# Patient Record
Sex: MALE | Race: WHITE | Employment: OTHER | URBAN - METROPOLITAN AREA
[De-identification: names, ages, dates, MRNs, and addresses within clinical notes are randomized per-mention and may not be internally consistent; named-entity substitution may affect disease eponyms.]

---

## 2017-01-17 ENCOUNTER — LAB ENCOUNTER (OUTPATIENT)
Dept: LAB | Age: 72
End: 2017-01-17
Attending: INTERNAL MEDICINE
Payer: MEDICARE

## 2017-01-17 DIAGNOSIS — N18.30 CHRONIC KIDNEY DISEASE, STAGE III (MODERATE) (HCC): Primary | ICD-10-CM

## 2017-01-17 LAB
ALBUMIN SERPL-MCNC: 3.9 G/DL (ref 3.5–4.8)
ALP LIVER SERPL-CCNC: 101 U/L (ref 45–117)
ALT SERPL-CCNC: 20 U/L (ref 17–63)
AST SERPL-CCNC: 20 U/L (ref 15–41)
BASOPHILS # BLD AUTO: 0.08 X10(3) UL (ref 0–0.1)
BASOPHILS NFR BLD AUTO: 1.2 %
BILIRUB SERPL-MCNC: 0.4 MG/DL (ref 0.1–2)
BILIRUB UR QL STRIP.AUTO: NEGATIVE
BUN BLD-MCNC: 50 MG/DL (ref 8–20)
CALCIUM BLD-MCNC: 9 MG/DL (ref 8.3–10.3)
CHLORIDE: 106 MMOL/L (ref 101–111)
CLARITY UR REFRACT.AUTO: CLEAR
CO2: 26 MMOL/L (ref 22–32)
COLOR UR AUTO: YELLOW
CREAT BLD-MCNC: 2.68 MG/DL (ref 0.7–1.3)
CREAT UR-SCNC: 71.5 MG/DL
EOSINOPHIL # BLD AUTO: 0.42 X10(3) UL (ref 0–0.3)
EOSINOPHIL NFR BLD AUTO: 6.1 %
ERYTHROCYTE [DISTWIDTH] IN BLOOD BY AUTOMATED COUNT: 14 % (ref 11.5–16)
GLUCOSE BLD-MCNC: 200 MG/DL (ref 70–99)
GLUCOSE UR STRIP.AUTO-MCNC: 50 MG/DL
HCT VFR BLD AUTO: 34.6 % (ref 37–53)
HGB BLD-MCNC: 11.2 G/DL (ref 13–17)
HYALINE CASTS #/AREA URNS AUTO: PRESENT /LPF
IMMATURE GRANULOCYTE COUNT: 0.02 X10(3) UL (ref 0–1)
IMMATURE GRANULOCYTE RATIO %: 0.3 %
KETONES UR STRIP.AUTO-MCNC: NEGATIVE MG/DL
LEUKOCYTE ESTERASE UR QL STRIP.AUTO: NEGATIVE
LYMPHOCYTES # BLD AUTO: 1.13 X10(3) UL (ref 0.9–4)
LYMPHOCYTES NFR BLD AUTO: 16.4 %
M PROTEIN MFR SERPL ELPH: 6.7 G/DL (ref 6.1–8.3)
MCH RBC QN AUTO: 28.1 PG (ref 27–33.2)
MCHC RBC AUTO-ENTMCNC: 32.4 G/DL (ref 31–37)
MCV RBC AUTO: 86.9 FL (ref 80–99)
MONOCYTES # BLD AUTO: 0.72 X10(3) UL (ref 0.1–0.6)
MONOCYTES NFR BLD AUTO: 10.4 %
NEUTROPHIL ABS PRELIM: 4.53 X10 (3) UL (ref 1.3–6.7)
NEUTROPHILS # BLD AUTO: 4.53 X10(3) UL (ref 1.3–6.7)
NEUTROPHILS NFR BLD AUTO: 65.6 %
NITRITE UR QL STRIP.AUTO: NEGATIVE
PH UR STRIP.AUTO: 5 [PH] (ref 4.5–8)
PHOSPHATE SERPL-MCNC: 5.6 MG/DL (ref 2.5–4.9)
PLATELET # BLD AUTO: 240 10(3)UL (ref 150–450)
POTASSIUM SERPL-SCNC: 4.1 MMOL/L (ref 3.6–5.1)
PROT UR STRIP.AUTO-MCNC: 100 MG/DL
PROT UR-MCNC: 198.6 MG/DL
PTH-INTACT SERPL-MCNC: 127.6 PG/ML (ref 11.1–79.5)
RBC # BLD AUTO: 3.98 X10(6)UL (ref 3.8–5.8)
RBC UR QL AUTO: NEGATIVE
RED CELL DISTRIBUTION WIDTH-SD: 43.8 FL (ref 35.1–46.3)
SODIUM SERPL-SCNC: 142 MMOL/L (ref 136–144)
SP GR UR STRIP.AUTO: 1.01 (ref 1–1.03)
UROBILINOGEN UR STRIP.AUTO-MCNC: <2 MG/DL
WBC # BLD AUTO: 6.9 X10(3) UL (ref 4–13)

## 2017-01-17 PROCEDURE — 81001 URINALYSIS AUTO W/SCOPE: CPT

## 2017-01-17 PROCEDURE — 80053 COMPREHEN METABOLIC PANEL: CPT

## 2017-01-17 PROCEDURE — 83970 ASSAY OF PARATHORMONE: CPT

## 2017-01-17 PROCEDURE — 85025 COMPLETE CBC W/AUTO DIFF WBC: CPT

## 2017-01-17 PROCEDURE — 84156 ASSAY OF PROTEIN URINE: CPT

## 2017-01-17 PROCEDURE — 84100 ASSAY OF PHOSPHORUS: CPT

## 2017-01-17 PROCEDURE — 82570 ASSAY OF URINE CREATININE: CPT

## 2017-05-15 ENCOUNTER — LAB ENCOUNTER (OUTPATIENT)
Dept: LAB | Age: 72
End: 2017-05-15
Attending: INTERNAL MEDICINE
Payer: MEDICARE

## 2017-05-15 DIAGNOSIS — N18.30 CHRONIC KIDNEY DISEASE, STAGE III (MODERATE) (HCC): Primary | ICD-10-CM

## 2017-05-15 PROCEDURE — 80053 COMPREHEN METABOLIC PANEL: CPT

## 2017-06-22 ENCOUNTER — APPOINTMENT (OUTPATIENT)
Dept: LAB | Facility: HOSPITAL | Age: 72
End: 2017-06-22
Attending: OPHTHALMOLOGY
Payer: MEDICARE

## 2017-06-22 ENCOUNTER — EKG ENCOUNTER (OUTPATIENT)
Dept: LAB | Facility: HOSPITAL | Age: 72
End: 2017-06-22
Attending: OPHTHALMOLOGY
Payer: MEDICARE

## 2017-06-22 DIAGNOSIS — H59.021 RETAINED LENS MATERIAL FOLLOWING CATARACT SURGERY OF RIGHT EYE: Primary | ICD-10-CM

## 2017-06-22 PROCEDURE — 80053 COMPREHEN METABOLIC PANEL: CPT

## 2017-06-22 PROCEDURE — 36415 COLL VENOUS BLD VENIPUNCTURE: CPT

## 2017-06-22 PROCEDURE — 93005 ELECTROCARDIOGRAM TRACING: CPT

## 2017-06-22 PROCEDURE — 93010 ELECTROCARDIOGRAM REPORT: CPT | Performed by: INTERNAL MEDICINE

## 2017-08-24 ENCOUNTER — LAB ENCOUNTER (OUTPATIENT)
Dept: LAB | Age: 72
End: 2017-08-24
Attending: INTERNAL MEDICINE
Payer: MEDICARE

## 2017-08-24 DIAGNOSIS — N25.0 RENAL OSTEODYSTROPHY: ICD-10-CM

## 2017-08-24 DIAGNOSIS — N18.4 CHRONIC KIDNEY DISEASE, STAGE IV (SEVERE) (HCC): Primary | ICD-10-CM

## 2017-08-24 LAB
BUN BLD-MCNC: 55 MG/DL (ref 8–20)
CALCIUM BLD-MCNC: 9.3 MG/DL (ref 8.3–10.3)
CHLORIDE: 106 MMOL/L (ref 101–111)
CO2: 26 MMOL/L (ref 22–32)
CREAT BLD-MCNC: 2.93 MG/DL (ref 0.7–1.3)
GLUCOSE BLD-MCNC: 168 MG/DL (ref 70–99)
PHOSPHATE SERPL-MCNC: 4.6 MG/DL (ref 2.5–4.9)
POTASSIUM SERPL-SCNC: 3.9 MMOL/L (ref 3.6–5.1)
PTH-INTACT SERPL-MCNC: 113.4 PG/ML (ref 11.1–79.5)
SODIUM SERPL-SCNC: 142 MMOL/L (ref 136–144)

## 2017-08-24 PROCEDURE — 84100 ASSAY OF PHOSPHORUS: CPT

## 2017-08-24 PROCEDURE — 83970 ASSAY OF PARATHORMONE: CPT

## 2017-08-24 PROCEDURE — 80048 BASIC METABOLIC PNL TOTAL CA: CPT

## 2017-12-21 PROBLEM — I50.33 ACUTE ON CHRONIC DIASTOLIC CHF (CONGESTIVE HEART FAILURE) (HCC): Status: ACTIVE | Noted: 2017-12-21

## 2017-12-28 ENCOUNTER — LAB ENCOUNTER (OUTPATIENT)
Dept: LAB | Age: 72
End: 2017-12-28
Attending: INTERNAL MEDICINE
Payer: MEDICARE

## 2017-12-28 DIAGNOSIS — N25.0 RENAL OSTEODYSTROPHY: ICD-10-CM

## 2017-12-28 DIAGNOSIS — N18.4 CHRONIC KIDNEY DISEASE, STAGE IV (SEVERE) (HCC): Primary | ICD-10-CM

## 2017-12-28 PROCEDURE — 81001 URINALYSIS AUTO W/SCOPE: CPT

## 2017-12-28 PROCEDURE — 83970 ASSAY OF PARATHORMONE: CPT

## 2017-12-28 PROCEDURE — 84156 ASSAY OF PROTEIN URINE: CPT

## 2017-12-28 PROCEDURE — 85025 COMPLETE CBC W/AUTO DIFF WBC: CPT

## 2017-12-28 PROCEDURE — 82570 ASSAY OF URINE CREATININE: CPT

## 2017-12-28 PROCEDURE — 84100 ASSAY OF PHOSPHORUS: CPT

## 2017-12-28 PROCEDURE — 80053 COMPREHEN METABOLIC PANEL: CPT

## 2018-01-08 ENCOUNTER — LAB ENCOUNTER (OUTPATIENT)
Dept: LAB | Age: 73
End: 2018-01-08
Attending: INTERNAL MEDICINE
Payer: MEDICARE

## 2018-01-08 DIAGNOSIS — D64.9 ANEMIA, UNSPECIFIED: Primary | ICD-10-CM

## 2018-01-08 LAB
FOLATE (FOLIC ACID), SERUM: 11.7 NG/ML (ref 8.7–24)
HAV AB SERPL IA-ACNC: 254 PG/ML (ref 193–986)
IRON SATURATION: 14 % (ref 13–45)
IRON: 46 UG/DL (ref 45–182)
TOTAL IRON BINDING CAPACITY: 326 UG/DL (ref 298–536)
TRANSFERRIN: 219 MG/DL (ref 200–360)

## 2018-01-08 PROCEDURE — 83550 IRON BINDING TEST: CPT

## 2018-01-08 PROCEDURE — 82746 ASSAY OF FOLIC ACID SERUM: CPT

## 2018-01-08 PROCEDURE — 82607 VITAMIN B-12: CPT

## 2018-01-08 PROCEDURE — 83540 ASSAY OF IRON: CPT

## 2018-01-17 PROCEDURE — 86334 IMMUNOFIX E-PHORESIS SERUM: CPT | Performed by: INTERNAL MEDICINE

## 2018-01-17 PROCEDURE — 84165 PROTEIN E-PHORESIS SERUM: CPT | Performed by: INTERNAL MEDICINE

## 2018-01-17 PROCEDURE — 83921 ORGANIC ACID SINGLE QUANT: CPT | Performed by: INTERNAL MEDICINE

## 2018-01-17 PROCEDURE — 83883 ASSAY NEPHELOMETRY NOT SPEC: CPT | Performed by: INTERNAL MEDICINE

## 2018-01-17 PROCEDURE — 82668 ASSAY OF ERYTHROPOIETIN: CPT | Performed by: INTERNAL MEDICINE

## 2018-01-19 PROBLEM — N18.9 ANEMIA ASSOCIATED WITH CHRONIC RENAL FAILURE: Status: ACTIVE | Noted: 2018-01-19

## 2018-01-19 PROBLEM — D63.1 ANEMIA ASSOCIATED WITH CHRONIC RENAL FAILURE: Status: ACTIVE | Noted: 2018-01-19

## 2018-01-22 PROBLEM — E53.8 B12 DEFICIENCY: Status: ACTIVE | Noted: 2018-01-22

## 2018-02-12 ENCOUNTER — HOSPITAL ENCOUNTER (EMERGENCY)
Facility: HOSPITAL | Age: 73
Discharge: HOME | End: 2018-02-12
Attending: EMERGENCY MEDICINE
Payer: MEDICARE

## 2018-02-12 VITALS
HEART RATE: 54 BPM | HEIGHT: 72 IN | RESPIRATION RATE: 16 BRPM | SYSTOLIC BLOOD PRESSURE: 155 MMHG | DIASTOLIC BLOOD PRESSURE: 62 MMHG | OXYGEN SATURATION: 100 % | TEMPERATURE: 97 F | WEIGHT: 250 LBS | BODY MASS INDEX: 33.86 KG/M2

## 2018-02-12 DIAGNOSIS — E10.649 HYPOGLYCEMIC INSULIN REACTION IN TYPE 1 DIABETES MELLITUS (HCC): Primary | ICD-10-CM

## 2018-02-12 LAB
ALBUMIN SERPL-MCNC: 4 G/DL (ref 3.5–4.8)
ALP LIVER SERPL-CCNC: 100 U/L (ref 45–117)
ALT SERPL-CCNC: 15 U/L (ref 17–63)
AST SERPL-CCNC: 22 U/L (ref 15–41)
ATRIAL RATE: 55 BPM
BASOPHILS # BLD AUTO: 0.07 X10(3) UL (ref 0–0.1)
BASOPHILS NFR BLD AUTO: 0.7 %
BILIRUB SERPL-MCNC: 0.6 MG/DL (ref 0.1–2)
BUN BLD-MCNC: 54 MG/DL (ref 8–20)
CALCIUM BLD-MCNC: 9.5 MG/DL (ref 8.3–10.3)
CHLORIDE: 106 MMOL/L (ref 101–111)
CO2: 26 MMOL/L (ref 22–32)
CREAT BLD-MCNC: 3.26 MG/DL (ref 0.7–1.3)
EOSINOPHIL # BLD AUTO: 0.32 X10(3) UL (ref 0–0.3)
EOSINOPHIL NFR BLD AUTO: 3.4 %
ERYTHROCYTE [DISTWIDTH] IN BLOOD BY AUTOMATED COUNT: 13.6 % (ref 11.5–16)
GLUCOSE BLD-MCNC: 118 MG/DL (ref 65–99)
GLUCOSE BLD-MCNC: 79 MG/DL (ref 70–99)
GLUCOSE BLD-MCNC: 96 MG/DL (ref 65–99)
HAV IGM SER QL: 2.5 MG/DL (ref 1.7–3)
HCT VFR BLD AUTO: 30.4 % (ref 37–53)
HGB BLD-MCNC: 10 G/DL (ref 13–17)
IMMATURE GRANULOCYTE COUNT: 0.06 X10(3) UL (ref 0–1)
IMMATURE GRANULOCYTE RATIO %: 0.6 %
LYMPHOCYTES # BLD AUTO: 0.49 X10(3) UL (ref 0.9–4)
LYMPHOCYTES NFR BLD AUTO: 5.2 %
M PROTEIN MFR SERPL ELPH: 7.6 G/DL (ref 6.1–8.3)
MCH RBC QN AUTO: 27.9 PG (ref 27–33.2)
MCHC RBC AUTO-ENTMCNC: 32.9 G/DL (ref 31–37)
MCV RBC AUTO: 84.7 FL (ref 80–99)
MONOCYTES # BLD AUTO: 0.56 X10(3) UL (ref 0.1–1)
MONOCYTES NFR BLD AUTO: 6 %
NEUTROPHIL ABS PRELIM: 7.91 X10 (3) UL (ref 1.3–6.7)
NEUTROPHILS # BLD AUTO: 7.91 X10(3) UL (ref 1.3–6.7)
NEUTROPHILS NFR BLD AUTO: 84.1 %
P AXIS: 67 DEGREES
P-R INTERVAL: 254 MS
PLATELET # BLD AUTO: 257 10(3)UL (ref 150–450)
POTASSIUM SERPL-SCNC: 3.2 MMOL/L (ref 3.6–5.1)
Q-T INTERVAL: 510 MS
QRS DURATION: 116 MS
QTC CALCULATION (BEZET): 487 MS
R AXIS: 64 DEGREES
RBC # BLD AUTO: 3.59 X10(6)UL (ref 3.8–5.8)
RED CELL DISTRIBUTION WIDTH-SD: 41.8 FL (ref 35.1–46.3)
SODIUM SERPL-SCNC: 141 MMOL/L (ref 136–144)
T AXIS: 127 DEGREES
VENTRICULAR RATE: 55 BPM
WBC # BLD AUTO: 9.4 X10(3) UL (ref 4–13)

## 2018-02-12 PROCEDURE — 82962 GLUCOSE BLOOD TEST: CPT

## 2018-02-12 PROCEDURE — 99285 EMERGENCY DEPT VISIT HI MDM: CPT

## 2018-02-12 PROCEDURE — 80053 COMPREHEN METABOLIC PANEL: CPT | Performed by: EMERGENCY MEDICINE

## 2018-02-12 PROCEDURE — 96360 HYDRATION IV INFUSION INIT: CPT

## 2018-02-12 PROCEDURE — 83735 ASSAY OF MAGNESIUM: CPT | Performed by: EMERGENCY MEDICINE

## 2018-02-12 PROCEDURE — 96361 HYDRATE IV INFUSION ADD-ON: CPT

## 2018-02-12 PROCEDURE — 93010 ELECTROCARDIOGRAM REPORT: CPT

## 2018-02-12 PROCEDURE — 93005 ELECTROCARDIOGRAM TRACING: CPT

## 2018-02-12 PROCEDURE — 85025 COMPLETE CBC W/AUTO DIFF WBC: CPT | Performed by: EMERGENCY MEDICINE

## 2018-02-12 RX ORDER — SODIUM CHLORIDE 9 MG/ML
125 INJECTION, SOLUTION INTRAVENOUS CONTINUOUS
Status: DISCONTINUED | OUTPATIENT
Start: 2018-02-12 | End: 2018-02-12

## 2018-02-12 NOTE — ED PROVIDER NOTES
Patient Seen in: [facility] Emergency Department    History   Patient presents with:  Fall (musculoskeletal, neurologic)  Hypoglycemia (metabolic)    Stated Complaint: fall    HPI    71-year-old male complaining of hypoglycemia.   This patient  Has a Reena Nelson MD;  Location: [address] [place] [address]                MANAGEMENT  1/25/2016: INJECTION, W/WO CONTRAST, DX/THERAPEUTIC SUBST* N/A      Comment: Procedure: LUMBAR EPIDURAL;  Surgeon: Lyndsay Casanova MD;  Location: [address] [place] [address] PAIN                MANAGEMENT  1/25/2016: PATIENT [place] Mihir PREOPERATIVE ORDER FOR IV ANT* N/A      Comment: Procedure: LUMBAR EPIDURAL;  Surgeon: Micaela Hamman, MD;  Location: [address] [place] [address]                MANAGEMENT  3/2/2016: Ermias Snow limits   CBC W/ DIFFERENTIAL - Abnormal; Notable for the following:     RBC 3.59 (*)     HGB 10.0 (*)     HCT 30.4 (*)     Neutrophil Absolute Prelim 7.91 (*)     Neutrophil Absolute 7.91 (*)     Lymphocyte Absolute 0.49 (*)     Eosinophil Absolute 0.32 Harrison Monzon [facility])  (primary encounter diagnosis)    Disposition:  Discharge  2/12/2018 11:27 am    Follow-up:  Hans Lousi MD  [address]      In 2 days  As needed        Medications Prescribed:  Discharge Medication List as

## 2018-07-03 ENCOUNTER — LAB ENCOUNTER (OUTPATIENT)
Dept: LAB | Age: 73
End: 2018-07-03
Attending: INTERNAL MEDICINE
Payer: MEDICARE

## 2018-07-03 DIAGNOSIS — N25.0 RENAL OSTEODYSTROPHY: ICD-10-CM

## 2018-07-03 DIAGNOSIS — N18.4 CHRONIC KIDNEY DISEASE, STAGE IV (SEVERE) (HCC): Primary | ICD-10-CM

## 2018-07-03 LAB
BUN BLD-MCNC: 86 MG/DL (ref 8–20)
CALCIUM BLD-MCNC: 9.7 MG/DL (ref 8.3–10.3)
CHLORIDE: 99 MMOL/L (ref 101–111)
CO2: 30 MMOL/L (ref 22–32)
CREAT BLD-MCNC: 3.83 MG/DL (ref 0.7–1.3)
GLUCOSE BLD-MCNC: 120 MG/DL (ref 70–99)
PHOSPHATE SERPL-MCNC: 4.1 MG/DL (ref 2.5–4.9)
POTASSIUM SERPL-SCNC: 3.3 MMOL/L (ref 3.6–5.1)
PTH-INTACT SERPL-MCNC: 155 PG/ML (ref 11.1–79.5)
SODIUM SERPL-SCNC: 141 MMOL/L (ref 136–144)

## 2018-07-03 PROCEDURE — 84100 ASSAY OF PHOSPHORUS: CPT

## 2018-07-03 PROCEDURE — 82607 VITAMIN B-12: CPT | Performed by: INTERNAL MEDICINE

## 2018-07-03 PROCEDURE — 83970 ASSAY OF PARATHORMONE: CPT

## 2018-07-03 PROCEDURE — 80048 BASIC METABOLIC PNL TOTAL CA: CPT

## 2018-07-03 PROCEDURE — 82746 ASSAY OF FOLIC ACID SERUM: CPT | Performed by: INTERNAL MEDICINE

## 2018-09-05 ENCOUNTER — LAB ENCOUNTER (OUTPATIENT)
Dept: LAB | Age: 73
End: 2018-09-05
Attending: INTERNAL MEDICINE
Payer: MEDICARE

## 2018-09-05 DIAGNOSIS — N25.0 RENAL OSTEODYSTROPHY: Primary | ICD-10-CM

## 2018-09-05 LAB
ALBUMIN SERPL-MCNC: 3.7 G/DL (ref 3.5–4.8)
ALBUMIN/GLOB SERPL: 1.2 {RATIO} (ref 1–2)
ALP LIVER SERPL-CCNC: 91 U/L (ref 45–117)
ALT SERPL-CCNC: 13 U/L (ref 17–63)
ANION GAP SERPL CALC-SCNC: 9 MMOL/L (ref 0–18)
AST SERPL-CCNC: 16 U/L (ref 15–41)
BILIRUB SERPL-MCNC: 0.5 MG/DL (ref 0.1–2)
BUN BLD-MCNC: 59 MG/DL (ref 8–20)
BUN/CREAT SERPL: 18.7 (ref 10–20)
CALCIUM BLD-MCNC: 8.6 MG/DL (ref 8.3–10.3)
CHLORIDE SERPL-SCNC: 103 MMOL/L (ref 101–111)
CO2 SERPL-SCNC: 28 MMOL/L (ref 22–32)
CREAT BLD-MCNC: 3.16 MG/DL (ref 0.7–1.3)
GLOBULIN PLAS-MCNC: 3.1 G/DL (ref 2.5–4)
GLUCOSE BLD-MCNC: 144 MG/DL (ref 70–99)
M PROTEIN MFR SERPL ELPH: 6.8 G/DL (ref 6.1–8.3)
OSMOLALITY SERPL CALC.SUM OF ELEC: 309 MOSM/KG (ref 275–295)
PHOSPHATE SERPL-MCNC: 4.8 MG/DL (ref 2.5–4.9)
POTASSIUM SERPL-SCNC: 3.4 MMOL/L (ref 3.6–5.1)
PTH-INTACT SERPL-MCNC: 156.8 PG/ML (ref 11.1–79.5)
SODIUM SERPL-SCNC: 140 MMOL/L (ref 136–144)

## 2018-09-05 PROCEDURE — 80053 COMPREHEN METABOLIC PANEL: CPT

## 2018-09-05 PROCEDURE — 84100 ASSAY OF PHOSPHORUS: CPT

## 2018-09-05 PROCEDURE — 83970 ASSAY OF PARATHORMONE: CPT

## 2018-11-14 ENCOUNTER — LAB ENCOUNTER (OUTPATIENT)
Dept: LAB | Age: 73
End: 2018-11-14
Attending: INTERNAL MEDICINE
Payer: MEDICARE

## 2018-11-14 DIAGNOSIS — N25.0 RENAL OSTEODYSTROPHY: Primary | ICD-10-CM

## 2018-11-14 PROCEDURE — 84100 ASSAY OF PHOSPHORUS: CPT

## 2018-11-14 PROCEDURE — 80053 COMPREHEN METABOLIC PANEL: CPT

## 2018-11-14 PROCEDURE — 83970 ASSAY OF PARATHORMONE: CPT

## 2018-11-19 ENCOUNTER — LAB ENCOUNTER (OUTPATIENT)
Dept: LAB | Age: 73
End: 2018-11-19
Attending: INTERNAL MEDICINE
Payer: MEDICARE

## 2018-11-19 DIAGNOSIS — N17.9 AKI (ACUTE KIDNEY INJURY) (HCC): Primary | ICD-10-CM

## 2018-11-19 PROCEDURE — 80048 BASIC METABOLIC PNL TOTAL CA: CPT

## 2018-11-24 ENCOUNTER — LAB ENCOUNTER (OUTPATIENT)
Dept: LAB | Age: 73
End: 2018-11-24
Attending: INTERNAL MEDICINE
Payer: MEDICARE

## 2018-11-24 DIAGNOSIS — N17.9 ACUTE KIDNEY FAILURE, UNSPECIFIED (HCC): Primary | ICD-10-CM

## 2018-11-24 PROCEDURE — 80048 BASIC METABOLIC PNL TOTAL CA: CPT

## 2019-02-06 ENCOUNTER — APPOINTMENT (OUTPATIENT)
Dept: GENERAL RADIOLOGY | Facility: HOSPITAL | Age: 74
DRG: 291 | End: 2019-02-06
Payer: MEDICARE

## 2019-02-06 ENCOUNTER — LAB ENCOUNTER (OUTPATIENT)
Dept: LAB | Age: 74
DRG: 291 | End: 2019-02-06
Attending: INTERNAL MEDICINE
Payer: MEDICARE

## 2019-02-06 ENCOUNTER — HOSPITAL ENCOUNTER (INPATIENT)
Facility: HOSPITAL | Age: 74
LOS: 3 days | Discharge: HOME | DRG: 291 | End: 2019-02-09
Attending: STUDENT IN AN ORGANIZED HEALTH CARE EDUCATION/TRAINING PROGRAM | Admitting: INTERNAL MEDICINE
Payer: MEDICARE

## 2019-02-06 DIAGNOSIS — R50.9 FEVER, UNSPECIFIED FEVER CAUSE: ICD-10-CM

## 2019-02-06 DIAGNOSIS — R05.9 COUGH: ICD-10-CM

## 2019-02-06 DIAGNOSIS — N19 RENAL FAILURE, UNSPECIFIED CHRONICITY: ICD-10-CM

## 2019-02-06 DIAGNOSIS — N25.0 RENAL OSTEODYSTROPHY: ICD-10-CM

## 2019-02-06 DIAGNOSIS — R11.2 NAUSEA AND VOMITING, INTRACTABILITY OF VOMITING NOT SPECIFIED, UNSPECIFIED VOMITING TYPE: ICD-10-CM

## 2019-02-06 DIAGNOSIS — I50.9 ACUTE ON CHRONIC CONGESTIVE HEART FAILURE, UNSPECIFIED HEART FAILURE TYPE (HCC): Primary | ICD-10-CM

## 2019-02-06 DIAGNOSIS — J81.0 ACUTE PULMONARY EDEMA (HCC): ICD-10-CM

## 2019-02-06 DIAGNOSIS — N18.4 CHRONIC KIDNEY DISEASE, STAGE IV (SEVERE) (HCC): Primary | ICD-10-CM

## 2019-02-06 LAB
ALBUMIN SERPL-MCNC: 3.2 G/DL (ref 3.1–4.5)
ALBUMIN SERPL-MCNC: 3.5 G/DL (ref 3.1–4.5)
ALBUMIN/GLOB SERPL: 0.8 {RATIO} (ref 1–2)
ALBUMIN/GLOB SERPL: 1.1 {RATIO} (ref 1–2)
ALLENS TEST: POSITIVE
ALP LIVER SERPL-CCNC: 118 U/L (ref 45–117)
ALP LIVER SERPL-CCNC: 135 U/L (ref 45–117)
ALT SERPL-CCNC: 13 U/L (ref 17–63)
ALT SERPL-CCNC: 15 U/L (ref 17–63)
ANION GAP SERPL CALC-SCNC: 7 MMOL/L (ref 0–18)
ANION GAP SERPL CALC-SCNC: 9 MMOL/L (ref 0–18)
APTT PPP: 36.1 SECONDS (ref 26.1–34.6)
ARTERIAL BLD GAS O2 SATURATION: 98 % (ref 92–100)
ARTERIAL BLOOD GAS BASE EXCESS: -0.7
ARTERIAL BLOOD GAS HCO3: 22.9 MEQ/L (ref 22–26)
ARTERIAL BLOOD GAS PCO2: 34 MM HG (ref 35–45)
ARTERIAL BLOOD GAS PH: 7.45 (ref 7.35–7.45)
ARTERIAL BLOOD GAS PO2: 185 MM HG (ref 80–105)
AST SERPL-CCNC: 18 U/L (ref 15–41)
AST SERPL-CCNC: 22 U/L (ref 15–41)
BASOPHILS # BLD AUTO: 0.05 X10(3) UL (ref 0–0.2)
BASOPHILS NFR BLD AUTO: 0.4 %
BILIRUB SERPL-MCNC: 0.5 MG/DL (ref 0.1–2)
BILIRUB SERPL-MCNC: 0.7 MG/DL (ref 0.1–2)
BUN BLD-MCNC: 42 MG/DL (ref 8–20)
BUN BLD-MCNC: 51 MG/DL (ref 8–20)
BUN/CREAT SERPL: 10.9 (ref 10–20)
BUN/CREAT SERPL: 9.8 (ref 10–20)
CALCIUM BLD-MCNC: 8.2 MG/DL (ref 8.3–10.3)
CALCIUM BLD-MCNC: 9.1 MG/DL (ref 8.3–10.3)
CALCULATED O2 SATURATION: 100 % (ref 92–100)
CARBOXYHEMOGLOBIN: 1.2 % SAT (ref 0–3)
CHLORIDE SERPL-SCNC: 107 MMOL/L (ref 101–111)
CHLORIDE SERPL-SCNC: 108 MMOL/L (ref 101–111)
CO2 SERPL-SCNC: 25 MMOL/L (ref 22–32)
CO2 SERPL-SCNC: 25 MMOL/L (ref 22–32)
CREAT BLD-MCNC: 4.27 MG/DL (ref 0.7–1.3)
CREAT BLD-MCNC: 4.69 MG/DL (ref 0.7–1.3)
DEPRECATED RDW RBC AUTO: 45.3 FL (ref 35.1–46.3)
EOSINOPHIL # BLD AUTO: 0.26 X10(3) UL (ref 0–0.7)
EOSINOPHIL NFR BLD AUTO: 1.8 %
ERYTHROCYTE [DISTWIDTH] IN BLOOD BY AUTOMATED COUNT: 14.5 % (ref 11–15)
EXPIRATORY PRESSURE: 6 CM H2O
FIO2: 50 %
GLOBULIN PLAS-MCNC: 3 G/DL (ref 2.8–4.4)
GLOBULIN PLAS-MCNC: 4.3 G/DL (ref 2.8–4.4)
GLUCOSE BLD-MCNC: 145 MG/DL (ref 70–99)
GLUCOSE BLD-MCNC: 209 MG/DL (ref 70–99)
HCT VFR BLD AUTO: 29.6 % (ref 39–53)
HGB BLD-MCNC: 9.8 G/DL (ref 13–17.5)
IMM GRANULOCYTES # BLD AUTO: 0.07 X10(3) UL (ref 0–1)
IMM GRANULOCYTES NFR BLD: 0.5 %
INR BLD: 1.24 (ref 0.9–1.1)
INSP PRESSURE: 14 CM H2O
LACTIC ACID: 2 MMOL/L (ref 0.5–2)
LYMPHOCYTES # BLD AUTO: 0.34 X10(3) UL (ref 1–4)
LYMPHOCYTES NFR BLD AUTO: 2.4 %
M PROTEIN MFR SERPL ELPH: 6.2 G/DL (ref 6.4–8.2)
M PROTEIN MFR SERPL ELPH: 7.8 G/DL (ref 6.4–8.2)
MCH RBC QN AUTO: 28.7 PG (ref 26–34)
MCHC RBC AUTO-ENTMCNC: 33.1 G/DL (ref 31–37)
MCV RBC AUTO: 86.5 FL (ref 80–100)
METHEMOGLOBIN: 0.7 % SAT (ref 0.4–1.5)
MONOCYTES # BLD AUTO: 0.82 X10(3) UL (ref 0.1–1)
MONOCYTES NFR BLD AUTO: 5.8 %
NEUTROPHILS # BLD AUTO: 12.67 X10 (3) UL (ref 1.5–7.7)
NEUTROPHILS # BLD AUTO: 12.67 X10(3) UL (ref 1.5–7.7)
NEUTROPHILS NFR BLD AUTO: 89.1 %
OSMOLALITY SERPL CALC.SUM OF ELEC: 303 MOSM/KG (ref 275–295)
OSMOLALITY SERPL CALC.SUM OF ELEC: 312 MOSM/KG (ref 275–295)
P/F RATIO: 368.4 MMHG
PATIENT TEMPERATURE: 99 F
PHOSPHATE SERPL-MCNC: 3.9 MG/DL (ref 2.5–4.9)
PLATELET # BLD AUTO: 226 10(3)UL (ref 150–450)
POTASSIUM SERPL-SCNC: 4.1 MMOL/L (ref 3.6–5.1)
POTASSIUM SERPL-SCNC: 4.3 MMOL/L (ref 3.6–5.1)
PRO-BETA NATRIURETIC PEPTIDE: 7352 PG/ML (ref ?–125)
PROCALCITONIN SERPL-MCNC: 0.17 NG/ML
PSA SERPL DL<=0.01 NG/ML-MCNC: 16.1 SECONDS (ref 12.4–14.7)
PTH-INTACT SERPL-MCNC: 233.5 PG/ML (ref 11.1–79.5)
RBC # BLD AUTO: 3.42 X10(6)UL (ref 3.8–5.8)
SODIUM SERPL-SCNC: 140 MMOL/L (ref 136–144)
SODIUM SERPL-SCNC: 141 MMOL/L (ref 136–144)
TOTAL HEMOGLOBIN: 9.4 G/DL (ref 12.6–17.4)
TROPONIN I SERPL-MCNC: <0.046 NG/ML (ref ?–0.05)
VENT RATE: 16 /MIN
WBC # BLD AUTO: 14.2 X10(3) UL (ref 4–11)

## 2019-02-06 PROCEDURE — 71045 X-RAY EXAM CHEST 1 VIEW: CPT | Performed by: STUDENT IN AN ORGANIZED HEALTH CARE EDUCATION/TRAINING PROGRAM

## 2019-02-06 PROCEDURE — 83970 ASSAY OF PARATHORMONE: CPT

## 2019-02-06 PROCEDURE — 80053 COMPREHEN METABOLIC PANEL: CPT

## 2019-02-06 PROCEDURE — 84100 ASSAY OF PHOSPHORUS: CPT

## 2019-02-06 PROCEDURE — 5A09357 ASSISTANCE WITH RESPIRATORY VENTILATION, LESS THAN 24 CONSECUTIVE HOURS, CONTINUOUS POSITIVE AIRWAY PRESSURE: ICD-10-PCS | Performed by: INTERNAL MEDICINE

## 2019-02-06 RX ORDER — FUROSEMIDE 10 MG/ML
40 INJECTION INTRAMUSCULAR; INTRAVENOUS ONCE
Status: COMPLETED | OUTPATIENT
Start: 2019-02-06 | End: 2019-02-06

## 2019-02-06 RX ORDER — HYDRALAZINE HYDROCHLORIDE 50 MG/1
100 TABLET, FILM COATED ORAL 3 TIMES DAILY
Status: DISCONTINUED | OUTPATIENT
Start: 2019-02-06 | End: 2019-02-09

## 2019-02-06 RX ORDER — ACETAMINOPHEN 500 MG
TABLET ORAL
Status: DISPENSED
Start: 2019-02-06 | End: 2019-02-07

## 2019-02-06 RX ORDER — FUROSEMIDE 10 MG/ML
40 INJECTION INTRAMUSCULAR; INTRAVENOUS
Status: DISCONTINUED | OUTPATIENT
Start: 2019-02-07 | End: 2019-02-09

## 2019-02-06 RX ORDER — AMLODIPINE BESYLATE 5 MG/1
10 TABLET ORAL
Status: DISCONTINUED | OUTPATIENT
Start: 2019-02-07 | End: 2019-02-09

## 2019-02-06 RX ORDER — CARVEDILOL 12.5 MG/1
25 TABLET ORAL
Status: DISCONTINUED | OUTPATIENT
Start: 2019-02-07 | End: 2019-02-09

## 2019-02-06 RX ORDER — HYDRALAZINE HYDROCHLORIDE 20 MG/ML
10 INJECTION INTRAMUSCULAR; INTRAVENOUS
Status: DISCONTINUED | OUTPATIENT
Start: 2019-02-06 | End: 2019-02-09

## 2019-02-06 RX ORDER — CLONIDINE HYDROCHLORIDE 0.1 MG/1
0.1 TABLET ORAL 2 TIMES DAILY
Status: DISCONTINUED | OUTPATIENT
Start: 2019-02-06 | End: 2019-02-09

## 2019-02-06 RX ORDER — ATORVASTATIN CALCIUM 80 MG/1
80 TABLET, FILM COATED ORAL NIGHTLY
Status: DISCONTINUED | OUTPATIENT
Start: 2019-02-06 | End: 2019-02-09

## 2019-02-06 RX ORDER — ASPIRIN 325 MG
325 TABLET, DELAYED RELEASE (ENTERIC COATED) ORAL DAILY
Status: DISCONTINUED | OUTPATIENT
Start: 2019-02-06 | End: 2019-02-09

## 2019-02-06 RX ORDER — ACETAMINOPHEN 500 MG
1000 TABLET ORAL ONCE
Status: COMPLETED | OUTPATIENT
Start: 2019-02-06 | End: 2019-02-06

## 2019-02-06 RX ORDER — NITROGLYCERIN 20 MG/100ML
200 INJECTION INTRAVENOUS
Status: DISCONTINUED | OUTPATIENT
Start: 2019-02-06 | End: 2019-02-09

## 2019-02-06 RX ORDER — ONDANSETRON 2 MG/ML
4 INJECTION INTRAMUSCULAR; INTRAVENOUS ONCE
Status: COMPLETED | OUTPATIENT
Start: 2019-02-06 | End: 2019-02-06

## 2019-02-07 ENCOUNTER — APPOINTMENT (OUTPATIENT)
Dept: INTERVENTIONAL RADIOLOGY/VASCULAR | Facility: HOSPITAL | Age: 74
DRG: 291 | End: 2019-02-07
Attending: INTERNAL MEDICINE
Payer: MEDICARE

## 2019-02-07 ENCOUNTER — APPOINTMENT (OUTPATIENT)
Dept: CV DIAGNOSTICS | Facility: HOSPITAL | Age: 74
DRG: 291 | End: 2019-02-07
Attending: INTERNAL MEDICINE
Payer: MEDICARE

## 2019-02-07 PROBLEM — N18.6 ESRD (END STAGE RENAL DISEASE) (HCC): Status: ACTIVE | Noted: 2019-02-06

## 2019-02-07 LAB
ADENOVIRUS PCR:: NEGATIVE
ANION GAP SERPL CALC-SCNC: 5 MMOL/L (ref 0–18)
B PERT DNA SPEC QL NAA+PROBE: NEGATIVE
BILIRUB UR QL STRIP.AUTO: NEGATIVE
BUN BLD-MCNC: 49 MG/DL (ref 8–20)
BUN/CREAT SERPL: 10.6 (ref 10–20)
C PNEUM DNA SPEC QL NAA+PROBE: NEGATIVE
CALCIUM BLD-MCNC: 8.3 MG/DL (ref 8.3–10.3)
CHLORIDE SERPL-SCNC: 110 MMOL/L (ref 101–111)
CLARITY UR REFRACT.AUTO: CLEAR
CO2 SERPL-SCNC: 27 MMOL/L (ref 22–32)
CORONAVIRUS 229E PCR:: POSITIVE
CORONAVIRUS HKU1 PCR:: NEGATIVE
CORONAVIRUS NL63 PCR:: NEGATIVE
CORONAVIRUS OC43 PCR:: NEGATIVE
CREAT BLD-MCNC: 4.63 MG/DL (ref 0.7–1.3)
DEPRECATED HBV CORE AB SER IA-ACNC: 241.7 NG/ML (ref 30–530)
EST. AVERAGE GLUCOSE BLD GHB EST-MCNC: 160 MG/DL (ref 68–126)
FLUAV RNA SPEC QL NAA+PROBE: NEGATIVE
FLUBV RNA SPEC QL NAA+PROBE: NEGATIVE
GLUCOSE BLD-MCNC: 113 MG/DL (ref 70–99)
GLUCOSE BLD-MCNC: 116 MG/DL (ref 65–99)
GLUCOSE BLD-MCNC: 125 MG/DL (ref 65–99)
GLUCOSE BLD-MCNC: 138 MG/DL (ref 65–99)
GLUCOSE BLD-MCNC: 152 MG/DL (ref 65–99)
GLUCOSE UR STRIP.AUTO-MCNC: 50 MG/DL
HAV IGM SER QL: 2.4 MG/DL (ref 1.8–2.5)
HBA1C MFR BLD HPLC: 7.2 % (ref ?–5.7)
HBV SURFACE AG SER-ACNC: <0.1 [IU]/L
HBV SURFACE AG SERPL QL IA: NONREACTIVE
IRON SATURATION: 10 % (ref 20–50)
IRON: 21 UG/DL (ref 45–182)
KETONES UR STRIP.AUTO-MCNC: NEGATIVE MG/DL
LEUKOCYTE ESTERASE UR QL STRIP.AUTO: NEGATIVE
METAPNEUMOVIRUS PCR:: NEGATIVE
MYCOPLASMA PNEUMONIA PCR:: NEGATIVE
NITRITE UR QL STRIP.AUTO: NEGATIVE
OSMOLALITY SERPL CALC.SUM OF ELEC: 308 MOSM/KG (ref 275–295)
PARAINFLUENZA 1 PCR:: NEGATIVE
PARAINFLUENZA 2 PCR:: NEGATIVE
PARAINFLUENZA 3 PCR:: NEGATIVE
PARAINFLUENZA 4 PCR:: NEGATIVE
PH UR STRIP.AUTO: 5 [PH] (ref 4.5–8)
POTASSIUM SERPL-SCNC: 3.8 MMOL/L (ref 3.6–5.1)
PROT UR STRIP.AUTO-MCNC: 100 MG/DL
RHINOVIRUS/ENTERO PCR:: NEGATIVE
RSV RNA SPEC QL NAA+PROBE: NEGATIVE
SODIUM SERPL-SCNC: 142 MMOL/L (ref 136–144)
SP GR UR STRIP.AUTO: 1.01 (ref 1–1.03)
TOTAL IRON BINDING CAPACITY: 216 UG/DL (ref 240–450)
TRANSFERRIN SERPL-MCNC: 145 MG/DL (ref 200–360)
UROBILINOGEN UR STRIP.AUTO-MCNC: <2 MG/DL

## 2019-02-07 PROCEDURE — 5A1D70Z PERFORMANCE OF URINARY FILTRATION, INTERMITTENT, LESS THAN 6 HOURS PER DAY: ICD-10-PCS | Performed by: RADIOLOGY

## 2019-02-07 PROCEDURE — 0JH63XZ INSERTION OF TUNNELED VASCULAR ACCESS DEVICE INTO CHEST SUBCUTANEOUS TISSUE AND FASCIA, PERCUTANEOUS APPROACH: ICD-10-PCS | Performed by: RADIOLOGY

## 2019-02-07 PROCEDURE — 02HV33Z INSERTION OF INFUSION DEVICE INTO SUPERIOR VENA CAVA, PERCUTANEOUS APPROACH: ICD-10-PCS | Performed by: RADIOLOGY

## 2019-02-07 PROCEDURE — 99223 1ST HOSP IP/OBS HIGH 75: CPT | Performed by: INTERNAL MEDICINE

## 2019-02-07 PROCEDURE — 93306 TTE W/DOPPLER COMPLETE: CPT | Performed by: INTERNAL MEDICINE

## 2019-02-07 RX ORDER — HEPARIN SODIUM 1000 [USP'U]/ML
1.5 INJECTION, SOLUTION INTRAVENOUS; SUBCUTANEOUS ONCE
Status: DISCONTINUED | OUTPATIENT
Start: 2019-02-07 | End: 2019-02-09

## 2019-02-07 RX ORDER — ACETAMINOPHEN 325 MG/1
650 TABLET ORAL EVERY 6 HOURS PRN
Status: DISCONTINUED | OUTPATIENT
Start: 2019-02-07 | End: 2019-02-09

## 2019-02-07 RX ORDER — LIDOCAINE HYDROCHLORIDE 10 MG/ML
INJECTION, SOLUTION INFILTRATION; PERINEURAL
Status: COMPLETED
Start: 2019-02-07 | End: 2019-02-07

## 2019-02-07 RX ORDER — HEPARIN SODIUM 5000 [USP'U]/ML
INJECTION, SOLUTION INTRAVENOUS; SUBCUTANEOUS
Status: COMPLETED
Start: 2019-02-07 | End: 2019-02-07

## 2019-02-07 RX ORDER — LOSARTAN POTASSIUM 50 MG/1
50 TABLET ORAL DAILY
Status: DISCONTINUED | OUTPATIENT
Start: 2019-02-07 | End: 2019-02-09

## 2019-02-07 RX ORDER — MIDAZOLAM HYDROCHLORIDE 1 MG/ML
INJECTION INTRAMUSCULAR; INTRAVENOUS
Status: COMPLETED
Start: 2019-02-07 | End: 2019-02-07

## 2019-02-07 RX ORDER — HEPARIN SODIUM 1000 [USP'U]/ML
1.5 INJECTION, SOLUTION INTRAVENOUS; SUBCUTANEOUS
Status: DISCONTINUED | OUTPATIENT
Start: 2019-02-07 | End: 2019-02-09

## 2019-02-07 RX ORDER — CEFAZOLIN SODIUM 1 G/3ML
INJECTION, POWDER, FOR SOLUTION INTRAMUSCULAR; INTRAVENOUS
Status: DISCONTINUED
Start: 2019-02-07 | End: 2019-02-07 | Stop reason: WASHOUT

## 2019-02-07 RX ORDER — HEPARIN SODIUM 5000 [USP'U]/ML
5000 INJECTION, SOLUTION INTRAVENOUS; SUBCUTANEOUS EVERY 8 HOURS SCHEDULED
Status: DISCONTINUED | OUTPATIENT
Start: 2019-02-07 | End: 2019-02-09

## 2019-02-07 RX ORDER — DEXTROSE MONOHYDRATE 25 G/50ML
50 INJECTION, SOLUTION INTRAVENOUS
Status: DISCONTINUED | OUTPATIENT
Start: 2019-02-07 | End: 2019-02-09

## 2019-02-07 NOTE — PLAN OF CARE
Received from ED around 2340 on RA. Alert and oriented x4. Denies SOB/CP. Lungs clear, breathing non-labored and equal. sBP <160, currently on low dose of nitro, will wean as tolerated. Using own insulin pump per endo. Urinal at bedside.  Will continue to m

## 2019-02-07 NOTE — CONSULTS
[facility]  Consultation Note      Lucita Pan Patient Status:  Inpatient    1945 MRN [id number]   [facility] 6NE-A Attending Vi Brooke MD   [facility] Day # 1 PCP Jessica Lawrence.  Ramon Bradley MD     Outpatient PCT 0.17. BiPAP and NTG improved work of breathing, RA this AM, weaning off NTG gtt. s/p lasix 40mg IV x 2 (last night, this AM). Cardiology consultation was requested.     Medications:    Current Facility-Administered Medications:  acetaminophen PAIN MANAGEMENT   • LUMBAR EPIDURAL N/A 1/25/2016    Performed by Caleb Pichardo MD at [address] 12/10/2015    Performed by Caleb Pichardo MD at [facility] murmurs/rubs/gallops are appreciated; PMI is non-displaced; there is no evidence of a sternal heave  Lungs: coarse BS bilaterally, no accessory muscle use is noted  Abdomen: Soft, non-tender; bowel sounds are normoactive; no hepatosplenomegaly  Extremities

## 2019-02-07 NOTE — RESPIRATORY THERAPY NOTE
DESI : EQUIPMENT USE: DAILY SUMMARY                                            SET MODE: CPAP                                          USAGE IN HOURS: 0:55                                          90% EXP. PRESSURE(E

## 2019-02-07 NOTE — H&P
[facility] hospitalist H+P  PCP Suzan Alarcon MD  CC SOB  HPI 73 yo male with multiple medical problems including but not limited to CKD, CAD, HTN, DESI, DM type 1 on insulin pum  Here with SOB, cough, fever. Feeling better.  During my visit no pain, pain quant Brother    • Diabetes Brother      Social History    Socioeconomic History      Marital status:       Spouse name: Not on file      Number of children: Not on file      Years of education: Not on file      Highest education level: Not on file    Soc TAKE 1 TABLET BY MOUTH TWO  TIMES DAILY WITH MEALS Disp: 180 tablet Rfl: 3   furosemide 80 MG Oral Tab Take 1 tablet (80 mg total) by mouth 2 (two) times daily.  Disp: 60 tablet Rfl: 5   HUMALOG 100 UNIT/ML Subcutaneous Solution 95 UNITS PER DAY AS DIRECTED bedside in agreement    CKD, ESRD  Consult renal    HTN uncontrolled  Medication ordered with improvement    CAD denies chest pain  Cardiology consulted    Preventative heparin    Keyshawn Quevedo MD  [facility] hospitalist  [phone]

## 2019-02-07 NOTE — PROGRESS NOTES
[facility] Note: Antimicrobial Weight Dose Adjustment for: ceftriaxone (ROCEPHIN)    Finn Hughes is a 72year old male who has been prescribed ceftriaxone (ROCEPHIN) 1g x1 dose in the ED.     Based on weight > 100kg, the dose was adjusted to cef

## 2019-02-07 NOTE — CONSULTS
[facility]  Report of Consultation    Quincy Dancer Patient Status:  Inpatient    1945 MRN [id number]   [facility] 6NE-A Attending Kassandra Min MD   [address] Day # 1 PCP Jorge Doctor.  Alfonso Keyes MD     Reason for Consultation Coronary Artery Stents 12/2009 8/16/2010   • Stented coronary artery    • Type 1 diabetes mellitus ([facility] [address]    • Unspecified essential hypertension      Past Surgical History:   Procedure Laterality Date   • BACK SURGERY  5/16/16    L2-L5 Decomp poss uninstru file.    Review of Systems:  + fever  + wt gain  Denies HA or visual changes  Denies CP or palpitations  + SOB/cough  Denies abd or flank pain  Denies N/V/D  Denies change in urinary habits or gross hematuria  + LE edema  Denies skin rashes/myalgias/arthra 02/07/2019       Glucose (mg/dL)   Date Value   01/09/2014 136 (H)   08/27/2012 270 (H)   01/12/2012 202 (H)     BUN (mg/dL)   Date Value   02/07/2019 49 (H)   02/06/2019 51 (H)   02/06/2019 42 (H)     Blood Urea Nitrogen (mg/dL)   Date Value   06/25/2018 and another treatment tomorrow and we will make arrangements for outpatient 3 times weekly dialysis for now likely at [facility].   Ultimately he and his wife who is a nurse believe that peritoneal dialysis would be a good option for him and I certain

## 2019-02-07 NOTE — CM/SW NOTE
Order received for Outpt hemodialysis. MD note indicates 3 times/wk at the [facility]. Referral info faxed to [facility] intake- [phone]. SW will need to f/u with pt and [facility] intake tomorrow re: time/day options for pt.   Will need to send HD flowsh

## 2019-02-07 NOTE — ED PROVIDER NOTES
Patient Seen in: [facility] Emergency Department    History   Patient presents with:  Dyspnea EJ SOB (respiratory)    Stated Complaint: dyspnea cough swollen legs    HPI    Patient is a 44-year-old male with previous history of coronary artery dise [facility] MAIN OR   • OTHER SURGICAL HISTORY  10/4/12    cysto-Dr. Stephanie Breaux   • REPLACE AORTIC VALVE OPEN  2012           Social History    Tobacco Use      Smoking status: Never Smoker      Smokeless tobacco: Never Used    Alcohol use: No      Alcohol/week: 0.0 oz GFR, Non- 12 (*)     GFR, -American 13 (*)     Alt 15 (*)     Alkaline Phosphatase 135 (*)     A/G Ratio 0.8 (*)     All other components within normal limits   PRO BETA NATRIURETIC PEPTIDE - Abnormal; Notable for the following compo intervals and axes as noted on EKG Report. Rate: 89  Rhythm: Sinus Rhythm  Reading: Normal sinus rhythm, normal intervals, normal axis, nonspecific ST wave abnormality noted.   No significant change from previous EKG performed on February 12, 2018 with the ICD-10-CM Noted POA    Acute on chronic congestive heart failure (HCC) I50.9 2/6/2019 Unknown      In excess of 60 minutes of critical care time (exclusive of billable procedures) was administered to manage the patient's unstable vital signs due to he

## 2019-02-07 NOTE — PROGRESS NOTES
Assumed care of patient at 0730. Patient alert and oriented X 4. Neurologically intact. CPAP in place. Lung sounds have crackles in bases bilaterally. Voiding per urinal.  Tolerating diet without nausea or vomiting.   After breakfast made NPO for inser

## 2019-02-07 NOTE — CONSULTS
Pulmonary / Critical Care H&P/Consult       NAME: Zunilda Russell - ROOM: [facility]/0025-E - MRN: [id number] - Age: 72year old - :  1945    Date of Admission: 2019  8:44 PM  Admission Diagnosis: Cough [R05]  Acute pulmonary edema ([facility] [address] [J81.0] PAIN MANAGEMENT   • LUMBAR EPIDURAL N/A 12/10/2015    Performed by Ema Griffin MD at [facility] ER W/ BONE GRAFT 3 LEVEL N/A 5/16/2016    Performed by Sree Galvan MD at [facility] MAIN OR   • OTHER SURGICAL HI Disp: 60 tablet Rfl: 3   atorvastatin 80 MG Oral Tab Take 1 tablet (80 mg total) by mouth nightly. Disp: 90 tablet Rfl: 3   metolazone 5 MG Oral Tab 5 mg as needed.  Disp:  Rfl:    AmLODIPine Besylate 10 MG Oral Tab Take 1 tablet (10 mg total) by mouth once (110.3 kg)  11/21/18 : 240 lb (108.9 kg)        Intake/Output Summary (Last 24 hours) at 2/7/2019 0856  Last data filed at 2/7/2019 0816  Gross per 24 hour   Intake 255 ml   Output 1200 ml   Net -945 ml       BP (!) 182/70 (BP Location: Right arm)   Pulse negative  rvp + coronavirus  procalc 0.17    Imaging: cxr: bilat interstitial opacities. ASSESSMENT/PLAN:    1. Dyspnea: secondary to pulmonary edema.  Viral bronchitis vs pneumonitis probably contributes but to a lesser degree  - currently on RA  - O2

## 2019-02-07 NOTE — RESPIRATORY THERAPY NOTE
Pt. In room 6621, in er pt on bipap, pt now breathing comfortable, o2 sats 94-95% on room.   Pt has hx of DESI/uses cpap at home     Pt set-up on cpap, per home settings, will continue to monitor

## 2019-02-08 ENCOUNTER — APPOINTMENT (OUTPATIENT)
Dept: GENERAL RADIOLOGY | Facility: HOSPITAL | Age: 74
DRG: 291 | End: 2019-02-08
Attending: INTERNAL MEDICINE
Payer: MEDICARE

## 2019-02-08 LAB
ANION GAP SERPL CALC-SCNC: 9 MMOL/L (ref 0–18)
ATRIAL RATE: 89 BPM
BASOPHILS # BLD AUTO: 0.05 X10(3) UL (ref 0–0.2)
BASOPHILS NFR BLD AUTO: 0.8 %
BUN BLD-MCNC: 44 MG/DL (ref 8–20)
BUN/CREAT SERPL: 11 (ref 10–20)
CALCIUM BLD-MCNC: 8.5 MG/DL (ref 8.3–10.3)
CHLORIDE SERPL-SCNC: 104 MMOL/L (ref 101–111)
CO2 SERPL-SCNC: 27 MMOL/L (ref 22–32)
CREAT BLD-MCNC: 4.01 MG/DL (ref 0.7–1.3)
DEPRECATED RDW RBC AUTO: 46.6 FL (ref 35.1–46.3)
EOSINOPHIL # BLD AUTO: 0.27 X10(3) UL (ref 0–0.7)
EOSINOPHIL NFR BLD AUTO: 4.3 %
ERYTHROCYTE [DISTWIDTH] IN BLOOD BY AUTOMATED COUNT: 14.6 % (ref 11–15)
GLUCOSE BLD-MCNC: 110 MG/DL (ref 65–99)
GLUCOSE BLD-MCNC: 162 MG/DL (ref 65–99)
GLUCOSE BLD-MCNC: 175 MG/DL (ref 65–99)
GLUCOSE BLD-MCNC: 239 MG/DL (ref 65–99)
GLUCOSE BLD-MCNC: 364 MG/DL (ref 65–99)
GLUCOSE BLD-MCNC: 392 MG/DL (ref 65–99)
GLUCOSE BLD-MCNC: 69 MG/DL (ref 65–99)
GLUCOSE BLD-MCNC: 71 MG/DL (ref 70–99)
GLUCOSE BLD-MCNC: 74 MG/DL (ref 65–99)
HAV IGM SER QL: 2.2 MG/DL (ref 1.8–2.5)
HCT VFR BLD AUTO: 24.4 % (ref 39–53)
HGB BLD-MCNC: 8 G/DL (ref 13–17.5)
IMM GRANULOCYTES # BLD AUTO: 0.04 X10(3) UL (ref 0–1)
IMM GRANULOCYTES NFR BLD: 0.6 %
LYMPHOCYTES # BLD AUTO: 0.53 X10(3) UL (ref 1–4)
LYMPHOCYTES NFR BLD AUTO: 8.4 %
MCH RBC QN AUTO: 28.5 PG (ref 26–34)
MCHC RBC AUTO-ENTMCNC: 32.8 G/DL (ref 31–37)
MCV RBC AUTO: 86.8 FL (ref 80–100)
MONOCYTES # BLD AUTO: 1.06 X10(3) UL (ref 0.1–1)
MONOCYTES NFR BLD AUTO: 16.7 %
NEUTROPHILS # BLD AUTO: 4.38 X10 (3) UL (ref 1.5–7.7)
NEUTROPHILS # BLD AUTO: 4.38 X10(3) UL (ref 1.5–7.7)
NEUTROPHILS NFR BLD AUTO: 69.2 %
OSMOLALITY SERPL CALC.SUM OF ELEC: 300 MOSM/KG (ref 275–295)
P AXIS: 46 DEGREES
P-R INTERVAL: 198 MS
PHOSPHATE SERPL-MCNC: 3.2 MG/DL (ref 2.5–4.9)
PLATELET # BLD AUTO: 147 10(3)UL (ref 150–450)
POTASSIUM SERPL-SCNC: 3.3 MMOL/L (ref 3.6–5.1)
Q-T INTERVAL: 364 MS
QRS DURATION: 100 MS
QTC CALCULATION (BEZET): 442 MS
R AXIS: 71 DEGREES
RBC # BLD AUTO: 2.81 X10(6)UL (ref 3.8–5.8)
SODIUM SERPL-SCNC: 140 MMOL/L (ref 136–144)
T AXIS: 72 DEGREES
VENTRICULAR RATE: 89 BPM
WBC # BLD AUTO: 6.3 X10(3) UL (ref 4–11)

## 2019-02-08 PROCEDURE — 71045 X-RAY EXAM CHEST 1 VIEW: CPT | Performed by: INTERNAL MEDICINE

## 2019-02-08 PROCEDURE — 99232 SBSQ HOSP IP/OBS MODERATE 35: CPT | Performed by: INTERNAL MEDICINE

## 2019-02-08 NOTE — PROGRESS NOTES
[facility]  Progress Note    Junie Santiago Patient Status:  Inpatient    1945 MRN [id number]   [facility] 8NE-A Attending Lou Palencia MD   [facility] Day # 2 PCP Edson Mckenzie.  MD Isabel     Assessment/Plan:  Patient Active glucose noted this AM  - decrease mdnt basal rate 1.5 units-->1.35 units/hr and add/resume AM basal of 1.5 units/hr  -CPM with carb ratio 1:4 and correction of 1:20, targeting to 120.    -Adjust regimen cautiously given his acute kidney injury and risk for 8. 2*  9.1  8.3   --   8.5   --    --    ALB  3.2  3.5   --    --    --    --    --     < > = values in this interval not displayed.      Lab Results   Component Value Date    PGLU 162 02/08/2019     Results for Akila Membreno (MRN [id number]) as of

## 2019-02-08 NOTE — PROGRESS NOTES
[facility]  Progress Note    Valerie Castaneda Patient Status:  Inpatient    1945 MRN [id number]   [facility] 8NE-A Attending Anai Oneil MD   [facility] Day # 2 PCP Elma Code.  Keith Bey MD     Outpatient cardi (108.9 kg)      General: Awake and alert; in no acute distress  Cardiac: Regular rate and regular rhythm; no murmurs/rubs/gallops are appreciated  Lungs: Clear to auscultation bilaterally; no accessory muscle use  Abdomen: Soft, non-tender; bowel sounds ar Component Value Date    WBC 6.3 02/08/2019    HGB 8.0 02/08/2019    HCT 24.4 02/08/2019    .0 02/08/2019     Lab Results   Component Value Date    INR 1.24 (H) 02/06/2019    INR 1.1 05/02/2016     Lab Results   Component Value Date     02/08

## 2019-02-08 NOTE — PROGRESS NOTES
[address] Patient Status:  Inpatient    1945 MRN [id number]   [facility] 8NE-A Attending Bharat Estes MD   [facility] Day # 2 PCP Tasha Aburto.  MD Isabel     Pulm / Critical Care Progress Note     S: pt st abnormality, atraumatic   Lungs: ctab   Chest wall: No tenderness or deformity. Heart: Regular rate and rhythm, normal S1S2, no murmur. Abdomen: soft, non-tender, non-distended, positive BS.    Extremity: no edema       Recent Labs   Lab  02/06/19   082

## 2019-02-08 NOTE — RESPIRATORY THERAPY NOTE
DESI - Equipment Use Daily Summary:  · Set Mode CPAP  · Usage in hours: 5:35  · 90% Pressure (EPAP) level: 8.0  · 90% Insp Pressure (IPAP):   · AHI: 4.5  · Supplemental Oxygen:   · Comments:

## 2019-02-08 NOTE — CM/SW NOTE
MSW spoke to Ida at [address] ext: 2094  MSW sent Flow sheets and requested [facility], MSW awaiting confirmation. MSW spoke to Ida and pt over speaker phone, pt declines a Tu/Th/Sat chair time.  Ida to check 1st choice Chadd, 2n

## 2019-02-08 NOTE — CERTIFICATION
**Certification    PHYSICIAN Certification of Need for Inpatient Hospitalization    Based on the his current state of illness, Jinx Favre requires inpatient hospitalization for his acute respiratory insufficiency, ESRD

## 2019-02-08 NOTE — CONSULTS
Heart Failure Navigator Progress Note    Patient was evaluated by the Heart Failure Navigator for understanding, verbalization, demonstration, and recall of education related to heart failure, overall adherence to the behaviors necessa transportation to attend follow-up appointments    _x__ yes  ___ no    Gave pt contact information and instructed him to call for further questions post discharge. Pt voice understanding and is in agreement with the plans.  He will let his wife know that I

## 2019-02-08 NOTE — PROGRESS NOTES
[facility] hospitalist daily note  Patient was seen/examined on 2/8/19    S; no chest pain, no SOB, no abd pain, no nausea/emesis  Patient denies bleeding.  He feels better  Per pt she gets Epo shot every 3 weeks for his anemia  Finished dialysis session early thi

## 2019-02-08 NOTE — PROGRESS NOTES
[facility]  Nephrology Progress Note    Zunilda Russell Patient Status:  Inpatient    1945 MRN [id number]   [facility] 8NE-A Attending Galen Richmond MD   [facility] Day # 2 PCP Serita Ganser.  MD Isabel       SUBJECTIVE:  Feels 2. 2   PHOS  3.9   --    --   3.2   GLU  145*  209*  113*  71       Recent Labs   Lab  02/06/19   0900  02/06/19   2053   ALT  13*  15*   AST  18  22   ALB  3.2  3.5       Recent Labs   Lab  02/07/19   1752  02/07/19   2108  02/08/19   0734  02/08/19   0805 hypertensive nephrosclerosis. He presented with worsening volume status and significantly worsening renal function and CVC was placed yesterday with initiation of HD. Plan HD again today.   he is interested in PD but not clear which modality he will choos

## 2019-02-09 VITALS
TEMPERATURE: 98 F | HEART RATE: 66 BPM | HEIGHT: 72 IN | RESPIRATION RATE: 18 BRPM | DIASTOLIC BLOOD PRESSURE: 65 MMHG | SYSTOLIC BLOOD PRESSURE: 151 MMHG | OXYGEN SATURATION: 97 % | BODY MASS INDEX: 30.1 KG/M2 | WEIGHT: 222.25 LBS

## 2019-02-09 LAB
ATRIAL RATE: 55 BPM
GLUCOSE BLD-MCNC: 114 MG/DL (ref 65–99)
GLUCOSE BLD-MCNC: 238 MG/DL (ref 65–99)
P AXIS: 64 DEGREES
P-R INTERVAL: 264 MS
Q-T INTERVAL: 504 MS
QRS DURATION: 102 MS
QTC CALCULATION (BEZET): 482 MS
R AXIS: 71 DEGREES
T AXIS: 133 DEGREES
VENTRICULAR RATE: 55 BPM

## 2019-02-09 PROCEDURE — 99232 SBSQ HOSP IP/OBS MODERATE 35: CPT | Performed by: INTERNAL MEDICINE

## 2019-02-09 RX ORDER — LOSARTAN POTASSIUM 50 MG/1
50 TABLET ORAL DAILY
Qty: 30 TABLET | Refills: 5 | Status: SHIPPED | OUTPATIENT
Start: 2019-02-09 | End: 2019-08-02

## 2019-02-09 NOTE — PROGRESS NOTES
[facility]  Nephrology Progress Note    Rubi Guzman Patient Status:  Inpatient    1945 MRN [id number]   [facility] 8NE-A Attending Michele Galvez MD   [facility] Day # 3 PCP Jaron Hall MD       SUBJECTIVE:  No com 3.9   --    --   3.2   GLU  145*  209*  113*  71       Recent Labs   Lab  02/06/19   0900  02/06/19   2053   ALT  13*  15*   AST  18  22   ALB  3.2  3.5       Recent Labs   Lab  02/08/19   1505  02/08/19   1720  02/08/19   2333  02/09/19   0648  02/09/19 volume status and significantly worsening renal function and CVC was placed and HD initiated. [facility] [place] and will discuss AVF vs PD cath placement as outpt (pt states he is leaning towards HD)     #2.   Anemia-due to his end-st

## 2019-02-09 NOTE — DIETARY NOTE
Nutrition Short Note    Dietitian consult received for heart failure education. Appropriate education and handout provided. All questions answered. RD available PRN.     Yoel Sharma MA, RD, LDN

## 2019-02-09 NOTE — RESPIRATORY THERAPY NOTE
DESI - Equipment Use Daily Summary:  · Set Mode CFLEX  · Usage in hours: 5.7  · 90% Pressure (EPAP) level:   · 90% Insp Pressure (IPAP): 8  · AHI: 5.9  · Supplemental Oxygen:  · Comments:

## 2019-02-09 NOTE — PROGRESS NOTES
[facility]  Progress Note    Caralee Rolling Patient Status:  Inpatient    1945 MRN [id number]   [facility] 8NE-A Attending Remy Harvey MD   [address] Day # 3 PCP Glenn Hall MD     Assessment/Plan:  Patient Active glucose yesterday AM with rebound hyperglycemia  - mdnt basal rate 1.5 units-->1.35 units/hr as of yesterday AM and add/resume AM basal of 1.5 units/hr  -CPM with carb ratio 1:4 and correction of 1:20, targeting to 120.    -likely rebound hyperglycemia yes --   4.01*   --    --    A1C  7.2*   --    --    --    --    --    PGLU   --    --    < >   --    < >  114*   CA  9.1  8.3   --   8.5   --    --    ALB  3.5   --    --    --    --    --     < > = values in this interval not displayed.      Lab Results   Com

## 2019-02-09 NOTE — DISCHARGE SUMMARY
[facility] Internal Medicine Discharge Summary   Patient ID:  Nahum Houston  [id number]  72 year old  7/17/1945    Admit date: 2/6/2019    Discharge date and time: 2/9/2019     Attending Physician: Charla Sharp    Primary Care Physician: Phani Mullins.  Kenneth admit   -home insulin per them    Leukocytosis may be due to coronovirus         ESRD  Consulted renal, dialysis initiated during admit  Hypokalemia replacement per renal  Plan HD MWF         CAD, HTN denies chest pain  Cardiology consulted, medication ord PTA Insulin via Pump        STOP taking these medications    Potassium Chloride ER 20 MEQ Tbcr  Commonly known as:  K-DUR M20           Where to Get Your Medications      These medications were sent to [address] [place] [address] cardiac shadow. Vascular congestion present and interstitial opacities and probably mild airspace opacities, suspect CHF with edema. No large effusion. No pneumothorax. 7 sternotomy wires are seen. Prosthetic aortic valve seen.       CONCLUSION:  Suspe immediate complication. 0.2 min of recorded fluoroscopy time. AK:AP 1.59. CONCLUSION:  Right tunneled HD catheter system. It is ready for use. Routine care.      Dictated by: Ras Mendez MD on 2/07/2019 at 19:54     Approved by: Ras Mendez MD

## 2019-02-09 NOTE — PROGRESS NOTES
[address] [facility]  Progress Note    Xi Lombardi Patient Status:  Inpatient    1945 MRN [id number]   [facility] 8NE-A Attending Bharat Estes MD   [facility] Day # 3 PCP Tasha Aburto.  MD Isabel     Cc: follow up pu 4.27*  4.69*  4.63*  4.01*   CA  8.2*  9.1  8.3  8.5   MG   --    --   2.4  2.2   PHOS  3.9   --    --   3.2   GLU  145*  209*  113*  71       Recent Labs   Lab  02/06/19   0900  02/06/19 2053   ALT  13*  15*   AST  18  22   ALB  3.2  3.5       Recent La Intravenous Once   • Heparin Sodium (Porcine)  5,000 Units Subcutaneous Q8H [facility]   • Insulin via Insulin Pump   Subcutaneous TID AC and HS   • Losartan Potassium  50 mg Oral Daily   • AmLODIPine Besylate  10 mg Oral Daily   • atorvastatin  80 mg Oral Nightl

## 2019-02-10 PROBLEM — Z09 HOSPITAL DISCHARGE FOLLOW-UP: Status: ACTIVE | Noted: 2019-02-10

## 2019-02-10 PROBLEM — I50.32 CHRONIC DIASTOLIC HEART FAILURE (HCC): Status: ACTIVE | Noted: 2017-12-21

## 2019-03-13 ENCOUNTER — HOSPITAL ENCOUNTER (OUTPATIENT)
Dept: ULTRASOUND IMAGING | Facility: HOSPITAL | Age: 74
Discharge: HOME | End: 2019-03-13
Attending: THORACIC SURGERY (CARDIOTHORACIC VASCULAR SURGERY)
Payer: MEDICARE

## 2019-03-13 DIAGNOSIS — Z01.818 OTHER SPECIFIED PRE-OPERATIVE EXAMINATION: ICD-10-CM

## 2019-03-13 DIAGNOSIS — N18.6 ESRD (END STAGE RENAL DISEASE) (HCC): ICD-10-CM

## 2019-03-13 PROCEDURE — 93971 EXTREMITY STUDY: CPT | Performed by: THORACIC SURGERY (CARDIOTHORACIC VASCULAR SURGERY)

## 2019-03-18 ENCOUNTER — HOSPITAL ENCOUNTER (OUTPATIENT)
Dept: LAB | Facility: HOSPITAL | Age: 74
Discharge: HOME | End: 2019-03-18
Attending: SURGERY
Payer: MEDICARE

## 2019-03-18 DIAGNOSIS — N18.6 ESRD (END STAGE RENAL DISEASE) (HCC): ICD-10-CM

## 2019-03-18 DIAGNOSIS — Z91.89 AT RISK FOR INFECTION: ICD-10-CM

## 2019-03-18 DIAGNOSIS — Z91.89 AT RISK FOR BLEEDING: ICD-10-CM

## 2019-03-18 DIAGNOSIS — Z01.818 PREOP TESTING: ICD-10-CM

## 2019-03-18 DIAGNOSIS — I10 HTN (HYPERTENSION): ICD-10-CM

## 2019-03-18 LAB
ALBUMIN SERPL-MCNC: 3.8 G/DL (ref 3.4–5)
ALBUMIN/GLOB SERPL: 1 {RATIO} (ref 1–2)
ALP LIVER SERPL-CCNC: 108 U/L (ref 45–117)
ALT SERPL-CCNC: 17 U/L (ref 16–61)
ANION GAP SERPL CALC-SCNC: 6 MMOL/L (ref 0–18)
APTT PPP: 36.5 SECONDS (ref 26.1–34.6)
AST SERPL-CCNC: 19 U/L (ref 15–37)
BASOPHILS # BLD AUTO: 0.08 X10(3) UL (ref 0–0.2)
BASOPHILS NFR BLD AUTO: 1.4 %
BILIRUB SERPL-MCNC: 0.6 MG/DL (ref 0.1–2)
BUN BLD-MCNC: 28 MG/DL (ref 7–18)
BUN/CREAT SERPL: 10.5 (ref 10–20)
CALCIUM BLD-MCNC: 9.3 MG/DL (ref 8.5–10.1)
CHLORIDE SERPL-SCNC: 103 MMOL/L (ref 98–107)
CO2 SERPL-SCNC: 30 MMOL/L (ref 21–32)
CREAT BLD-MCNC: 2.66 MG/DL (ref 0.7–1.3)
DEPRECATED RDW RBC AUTO: 48.4 FL (ref 35.1–46.3)
EOSINOPHIL # BLD AUTO: 0.3 X10(3) UL (ref 0–0.7)
EOSINOPHIL NFR BLD AUTO: 5.3 %
ERYTHROCYTE [DISTWIDTH] IN BLOOD BY AUTOMATED COUNT: 14.5 % (ref 11–15)
GLOBULIN PLAS-MCNC: 3.7 G/DL (ref 2.8–4.4)
GLUCOSE BLD-MCNC: 131 MG/DL (ref 70–99)
HCT VFR BLD AUTO: 40.9 % (ref 39–53)
HGB BLD-MCNC: 13.4 G/DL (ref 13–17.5)
IMM GRANULOCYTES # BLD AUTO: 0.01 X10(3) UL (ref 0–1)
IMM GRANULOCYTES NFR BLD: 0.2 %
INR BLD: 1.08 (ref 0.9–1.1)
LYMPHOCYTES # BLD AUTO: 0.79 X10(3) UL (ref 1–4)
LYMPHOCYTES NFR BLD AUTO: 13.9 %
M PROTEIN MFR SERPL ELPH: 7.5 G/DL (ref 6.4–8.2)
MCH RBC QN AUTO: 29.7 PG (ref 26–34)
MCHC RBC AUTO-ENTMCNC: 32.8 G/DL (ref 31–37)
MCV RBC AUTO: 90.7 FL (ref 80–100)
MONOCYTES # BLD AUTO: 0.61 X10(3) UL (ref 0.1–1)
MONOCYTES NFR BLD AUTO: 10.7 %
NEUTROPHILS # BLD AUTO: 3.91 X10 (3) UL (ref 1.5–7.7)
NEUTROPHILS # BLD AUTO: 3.91 X10(3) UL (ref 1.5–7.7)
NEUTROPHILS NFR BLD AUTO: 68.5 %
OSMOLALITY SERPL CALC.SUM OF ELEC: 295 MOSM/KG (ref 275–295)
PLATELET # BLD AUTO: 168 10(3)UL (ref 150–450)
POTASSIUM SERPL-SCNC: 4 MMOL/L (ref 3.5–5.1)
PSA SERPL DL<=0.01 NG/ML-MCNC: 14.5 SECONDS (ref 12.5–14.7)
RBC # BLD AUTO: 4.51 X10(6)UL (ref 3.8–5.8)
SODIUM SERPL-SCNC: 139 MMOL/L (ref 136–145)
WBC # BLD AUTO: 5.7 X10(3) UL (ref 4–11)

## 2019-03-18 PROCEDURE — 85730 THROMBOPLASTIN TIME PARTIAL: CPT | Performed by: SURGERY

## 2019-03-18 PROCEDURE — 85025 COMPLETE CBC W/AUTO DIFF WBC: CPT | Performed by: SURGERY

## 2019-03-18 PROCEDURE — 36415 COLL VENOUS BLD VENIPUNCTURE: CPT | Performed by: SURGERY

## 2019-03-18 PROCEDURE — 85610 PROTHROMBIN TIME: CPT | Performed by: SURGERY

## 2019-03-18 PROCEDURE — 80053 COMPREHEN METABOLIC PANEL: CPT | Performed by: SURGERY

## 2019-03-20 ENCOUNTER — ANESTHESIA EVENT (OUTPATIENT)
Dept: CARDIAC SURGERY | Facility: HOSPITAL | Age: 74
End: 2019-03-20

## 2019-03-21 ENCOUNTER — ANESTHESIA (OUTPATIENT)
Dept: CARDIAC SURGERY | Facility: HOSPITAL | Age: 74
End: 2019-03-21

## 2019-03-21 ENCOUNTER — HOSPITAL ENCOUNTER (OUTPATIENT)
Facility: HOSPITAL | Age: 74
Setting detail: HOSPITAL OUTPATIENT SURGERY
Discharge: HOME | End: 2019-03-21
Attending: SURGERY | Admitting: SURGERY
Payer: MEDICARE

## 2019-03-21 VITALS
BODY MASS INDEX: 30.07 KG/M2 | WEIGHT: 222 LBS | RESPIRATION RATE: 14 BRPM | HEART RATE: 52 BPM | SYSTOLIC BLOOD PRESSURE: 130 MMHG | DIASTOLIC BLOOD PRESSURE: 52 MMHG | OXYGEN SATURATION: 100 % | TEMPERATURE: 97 F | HEIGHT: 72 IN

## 2019-03-21 DIAGNOSIS — I10 HTN (HYPERTENSION): ICD-10-CM

## 2019-03-21 DIAGNOSIS — Z91.89 AT RISK FOR BLEEDING: ICD-10-CM

## 2019-03-21 DIAGNOSIS — Z91.89 AT RISK FOR INFECTION: ICD-10-CM

## 2019-03-21 DIAGNOSIS — Z01.818 PREOP TESTING: Primary | ICD-10-CM

## 2019-03-21 DIAGNOSIS — N18.6 ESRD (END STAGE RENAL DISEASE) (HCC): ICD-10-CM

## 2019-03-21 LAB
ANION GAP SERPL CALC-SCNC: 9 MMOL/L (ref 0–18)
BUN BLD-MCNC: 41 MG/DL (ref 7–18)
BUN/CREAT SERPL: 11.7 (ref 10–20)
CALCIUM BLD-MCNC: 8.4 MG/DL (ref 8.5–10.1)
CHLORIDE SERPL-SCNC: 106 MMOL/L (ref 98–107)
CO2 SERPL-SCNC: 26 MMOL/L (ref 21–32)
CREAT BLD-MCNC: 3.49 MG/DL (ref 0.7–1.3)
GLUCOSE BLD-MCNC: 67 MG/DL (ref 70–99)
GLUCOSE BLD-MCNC: 95 MG/DL (ref 70–99)
GLUCOSE BLD-MCNC: 96 MG/DL (ref 70–99)
OSMOLALITY SERPL CALC.SUM OF ELEC: 302 MOSM/KG (ref 275–295)
POTASSIUM SERPL-SCNC: 3.4 MMOL/L (ref 3.5–5.1)
SODIUM SERPL-SCNC: 141 MMOL/L (ref 136–145)

## 2019-03-21 PROCEDURE — 82962 GLUCOSE BLOOD TEST: CPT

## 2019-03-21 PROCEDURE — 03180ZD BYPASS LEFT BRACHIAL ARTERY TO UPPER ARM VEIN, OPEN APPROACH: ICD-10-PCS | Performed by: SURGERY

## 2019-03-21 PROCEDURE — 80048 BASIC METABOLIC PNL TOTAL CA: CPT | Performed by: SURGERY

## 2019-03-21 RX ORDER — MIDAZOLAM HYDROCHLORIDE 1 MG/ML
1 INJECTION INTRAMUSCULAR; INTRAVENOUS EVERY 5 MIN PRN
Status: DISCONTINUED | OUTPATIENT
Start: 2019-03-21 | End: 2019-03-21

## 2019-03-21 RX ORDER — MEPERIDINE HYDROCHLORIDE 25 MG/ML
12.5 INJECTION INTRAMUSCULAR; INTRAVENOUS; SUBCUTANEOUS AS NEEDED
Status: DISCONTINUED | OUTPATIENT
Start: 2019-03-21 | End: 2019-03-21

## 2019-03-21 RX ORDER — METOCLOPRAMIDE HYDROCHLORIDE 5 MG/ML
10 INJECTION INTRAMUSCULAR; INTRAVENOUS AS NEEDED
Status: DISCONTINUED | OUTPATIENT
Start: 2019-03-21 | End: 2019-03-21

## 2019-03-21 RX ORDER — HYDROCODONE BITARTRATE AND ACETAMINOPHEN 5; 325 MG/1; MG/1
1 TABLET ORAL AS NEEDED
Status: DISCONTINUED | OUTPATIENT
Start: 2019-03-21 | End: 2019-03-21

## 2019-03-21 RX ORDER — HYDROCODONE BITARTRATE AND ACETAMINOPHEN 5; 325 MG/1; MG/1
2 TABLET ORAL AS NEEDED
Status: DISCONTINUED | OUTPATIENT
Start: 2019-03-21 | End: 2019-03-21

## 2019-03-21 RX ORDER — ACETAMINOPHEN 500 MG
1000 TABLET ORAL ONCE AS NEEDED
Status: DISCONTINUED | OUTPATIENT
Start: 2019-03-21 | End: 2019-03-21

## 2019-03-21 RX ORDER — HYDROMORPHONE HYDROCHLORIDE 1 MG/ML
0.4 INJECTION, SOLUTION INTRAMUSCULAR; INTRAVENOUS; SUBCUTANEOUS EVERY 5 MIN PRN
Status: DISCONTINUED | OUTPATIENT
Start: 2019-03-21 | End: 2019-03-21

## 2019-03-21 RX ORDER — CEFAZOLIN SODIUM/WATER 2 G/20 ML
SYRINGE (ML) INTRAVENOUS
Status: DISCONTINUED | OUTPATIENT
Start: 2019-03-21 | End: 2019-03-21 | Stop reason: HOSPADM

## 2019-03-21 RX ORDER — SODIUM CHLORIDE, SODIUM LACTATE, POTASSIUM CHLORIDE, CALCIUM CHLORIDE 600; 310; 30; 20 MG/100ML; MG/100ML; MG/100ML; MG/100ML
INJECTION, SOLUTION INTRAVENOUS CONTINUOUS
Status: DISCONTINUED | OUTPATIENT
Start: 2019-03-21 | End: 2019-03-21

## 2019-03-21 RX ORDER — BUPIVACAINE HYDROCHLORIDE 5 MG/ML
INJECTION, SOLUTION EPIDURAL; INTRACAUDAL AS NEEDED
Status: DISCONTINUED | OUTPATIENT
Start: 2019-03-21 | End: 2019-03-21 | Stop reason: HOSPADM

## 2019-03-21 RX ORDER — ONDANSETRON 2 MG/ML
4 INJECTION INTRAMUSCULAR; INTRAVENOUS AS NEEDED
Status: DISCONTINUED | OUTPATIENT
Start: 2019-03-21 | End: 2019-03-21

## 2019-03-21 RX ORDER — LABETALOL HYDROCHLORIDE 5 MG/ML
5 INJECTION, SOLUTION INTRAVENOUS EVERY 5 MIN PRN
Status: DISCONTINUED | OUTPATIENT
Start: 2019-03-21 | End: 2019-03-21

## 2019-03-21 RX ORDER — NALOXONE HYDROCHLORIDE 0.4 MG/ML
80 INJECTION, SOLUTION INTRAMUSCULAR; INTRAVENOUS; SUBCUTANEOUS AS NEEDED
Status: DISCONTINUED | OUTPATIENT
Start: 2019-03-21 | End: 2019-03-21

## 2019-03-21 RX ORDER — DEXTROSE MONOHYDRATE 25 G/50ML
50 INJECTION, SOLUTION INTRAVENOUS
Status: DISCONTINUED | OUTPATIENT
Start: 2019-03-21 | End: 2019-03-21

## 2019-03-21 NOTE — H&P
[address] [place]    PATIENT'S NAME: Odin Walker   ATTENDING PHYSICIAN: Kati Macdonald M.D.    PATIENT ACCOUNT#:   [de-identified]    LOCATION:  [address] [place] [address] 8 [address]WP 10  MEDICAL RECORD #:   [id number]       YOB: 1945  ADMISSION DA

## 2019-03-21 NOTE — ANESTHESIA POSTPROCEDURE EVALUATION
[address] Patient Status:  Inpatient   Age/Gender 72year old male MRN [id number]   Location [facility] Attending Matt Aceves MD   Hosp Day # 0 PCP Bryant Soulier.  MD Isabel       Anesthesia Post-

## 2019-03-21 NOTE — PROGRESS NOTES
Called Interventional Radiology where Permacath Catheter was placed. Dr. Alaina Ortega informed of \"Angry\" stitch still in place to R side of neck. Area directly surrounding suture red and inflamed about 4 mm around.  Orders from Dr. Alaina Ortega to remove suture and done wit

## 2019-03-21 NOTE — OPERATIVE REPORT
Pre-op Dx: ESRD. Post-op Dx: same. Procedure: Left brachial- cephalic vein upper arm fistula.  Surgeon: Grabiel/ Asst: Latrell Cruz

## 2019-03-21 NOTE — ANESTHESIA PREPROCEDURE EVALUATION
PRE-OP EVALUATION    Patient Name: Barrie Vazquez    Pre-op Diagnosis: end stage renal disease    Procedure(s):  creation of left arm arteriovenous fistula  Melania Camara    Surgeon(s) and Role:     Rylee Qureshi MD - Primary    Pre-op vitals reviewed carbsCorrection Factor - unknown Disp:  Rfl:        Allergies: Lisinopril      Anesthesia Evaluation    Patient summary reviewed.     Anesthetic Complications           GI/Hepatic/Renal             (+) chronic renal disease and ESRD and hemodialysis REPLACE AORTIC VALVE OPEN  2012     Social History    Tobacco Use      Smoking status: Never Smoker      Smokeless tobacco: Never Used    Alcohol use: No      Alcohol/week: 0.0 oz      Drug use: No     Available pre-op labs reviewed.   Lab Results   Compone

## 2019-03-22 NOTE — OPERATIVE REPORT
[facility]    PATIENT'S NAME: Shirley Montana   ATTENDING PHYSICIAN: Sarah Tabares M.D. OPERATING PHYSICIAN: Sarah Tabares M.D.    PATIENT ACCOUNT#:   [de-identified]    LOCATION:  [address] [place] [address] EDWP 10  MEDICAL RECORD #:   [id number] sutures, hemoclips, and divided. The vein was ligated distally with 3-0 Ethibond on a needle after receiving a bolus of heparin of 10,000 units.   The patient had the brachial artery dissected out medially through the same incision with care taken to avoid

## 2019-04-18 ENCOUNTER — HOSPITAL ENCOUNTER (OUTPATIENT)
Dept: ULTRASOUND IMAGING | Facility: HOSPITAL | Age: 74
Discharge: HOME | End: 2019-04-18
Attending: SURGERY
Payer: MEDICARE

## 2019-04-18 DIAGNOSIS — G54.0 LEFT BRACHIAL PLEXITIS: ICD-10-CM

## 2019-04-18 PROCEDURE — 93990 DOPPLER FLOW TESTING: CPT | Performed by: SURGERY

## 2019-06-20 ENCOUNTER — HOSPITAL ENCOUNTER (OUTPATIENT)
Dept: ULTRASOUND IMAGING | Facility: HOSPITAL | Age: 74
Discharge: HOME | End: 2019-06-20
Attending: SURGERY
Payer: MEDICARE

## 2019-06-20 DIAGNOSIS — L98.8 FISTULA: ICD-10-CM

## 2019-06-20 DIAGNOSIS — N18.6 ESRD (END STAGE RENAL DISEASE) (HCC): ICD-10-CM

## 2019-06-20 DIAGNOSIS — I73.9 CLAUDICATION (HCC): ICD-10-CM

## 2019-06-20 PROCEDURE — 93990 DOPPLER FLOW TESTING: CPT | Performed by: SURGERY

## 2019-07-25 RX ORDER — CALCIUM ACETATE 667 MG/1
1 CAPSULE ORAL
COMMUNITY
End: 2021-02-22

## 2019-07-26 ENCOUNTER — HOSPITAL ENCOUNTER (OUTPATIENT)
Dept: LAB | Facility: HOSPITAL | Age: 74
Discharge: HOME | End: 2019-07-26
Attending: SURGERY
Payer: MEDICARE

## 2019-07-26 ENCOUNTER — HOSPITAL ENCOUNTER (OUTPATIENT)
Dept: CV DIAGNOSTICS | Facility: HOSPITAL | Age: 74
Discharge: HOME | End: 2019-07-26
Attending: SURGERY
Payer: MEDICARE

## 2019-07-26 DIAGNOSIS — N18.6 ESRD (END STAGE RENAL DISEASE) (HCC): ICD-10-CM

## 2019-07-26 DIAGNOSIS — Z01.818 PREOP TESTING: ICD-10-CM

## 2019-07-26 DIAGNOSIS — Z91.89 AT RISK FOR BLEEDING: ICD-10-CM

## 2019-07-26 LAB
ALBUMIN SERPL-MCNC: 4.2 G/DL (ref 3.4–5)
ALBUMIN/GLOB SERPL: 1 {RATIO} (ref 1–2)
ALP LIVER SERPL-CCNC: 89 U/L (ref 45–117)
ALT SERPL-CCNC: 22 U/L (ref 16–61)
ANION GAP SERPL CALC-SCNC: 9 MMOL/L (ref 0–18)
APTT PPP: 36 SECONDS (ref 25.4–36.1)
AST SERPL-CCNC: 19 U/L (ref 15–37)
ATRIAL RATE: 57 BPM
BASOPHILS # BLD AUTO: 0.06 X10(3) UL (ref 0–0.2)
BASOPHILS NFR BLD AUTO: 0.8 %
BILIRUB SERPL-MCNC: 0.6 MG/DL (ref 0.1–2)
BUN BLD-MCNC: 22 MG/DL (ref 7–18)
BUN/CREAT SERPL: 5 (ref 10–20)
CALCIUM BLD-MCNC: 9.6 MG/DL (ref 8.5–10.1)
CHLORIDE SERPL-SCNC: 99 MMOL/L (ref 98–112)
CO2 SERPL-SCNC: 30 MMOL/L (ref 21–32)
CREAT BLD-MCNC: 4.4 MG/DL (ref 0.7–1.3)
DEPRECATED RDW RBC AUTO: 45.9 FL (ref 35.1–46.3)
EOSINOPHIL # BLD AUTO: 0.29 X10(3) UL (ref 0–0.7)
EOSINOPHIL NFR BLD AUTO: 4 %
ERYTHROCYTE [DISTWIDTH] IN BLOOD BY AUTOMATED COUNT: 13.5 % (ref 11–15)
GLOBULIN PLAS-MCNC: 4.4 G/DL (ref 2.8–4.4)
GLUCOSE BLD-MCNC: 122 MG/DL (ref 70–99)
HCT VFR BLD AUTO: 35.9 % (ref 39–53)
HGB BLD-MCNC: 12.2 G/DL (ref 13–17.5)
IMM GRANULOCYTES # BLD AUTO: 0.03 X10(3) UL (ref 0–1)
IMM GRANULOCYTES NFR BLD: 0.4 %
INR BLD: 1.07 (ref 0.9–1.1)
LYMPHOCYTES # BLD AUTO: 1.16 X10(3) UL (ref 1–4)
LYMPHOCYTES NFR BLD AUTO: 16 %
M PROTEIN MFR SERPL ELPH: 8.6 G/DL (ref 6.4–8.2)
MCH RBC QN AUTO: 31.5 PG (ref 26–34)
MCHC RBC AUTO-ENTMCNC: 34 G/DL (ref 31–37)
MCV RBC AUTO: 92.8 FL (ref 80–100)
MONOCYTES # BLD AUTO: 0.91 X10(3) UL (ref 0.1–1)
MONOCYTES NFR BLD AUTO: 12.6 %
NEUTROPHILS # BLD AUTO: 4.79 X10 (3) UL (ref 1.5–7.7)
NEUTROPHILS # BLD AUTO: 4.79 X10(3) UL (ref 1.5–7.7)
NEUTROPHILS NFR BLD AUTO: 66.2 %
OSMOLALITY SERPL CALC.SUM OF ELEC: 291 MOSM/KG (ref 275–295)
P AXIS: 46 DEGREES
P-R INTERVAL: 250 MS
PLATELET # BLD AUTO: 174 10(3)UL (ref 150–450)
POTASSIUM SERPL-SCNC: 4.4 MMOL/L (ref 3.5–5.1)
PSA SERPL DL<=0.01 NG/ML-MCNC: 14.4 SECONDS (ref 12.5–14.7)
Q-T INTERVAL: 450 MS
QRS DURATION: 116 MS
QTC CALCULATION (BEZET): 438 MS
R AXIS: 60 DEGREES
RBC # BLD AUTO: 3.87 X10(6)UL (ref 3.8–5.8)
SODIUM SERPL-SCNC: 138 MMOL/L (ref 136–145)
T AXIS: 122 DEGREES
VENTRICULAR RATE: 57 BPM
WBC # BLD AUTO: 7.2 X10(3) UL (ref 4–11)

## 2019-07-26 PROCEDURE — 93010 ELECTROCARDIOGRAM REPORT: CPT | Performed by: INTERNAL MEDICINE

## 2019-07-26 PROCEDURE — 85610 PROTHROMBIN TIME: CPT | Performed by: SURGERY

## 2019-07-26 PROCEDURE — 36415 COLL VENOUS BLD VENIPUNCTURE: CPT | Performed by: SURGERY

## 2019-07-26 PROCEDURE — 80053 COMPREHEN METABOLIC PANEL: CPT | Performed by: SURGERY

## 2019-07-26 PROCEDURE — 93005 ELECTROCARDIOGRAM TRACING: CPT

## 2019-07-26 PROCEDURE — 85730 THROMBOPLASTIN TIME PARTIAL: CPT | Performed by: SURGERY

## 2019-07-26 PROCEDURE — 85025 COMPLETE CBC W/AUTO DIFF WBC: CPT | Performed by: SURGERY

## 2019-08-01 ENCOUNTER — HOSPITAL ENCOUNTER (OUTPATIENT)
Facility: HOSPITAL | Age: 74
Setting detail: HOSPITAL OUTPATIENT SURGERY
Discharge: HOME | End: 2019-08-01
Attending: SURGERY | Admitting: SURGERY
Payer: MEDICARE

## 2019-08-01 ENCOUNTER — ANESTHESIA EVENT (OUTPATIENT)
Dept: CARDIAC SURGERY | Facility: HOSPITAL | Age: 74
End: 2019-08-01
Payer: MEDICARE

## 2019-08-01 ENCOUNTER — ANESTHESIA (OUTPATIENT)
Dept: CARDIAC SURGERY | Facility: HOSPITAL | Age: 74
End: 2019-08-01
Payer: MEDICARE

## 2019-08-01 VITALS
HEART RATE: 57 BPM | DIASTOLIC BLOOD PRESSURE: 62 MMHG | OXYGEN SATURATION: 100 % | TEMPERATURE: 98 F | HEIGHT: 72 IN | WEIGHT: 220 LBS | BODY MASS INDEX: 29.8 KG/M2 | RESPIRATION RATE: 18 BRPM | SYSTOLIC BLOOD PRESSURE: 140 MMHG

## 2019-08-01 DIAGNOSIS — Z91.89 AT RISK FOR BLEEDING: ICD-10-CM

## 2019-08-01 DIAGNOSIS — Z01.818 PREOP TESTING: ICD-10-CM

## 2019-08-01 DIAGNOSIS — N18.6 ESRD (END STAGE RENAL DISEASE) (HCC): Primary | ICD-10-CM

## 2019-08-01 LAB
ANION GAP SERPL CALC-SCNC: 9 MMOL/L (ref 0–18)
BUN BLD-MCNC: 39 MG/DL (ref 7–18)
BUN/CREAT SERPL: 6.1 (ref 10–20)
CALCIUM BLD-MCNC: 9.6 MG/DL (ref 8.5–10.1)
CHLORIDE SERPL-SCNC: 103 MMOL/L (ref 98–112)
CO2 SERPL-SCNC: 26 MMOL/L (ref 21–32)
CREAT BLD-MCNC: 6.42 MG/DL (ref 0.7–1.3)
GLUCOSE BLD-MCNC: 148 MG/DL (ref 70–99)
GLUCOSE BLD-MCNC: 196 MG/DL (ref 70–99)
OSMOLALITY SERPL CALC.SUM OF ELEC: 301 MOSM/KG (ref 275–295)
POTASSIUM SERPL-SCNC: 4.5 MMOL/L (ref 3.5–5.1)
SODIUM SERPL-SCNC: 138 MMOL/L (ref 136–145)

## 2019-08-01 PROCEDURE — 05WY03Z REVISION OF INFUSION DEVICE IN UPPER VEIN, OPEN APPROACH: ICD-10-PCS | Performed by: SURGERY

## 2019-08-01 PROCEDURE — 80048 BASIC METABOLIC PNL TOTAL CA: CPT | Performed by: SURGERY

## 2019-08-01 PROCEDURE — 82962 GLUCOSE BLOOD TEST: CPT

## 2019-08-01 PROCEDURE — 36415 COLL VENOUS BLD VENIPUNCTURE: CPT

## 2019-08-01 RX ORDER — HYDROCODONE BITARTRATE AND ACETAMINOPHEN 5; 325 MG/1; MG/1
2 TABLET ORAL AS NEEDED
Status: DISCONTINUED | OUTPATIENT
Start: 2019-08-01 | End: 2019-08-01 | Stop reason: HOSPADM

## 2019-08-01 RX ORDER — HYDROCODONE BITARTRATE AND ACETAMINOPHEN 5; 325 MG/1; MG/1
1 TABLET ORAL AS NEEDED
Status: DISCONTINUED | OUTPATIENT
Start: 2019-08-01 | End: 2019-08-01 | Stop reason: HOSPADM

## 2019-08-01 RX ORDER — DIPHENHYDRAMINE HYDROCHLORIDE 50 MG/ML
12.5 INJECTION INTRAMUSCULAR; INTRAVENOUS AS NEEDED
Status: DISCONTINUED | OUTPATIENT
Start: 2019-08-01 | End: 2019-08-01 | Stop reason: HOSPADM

## 2019-08-01 RX ORDER — MIDAZOLAM HYDROCHLORIDE 1 MG/ML
1 INJECTION INTRAMUSCULAR; INTRAVENOUS EVERY 5 MIN PRN
Status: DISCONTINUED | OUTPATIENT
Start: 2019-08-01 | End: 2019-08-01 | Stop reason: HOSPADM

## 2019-08-01 RX ORDER — HYDROMORPHONE HYDROCHLORIDE 1 MG/ML
0.4 INJECTION, SOLUTION INTRAMUSCULAR; INTRAVENOUS; SUBCUTANEOUS EVERY 5 MIN PRN
Status: DISCONTINUED | OUTPATIENT
Start: 2019-08-01 | End: 2019-08-01 | Stop reason: HOSPADM

## 2019-08-01 RX ORDER — METOCLOPRAMIDE HYDROCHLORIDE 5 MG/ML
10 INJECTION INTRAMUSCULAR; INTRAVENOUS AS NEEDED
Status: DISCONTINUED | OUTPATIENT
Start: 2019-08-01 | End: 2019-08-01 | Stop reason: HOSPADM

## 2019-08-01 RX ORDER — INSULIN ASPART 100 [IU]/ML
INJECTION, SOLUTION INTRAVENOUS; SUBCUTANEOUS ONCE
Status: DISCONTINUED | OUTPATIENT
Start: 2019-08-01 | End: 2019-08-01 | Stop reason: HOSPADM

## 2019-08-01 RX ORDER — DEXTROSE MONOHYDRATE 25 G/50ML
50 INJECTION, SOLUTION INTRAVENOUS
Status: DISCONTINUED | OUTPATIENT
Start: 2019-08-01 | End: 2019-08-01 | Stop reason: HOSPADM

## 2019-08-01 RX ORDER — NALOXONE HYDROCHLORIDE 0.4 MG/ML
80 INJECTION, SOLUTION INTRAMUSCULAR; INTRAVENOUS; SUBCUTANEOUS AS NEEDED
Status: DISCONTINUED | OUTPATIENT
Start: 2019-08-01 | End: 2019-08-01 | Stop reason: HOSPADM

## 2019-08-01 RX ORDER — BUPIVACAINE HYDROCHLORIDE 5 MG/ML
INJECTION, SOLUTION EPIDURAL; INTRACAUDAL AS NEEDED
Status: DISCONTINUED | OUTPATIENT
Start: 2019-08-01 | End: 2019-08-01

## 2019-08-01 RX ORDER — SODIUM CHLORIDE, SODIUM LACTATE, POTASSIUM CHLORIDE, CALCIUM CHLORIDE 600; 310; 30; 20 MG/100ML; MG/100ML; MG/100ML; MG/100ML
INJECTION, SOLUTION INTRAVENOUS CONTINUOUS
Status: DISCONTINUED | OUTPATIENT
Start: 2019-08-01 | End: 2019-08-01

## 2019-08-01 RX ORDER — CEFAZOLIN SODIUM/WATER 2 G/20 ML
SYRINGE (ML) INTRAVENOUS
Status: DISCONTINUED | OUTPATIENT
Start: 2019-08-01 | End: 2019-08-01

## 2019-08-01 RX ORDER — MEPERIDINE HYDROCHLORIDE 25 MG/ML
12.5 INJECTION INTRAMUSCULAR; INTRAVENOUS; SUBCUTANEOUS AS NEEDED
Status: DISCONTINUED | OUTPATIENT
Start: 2019-08-01 | End: 2019-08-01 | Stop reason: HOSPADM

## 2019-08-01 RX ORDER — ONDANSETRON 2 MG/ML
4 INJECTION INTRAMUSCULAR; INTRAVENOUS AS NEEDED
Status: DISCONTINUED | OUTPATIENT
Start: 2019-08-01 | End: 2019-08-01 | Stop reason: HOSPADM

## 2019-08-01 NOTE — H&P
[address] [place]    PATIENT'S NAME: Sourav Soria   ATTENDING PHYSICIAN: Pierre Mcardle, M.D.    PATIENT ACCOUNT#:   [de-identified]    LOCATION:  [address] 10  MEDICAL RECORD #:   [id number]       YOB: 1945  ADMISSION DATE: auscultation. HEART:  Questionable systolic ejection murmur at left sternal border. ABDOMEN:  Soft, nontender. No masses. EXTREMITIES:  Left arm fistula is functioning. Flow appreciated at the palmar arch. The wound is clean and dry.   Lower leg pulse

## 2019-08-01 NOTE — ANESTHESIA PREPROCEDURE EVALUATION
PRE-OP EVALUATION    Patient Name: Kamran Ríos    Pre-op Diagnosis: end stage renal failure    Procedure(s):  revision of left arm arteriovenous fistula  SA pending    Surgeon(s) and Role:     Kevin Cortez MD - Primary    Pre-op vitals revfe CABG/stent    (+) valvular problems/murmurs (s/p AVR)        (+) CHF                Endo/Other      (+) diabetes         (+) anemia                   Pulmonary                    (+) sleep apnea       Neuro/Psych                                    Past Radha full Cardiovascular  Comment: mechanical valve     Rhythm: regular  Rate: normal     Dental      Dental appliance(s): partials       Pulmonary      Breath sounds clear to auscultation bilaterally.                Other findings            ASA: 4   Plan: gene

## 2019-08-01 NOTE — PROGRESS NOTES
Here for revision of AV fistula of Left arm. AOX3, no complaints, has insulin pump intact, Hemodialysis catheter to Right upper chest.  AV fistula to Left arm +Thrill. Wife is at bedside. BMP sent.

## 2019-08-01 NOTE — OPERATIVE REPORT
Pre-op Dx: Patent left brachial-cephalic fistula with increasing depth from skin in upper arm. Post-op Dx; same. Procedure: Revision left arm fistula/ mobilization upper arm venouis outflow.  Surgeon: Grabiel/ Asst: Eduardo Cheek

## 2019-08-01 NOTE — ANESTHESIA POSTPROCEDURE EVALUATION
[address] Patient Status:  Inpatient   Age/Gender 69year old male MRN [id number]   Location [address] [place] [address] Attending Uli Miner MD   Hosp Day # 0 PCP Jaron Hall MD       Anesthesia Post-op

## 2019-08-02 NOTE — OPERATIVE REPORT
[facility]    PATIENT'S NAME: Shirley Montana   ATTENDING PHYSICIAN: Sarah Tabares M.D. OPERATING PHYSICIAN: Sarah Tabares M.D.    PATIENT ACCOUNT#:   [de-identified]    LOCATION:  [address] 3 ED 10  MEDICAL RECORD #:   [id number]       [facility] outer part of [facility] arm just lateral to the outflow tract. Dissection brought down to expose the vein. It was mobilized upward with ligation of certain branches with 4-0 Ethibond ties, 6-0 Prolene sutures, hemoclips, and divided.   The vein

## 2019-08-30 ENCOUNTER — HOSPITAL ENCOUNTER (OUTPATIENT)
Dept: CARDIOLOGY CLINIC | Facility: HOSPITAL | Age: 74
Discharge: HOME | End: 2019-08-30
Attending: SURGERY
Payer: MEDICARE

## 2019-08-30 DIAGNOSIS — I77.0 A-V FISTULA (HCC): ICD-10-CM

## 2019-08-30 DIAGNOSIS — G54.0 LEFT BRACHIAL PLEXITIS: ICD-10-CM

## 2019-08-30 PROCEDURE — 93931 UPPER EXTREMITY STUDY: CPT | Performed by: SURGERY

## 2019-09-19 ENCOUNTER — HOSPITAL ENCOUNTER (OUTPATIENT)
Dept: ULTRASOUND IMAGING | Facility: HOSPITAL | Age: 74
Discharge: HOME | End: 2019-09-19
Attending: OPHTHALMOLOGY
Payer: MEDICARE

## 2019-09-19 DIAGNOSIS — I65.29 CAROTID ARTERY STENOSIS: ICD-10-CM

## 2019-09-19 PROCEDURE — 93880 EXTRACRANIAL BILAT STUDY: CPT | Performed by: OPHTHALMOLOGY

## 2019-10-08 ENCOUNTER — TELEPHONE (OUTPATIENT)
Dept: NEPHROLOGY | Facility: CLINIC | Age: 74
End: 2019-10-08

## 2019-10-08 DIAGNOSIS — N18.4 CKD (CHRONIC KIDNEY DISEASE) STAGE 4, GFR 15-29 ML/MIN (HCC): ICD-10-CM

## 2019-10-08 DIAGNOSIS — I25.10 CORONARY ARTERY DISEASE INVOLVING NATIVE CORONARY ARTERY OF NATIVE HEART WITHOUT ANGINA PECTORIS: ICD-10-CM

## 2019-10-08 DIAGNOSIS — Z95.2 HEART VALVE REPLACED BY TRANSPLANT: ICD-10-CM

## 2019-10-08 DIAGNOSIS — R60.0 BILATERAL LEG EDEMA: ICD-10-CM

## 2019-10-08 DIAGNOSIS — R06.02 SOB (SHORTNESS OF BREATH): ICD-10-CM

## 2019-10-08 DIAGNOSIS — I50.33 ACUTE ON CHRONIC DIASTOLIC CHF (CONGESTIVE HEART FAILURE) (HCC): ICD-10-CM

## 2019-10-08 DIAGNOSIS — E78.5 DYSLIPIDEMIA: ICD-10-CM

## 2019-10-08 DIAGNOSIS — I10 ESSENTIAL HYPERTENSION: ICD-10-CM

## 2019-10-08 RX ORDER — FUROSEMIDE 80 MG
80 TABLET ORAL DAILY
Qty: 90 TABLET | Refills: 3 | Status: SHIPPED | OUTPATIENT
Start: 2019-10-08 | End: 2020-12-15

## 2019-10-08 RX ORDER — FUROSEMIDE 80 MG
80 TABLET ORAL DAILY
Qty: 90 TABLET | Refills: 1 | Status: CANCELLED | OUTPATIENT
Start: 2019-10-08

## 2019-10-08 NOTE — TELEPHONE ENCOUNTER
PT IS OUT OF MEDS> Pt needs Rx for furosemide 80 mg. Takes one per day. Qty #90. Pharm Mohinder Holly, Bettina Shanks. Pt's #[phone]. Dialysis pt who saw Dr. Kenny Bush at dialysis on Monday but did not realize he needed refill.

## 2019-10-24 ENCOUNTER — ANESTHESIA EVENT (OUTPATIENT)
Dept: CARDIAC SURGERY | Facility: HOSPITAL | Age: 74
End: 2019-10-24
Payer: MEDICARE

## 2019-10-25 ENCOUNTER — HOSPITAL ENCOUNTER (OUTPATIENT)
Facility: HOSPITAL | Age: 74
Setting detail: HOSPITAL OUTPATIENT SURGERY
Discharge: HOME | End: 2019-10-25
Attending: SURGERY | Admitting: SURGERY
Payer: MEDICARE

## 2019-10-25 ENCOUNTER — ANESTHESIA (OUTPATIENT)
Dept: CARDIAC SURGERY | Facility: HOSPITAL | Age: 74
End: 2019-10-25
Payer: MEDICARE

## 2019-10-25 VITALS
HEART RATE: 53 BPM | OXYGEN SATURATION: 100 % | BODY MASS INDEX: 29.8 KG/M2 | RESPIRATION RATE: 16 BRPM | WEIGHT: 220 LBS | DIASTOLIC BLOOD PRESSURE: 56 MMHG | HEIGHT: 72 IN | TEMPERATURE: 97 F | SYSTOLIC BLOOD PRESSURE: 136 MMHG

## 2019-10-25 PROCEDURE — 93010 ELECTROCARDIOGRAM REPORT: CPT | Performed by: INTERNAL MEDICINE

## 2019-10-25 PROCEDURE — 36415 COLL VENOUS BLD VENIPUNCTURE: CPT

## 2019-10-25 PROCEDURE — 82962 GLUCOSE BLOOD TEST: CPT

## 2019-10-25 PROCEDURE — 93005 ELECTROCARDIOGRAM TRACING: CPT

## 2019-10-25 PROCEDURE — 80048 BASIC METABOLIC PNL TOTAL CA: CPT | Performed by: SURGERY

## 2019-10-25 PROCEDURE — 03170ZF BYPASS RIGHT BRACHIAL ARTERY TO LOWER ARM VEIN, OPEN APPROACH: ICD-10-PCS | Performed by: SURGERY

## 2019-10-25 RX ORDER — METOCLOPRAMIDE HYDROCHLORIDE 5 MG/ML
10 INJECTION INTRAMUSCULAR; INTRAVENOUS AS NEEDED
Status: DISCONTINUED | OUTPATIENT
Start: 2019-10-25 | End: 2019-10-25 | Stop reason: HOSPADM

## 2019-10-25 RX ORDER — NALOXONE HYDROCHLORIDE 0.4 MG/ML
80 INJECTION, SOLUTION INTRAMUSCULAR; INTRAVENOUS; SUBCUTANEOUS AS NEEDED
Status: DISCONTINUED | OUTPATIENT
Start: 2019-10-25 | End: 2019-10-25 | Stop reason: HOSPADM

## 2019-10-25 RX ORDER — CEFAZOLIN SODIUM/WATER 2 G/20 ML
SYRINGE (ML) INTRAVENOUS
Status: DISCONTINUED | OUTPATIENT
Start: 2019-10-25 | End: 2019-10-25 | Stop reason: HOSPADM

## 2019-10-25 RX ORDER — DIPHENHYDRAMINE HYDROCHLORIDE 50 MG/ML
12.5 INJECTION INTRAMUSCULAR; INTRAVENOUS AS NEEDED
Status: DISCONTINUED | OUTPATIENT
Start: 2019-10-25 | End: 2019-10-25 | Stop reason: HOSPADM

## 2019-10-25 RX ORDER — MIDAZOLAM HYDROCHLORIDE 1 MG/ML
1 INJECTION INTRAMUSCULAR; INTRAVENOUS EVERY 5 MIN PRN
Status: DISCONTINUED | OUTPATIENT
Start: 2019-10-25 | End: 2019-10-25 | Stop reason: HOSPADM

## 2019-10-25 RX ORDER — DEXTROSE MONOHYDRATE 25 G/50ML
50 INJECTION, SOLUTION INTRAVENOUS
Status: DISCONTINUED | OUTPATIENT
Start: 2019-10-25 | End: 2019-10-25 | Stop reason: HOSPADM

## 2019-10-25 RX ORDER — ONDANSETRON 2 MG/ML
4 INJECTION INTRAMUSCULAR; INTRAVENOUS AS NEEDED
Status: DISCONTINUED | OUTPATIENT
Start: 2019-10-25 | End: 2019-10-25 | Stop reason: HOSPADM

## 2019-10-25 RX ORDER — ACETAMINOPHEN 500 MG
1000 TABLET ORAL ONCE AS NEEDED
Status: DISCONTINUED | OUTPATIENT
Start: 2019-10-25 | End: 2019-10-25 | Stop reason: HOSPADM

## 2019-10-25 RX ORDER — HYDROCODONE BITARTRATE AND ACETAMINOPHEN 5; 325 MG/1; MG/1
2 TABLET ORAL AS NEEDED
Status: DISCONTINUED | OUTPATIENT
Start: 2019-10-25 | End: 2019-10-25 | Stop reason: HOSPADM

## 2019-10-25 RX ORDER — HYDROCODONE BITARTRATE AND ACETAMINOPHEN 5; 325 MG/1; MG/1
1 TABLET ORAL AS NEEDED
Status: DISCONTINUED | OUTPATIENT
Start: 2019-10-25 | End: 2019-10-25 | Stop reason: HOSPADM

## 2019-10-25 RX ORDER — SODIUM CHLORIDE 9 MG/ML
INJECTION, SOLUTION INTRAVENOUS CONTINUOUS
Status: DISCONTINUED | OUTPATIENT
Start: 2019-10-25 | End: 2019-10-25

## 2019-10-25 NOTE — ANESTHESIA POSTPROCEDURE EVALUATION
[address] Patient Status:  Hospital Outpatient Surgery   Age/Gender 69year old male MRN [id number]   Location [address] [facility] Attending Kaylyn Pat MD   Hosp Day # 0 PCP Micah Garcia MD

## 2019-10-25 NOTE — OPERATIVE REPORT
[facility]     PATIENT'S NAME: Lucita Pan        ATTENDING PHYSICIAN: Saadia Montana. MD Dinah   OPERATING PHYSICIAN: Saadia Montana.  MD Dinah      MEDICAL RECORD #:   [id number]        YOB: 1945       ADMISSION DATE:       10/25/ was given for surgical antimicrobial prophylaxis.  The patient was subsequently prepped and draped in the usual sterile fashion.  Time-out was performed.  With the use of the ultrasound, the cephalic vein was inspected and found to be of adequate caliber a suture in subcuticular fashion.  Dermabond was applied over the incision.  The patient awoke from anesthesia and was taken to recovery in stable condition.  All counts were correct at the end of the case.  I was present and performed all aspects of the pro

## 2019-10-25 NOTE — ANESTHESIA PREPROCEDURE EVALUATION
PRE-OP EVALUATION    Patient Name: Marine Javed    Pre-op Diagnosis: end stage  renal disease    Procedure(s):  right brachialcephalic arteriovenous fistula creation  Amparo Chung    Surgeon(s) and Role:     Senthil Rowley MD - Primary  Pre-op vi FOR PAIN MANAGEMENT   • LUMBAR LAMINECTOMY FUSION W/ BONE GRAFT 3 LEVEL N/A 5/16/2016    Performed by Ngozi Go MD at [address]   • OTHER SURGICAL HISTORY  10/4/12    cysto-Dr. Jony Henson   • REPLACE AORTIC VALVE OPEN  2012   • VALVE REPAIR       Social with: patient and spouse                Present on Admission:  **None**

## 2019-10-25 NOTE — H&P
Vascular Surgery  New Patient Consultation     Name: Rubi Guzman  MRN: [id number]  : 1945     Referring Provider: Marry Coley     CC: dialysis access creation     HPI: 68 yo M with hx of HTN, HL, CAD, and end-stage renal disease on • BACK SURGERY   5/16/16     L2-L5 Decomp poss uninstrumented fusion   • CABG       • CATH PERCUTANEOUS  TRANSLUMINAL CORONARY ANGIOPLASTY       • CATH TRANSCATHETER AORTIC VALVE REPLACEMENT       • LUMBAR EPIDURAL N/A 3/2/2016     Performed by Susan Redmond Ferrous Sulfate 325 (65 Fe) MG Oral Tab Take by mouth. Disp:  Rfl:    CARVEDILOL 25 MG Oral Tab TAKE 1 TABLET BY MOUTH TWO  TIMES DAILY WITH MEALS Disp: 180 tablet Rfl: 3   furosemide 80 MG Oral Tab Take 1 tablet (80 mg total) by mouth 2 (two) times daily. Previous incisions: well healed from previous attempts at dialysis access creation. Neck supple; no LAD; trachea midline   RRR  CTAB; breathing comfortably  Abd soft, NT, ND;  no palpable abdominal masses.   RUE: Palpable radial and 1+ ulnar pulse  LUE: Pa !Basilic ! Mid right upper arm     !------!  +--------+------------------------+------+  ! Basilic ! Distal right upper arm  !5.30mm! +--------+------------------------+------+  ! Basilic ! Right antecubital       !5.20mm!   +--------+------------------------+- !Basilic ! Distal left forearm     ! 2.20mm! +--------+------------------------+------+  ! Basilic ! Left wrist              !2.20mm! +--------+------------------------+------+     Artery mapping:     +-----------------------------+--------+  ! Location

## 2019-11-07 PROBLEM — N18.4 CHRONIC KIDNEY DISEASE, STAGE 4 (SEVERE) (HCC): Status: ACTIVE | Noted: 2018-08-11

## 2019-11-07 PROBLEM — Z01.818 PRE-TRANSPLANT EVALUATION FOR KIDNEY TRANSPLANT: Status: ACTIVE | Noted: 2018-08-10

## 2020-05-12 RX ORDER — CALCIUM ACETATE 667 MG/1
CAPSULE ORAL
Qty: 270 CAPSULE | Refills: 0 | OUTPATIENT
Start: 2020-05-12

## 2020-10-05 RX ORDER — FUROSEMIDE 80 MG
TABLET ORAL
Qty: 90 TABLET | Refills: 3 | OUTPATIENT
Start: 2020-10-05

## 2020-10-05 NOTE — TELEPHONE ENCOUNTER
Furosemide 80mg Take 1 tablet daily    Last filled-10/8/2019 for 1 year    LOV-patient to contact dialysis    rx denied

## 2020-11-21 PROBLEM — N18.4 CHRONIC KIDNEY DISEASE, STAGE 4 (SEVERE) (HCC): Status: RESOLVED | Noted: 2018-08-11 | Resolved: 2020-11-21

## 2020-11-21 PROBLEM — E11.3553 STABLE PROLIFERATIVE DIABETIC RETINOPATHY OF BOTH EYES ASSOCIATED WITH TYPE 2 DIABETES MELLITUS (HCC): Status: ACTIVE | Noted: 2020-11-21

## 2020-11-23 NOTE — BRIEF OP NOTE
Pre-Operative Diagnosis: end stage  renal disease     Post-Operative Diagnosis: End stage renal disease      Procedure Performed:   Procedure(s):  Right brachialcephalic arteriovenous fistula creation.      Surgeon(s) and Role:     * Maryanne Santoyo MD -
None

## 2021-01-21 NOTE — CONSULTS
Pharmacy consulted to dose Zosyn for possible sepsis. Weight >100 kg and will be initiating dialysis per renal.  Will dose Zosyn at 4.5 grams IV every 12 hours.      Neha Najera PharmD  02/07/19 11:26 AM 21-Jan-2021 21:55:28

## 2021-02-22 RX ORDER — CALCIUM ACETATE 667 MG/1
CAPSULE ORAL
Qty: 270 CAPSULE | Refills: 0 | Status: SHIPPED | OUTPATIENT
Start: 2021-02-22

## 2021-04-17 PROBLEM — Z09 HOSPITAL DISCHARGE FOLLOW-UP: Status: RESOLVED | Noted: 2019-02-10 | Resolved: 2021-04-17

## 2021-04-17 PROBLEM — R50.9 FEVER, UNSPECIFIED FEVER CAUSE: Status: RESOLVED | Noted: 2019-02-06 | Resolved: 2021-04-17

## 2021-04-17 PROBLEM — I50.9 ACUTE ON CHRONIC CONGESTIVE HEART FAILURE (HCC): Status: RESOLVED | Noted: 2019-02-06 | Resolved: 2021-04-17

## 2021-04-17 PROBLEM — R11.2 NAUSEA AND VOMITING, INTRACTABILITY OF VOMITING NOT SPECIFIED, UNSPECIFIED VOMITING TYPE: Status: RESOLVED | Noted: 2019-02-06 | Resolved: 2021-04-17

## 2021-04-17 PROBLEM — Z01.818 PRE-TRANSPLANT EVALUATION FOR KIDNEY TRANSPLANT: Status: RESOLVED | Noted: 2018-08-10 | Resolved: 2021-04-17

## 2021-04-17 PROBLEM — I50.9 ACUTE ON CHRONIC CONGESTIVE HEART FAILURE, UNSPECIFIED HEART FAILURE TYPE (HCC): Status: RESOLVED | Noted: 2019-02-06 | Resolved: 2021-04-17

## 2021-04-17 PROBLEM — R05.9 COUGH: Status: RESOLVED | Noted: 2019-02-06 | Resolved: 2021-04-17

## 2021-04-17 PROBLEM — J81.0 ACUTE PULMONARY EDEMA (HCC): Status: RESOLVED | Noted: 2019-02-06 | Resolved: 2021-04-17

## 2021-04-17 PROBLEM — N18.6 ESRD (END STAGE RENAL DISEASE) (HCC): Status: RESOLVED | Noted: 2019-02-06 | Resolved: 2021-04-17

## 2021-06-10 ENCOUNTER — APPOINTMENT (OUTPATIENT)
Dept: CT IMAGING | Facility: HOSPITAL | Age: 76
DRG: 480 | End: 2021-06-10
Attending: EMERGENCY MEDICINE
Payer: MEDICARE

## 2021-06-10 ENCOUNTER — APPOINTMENT (OUTPATIENT)
Dept: GENERAL RADIOLOGY | Facility: HOSPITAL | Age: 76
DRG: 480 | End: 2021-06-10
Attending: EMERGENCY MEDICINE
Payer: MEDICARE

## 2021-06-10 ENCOUNTER — APPOINTMENT (OUTPATIENT)
Dept: GENERAL RADIOLOGY | Facility: HOSPITAL | Age: 76
DRG: 480 | End: 2021-06-10
Attending: ORTHOPAEDIC SURGERY
Payer: MEDICARE

## 2021-06-10 ENCOUNTER — APPOINTMENT (OUTPATIENT)
Dept: GENERAL RADIOLOGY | Facility: HOSPITAL | Age: 76
DRG: 480 | End: 2021-06-10
Payer: MEDICARE

## 2021-06-10 ENCOUNTER — HOSPITAL ENCOUNTER (INPATIENT)
Facility: HOSPITAL | Age: 76
LOS: 5 days | Discharge: NURSING, REHAB OR LONG-TERM CARE | DRG: 480 | End: 2021-06-15
Admitting: INTERNAL MEDICINE
Payer: MEDICARE

## 2021-06-10 DIAGNOSIS — N18.6 ANEMIA IN ESRD (END-STAGE RENAL DISEASE) (HCC): ICD-10-CM

## 2021-06-10 DIAGNOSIS — I48.20 ATRIAL FIBRILLATION, CHRONIC (HCC): ICD-10-CM

## 2021-06-10 DIAGNOSIS — S72.002A CLOSED FRACTURE OF NECK OF LEFT FEMUR, INITIAL ENCOUNTER (HCC): Primary | ICD-10-CM

## 2021-06-10 DIAGNOSIS — D63.1 ANEMIA IN ESRD (END-STAGE RENAL DISEASE) (HCC): ICD-10-CM

## 2021-06-10 PROCEDURE — 70450 CT HEAD/BRAIN W/O DYE: CPT | Performed by: EMERGENCY MEDICINE

## 2021-06-10 PROCEDURE — 73552 X-RAY EXAM OF FEMUR 2/>: CPT | Performed by: ORTHOPAEDIC SURGERY

## 2021-06-10 PROCEDURE — 73700 CT LOWER EXTREMITY W/O DYE: CPT | Performed by: ORTHOPAEDIC SURGERY

## 2021-06-10 PROCEDURE — 73501 X-RAY EXAM HIP UNI 1 VIEW: CPT

## 2021-06-10 PROCEDURE — 71045 X-RAY EXAM CHEST 1 VIEW: CPT | Performed by: EMERGENCY MEDICINE

## 2021-06-10 RX ORDER — ONDANSETRON 2 MG/ML
4 INJECTION INTRAMUSCULAR; INTRAVENOUS EVERY 6 HOURS PRN
Status: DISCONTINUED | OUTPATIENT
Start: 2021-06-10 | End: 2021-06-12

## 2021-06-10 RX ORDER — CARVEDILOL 12.5 MG/1
25 TABLET ORAL 2 TIMES DAILY WITH MEALS
Status: DISCONTINUED | OUTPATIENT
Start: 2021-06-10 | End: 2021-06-15

## 2021-06-10 RX ORDER — HYDROMORPHONE HYDROCHLORIDE 1 MG/ML
0.2 INJECTION, SOLUTION INTRAMUSCULAR; INTRAVENOUS; SUBCUTANEOUS EVERY 2 HOUR PRN
Status: DISCONTINUED | OUTPATIENT
Start: 2021-06-10 | End: 2021-06-15

## 2021-06-10 RX ORDER — POLYETHYLENE GLYCOL 3350 17 G/17G
17 POWDER, FOR SOLUTION ORAL DAILY PRN
Status: DISCONTINUED | OUTPATIENT
Start: 2021-06-10 | End: 2021-06-15

## 2021-06-10 RX ORDER — HYDROCODONE BITARTRATE AND ACETAMINOPHEN 5; 325 MG/1; MG/1
1 TABLET ORAL EVERY 4 HOURS PRN
Status: DISCONTINUED | OUTPATIENT
Start: 2021-06-10 | End: 2021-06-12

## 2021-06-10 RX ORDER — DEXTROSE MONOHYDRATE 25 G/50ML
50 INJECTION, SOLUTION INTRAVENOUS
Status: DISCONTINUED | OUTPATIENT
Start: 2021-06-10 | End: 2021-06-11

## 2021-06-10 RX ORDER — HYDROCODONE BITARTRATE AND ACETAMINOPHEN 5; 325 MG/1; MG/1
1 TABLET ORAL EVERY 4 HOURS PRN
Status: DISCONTINUED | OUTPATIENT
Start: 2021-06-10 | End: 2021-06-15

## 2021-06-10 RX ORDER — ATORVASTATIN CALCIUM 40 MG/1
80 TABLET, FILM COATED ORAL NIGHTLY
Status: DISCONTINUED | OUTPATIENT
Start: 2021-06-10 | End: 2021-06-15

## 2021-06-10 RX ORDER — MORPHINE SULFATE 2 MG/ML
2 INJECTION, SOLUTION INTRAMUSCULAR; INTRAVENOUS EVERY 2 HOUR PRN
Status: DISCONTINUED | OUTPATIENT
Start: 2021-06-10 | End: 2021-06-15

## 2021-06-10 RX ORDER — DOCUSATE SODIUM 100 MG/1
100 CAPSULE, LIQUID FILLED ORAL 2 TIMES DAILY
Status: DISCONTINUED | OUTPATIENT
Start: 2021-06-10 | End: 2021-06-15

## 2021-06-10 RX ORDER — HYDROMORPHONE HYDROCHLORIDE 1 MG/ML
0.8 INJECTION, SOLUTION INTRAMUSCULAR; INTRAVENOUS; SUBCUTANEOUS EVERY 2 HOUR PRN
Status: DISCONTINUED | OUTPATIENT
Start: 2021-06-10 | End: 2021-06-15

## 2021-06-10 RX ORDER — HYDROCODONE BITARTRATE AND ACETAMINOPHEN 5; 325 MG/1; MG/1
2 TABLET ORAL EVERY 4 HOURS PRN
Status: DISCONTINUED | OUTPATIENT
Start: 2021-06-10 | End: 2021-06-15

## 2021-06-10 RX ORDER — MORPHINE SULFATE 4 MG/ML
4 INJECTION, SOLUTION INTRAMUSCULAR; INTRAVENOUS EVERY 2 HOUR PRN
Status: DISCONTINUED | OUTPATIENT
Start: 2021-06-10 | End: 2021-06-15

## 2021-06-10 RX ORDER — METOCLOPRAMIDE HYDROCHLORIDE 5 MG/ML
5 INJECTION INTRAMUSCULAR; INTRAVENOUS EVERY 8 HOURS PRN
Status: DISCONTINUED | OUTPATIENT
Start: 2021-06-10 | End: 2021-06-15

## 2021-06-10 RX ORDER — BISACODYL 10 MG
10 SUPPOSITORY, RECTAL RECTAL
Status: DISCONTINUED | OUTPATIENT
Start: 2021-06-10 | End: 2021-06-15

## 2021-06-10 RX ORDER — ACETAMINOPHEN 325 MG/1
650 TABLET ORAL EVERY 4 HOURS PRN
Status: DISCONTINUED | OUTPATIENT
Start: 2021-06-10 | End: 2021-06-15

## 2021-06-10 RX ORDER — SODIUM CHLORIDE 9 MG/ML
INJECTION, SOLUTION INTRAVENOUS CONTINUOUS
Status: ACTIVE | OUTPATIENT
Start: 2021-06-10 | End: 2021-06-10

## 2021-06-10 RX ORDER — ONDANSETRON 2 MG/ML
4 INJECTION INTRAMUSCULAR; INTRAVENOUS EVERY 6 HOURS PRN
Status: DISCONTINUED | OUTPATIENT
Start: 2021-06-10 | End: 2021-06-15

## 2021-06-10 RX ORDER — MORPHINE SULFATE 2 MG/ML
1 INJECTION, SOLUTION INTRAMUSCULAR; INTRAVENOUS EVERY 2 HOUR PRN
Status: DISCONTINUED | OUTPATIENT
Start: 2021-06-10 | End: 2021-06-15

## 2021-06-10 RX ORDER — HYDROCODONE BITARTRATE AND ACETAMINOPHEN 5; 325 MG/1; MG/1
2 TABLET ORAL EVERY 4 HOURS PRN
Status: DISCONTINUED | OUTPATIENT
Start: 2021-06-10 | End: 2021-06-12

## 2021-06-10 RX ORDER — HYDROMORPHONE HYDROCHLORIDE 1 MG/ML
0.4 INJECTION, SOLUTION INTRAMUSCULAR; INTRAVENOUS; SUBCUTANEOUS EVERY 2 HOUR PRN
Status: DISCONTINUED | OUTPATIENT
Start: 2021-06-10 | End: 2021-06-15

## 2021-06-10 RX ORDER — ACETAMINOPHEN 325 MG/1
650 TABLET ORAL EVERY 4 HOURS PRN
Status: DISCONTINUED | OUTPATIENT
Start: 2021-06-10 | End: 2021-06-12

## 2021-06-10 NOTE — ED INITIAL ASSESSMENT (HPI)
Pt to ED s/p slip and fall approximately 2 hours PTA. Pt c/o pain to left hip. Pt states that he began taking Xarelto yesterday due to Afib. Pt denies hitting his head and denies LOC.

## 2021-06-10 NOTE — PROGRESS NOTES
Consult Received    Left femoral neck fracture, basicervical femoral neck fracture  Afib on Xeralto  Last [place] Shawn does was this morning (6/10)    Bedrest  Full length femur film  CT left Hip  Medical optimization  Laureen Dow to ad

## 2021-06-10 NOTE — ED PROVIDER NOTES
Patient Seen in: [facility] Emergency Department      History   Patient presents with:  Fall  Leg or Foot Injury    Stated Complaint: fall, left hip pain     HPI/Subjective:   HPI    Patient is a pleasant 72-year-old male, presenting for evaluation Procedure Laterality Date   • BACK SURGERY  5/16/16    L2-L5 Decomp poss uninstrumented fusion   • CABG     • CATH PERCUTANEOUS  TRANSLUMINAL CORONARY ANGIOPLASTY     • CATH TRANSCATHETER AORTIC VALVE REPLACEMENT     • INJECTION, W/WO CONTRAST, DX/THERAP HAVE EXPERIENCED ANY OF THE FOLLOWING EVENTS N/A 3/2/2016    Procedure: LUMBAR EPIDURAL;  Surgeon: Thelma Pereyra MD;  Location: [facility] FOR PAIN MANAGEMENT   • PATIENT [address] FOR IV ANTIBIOTIC SURGICAL SITE INFECTION PROPHYLAXIS. respirations are unlabored. HEART: Irregularly irregular. There are no rubs or gallops. ABDOMEN: The abdomen is soft, nondistended, nontender. Bowel sounds present. SKIN: Skin is pink warm and dry. There are no rashes.    EXTREMITIES: Pain with movemen CBC W/ DIFFERENTIAL[[id number]]          Abnormal            Final result                 Please view results for these tests on the individual orders.    URINALYSIS WITH CULTURE REFLEX   RAINBOW DRAW BLUE   RAINBOW DRAW LAVENDER   RAINBOW DRAW LIGHT GREEN INDICATIONS:  fall, left hip pain  PATIENT STATED HISTORY: (As transcribed by Technologist)  Left hip pain after falling. FINDINGS:  There is fracture of left proximal femur. The fracture line is along the mid neck. Separation approximately 0.6 cm.   [facility] [employer] provider specified.         Medications Prescribed:  Current Discharge Medication List                          Hospital Problems     Present on Admission  Date Reviewed: 6/8/2021        ICD-10-CM Noted POA    * (Principal) Closed fracture of neck of left f

## 2021-06-11 ENCOUNTER — ANESTHESIA EVENT (OUTPATIENT)
Dept: SURGERY | Facility: HOSPITAL | Age: 76
DRG: 480 | End: 2021-06-11
Payer: MEDICARE

## 2021-06-11 ENCOUNTER — APPOINTMENT (OUTPATIENT)
Dept: GENERAL RADIOLOGY | Facility: HOSPITAL | Age: 76
DRG: 480 | End: 2021-06-11
Attending: ORTHOPAEDIC SURGERY
Payer: MEDICARE

## 2021-06-11 ENCOUNTER — ANESTHESIA (OUTPATIENT)
Dept: SURGERY | Facility: HOSPITAL | Age: 76
DRG: 480 | End: 2021-06-11
Payer: MEDICARE

## 2021-06-11 PROCEDURE — 0QH734Z INSERTION OF INTERNAL FIXATION DEVICE INTO LEFT UPPER FEMUR, PERCUTANEOUS APPROACH: ICD-10-PCS | Performed by: ORTHOPAEDIC SURGERY

## 2021-06-11 PROCEDURE — 99222 1ST HOSP IP/OBS MODERATE 55: CPT | Performed by: INTERNAL MEDICINE

## 2021-06-11 PROCEDURE — 5A1D70Z PERFORMANCE OF URINARY FILTRATION, INTERMITTENT, LESS THAN 6 HOURS PER DAY: ICD-10-PCS | Performed by: INTERNAL MEDICINE

## 2021-06-11 PROCEDURE — 76000 FLUOROSCOPY <1 HR PHYS/QHP: CPT | Performed by: ORTHOPAEDIC SURGERY

## 2021-06-11 DEVICE — IMPLANTABLE DEVICE: Type: IMPLANTABLE DEVICE | Site: HIP | Status: FUNCTIONAL

## 2021-06-11 RX ORDER — LOSARTAN POTASSIUM 50 MG/1
50 TABLET ORAL
Status: DISCONTINUED | OUTPATIENT
Start: 2021-06-12 | End: 2021-06-15

## 2021-06-11 RX ORDER — TIMOLOL MALEATE 5 MG/ML
1 SOLUTION/ DROPS OPHTHALMIC 2 TIMES DAILY
Status: DISCONTINUED | OUTPATIENT
Start: 2021-06-11 | End: 2021-06-15

## 2021-06-11 RX ORDER — SODIUM CHLORIDE 9 MG/ML
INJECTION, SOLUTION INTRAVENOUS CONTINUOUS PRN
Status: DISCONTINUED | OUTPATIENT
Start: 2021-06-11 | End: 2021-06-11 | Stop reason: SURG

## 2021-06-11 RX ORDER — PHENYLEPHRINE HCL 10 MG/ML
VIAL (ML) INJECTION AS NEEDED
Status: DISCONTINUED | OUTPATIENT
Start: 2021-06-11 | End: 2021-06-11 | Stop reason: SURG

## 2021-06-11 RX ORDER — BRIMONIDINE TARTRATE 2 MG/ML
1 SOLUTION/ DROPS OPHTHALMIC 2 TIMES DAILY
Status: DISCONTINUED | OUTPATIENT
Start: 2021-06-11 | End: 2021-06-15

## 2021-06-11 RX ORDER — SODIUM CHLORIDE, SODIUM LACTATE, POTASSIUM CHLORIDE, CALCIUM CHLORIDE 600; 310; 30; 20 MG/100ML; MG/100ML; MG/100ML; MG/100ML
INJECTION, SOLUTION INTRAVENOUS CONTINUOUS
Status: DISCONTINUED | OUTPATIENT
Start: 2021-06-11 | End: 2021-06-11 | Stop reason: HOSPADM

## 2021-06-11 RX ORDER — ONDANSETRON 2 MG/ML
4 INJECTION INTRAMUSCULAR; INTRAVENOUS AS NEEDED
Status: DISCONTINUED | OUTPATIENT
Start: 2021-06-11 | End: 2021-06-11 | Stop reason: HOSPADM

## 2021-06-11 RX ORDER — DEXTROSE MONOHYDRATE 25 G/50ML
50 INJECTION, SOLUTION INTRAVENOUS
Status: DISCONTINUED | OUTPATIENT
Start: 2021-06-11 | End: 2021-06-12

## 2021-06-11 RX ORDER — BUPIVACAINE HYDROCHLORIDE AND EPINEPHRINE 5; 5 MG/ML; UG/ML
INJECTION, SOLUTION EPIDURAL; INTRACAUDAL; PERINEURAL AS NEEDED
Status: DISCONTINUED | OUTPATIENT
Start: 2021-06-11 | End: 2021-06-11 | Stop reason: HOSPADM

## 2021-06-11 RX ORDER — DEXTROSE MONOHYDRATE 25 G/50ML
50 INJECTION, SOLUTION INTRAVENOUS
Status: DISCONTINUED | OUTPATIENT
Start: 2021-06-11 | End: 2021-06-11 | Stop reason: HOSPADM

## 2021-06-11 RX ORDER — HYDROCODONE BITARTRATE AND ACETAMINOPHEN 5; 325 MG/1; MG/1
2 TABLET ORAL AS NEEDED
Status: DISCONTINUED | OUTPATIENT
Start: 2021-06-11 | End: 2021-06-11 | Stop reason: HOSPADM

## 2021-06-11 RX ORDER — DEXAMETHASONE SODIUM PHOSPHATE 4 MG/ML
VIAL (ML) INJECTION AS NEEDED
Status: DISCONTINUED | OUTPATIENT
Start: 2021-06-11 | End: 2021-06-11 | Stop reason: SURG

## 2021-06-11 RX ORDER — CEFAZOLIN SODIUM 1 G/3ML
INJECTION, POWDER, FOR SOLUTION INTRAMUSCULAR; INTRAVENOUS AS NEEDED
Status: DISCONTINUED | OUTPATIENT
Start: 2021-06-11 | End: 2021-06-11 | Stop reason: SURG

## 2021-06-11 RX ORDER — ONDANSETRON 2 MG/ML
INJECTION INTRAMUSCULAR; INTRAVENOUS AS NEEDED
Status: DISCONTINUED | OUTPATIENT
Start: 2021-06-11 | End: 2021-06-11 | Stop reason: SURG

## 2021-06-11 RX ORDER — TIMOLOL MALEATE 5 MG/ML
1 SOLUTION/ DROPS OPHTHALMIC 2 TIMES DAILY
Status: DISCONTINUED | OUTPATIENT
Start: 2021-06-11 | End: 2021-06-11 | Stop reason: SDUPTHER

## 2021-06-11 RX ORDER — HYDROMORPHONE HYDROCHLORIDE 1 MG/ML
0.4 INJECTION, SOLUTION INTRAMUSCULAR; INTRAVENOUS; SUBCUTANEOUS EVERY 5 MIN PRN
Status: DISCONTINUED | OUTPATIENT
Start: 2021-06-11 | End: 2021-06-11 | Stop reason: HOSPADM

## 2021-06-11 RX ORDER — FUROSEMIDE 40 MG/1
80 TABLET ORAL DAILY
Status: DISCONTINUED | OUTPATIENT
Start: 2021-06-11 | End: 2021-06-11

## 2021-06-11 RX ORDER — HYDROCODONE BITARTRATE AND ACETAMINOPHEN 5; 325 MG/1; MG/1
1 TABLET ORAL AS NEEDED
Status: DISCONTINUED | OUTPATIENT
Start: 2021-06-11 | End: 2021-06-11 | Stop reason: HOSPADM

## 2021-06-11 RX ORDER — DOXEPIN HYDROCHLORIDE 50 MG/1
1 CAPSULE ORAL DAILY
Status: DISCONTINUED | OUTPATIENT
Start: 2021-06-12 | End: 2021-06-15

## 2021-06-11 RX ORDER — LIDOCAINE HYDROCHLORIDE 10 MG/ML
INJECTION, SOLUTION EPIDURAL; INFILTRATION; INTRACAUDAL; PERINEURAL AS NEEDED
Status: DISCONTINUED | OUTPATIENT
Start: 2021-06-11 | End: 2021-06-11 | Stop reason: SURG

## 2021-06-11 RX ORDER — CEFAZOLIN SODIUM/WATER 2 G/20 ML
2 SYRINGE (ML) INTRAVENOUS EVERY 8 HOURS
Status: COMPLETED | OUTPATIENT
Start: 2021-06-11 | End: 2021-06-12

## 2021-06-11 RX ORDER — FUROSEMIDE 40 MG/1
80 TABLET ORAL DAILY
Status: DISCONTINUED | OUTPATIENT
Start: 2021-06-12 | End: 2021-06-15

## 2021-06-11 RX ORDER — NALOXONE HYDROCHLORIDE 0.4 MG/ML
80 INJECTION, SOLUTION INTRAMUSCULAR; INTRAVENOUS; SUBCUTANEOUS AS NEEDED
Status: DISCONTINUED | OUTPATIENT
Start: 2021-06-11 | End: 2021-06-11 | Stop reason: HOSPADM

## 2021-06-11 RX ADMIN — LIDOCAINE HYDROCHLORIDE 50 MG: 10 INJECTION, SOLUTION EPIDURAL; INFILTRATION; INTRACAUDAL; PERINEURAL at 11:32:00

## 2021-06-11 RX ADMIN — SODIUM CHLORIDE: 9 INJECTION, SOLUTION INTRAVENOUS at 11:26:00

## 2021-06-11 RX ADMIN — ONDANSETRON 4 MG: 2 INJECTION INTRAMUSCULAR; INTRAVENOUS at 12:36:00

## 2021-06-11 RX ADMIN — PHENYLEPHRINE HCL 100 MCG: 10 MG/ML VIAL (ML) INJECTION at 11:43:00

## 2021-06-11 RX ADMIN — CEFAZOLIN SODIUM 2 G: 1 INJECTION, POWDER, FOR SOLUTION INTRAMUSCULAR; INTRAVENOUS at 11:36:00

## 2021-06-11 RX ADMIN — DEXAMETHASONE SODIUM PHOSPHATE 8 MG: 4 MG/ML VIAL (ML) INJECTION at 11:56:00

## 2021-06-11 NOTE — CONSULTS
[facility]  Report of Consultation    Hi Ochoa Patient Status:  Inpatient    1945 MRN [id number]   [facility] 3SW-A Attending Jordana Gerardo, *   Hosp Day # 1 PCP Vilma Donnelly MD     Reason for [facility] [place] [facility] [place] [facility]   • INJECTION, W/WO CONTRAST, DX/THERAPEUTIC SUBSTANCE, EPIDURAL/SUBARACHNOID; LUMBAR/SACRAL N/A 1/25/2016    Procedure: LUMBAR EPIDURAL;  Surgeon: Krishna Alvarez MD;  Location: [address] [place] [address]   •  [facility] PREOPERATIVE ORDER FOR IV ANTIBIOTIC SURGICAL SITE INFECTION PROPHYLAXIS.  N/A 1/25/2016    Procedure: LUMBAR EPIDURAL;  Surgeon: Kiesha Johnson MD;  Location: [facility] FOR PAIN MANAGEMENT   • PATIENT [facility] IV ANTIBIOTI HYDROcodone-acetaminophen (NORCO) 5-325 MG per tab 2 tablet, 2 tablet, Oral, Q4H PRN  •  ondansetron HCl (ZOFRAN) injection 4 mg, 4 mg, Intravenous, Q6H PRN  •  acetaminophen (TYLENOL) tab 650 mg, 650 mg, Oral, Q4H PRN **OR** HYDROcodone-acetaminophen (NOR gross hematuria  Denies LE edema  + L hip pain      Physical Exam:   /89 (BP Location: Right arm)   Pulse 63   Temp 98.6 °F (37 °C) (Oral)   Resp 15   Ht 6' (1.829 m)   Wt 230 lb (104.3 kg)   SpO2 98%   BMI 31.19 kg/m²   Temp (24hrs), Av.4 °F (36 Value   10/18/2019 34.0 (H)   06/25/2018 88 (H)   06/18/2018 76 (H)   01/09/2014 24   08/27/2012 21   01/12/2012 25     Creatinine, Serum (mg/dL)   Date Value   01/09/2014 1.65 (H)   08/27/2012 1.19   01/12/2012 1.25     Creatinine (mg/dL)   Date Value   0

## 2021-06-11 NOTE — PROGRESS NOTES
NURSING ADMISSION NOTE      Patient admitted via Cart  Oriented to room. Safety precautions initiated. Bed in low position. Call light in reach. Hospitalist notified of new consult.

## 2021-06-11 NOTE — CM/SW NOTE
06/11/21 1000   CM/SW Referral Data   Referral Source Physician   Reason for Referral Discharge planning   Informant Patient;Spouse; Children   Patient Info   Patient's Mental Status Alert;Oriented   Patient's Home Environment House   Patient lives with

## 2021-06-11 NOTE — BRIEF OP NOTE
Brief Op Note - Ortho    Pre-Operative Diagnosis: Left nondisplaced femoral neck fracture     Post-Operative Diagnosis: Left nondisplaced femoral neck fracture     Procedure Performed:   PERCUTANEOUS SCREW FIXATION OF LEFT FEMORAL NECK    Surgeon(s) and Ro

## 2021-06-11 NOTE — CONSULTS
[facility]    Report of Consultation    Keyana Tenajoseluis Patient Status:  Inpatient    1945 MRN [id number]   [facility] PRE OP HOLDING Attending Thee Mueller, *   Hosp Day # 1 PCP Darrell Dancer, MD     Date of A Coronary Artery Stents 12/2009 8/16/2010   • Type 1 diabetes mellitus ([facility] [address]    • Unspecified essential hypertension        Past Surgical History  Past Surgical History:   Procedure Laterality Date   • BACK SURGERY  5/16/16    L2-L5 Decomp poss uninstrument THE FOLLOWING EVENTS N/A 1/25/2016    Procedure: LUMBAR EPIDURAL;  Surgeon: Rosa Mcgraw MD;  Location: [facility] FOR PAIN MANAGEMENT   • PATIENT DOCUMENTED NOT TO HAVE EXPERIENCED ANY OF THE FOLLOWING EVENTS N/A 3/2/2016    Procedure: LUMBAR EPIDURAL; injection 1 mg, 1 mg, Intravenous, Q2H PRN   Or  [MAR Hold] morphINE sulfate (PF) 2 MG/ML injection 2 mg, 2 mg, Intravenous, Q2H PRN   Or  [MAR Hold] morphINE sulfate (PF) 4 MG/ML injection 4 mg, 4 mg, Intravenous, Q2H PRN  [MAR Hold] acetaminophen (TYLENO PRN  [MAR Hold] insulin detemir (LEVEMIR) 100 UNIT/ML flextouch 15 Units, 15 Units, Subcutaneous, Daily  [MAR Hold] Insulin Aspart Pen (NOVOLOG) 100 UNIT/ML flexpen 1-68 Units, 1-68 Units, Subcutaneous, TID CC  [MAR Hold] Insulin Aspart Pen (NOVOLOG) 100 U nausea  Skin:  Denies rash   Neurologic:  Denies headache or other problems  Musculoskeletal:  As described in HPI  14 System Review of Systems otherwise negative     Physical Exam:   Blood pressure 132/57, pulse 68, temperature 98.7 °F (37.1 °C), temperat INR 1.75 (H) 06/11/2021    PTP 20.9 (H) 06/11/2021    TSH 2.580 08/27/2012    PSA 0.7 01/09/2014    CRP 0.3 04/22/2014    MG 2.1 06/11/2021    PHOS 3.2 02/08/2019    TROP <0.046 02/06/2019    B12 949 02/06/2019         Imaging:  XR FEMUR MIN 2 VIEWS LEFT ( since last night. Xarelto has been held since yesterday a.m.  - Operative and nonoperative treatments discussed with the patient. Risks of nonoperative management including complications of prolonged bedrest and/or immobility discussed.   Risks of surgica

## 2021-06-11 NOTE — H&P
[facility] Hospitalist H&P       CC: L femoral neck fx sp mechanical fall    PCP: Anisha Nevarez MD    History of Present Illness: Pt is a 69 yo with mmp including but not limited to CAD, HTN/HL, new pAfib, DMII on insulin pump, ESRD on HD, river is admitt LUMBAR/SACRAL N/A 1/25/2016    Procedure: LUMBAR EPIDURAL;  Surgeon: Cherylene Deters, MD;  Location: [facility] FOR PAIN MANAGEMENT   • INJECTION, W/WO CONTRAST, DX/THERAPEUTIC SUBSTANCE, EPIDURAL/SUBARACHNOID; LUMBAR/SACRAL N/A 3/2/2016    Procedure: Goldie Hopkins EPIDURAL;  Surgeon: Flavio Mckay MD;  Location: [facility] FOR PAIN MANAGEMENT   • PATIENT [address] FOR IV ANTIBIOTIC SURGICAL SITE INFECTION PROPHYLAXIS.  N/A 3/2/2016    Procedure: LUMBAR EPIDURAL;  Surgeon: Chandni Evans MD;  Katie Hernández Normocephalic, without obvious abnormality, atraumatic. Eyes:  Sclera anicteric,  EOMs intact.  Lids wnl    Ears, nose, throat:  external ears and nose within normal limits, hearing intact       Neck: Supple, symmetrical   Lungs:   Clear to auscultation hip.   Dictated by (CST): Rex Calabrese MD on 6/10/2021 at 8:38 PM     Finalized by (CST): Rex Calabrese MD on 6/10/2021 at 8:40 PM            XR [address] [facility] VIEW, LEFT (CPT=73501)    Result Date: 6/10/2021                 CONCLUSION:  Left femora

## 2021-06-11 NOTE — ANESTHESIA PROCEDURE NOTES
Airway  Date/Time: 6/11/2021 11:32 AM  Urgency: elective    Airway not difficult    General Information and Staff    Patient location during procedure: OR  Anesthesiologist: Bogdan Conn MD  Resident/CRNA: Cezar Griffin CRNA  Performed: CRNA     Ind

## 2021-06-11 NOTE — ED QUICK NOTES
Glucose monitor was removed and given to wife. accucheck was completed  Insulin pump remains on, and in place.

## 2021-06-11 NOTE — OPERATIVE REPORT
Operative Note  [address]    Patient Name: Rohit Amaro    Procedure Date: 2021    : 1945    MRN: [id number]    Preoperative Diagnosis: Left nondisplaced femoral neck fracture    Postoperative Diagnosis: Left nondispla the entire way and not perforating the subchondral bone. 2 additional wires were placed in the posterior superior and anterior superior positions to create the inverted triangle configuration.   Similarly these were confirmed on fluoroscopy to be within steven

## 2021-06-11 NOTE — PLAN OF CARE
A&Ox4. VSS. On room air. SCDs on bilaterally. Telemetry monitoring - DESI no cpap. NPO since midnight. Last BM 6/9. Dialysis patient on M-W-F - decreased urine output. Fistula to right arm. Pain controlled with Dilaudid. Bedrest maintained.  Insulin pump rem

## 2021-06-11 NOTE — PLAN OF CARE
Dialysis this am, 2L removed per dialysis technician. OR at 1100, returned to floor around 1400. O2 2L per NC, hx DESI no CPAP, was on room air preop, will wean as tolerated. Pt a/ox4. Denies pain at this time. Small aquacel to left thigh CDI.  CMS intac

## 2021-06-11 NOTE — ANESTHESIA POSTPROCEDURE EVALUATION
[address] Patient Status:  Inpatient   Age/Gender 70year old male MRN [id number]   [facility] SURGERY Attending Jeevan Montana, [address] [place] Day # 1 PCP Lavelle Alarcon MD       Anesthesia Post-op Note    P

## 2021-06-11 NOTE — PROGRESS NOTES
AFO ordered for right foot drop. [address] [facility]  updated pt transport to OR. Will see patient later this afternoon for AFO delivery.

## 2021-06-11 NOTE — ANESTHESIA PREPROCEDURE EVALUATION
PRE-OP EVALUATION    Patient Name: Janet Marks    Admit Diagnosis: Atrial fibrillation, chronic ([facility] [I48.20]  Closed fracture of neck of left femur, initial encounter ([facility] [S72.002A]    Pre-op Diagnosis: Femoral fracture ([facility] Ericka Smith    PE tablet, Oral, Q4H PRN   Or  [MAR Hold] HYDROcodone-acetaminophen (NORCO) 5-325 MG per tab 2 tablet, 2 tablet, Oral, Q4H PRN  [MAR Hold] HYDROmorphone HCl (DILAUDID) 1 MG/ML injection 0.2 mg, 0.2 mg, Intravenous, Q2H PRN   Or  [MAR Hold] HYDROmorphone HCl ( 0900  COMBIGAN 0.2-0.5 % Ophthalmic Solution, Place 1 drop into both eyes 2 (two) times daily. , Disp: , Rfl: , 6/10/2021 at 0900  losartan Potassium 50 MG Oral Tab, Take 1 tablet (50 mg total) by mouth Every Tuesday, Thursday, and Saturday.  Takes on [place]- involving native coronary artery of native heart without angina pectoris Chronic diastolic heart failure (HCC)  Anemia associated with chronic renal failure B12 deficiency  Anemia in ESRD (end-stage renal disease) ([facility] [address] Status post aortic valve replacement W/ BONE GRAFT 3 LEVEL;  Surgeon: Holly Watson MD;  Location: [facility] MAIN OR   • PATIENT DOCUMENTED NOT TO HAVE EXPERIENCED ANY OF THE FOLLOWING EVENTS N/A 12/10/2015    Procedure: LUMBAR EPIDURAL;  Surgeon: Simeon Layne MD;  Location: [address] [place] [address] BUN 40 (H) 06/11/2021    CREATSERUM 6.73 (H) 06/11/2021     (H) 06/11/2021    CA 8.6 06/11/2021     Lab Results   Component Value Date    INR 1.75 (H) 06/11/2021         Airway      Mallampati: II  Mouth opening: >3 FB  TM distance: > 6 cm  Neck ROM

## 2021-06-11 NOTE — PROGRESS NOTES
Patient returned to unit from PACU. A/ox4, denies pain at this time. CMS intact to LLE. AFO ordered for RLE, will be delivered today. WBAT per orders.  Plan for PT/OT to see in am.

## 2021-06-12 RX ORDER — INSULIN ASPART 100 [IU]/ML
INJECTION, SOLUTION INTRAVENOUS; SUBCUTANEOUS
Status: DISCONTINUED | OUTPATIENT
Start: 2021-06-12 | End: 2021-06-14

## 2021-06-12 RX ORDER — DEXTROSE MONOHYDRATE 25 G/50ML
50 INJECTION, SOLUTION INTRAVENOUS
Status: DISCONTINUED | OUTPATIENT
Start: 2021-06-12 | End: 2021-06-12

## 2021-06-12 RX ORDER — DEXTROSE MONOHYDRATE 25 G/50ML
50 INJECTION, SOLUTION INTRAVENOUS
Status: DISCONTINUED | OUTPATIENT
Start: 2021-06-12 | End: 2021-06-15

## 2021-06-12 NOTE — PHYSICAL THERAPY NOTE
PHYSICAL THERAPY HIP EVALUATION - INPATIENT     Room Number: 425/806-X  Evaluation Date: 6/12/2021  Type of Evaluation: Initial  Physician Order: PT Eval and Treat    Presenting Problem: s/p left hip percutaneous screw fixation on 6/11/21  Reason for Thera Decomp poss uninstrumented fusion   • CABG     • CATH PERCUTANEOUS  TRANSLUMINAL CORONARY ANGIOPLASTY     • CATH TRANSCATHETER AORTIC VALVE REPLACEMENT     • INJECTION, W/WO CONTRAST, DX/THERAPEUTIC SUBSTANCE, EPIDURAL/SUBARACHNOID; LUMBAR/SACRAL N/A 12/10 Procedure: LUMBAR EPIDURAL;  Surgeon: Shane Kc MD;  Location: [facility] FOR PAIN MANAGEMENT   • PATIENT [address] FOR IV ANTIBIOTIC SURGICAL SITE INFECTION PROPHYLAXIS.  N/A 12/10/2015    Procedure: LUMBAR EPIDURAL;  Surgeon: Je Gonzalez for the following:   Left Hip flexion 100  Left Knee extension lacks 7-10 degs    Lower extremity strength is within functional limits except for the following:    Left Hip flexion  3-/5  Left Knee extension  3-/5    BALANCE  Static Sitting: Normal  Dynami Mobility as indicated above. VC's provided for expectations, encouragement, reassurance, t/f tech's, pain control tech's, gait pattern. Exercise/Education Provided:  Functional activity tolerated  Gait training  Transfer training  Eval completed.   E gait.   The patient is below baseline and would benefit from skilled inpatient PT to address the above deficits to assist patient in returning to prior level of function.     DISCHARGE RECOMMENDATIONS  PT Discharge Recommendations: Acute rehabilitation    P

## 2021-06-12 NOTE — PLAN OF CARE
Dressing to left hip clean, dry, intact. Denies numbness, tingling. Sitting up in chair. Has ambulated in hallway and in room, using walker & gait belt with asst of staff. Instructed on plan of care, call don't fall protocol, verbalized understanding.

## 2021-06-12 NOTE — PROGRESS NOTES
[address] Patient Status:  Inpatient    1945 MRN [id number]   [facility] 3SW-A Attending Gregorio Martinez, *   Hosp Day # 2 PCP Abdulaziz Fox MD       Subjective:  POD #1 left hip pinning    Pa

## 2021-06-12 NOTE — CONSULTS
PHYSICAL MEDICINE AND REHABILITATION CONSULTATION     CC: Impaired mobility and ADL dysfunction secondary to Left femoral neck fracture    HPI: This is a 68-year-old gentleman with known history of ESRD due to diabetes mellitus and hypertension who is on h (NOVOLOG) 100 UNIT/ML vial 1-100 Units, 1-100 Units, Subcutaneous, TID AC and HS  losartan Potassium (COZAAR) tab 50 mg, 50 mg, Oral, Once per day on Sun Tue Thu Sat  furosemide (LASIX) tab 80 mg, 80 mg, Oral, Daily  brimonidine Tartrate (ALPHAGAN) 0.2 % o Comment:Dyspnea    Past Medical History:  Past Medical History:   Diagnosis Date   • Aortic stenosis 1/5/2012   • CAD (coronary artery disease) 8/16/2010   • Cataract    • CKD (chronic kidney disease) stage 4, GFR 15-29 ml/min ([facility]) 3/30/2016   • Congestiv BY  PHYS PERFRMG SV 5+ YR N/A 1/25/2016    Procedure: LUMBAR EPIDURAL;  Surgeon: Kel Rome MD;  Location: [address] [place] [address] BY  PHYS [address] [facility]  N SV 5+ YR N/A 3/2/2016    Procedure: LUMBAR EPIDURAL;  Surgeon: Ashley Perry, Social History:  The patient lives in a 1 level house with his wife. 2 stairs to enter.         FUNCTIONAL STATUS:  Premorbid functional status-independent mobility and self-care skills without an assistive device    Current functional status-maximal K 5.6 06/12/2021    CL 95 06/12/2021    CO2 26.0 06/12/2021     06/12/2021    CA 8.5 06/12/2021    MG 2.1 06/12/2021    PGLU 302 06/12/2021       ASSESSMENT:    REHAB DIAGNOSIS:  1. Impaired mobility/ADL dysfunction secondary to left hip  2.   Status

## 2021-06-12 NOTE — OCCUPATIONAL THERAPY NOTE
OCCUPATIONAL THERAPY EVALUATION - INPATIENT     Room Number: 807/176-B  Evaluation Date: 6/12/2021  Type of Evaluation: Initial  Presenting Problem: L hip fracture    Physician Order: IP Consult to Occupational Therapy  Reason for Therapy: ADL/IADL Dysfunc CONTRAST, DX/THERAPEUTIC SUBSTANCE, EPIDURAL/SUBARACHNOID; LUMBAR/SACRAL N/A 12/10/2015    Procedure: LUMBAR EPIDURAL;  Surgeon: Marianna Su MD;  Location: [facility] FOR PAIN MANAGEMENT   • INJECTION, W/WO CONTRAST, DX/THERAPEUTIC SUBSTANCE, EPIDURAL/ INFECTION PROPHYLAXIS. N/A 12/10/2015    Procedure: LUMBAR EPIDURAL;  Surgeon: Domo Muhammad MD;  Location: [facility] FOR PAIN MANAGEMENT   • PATIENT [address] FOR IV ANTIBIOTIC SURGICAL SITE INFECTION PROPHYLAXIS.  N/A 1/25/2016    Pro limits    SENSATION  Light touch:  intact    Communication: WNL    Behavioral/Emotional/Social: WNL    RANGE OF MOTION AND STRENGTH ASSESSMENT  Upper extremity ROM is within functional limits     Upper extremity strength is within functional limits      on and needs met. Patient End of Session: Up in chair;Needs met;Call light within reach;RN aware of session/findings; All patient questions and concerns addressed; Alarm set    ASSESSMENT     Patient is a 70year old male admitted on 6/10/2021 for  Left fem transfer training; Endurance training;Patient/Family education;Patient/Family training;Equipment eval/education; Compensatory technique education  Rehab Potential : Good  Frequency (Obs): 3-5x/week  Number of Visits to Meet Established Goals: 4    ADL Goals

## 2021-06-12 NOTE — PLAN OF CARE
Pt alert and oriented x4. Pt denies n/t. Pt has chronic foot drop RLE AFO to be worn when OOB. 2L PRN  IS encouraged. DESI does not use CPAP. Tele: Afib. SCD's bilaterally/ ankle pumps encouraged. Decreased urine output. LBM 6/9. WBAT to LLE.  Min-mod ass

## 2021-06-12 NOTE — CM/SW NOTE
RN contacted YSABEL stating PT and OT recommend [facility] for acute rehabilitation and a PMR consult is pending. YSABEL sent referral via [facility] to [address] [facility] for acute rehabilitation, pending PMR recommendation.      Addendum:  [facility] can accept pending dialysis

## 2021-06-12 NOTE — PROGRESS NOTES
[facility] Hospitalist Progress Note     PCP: Jaime Lopez MD    CC:  Follow up    SUBJECTIVE:  Sitting up in chair, wife at bedside.   Pain minimal. No cp/sob/n/v/f/c. +U, +flatus    OBJECTIVE:  Temp:  [97.8 °F (36.6 °C)-98.6 °F (37 °C)] 98.6 °F (37 °C) brimonidine Tartrate  1 drop Both Eyes BID    And   • Timolol Maleate  1 drop Both Eyes BID   • multivitamin  1 tablet Oral Daily   • atorvastatin  80 mg Oral Nightly   • carvedilol  25 mg Oral BID with meals   • docusate sodium  100 mg Oral BID   • Yonsi monitor     **PPx-scds for now  Questions/concerns were discussed with patient and/or family by bedside.          Thank Bandar Eduardo MD    [facility] Hospitalist  Answering Service number: [phone]

## 2021-06-13 NOTE — PROGRESS NOTES
[facility] Hospitalist Progress Note     PCP: Lavelle Alarcon MD    CC:  Follow up    SUBJECTIVE:  Sitting up in chair,    Pain minimal. No cp/sob/n/v/f/c. +U, +flatus.  bs better    OBJECTIVE:  Temp:  [97.6 °F (36.4 °C)-99 °F (37.2 °C)] 98 °F (36.7 °C)  Puls drop Both Eyes BID    And   • Timolol Maleate  1 drop Both Eyes BID   • multivitamin  1 tablet Oral Daily   • atorvastatin  80 mg Oral Nightly   • carvedilol  25 mg Oral BID with meals   • docusate sodium  100 mg Oral BID   • Insulin Aspart Pen  1-68 Units

## 2021-06-13 NOTE — PROGRESS NOTES
[address] Patient Status:  Inpatient    1945 MRN [id number]   [facility] 3SW-A Attending Vannessa Herrera, *   Hosp Day # 3 PCP Gita Yoo MD       Subjective:  POD #2 left hip pinning     P

## 2021-06-13 NOTE — PLAN OF CARE
Pt alert and oriented x4. Pt denies n/t. Pt has chronic foot drop RLE AFO to be worn when OOB. 2L PRN  IS encouraged. DESI does not use CPAP. Tele: Afib. SCD's bilaterally/ ankle pumps encouraged. Decreased urine output. LBM 6/12. WBAT to LLE.  Min-mod as

## 2021-06-13 NOTE — PLAN OF CARE
Right hip/thigh aquacel dressing with small amount old drainage. Pedal pulses + bilat. Chronic right foot drop. AV fistula to right arm. Has own continuous glucose monitor. Dialyisis called and scheduled for tomorrow, I spoke with Teresa Wong.  Plan for dc to [facility]

## 2021-06-14 PROCEDURE — 99232 SBSQ HOSP IP/OBS MODERATE 35: CPT | Performed by: CLINICAL NURSE SPECIALIST

## 2021-06-14 PROCEDURE — 90935 HEMODIALYSIS ONE EVALUATION: CPT | Performed by: INTERNAL MEDICINE

## 2021-06-14 RX ORDER — HYDROCODONE BITARTRATE AND ACETAMINOPHEN 5; 325 MG/1; MG/1
1-2 TABLET ORAL EVERY 4 HOURS PRN
Qty: 20 TABLET | Refills: 0 | Status: SHIPPED | OUTPATIENT
Start: 2021-06-14 | End: 2021-07-12 | Stop reason: ALTCHOICE

## 2021-06-14 RX ORDER — PSEUDOEPHEDRINE HCL 30 MG
100 TABLET ORAL 2 TIMES DAILY
Qty: 30 CAPSULE | Refills: 0 | Status: SHIPPED | OUTPATIENT
Start: 2021-06-14 | End: 2021-08-24 | Stop reason: ALTCHOICE

## 2021-06-14 NOTE — PROGRESS NOTES
[facility] Hospitalist Progress Note     PCP: Dahlia Romero MD    CC:  Follow up    SUBJECTIVE:  Sitting up in bed, sore today.  No cp/sob/n/v/f/c. +U, +flatus    OBJECTIVE:  Temp:  [97.7 °F (36.5 °C)-98.6 °F (37 °C)] 98.1 °F (36.7 °C)  Pulse:  [55-85] 85 aspart  1-100 Units Subcutaneous TID AC and HS   • losartan Potassium  50 mg Oral Once per day on Sun Tue Thu Sat   • furosemide  80 mg Oral Daily   • brimonidine Tartrate  1 drop Both Eyes BID    And   • Timolol Maleate  1 drop Both Eyes BID   • multivita bedside.          Thank Carline Howell MD    [facility] Hospitalist  Answering Service number: [phone]

## 2021-06-14 NOTE — CM/SW NOTE
Spoke with Wilhelm Bumpers from [facility]. Pt is accepted for admission and they have dialysis availability, but no current bed availability for acute rehab. She does not anticipate bed availability today. Unsure when beds will open up.     Met with pt and pt's wi

## 2021-06-14 NOTE — CONSULTS
[facility]  Diabetes Clinical Nurse Specialist Consult Note    Raven List Patient Status:  Inpatient    1945 MRN [id number]   [facility] 3SW-A Attending Bladimir Aguilar, *   Hosp Day # 4 PCP Cintia Oglesby MD patient  regarding medications,insulin pump management and interrogation.      Insulin Pump Settings:    Type of Pump: Medtronic 630G  Type of Insulin: Humalog  Total Daily Dose:  57 units    Basal:  0-8A = 1.35; 8A-12P = 1.55; 12P -24 = 1.4  Total Daily Do

## 2021-06-14 NOTE — CONSULTS
[facility]  Progress Note    Boise Michael Patient Status:  Inpatient    1945 MRN [id number]   [facility] 3SW-A Attending Kaykay Zhou, *   Hosp Day # 4 PCP Carmelo Liu MD        No acute events overnight Intravenous, Q2H PRN   Or  HYDROmorphone HCl (DILAUDID) 1 MG/ML injection 0.4 mg, 0.4 mg, Intravenous, Q2H PRN   Or  HYDROmorphone HCl (DILAUDID) 1 MG/ML injection 0.8 mg, 0.8 mg, Intravenous, Q2H PRN  Metoclopramide HCl (REGLAN) injection 5 mg, 5 mg, Intr hours.    Invalid input(s): ALPHOS, TBIL, DBIL, TPROT        Impression/Plan:    1. ESRD- due to DM/HTN. Hyperkalemia -   HD today    2. Anemia- due to ESRD. Cont DALE for goal hgb 10-11 gms    3. L femoral neck fx- s/p OR.   Per ortho    4.  afib- rate

## 2021-06-14 NOTE — PLAN OF CARE
Patient is alert, oriented x4. Admitted for closed fracture of left femur s/p fall. Vitals stable, afebrile. Stable on RA. Tele - Afib controlled (60-70s)-on xarelto. C/o moderate pain to the left hip- managing with PRN tylenol/norco. Tolerating diet.  Insu Anticipate increased pain with activity and pre-medicate as appropriate  Outcome: Progressing     Problem: SAFETY ADULT - FALL  Goal: Free from fall injury  Description: INTERVENTIONS:  - Assess pt frequently for physical needs  - Identify cognitive and ph

## 2021-06-14 NOTE — CM/SW NOTE
Received call from pt's dtr, Hanny Pardo ([phone]). Discussed DC planning for [facility], however no beds available. Reviewed options for [facility] vs alternate acute rehab facilities. Pt's dtr requested referrals to other acute rehab programs.   They do prefer [facility] if b

## 2021-06-14 NOTE — OCCUPATIONAL THERAPY NOTE
OCCUPATIONAL THERAPY TREATMENT NOTE - INPATIENT     Room Number: 116/304-I  Session: 1   Number of Visits to Meet Established Goals: 4    Presenting Problem: L hip fracture    History related to current admission: Patient is a 70year old male admitted on EPIDURAL/SUBARACHNOID; LUMBAR/SACRAL N/A 12/10/2015    Procedure: LUMBAR EPIDURAL;  Surgeon: Karl Wall MD;  Location: [facility] FOR PAIN MANAGEMENT   • INJECTION, W/WO CONTRAST, DX/THERAPEUTIC SUBSTANCE, EPIDURAL/SUBARACHNOID; LUMBAR/SACRAL N/A 1/25 Procedure: LUMBAR EPIDURAL;  Surgeon: Reece Cazares MD;  Location: [facility] FOR PAIN MANAGEMENT   • PATIENT [address] FOR IV ANTIBIOTIC SURGICAL SITE INFECTION PROPHYLAXIS.  N/A 1/25/2016    Procedure: LUMBAR EPIDURAL;  Surgeon: Karyn Marin mobility, performed supine to sit min assist for LLE with increased time; education on LB dressing techniques/equipment, performed LB dressing donning shorts to knees with use of reacher, min assist and increased time due to low vision; standing via RW and equipment PRN --> in progress  Patient will perform toileting: with supervision --> in progress    Functional Transfer Goals  Patient will transfer from sit to stand:  with min assist --> MET 6/14  Patient will transfer to toilet:  with min assist and with

## 2021-06-14 NOTE — PLAN OF CARE
Pt AOx4. R.A, DESI no CPAP. Pain to Left Hip relieved with PO pain medication. Aquacel dressing, C/D/I.  WBAT, Up w/ min-moderate assist. VS remain stable, on tele - a.fib at baseline, hemodialysis M,W,F, per pt voids small amounts occasionally, last BM 6/12

## 2021-06-14 NOTE — CM/SW NOTE
No acute rehab facilities with bed availability for today. Spoke with Fabienne Mills from [facility] who stated they will have a bed for patient tomorrow, 6/15. He will go to room 1113. RN to call report to [phone].   Spoke with pt's dtr, Blanca Stauffer who has been i

## 2021-06-14 NOTE — PROGRESS NOTES
ORTHOPEDIC SURGERY PROGRESS NOTE    SUBJECTIVE:  Feels well this morning. Some thigh pain yesterday after a lot of ambulation. Feels improved this morning. Denies fevers, chills, chest pains, shortness of breath, calf pain.     OBJECTIVE:  Blood pressure 11

## 2021-06-15 VITALS
BODY MASS INDEX: 31.15 KG/M2 | WEIGHT: 230 LBS | TEMPERATURE: 99 F | DIASTOLIC BLOOD PRESSURE: 52 MMHG | OXYGEN SATURATION: 98 % | SYSTOLIC BLOOD PRESSURE: 135 MMHG | HEIGHT: 72 IN | RESPIRATION RATE: 19 BRPM | HEART RATE: 60 BPM

## 2021-06-15 PROCEDURE — 99231 SBSQ HOSP IP/OBS SF/LOW 25: CPT | Performed by: INTERNAL MEDICINE

## 2021-06-15 NOTE — PROGRESS NOTES
Patient will be going to [facility]. He will be getting picked up at 1300. Called report to [employer]. No further questions were asked.

## 2021-06-15 NOTE — PROGRESS NOTES
Assumed care at 0700. He is A&Ox4. On RA. Tele-A Fib controlled. Aquacel dressing to Left hip. AFO brace to his Rt foot when OOB,  2A w walker to move to chair. Has insulin pump on and he does the adjustments with his wife. DC to [facility] today at 1300.

## 2021-06-15 NOTE — PROGRESS NOTES
Notified Dr. Ginny Cid about patient's K=5.4. He said that it is fine for him to Discharge today. He will get dialysis tomorrow.

## 2021-06-15 NOTE — CONSULTS
[facility]  Progress Note    Finn Hughes Patient Status:  Inpatient    1945 MRN [id number]   [facility] 3SW-A Attending Elver Barakat, *   Hosp Day # 5 PCP Anisha Nevarez MD        No acute events overnight  P (DILAUDID) 1 MG/ML injection 0.2 mg, 0.2 mg, Intravenous, Q2H PRN   Or  HYDROmorphone HCl (DILAUDID) 1 MG/ML injection 0.4 mg, 0.4 mg, Intravenous, Q2H PRN   Or  HYDROmorphone HCl (DILAUDID) 1 MG/ML injection 0.8 mg, 0.8 mg, Intravenous, Q2H PRN  Metoclopr TPROT      Impression/Plan:    1. ESRD- due to DM/HTN. Hyperkalemia - continue routine HD; next treatment tomorrow     2. Anemia- due to ESRD. Cont DALE for goal hgb 10-11 gms    3. L femoral neck fx- s/p OR.   Per ortho    4.  afib- rate controlled, on

## 2021-06-15 NOTE — DISCHARGE SUMMARY
General Medicine Discharge Summary     Patient ID:  Xi Lombardi  70year old  7/17/1945    Admit date: 6/10/2021    Discharge date and time: 6/15/2021    Attending Physician: Otis Burns including xarelto  **ESRD on HD-renal on, HD per renal  **chronic anemia-monitor  **DESI-protocol  **coagulopathy d/t xarelto, monitor prn  **thrombocytopenia, mild- repeat CBC in 1 wk recommended    Consults: IP CONSULT TO ORTHOPEDIC SURGERY  IP CONSULT TO infarction or acute intracranial hemorrhage. White matter low attenuation is consistent with mild to moderate chronic small vessel disease. There is mild maxillary sinus mucosal thickening.   The remainder of the paranasal sinuses and mastoid air cells ar CONCLUSION:  1. There is a nondisplaced mid femoral neck fracture. 2. There is joint space narrowing and osteoarthritis in the left hip.    Dictated by (CST): Kierra Iglesias MD on 6/10/2021 at 8:38 PM     Finalized by (CST): Kierra Iglesias MD on 6/10/2021 [id number]* 14.91mGy            CONCLUSION:  Six images obtained during placement of 3 lag screws proximal left femur. Images obtained for the purposes of surgical planning.  If additional diagnostic imaging needed, consider followup with standard imag both eyes 2 (two) times daily. losartan Potassium 50 MG Oral Tab  Take 1 tablet (50 mg total) by mouth Every Tuesday, Thursday, and Saturday. Takes on Sa-Su-Tu-Th and Sunday    furosemide 80 MG Oral Tab  Take 1 tablet (80 mg total) by mouth daily.     as

## 2021-06-15 NOTE — PLAN OF CARE
A&O x 4. VSS. On RA/DESI no CPAP. Tele- AFib. Left hip pain well controlled with Norco PRN. Aquacel dressing to left hip D/I. AFO brace to right foot when OOB. Up with min/mod assist and walker to bedside chair. SCD's/Xarelto.  Reviewed POC, pain management,

## 2021-09-24 ENCOUNTER — HOSPITAL ENCOUNTER (EMERGENCY)
Facility: HOSPITAL | Age: 76
Discharge: HOME | End: 2021-09-25
Attending: EMERGENCY MEDICINE
Payer: MEDICARE

## 2021-09-24 VITALS
RESPIRATION RATE: 18 BRPM | OXYGEN SATURATION: 98 % | WEIGHT: 227.06 LBS | BODY MASS INDEX: 31 KG/M2 | HEART RATE: 78 BPM | SYSTOLIC BLOOD PRESSURE: 113 MMHG | TEMPERATURE: 98 F | DIASTOLIC BLOOD PRESSURE: 51 MMHG

## 2021-09-24 DIAGNOSIS — L50.9 URTICARIA: Primary | ICD-10-CM

## 2021-09-24 PROCEDURE — 99284 EMERGENCY DEPT VISIT MOD MDM: CPT

## 2021-09-24 PROCEDURE — S0028 INJECTION, FAMOTIDINE, 20 MG: HCPCS | Performed by: EMERGENCY MEDICINE

## 2021-09-24 PROCEDURE — 96372 THER/PROPH/DIAG INJ SC/IM: CPT

## 2021-09-24 RX ORDER — FAMOTIDINE 10 MG/ML
20 INJECTION, SOLUTION INTRAVENOUS ONCE
Status: DISCONTINUED | OUTPATIENT
Start: 2021-09-24 | End: 2021-09-25

## 2021-09-24 RX ORDER — DIPHENHYDRAMINE HYDROCHLORIDE 50 MG/ML
25 INJECTION INTRAMUSCULAR; INTRAVENOUS ONCE
Status: DISCONTINUED | OUTPATIENT
Start: 2021-09-24 | End: 2021-09-25

## 2021-09-24 RX ORDER — METHYLPREDNISOLONE SODIUM SUCCINATE 125 MG/2ML
125 INJECTION, POWDER, LYOPHILIZED, FOR SOLUTION INTRAMUSCULAR; INTRAVENOUS ONCE
Status: DISCONTINUED | OUTPATIENT
Start: 2021-09-24 | End: 2021-09-25

## 2021-09-25 RX ORDER — FAMOTIDINE 20 MG/1
20 TABLET ORAL 2 TIMES DAILY
Status: DISCONTINUED | OUTPATIENT
Start: 2021-09-25 | End: 2021-09-25

## 2021-09-25 RX ORDER — METHYLPREDNISOLONE 4 MG/1
TABLET ORAL
Qty: 1 EACH | Refills: 0 | Status: SHIPPED | OUTPATIENT
Start: 2021-09-25 | End: 2021-10-19 | Stop reason: ALTCHOICE

## 2021-09-25 RX ORDER — DIPHENHYDRAMINE HYDROCHLORIDE 50 MG/ML
25 INJECTION INTRAMUSCULAR; INTRAVENOUS ONCE
Status: COMPLETED | OUTPATIENT
Start: 2021-09-25 | End: 2021-09-25

## 2021-09-25 RX ORDER — METHYLPREDNISOLONE SODIUM SUCCINATE 125 MG/2ML
125 INJECTION, POWDER, LYOPHILIZED, FOR SOLUTION INTRAMUSCULAR; INTRAVENOUS ONCE
Status: COMPLETED | OUTPATIENT
Start: 2021-09-25 | End: 2021-09-25

## 2021-09-25 NOTE — ED PROVIDER NOTES
His urticarial rash  Patient Seen in: [facility] Emergency Department      History   Patient presents with: Allergic Rxn Allergies    Stated Complaint: rash    Subjective:   HPI    Patient is a 63-year-old male.   Presents with diffuse urticarial rash DX/THERAPEUTIC SUBSTANCE, EPIDURAL/SUBARACHNOID; LUMBAR/SACRAL N/A 1/25/2016    Procedure: LUMBAR EPIDURAL;  Surgeon: Cherylene Deters, MD;  Location: [facility] FOR PAIN MANAGEMENT   • INJECTION, W/WO CONTRAST, DX/THERAPEUTIC SUBSTANCE, EPIDURAL/SUBARACHNOI PROPHYLAXIS. N/A 1/25/2016    Procedure: LUMBAR EPIDURAL;  Surgeon: Nancy Kyle MD;  Location: [facility] FOR PAIN MANAGEMENT   • PATIENT [address] FOR IV ANTIBIOTIC SURGICAL SITE INFECTION PROPHYLAXIS.  N/A 3/2/2016    Procedure: Samm Callaway rash.      Comments: Diffuse urticarial rash noted on the trunk and extremities. Neurological:      Mental Status: He is alert and oriented to person, place, and time. Motor: No abnormal muscle tone.       Coordination: Coordination normal.   Psychia

## 2023-05-17 NOTE — LETTER
Date: 8/29/2024  Patient name: Darin Bowens  YOB: 1945  Medical Record Number: [id number]  Primary Coverage: Payor: MEDICARE / Plan: MEDICARE PART A&B / Product Type: *No Product type* /   Secondary Coverage: COMMERCIAL - [facility]  Insurance ID: [id number]  Patient Address: [address] [place] [id number]  Telephone Information:   Home Phone [phone]   Work Phone [phone]   Mobile [phone]       Encounter Date: 8/29/2024  Provider: DELMI Wheeler  Diagnosis:     ICD-10-CM   1. Non-pressure chronic ulcer of other part of left foot with necrosis of bone ([facility])  L97.524   2. Non-pressure chronic ulcer of other part of right foot with necrosis of bone ([facility])  L97.514   3. PAD (peripheral artery disease) ([facility])  I73.9   4. ESRD (end stage renal disease) ([facility])  N18.6   5. Diabetic foot ulcer with osteomyelitis ([facility])  E11.621    E11.69    L97.509    M86.9         Wound 07/18/24 #1 Foot Left (Active)   Date First Assessed/Time First Assessed: 07/18/24 1344    Wound Number (Wound Clinic Only): #1  Primary Wound Type: Diabetic Ulcer  Location: Foot  Wound Location Orientation: Left      Assessments 7/18/2024  2:00 PM 8/29/2024 10:23 AM   Wound Image        Drainage Amount Small Moderate   Drainage Description Serosanguineous Sanguineous   Treatments Compression Compression;Wound Vac - Neg Pressure   Wound Length (cm) 5.2 cm 3.4 cm   Wound Width (cm) 10.4 cm 8.6 cm   Wound Surface Area (cm^2) 54.08 cm^2 29.24 cm^2   Wound Depth (cm) 1 cm 1.2 cm   Wound Volume (cm^3) 54.08 cm^3 35.088 cm^3   Wound Healing % -- 35   Margins Well-defined edges Well-defined edges   Non-staged Wound Description Full thickness Full thickness   María-wound Assessment Clean;Dry;Blanchable erythema Edema   Wound Granulation Tissue Red;Spongy --   Wound Bed Granulation (%) 15 % --   Wound Bed Slough (%) 85 % --   Wound Odor None None   Exposed Structure Bone;Adipose --   Shape 10staples to  periwound unable to view wound bed   Tunneling? No --   Undermining? No --   Sinus Tracts? No --   Balbuena Scale Grade 4 Grade 4       Inactive Orders   Date Order Priority Status Authorizing Provider   08/29/24 1204 Cellular tissue product application Diabetic Ulcer Left Foot Routine Completed Jenny Olmos, APRN   08/22/24 1152 Cellular tissue product application Diabetic Ulcer Left Foot Routine Completed Jenny Olmos, APRN   08/14/24 1452 Wound care Routine Discontinued Jose R Meza DPM   08/01/24 1142 Cellular tissue product application Diabetic Ulcer Left Foot Routine Completed Jenny Olmos, APRN   08/01/24 1116 Debridement Diabetic Ulcer Left Foot Routine Completed Jenny Olmos, APRN   07/25/24 1445 Debridement Diabetic Ulcer Left Foot Routine Completed Jenny Olmos, DELMI       Wound 07/18/24 #2 right Foot Right (Active)   Date First Assessed/Time First Assessed: 07/18/24 1345    Wound Number (Wound Clinic Only): #2 right  Primary Wound Type: Diabetic Ulcer  Location: Foot  Wound Location Orientation: Right      Assessments 7/18/2024  1:57 PM 8/29/2024 10:16 AM   Wound Image         Drainage Amount Small Small   Drainage Description Serosanguineous Sanguineous   Treatments Compression Compression   Wound Length (cm) 4.4 cm 1.4 cm   Wound Width (cm) 14 cm 12.8 cm   Wound Surface Area (cm^2) 61.6 cm^2 17.92 cm^2   Wound Depth (cm) 2.1 cm 0.1 cm   Wound Volume (cm^3) 129.36 cm^3 1.792 cm^3   Wound Healing % -- 99   Margins Well-defined edges Well-defined edges   Non-staged Wound Description Full thickness Full thickness   María-wound Assessment Clean;Dry;Blanchable erythema Edema;Dry   Wound Granulation Tissue Red;Pink;Spongy Pink   Wound Bed Granulation (%) 30 % 20 %   Wound Bed Epithelium (%) 20 % 70 %   Wound Bed Slough (%) 50 % 0 %   Wound Bed Eschar (%) -- 30 %   Wound Odor None None   Exposed Structure Bone;Adipose --   Shape 9 staples noted to maría wound 13 sutures noted   Tunneling? No --    Undermining? No --   Sinus Tracts? No --   Balbuena Scale Grade 4 Grade 4       Inactive Orders   Date Order Priority Status Authorizing Provider   08/29/24 1207 Debridement Diabetic Ulcer Right Foot Routine Completed Jenny Olmos, APRN   08/29/24 1205 Cellular tissue product application Diabetic Ulcer Right Foot Routine Completed Jenny Olmos, APRN   08/14/24 1452 Wound care Routine Discontinued Jose R Meza DPM   08/01/24 1140 Cellular tissue product application Diabetic Ulcer Right Foot Routine Completed Jenny Olmos, APRN   08/01/24 1058 Debridement Diabetic Ulcer Right Foot Routine Completed Jenny Olmos, APRN   07/25/24 1445 Debridement Diabetic Ulcer Right Foot Routine Completed Jenny Olmos, DELMI       Wound 07/18/24 #3 Ankle Posterior;Right (Active)   Date First Assessed/Time First Assessed: 07/18/24 1347    Wound Number (Wound Clinic Only): #3  Primary Wound Type: Diabetic Ulcer  Location: Ankle  Wound Location Orientation: Posterior;Right      Assessments 7/18/2024  2:07 PM 8/29/2024 10:19 AM   Wound Image        Drainage Amount None None   Treatments Compression --   Wound Length (cm) 4.5 cm 3.2 cm   Wound Width (cm) 2.1 cm 1.9 cm   Wound Surface Area (cm^2) 9.45 cm^2 6.08 cm^2   Wound Depth (cm) 0.1 cm 0.1 cm   Wound Volume (cm^3) 0.945 cm^3 0.608 cm^3   Wound Healing % -- 36   Margins -- Well-defined edges   Non-staged Wound Description -- Full thickness   María-wound Assessment Dry;Blanchable erythema --   Wound Granulation Tissue Pink;Firm Spongy;Pink   Wound Bed Granulation (%) 10 % 30 %   Wound Bed Epithelium (%) 10 % --   Wound Bed Slough (%) -- 30 %   Wound Bed Eschar (%) 80 % 40 %   Wound Odor None None   Shape blister to maría --   Tunneling? No --   Undermining? No --   Sinus Tracts? No --   Pressure Injury Stage Unstageable Stage 3   Balbuena Scale -- Grade 4       Inactive Orders   Date Order Priority Status Authorizing Provider   07/25/24 1442 Debridement Pressure Injury  Posterior;Right Ankle Routine Completed Jenny Olmos APRN       Wound 07/18/24 #4 Right heel wound Heel Right (Active)   Date First Assessed/Time First Assessed: 07/18/24 1428    Wound Number (Wound Clinic Only): #4 Right heel wound  Primary Wound Type: Pressure Injury  Location: Heel  Wound Location Orientation: Right      Assessments 7/18/2024  2:31 PM 8/29/2024 10:22 AM   Wound Image       Drainage Amount None Small   Drainage Description -- Serous;Yellow   Treatments Compression --   Wound Length (cm) 0.5 cm 1 cm   Wound Width (cm) 1 cm 1.3 cm   Wound Surface Area (cm^2) 0.5 cm^2 1.3 cm^2   Wound Depth (cm) 0 cm 0.1 cm   Wound Volume (cm^3) 0 cm^3 0.13 cm^3   Margins Well-defined edges --   María-wound Assessment Dry;Clean Edema   Wound Granulation Tissue -- Firm;Pink   Wound Bed Granulation (%) -- 75 %   Wound Bed Epithelium (%) 100 % --   Wound Bed Slough (%) -- 25 %   Wound Odor None None   Pressure Injury Stage Deep Tissue Unstageable       No associated orders.       Compression Wrap 08/01/24 Foot Dorsal;Right (Active)   Placement Date/Time: 08/01/24 1147   Location: Foot  Wound Location Orientation: Dorsal;Right      Assessments 8/1/2024 11:47 AM 8/29/2024 10:24 AM   Response to Treatment Well tolerated Well tolerated   Compression Layers Multilayer Multilayer   Compression Product Type Unna Boot Unna Boot   Dressing Applied Yes Yes   Compression Wrap Location Toes to Knee Toes to Knee   Compression Wrap Status Clean;Dry Clean;Dry;Intact       No associated orders.       Compression Wrap 08/01/24 Foot Dorsal;Left (Active)   Placement Date/Time: 08/01/24 1148   Location: Foot  Wound Location Orientation: Dorsal;Left      Assessments 8/1/2024 11:48 AM 8/29/2024 10:24 AM   Response to Treatment Well tolerated Well tolerated   Compression Layers Multilayer Multilayer   Compression Product Type Unna Boot Unna Boot   Dressing Applied Yes Yes   Compression Wrap Location Toes to Knee Toes to Knee   Compression  Wrap Status Clean;Dry --       No associated orders.       Wound 08/22/24 #5 Left Heel Heel Left (Active)   Date First Assessed/Time First Assessed: 08/22/24 1021    Wound Number (Wound Clinic Only): #5 Left Heel  Primary Wound Type: Pressure Injury  Location: Heel  Wound Location Orientation: Left      Assessments 8/22/2024 10:49 AM 8/29/2024 10:24 AM   Wound Image       Drainage Amount None None   Treatments Betadine Betadine   Wound Length (cm) 1.6 cm 1.5 cm   Wound Width (cm) 0.5 cm 1 cm   Wound Surface Area (cm^2) 0.8 cm^2 1.5 cm^2   Wound Depth (cm) 0 cm 0 cm   Wound Volume (cm^3) 0 cm^3 0 cm^3   Margins -- Well-defined edges   Non-staged Wound Description -- Full thickness   María-wound Assessment Clean Dry   Wound Bed Epithelium (%) 100 % 100 %   Wound Odor None None   Shape -- DTI   Pressure Injury Stage Deep Tissue Deep Tissue       No associated orders.       Wound 08/22/24 #6 Left Anterior Ankle Ankle Anterior;Left (Active)   Date First Assessed/Time First Assessed: 08/22/24 1022    Wound Number (Wound Clinic Only): #6 Left Anterior Ankle  Primary Wound Type: Pressure Injury  Location: Ankle  Wound Location Orientation: Anterior;Left      Assessments 8/22/2024 10:50 AM 8/29/2024 10:27 AM   Wound Image       Drainage Amount None None   Treatments Betadine Betadine   Wound Length (cm) 1.2 cm 1.1 cm   Wound Width (cm) 0.6 cm 0.4 cm   Wound Surface Area (cm^2) 0.72 cm^2 0.44 cm^2   Wound Depth (cm) 0 cm 0 cm   Wound Volume (cm^3) 0 cm^3 0 cm^3   Margins -- Well-defined edges   Non-staged Wound Description -- Full thickness   María-wound Assessment Clean Clean   Wound Bed Epithelium (%) 100 % 100 %   Wound Odor None None   Pressure Injury Stage Stage 1 Stage 1       No associated orders.       Negative Pressure Wound Therapy Dorsal;Left (Active)   Placement Date/Time: 08/22/24 1258   Wound Location Orientation: Dorsal;Left      Assessments 8/22/2024 10:49 AM 8/26/2024  4:30 PM   Wound photographed/measured Yes  Yes   Machine Status (On) Yes Yes   Unit Type Medella Medella   Dressing Type Other (Comment) Other (Comment)   Number of Foam Pieces Used 2 2   Cycle Continuous Continuous   Target Pressure (mmHg) 125 125   Canister Changed Yes No       No associated orders.       Negative Pressure Wound Therapy Dorsal;Right (Active)   Placement Date/Time: 08/29/24 1245   Wound Location Orientation: Dorsal;Right      Assessments 8/29/2024 10:24 AM   Wound photographed/measured Yes   Machine Status (On) Yes   Site Assessment Clean   María-wound Assessment Clean   Unit Type Medella   Number of Foam Pieces Used 2   Cycle Continuous   Target Pressure (mmHg) 125   Canister Changed Yes       No associated orders.                           Miscellaneous/Additional Orders:  Offloading: Heel off-loading boot(s)      Follow Up:  No follow-ups on file.      Additional Notes:  All dressings to remain in place until wound clinic appointment.    Additional home health DME: No           Intermediate Repair And Flap Additional Text (Will Appearing After The Standard Complex Repair Text): The intermediate repair was not sufficient to completely close the primary defect. The remaining additional defect was repaired with the flap mentioned below.

## 2024-05-20 RX ORDER — DORZOLAMIDE HCL 20 MG/ML
1 SOLUTION/ DROPS OPHTHALMIC 2 TIMES DAILY
COMMUNITY

## 2024-05-20 RX ORDER — KETOCONAZOLE 20 MG/ML
1 SHAMPOO TOPICAL
COMMUNITY

## 2024-05-20 RX ORDER — LORATADINE 10 MG/1
10 TABLET, ORALLY DISINTEGRATING ORAL AS NEEDED
Status: ON HOLD | COMMUNITY
End: 2024-05-22

## 2024-05-20 RX ORDER — NETARSUDIL AND LATANOPROST OPHTHALMIC SOLUTION, 0.02%/0.005% .2; .05 MG/ML; MG/ML
1 SOLUTION/ DROPS OPHTHALMIC; TOPICAL DAILY
COMMUNITY

## 2024-05-20 NOTE — PAT NURSING NOTE
Per PAT encounter/Flirtomatichart message sent to pt:    PreOp Instructions     You are scheduled for: a Peripheral Procedure     Date of Procedure: 05/22/24 Wednesday     Diet Instructions: Do not eat or drink anything after midnight     Medications: Take an Aspirin 81 mg tablet the day of your procedure, Take Plavix 75mg the day of your procedure, Medications you are allowed to take can be taken with a sip of water the morning of your procedure     Do not take the following Blood Thinner(s): Last dose of Xarelto was Sunday evening 5/19/24. Do NOT take your dose tonight, Monday 5/20, and tomorrow Tuesday 5/21.     Medications to Stop: Hold herbal supplements and vitamins morning of the procedure 5/22.    Diabetic Instructions: You wear a CGM (continuous glucose monitor), you will need to remove the entire device (sensor/transmitter) prior to your procedure. Please discuss with your Endocrinologist's office regarding what you should set your insulin pump to since your will not be eating or drinking after midnight going into the procedure day Wednesday 5/22/24.      Skin Prep: Shower with antibacterial soap using a clean washcloth, prior to procedure     Arrival Time: The day prior to your procedure (Tuesday) you will receive a phone call before 6:00 pm with your arrival time. If you haven't received a phone call, please check your voicemail messages., If you did not receive a voice mail and it is after 6:00 pm, please call the nursing supervisor at [phone].    Driving After Procedure: If sedation is given, you WILL NOT be able to drive home. You will need a responsible adult  to drive you home., Cannot take uber or cab unless approved by physician     Discharge Teaching: Your nurse will give you specific instructions before discharge, Most people can resume normal activities in 2-3 days, Any questions, please call the physician's office     [employer] parking is available starting at 6 am or park in the [place] parkin  ginny at [facility]. Check in at the [facility] reception desk. Our  will be there to check you in for your procedure. Please bring your insurance cards and ID with you.                                                                                                                                      Please DO NOT respond to this message, the inbasket is not monitored for messages. For any questions, please call the physician's office.    Wife stated will call Endocrinologist re: basal rate of insulin pump and to review how to place G7 device since will need to place one on (last G6 placed on 5/19).

## 2024-05-22 ENCOUNTER — HOSPITAL ENCOUNTER (OUTPATIENT)
Dept: INTERVENTIONAL RADIOLOGY/VASCULAR | Facility: HOSPITAL | Age: 79
Discharge: HOME | End: 2024-05-22
Attending: SURGERY | Admitting: SURGERY
Payer: MEDICARE

## 2024-05-22 VITALS
BODY MASS INDEX: 28.44 KG/M2 | OXYGEN SATURATION: 97 % | TEMPERATURE: 98 F | WEIGHT: 210 LBS | DIASTOLIC BLOOD PRESSURE: 75 MMHG | SYSTOLIC BLOOD PRESSURE: 132 MMHG | HEART RATE: 49 BPM | HEIGHT: 72 IN | RESPIRATION RATE: 11 BRPM

## 2024-05-22 DIAGNOSIS — I70.269 ATHEROSCLEROSIS OF ARTERY OF EXTREMITY WITH GANGRENE (HCC): ICD-10-CM

## 2024-05-22 LAB — GLUCOSE BLD-MCNC: 134 MG/DL (ref 70–99)

## 2024-05-22 PROCEDURE — 99153 MOD SED SAME PHYS/QHP EA: CPT | Performed by: SURGERY

## 2024-05-22 PROCEDURE — 0620T EVASC VEN ARTLZ TIBL/PRNL VN: CPT | Performed by: SURGERY

## 2024-05-22 PROCEDURE — 37241 VASC EMBOLIZE/OCCLUDE VENOUS: CPT | Performed by: SURGERY

## 2024-05-22 PROCEDURE — 82962 GLUCOSE BLOOD TEST: CPT

## 2024-05-22 PROCEDURE — 99152 MOD SED SAME PHYS/QHP 5/>YRS: CPT | Performed by: SURGERY

## 2024-05-22 PROCEDURE — 06LY3CZ OCCLUSION OF LOWER VEIN WITH EXTRALUMINAL DEVICE, PERCUTANEOUS APPROACH: ICD-10-PCS | Performed by: SURGERY

## 2024-05-22 PROCEDURE — 047P3Z1 DILATION OF RIGHT ANTERIOR TIBIAL ARTERY USING DRUG-COATED BALLOON, PERCUTANEOUS APPROACH: ICD-10-PCS | Performed by: SURGERY

## 2024-05-22 RX ORDER — IODIXANOL 320 MG/ML
100 INJECTION, SOLUTION INTRAVASCULAR
Status: COMPLETED | OUTPATIENT
Start: 2024-05-22 | End: 2024-05-22

## 2024-05-22 RX ORDER — MIDAZOLAM HYDROCHLORIDE 1 MG/ML
INJECTION INTRAMUSCULAR; INTRAVENOUS
Status: COMPLETED
Start: 2024-05-22 | End: 2024-05-22

## 2024-05-22 RX ORDER — SODIUM CHLORIDE 9 MG/ML
INJECTION, SOLUTION INTRAVENOUS CONTINUOUS
Status: DISCONTINUED | OUTPATIENT
Start: 2024-05-22 | End: 2024-05-22

## 2024-05-22 RX ORDER — HEPARIN SODIUM 5000 [USP'U]/ML
INJECTION, SOLUTION INTRAVENOUS; SUBCUTANEOUS
Status: COMPLETED
Start: 2024-05-22 | End: 2024-05-22

## 2024-05-22 RX ORDER — LIDOCAINE HYDROCHLORIDE 10 MG/ML
INJECTION, SOLUTION EPIDURAL; INFILTRATION; INTRACAUDAL; PERINEURAL
Status: COMPLETED
Start: 2024-05-22 | End: 2024-05-22

## 2024-05-22 RX ADMIN — IODIXANOL 150 ML: 320 INJECTION, SOLUTION INTRAVASCULAR at 15:22:00

## 2024-05-22 RX ADMIN — SODIUM CHLORIDE: 9 INJECTION, SOLUTION INTRAVENOUS at 10:15:00

## 2024-05-22 NOTE — IVS NOTE
Patient discharged home post PV angiogram. Pt is able to sit up and ambulate without difficulty. Pt's vital signs are stable. Left groin procedural access site is dry and intact with no signs and symptoms of bleeding or hematoma. Pt tolerated food/fluids. Dr. Nix spoke to pt/family post procedure. Instructions provided and pt/family verbalized understanding. PIV removed. Discharged via wheelchair with all belongings.    153

## 2024-05-22 NOTE — DISCHARGE INSTRUCTIONS
HOME CARE INSTRUCTIONS FOLLOWING ANGIOGRAPHY, ANGIOPLASTY (PTCA/PTA) OR INSERTION OF STENT       Activity  DO NOT drive after the procedure.  You may resume driving late the following day according to the nurse or physician's instructions  Plan on resting and relaxing tonight and tomorrow  Resume your normal activity after 48 hours, or as instructed by your physician  Do not lift anything over 10 pounds for 1 week  Avoid heavy lifting for 1 week  Avoid drinking alcohol for the next 24 hours  If the groin site was used, avoid repeated stair use and excessive walking for the next 24 hours    What is Normal?  A small lump at the procedure site associated with mild tenderness when touched  The procedure site may be bruised or discolored  There may be a small amount of drainage on the bandage    Special Instructions  Drink plenty of fluids during the next 24 hours to \"flush\" the contrast from your system  The bandage is to remain in place for 24 hours  Keep the bandage clean and dry  DO NOT submerge the procedure site for 3 days (no bath tubs or pools)  After 24 hours, you must remove the bandage  You should shower after removing the bandage, and wash the procedure site gently with soap and water  If you choose to wear a bandage for a few days, make sure it remains clean and dry and that it is changed daily    When you should NOTIFY YOUR PHYSICIAN  Bleeding can occur at the procedure site - both on the outside of the skin and/or beneath the surface of the skin  Swelling or a large lump at the procedure site can occur, which may be accompanied by moderate to severe pain    If either of the above occurs, lie down flat.  Have someone apply pressure to the procedure site with both hands, as instructed by the nurse.  Hold pressure for 20 minutes and the bleeding should stop.  Notify your physician of the occurrence  If the bleeding does not stop, call 911 and continue to apply pressure    If you experience signs of a fever,  temperature > 101°, chills, infection (redness, swelling, thick yellow drainage, or a foul odor from the procedure site)  If you notice any numbness, tingling, or loss of feeling to your leg or foot or groin access  If you notice any numbness, tingling, or loss of feeling to your fingers or hand, if wrist access was utilized    If You Received a Stent:    You will remain on an antiplatelet drug and/or aspirin.  Antiplatelet medications are usually taken for six months to one year and should not be stopped unless your cardiologist directs you to do so.  These medications help to prevent blockage at the stent site.  If another physician or dentist asks you to stop your antiplatelet medication, you need to consult your cardiologist first.  Together, your cardiologist and other physician can discuss the risks that may be involved if you are not taking the antiplatelet medication   If an MRI is necessary, it may be done 4-6 weeks after your procedure.  Verify this with your cardiologist  Keep your stent card with you at all times!  If you need an MRI in the future, your stent card will need to be shown to the technologist before performing the MRI.  A duplicate card CANNOT be reproduced.    Other  You may resume your present diet, unless otherwise specified by your physician.  You may resume all of your medications as prescribed, unless otherwise directed by your physician.  A list of your medications was provided to you at discharge.  Continue the walking program initiated in the hospital and progress your walking as directed.  Or, gradually resume your previous aerobic exercise schedule as tolerated.  Hold metformin/glucophage 48 hours post proceudre.

## 2024-05-23 NOTE — H&P
The above referenced H&P was reviewed by Aleksey Nix MD on 5/22/2024, the patient was examined and no significant changes have occurred in the patient's condition since the H&P was performed.  Risks and benefits were discussed, proceed with procedure as planned.    Aleksey Nix MD  [facility]  Vascular Surgery

## 2024-05-23 NOTE — OPERATIVE REPORT
Vascular Surgery Op Note  Patients Name:  Darin Bowens    Operating Physician: Aleksey Nix MD                                                     Location:  Cath lab                                               YOB: 1945      Operation Date:   05/22/2024           Pre-Operative Diagnosis:   1.  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  Ultrasound-guided access of the left common femoral artery  2.  Selective catheterization third order branch of the right lower extremity  3.  Right  leg angiogram with radiologic supervision and interpretation  4.  Ultrasound-guided access of the right anterior tibial vein.  5.  Right lower extremity venogram with radiologic supervision and interpretation  6.  Angioplasty and stenting of the right anterior tibial artery with 6 mm Viabahn postdilated 5.0mm balloon.   7.  Angioplasty of the right anterior tibial vein with 6 mm Viabahn postdilated 5.0mm balloon.   8.  Venoplasty of the plantar veins with 3 mm balloon.  9. Embolization of right anterior tibial artery with 9mm MVP medtronic vascular plug.    10. Closure of right groin with Pro-glide Perclose suture (6Fr).        Surgeon:  Aleksey Nix MD        Anesthesia:   An independent observer was present for the physiological monitoring and administration medication throughout the duration of the procedure under my direct supervision.     Versed 6 mg  Fentanyl 300 mcg  Sedation time 120 minutes        EBL: 100cc     Complications: None     Drains: None     Findings: Widely patent bilateral common femoral, SFA, profunda, popliteal.  PT and peroneal patent to the foot however significant atresia of the vasculature into the foot with sparse collaterals.  AT reoccluded despite recent endovascular intervention.  No opacification to the toe wounds.   Deep venous arterialization performed in the right anterior tibial artery to vein however the veins in the foot were friable after 3 mm balloon angioplasty and there was no significant outflow and thus this was embolized.  The patient maintained outflow via the PT and peroneal bilaterally consistent with preoperative baseline.  Patient should proceed with TMA and should this not heal they understand the need for future major amputation.        Indications for procedure: This is a 78-year-old male who presented with gangrene .  On evaluation he had inadequate flow for wound healing. He underwent angiogram with recent intervention of the anterior tibial artery.  Despite this intervention there was reocclusion of the anterior tibials and worsening of his wounds.  Given the status of his foot, with worsening wounds despite recent intervention he was offered amputation which he was against versus repeat angiogram with plan for possible deep venous arterialization procedure.  They strongly desired attempt at limb salvage with deep vein arterialization procedure.  I discussed the risk and benefits of the procedure.  Informed consent was directly obtained.  On the afternoon of 05/22 the patient was brought to the Cath Lab and placed in supine position.  Conscious sedation was initiated without any issues.  The patient was subsequently prepped and draped in the usual sterile fashion.  Timeout was performed.  With the use the ultrasound the left common femoral was identified proximal to its bifurcation at the level of the femoral head.  The skin and soft tissues were anesthetized and then a micropuncture needle was used to access the right common femoral with advancement of a microwire and exchanged for a micro sheath.  Glidewire advantage was then put in place followed by placement of a 5 Wolof sheath.  Sheath shot angiogram deemed appropriate location of the access.  The patient was systemically heparinized.  A rim  catheter was advanced to the aorta.  The glide wire advantage and rim catheter were advanced up and over the bifurcation into the right common femoral.  A right leg angiogram was performed with the findings as listed above.  As such I opted to proceed with intervention.  A Glidewire advantage was then put in place and then exchanged for a 6 Citizen of [place] sheath that was advanced up and over the bifurcation into the SFA.     I then advanced the wire and Chou cross catheter into the anterior tibial artery. I opted to proceeded with a double snare approach.  I placed a snare from below into the anterior tibial artery.  I then accessed the right anterior tibial vein with ultrasound and advanced a V18 wire and exchanged for a 5 Citizen of [place] slender sheath.  I then placed a snare overlapping the anterior tibial artery.  I then utilized a needle and punctured the calf directly through the snares under fluoroscopic visualization and confirmed encircling of the needle by both snares.  A run-through wire was then snared from above and below thereby obtaining through and through access.  I then exchanged for a Chou cross catheter and V18 wire for through and through access.  I then used a 4 mm balloon to perform predilation through the crossing site between the anterior tibial artery and anterior tibial vein with 4 mm balloon to perform angioplasty of both. I then exchanged for a 6 mm Viabahn and extended this through the origin of the anterior tibial artery all the way down to the anterior tibial vein at the ankle.  This was postdilated with 5 mm balloon.  Repeat angiogram revealed wide patency of this.  I then exchanged for a Chou cross catheter and redirected an 018 Glidewire advantage into the anterior tibial vein and attempted to pass this into the foot however the vein was discontinuous with the pedal venous loop.  I used numerous wire and catheter combinations and was able to negotiate a communication between the anterior tibial  vein and posterior tibial vein. A 3 mm balloon was used to perform venoplasty of the plantar veins.  Repeat angiogram revealed despite gentle and insufflation of the plantar veins that there was no outflow through this and the vein was very friable with no significant outflow.  Therefore and in weighing risks and benefits opted to terminate the procedure.  I placed a glide cath into the proximal anterior tibial and deployed a 9 mm Medtronic vascular plug to successfully embolized the deep anteriorization.  Repeat angiogram revealed outflow via the peroneal and PT that was unchanged from preoperative baseline. At this point I was satisfied and opted to terminate the procedure.  I performed a retrograde angiogram on the left that confirmed the outflow to match the right lower Dominy and I opted to forego any intervention on this.  All catheters and wires were withdrawn.  I then deployed a Pro-glide Perclose suture in the right groin which was then cinched locked and cut thereby closing the arteriotomy site.  Manual pressure was held distally and up upon visualization of hemostasis a sterile dressing was applied.        The patient awoke from sedation and was taken to recovery in stable condition.  All counts were correct at the end of the case.  He should proceed with attempt at TMA and she does not heal he will require major amputation.  This was communicated with the family extensively who affirmed understanding and agreed.     Aleksey Nix MD

## 2024-06-04 VITALS — HEIGHT: 72 IN | WEIGHT: 210 LBS | BODY MASS INDEX: 28.44 KG/M2

## 2024-06-06 ENCOUNTER — ANESTHESIA EVENT (OUTPATIENT)
Dept: SURGERY | Facility: HOSPITAL | Age: 79
DRG: 239 | End: 2024-06-06
Payer: MEDICARE

## 2024-06-06 ENCOUNTER — HOSPITAL ENCOUNTER (OUTPATIENT)
Facility: HOSPITAL | Age: 79
Discharge: HOME | End: 2024-06-06
Attending: PODIATRIST | Admitting: PODIATRIST
Payer: MEDICARE

## 2024-06-06 ENCOUNTER — ANESTHESIA (OUTPATIENT)
Dept: SURGERY | Facility: HOSPITAL | Age: 79
DRG: 239 | End: 2024-06-06
Payer: MEDICARE

## 2024-06-06 RX ORDER — SODIUM CHLORIDE 9 MG/ML
INJECTION, SOLUTION INTRAVENOUS CONTINUOUS
Status: DISCONTINUED | OUTPATIENT
Start: 2024-06-06 | End: 2024-06-06

## 2024-06-06 RX ORDER — DEXTROSE MONOHYDRATE 25 G/50ML
50 INJECTION, SOLUTION INTRAVENOUS
Status: DISCONTINUED | OUTPATIENT
Start: 2024-06-06 | End: 2024-06-06

## 2024-06-06 RX ORDER — NICOTINE POLACRILEX 4 MG
30 LOZENGE BUCCAL
Status: DISCONTINUED | OUTPATIENT
Start: 2024-06-06 | End: 2024-06-06

## 2024-06-06 RX ORDER — ACETAMINOPHEN 500 MG
1000 TABLET ORAL ONCE
Status: DISCONTINUED | OUTPATIENT
Start: 2024-06-06 | End: 2024-06-06

## 2024-06-06 RX ORDER — NICOTINE POLACRILEX 4 MG
15 LOZENGE BUCCAL
Status: DISCONTINUED | OUTPATIENT
Start: 2024-06-06 | End: 2024-06-06

## 2024-06-12 DIAGNOSIS — M86.9 OSTEOMYELITIS (HCC): Primary | ICD-10-CM

## 2024-06-13 ENCOUNTER — HOSPITAL ENCOUNTER (INPATIENT)
Facility: HOSPITAL | Age: 79
LOS: 2 days | Discharge: HOME HEALTH | DRG: 239 | End: 2024-06-15
Attending: PODIATRIST | Admitting: PODIATRIST

## 2024-06-13 DIAGNOSIS — M86.9 OSTEOMYELITIS (HCC): ICD-10-CM

## 2024-06-13 PROBLEM — I96 GANGRENE OF RIGHT FOOT (HCC): Status: ACTIVE | Noted: 2024-06-13

## 2024-06-13 LAB
ANION GAP SERPL CALC-SCNC: 11 MMOL/L (ref 0–18)
BUN BLD-MCNC: 39 MG/DL (ref 9–23)
CALCIUM BLD-MCNC: 8.9 MG/DL (ref 8.5–10.1)
CHLORIDE SERPL-SCNC: 96 MMOL/L (ref 98–112)
CO2 SERPL-SCNC: 24 MMOL/L (ref 21–32)
CREAT BLD-MCNC: 7.38 MG/DL
EGFRCR SERPLBLD CKD-EPI 2021: 7 ML/MIN/1.73M2 (ref 60–?)
GLUCOSE BLD-MCNC: 140 MG/DL (ref 70–99)
GLUCOSE BLD-MCNC: 158 MG/DL (ref 70–99)
GLUCOSE BLD-MCNC: 165 MG/DL (ref 70–99)
GLUCOSE BLD-MCNC: 181 MG/DL (ref 70–99)
OSMOLALITY SERPL CALC.SUM OF ELEC: 285 MOSM/KG (ref 275–295)
POTASSIUM SERPL-SCNC: 4.7 MMOL/L (ref 3.5–5.1)
SODIUM SERPL-SCNC: 131 MMOL/L (ref 136–145)

## 2024-06-13 PROCEDURE — 0L8S0ZZ DIVISION OF RIGHT ANKLE TENDON, OPEN APPROACH: ICD-10-PCS | Performed by: PODIATRIST

## 2024-06-13 PROCEDURE — 0Y6S0Z0 DETACHMENT AT LEFT 2ND TOE, COMPLETE, OPEN APPROACH: ICD-10-PCS | Performed by: PODIATRIST

## 2024-06-13 PROCEDURE — 0Y6M0ZC DETACHMENT AT RIGHT FOOT, PARTIAL 3RD RAY, OPEN APPROACH: ICD-10-PCS | Performed by: PODIATRIST

## 2024-06-13 PROCEDURE — 0Y6M0ZD DETACHMENT AT RIGHT FOOT, PARTIAL 4TH RAY, OPEN APPROACH: ICD-10-PCS | Performed by: PODIATRIST

## 2024-06-13 PROCEDURE — 0Y6M0Z9 DETACHMENT AT RIGHT FOOT, PARTIAL 1ST RAY, OPEN APPROACH: ICD-10-PCS | Performed by: PODIATRIST

## 2024-06-13 PROCEDURE — 0Y6M0ZF DETACHMENT AT RIGHT FOOT, PARTIAL 5TH RAY, OPEN APPROACH: ICD-10-PCS | Performed by: PODIATRIST

## 2024-06-13 PROCEDURE — 0Y6M0ZB DETACHMENT AT RIGHT FOOT, PARTIAL 2ND RAY, OPEN APPROACH: ICD-10-PCS | Performed by: PODIATRIST

## 2024-06-13 RX ORDER — HYDROCODONE BITARTRATE AND ACETAMINOPHEN 5; 325 MG/1; MG/1
1 TABLET ORAL EVERY 4 HOURS PRN
Status: DISCONTINUED | OUTPATIENT
Start: 2024-06-13 | End: 2024-06-15

## 2024-06-13 RX ORDER — SODIUM CHLORIDE, SODIUM LACTATE, POTASSIUM CHLORIDE, CALCIUM CHLORIDE 600; 310; 30; 20 MG/100ML; MG/100ML; MG/100ML; MG/100ML
INJECTION, SOLUTION INTRAVENOUS CONTINUOUS
Status: DISCONTINUED | OUTPATIENT
Start: 2024-06-13 | End: 2024-06-13 | Stop reason: HOSPADM

## 2024-06-13 RX ORDER — LIDOCAINE HYDROCHLORIDE 10 MG/ML
INJECTION, SOLUTION EPIDURAL; INFILTRATION; INTRACAUDAL; PERINEURAL AS NEEDED
Status: DISCONTINUED | OUTPATIENT
Start: 2024-06-13 | End: 2024-06-13 | Stop reason: SURG

## 2024-06-13 RX ORDER — BRIMONIDINE TARTRATE 2 MG/ML
1 SOLUTION/ DROPS OPHTHALMIC 2 TIMES DAILY
Status: DISCONTINUED | OUTPATIENT
Start: 2024-06-13 | End: 2024-06-15

## 2024-06-13 RX ORDER — BUPIVACAINE HYDROCHLORIDE 5 MG/ML
INJECTION, SOLUTION EPIDURAL; INTRACAUDAL AS NEEDED
Status: DISCONTINUED | OUTPATIENT
Start: 2024-06-13 | End: 2024-06-13 | Stop reason: HOSPADM

## 2024-06-13 RX ORDER — MIDAZOLAM HYDROCHLORIDE 1 MG/ML
1 INJECTION INTRAMUSCULAR; INTRAVENOUS EVERY 5 MIN PRN
Status: DISCONTINUED | OUTPATIENT
Start: 2024-06-13 | End: 2024-06-13 | Stop reason: HOSPADM

## 2024-06-13 RX ORDER — CARVEDILOL 12.5 MG/1
12.5 TABLET ORAL 2 TIMES DAILY WITH MEALS
Status: DISCONTINUED | OUTPATIENT
Start: 2024-06-13 | End: 2024-06-15

## 2024-06-13 RX ORDER — NALOXONE HYDROCHLORIDE 0.4 MG/ML
80 INJECTION, SOLUTION INTRAMUSCULAR; INTRAVENOUS; SUBCUTANEOUS AS NEEDED
Status: DISCONTINUED | OUTPATIENT
Start: 2024-06-13 | End: 2024-06-13 | Stop reason: HOSPADM

## 2024-06-13 RX ORDER — HYDROMORPHONE HYDROCHLORIDE 1 MG/ML
0.6 INJECTION, SOLUTION INTRAMUSCULAR; INTRAVENOUS; SUBCUTANEOUS EVERY 5 MIN PRN
Status: DISCONTINUED | OUTPATIENT
Start: 2024-06-13 | End: 2024-06-13 | Stop reason: HOSPADM

## 2024-06-13 RX ORDER — ACETAMINOPHEN 325 MG/1
650 TABLET ORAL EVERY 4 HOURS PRN
Status: DISCONTINUED | OUTPATIENT
Start: 2024-06-13 | End: 2024-06-15

## 2024-06-13 RX ORDER — ACETAMINOPHEN 500 MG
1000 TABLET ORAL ONCE AS NEEDED
Status: DISCONTINUED | OUTPATIENT
Start: 2024-06-13 | End: 2024-06-13 | Stop reason: HOSPADM

## 2024-06-13 RX ORDER — NICOTINE POLACRILEX 4 MG
30 LOZENGE BUCCAL
Status: DISCONTINUED | OUTPATIENT
Start: 2024-06-13 | End: 2024-06-15

## 2024-06-13 RX ORDER — METOPROLOL TARTRATE 1 MG/ML
2.5 INJECTION, SOLUTION INTRAVENOUS ONCE
Status: DISCONTINUED | OUTPATIENT
Start: 2024-06-13 | End: 2024-06-13 | Stop reason: HOSPADM

## 2024-06-13 RX ORDER — POLYETHYLENE GLYCOL 3350 17 G/17G
17 POWDER, FOR SOLUTION ORAL DAILY PRN
Status: DISCONTINUED | OUTPATIENT
Start: 2024-06-13 | End: 2024-06-15

## 2024-06-13 RX ORDER — ONDANSETRON 2 MG/ML
4 INJECTION INTRAMUSCULAR; INTRAVENOUS EVERY 6 HOURS PRN
Status: DISCONTINUED | OUTPATIENT
Start: 2024-06-13 | End: 2024-06-13 | Stop reason: HOSPADM

## 2024-06-13 RX ORDER — DORZOLAMIDE HCL 20 MG/ML
1 SOLUTION/ DROPS OPHTHALMIC 2 TIMES DAILY
Status: DISCONTINUED | OUTPATIENT
Start: 2024-06-13 | End: 2024-06-15

## 2024-06-13 RX ORDER — ENEMA 19; 7 G/133ML; G/133ML
1 ENEMA RECTAL ONCE AS NEEDED
Status: DISCONTINUED | OUTPATIENT
Start: 2024-06-13 | End: 2024-06-15

## 2024-06-13 RX ORDER — BISACODYL 10 MG
10 SUPPOSITORY, RECTAL RECTAL
Status: DISCONTINUED | OUTPATIENT
Start: 2024-06-13 | End: 2024-06-15

## 2024-06-13 RX ORDER — METOCLOPRAMIDE HYDROCHLORIDE 5 MG/ML
5 INJECTION INTRAMUSCULAR; INTRAVENOUS EVERY 8 HOURS PRN
Status: DISCONTINUED | OUTPATIENT
Start: 2024-06-13 | End: 2024-06-13 | Stop reason: HOSPADM

## 2024-06-13 RX ORDER — SODIUM CHLORIDE 9 MG/ML
INJECTION, SOLUTION INTRAVENOUS CONTINUOUS
Status: DISCONTINUED | OUTPATIENT
Start: 2024-06-13 | End: 2024-06-15

## 2024-06-13 RX ORDER — HEPARIN SODIUM 5000 [USP'U]/ML
5000 INJECTION, SOLUTION INTRAVENOUS; SUBCUTANEOUS EVERY 8 HOURS SCHEDULED
Status: DISCONTINUED | OUTPATIENT
Start: 2024-06-13 | End: 2024-06-15

## 2024-06-13 RX ORDER — HYDROMORPHONE HYDROCHLORIDE 1 MG/ML
0.2 INJECTION, SOLUTION INTRAMUSCULAR; INTRAVENOUS; SUBCUTANEOUS EVERY 2 HOUR PRN
Status: DISCONTINUED | OUTPATIENT
Start: 2024-06-13 | End: 2024-06-15

## 2024-06-13 RX ORDER — ATORVASTATIN CALCIUM 40 MG/1
80 TABLET, FILM COATED ORAL NIGHTLY
Status: DISCONTINUED | OUTPATIENT
Start: 2024-06-13 | End: 2024-06-15

## 2024-06-13 RX ORDER — SENNOSIDES 8.6 MG
17.2 TABLET ORAL NIGHTLY PRN
Status: DISCONTINUED | OUTPATIENT
Start: 2024-06-13 | End: 2024-06-15

## 2024-06-13 RX ORDER — HYDROCODONE BITARTRATE AND ACETAMINOPHEN 5; 325 MG/1; MG/1
2 TABLET ORAL ONCE AS NEEDED
Status: DISCONTINUED | OUTPATIENT
Start: 2024-06-13 | End: 2024-06-13 | Stop reason: HOSPADM

## 2024-06-13 RX ORDER — INSULIN ASPART 100 [IU]/ML
INJECTION, SOLUTION INTRAVENOUS; SUBCUTANEOUS ONCE
Status: DISCONTINUED | OUTPATIENT
Start: 2024-06-13 | End: 2024-06-13 | Stop reason: HOSPADM

## 2024-06-13 RX ORDER — TIMOLOL MALEATE 5 MG/ML
1 SOLUTION/ DROPS OPHTHALMIC 2 TIMES DAILY
Status: DISCONTINUED | OUTPATIENT
Start: 2024-06-13 | End: 2024-06-15

## 2024-06-13 RX ORDER — HYDROCODONE BITARTRATE AND ACETAMINOPHEN 5; 325 MG/1; MG/1
2 TABLET ORAL EVERY 4 HOURS PRN
Status: DISCONTINUED | OUTPATIENT
Start: 2024-06-13 | End: 2024-06-15

## 2024-06-13 RX ORDER — CALCIUM ACETATE 667 MG/1
1 CAPSULE ORAL
Status: DISCONTINUED | OUTPATIENT
Start: 2024-06-14 | End: 2024-06-15

## 2024-06-13 RX ORDER — SODIUM CHLORIDE, SODIUM LACTATE, POTASSIUM CHLORIDE, CALCIUM CHLORIDE 600; 310; 30; 20 MG/100ML; MG/100ML; MG/100ML; MG/100ML
INJECTION, SOLUTION INTRAVENOUS CONTINUOUS
Status: DISCONTINUED | OUTPATIENT
Start: 2024-06-13 | End: 2024-06-15

## 2024-06-13 RX ORDER — NICOTINE POLACRILEX 4 MG
15 LOZENGE BUCCAL
Status: DISCONTINUED | OUTPATIENT
Start: 2024-06-13 | End: 2024-06-15

## 2024-06-13 RX ORDER — ACETAMINOPHEN 500 MG
1000 TABLET ORAL ONCE
Status: DISCONTINUED | OUTPATIENT
Start: 2024-06-13 | End: 2024-06-15

## 2024-06-13 RX ORDER — HYDROMORPHONE HYDROCHLORIDE 1 MG/ML
0.4 INJECTION, SOLUTION INTRAMUSCULAR; INTRAVENOUS; SUBCUTANEOUS EVERY 2 HOUR PRN
Status: DISCONTINUED | OUTPATIENT
Start: 2024-06-13 | End: 2024-06-15

## 2024-06-13 RX ORDER — HYDROMORPHONE HYDROCHLORIDE 1 MG/ML
0.1 INJECTION, SOLUTION INTRAMUSCULAR; INTRAVENOUS; SUBCUTANEOUS EVERY 2 HOUR PRN
Status: DISCONTINUED | OUTPATIENT
Start: 2024-06-13 | End: 2024-06-15

## 2024-06-13 RX ORDER — DEXTROSE MONOHYDRATE 25 G/50ML
50 INJECTION, SOLUTION INTRAVENOUS
Status: DISCONTINUED | OUTPATIENT
Start: 2024-06-13 | End: 2024-06-15

## 2024-06-13 RX ORDER — HYDROMORPHONE HYDROCHLORIDE 1 MG/ML
0.2 INJECTION, SOLUTION INTRAMUSCULAR; INTRAVENOUS; SUBCUTANEOUS EVERY 5 MIN PRN
Status: DISCONTINUED | OUTPATIENT
Start: 2024-06-13 | End: 2024-06-13 | Stop reason: HOSPADM

## 2024-06-13 RX ORDER — HYDROMORPHONE HYDROCHLORIDE 1 MG/ML
0.4 INJECTION, SOLUTION INTRAMUSCULAR; INTRAVENOUS; SUBCUTANEOUS EVERY 5 MIN PRN
Status: DISCONTINUED | OUTPATIENT
Start: 2024-06-13 | End: 2024-06-13 | Stop reason: HOSPADM

## 2024-06-13 RX ORDER — HYDROCODONE BITARTRATE AND ACETAMINOPHEN 5; 325 MG/1; MG/1
1 TABLET ORAL ONCE AS NEEDED
Status: DISCONTINUED | OUTPATIENT
Start: 2024-06-13 | End: 2024-06-13 | Stop reason: HOSPADM

## 2024-06-13 RX ADMIN — SODIUM CHLORIDE: 9 INJECTION, SOLUTION INTRAVENOUS at 17:13:00

## 2024-06-13 RX ADMIN — LIDOCAINE HYDROCHLORIDE 50 MG: 10 INJECTION, SOLUTION EPIDURAL; INFILTRATION; INTRACAUDAL; PERINEURAL at 16:09:00

## 2024-06-13 NOTE — ANESTHESIA POSTPROCEDURE EVALUATION
[facility]    Darin Bowens Patient Status:  Hospital Outpatient Surgery   Age/Gender 78 year old male MRN [id number]   Location [facility] POST ANESTHESIA CARE UNIT Attending Jose R Meza DPM   Hosp Day # 0 PCP Zachery Hall MD       Anesthesia Post-op Note    RIGHT TRANSMETATARSAL AMPUTATION, ACHILLES TENDON LENGTHENING, AND LEFT SECOND TOE AMPUTATION    Procedure Summary       Date: 06/13/24 Room / Location:  MAIN OR [address] [place] [address] MAIN OR    Anesthesia Start: 1603 Anesthesia Stop: 1713    Procedure: RIGHT TRANSMETATARSAL AMPUTATION, ACHILLES TENDON LENGTHENING, AND LEFT SECOND TOE AMPUTATION (Bilateral: Foot) Diagnosis:       Osteomyelitis (HCC)      (Osteomyelitis (HCC) [M86.9])    Surgeons: Jose R Meza DPM Responsible Provider: Ingrid Moscoso MD    Anesthesia Type: general, MAC ASA Status: 3            Anesthesia Type: general, MAC    Vitals Value Taken Time   BP 89/52 06/13/24 1711   Temp  06/13/24 1713   Pulse 62 06/13/24 1713   Resp 13 06/13/24 1713   SpO2 97 % 06/13/24 1713   Vitals shown include unfiled device data.    Patient Location: PACU    Anesthesia Type: MAC    Airway Patency: patent    Postop Pain Control: adequate    Mental Status: mildly sedated but able to meaningfully participate in the post-anesthesia evaluation    Nausea/Vomiting: none    Cardiopulmonary/Hydration status: stable euvolemic    Complications: no apparent anesthesia related complications    Postop vital signs: stable    Dental Exam: Unchanged from Preop    Patient to be discharged from PACU when criteria met.

## 2024-06-13 NOTE — ANESTHESIA PREPROCEDURE EVALUATION
PRE-OP EVALUATION    Patient Name: Darin Bowens    Admit Diagnosis: Osteomyelitis (HCC) [M86.9]  Gangrene of right foot (HCC)    Pre-op Diagnosis: Osteomyelitis (HCC) [M86.9]    RIGHT TRANSMETATARSAL AMPUTATION AND ACHILLES TENDON LENGTHENING    Anesthesia Procedure: RIGHT TRANSMETATARSAL AMPUTATION AND ACHILLES TENDON LENGTHENING (Right)    Surgeons and Role:     * Jose R Meza, DPM - Primary    Pre-op vitals reviewed.  Temp: 98.6 °F (37 °C)  Pulse: 66  Resp: 18  BP: 114/63  SpO2: 100 %  Body mass index is 28.4 kg/m².    Current medications reviewed.  Hospital Medications:   acetaminophen (Tylenol Extra Strength) tab 1,000 mg  1,000 mg Oral Once    glucose (Dex4) 15 GM/59ML oral liquid 15 g  15 g Oral Q15 Min PRN    Or    glucose (Glutose) 40% oral gel 15 g  15 g Oral Q15 Min PRN    Or    glucose-vitamin C (Dex-4) chewable tab 4 tablet  4 tablet Oral Q15 Min PRN    Or    dextrose 50% injection 50 mL  50 mL Intravenous Q15 Min PRN    Or    glucose (Dex4) 15 GM/59ML oral liquid 30 g  30 g Oral Q15 Min PRN    Or    glucose (Glutose) 40% oral gel 30 g  30 g Oral Q15 Min PRN    Or    glucose-vitamin C (Dex-4) chewable tab 8 tablet  8 tablet Oral Q15 Min PRN    lactated ringers infusion   Intravenous Continuous    ceFAZolin (Ancef) 2g in 10mL IV syringe premix  2 g Intravenous Once    ceFAZolin (Ancef) 2 g/10mL IV syringe premix        sodium chloride 0.9% infusion   Intravenous Continuous       Outpatient Medications:     Medications Prior to Admission   Medication Sig Dispense Refill Last Dose    carvedilol 12.5 MG Oral Tab Take 1 tablet (12.5 mg total) by mouth 2 (two) times daily with meals. Take one tablet (12.5mg total) by mouth two times a day 180 tablet 3 6/13/2024    dorzolamide 2 % Ophthalmic Solution Place 1 drop into both eyes in the morning and 1 drop before bedtime.       ketoconazole 2 % External Shampoo Apply 1 Application topically daily as needed for Itching.       Netarsudil-Latanoprost  (ROCKLATAN) 0.02-0.005 % Ophthalmic Solution Apply 1 drop to eye daily. Both eyes       atorvastatin 80 MG Oral Tab Take 1 tablet (80 mg total) by mouth nightly. 90 tablet 2     CLOPIDOGREL 75 MG Oral Tab TAKE 1 TABLET(75 MG) BY MOUTH DAILY 90 tablet 0 6/6/2024    Continuous Blood Gluc Sensor (DEXCOM G6 SENSOR) Does not apply Misc 1 Device Every 10 days. 3 each 1     Insulin Lispro (HUMALOG) 100 UNIT/ML Subcutaneous Solution 95 units per day via insulin pump. 90 mL 1     clindamycin 1 % External Lotion Apply twice daily to scalp and chest rashes (Patient taking differently: Apply topically 2 (two) times daily as needed. Apply twice daily to scalp and chest rashes) 60 mL 11     Ergocalciferol (VITAMIN D2 OR) Take 50,000 Units by mouth every 30 (thirty) days.       rivaroxaban (XARELTO) 15 MG Oral Tab Take 1 tablet (15 mg total) by mouth daily with food. 90 tablet 3 6/6/2024    Continuous Blood Gluc Transmit (DEXCOM G6 TRANSMITTER) Does not apply Misc Change every 90 days 1 each 3     CALCIUM ACETATE 667 MG Oral Cap TAKE 1 CAPSULE BY MOUTH THREE TIMES DAILY WITH MEALS 270 capsule 0     COMBIGAN 0.2-0.5 % Ophthalmic Solution Place 1 drop into both eyes 2 (two) times daily.       PTA Insulin via Pump Inject into the skin continuous. humalog insulin   Medtronic  Basal Rate : 55.6 standard rate 1.90  I:C - 1 units/4 carbs  Correction Factor - unknown          Allergies: Lisinopril      Anesthesia Evaluation    Patient summary reviewed.    Anesthetic Complications  (-) history of anesthetic complications         GI/Hepatic/Renal    Negative GI/hepatic/renal ROS.         (+) chronic renal disease and ESRD and hemodialysis                   Cardiovascular                  (+) hypertension     (+) CAD             (+) dysrhythmias and atrial fibrillation  (+) CHF                Endo/Other      (+) diabetes  type 1, using insulin                         Pulmonary                    (+) sleep apnea and CPAP       Neuro/Psych    Negative neuro/psych ROS.                                  Procedure Laterality Date    Back surgery  5/16/16    L2-L5 Decomp poss uninstrumented fusion    Cabg      Cath percutaneous  transluminal coronary angioplasty      Cath transcatheter aortic valve replacement      Injection, w/wo contrast, dx/therapeutic substance, epidural/subarachnoid; lumbar/sacral N/A 12/10/2015    Procedure: LUMBAR EPIDURAL;  Surgeon: Rojas Bolanos MD;  Location: [facility]    Injection, w/wo contrast, dx/therapeutic substance, epidural/subarachnoid; lumbar/sacral N/A 1/25/2016    Procedure: LUMBAR EPIDURAL;  Surgeon: Rojas Bolanos MD;  Location: [facility]    Injection, w/wo contrast, dx/therapeutic substance, epidural/subarachnoid; lumbar/sacral N/A 3/2/2016    Procedure: LUMBAR EPIDURAL;  Surgeon: Corey Mcduffie MD;  Location: [facility]    M-seda by  phys perfrmg svc 5+ yr N/A 12/10/2015    Procedure: LUMBAR EPIDURAL;  Surgeon: Rojas Bolanos MD;  Location: [facility]    M-sedaj by  phys perfrmg svc 5+ yr N/A 1/25/2016    Procedure: LUMBAR EPIDURAL;  Surgeon: Rojas Bolanos MD;  Location: [facility]    M-sedaj by  phys perfrmg svc 5+ yr N/A 3/2/2016    Procedure: LUMBAR EPIDURAL;  Surgeon: Corey Mcduffie MD;  Location: [facility] PAIN MANAGEMENT    Other surgical history  10/4/12    cysto-Dr. [email or web address]    Other surgical history N/A 5/16/2016    Procedure: LUMBAR LAMINECTOMY FUSION W/ BONE GRAFT 3 LEVEL;  Surgeon: CARLY Garcia MD;  Location:  MAIN OR    Patient documented not to have experienced any of the following events N/A 12/10/2015    Procedure: LUMBAR EPIDURAL;  Surgeon: Rojas Bolanos MD;  Location: [facility]    Patient documented not to have experienced any of the following events N/A 1/25/2016    Procedure: LUMBAR EPIDURAL;  Surgeon: Rojas Bolanos MD;  Location: [facility]  [place] [facility]    Patient documented not to have experienced any of the following events N/A 3/2/2016    Procedure: LUMBAR EPIDURAL;  Surgeon: Corey Mcduffie MD;  Location: [facility]IN [facility]    Patient withough preoperative order for iv antibiotic surgical site infection prophylaxis. N/A 12/10/2015    Procedure: LUMBAR EPIDURAL;  Surgeon: Rojas Bolanos MD;  Location: [facility]    Patient withough preoperative order for iv antibiotic surgical site infection prophylaxis. N/A 1/25/2016    Procedure: LUMBAR EPIDURAL;  Surgeon: Rojas Bolanos MD;  Location: [facility]IN [facility]    Patient withough preoperative order for iv antibiotic surgical site infection prophylaxis. N/A 3/2/2016    Procedure: LUMBAR EPIDURAL;  Surgeon: Corey Mcduffie MD;  Location: [facility] PAIN MANAGEMENT    Replace aortic valve open  2012    Valve repair       Social History     Socioeconomic History    Marital status:    Tobacco Use    Smoking status: Never    Smokeless tobacco: Never   Vaping Use    Vaping status: Never Used   Substance and Sexual Activity    Alcohol use: No     Alcohol/week: 0.0 standard drinks of alcohol    Drug use: No     History   Drug Use No     Available pre-op labs reviewed.     Lab Results   Component Value Date     (L) 06/13/2024    K 4.7 06/13/2024    CL 96 (L) 06/13/2024    CO2 24.0 06/13/2024    BUN 39 (H) 06/13/2024    CREATSERUM 7.38 (H) 06/13/2024     (H) 06/13/2024    CA 8.9 06/13/2024            Airway      Mallampati: I  Mouth opening: >3 FB  TM distance: > 6 cm  Neck ROM: full Cardiovascular    Cardiovascular exam normal.  Rhythm: regular  Rate: normal     Dental    Dentition appears grossly intact         Pulmonary    Pulmonary exam normal.                 Other findings        ASA: 3   Plan: general  NPO status verified and patient meets guidelines.    Post-procedure pain management plan discussed with surgeon and  patient.    Comment:    Plan is MAC anesthesia, which may include deep sedation.  Implied that memory of procedure is unlikely although intraop recall, if it occurs, may be a reasonable and comfortable experience with this anesthetic.  Aware that general anesthesia is not intended though deep sedation may include occasional moments of general anesthesia.  The backup plan for safe patient care may include change of plan to full general anesthesia with potential airway placement.  Patient (legal guardian) demonstrated understanding, accepts risks and benefits, and wishes to proceed as reflected in the signed consent form.  Difficulty airway equipment available as needed, may need general anesthesia OETT.  Previous anesthesia records reviewed.  Plan/risks discussed with: patient              Plan/risks discussed with: patient              Present on Admission:  **None**

## 2024-06-13 NOTE — OPERATIVE REPORT
Date of surgery: 06/13/24     Preoperative Diagnosis:   Right forefoot gangrene  Right ankle equinus  Left second toe gangrene  Peripheral arterial disease  Diabetic neuropathy    Postoperative Diagnosis: Same     Procedure:  1.  Transmetatarsal amputation of the right foot  2.  Right Achilles tendon lengthening  3.  Left second toe amputation at the metatarsophalangeal joint.     Surgeon: Jose R Meza D.P.M.  Anesthesia: MAC  EBL: 30 ml     Specimens: Right forefoot and Left 2nd toe  Complications: None     Technique:  Patient was brought into the operating room and placed on the operating table in a supine position.  Patient was then placed under anesthesia.  Both lower extremities were then prepped and draped in the usual aseptic manner.  A tourniquet was not utilized. 20 ml total of 0.5% Marcaine was utilized to block the right ankle and left foot.    A fishmouth type incision was made at the RIGHT forefoot proximal to the metatarsophalangeal joint.  Incision was deepened directly down to bone.  Using blunt and sharp dissection the soft tissues were freed off the distal aspect of the metatarsals.  Next, a sagittal saw was utilized and all metatarsals were resected distally.  Sharp dissection was carried out and the forefoot was amputated and passed off the field and sent for specimen.  Bleeders were ligated as necessary.  Neurovascular structures were carefully protected.  All prominent tendons were sharply excised to allow them to retract back into the deeper soft tissues.  Surgical site was then copiously irrigated with Irrisept solution.  Good healthy bleeding was noted from the dorsal and plantar full-thickness flaps.  The plantar full-thickness flap was mobilized and repositioned dorsally for closure.  Next, deep structures were reapproximated with 2-0 Vicryl, subcutaneous tissue was closed with 4-0 Vicryl and skin was then closed with staples.  Good CFT was noted from both the dorsal and plantar  full-thickness flaps.    Next, three incisions were made at the distal Achilles tendon, 2 cm apart in the standard technique and proximal to the insertion.  Next, the ankle was placed in maximal dorsiflexion and Achilles tendon lengthening was done through these 3 incisions with excellent reduction of the equinus.  Surgical sites were then irrigated and closed with skin staples.    Attention was directed to the LEFT foot. A, circumferential incision was around the second toe. Incision was deepened directly done to bone. The was then disarticulated sharply through the MTP joint and passed off the field. All prominent tendons were sharply excised to allow them to retract back into the deeper soft tissues.  Surgical site was then copiously irrigated with Irrisept solution.  Good healthy bleeding was noted the wound margins. Next, subcutaneous tissue was closed with 4-0 Vicryl and skin was then closed with 3-0 Nylon.    Betadine paint, adaptic and a well-padded sterile dressing was applied to both surgical sites.  The right lower extremity will be placed in a PRAFO boot postoperative to keep the ankle in a maximally dorsiflexed position.  Patient tolerated the procedure well and was transferred to PACU in stable condition.

## 2024-06-13 NOTE — H&P
[facility]   part of [facility]    History & Physical    Darin Bowens Patient Status:  Hospital Outpatient Surgery    1945 MRN [id number]   Location [facility] PERIOPERATIVE SERVICE Attending Jose R Meza [id number]   Hosp Day # 0 PCP Zachery Hall MD     Date:  2024  Date of Admission:  2024    Chief Complaint:   TMA    HPI:   Darin Bowens is a(n) 78 year old male w/ PMHx of CAD s/p stent , PAD s/p b/l LE stents, Afib (renally dosed xarelto), DM2 on insulin pump c/b polyneuropathy, ESRD on HD, chronic CHF w/ LVH, AVStenosis s/p BioAVR , h/o CVA, eHTN, HLD, DSEI, gangrenous right foot/DFU who presented for right TMA w/ Dr. Odell.     Patient seen for pre op by elizabeth, Dr. Zimmerman - rec holding plavix / xarelto x 2 days pre op - patient has not taken those since Friday of last week.     He has no chest pains, palps, dyspnea, he has chronic stable arthur. No fevers, chills.     He is on insulin pump and was instructed by endo to turn to 80% and recently taken it off for surgery.    He had HD yesterday - typical schedule is MWF    History     Past Medical History:    Aortic stenosis    Back problem    CAD (coronary artery disease)    Calculus of kidney    Cataract    CKD (chronic kidney disease) stage 4, GFR 15-29 ml/min (HCC)    Congestive heart disease (HCC)    Coronary atherosclerosis    DDD (degenerative disc disease), lumbar    Diabetes mellitus (HCC)    Dialysis patient ([facility])    fistula upper right arm/MWF    Dyslipidemia    Heart valve disease    High cholesterol    Hypertriglyceridemia    Hypoglycemic reaction    Muscle weakness    cane    Onychomycosis    DESI (obstructive sleep apnea) C-PAP     severe AHI 68, O2 sam 84%, CPAP 7    Other and unspecified hyperlipidemia    Overweight(278.02)    Renal disorder    S/P Coronary Artery Stents 2009    Sleep apnea    doesn't use CPAP    Type 1 diabetes mellitus (HCC)    Unspecified essential hypertension    Visual  impairment    legally blind     Past Surgical History:   Procedure Laterality Date    Back surgery  5/16/16    L2-L5 Decomp poss uninstrumented fusion    Cabg      Cath percutaneous  transluminal coronary angioplasty      Cath transcatheter aortic valve replacement      Injection, w/wo contrast, dx/therapeutic substance, epidural/subarachnoid; lumbar/sacral N/A 12/10/2015    Procedure: LUMBAR EPIDURAL;  Surgeon: Rojas Bolanos MD;  Location: [facility]    Injection, w/wo contrast, dx/therapeutic substance, epidural/subarachnoid; lumbar/sacral N/A 1/25/2016    Procedure: LUMBAR EPIDURAL;  Surgeon: Rojas Bolanos MD;  Location: [facility]    Injection, w/wo contrast, dx/therapeutic substance, epidural/subarachnoid; lumbar/sacral N/A 3/2/2016    Procedure: LUMBAR EPIDURAL;  Surgeon: Corey Mcduffie MD;  Location: [facility]    M-sedaj by  phys perfrmg svc 5+ yr N/A 12/10/2015    Procedure: LUMBAR EPIDURAL;  Surgeon: Rojas Bolanos MD;  Location: [facility]    M-sedaj by  phys perfrmg svc 5+ yr N/A 1/25/2016    Procedure: LUMBAR EPIDURAL;  Surgeon: Rojas Bolanos MD;  Location: [facility]    M-sedaj by  phys perfrmg svc 5+ yr N/A 3/2/2016    Procedure: LUMBAR EPIDURAL;  Surgeon: Corey Mcduffie MD;  Location: [facility]    Other surgical history  10/4/12    raleigho-Dr. Calderón    Other surgical history N/A 5/16/2016    Procedure: LUMBAR LAMINECTOMY FUSION W/ BONE GRAFT 3 LEVEL;  Surgeon: CARLY Garcia MD;  Location:  MAIN OR    Patient documented not to have experienced any of the following events N/A 12/10/2015    Procedure: LUMBAR EPIDURAL;  Surgeon: Rojas Bolanos MD;  Location: [facility]    Patient documented not to have experienced any of the following events N/A 1/25/2016    Procedure: LUMBAR EPIDURAL;  Surgeon: Rojas Bolanos MD;  Location: [facility]  [facility]    Patient documented not to have experienced any of the following events N/A 3/2/2016    Procedure: LUMBAR EPIDURAL;  Surgeon: Corey Mcduffie MD;  Location: [facility]    Patient withough preoperative order for iv antibiotic surgical site infection prophylaxis. N/A 12/10/2015    Procedure: LUMBAR EPIDURAL;  Surgeon: Rojas Bolanos MD;  Location: [facility]    Patient withough preoperative order for iv antibiotic surgical site infection prophylaxis. N/A 1/25/2016    Procedure: LUMBAR EPIDURAL;  Surgeon: Rojas Bolanos MD;  Location: [facility]    Patient withough preoperative order for iv antibiotic surgical site infection prophylaxis. N/A 3/2/2016    Procedure: LUMBAR EPIDURAL;  Surgeon: Corey Mcduffie MD;  Location: [facility]    Replace aortic valve open  2012    Valve repair       Family History   Problem Relation Age of Onset    Heart Attack Father     Heart Attack Brother     Diabetes Brother     Diabetes Brother      Social History:  Social History     Socioeconomic History    Marital status:    Tobacco Use    Smoking status: Never    Smokeless tobacco: Never   Vaping Use    Vaping status: Never Used   Substance and Sexual Activity    Alcohol use: No     Alcohol/week: 0.0 standard drinks of alcohol    Drug use: No     Social Determinants of Health     Food Insecurity: Low Risk  (1/28/2023)    Received from [facility]    Food Insecurity     Have there been times that your food ran out, and you didn't have money to get more?: No     Are there times that you worry that this might happen?: No   Transportation Needs: Low Risk  (1/28/2023)    Received from [facility]    Transportation Needs     Do you have trouble getting transportation to medical appointments?: No   Social Connections: Low Risk  (9/7/2021)    Received from [facility]    Social  Connections     Do you have someone you could call for help if needed?: Yes       Allergies/Medications:   Allergies:   Allergies   Allergen Reactions    Lisinopril Coughing     Dyspnea       Medications Prior to Admission   Medication Sig    carvedilol 12.5 MG Oral Tab Take 1 tablet (12.5 mg total) by mouth 2 (two) times daily with meals. Take one tablet (12.5mg total) by mouth two times a day    dorzolamide 2 % Ophthalmic Solution Place 1 drop into both eyes in the morning and 1 drop before bedtime.    ketoconazole 2 % External Shampoo Apply 1 Application topically daily as needed for Itching.    Netarsudil-Latanoprost (ROCKLATAN) 0.02-0.005 % Ophthalmic Solution Apply 1 drop to eye daily. Both eyes    atorvastatin 80 MG Oral Tab Take 1 tablet (80 mg total) by mouth nightly.    CLOPIDOGREL 75 MG Oral Tab TAKE 1 TABLET(75 MG) BY MOUTH DAILY    Continuous Blood Gluc Sensor (DEXCOM G6 SENSOR) Does not apply Misc 1 Device Every 10 days.    Insulin Lispro (HUMALOG) 100 UNIT/ML Subcutaneous Solution 95 units per day via insulin pump.    clindamycin 1 % External Lotion Apply twice daily to scalp and chest rashes (Patient taking differently: Apply topically 2 (two) times daily as needed. Apply twice daily to scalp and chest rashes)    Ergocalciferol (VITAMIN D2 OR) Take 50,000 Units by mouth every 30 (thirty) days.    rivaroxaban (XARELTO) 15 MG Oral Tab Take 1 tablet (15 mg total) by mouth daily with food.    Continuous Blood Gluc Transmit (DEXCOM G6 TRANSMITTER) Does not apply Misc Change every 90 days    CALCIUM ACETATE 667 MG Oral Cap TAKE 1 CAPSULE BY MOUTH THREE TIMES DAILY WITH MEALS    COMBIGAN 0.2-0.5 % Ophthalmic Solution Place 1 drop into both eyes 2 (two) times daily.    PTA Insulin via Pump Inject into the skin continuous. humalog insulin   [employer]tronic  Basal Rate : 55.6 standard rate 1.90  I:C - 1 units/4 carbs  Correction Factor - unknown       Review of Systems:   A comprehensive 12 point review of systems  was otherwise negative, aside from what's stated above.    Physical Exam:   Vital Signs:  Blood pressure 114/63, pulse 66, temperature 98.6 °F (37 °C), temperature source Temporal, resp. rate 18, weight 209 lb 7 oz (95 kg), SpO2 100%.     General: No acute distress. Alert and oriented x 3.  HEENT: Moist mucous membranes. EOM-I. PERRL  Neck: No lymphadenopathy.  No JVD. No carotid bruits.  Respiratory: Clear to auscultation bilaterally.  No wheezes. No rhonchi.  Cardiovascular: S1, S2.  Regular rate and rhythm.  No murmurs. Equal pulses   Abdomen: Soft, nontender, nondistended.  Positive bowel sounds. No rebound tenderness  Neurologic: No focal neurological deficits.  Musculoskeletal: b/l feet are wrapped  Integument: No lesions. No erythema.  Psychiatric: Appropriate mood and affect.    Results:     Lab Results   Component Value Date    WBC 6.77 03/01/2022    HGB 11.7 (L) 03/01/2022    HCT 38.0 03/01/2022     03/01/2022    CREATSERUM 7.38 (H) 06/13/2024    BUN 39 (H) 06/13/2024     (L) 06/13/2024    K 4.7 06/13/2024    CL 96 (L) 06/13/2024    CO2 24.0 06/13/2024     (H) 06/13/2024    CA 8.9 06/13/2024    ALB 5.0 01/03/2022    ALKPHO 102 01/03/2022    BILT 0.77 01/03/2022    TP 7.5 01/03/2022    AST 13 01/03/2022    ALT 7 01/03/2022    PTT 31.7 03/01/2022    INR 1.4 (H) 03/01/2022    TSH 2.580 08/27/2012    PSA 0.7 01/09/2014    CRP 0.3 04/22/2014    MG 2.1 06/12/2021    PHOS 3.2 02/08/2019    TROP <0.046 02/06/2019    B12 949 02/06/2019                Assessment/Plan:     Darin Bowens is a(n) 78 year old male w/ PMHx of CAD s/p stent 2009, PAD s/p b/l LE stents, Afib (renally dosed xarelto), DM2 on insulin pump c/b polyneuropathy, ESRD on HD, chronic CHF w/ LVH, AVStenosis s/p BioAVR 2012, h/o CVA, eHTN, HLD, DESI, gangrenous right foot/DFU who presented for right TMA w/ Dr. Odell.    Gangrene to right foot   DFU  PAD s/p b/l LE stents   Plan for TMA  Pre op risk assessment / eval   -  patient seen by elizabeth for pre op - Dr. Zimmerman - rec optimized for OR   - he has multiple cardiac co morbidities, CAD, CHF, Afib, CKD on HD, DM2 on insulin, prior CVA  - plan to continue beta blockade perioperatively   - takes losartan on non HD days   - insulin pump turned off per endo recs - resume post op once on diet   - norco prn, dilaudid low dose iv prn for post op pain    CAD s/p stent 2009  CHF w/ LVH   Afib   PAD  eHTN  HLD  - resume plavix and xarelto when okay w/ podiatry   - telemetry   - cont coreg   - takes losartan on non hd days - resume as needed     ESRD on HD   - consult nephrology     DM on insulin pump  - see above     Resume home eye meds    Trend labs daily     DVT ppx: ppx heparin until okay for full ac    Dispo: will follow     DO MISTY DeG Hospitalist

## 2024-06-14 LAB
ANION GAP SERPL CALC-SCNC: 9 MMOL/L (ref 0–18)
BASOPHILS # BLD AUTO: 0.03 X10(3) UL (ref 0–0.2)
BASOPHILS NFR BLD AUTO: 0.3 %
BUN BLD-MCNC: 53 MG/DL (ref 9–23)
CALCIUM BLD-MCNC: 8.9 MG/DL (ref 8.5–10.1)
CHLORIDE SERPL-SCNC: 100 MMOL/L (ref 98–112)
CO2 SERPL-SCNC: 24 MMOL/L (ref 21–32)
CREAT BLD-MCNC: 8.72 MG/DL
EGFRCR SERPLBLD CKD-EPI 2021: 6 ML/MIN/1.73M2 (ref 60–?)
EOSINOPHIL # BLD AUTO: 0.09 X10(3) UL (ref 0–0.7)
EOSINOPHIL NFR BLD AUTO: 0.8 %
ERYTHROCYTE [DISTWIDTH] IN BLOOD BY AUTOMATED COUNT: 14.1 %
EST. AVERAGE GLUCOSE BLD GHB EST-MCNC: 123 MG/DL (ref 68–126)
GLUCOSE BLD-MCNC: 144 MG/DL (ref 70–99)
GLUCOSE BLD-MCNC: 166 MG/DL (ref 70–99)
GLUCOSE BLD-MCNC: 177 MG/DL (ref 70–99)
GLUCOSE BLD-MCNC: 242 MG/DL (ref 70–99)
GLUCOSE BLD-MCNC: 259 MG/DL (ref 70–99)
HBA1C MFR BLD: 5.9 % (ref ?–5.7)
HCT VFR BLD AUTO: 26.7 %
HGB BLD-MCNC: 8.8 G/DL
IMM GRANULOCYTES # BLD AUTO: 0.04 X10(3) UL (ref 0–1)
IMM GRANULOCYTES NFR BLD: 0.4 %
LYMPHOCYTES # BLD AUTO: 0.51 X10(3) UL (ref 1–4)
LYMPHOCYTES NFR BLD AUTO: 4.6 %
MAGNESIUM SERPL-MCNC: 2.2 MG/DL (ref 1.6–2.6)
MCH RBC QN AUTO: 31.3 PG (ref 26–34)
MCHC RBC AUTO-ENTMCNC: 33 G/DL (ref 31–37)
MCV RBC AUTO: 95 FL
MONOCYTES # BLD AUTO: 0.75 X10(3) UL (ref 0.1–1)
MONOCYTES NFR BLD AUTO: 6.7 %
NEUTROPHILS # BLD AUTO: 9.7 X10 (3) UL (ref 1.5–7.7)
NEUTROPHILS # BLD AUTO: 9.7 X10(3) UL (ref 1.5–7.7)
NEUTROPHILS NFR BLD AUTO: 87.2 %
OSMOLALITY SERPL CALC.SUM OF ELEC: 294 MOSM/KG (ref 275–295)
PLATELET # BLD AUTO: 160 10(3)UL (ref 150–450)
POTASSIUM SERPL-SCNC: 5 MMOL/L (ref 3.5–5.1)
RBC # BLD AUTO: 2.81 X10(6)UL
SODIUM SERPL-SCNC: 133 MMOL/L (ref 136–145)
WBC # BLD AUTO: 11.1 X10(3) UL (ref 4–11)

## 2024-06-14 PROCEDURE — 99222 1ST HOSP IP/OBS MODERATE 55: CPT | Performed by: INTERNAL MEDICINE

## 2024-06-14 PROCEDURE — 5A1D70Z PERFORMANCE OF URINARY FILTRATION, INTERMITTENT, LESS THAN 6 HOURS PER DAY: ICD-10-PCS | Performed by: INTERNAL MEDICINE

## 2024-06-14 RX ORDER — CLOPIDOGREL BISULFATE 75 MG/1
75 TABLET ORAL DAILY
Status: DISCONTINUED | OUTPATIENT
Start: 2024-06-15 | End: 2024-06-15

## 2024-06-14 NOTE — CM/SW NOTE
Met w/ pt to discuss DC planning. Current anticipated therapy need for DC is gradual rehabilitative therapy. Discussed medicare guidelines for coverage for RORO stay.     Pt was admitted as inpatient 6/13. He would need 3 medically necessary midnights to qualify for RORO coverage. If pt is medically cleared before that time, pt could elect to private pay for RORO or discharge to home w/ home health care services.     Pt reports that he does not have the financial means to privately pay for RORO. If he is medically cleared before Sunday, he would need to discharge home with home health care.     D/w pt that his functional mobility may also improve during his stay. PT contacted and will see pt tomorrow for treatment and to assess for changes in pt's anticipated therapy need.     Pt's sps Jocelyne also updated on POC.     Message sent to [facility] for review. Referrals for RORO and C sent. PASRR completed and attached to RORO referral.     CM/SW to follow up w/ pt/sps Saturday.     HUGO Smith, CMSRN    [id number]

## 2024-06-14 NOTE — OCCUPATIONAL THERAPY NOTE
OCCUPATIONAL THERAPY EVALUATION - INPATIENT     Room Number: 355/355-A  Evaluation Date: 6/14/2024  Type of Evaluation: Initial  Presenting Problem: gangrene of R foot s/p R TMA with achilles lengthening and L 2nd toe amputation on 6/13    Physician Order: IP Consult to Occupational Therapy  Reason for Therapy: ADL/IADL Dysfunction and Discharge Planning    OCCUPATIONAL THERAPY ASSESSMENT   Patient is currently functioning below baseline with toileting, bathing, upper body dressing, lower body dressing, bed mobility, and transfers. Prior to admission, patient's baseline is mod I with use of a cane at home and using a W/C in the community, pt also has assist with showers from his wife.  Patient is requiring moderate assist and maximum assist as a result of the following impairments: decreased functional strength, decreased functional reach, decreased endurance, pain, impaired standing balance, and difficulty maintaining precautions. Occupational Therapy will continue to follow for duration of hospitalization.    Patient will benefit from continued skilled OT Services to promote return to prior level of function and safety with continuous assistance and gradual rehabilitative therapy       History Related to Current Admission: Patient is a 78 year old male admitted on 6/13/2024 with Presenting Problem: gangrene of R foot s/p R TMA with achilles lengthening and L 2nd toe amputation on 6/13. Co-Morbidities : ESRD on HD, PAD    Recommendations for nursing staff:   Transfers: magaly lift  Toileting location: bed    EVALUATION SESSION  Patient Start of Session: Pt was found in his bed with his wife at the bedside.   FUNCTIONAL TRANSFER ASSESSMENT  Sit to Stand: Edge of Bed  Edge of Bed: Maximum Assist    BED MOBILITY  Supine to Sit : Minimal Assist  Sit to Supine (OT): Minimal Assist    BALANCE ASSESSMENT     FUNCTIONAL ADL ASSESSMENT  LB Dressing Seated: Dependent (to don L LE post-op shoe)      ACTIVITY TOLERANCE:                           O2 SATURATIONS       Cognition  Follows simple commands     Upper extremity: WFL    EDUCATION PROVIDED  Patient: Role of Occupational Therapy; Plan of Care; Discharge Recommendations; Functional Transfer Techniques; Fall Prevention  Patient's Response to Education: Verbalized Understanding  Family/Caregiver: Role of Occupational Therapy; Plan of Care; Discharge Recommendations; Functional Transfer Techniques; Fall Prevention  Family/Caregiver's Response to Education: Verbalized Understanding      Equipment used: RW, gait belt   Demonstrates functional use, Would benefit from additional trial     Therapist comments: LB dressing to don L LE post-op shoe>supine>sit EOB>stand to RW>pt demo difficulty with motor planning side steps to L side to HOB>pt also was unable to maintain NWB through his R LE during functional mobility with the use of a RW>sitting EOB>supine>set-up with meal     Patient End of Session: In bed;Needs met;RN aware of session/findings;Call light within reach;All patient questions and concerns addressed;Alarm set;Family present    OCCUPATIONAL PROFILE    HOME SITUATION  Type of Home: Condo  Home Layout: One level  Lives With: Spouse    Toilet and Equipment: Comfort height toilet  Shower/Tub and Equipment: Walk-in shower;Shower chair  Other Equipment: Other (Comment) (RW, W/C, cane)    Occupation/Status: Retired   Hand Dominance: Right  Drives: No  Patient Regularly Uses: Glasses    Prior Level of Function: Pt lives at home with his wife. Pt's wife assists as needed with ADL but most consistently with showers. Pt uses a cane indoors and a W/C in the community.     SUBJECTIVE   \"They cut off one toe on this foot and 5 on the other.\"     PAIN ASSESSMENT  Ratin  Location: Pt denies pain       OBJECTIVE  Precautions: Bed/chair alarm;Low vision;Hard of hearing;Limb alert - right (legally blind)  Fall Risk: High fall risk    WEIGHT BEARING RESTRICTION  Weight Bearing Restriction: R  lower extremity;L lower extremity        R Lower Extremity: Non-Weight Bearing (PRAFO in full dorsiflexion)  L Lower Extremity: Weight Bearing as Tolerated (post-op shoe)    ASSESSMENTS    AM-PAC ‘6-Clicks’ Inpatient Daily Activity Short Form  -   Putting on and taking off regular lower body clothing?: A Lot  -   Bathing (including washing, rinsing, drying)?: A Lot  -   Toileting, which includes using toilet, bedpan or urinal? : A Lot  -   Putting on and taking off regular upper body clothing?: A Lot  -   Taking care of personal grooming such as brushing teeth?: A Little  -   Eating meals?: A Little    AM-PAC Score:  Score: 14  Approx Degree of Impairment: 59.67%  Standardized Score (AM-PAC Scale): 33.39    PLAN  OT Treatment Plan: Balance activities;Energy conservation/work simplification techniques;ADL training;Functional transfer training;Patient/Family education;Patient/Family training;Equipment eval/education;Compensatory technique education;Continued evaluation  Rehab Potential : Fair  Frequency: 3-5x/week  Number of Visits to Meet Established Goals: 5    ADL Goals  Patient will perform toileting with mod A and AE PRN  Patient will perform LB dressing with mod A and AE PRN    Functional Transfer Goals  Patient will perform bed mobility supine to sit with min A  Patient will perform bed mobility sit to supine with min A  Patient will perform toilet transfer with mod A    Additional Goals:  Patient will state R LE NWB and L LE WBAT with post-op shoe donned precautions and maintain during ADL/functional mobility.       Patient Evaluation Complexity Level:   Occupational Profile/Medical History MODERATE - Expanded review of history including review of medical or therapy record   Specific performance deficits impacting engagement in ADL/IADL MODERATE  3 - 5 performance deficits   Client Assessment/Performance Deficits MODERATE - Comorbidities and min to mod modifications of tasks    Clinical Decision Making  MODERATE - Analysis of occupational profile, detailed assessments, several treatment options    Overall Complexity MODERATE     OT Session Time: 30 minutes  Therapeutic Activity: 15 minutes

## 2024-06-14 NOTE — PHYSICAL THERAPY NOTE
PHYSICAL THERAPY EVALUATION - INPATIENT     Room Number: 355/355-A  Evaluation Date: 6/14/2024  Type of Evaluation: Initial  Physician Order: PT Eval and Treat    Presenting Problem: s/p R TMA and achilles tendon lengthening, L 2nd toe amputation 6/13/24  Co-Morbidities : ESRD on HD, PAD, CAD, HTN, Afib, CHF  Reason for Therapy: Mobility Dysfunction and Discharge Planning    PHYSICAL THERAPY ASSESSMENT   Patient is currently functioning below baseline with transfers, gait, and standing prolonged periods.  Prior to admission, patient's baseline is supervision.  Patient is requiring maximum assist as a result of the following impairments: decreased functional strength, impaired standing balance, impaired coordination, impaired motor planning, difficulty maintaining precautions, and limited R ankle ROM.  Physical Therapy will continue to follow for duration of hospitalization.    Patient will benefit from continued skilled PT Services to promote return to prior level of function and safety with continuous assistance and gradual rehabilitative therapy .    PLAN  PT Treatment Plan: Bed mobility;Endurance;Transfer training;Balance training;Strengthening;Range of motion;Gait training  Rehab Potential : Fair  Frequency (Obs): 3-5x/week  Number of Visits to Meet Established Goals: 5      CURRENT GOALS    Goal #1 Patient is able to demonstrate supine - sit EOB @ level: supervision     Goal #2 Patient is able to demonstrate transfers Sit to/from Stand at assistance level: moderate assistance     Goal #3 Patient is able to transfer to bedside chair with MOD assist     Goal #4    Goal #5    Goal #6    Goal Comments: Goals established on 6/14/2024      PHYSICAL THERAPY MEDICAL/SOCIAL HISTORY  History related to current admission: Patient is a 78 year old male admitted on 6/13/2024 from home for R TMA and achilles tendon lengthening as well as 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      HOME SITUATION  Type of Home:  Condo   Home Layout: One level  Stairs to Enter : 3  Railing: Yes  Stairs to Bedroom: 0       Lives With: Spouse  Drives: No  Patient Owned Equipment: Rolling walker;Cane;Wheelchair  Patient Regularly Uses: Glasses    Prior Level of [place]: Pt lives with spouse in single story home with 3 LAUREN. Pt typically ambulatory with cane sometimes. Pt independent with most ADL and mobility. Pt does use W/C for longer distances in the community.     SUBJECTIVE  \"I'm just used to putting weight on that leg.\"       OBJECTIVE  Precautions: Bed/chair alarm;Low vision;Hard of hearing;Limb alert - right (legally blind)  Fall Risk: High fall risk    WEIGHT BEARING RESTRICTION  Weight Bearing Restriction: R lower extremity;L lower extremity        R Lower Extremity: Non-Weight Bearing (PRAFO in full dorsiflexion)  L Lower Extremity: Weight Bearing as Tolerated (post-op shoe)    PAIN ASSESSMENT  Rating: 3  Location: R LE  Management Techniques: Activity promotion;Repositioning    COGNITION  Following Commands:  follows one step commands with increased time and follows one step commands with repetition  Motor Planning: impaired  Safety Judgement:  decreased awareness of need for assistance and decreased awareness of need for safety  Awareness of Errors:  decreased awareness of errors     RANGE OF MOTION AND STRENGTH ASSESSMENT  Upper extremity ROM and strength are within functional limits     Lower extremity ROM is within functional limits except limited R ankle PF due to bracing    Lower extremity strength is within functional limits except for the following:    Right Knee extension  3+/5  Left Knee extension  3+/5      BALANCE  Static Sitting: Good  Dynamic Sitting: Good  Static Standing: Poor  Dynamic Standing: Poor -    ADDITIONAL TESTS                                    ACTIVITY TOLERANCE                         O2 WALK       NEUROLOGICAL FINDINGS                        AM-PAC '6-Clicks' INPATIENT SHORT FORM - BASIC  MOBILITY  How much difficulty does the patient currently have...  Patient Difficulty: Turning over in bed (including adjusting bedclothes, sheets and blankets)?: A Little   Patient Difficulty: Sitting down on and standing up from a chair with arms (e.g., wheelchair, bedside commode, etc.): A Lot   Patient Difficulty: Moving from lying on back to sitting on the side of the bed?: A Little   How much help from another person does the patient currently need...   Help from Another: Moving to and from a bed to a chair (including a wheelchair)?: A Lot   Help from Another: Need to walk in hospital room?: Total   Help from Another: Climbing 3-5 steps with a railing?: Total       AM-PAC Score:  Raw Score: 12   Approx Degree of Impairment: 68.66%   Standardized Score (AM-PAC Scale): 35.33   CMS Modifier (G-Code): CL    FUNCTIONAL ABILITY STATUS  Gait Assessment   Functional Mobility/Gait Assessment  Gait Assistance: Maximum assistance  Distance (ft): 1  Assistive Device: Rolling walker    Skilled Therapy Provided   Received supine in bed.   Assisted with donning L post op shoe.   Supervision for bed mobility.   Demo on transfers/standing while maintaining NWB on R LE.   VC for transfer set up.   MOD assist x 2P for force generation and balance with sit-stand.   Static standing with MOD assist for balance.   2 sit-stand reps with MOD assist x 2P.  Unable to maintain NWB on R LE despite cuing.   VC for side steps. Difficulty with motor planning.   Poor L limb advancement for gait.   MAX assist x 2P for balance while attempting side steps.   Returned to sitting EOB.   Supervision for bed mobility with return to bed.     Bed Mobility:  Rolling: supervision  Supine to sit: supervision   Sit to supine: supervision     Transfer Mobility:  Sit to stand: MOD x 2P   Stand to sit: MOD x 2P  Gait = MAX x 2P    Therapist's Comments: Reviewed NWB with PRAFO boot recommendation on R and WBAT with post op shoe on L foot. Will assess patients  ability to perform lateral transfers to drop arm chair next session.     Exercise/Education Provided:  Bed mobility  Energy conservation  Functional activity tolerated  Posture  Strengthening  Transfer training    Patient End of Session: In bed;Needs met;Call light within reach;RN aware of session/findings;Family present;Discussed recommendations with /      Patient Evaluation Complexity Level:  History Moderate - 1 or 2 personal factors and/or co-morbidities   Examination of body systems Moderate - addressing a total of 3 or more elements   Clinical Presentation Moderate - Evolving   Clinical Decision Making Moderate - Evolving       PT Session Time: 20 minutes  Gait Training:  minutes  Therapeutic Activity: 12 minutes  Neuromuscular Re-education:  minutes  Therapeutic Exercise:  minutes

## 2024-06-14 NOTE — CONSULTS
[facility]  Report of Consultation    Darin Bowens Patient Status:  Inpatient    1945 MRN [id number]   Location [facility] 3SW-A Attending Jose R Meza, DPM   Hosp Day # 1 PCP Zachery Hall MD     Reason for Consultation:  ESRD on HD    History of Present Illness:  Darin Bowens is a a(n) 78 year old man well known to our service with mult med probs incl ESRD due to DM on HD MWF at [facility] adm yest after R TMA and L second toe amp.    History:  Past Medical History:    Aortic stenosis    Back problem    CAD (coronary artery disease)    Calculus of kidney    Cataract    CKD (chronic kidney disease) stage 4, GFR 15-29 ml/min (HCC)    Congestive heart disease (HCC)    Coronary atherosclerosis    DDD (degenerative disc disease), lumbar    Diabetes mellitus (HCC)    Dialysis patient ([facility])    fistula upper right arm/MWF    Dyslipidemia    Heart valve disease    High cholesterol    Hypertriglyceridemia    Hypoglycemic reaction    Muscle weakness    cane    Onychomycosis    DESI (obstructive sleep apnea) C-PAP     severe AHI 68, O2 sam 84%, CPAP 7    Other and unspecified hyperlipidemia    Overweight(278.02)    Renal disorder    S/P Coronary Artery Stents 2009    Sleep apnea    doesn't use CPAP    Type 1 diabetes mellitus (HCC)    Unspecified essential hypertension    Visual impairment    legally blind     Past Surgical History:   Procedure Laterality Date    Back surgery  16    L2-L5 Decomp poss uninstrumented fusion    Cabg      Cath percutaneous  transluminal coronary angioplasty      Cath transcatheter aortic valve replacement      Injection, w/wo contrast, dx/therapeutic substance, epidural/subarachnoid; lumbar/sacral N/A 12/10/2015    Procedure: LUMBAR EPIDURAL;  Surgeon: Rojas Bolanos MD;  Location: [facility] [place] [facility]    Injection, w/wo contrast, dx/therapeutic substance, epidural/subarachnoid; lumbar/sacral N/A 2016    Procedure: LUMBAR  EPIDURAL;  Surgeon: Rojas Bolanos MD;  Location: [facility]    Injection, w/wo contrast, dx/therapeutic substance, epidural/subarachnoid; lumbar/sacral N/A 3/2/2016    Procedure: LUMBAR EPIDURAL;  Surgeon: Corey Mcduffie MD;  Location: [facility]    M-sedaj by  phys perfrmg sv 5+ yr N/A 12/10/2015    Procedure: LUMBAR EPIDURAL;  Surgeon: Rojas Bolanos MD;  Location: [facility]    M-sedaj by  phys perfrmg svc 5+ yr N/A 1/25/2016    Procedure: LUMBAR EPIDURAL;  Surgeon: Rojas Bolanos MD;  Location: [facility]    M-sedaj by  phys perfrmg svc 5+ yr N/A 3/2/2016    Procedure: LUMBAR EPIDURAL;  Surgeon: Corey Mcduffie MD;  Location: [facility]    Other surgical history  10/4/12    cysto-Dr. Calderón    Other surgical history N/A 5/16/2016    Procedure: LUMBAR LAMINECTOMY FUSION W/ BONE GRAFT 3 LEVEL;  Surgeon: CARLY Garcia MD;  Location: [facility] OR    Patient documented not to have experienced any of the following events N/A 12/10/2015    Procedure: LUMBAR EPIDURAL;  Surgeon: Rojas Bolanos MD;  Location: [facility]    Patient documented not to have experienced any of the following events N/A 1/25/2016    Procedure: LUMBAR EPIDURAL;  Surgeon: Rojas Bolanos MD;  Location: [facility]    Patient documented not to have experienced any of the following events N/A 3/2/2016    Procedure: LUMBAR EPIDURAL;  Surgeon: Corey Mcduffie MD;  Location: [facility]    Patient withough preoperative order for iv antibiotic surgical site infection prophylaxis. N/A 12/10/2015    Procedure: LUMBAR EPIDURAL;  Surgeon: Rojas Bolanos MD;  Location: [facility]    Patient withough preoperative order for iv antibiotic surgical site infection prophylaxis. N/A 1/25/2016    Procedure: LUMBAR EPIDURAL;  Surgeon: Rojas Bolanos MD;  Location: [facility]    Patient withough preoperative order for iv antibiotic surgical site infection prophylaxis. N/A 3/2/2016    Procedure: LUMBAR EPIDURAL;  Surgeon: Corey Mcduffie MD;  Location: [facility] [place] [facility]    Replace aortic valve open  2012    Valve repair       Family History   Problem Relation Age of Onset    Heart Attack Father     Heart Attack Brother     Diabetes Brother     Diabetes Brother       reports that he has never smoked. He has never used smokeless tobacco. He reports that he does not drink alcohol and does not use drugs.    Allergies:  Allergies   Allergen Reactions    Lisinopril Coughing     Dyspnea         Medications:    Current Facility-Administered Medications:     acetaminophen (Tylenol Extra Strength) tab 1,000 mg, 1,000 mg, Oral, Once    glucose (Dex4) 15 GM/59ML oral liquid 15 g, 15 g, Oral, Q15 Min PRN **OR** glucose (Glutose) 40% oral gel 15 g, 15 g, Oral, Q15 Min PRN **OR** glucose-vitamin C (Dex-4) chewable tab 4 tablet, 4 tablet, Oral, Q15 Min PRN **OR** dextrose 50% injection 50 mL, 50 mL, Intravenous, Q15 Min PRN **OR** glucose (Dex4) 15 GM/59ML oral liquid 30 g, 30 g, Oral, Q15 Min PRN **OR** glucose (Glutose) 40% oral gel 30 g, 30 g, Oral, Q15 Min PRN **OR** glucose-vitamin C (Dex-4) chewable tab 8 tablet, 8 tablet, Oral, Q15 Min PRN    lactated ringers infusion, , Intravenous, Continuous    sodium chloride 0.9% infusion, , Intravenous, Continuous    atorvastatin (Lipitor) tab 80 mg, 80 mg, Oral, Nightly    calcium acetate (Phoslo) cap 667 mg, 1 capsule, Oral, TID CC    carvedilol (Coreg) tab 12.5 mg, 12.5 mg, Oral, BID with meals    brimonidine (Alphagan) 0.2 % ophthalmic solution 1 drop, 1 drop, Both Eyes, BID    timolol (Timoptic) 0.5 % ophthalmic solution 1 drop, 1 drop, Both Eyes, BID    dorzolamide (Trusopt) 2 % ophthalmic solution 1 drop, 1 drop, Both Eyes, BID    Netarsudil-Latanoprost 0.02-0.005 % SOLN 1 drop, 1 drop, Ophthalmic, Nightly    heparin  (Porcine) 5000 UNIT/ML injection 5,000 Units, 5,000 Units, Subcutaneous, Q8H LIZEBTH    acetaminophen (Tylenol) tab 650 mg, 650 mg, Oral, Q4H PRN **OR** HYDROcodone-acetaminophen (Norco) 5-325 MG per tab 1 tablet, 1 tablet, Oral, Q4H PRN **OR** HYDROcodone-acetaminophen (Norco) 5-325 MG per tab 2 tablet, 2 tablet, Oral, Q4H PRN    HYDROmorphone (Dilaudid) 1 MG/ML injection 0.1 mg, 0.1 mg, Intravenous, Q2H PRN **OR** HYDROmorphone (Dilaudid) 1 MG/ML injection 0.2 mg, 0.2 mg, Intravenous, Q2H PRN **OR** HYDROmorphone (Dilaudid) 1 MG/ML injection 0.4 mg, 0.4 mg, Intravenous, Q2H PRN    polyethylene glycol (PEG 3350) (Miralax) 17 g oral packet 17 g, 17 g, Oral, Daily PRN    sennosides (Senokot) tab 17.2 mg, 17.2 mg, Oral, Nightly PRN    bisacodyl (Dulcolax) 10 MG rectal suppository 10 mg, 10 mg, Rectal, Daily PRN    fleet enema (Fleet) 7-19 GM/118ML rectal enema 133 mL, 1 enema, Rectal, Once PRN    ceFAZolin (Ancef) 1 g in dextrose 5% 100mL IVPB-ADD, 1 g, Intravenous, Q24H    insulin aspart (NovoLOG) 100 Units/mL FlexPen 1-10 Units, 1-10 Units, Subcutaneous, TID AC and HS  No current outpatient medications on file.       Review of Systems:  Denies fever/chills  Denies wt loss/gain  Denies HA or visual changes  Denies CP or palpitations  Denies SOB/cough/hemoptysis  Denies abd or flank pain  Denies N/V/D  Denies change in urinary habits or gross hematuria  Denies LE edema  Denies skin rashes/myalgias/arthralgias      Physical Exam:   /64 (BP Location: Left arm)   Pulse 68   Temp 98.7 °F (37.1 °C) (Oral)   Resp 20   Wt 211 lb 6.4 oz (95.9 kg)   SpO2 98%   BMI 28.67 kg/m²   Temp (24hrs), Av.3 °F (36.8 °C), Min:97.7 °F (36.5 °C), Max:99 °F (37.2 °C)       Intake/Output Summary (Last 24 hours) at 2024 1232  Last data filed at 2024 0823  Gross per 24 hour   Intake 650 ml   Output 30 ml   Net 620 ml     Last 3 Weights   24 0545 211 lb 6.4 oz (95.9 kg)   24 1324 209 lb 7 oz (95 kg)   24  1120 210 lb (95.3 kg)   05/20/24 1342 210 lb (95.3 kg)   04/07/22 1253 210 lb (95.3 kg)   12/21/21 0831 210 lb (95.3 kg)     General: Alert and oriented in no apparent distress.  HEENT: No scleral icterus, MMM  Neck: Supple, no SHERI or thyromegaly  Cardiac: Regular rate and rhythm, S1, S2 normal, no murmur or rub  Lungs: Clear without wheezes, rales, rhonchi.    Abdomen: Soft, non-tender. + bowel sounds, no palpable organomegaly  Extremities: Without clubbing, cyanosis or edema. B foot dressings in place, AVF with bruit/thrill  Neurologic: Alert and oriented, cranial nerves grossly intact, moving all extremities  Skin: Warm and dry, no rashes      Laboratory Data:  Lab Results   Component Value Date    WBC 11.1 06/14/2024    HGB 8.8 06/14/2024    HCT 26.7 06/14/2024    .0 06/14/2024    CREATSERUM 8.72 06/14/2024    BUN 53 06/14/2024     06/14/2024    K 5.0 06/14/2024     06/14/2024    CO2 24.0 06/14/2024     06/14/2024    CA 8.9 06/14/2024    MG 2.2 06/14/2024    PGLU 259 06/14/2024       Glucose (mg/dL)   Date Value   01/09/2014 136 (H)   08/27/2012 270 (H)   01/12/2012 202 (H)     BUN (mg/dL)   Date Value   06/14/2024 53 (H)   06/13/2024 39 (H)     Blood Urea Nitrogen (mg/dL)   Date Value   03/01/2022 38.0 (H)   01/03/2022 23.0 (H)   11/22/2021 58.0 (H)   01/09/2014 24   08/27/2012 21   01/12/2012 25     Creatinine, Serum (mg/dL)   Date Value   01/09/2014 1.65 (H)   08/27/2012 1.19   01/12/2012 1.25     Creatinine (mg/dL)   Date Value   06/14/2024 8.72 (H)   06/13/2024 7.38 (H)   03/01/2022 5.97 (H)   01/03/2022 3.82 (H)   11/22/2021 8.97 (H)       No results found for: \"MICROALBCREA\"    Recent Labs   Lab 06/14/24  0515   WBC 11.1*   HGB 8.8*   MCV 95.0   .0       Recent Labs   Lab 06/13/24  1337 06/14/24  0515   * 133*   K 4.7 5.0   CL 96* 100   CO2 24.0 24.0   BUN 39* 53*   CREATSERUM 7.38* 8.72*   CA 8.9 8.9   MG  --  2.2   * 166*       No results for input(s):  \"ALT\", \"AST\", \"ALB\", \"AMYLASE\", \"LIPASE\", \"LDH\" in the last 168 hours.    Invalid input(s): \"ALPHOS\", \"TBIL\", \"DBIL\", \"TPROT\"    Recent Labs   Lab 06/13/24  1337 06/13/24  1709 06/13/24  2328 06/14/24  0533 06/14/24  1207   PGLU 165* 140* 181* 177* 259*             Impression/Plan:    #1.  ESRD- due to DM.  HD to cont per usual MWF routine    #2.  Anemia- due to ESRD.  DALE to cont for goal hgb 10-11 gms    #3.  PAD/osteo- s/p OR    Thank you for allowing me to participate in the care of your patient. Please do not hesitate to call with any questions or concerns.       Sean Flores MD  6/14/2024  12:32 PM

## 2024-06-14 NOTE — PROGRESS NOTES
[facility] Pharmacy Note:  Renal Adjustment for cefazolin (ANCEF)    Darin Bowens is a 78 year old patient who has been prescribed cefazolin (ANCEF) 1000 mg every 8 hrs.  The estimated creatinine clearance is 9.3 mL/min (A) (based on SCr of 7.38 mg/dL (H)). The dose has been adjusted to cefazolin (ANCEF) 1000 mg every 24 hrs per hospital renal dose adjustment protocol for treatment of surgical prophylaxis.  Pharmacy will follow and adjust dose as warranted for additional renal function changes.    Thank you,    Rachel Jin, PharmD  6/13/2024  8:12 PM

## 2024-06-14 NOTE — CONSULTS
Darin Bowens 78 year old male presents with gangrene of right forefoot and left 2nd toe. He underwent bilaterally LE angio for revascularization. Currently denies any SOB, cp/n/f/v/c     Past Medical History:    Aortic stenosis    Back problem    CAD (coronary artery disease)    Calculus of kidney    Cataract    CKD (chronic kidney disease) stage 4, GFR 15-29 ml/min ([facility])    Congestive heart disease ([facility])    Coronary atherosclerosis    DDD (degenerative disc disease), lumbar    Diabetes mellitus ([facility])    Dialysis patient ([facility])    fistula upper right arm/MWF    Dyslipidemia    Heart valve disease    High cholesterol    Hypertriglyceridemia    Hypoglycemic reaction    Muscle weakness    cane    Onychomycosis    DESI (obstructive sleep apnea) C-PAP     severe AHI 68, O2 asm 84%, CPAP 7    Other and unspecified hyperlipidemia    Overweight(278.02)    Renal disorder    S/P Coronary Artery Stents 12/2009    Sleep apnea    doesn't use CPAP    Type 1 diabetes mellitus ([facility])    Unspecified essential hypertension    Visual impairment    legally blind     Past Surgical History:   Procedure Laterality Date    Back surgery  5/16/16    L2-L5 Decomp poss uninstrumented fusion    Cabg      Cath percutaneous  transluminal coronary angioplasty      Cath transcatheter aortic valve replacement      Injection, w/wo contrast, dx/therapeutic substance, epidural/subarachnoid; lumbar/sacral N/A 12/10/2015    Procedure: LUMBAR EPIDURAL;  Surgeon: Rojas Bolanos MD;  Location: [facility]    Injection, w/wo contrast, dx/therapeutic substance, epidural/subarachnoid; lumbar/sacral N/A 1/25/2016    Procedure: LUMBAR EPIDURAL;  Surgeon: Rojas Bolanos MD;  Location: [facility]    Injection, w/wo contrast, dx/therapeutic substance, epidural/subarachnoid; lumbar/sacral N/A 3/2/2016    Procedure: LUMBAR EPIDURAL;  Surgeon: Corey Mcduffie MD;  Location: [facility]    M-sedajavi by   phys perfrmg svc 5+ yr N/A 12/10/2015    Procedure: LUMBAR EPIDURAL;  Surgeon: Rojas Bolanos MD;  Location: [facility]    M-sedaj by  phys perfrmg svc 5+ yr N/A 1/25/2016    Procedure: LUMBAR EPIDURAL;  Surgeon: Rojas Bolanos MD;  Location: [facility]    M-sedaj by  phys perfrmg svc 5+ yr N/A 3/2/2016    Procedure: LUMBAR EPIDURAL;  Surgeon: Corey Mcduffie MD;  Location: [facility]    Other surgical history  10/4/12    cysto-Dr. Calderón    Other surgical history N/A 5/16/2016    Procedure: LUMBAR LAMINECTOMY FUSION W/ BONE GRAFT 3 LEVEL;  Surgeon: CARLY Garcia MD;  Location: [facility] OR    Patient documented not to have experienced any of the following events N/A 12/10/2015    Procedure: LUMBAR EPIDURAL;  Surgeon: Rojas Bolanos MD;  Location: [facility]    Patient documented not to have experienced any of the following events N/A 1/25/2016    Procedure: LUMBAR EPIDURAL;  Surgeon: Rojas Bolanos MD;  Location: [facility]    Patient documented not to have experienced any of the following events N/A 3/2/2016    Procedure: LUMBAR EPIDURAL;  Surgeon: Corey Mcduffie MD;  Location: [facility]    Patient withough preoperative order for iv antibiotic surgical site infection prophylaxis. N/A 12/10/2015    Procedure: LUMBAR EPIDURAL;  Surgeon: Rojas Bolanos MD;  Location: [facility]    Patient withough preoperative order for iv antibiotic surgical site infection prophylaxis. N/A 1/25/2016    Procedure: LUMBAR EPIDURAL;  Surgeon: Rojas Bolanos MD;  Location: [facility]    Patient withough preoperative order for iv antibiotic surgical site infection prophylaxis. N/A 3/2/2016    Procedure: LUMBAR EPIDURAL;  Surgeon: Corey Mcduffie MD;  Location: [facility]    Replace aortic valve open  2012    Valve repair       Family History    Problem Relation Age of Onset    Heart Attack Father     Heart Attack Brother     Diabetes Brother     Diabetes Brother      Social History     Socioeconomic History    Marital status:      Spouse name: Not on file    Number of children: Not on file    Years of education: Not on file    Highest education level: Not on file   Occupational History    Not on file   Tobacco Use    Smoking status: Never    Smokeless tobacco: Never   Vaping Use    Vaping status: Never Used   Substance and Sexual Activity    Alcohol use: No     Alcohol/week: 0.0 standard drinks of alcohol    Drug use: No    Sexual activity: Not on file   Other Topics Concern    Not on file   Social History Narrative    Not on file     Social Determinants of Health     Financial Resource Strain: Not on file   Food Insecurity: No Food Insecurity (6/13/2024)    Food Insecurity     Food Insecurity: Never true   Transportation Needs: No Transportation Needs (6/13/2024)    Transportation Needs     Lack of Transportation: No     Car Seat: Not on file   Physical Activity: Not on file   Stress: Not on file   Social Connections: Low Risk  (9/7/2021)    Received from [facility]    Social Connections     Do you have someone you could call for help if needed?: Yes   Housing Stability: Low Risk  (6/13/2024)    Housing Stability     Housing Instability: No     Housing Instability Emergency: Not on file     Crib or [place]in[place]: Not on file     Allergies   Allergen Reactions    Lisinopril Coughing     Dyspnea         ROS  Constitutional: negative for - fever, chills, weight change  Integument: Right forefoot gangrene, Left 2nd toe gangrene  Respiratory: negative for - wheezing, SOB, chest congestion, cough  Cardiovascular: negative for - lower extremity edema, cyanosis, palpitations, chest pain, night cramps  Gastrointestinal: negative for - abdominal pain, diarrhea, heartburn, nausea  Musculoskeletal: negative for - toe pain, ankle pain,  joint pain, muscle cramps, muscle weakness  Neurological: negative for - tremors, memory loss, paralysis, vertigo, (+) numbness/tingling    Physical exam:   Pedal pulses diminished. Dry forefoot gangrene, right foot. Left 2nd toe gangrene. Dry stable eschar of left 4th toe.    Last A1c value was 5.9% done 6/14/2024.      Assessment & Plan: 78 year old male with     Right forefoot gangrene  Left 2nd toe gangrene  PAD  Diabetic neuropathy    Afebrile, AVSS  Patient is s/p bilateral LE angio & revascularization    Discussed surgical and conservative treatment options at length with patient including all risks benefits and complications.  At this time patient would like to proceed with surgical treatment since patient has failed conservative treatment  I discussed the surgical procedure, postop course, importance of compliance and possible complications including but not limited to: pain, infection, bleeding, neuritis, dehiscence, chronic nonhealing wounds, proximal amputation and need for further surgery.  Patient verbalized understanding of the above and would like to proceed with surgery    Patient has been NPO  Sx today  Patient will need rehab placement postop    Jose R Meza DPM

## 2024-06-14 NOTE — PLAN OF CARE
Patient is alert and oriented x 4. Dialysis called at 1900 & left voicemail for tmrw. Vitals stable on room air. Tele (afib). ACE wrap dressing to BLE c/d/I & elevated on pillows. Denies pain. Denies numbness & tingling. Anuric, plan for dialysis tmrw. Tolerating diet. Plan for PT/OT & wound care. Plan of care discussed with patient.

## 2024-06-14 NOTE — PROGRESS NOTES
[facility]   part of [facility]    Progress Note     Darin Bowens Patient Status:  Inpatient    1945 MRN [id number]   Location [facility] 3SW-A Attending Jose R Meza, [id number]   Hosp Day # 1 PCP Zachery Hall MD     Follow up for: The encounter diagnosis was Osteomyelitis (HCC).      Interval History/Subjective:     S/p RIGHT TRANSMETATARSAL AMPUTATION, ACHILLES TENDON LENGTHENING, AND LEFT SECOND TOE AMPUTATION   NATALY overnight  Afebrile, HDS    Feels well, denies pain, nausea/sob or any other new/worsening symptoms. Asking for diet to be liberalized. Hopeful to go home after HD     Vital signs:  Temp:  [97.7 °F (36.5 °C)-99 °F (37.2 °C)] 98.3 °F (36.8 °C)  Pulse:  [53-83] 70  Resp:  [12-19] 17  BP: ()/(43-64) 122/51  SpO2:  [95 %-100 %] 98 %    Physical Exam:    General: NAD, Comfortable, Nontoxic   Respiratory: CTAB; reg resp rate & effort, no wheezes/crackles  Cardiovascular: S1, S2. Regular rate and rhythm. No murmurs appreciated  Abdomen: Soft, NTND, no guarding/rebound   Neurologic: No focal neurological deficits.   Extremities: No edema. Bilateral feet wrapped in ACE, c/d/I; wiggles R toes  Skin: Dry, no rashes, ulcers or lesions     Diagnostic Data:      Labs:  Recent Labs   Lab 24  0515   WBC 11.1*   HGB 8.8*   MCV 95.0   .0       Recent Labs   Lab 24  1337 24  0515   * 166*   BUN 39* 53*   CREATSERUM 7.38* 8.72*   CA 8.9 8.9   * 133*   K 4.7 5.0   CL 96* 100   CO2 24.0 24.0       No results for input(s): \"PTP\", \"INR\" in the last 168 hours.    No results for input(s): \"TROP\", \"CK\" in the last 168 hours.         Imaging: Imaging data reviewed in Epic.    Medications:    acetaminophen  1,000 mg Oral Once    atorvastatin  80 mg Oral Nightly    calcium acetate  1 capsule Oral TID CC    carvedilol  12.5 mg Oral BID with meals    brimonidine  1 drop Both Eyes BID    timolol  1 drop Both Eyes BID    [Held by provider] dorzolamide  1 drop  Both Eyes BID    [Held by provider] Netarsudil-Latanoprost  1 drop Ophthalmic Nightly    heparin  5,000 Units Subcutaneous Q8H LIZBETH    ceFAZolin  1 g Intravenous Q24H    insulin aspart  1-10 Units Subcutaneous TID AC and HS       ASSESSMENT / PLAN:     Darin Bowens is a(n) 78 year old male w/ PMHx of CAD s/p stent 2009, PAD s/p b/l LE stents, Afib (renally dosed xarelto), DM2 on insulin pump c/b polyneuropathy, ESRD on HD, chronic CHF w/ LVH, AVStenosis s/p BioAVR 2012, h/o CVA, eHTN, HLD, DESI, gangrenous right foot/DFU who presented for right TMA w/ Dr. Odell.     Gangrene to right foot   DFU  PAD s/p b/l LE stents   Plan for TMA  Pre op risk assessment / eval   - patient seen by cards for pre op - Dr. Zimmerman - rec optimized for OR   - he has multiple cardiac co morbidities, CAD, CHF, Afib, CKD on HD, DM2 on insulin, prior CVA  - plan to continue beta blockade perioperatively   - takes losartan on non HD days   - insulin pump turned off per endo recs - resume post op once on diet   - norco prn, dilaudid low dose iv prn for post op pain     CAD s/p stent 2009  CHF w/ LVH   Afib   PAD  eHTN  HLD  - holding  plavix/xarelto, resume when okay w/ podiatry   - cont coreg   - takes losartan on non hd days - resume as needed   - postop wound care per Podiatry      ESRD on HD   - nephrology consulted, HD pending later today      DM on insulin pump  - see above     DVT Mechanical Prophylaxis:        DVT Pharmacologic Prophylaxis   Medication    heparin (Porcine) 5000 UNIT/ML injection 5,000 Units              Code Status: Full Code    Dispo: stable on floor; discharge pending completion of HD, Podiatry clearance    Plan of care discussed with patient and/or family at bedside.    HETAL Calix MD  [facility]   [phone]      This note was created using voice recognition technology. It may include inadvertent transcriptional errors. Any such errors should be contextually interpreted and should not be taken  to alter the content or the meaning.     Note to Patient: The 21st Century Cures Act makes medical notes like these available to patients in the interest of transparency. However, be advised this is a medical document. It is intended as peer to peer communication. It is written in medical language and may contain abbreviations or verbiage that are unfamiliar. It may appear blunt or direct. Medical documents are intended to carry relevant information, facts as evident, and the clinical opinion of the practitioner and not intended to be the primary source of your information.  Please refer directly to myself or clinical staff for information regarding plan of care.

## 2024-06-14 NOTE — PLAN OF CARE
Patient alert and oriented x4. VSS on RA. Tele in afib, controlled rate. Legally blind. Denies pain. Denies n/t. Ace wrap dressings to bilateral feet, CDI. Bilateral heels offloaded with pillows. Refusing SCDs, subcutaneous heparin, IS encouraged. Pt anuric, on dialysis. Rolling side to side. Tolerating diet, no c/o n/v.     0945: [facility] called for PRAFO boot for right foot.    Plan: wound care to see, dialysis, PT/OT    Update: PRAFO brace to RLE in place. NWB to RLE. LLE WBAT with surgical shoe.     1600: Spoke to Dr. Meza about patient progress. Dr. Meza will not be seeing patient today. Per Dr. Meza patient not cleared for discharge today.    1800: pt done with dialysis, per dialysis RN 2.5 liters removed

## 2024-06-14 NOTE — CONSULTS
[facility]  Report of Inpatient Wound Care Consultation    Darin Bowens Patient Status:  Inpatient    1945 MRN [id number]   Location [facility] 3SW-A Attending Jose R Meza, DPM   Hosp Day # 1 PCP Zachery Hall MD     Reason for Consultation:  L 2nd toe amputation site, R TMA    History of Present Illness:  Darin Bowens is a a(n) 78 year old male.  Patient with multiple comorbidities.    SUBJECTIVE:  How does it look?      History:  Past Medical History:    Aortic stenosis    Back problem    CAD (coronary artery disease)    Calculus of kidney    Cataract    CKD (chronic kidney disease) stage 4, GFR 15-29 ml/min ([facility])    Congestive heart disease (HCC)    Coronary atherosclerosis    DDD (degenerative disc disease), lumbar    Diabetes mellitus (HCC)    Dialysis patient ([facility])    fistula upper right arm/MWF    Dyslipidemia    Heart valve disease    High cholesterol    Hypertriglyceridemia    Hypoglycemic reaction    Muscle weakness    cane    Onychomycosis    DESI (obstructive sleep apnea) C-PAP     severe AHI 68, O2 sam 84%, CPAP 7    Other and unspecified hyperlipidemia    Overweight(278.02)    Renal disorder    S/P Coronary Artery Stents 2009    Sleep apnea    doesn't use CPAP    Type 1 diabetes mellitus (HCC)    Unspecified essential hypertension    Visual impairment    legally blind     Past Surgical History:   Procedure Laterality Date    Back surgery  16    L2-L5 Decomp poss uninstrumented fusion    Cabg      Cath percutaneous  transluminal coronary angioplasty      Cath transcatheter aortic valve replacement      Injection, w/wo contrast, dx/therapeutic substance, epidural/subarachnoid; lumbar/sacral N/A 12/10/2015    Procedure: LUMBAR EPIDURAL;  Surgeon: Rojas Bolanos MD;  Location: [facility]    Injection, w/wo contrast, dx/therapeutic substance, epidural/subarachnoid; lumbar/sacral N/A 2016    Procedure: LUMBAR EPIDURAL;  Surgeon:  Rojas Bolanos MD;  Location: [facility]    Injection, w/wo contrast, dx/therapeutic substance, epidural/subarachnoid; lumbar/sacral N/A 3/2/2016    Procedure: LUMBAR EPIDURAL;  Surgeon: Corey Mcduffie MD;  Location: [facility]    M-sedaj by  phys perfrmg svc 5+ yr N/A 12/10/2015    Procedure: LUMBAR EPIDURAL;  Surgeon: Rojas Bolanos MD;  Location: [facility]    M-sedaj by  phys perfrmg svc 5+ yr N/A 1/25/2016    Procedure: LUMBAR EPIDURAL;  Surgeon: Rojas Bolanos MD;  Location: [facility]    M-sedaj by  phys perfrmg svc 5+ yr N/A 3/2/2016    Procedure: LUMBAR EPIDURAL;  Surgeon: Corey Mcduffie MD;  Location: [facility]    Other surgical history  10/4/12    cysto-Dr. Calderón    Other surgical history N/A 5/16/2016    Procedure: LUMBAR LAMINECTOMY FUSION W/ BONE GRAFT 3 LEVEL;  Surgeon: CARLY Garcia MD;  Location: [facility] OR    Patient documented not to have experienced any of the following events N/A 12/10/2015    Procedure: LUMBAR EPIDURAL;  Surgeon: Rojas Bolanos MD;  Location: [facility]    Patient documented not to have experienced any of the following events N/A 1/25/2016    Procedure: LUMBAR EPIDURAL;  Surgeon: Rojas Bolanos MD;  Location: [facility]    Patient documented not to have experienced any of the following events N/A 3/2/2016    Procedure: LUMBAR EPIDURAL;  Surgeon: Corey Mcduffie MD;  Location: [facility]    Patient withough preoperative order for iv antibiotic surgical site infection prophylaxis. N/A 12/10/2015    Procedure: LUMBAR EPIDURAL;  Surgeon: Rojas Bolanos MD;  Location: [facility]    Patient withough preoperative order for iv antibiotic surgical site infection prophylaxis. N/A 1/25/2016    Procedure: LUMBAR EPIDURAL;  Surgeon: Rojas Bolanos MD;  Location: [facility]     Patient withough preoperative order for iv antibiotic surgical site infection prophylaxis. N/A 3/2/2016    Procedure: LUMBAR EPIDURAL;  Surgeon: Corey Mcduffie MD;  Location: [facility]    Replace aortic valve open  2012    Valve repair        reports that he has never smoked. He has never used smokeless tobacco. He reports that he does not drink alcohol and does not use drugs.      Allergies:  @ALLERGY    Laboratory Data:    Recent Labs   Lab 06/13/24  1337 06/13/24  1709 06/13/24  2328 06/14/24  0515 06/14/24  0533 06/14/24  1207   WBC  --   --   --  11.1*  --   --    HGB  --   --   --  8.8*  --   --    HCT  --   --   --  26.7*  --   --    PLT  --   --   --  160.0  --   --    CREATSERUM 7.38*  --   --  8.72*  --   --    BUN 39*  --   --  53*  --   --    *  --   --  166*  --   --    CA 8.9  --   --  8.9  --   --    PGLU 165*   < > 181*  --  177* 259*    < > = values in this interval not displayed.         ASSESSMENT:  Wound 06/13/24 Toe (Comment which one) Right (Active)   Date First Assessed/Time First Assessed: 06/13/24 1616   Primary Wound Type: Amputation  Location: (c) Toe (Comment which one)  Wound Location Orientation: Right      Assessments 6/14/2024  2:16 PM   Wound Image          Wound 06/13/24 Foot Left (Active)   Date First Assessed/Time First Assessed: 06/13/24 1646   Present on Original Admission: No  Primary Wound Type: Incision  Location: Foot  Wound Location Orientation: Left      Assessments 6/14/2024  2:15 PM   Wound Image         B feet warm, +dorsal pedal pulse noted, capillary refill at 4 seconds.  No open wounds noted.  L 4th toe with dry scab noted, incision to both the L 2nd toe amputation site dry as well as the R TMA site.    Betadyne/gauze to the L incision and L 4th toe, adaptic/gauze/ABD pad, light ace wrap to the R foot and then re-applied PRAFO boot.  RN aware of the above.     Discharge planning in progress.     Care Summary:  Care Summary: Discussed Plan  of Care at beside with patient. Patient verbally acknowledges understanding of all instructions and all questions were answered.      Additional Notes:  Discharge planning in progress.           Thank you for this consultation and for allowing me to participate in the care of your patient.      Time Spent 45 Minutes.    Thank you,  Lindy Brown, PT, [facility]  Wound Care Clinician  [place] [facility]  6/14/2024  2:53 PM

## 2024-06-14 NOTE — PLAN OF CARE
Patient alert and oriented x4. VSS on RA. Tele in afib, controlled rate. Legally blind. Right limb alert, AV fistula in place. Ace wrap dressing to right foot, CDI. Ace wrap dressing to left foot, CDI. Pt anuric. Rolling side to side.     Pt legally blind and does not manage insulin pump by self. Pts wife states she is not staying the night to be able to manage the pump. Pt and pts wife requesting MD to be paged for patient to be sliding scale overnight. Insulin pump and CGM not currently in place. Dr. Whitmore paged, see orders.     Plan: nephrology consult, wound care

## 2024-06-15 VITALS
DIASTOLIC BLOOD PRESSURE: 61 MMHG | RESPIRATION RATE: 17 BRPM | HEART RATE: 64 BPM | BODY MASS INDEX: 29 KG/M2 | WEIGHT: 211 LBS | SYSTOLIC BLOOD PRESSURE: 97 MMHG | OXYGEN SATURATION: 100 % | TEMPERATURE: 99 F

## 2024-06-15 PROBLEM — I73.9 PAD (PERIPHERAL ARTERY DISEASE) (HCC): Status: ACTIVE | Noted: 2024-06-15

## 2024-06-15 LAB
ANION GAP SERPL CALC-SCNC: 8 MMOL/L (ref 0–18)
BASOPHILS # BLD AUTO: 0.03 X10(3) UL (ref 0–0.2)
BASOPHILS NFR BLD AUTO: 0.3 %
BUN BLD-MCNC: 39 MG/DL (ref 9–23)
CALCIUM BLD-MCNC: 9.2 MG/DL (ref 8.5–10.1)
CHLORIDE SERPL-SCNC: 102 MMOL/L (ref 98–112)
CO2 SERPL-SCNC: 26 MMOL/L (ref 21–32)
CREAT BLD-MCNC: 6.86 MG/DL
EGFRCR SERPLBLD CKD-EPI 2021: 8 ML/MIN/1.73M2 (ref 60–?)
EOSINOPHIL # BLD AUTO: 0.03 X10(3) UL (ref 0–0.7)
EOSINOPHIL NFR BLD AUTO: 0.3 %
ERYTHROCYTE [DISTWIDTH] IN BLOOD BY AUTOMATED COUNT: 14.1 %
GLUCOSE BLD-MCNC: 231 MG/DL (ref 70–99)
GLUCOSE BLD-MCNC: 238 MG/DL (ref 70–99)
GLUCOSE BLD-MCNC: 324 MG/DL (ref 70–99)
HCT VFR BLD AUTO: 29.8 %
HGB BLD-MCNC: 10.1 G/DL
IMM GRANULOCYTES # BLD AUTO: 0.05 X10(3) UL (ref 0–1)
IMM GRANULOCYTES NFR BLD: 0.5 %
LYMPHOCYTES # BLD AUTO: 0.54 X10(3) UL (ref 1–4)
LYMPHOCYTES NFR BLD AUTO: 5.6 %
MAGNESIUM SERPL-MCNC: 2.2 MG/DL (ref 1.6–2.6)
MCH RBC QN AUTO: 31.2 PG (ref 26–34)
MCHC RBC AUTO-ENTMCNC: 33.9 G/DL (ref 31–37)
MCV RBC AUTO: 92 FL
MONOCYTES # BLD AUTO: 0.91 X10(3) UL (ref 0.1–1)
MONOCYTES NFR BLD AUTO: 9.4 %
NEUTROPHILS # BLD AUTO: 8.15 X10 (3) UL (ref 1.5–7.7)
NEUTROPHILS # BLD AUTO: 8.15 X10(3) UL (ref 1.5–7.7)
NEUTROPHILS NFR BLD AUTO: 83.9 %
OSMOLALITY SERPL CALC.SUM OF ELEC: 299 MOSM/KG (ref 275–295)
PLATELET # BLD AUTO: 165 10(3)UL (ref 150–450)
POTASSIUM SERPL-SCNC: 4.1 MMOL/L (ref 3.5–5.1)
RBC # BLD AUTO: 3.24 X10(6)UL
SODIUM SERPL-SCNC: 136 MMOL/L (ref 136–145)
WBC # BLD AUTO: 9.7 X10(3) UL (ref 4–11)

## 2024-06-15 PROCEDURE — 99232 SBSQ HOSP IP/OBS MODERATE 35: CPT | Performed by: INTERNAL MEDICINE

## 2024-06-15 NOTE — PROGRESS NOTES
[facility]   part of [facility]     Nephrology Progress Note    Darin Bowens Patient Status:  Inpatient    1945 MRN [id number]   Location [facility] 3SW-A Attending Jose R Meza, DPM   Hosp Day # 2 PCP Zachery Hall MD       SUBJECTIVE:  States he is \"feeling pretty good\"         Physical Exam:   BP 97/61 (BP Location: Left arm)   Pulse 64   Temp 98.7 °F (37.1 °C) (Oral)   Resp 17   Wt 211 lb (95.7 kg)   SpO2 100%   BMI 28.62 kg/m²   Temp (24hrs), Av.9 °F (37.2 °C), Min:98.7 °F (37.1 °C), Max:99.4 °F (37.4 °C)       Intake/Output Summary (Last 24 hours) at 6/15/2024 0920  Last data filed at 6/15/2024 0829  Gross per 24 hour   Intake 960 ml   Output --   Net 960 ml     Last 3 Weights   06/15/24 0008 211 lb (95.7 kg)   24 0545 211 lb 6.4 oz (95.9 kg)   24 1324 209 lb 7 oz (95 kg)   24 1120 210 lb (95.3 kg)   24 1342 210 lb (95.3 kg)   22 1253 210 lb (95.3 kg)   21 0831 210 lb (95.3 kg)     General: Alert and oriented in no apparent distress.  HEENT: No scleral icterus, MMM  Neck: Supple, no SHERI or thyromegaly  Cardiac: Regular rate and rhythm, S1, S2 normal, no murmur or rub  Lungs: Clear without wheezes, rales, rhonchi.    Abdomen: Soft, non-tender. + bowel sounds, no palpable organomegaly  Extremities: Without clubbing, cyanosis or edema. AVF with bruit/thrill, dressings in place B feet  Neurologic: moving all extremities  Skin: Warm and dry, no rash        Labs:     Recent Labs   Lab 24  0515 06/15/24  0517   WBC 11.1* 9.7   HGB 8.8* 10.1*   MCV 95.0 92.0   .0 165.0       Recent Labs   Lab 24  1337 24  0515 06/15/24  0517   * 133* 136   K 4.7 5.0 4.1   CL 96* 100 102   CO2 24.0 24.0 26.0   BUN 39* 53* 39*   CREATSERUM 7.38* 8.72* 6.86*   CA 8.9 8.9 9.2   MG  --  2.2 2.2   * 166* 238*       No results for input(s): \"ALT\", \"AST\", \"ALB\", \"AMYLASE\", \"LIPASE\", \"LDH\" in the last 168 hours.    Invalid  input(s): \"ALPHOS\", \"TBIL\", \"DBIL\", \"TPROT\"    Recent Labs   Lab 06/14/24  0533 06/14/24  1207 06/14/24  1736 06/14/24  2059 06/15/24  0501   PGLU 177* 259* 144* 242* 231*       Meds:   Current Facility-Administered Medications   Medication Dose Route Frequency    rivaroxaban (Xarelto) tab 15 mg  15 mg Oral Daily with food    clopidogrel (Plavix) tab 75 mg  75 mg Oral Daily    acetaminophen (Tylenol Extra Strength) tab 1,000 mg  1,000 mg Oral Once    glucose (Dex4) 15 GM/59ML oral liquid 15 g  15 g Oral Q15 Min PRN    Or    glucose (Glutose) 40% oral gel 15 g  15 g Oral Q15 Min PRN    Or    glucose-vitamin C (Dex-4) chewable tab 4 tablet  4 tablet Oral Q15 Min PRN    Or    dextrose 50% injection 50 mL  50 mL Intravenous Q15 Min PRN    Or    glucose (Dex4) 15 GM/59ML oral liquid 30 g  30 g Oral Q15 Min PRN    Or    glucose (Glutose) 40% oral gel 30 g  30 g Oral Q15 Min PRN    Or    glucose-vitamin C (Dex-4) chewable tab 8 tablet  8 tablet Oral Q15 Min PRN    lactated ringers infusion   Intravenous Continuous    sodium chloride 0.9% infusion   Intravenous Continuous    atorvastatin (Lipitor) tab 80 mg  80 mg Oral Nightly    calcium acetate (Phoslo) cap 667 mg  1 capsule Oral TID CC    carvedilol (Coreg) tab 12.5 mg  12.5 mg Oral BID with meals    brimonidine (Alphagan) 0.2 % ophthalmic solution 1 drop  1 drop Both Eyes BID    timolol (Timoptic) 0.5 % ophthalmic solution 1 drop  1 drop Both Eyes BID    dorzolamide (Trusopt) 2 % ophthalmic solution 1 drop  1 drop Both Eyes BID    Netarsudil-Latanoprost 0.02-0.005 % SOLN 1 drop  1 drop Ophthalmic Nightly    heparin (Porcine) 5000 UNIT/ML injection 5,000 Units  5,000 Units Subcutaneous Q8H LIZBETH    acetaminophen (Tylenol) tab 650 mg  650 mg Oral Q4H PRN    Or    HYDROcodone-acetaminophen (Norco) 5-325 MG per tab 1 tablet  1 tablet Oral Q4H PRN    Or    HYDROcodone-acetaminophen (Norco) 5-325 MG per tab 2 tablet  2 tablet Oral Q4H PRN    HYDROmorphone (Dilaudid) 1 MG/ML  injection 0.1 mg  0.1 mg Intravenous Q2H PRN    Or    HYDROmorphone (Dilaudid) 1 MG/ML injection 0.2 mg  0.2 mg Intravenous Q2H PRN    Or    HYDROmorphone (Dilaudid) 1 MG/ML injection 0.4 mg  0.4 mg Intravenous Q2H PRN    polyethylene glycol (PEG 3350) (Miralax) 17 g oral packet 17 g  17 g Oral Daily PRN    sennosides (Senokot) tab 17.2 mg  17.2 mg Oral Nightly PRN    bisacodyl (Dulcolax) 10 MG rectal suppository 10 mg  10 mg Rectal Daily PRN    fleet enema (Fleet) 7-19 GM/118ML rectal enema 133 mL  1 enema Rectal Once PRN    insulin aspart (NovoLOG) 100 Units/mL FlexPen 1-10 Units  1-10 Units Subcutaneous TID AC and HS         Impression/Plan:      #1.  ESRD- due to DM.  HD to cont per usual MWF routine     #2.  Anemia- due to ESRD.  DALE to cont for goal hgb 10-11 gms     #3.  PAD/osteo- s/p  R TMA and L second toe amp    Questions/concerns were discussed with patient and/or family by bedside.          Sean Flores MD  6/15/2024  9:20 AM

## 2024-06-15 NOTE — DISCHARGE SUMMARY
two times a day, Normal, Disp-180 tablet, R-3      clindamycin 1 % External Lotion Apply twice daily to scalp and chest rashes, Normal, Disp-60 mL, R-11      Ergocalciferol (VITAMIN D2 OR) Take 50,000 Units by mouth every 30 (thirty) days., Historical      rivaroxaban (XARELTO) 15 MG Oral Tab Take 1 tablet (15 mg total) by mouth daily with food., Normal, Disp-90 tablet, R-3      Continuous Blood Gluc Transmit (DEXCOM G6 TRANSMITTER) Does not apply Misc Change every 90 days, Normal, Disp-1 each, R-3      CALCIUM ACETATE 667 MG Oral Cap TAKE 1 CAPSULE BY MOUTH THREE TIMES DAILY WITH MEALS, Normal, Disp-270 capsule, R-0      COMBIGAN 0.2-0.5 % Ophthalmic Solution Place 1 drop into both eyes 2 (two) times daily., Historical, ROXANNA      PTA Insulin via Pump Inject into the skin continuous. humalog insulin   [employer]  Basal Rate : 55.6 standard rate 1.90  I:C - 1 units/4 carbs  Correction Factor - unknown, Historical             Activity: activity as tolerated  Diet: cardiac diet  Wound Care: as directed  Code Status: Full Code      Exam on day of discharge:     Vitals:    06/15/24 0829   BP: 97/61   Pulse: 64   Resp: 17   Temp: 98.7 °F (37.1 °C)       General: NAD, Comfortable, Nontoxic   Respiratory: CTAB; reg resp rate & effort, no wheezes/crackles  Cardiovascular: S1, S2. Regular rate and rhythm. No murmurs appreciated  Abdomen: Soft, NTND, no guarding/rebound   Neurologic: No focal neurological deficits.   Extremities: No edema. Bilateral feet wrapped in ACE, c/d/I; wiggles R toes  Skin: Dry, no rashes, ulcers or lesions       Total time coordinating care for discharge: 35 minutes    HETAL Calix MD  [facility] Hospitalist  [phone]

## 2024-06-15 NOTE — PLAN OF CARE
NURSING DISCHARGE NOTE    Discharged Home via Wheelchair.  Accompanied by Family member  Belongings Taken by patient/family.    AVS printed and discussed, IV removed, Pt ready to discharge home.

## 2024-06-15 NOTE — PHYSICAL THERAPY NOTE
PHYSICAL THERAPY TREATMENT NOTE - INPATIENT    Room Number: 355/355-A     Session: 1     Number of Visits to Meet Established Goals: 5    Presenting Problem: s/p R TMA and achilles tendon lengthening, L 2nd toe amputation 6/13/24  Co-Morbidities : ESRD on HD, PAD, CAD, HTN, Afib, CHF    ASSESSMENT   Patient demonstrates good  progress this session, goals  updated to reflect patient performance.    Patient continues to function below baseline with transfers, gait, and standing prolonged periods.  Contributing factors to remaining limitations include pain, impaired standing balance, impaired motor planning, decreased muscular endurance, and difficulty maintaining precautions.  Next session anticipate patient to progress transfers and gait.  Physical Therapy will continue to follow patient for duration of hospitalization.    Patient continues to benefit from continued skilled PT services: at discharge to promote functional independence and safety with additional support and return home with home health PT.    Pt requires a drop arm W/C to safely transfer OOB while maintaining NWB restriction on R LE. Pt would benefit from ramp to safely enter his home using W/C.     PLAN  PT Treatment Plan: Bed mobility;Endurance;Transfer training;Balance training;Strengthening;Range of motion;Gait training  Rehab Potential : Fair  Frequency (Obs): 3-5x/week    CURRENT GOALS     Goal #1 Patient is able to demonstrate supine - sit EOB @ level: supervision      Goal #2 Patient is able to demonstrate transfers Sit to/from Stand at assistance level: moderate assistance- MET  NEW: MIN while maintaining NWB on R LE      Goal #3 Patient is able to transfer to bedside chair with MOD assist      Goal #4  Pt able to perform lateral transfer to drop arm chair- MET   Goal #5     Goal #6     Goal Comments: Goals established on 6/14/2024  6/15/2024 all goals updated     SUBJECTIVE  \"I think today went better.\"     OBJECTIVE  Precautions:  Bed/chair alarm;Low vision;Hard of hearing;Limb alert - right (legally blind)    WEIGHT BEARING RESTRICTION  Weight Bearing Restriction: R lower extremity;L lower extremity        R Lower Extremity: Non-Weight Bearing (PRAFO in full dorsiflexion)  L Lower Extremity: Weight Bearing as Tolerated (post-op shoe)    PAIN ASSESSMENT   Ratin  Location: R LE  Management Techniques: Activity promotion;Repositioning    BALANCE                                                                                                                       Static Sitting: Good  Dynamic Sitting: Good           Static Standing: Poor  Dynamic Standing: Poor    ACTIVITY TOLERANCE                         O2 WALK         AM-PAC '6-Clicks' INPATIENT SHORT FORM - BASIC MOBILITY  How much difficulty does the patient currently have...  Patient Difficulty: Turning over in bed (including adjusting bedclothes, sheets and blankets)?: A Little   Patient Difficulty: Sitting down on and standing up from a chair with arms (e.g., wheelchair, bedside commode, etc.): A Little   Patient Difficulty: Moving from lying on back to sitting on the side of the bed?: A Little   How much help from another person does the patient currently need...   Help from Another: Moving to and from a bed to a chair (including a wheelchair)?: A Little   Help from Another: Need to walk in hospital room?: Total   Help from Another: Climbing 3-5 steps with a railing?: Total       AM-PAC Score:  Raw Score: 14   Approx Degree of Impairment: 61.29%   Standardized Score (AM-PAC Scale): 38.1   CMS Modifier (G-Code): CL    FUNCTIONAL ABILITY STATUS  Gait Assessment   Functional Mobility/Gait Assessment  Gait Assistance: Moderate assistance  Distance (ft): 1  Assistive Device: Rolling walker    Skilled Therapy Provided  Pt assisted with donning post op shoe.   Supervision for bed mobility.   VC for transfer set up.   MOD assist for force generation for sit-stand to RW.   MOD assist for  static and dynamic standing balance.   VC to maintaining NWB on R LE.   Pt demonstrates improved ability to slide L foot for limb advancement, however, unable to maintain NWB. Pt also demonstrates AKBAR.   Returned to sitting bedside.   VC to perform lateral transfer to drop arm chair.   Able to transfer to bedside chair with supervision.   Reviewed recommendations with Pt and wife via phone.     Bed Mobility:  Rolling: supervision   Supine<>Sit: supervision   Sit<>Supine: NT     Transfer Mobility:  Sit<>Stand: MOD   Stand<>Sit: MOD   Gait: MOD    Therapist's Comments: Recommend lateral transfers to drop arm chair toward L side.     Patient End of Session: Up in chair;Needs met;Call light within reach;RN aware of session/findings;All patient questions and concerns addressed;Alarm set    PT Session Time: 25 minutes  Gait Training: 10 minutes  Therapeutic Activity: 15 minutes  Therapeutic Exercise:  minutes   Neuromuscular Re-education:  minutes

## 2024-06-15 NOTE — DISCHARGE INSTRUCTIONS
Wound care: Every 48hrs due to start on 6/16  Betadyne painting, gauze to the Left 2nd toe amputation site and 4th toe.  Adaptic, gauze, ABD pad, kerlix to the Right transmetatarsal amputation site, re-apply PRAFO boot.     Sometimes managing your health at home requires assistance.  The [facility] team has recognized your preference to use [facility].  They can be reached by phone at [phone].  The fax number for your reference is [phone].  A representative from the home health agency will contact you or your family to schedule your first visit.

## 2024-06-15 NOTE — HOME CARE LIAISON
Received referral via [facility] for Home Health services. Spoke w/ patient and his wife who is agreeable with [facility]. Contact information placed on AVS.

## 2024-06-15 NOTE — CM/SW NOTE
SW placed phone call to patient's Wife, left VM.    2:30PM  SW received phone call from patient's Wife and Son, who stated they would like to take patient home.  SW set up [facility]HC, [facility] choice and reserved in [facility].     Family to provide transportation home.   RN updated.    Gracia Albrecht LCSW  /Discharge Planner

## 2024-06-15 NOTE — PROGRESS NOTES
POD #2 s/p TMA, CUBA RLE, L 2nd toe amputation by Dr. Meza.  Doing well  Dressings CDI  PRAFO boot on RLE  No pain  Denies n/v/f/c    AFVSS  Sutures intact  TMA R foot  Left 2nd toe amputated  No acute soi.            A/P:  POD#2    Doing well  Cont local wound care  Cont PRAFO boot on RLE  NWB RLE  WBAT surgical shoe left foot  Cont to work with PT/OT  Discharge planning to [facility]  Will follow

## 2024-06-15 NOTE — PROGRESS NOTES
[facility]   part of [facility]    Progress Note     Darin Bowens Patient Status:  Inpatient    1945 MRN [id number]   Location [facility] 3SW-A Attending Jose R Meza, [id number]   Hosp Day # 2 PCP Zachery Hall MD     Follow up for: The encounter diagnosis was Osteomyelitis (HCC).      Interval History/Subjective:     HD yesterday without issue  NATALY overnight  Afebrile, HDS  PT/OT pending     ***    Vital signs:  Temp:  [98.3 °F (36.8 °C)-99.4 °F (37.4 °C)] 99.4 °F (37.4 °C)  Pulse:  [62-79] 68  Resp:  [15-20] 16  BP: (104-122)/(37-64) 104/46  SpO2:  [97 %-100 %] 97 %    Physical Exam:    General: NAD, Comfortable, Nontoxic   Respiratory: CTAB; reg resp rate & effort, no wheezes/crackles  Cardiovascular: S1, S2. Regular rate and rhythm. No murmurs appreciated  Abdomen: Soft, NTND, no guarding/rebound   Neurologic: No focal neurological deficits.   Extremities: No edema. ***  Skin: Dry, no rashes, ulcers or lesions     Diagnostic Data:      Labs:  Recent Labs   Lab 24  0515 06/15/24  0517   WBC 11.1* 9.7   HGB 8.8* 10.1*   MCV 95.0 92.0   .0 165.0       Recent Labs   Lab 24  1337 24  0515 06/15/24  0517   * 166* 238*   BUN 39* 53* 39*   CREATSERUM 7.38* 8.72* 6.86*   CA 8.9 8.9 9.2   * 133* 136   K 4.7 5.0 4.1   CL 96* 100 102   CO2 24.0 24.0 26.0       No results for input(s): \"PTP\", \"INR\" in the last 168 hours.    No results for input(s): \"TROP\", \"CK\" in the last 168 hours.         Imaging: Imaging data reviewed in Epic.    Medications:    rivaroxaban  15 mg Oral Daily with food    clopidogrel  75 mg Oral Daily    acetaminophen  1,000 mg Oral Once    atorvastatin  80 mg Oral Nightly    calcium acetate  1 capsule Oral TID CC    carvedilol  12.5 mg Oral BID with meals    brimonidine  1 drop Both Eyes BID    timolol  1 drop Both Eyes BID    dorzolamide  1 drop Both Eyes BID    Netarsudil-Latanoprost  1 drop Ophthalmic Nightly    heparin  5,000 Units  Subcutaneous Q8H [facility]    insulin aspart  1-10 Units Subcutaneous TID AC and HS       ASSESSMENT / PLAN:     Darin Bowens Is a a 78 year old male who presents with ***    Problem List / Diagnoses    ***      Plan    ***    DVT Mechanical Prophylaxis:        DVT Pharmacologic Prophylaxis   Medication    rivaroxaban (Xarelto) tab 15 mg    heparin (Porcine) 5000 UNIT/ML injection 5,000 Units              Code Status: ***     Dispo: ***    Plan of care discussed with patient and/or family at bedside.    HETAL Calix MD  [facility]   [phone]      This note was created using voice recognition technology. It may include inadvertent transcriptional errors. Any such errors should be contextually interpreted and should not be taken to alter the content or the meaning.     Note to Patient: The 21st Century Cures Act makes medical notes like these available to patients in the interest of transparency. However, be advised this is a medical document. It is intended as peer to peer communication. It is written in medical language and may contain abbreviations or verbiage that are unfamiliar. It may appear blunt or direct. Medical documents are intended to carry relevant information, facts as evident, and the clinical opinion of the practitioner and not intended to be the primary source of your information.  Please refer directly to myself or clinical staff for information regarding plan of care.

## 2024-06-15 NOTE — PLAN OF CARE
On call I'm md updated regarding 8 beats of vtach as told by tele tech. Vss at this time. Call light within reach.

## 2024-06-15 NOTE — PLAN OF CARE
POD 2 BLE foot surgeries, Pt is AAOX4, legally blind, VSS, room air, IV SL, glucose monitored and treated per orders, daily weight, up with lateral transfer, stand pivot on Lt foot w/ post-op shoe, PO meds for pain, plan TBD, Pt doing well, all needs met, all safety measures in place, call light within reach, will CTM.

## 2024-06-18 NOTE — CM/SW NOTE
VM received yesterday evening on main social work line from pt's son indicating [facility] has not yet contacted them to start care. Notified [facility] liaison, Romana, and requested she reach out to pt's son ASAP to coordinate start of care. Romana confirms they have reached out to pt/family, but will relay to contact son (Darin #[phone]). They were delayed in contacting son over the weekend, as they needed to verify PCP.    Addendum: Confirmed with Romana from [facility] that DON reached out to son and pt will be seen today.     VM left for pt's son, Cricket, to follow up on concerns.    Sherry Varghese, AJN, RN-BC    [id number]

## 2024-06-24 LAB
ALBUMIN SERPL-MCNC: 2.7 G/DL (ref 3.4–5)
ALBUMIN/GLOB SERPL: 0.6 {RATIO} (ref 1–2)
ALP LIVER SERPL-CCNC: 149 U/L
ALT SERPL-CCNC: 22 U/L
ANION GAP SERPL CALC-SCNC: 12 MMOL/L (ref 0–18)
AST SERPL-CCNC: 67 U/L (ref 15–37)
BASOPHILS # BLD AUTO: 0.03 X10(3) UL (ref 0–0.2)
BASOPHILS NFR BLD AUTO: 0.2 %
BILIRUB SERPL-MCNC: 0.6 MG/DL (ref 0.1–2)
BUN BLD-MCNC: 76 MG/DL (ref 9–23)
CALCIUM BLD-MCNC: 9.1 MG/DL (ref 8.5–10.1)
CHLORIDE SERPL-SCNC: 95 MMOL/L (ref 98–112)
CO2 SERPL-SCNC: 23 MMOL/L (ref 21–32)
CREAT BLD-MCNC: 10.8 MG/DL
EGFRCR SERPLBLD CKD-EPI 2021: 4 ML/MIN/1.73M2 (ref 60–?)
EOSINOPHIL # BLD AUTO: 0.05 X10(3) UL (ref 0–0.7)
EOSINOPHIL NFR BLD AUTO: 0.3 %
ERYTHROCYTE [DISTWIDTH] IN BLOOD BY AUTOMATED COUNT: 14.7 %
GLOBULIN PLAS-MCNC: 4.4 G/DL (ref 2.8–4.4)
GLUCOSE BLD-MCNC: 149 MG/DL (ref 70–99)
HCT VFR BLD AUTO: 29.4 %
HGB BLD-MCNC: 9.4 G/DL
IMM GRANULOCYTES # BLD AUTO: 0.14 X10(3) UL (ref 0–1)
IMM GRANULOCYTES NFR BLD: 1 %
LYMPHOCYTES # BLD AUTO: 0.51 X10(3) UL (ref 1–4)
LYMPHOCYTES NFR BLD AUTO: 3.6 %
MCH RBC QN AUTO: 29.9 PG (ref 26–34)
MCHC RBC AUTO-ENTMCNC: 32 G/DL (ref 31–37)
MCV RBC AUTO: 93.6 FL
MONOCYTES # BLD AUTO: 0.6 X10(3) UL (ref 0.1–1)
MONOCYTES NFR BLD AUTO: 4.2 %
NEUTROPHILS # BLD AUTO: 13.01 X10 (3) UL (ref 1.5–7.7)
NEUTROPHILS # BLD AUTO: 13.01 X10(3) UL (ref 1.5–7.7)
NEUTROPHILS NFR BLD AUTO: 90.7 %
OSMOLALITY SERPL CALC.SUM OF ELEC: 295 MOSM/KG (ref 275–295)
PLATELET # BLD AUTO: 304 10(3)UL (ref 150–450)
PLATELETS.RETICULATED NFR BLD AUTO: 2.2 % (ref 0–7)
POTASSIUM SERPL-SCNC: 4.4 MMOL/L (ref 3.5–5.1)
PROT SERPL-MCNC: 7.1 G/DL (ref 6.4–8.2)
RBC # BLD AUTO: 3.14 X10(6)UL
SODIUM SERPL-SCNC: 130 MMOL/L (ref 136–145)
WBC # BLD AUTO: 14.3 X10(3) UL (ref 4–11)

## 2024-06-24 RX ORDER — AMOXICILLIN AND CLAVULANATE POTASSIUM 875; 125 MG/1; MG/1
0.5 TABLET, FILM COATED ORAL 2 TIMES DAILY
COMMUNITY
End: 2024-07-12

## 2024-06-25 ENCOUNTER — APPOINTMENT (OUTPATIENT)
Dept: INTERVENTIONAL RADIOLOGY/VASCULAR | Facility: HOSPITAL | Age: 79
End: 2024-06-25
Attending: INTERNAL MEDICINE
Payer: MEDICARE

## 2024-06-25 ENCOUNTER — HOSPITAL ENCOUNTER (INPATIENT)
Facility: HOSPITAL | Age: 79
LOS: 17 days | Discharge: NURSING, REHAB OR LONG-TERM CARE | End: 2024-07-12
Attending: EMERGENCY MEDICINE | Admitting: INTERNAL MEDICINE
Payer: MEDICARE

## 2024-06-25 DIAGNOSIS — T82.590A MALFUNCTION OF ARTERIOVENOUS DIALYSIS FISTULA, INITIAL ENCOUNTER (HCC): ICD-10-CM

## 2024-06-25 DIAGNOSIS — R11.2 NAUSEA AND VOMITING, UNSPECIFIED VOMITING TYPE: ICD-10-CM

## 2024-06-25 DIAGNOSIS — M86.9 OSTEOMYELITIS (HCC): ICD-10-CM

## 2024-06-25 DIAGNOSIS — L03.119 CELLULITIS OF LOWER EXTREMITY, UNSPECIFIED LATERALITY: Primary | ICD-10-CM

## 2024-06-25 DIAGNOSIS — I96 GANGRENE OF RIGHT FOOT (HCC): ICD-10-CM

## 2024-06-25 PROBLEM — N18.6 ESRD (END STAGE RENAL DISEASE) (HCC): Status: ACTIVE | Noted: 2024-06-25

## 2024-06-25 LAB
CRP SERPL-MCNC: 18.5 MG/DL (ref ?–0.3)
ERYTHROCYTE [SEDIMENTATION RATE] IN BLOOD: 75 MM/HR
FLUAV + FLUBV RNA SPEC NAA+PROBE: NEGATIVE
FLUAV + FLUBV RNA SPEC NAA+PROBE: NEGATIVE
GLUCOSE BLD-MCNC: 136 MG/DL (ref 70–99)
GLUCOSE BLD-MCNC: 225 MG/DL (ref 70–99)
GLUCOSE BLD-MCNC: 251 MG/DL (ref 70–99)
GLUCOSE BLD-MCNC: 333 MG/DL (ref 70–99)
LACTATE SERPL-SCNC: 1.3 MMOL/L (ref 0.4–2)
LACTATE SERPL-SCNC: 2.1 MMOL/L (ref 0.4–2)
RSV RNA SPEC NAA+PROBE: NEGATIVE
SARS-COV-2 RNA RESP QL NAA+PROBE: NOT DETECTED

## 2024-06-25 PROCEDURE — 5A1D70Z PERFORMANCE OF URINARY FILTRATION, INTERMITTENT, LESS THAN 6 HOURS PER DAY: ICD-10-PCS | Performed by: INTERNAL MEDICINE

## 2024-06-25 PROCEDURE — B544ZZA ULTRASONOGRAPHY OF LEFT JUGULAR VEINS, GUIDANCE: ICD-10-PCS | Performed by: RADIOLOGY

## 2024-06-25 PROCEDURE — 05HN33Z INSERTION OF INFUSION DEVICE INTO LEFT INTERNAL JUGULAR VEIN, PERCUTANEOUS APPROACH: ICD-10-PCS | Performed by: RADIOLOGY

## 2024-06-25 PROCEDURE — 99222 1ST HOSP IP/OBS MODERATE 55: CPT | Performed by: INTERNAL MEDICINE

## 2024-06-25 RX ORDER — ACETAMINOPHEN 500 MG
500 TABLET ORAL EVERY 4 HOURS PRN
Status: DISCONTINUED | OUTPATIENT
Start: 2024-06-25 | End: 2024-07-12

## 2024-06-25 RX ORDER — DEXTROSE MONOHYDRATE 25 G/50ML
50 INJECTION, SOLUTION INTRAVENOUS
Status: DISCONTINUED | OUTPATIENT
Start: 2024-06-25 | End: 2024-07-12

## 2024-06-25 RX ORDER — HYDROCODONE BITARTRATE AND ACETAMINOPHEN 5; 325 MG/1; MG/1
1 TABLET ORAL EVERY 4 HOURS PRN
Status: DISCONTINUED | OUTPATIENT
Start: 2024-06-25 | End: 2024-07-12

## 2024-06-25 RX ORDER — METRONIDAZOLE 500 MG/1
500 TABLET ORAL EVERY 12 HOURS SCHEDULED
Status: DISCONTINUED | OUTPATIENT
Start: 2024-06-25 | End: 2024-07-12

## 2024-06-25 RX ORDER — NICOTINE POLACRILEX 4 MG
15 LOZENGE BUCCAL
Status: DISCONTINUED | OUTPATIENT
Start: 2024-06-25 | End: 2024-07-12

## 2024-06-25 RX ORDER — SODIUM CHLORIDE 9 MG/ML
INJECTION, SOLUTION INTRAVENOUS CONTINUOUS
Status: DISCONTINUED | OUTPATIENT
Start: 2024-06-25 | End: 2024-06-25

## 2024-06-25 RX ORDER — HEPARIN SODIUM 5000 [USP'U]/ML
INJECTION, SOLUTION INTRAVENOUS; SUBCUTANEOUS
Status: COMPLETED
Start: 2024-06-25 | End: 2024-06-25

## 2024-06-25 RX ORDER — ONDANSETRON 2 MG/ML
4 INJECTION INTRAMUSCULAR; INTRAVENOUS EVERY 6 HOURS PRN
Status: DISCONTINUED | OUTPATIENT
Start: 2024-06-25 | End: 2024-07-12

## 2024-06-25 RX ORDER — ATORVASTATIN CALCIUM 40 MG/1
80 TABLET, FILM COATED ORAL NIGHTLY
Status: DISCONTINUED | OUTPATIENT
Start: 2024-06-25 | End: 2024-07-12

## 2024-06-25 RX ORDER — SENNOSIDES 8.6 MG
17.2 TABLET ORAL NIGHTLY PRN
Status: DISCONTINUED | OUTPATIENT
Start: 2024-06-25 | End: 2024-07-12

## 2024-06-25 RX ORDER — ONDANSETRON 2 MG/ML
4 INJECTION INTRAMUSCULAR; INTRAVENOUS ONCE
Status: COMPLETED | OUTPATIENT
Start: 2024-06-25 | End: 2024-06-25

## 2024-06-25 RX ORDER — METOCLOPRAMIDE HYDROCHLORIDE 5 MG/ML
5 INJECTION INTRAMUSCULAR; INTRAVENOUS EVERY 8 HOURS PRN
Status: DISCONTINUED | OUTPATIENT
Start: 2024-06-25 | End: 2024-07-12

## 2024-06-25 RX ORDER — ACETAMINOPHEN 325 MG/1
650 TABLET ORAL EVERY 4 HOURS PRN
Status: DISCONTINUED | OUTPATIENT
Start: 2024-06-25 | End: 2024-07-12

## 2024-06-25 RX ORDER — HEPARIN SODIUM 1000 [USP'U]/ML
1.5 INJECTION, SOLUTION INTRAVENOUS; SUBCUTANEOUS
Status: COMPLETED | OUTPATIENT
Start: 2024-06-25 | End: 2024-06-25

## 2024-06-25 RX ORDER — HYDROMORPHONE HYDROCHLORIDE 1 MG/ML
0.8 INJECTION, SOLUTION INTRAMUSCULAR; INTRAVENOUS; SUBCUTANEOUS EVERY 2 HOUR PRN
Status: DISCONTINUED | OUTPATIENT
Start: 2024-06-25 | End: 2024-07-12

## 2024-06-25 RX ORDER — NICOTINE POLACRILEX 4 MG
30 LOZENGE BUCCAL
Status: DISCONTINUED | OUTPATIENT
Start: 2024-06-25 | End: 2024-07-12

## 2024-06-25 RX ORDER — LIDOCAINE HYDROCHLORIDE 10 MG/ML
INJECTION, SOLUTION INFILTRATION; PERINEURAL
Status: COMPLETED
Start: 2024-06-25 | End: 2024-06-25

## 2024-06-25 RX ORDER — VANCOMYCIN 2 GRAM/500 ML IN 0.9 % SODIUM CHLORIDE INTRAVENOUS
2000 ONCE
Status: COMPLETED | OUTPATIENT
Start: 2024-06-25 | End: 2024-06-25

## 2024-06-25 RX ORDER — BISACODYL 10 MG
10 SUPPOSITORY, RECTAL RECTAL
Status: DISCONTINUED | OUTPATIENT
Start: 2024-06-25 | End: 2024-07-12

## 2024-06-25 RX ORDER — ALBUMIN (HUMAN) 12.5 G/50ML
25 SOLUTION INTRAVENOUS
Status: DISCONTINUED | OUTPATIENT
Start: 2024-06-25 | End: 2024-06-26

## 2024-06-25 RX ORDER — HYDROMORPHONE HYDROCHLORIDE 1 MG/ML
0.4 INJECTION, SOLUTION INTRAMUSCULAR; INTRAVENOUS; SUBCUTANEOUS EVERY 2 HOUR PRN
Status: DISCONTINUED | OUTPATIENT
Start: 2024-06-25 | End: 2024-07-12

## 2024-06-25 RX ORDER — CARVEDILOL 12.5 MG/1
12.5 TABLET ORAL 2 TIMES DAILY WITH MEALS
Status: DISCONTINUED | OUTPATIENT
Start: 2024-06-25 | End: 2024-07-12

## 2024-06-25 RX ORDER — HYDROMORPHONE HYDROCHLORIDE 1 MG/ML
0.2 INJECTION, SOLUTION INTRAMUSCULAR; INTRAVENOUS; SUBCUTANEOUS EVERY 2 HOUR PRN
Status: DISCONTINUED | OUTPATIENT
Start: 2024-06-25 | End: 2024-07-12

## 2024-06-25 RX ORDER — HYDROCODONE BITARTRATE AND ACETAMINOPHEN 5; 325 MG/1; MG/1
2 TABLET ORAL EVERY 4 HOURS PRN
Status: DISCONTINUED | OUTPATIENT
Start: 2024-06-25 | End: 2024-07-12

## 2024-06-25 RX ORDER — POLYETHYLENE GLYCOL 3350 17 G/17G
17 POWDER, FOR SOLUTION ORAL DAILY PRN
Status: DISCONTINUED | OUTPATIENT
Start: 2024-06-25 | End: 2024-07-12

## 2024-06-25 NOTE — ED QUICK NOTES
Pt's family leaving. Per family they removed his insulin pump because \"they wont use it here\". Family taking insulin pump home

## 2024-06-25 NOTE — H&P
[facility] History and Physical      Chief Complaint   Patient presents with    Postop/Procedure Problem    Fever    Nausea/Vomiting/Diarrhea        PCP: Zachery Hall MD      History of Present Illness: Patient is a 78 year old male with PMH sig for CAD status post stents, PAD status post bilateral lower extremity stents, atrial fibrillation on Xarelto, diabetes mellitus type 2 on insulin pump, end-stage renal disease on dialysis, chronic CHF with LVH, status post AVR, history of stroke, hypertension, hyperlipidemia, DESI and recent right TMA and left foot second digit amputation on 6/13 by Dr. Meza presented with poor surgical wound healing.  Apparently he has been having fever since last week and was started on Augmentin.  He was having redness and swelling near the surgical incision sites but then started noticing a foul odor and discharge coming from both wounds.  He was at dialysis when it had to be finished earlier this his AV fistula had clotted.  Has been reporting multiple episodes of nonbloody emesis.  Family has been having difficulty with basic ADLs given his recent deconditioning from surgery.  In the ER vitals are stable.  Labs significant for leukocytosis, baseline renal parameters, hyponatremia, anemia.  Started on ceftriaxone and vancomycin, no active drainage that could be cultured per ER.  Nephrology, vascular and podiatry were consulted admitted for further evaluation and treatment.    Past Medical History:    Aortic stenosis    Back problem    CAD (coronary artery disease)    Calculus of kidney    Cataract    CKD (chronic kidney disease) stage 4, GFR 15-29 ml/min ([facility])    Congestive heart disease (HCC)    Coronary atherosclerosis    DDD (degenerative disc disease), lumbar    Diabetes mellitus (HCC)    Dialysis patient ([facility])    fistula upper right arm/MWF    Dyslipidemia    Heart valve disease    High cholesterol    Hypertriglyceridemia    Hypoglycemic reaction    Muscle weakness     cane    Nausea and vomiting, unspecified vomiting type    Onychomycosis    DESI (obstructive sleep apnea) C-PAP     severe AHI 68, O2 sam 84%, CPAP 7    Other and unspecified hyperlipidemia    Overweight(278.02)    Renal disorder    S/P Coronary Artery Stents 12/2009    Sleep apnea    doesn't use CPAP    Type 1 diabetes mellitus (HCC)    Unspecified essential hypertension    Visual impairment    legally blind      Past Surgical History:   Procedure Laterality Date    Back surgery  5/16/16    L2-L5 Decomp poss uninstrumented fusion    Cabg      Cath percutaneous  transluminal coronary angioplasty      Cath transcatheter aortic valve replacement      Injection, w/wo contrast, dx/therapeutic substance, epidural/subarachnoid; lumbar/sacral N/A 12/10/2015    Procedure: LUMBAR EPIDURAL;  Surgeon: Rojas Bolanos MD;  Location: [facility]    Injection, w/wo contrast, dx/therapeutic substance, epidural/subarachnoid; lumbar/sacral N/A 1/25/2016    Procedure: LUMBAR EPIDURAL;  Surgeon: Rojas Bolanos MD;  Location: [facility]    Injection, w/wo contrast, dx/therapeutic substance, epidural/subarachnoid; lumbar/sacral N/A 3/2/2016    Procedure: LUMBAR EPIDURAL;  Surgeon: Corey Mcduffie MD;  Location: [facility]    M-sedaj by  phys perfrmg svc 5+ yr N/A 12/10/2015    Procedure: LUMBAR EPIDURAL;  Surgeon: Rojas Bolanos MD;  Location: [facility]    M-sedaj by  phys perfrmg svc 5+ yr N/A 1/25/2016    Procedure: LUMBAR EPIDURAL;  Surgeon: Rojas Bolanos MD;  Location: [facility]    M-sedaj by  phys perfrmg svc 5+ yr N/A 3/2/2016    Procedure: LUMBAR EPIDURAL;  Surgeon: Corey Mcduffie MD;  Location: [facility]    Other surgical history  10/4/12    cysto-Dr. Calderón    Other surgical history N/A 5/16/2016    Procedure: LUMBAR LAMINECTOMY FUSION W/ BONE GRAFT 3 LEVEL;  Surgeon: CARLY Garcia MD;   Location:  MAIN OR    Patient documented not to have experienced any of the following events N/A 12/10/2015    Procedure: LUMBAR EPIDURAL;  Surgeon: Rojas Bolanos MD;  Location: [facility]    Patient documented not to have experienced any of the following events N/A 1/25/2016    Procedure: LUMBAR EPIDURAL;  Surgeon: Rojas Bolanos MD;  Location: [facility]    Patient documented not to have experienced any of the following events N/A 3/2/2016    Procedure: LUMBAR EPIDURAL;  Surgeon: Corey Mcduffie MD;  Location: [facility]    Patient withough preoperative order for iv antibiotic surgical site infection prophylaxis. N/A 12/10/2015    Procedure: LUMBAR EPIDURAL;  Surgeon: Rojas Bolanos MD;  Location: [facility]    Patient withough preoperative order for iv antibiotic surgical site infection prophylaxis. N/A 1/25/2016    Procedure: LUMBAR EPIDURAL;  Surgeon: Rojas Bolanos MD;  Location: [facility]    Patient withough preoperative order for iv antibiotic surgical site infection prophylaxis. N/A 3/2/2016    Procedure: LUMBAR EPIDURAL;  Surgeon: Corey Mcduffie MD;  Location: [facility]    Replace aortic valve open  2012    Valve repair          ALL:  Allergies   Allergen Reactions    Lisinopril Coughing     Dyspnea          No current outpatient medications on file.       Social History     Tobacco Use    Smoking status: Never    Smokeless tobacco: Never   Substance Use Topics    Alcohol use: No     Alcohol/week: 0.0 standard drinks of alcohol        Fam Hx  Family History   Problem Relation Age of Onset    Heart Attack Father     Heart Attack Brother     Diabetes Brother     Diabetes Brother        Review of Systems  Comprehensive ROS reviewed and negative except for what is stated in HPI.      OBJECTIVE:  BP 95/45 (BP Location: Left arm)   Pulse 75   Temp 97.4 °F (36.3 °C) (Oral)   Resp 16    Ht 6' (1.829 m)   Wt 211 lb (95.7 kg)   SpO2 96%   BMI 28.62 kg/m²   General:  Alert, no distress, appears stated age.    Head:  Normocephalic, without obvious abnormality, atraumatic.   Eyes:  Sclera anicteric, No conjunctival pallor, EOMs intact.    Nose: Nares normal. Septum midline. Mucosa normal. No drainage.   Throat: Lips, mucosa, and tongue normal. Teeth and gums normal.   Neck: Supple, symmetrical, trachea midline, no cervical or supraclavicular lymph adenopathy, thyroid: no enlargment/tenderness/nodules appreciated   Lungs:   Clear to auscultation bilaterally. Normal effort   Chest wall:  No tenderness or deformity.   Heart:  Regular rate and rhythm, S1, S2 normal, no murmur, rub or gallop appreciated   Abdomen:   Soft, non-tender. Bowel sounds normal. No masses,  No organomegaly. Non distended   Extremities: Extremities normal, atraumatic, no cyanosis or edema. R TMA with dry grangrene along medial aspect, L foot with small area of eschar around surgical incision, 3rd toe blue/cold with no cap refill, diminished cap refill in other toes of L foot    Skin: Skin color, texture, turgor normal. No rashes or lesions.    Neurologic: Normal strength, no focal deficit appreciated     Data Review:    LABS:   Lab Results   Component Value Date    WBC 14.3 06/24/2024    HGB 9.4 06/24/2024    HCT 29.4 06/24/2024    .0 06/24/2024    CREATSERUM 10.80 06/24/2024    BUN 76 06/24/2024     06/24/2024    K 4.4 06/24/2024    CL 95 06/24/2024    CO2 23.0 06/24/2024     06/24/2024    CA 9.1 06/24/2024    ALB 2.7 06/24/2024    ALKPHO 149 06/24/2024    BILT 0.6 06/24/2024    TP 7.1 06/24/2024    AST 67 06/24/2024    ALT 22 06/24/2024    PGLU 136 06/25/2024       CXR: All imaging personally reviewed.      Radiology: No results found.     Assessment/Plan:     78 year old male with PMH sig for CAD status post stents, PAD status post bilateral lower extremity stents, atrial fibrillation on Xarelto, diabetes  mellitus type 2 on insulin pump, end-stage renal disease on dialysis, chronic CHF with LVH, status post AVR, history of stroke, hypertension, hyperlipidemia, DESI and recent right TMA and left foot second digit amputation on 6/13 by Dr. Meza presented with poor surgical wound healing.    Postop wound infection  R TMA, L foot 2nd digit amputation on 6/13, Dr. Meza/Podiatry  - obtain cultures if possible  - cont zosyn, vanco; consult ID  - consult Podiatry, will likely need further debridement/I&D  - check ESR/CRP; further imaging per podiatry  - Arterial US in April, likely has more distal PAD, may require further amputation, await vascular/podiatry evaluation     ESRD on HD  Malfunctioning AVF  - vascular consulted  - nephrology o/c     CAD s/p stents  PAD s/p b/l LE stents  Chronic CHF w/ LVH  S/p AVR  Atrial fibrillation  - coreg, lipitor  - holding xarelto for procedure     DM2  - insulin pump  - ISS/accucheks    Essential HTN  - coreg    Hyperlipidemia  - statin    DESI  - cpap at bedtime       FEN: regular diet, PT/OT  Proph: SCDs, heparin  Code status: Full code    Dispo - consult SW for placement     Outpatient records or previous hospital records reviewed.   [facility] hospitalist to continue to follow patient while in house      Bello Barrios MD  [facility]  Hospitalist  Message over [email or web address]  Pager: [phone]

## 2024-06-25 NOTE — ED PROVIDER NOTES
Patient Seen in: [facility] Emergency Department      History     Chief Complaint   Patient presents with    Postop/Procedure Problem    Fever    Nausea/Vomiting/Diarrhea     Stated Complaint: recent toes amputed 6/13 toes black    Subjective:   HPI    78-year-old male past medical history of CAD post stents in 2009, atrial fibrillation renally dose Xarelto, DM2 on insulin pump, chronic CHF with LVH, AV stenosis status post bio AVR 2012, ESRD on HD via right upper extremity AV fistula Monday Wednesday Friday, peripheral vascular disease status post lower extremity stents, transmetatarsal amputation of right foot and amputation of left second digit 6/13/2024 now with complaints of nausea and vomiting, clotted right AV fistula, intermittent fevers and foul smell from the wound site.  Patient's wife reports patient began having fevers last week was started on Augmentin feels like the redness and swelling have gone down slightly but has started noticing a foul order coming from both of the surgical wound sites.  Today patient had an dialysis early as he reportedly had a clotted AV fistula.  also today has not been eating and drinking well has had multiple episodes of nonbloody or bilious emesis.  Last vomited in the waiting room of the ED.  No fever chills no sick contacts.   patient's family is also concerned as the plan was for patient to go to a SNF however he did not qualify and was discharged home after 2 days.  They are having difficulty with his basic ADLs and getting him to dialysis because of his recent surgeries.    Objective:   Past Medical History:    Aortic stenosis    Back problem    CAD (coronary artery disease)    Calculus of kidney    Cataract    CKD (chronic kidney disease) stage 4, GFR 15-29 ml/min ([facility])    Congestive heart disease (HCC)    Coronary atherosclerosis    DDD (degenerative disc disease), lumbar    Diabetes mellitus ([facility])    Dialysis patient ([facility])    fistula upper right arm/MWF     Dyslipidemia    Heart valve disease    High cholesterol    Hypertriglyceridemia    Hypoglycemic reaction    Muscle weakness    cane    Onychomycosis    DESI (obstructive sleep apnea) C-PAP     severe AHI 68, O2 sam 84%, CPAP 7    Other and unspecified hyperlipidemia    Overweight(278.02)    Renal disorder    S/P Coronary Artery Stents 12/2009    Sleep apnea    doesn't use CPAP    Type 1 diabetes mellitus (HCC)    Unspecified essential hypertension    Visual impairment    legally blind              Past Surgical History:   Procedure Laterality Date    Back surgery  5/16/16    L2-L5 Decomp poss uninstrumented fusion    Cabg      Cath percutaneous  transluminal coronary angioplasty      Cath transcatheter aortic valve replacement      Injection, w/wo contrast, dx/therapeutic substance, epidural/subarachnoid; lumbar/sacral N/A 12/10/2015    Procedure: LUMBAR EPIDURAL;  Surgeon: Rojas Bolanos MD;  Location: [facility]    Injection, w/wo contrast, dx/therapeutic substance, epidural/subarachnoid; lumbar/sacral N/A 1/25/2016    Procedure: LUMBAR EPIDURAL;  Surgeon: Rojas Bolanos MD;  Location: [facility]    Injection, w/wo contrast, dx/therapeutic substance, epidural/subarachnoid; lumbar/sacral N/A 3/2/2016    Procedure: LUMBAR EPIDURAL;  Surgeon: Corey Mcduffie MD;  Location: [facility]    M-sedaj by  phys perfrmg svc 5+ yr N/A 12/10/2015    Procedure: LUMBAR EPIDURAL;  Surgeon: Rojas Bolanos MD;  Location: [facility]    M-sedaj by  phys perfrmg svc 5+ yr N/A 1/25/2016    Procedure: LUMBAR EPIDURAL;  Surgeon: Rojas Bolanos MD;  Location: [facility]    M-sedaj by  phys perfrmg svc 5+ yr N/A 3/2/2016    Procedure: LUMBAR EPIDURAL;  Surgeon: Corey Mcduffie MD;  Location: [facility]    Other surgical history  10/4/12    cysto-Dr. Calderón    Other surgical history N/A 5/16/2016    Procedure:  LUMBAR LAMINECTOMY FUSION W/ BONE GRAFT 3 LEVEL;  Surgeon: CARLY Garcia MD;  Location: [facility] OR    Patient documented not to have experienced any of the following events N/A 12/10/2015    Procedure: LUMBAR EPIDURAL;  Surgeon: Rojas Bolanos MD;  Location: [facility]IN [facility]    Patient documented not to have experienced any of the following events N/A 1/25/2016    Procedure: LUMBAR EPIDURAL;  Surgeon: Rojas Bolanos MD;  Location: [facility]    Patient documented not to have experienced any of the following events N/A 3/2/2016    Procedure: LUMBAR EPIDURAL;  Surgeon: Corey Mcduffie MD;  Location: [facility]    Patient withough preoperative order for iv antibiotic surgical site infection prophylaxis. N/A 12/10/2015    Procedure: LUMBAR EPIDURAL;  Surgeon: Rojas Bolanos MD;  Location: [facility]    Patient withough preoperative order for iv antibiotic surgical site infection prophylaxis. N/A 1/25/2016    Procedure: LUMBAR EPIDURAL;  Surgeon: Rojas Bolanos MD;  Location: [facility]    Patient withough preoperative order for iv antibiotic surgical site infection prophylaxis. N/A 3/2/2016    Procedure: LUMBAR EPIDURAL;  Surgeon: Corey Mcduffie MD;  Location: [facility]    Replace aortic valve open  2012    Valve repair                  Social History     Socioeconomic History    Marital status:    Tobacco Use    Smoking status: Never    Smokeless tobacco: Never   Vaping Use    Vaping status: Never Used   Substance and Sexual Activity    Alcohol use: No     Alcohol/week: 0.0 standard drinks of alcohol    Drug use: No     Social Determinants of Health     Food Insecurity: No Food Insecurity (6/25/2024)    Food Insecurity     Food Insecurity: Never true   Transportation Needs: No Transportation Needs (6/25/2024)    Transportation Needs     Lack of Transportation: No   Social Connections: Low  Risk  (9/7/2021)    Received from [facility]    Social Connections     Do you have someone you could call for help if needed?: Yes   Housing Stability: Low Risk  (6/25/2024)    Housing Stability     Housing Instability: No              Review of Systems    Positive for stated complaint: recent toes amputed 6/13 toes black  Other systems are as noted in HPI.  Constitutional and vital signs reviewed.      All other systems reviewed and negative except as noted above.    Physical Exam     ED Triage Vitals [06/24/24 2000]   BP 93/53   Pulse 93   Resp 18   Temp 98.8 °F (37.1 °C)   Temp src Oral   SpO2 100 %   O2 Device None (Room air)       Current Vitals:   Vital Signs  BP: 101/82  Pulse: 59  Resp: 15  Temp: 98.8 °F (37.1 °C)  Temp src: Oral  MAP (mmHg): 89    Oxygen Therapy  SpO2: 100 %  O2 Device: None (Room air)            Physical Exam  Vitals and nursing note reviewed.   Constitutional:       Appearance: He is well-developed.   HENT:      Head: Normocephalic and atraumatic.   Eyes:      Pupils: Pupils are equal, round, and reactive to light.   Cardiovascular:      Rate and Rhythm: Normal rate and regular rhythm.   Pulmonary:      Effort: Pulmonary effort is normal.      Breath sounds: Normal breath sounds.   Abdominal:      General: Bowel sounds are normal.      Palpations: Abdomen is soft.   Musculoskeletal:         General: No deformity.      Cervical back: Normal range of motion and neck supple.   Skin:     General: Skin is warm and dry.      Capillary Refill: Capillary refill takes less than 2 seconds.      Comments: Right foot edema and erythema overlying  incision site no drainage from the wound site no tenderness palpation, left foot with erythema and edema overlying the dorsum of the foot   Neurological:      Mental Status: He is alert and oriented to person, place, and time.               ED Course     Labs Reviewed   COMP METABOLIC PANEL (14) - Abnormal; Notable for the following  components:       Result Value    Glucose 149 (*)     Sodium 130 (*)     Chloride 95 (*)     BUN 76 (*)     Creatinine 10.80 (*)     eGFR-Cr 4 (*)     AST 67 (*)     Alkaline Phosphatase 149 (*)     Albumin 2.7 (*)     A/G Ratio 0.6 (*)     All other components within normal limits   LACTIC ACID, PLASMA - Abnormal; Notable for the following components:    Lactic Acid 2.1 (*)     All other components within normal limits   CBC W/ DIFFERENTIAL - Abnormal; Notable for the following components:    WBC 14.3 (*)     RBC 3.14 (*)     HGB 9.4 (*)     HCT 29.4 (*)     Neutrophil Absolute Prelim 13.01 (*)     Neutrophil Absolute 13.01 (*)     Lymphocyte Absolute 0.51 (*)     All other components within normal limits   SARS-COV-2/FLU A AND B/RSV BY PCR ([employer]) - Normal    Narrative:     This test is intended for the qualitative detection and differentiation of SARS-CoV-2, influenza A, influenza B, and respiratory syncytial virus (RSV) viral RNA in nasopharyngeal or nares swabs from individuals suspected of respiratory viral infection consistent with COVID-19 by their healthcare provider. Signs and symptoms of respiratory viral infection due to SARS-CoV-2, influenza, and RSV can be similar.    Test performed using the Xpert Xpress SARS-CoV-2/FLU/RSV (real time RT-PCR)  assay on the [employer] instrument, [address], [place] [address].   This test is being used under the Food and Drug Administration's Emergency Use Authorization.    The authorized Fact Sheet for Healthcare Providers for this assay is available upon request from the laboratory.   CBC WITH DIFFERENTIAL WITH PLATELET    Narrative:     The following orders were created for panel order CBC With Differential With Platelet.  Procedure                               Abnormality         Status                     ---------                               -----------         ------                     CBC W/ DIFFERENTIAL[[id number]]          Abnormal            Final result                  Please view results for these tests on the individual orders.   LACTIC ACID REFLEX POST POSTIVE   RAINBOW DRAW LAVENDER   RAINBOW DRAW LIGHT GREEN   RAINBOW DRAW BLUE   BLOOD CULTURE   BLOOD CULTURE                          MDM      78-year-old male past medical history of end-stage renal disease CAD A-fib on Xarelto recent transmetatarsal amputation of the right foot and left second digit amputation 6/13/2024 with complaints of possible wound infection nausea and vomiting and clotted right arm fistula.  Will sign stable arrival patient appears nontoxic examination.  Abdomen soft nontender no rebound guarding or rigidity.  Acute on distal risk and improvement of his nausea and vomiting.  Labs reviewed leukocytosis 14,000 lactic acid 2.1.  Patient was started on ceftriaxone as well as vancomycin for concern for possible infection of his postop surgical site.  There is no active drainage from the site that could be cultured.  Blood cultures sent.  Case discussed with hospitalist agrees admission at this time requesting consult orders for nephrology, vascular surgery, podiatry.  Orders placed.  Admission disposition: 6/25/2024  3:42 AM                                        Medical Decision Making      Disposition and Plan     Clinical Impression:  1. Cellulitis of lower extremity, unspecified laterality    2. Nausea and vomiting, unspecified vomiting type    3. Malfunction of arteriovenous dialysis fistula, initial encounter ([facility])         Disposition:  Admit  6/25/2024  3:42 am    Follow-up:  No follow-up provider specified.        Medications Prescribed:  Current Discharge Medication List                            Hospital Problems       Present on Admission  Date Reviewed: 4/7/2022            ICD-10-CM Noted POA    * (Principal) Cellulitis of lower extremity, unspecified laterality L03.119 6/25/2024 Unknown

## 2024-06-25 NOTE — PLAN OF CARE
A&O x 4, drowsy. VSS. On RA. Tele-NSR. No c/o pain. Bilateral wounds NIGEL, pictures and culture taken. NWB to LLE/Heel WB to RLE per pt, will clarify with Podiatry. Reviewed POC, pain management, and fall precautions with pt. Plan for Vascular, Podiatry, and Nephro to see. Will continue to monitor.

## 2024-06-25 NOTE — H&P (VIEW-ONLY)
[facility]  Report of Consultation    Darin Bowens Patient Status:  Observation    1945 MRN [id number]   Location [facility] 3SW-A Attending Yandel Ordoñez MD   Hosp Day # 0 PCP Zachery Hall MD     Reason for Consultation:  ESRD    History of Present Illness:  Darin Bowens is a a(n) 78 year old male with hx of PAD, Afib on AC, DM, ESRD, s/p AVR, DESI who is admitted to hospital for evaluation /management of poor surgical healing.   Patient was also noted to have a clotted AVF yesterday  Denies any n/v  No cp/sob  No f/c      History:  Past Medical History:    Aortic stenosis    Back problem    CAD (coronary artery disease)    Calculus of kidney    Cataract    CKD (chronic kidney disease) stage 4, GFR 15-29 ml/min (HCC)    Congestive heart disease (HCC)    Coronary atherosclerosis    DDD (degenerative disc disease), lumbar    Diabetes mellitus (HCC)    Dialysis patient ([facility])    fistula upper right arm/MWF    Dyslipidemia    Heart valve disease    High cholesterol    Hypertriglyceridemia    Hypoglycemic reaction    Muscle weakness    cane    Nausea and vomiting, unspecified vomiting type    Onychomycosis    DESI (obstructive sleep apnea) C-PAP     severe AHI 68, O2 sam 84%, CPAP 7    Other and unspecified hyperlipidemia    Overweight(278.02)    Renal disorder    S/P Coronary Artery Stents 2009    Sleep apnea    doesn't use CPAP    Type 1 diabetes mellitus (HCC)    Unspecified essential hypertension    Visual impairment    legally blind     Past Surgical History:   Procedure Laterality Date    Back surgery  16    L2-L5 Decomp poss uninstrumented fusion    Cabg      Cath percutaneous  transluminal coronary angioplasty      Cath transcatheter aortic valve replacement      Injection, w/wo contrast, dx/therapeutic substance, epidural/subarachnoid; lumbar/sacral N/A 12/10/2015    Procedure: LUMBAR EPIDURAL;  Surgeon: Rojas Bolanos MD;  [facility]    Injection, w/wo contrast, dx/therapeutic substance, epidural/subarachnoid; lumbar/sacral N/A 1/25/2016    Procedure: LUMBAR EPIDURAL;  Surgeon: Rojas Bolanos MD;  Location: [facility]    Injection, w/wo contrast, dx/therapeutic substance, epidural/subarachnoid; lumbar/sacral N/A 3/2/2016    Procedure: LUMBAR EPIDURAL;  Surgeon: Corey Mcduffie MD;  Location: [facility]    M-sedaj by  phys perfrmg svc 5+ yr N/A 12/10/2015    Procedure: LUMBAR EPIDURAL;  Surgeon: Rojas Bolanos MD;  Location: [facility]    M-sedaj by  phys perfrmg svc 5+ yr N/A 1/25/2016    Procedure: LUMBAR EPIDURAL;  Surgeon: Rojas Bolanos MD;  Location: [facility]    M-sedaj by  phys perfrmg svc 5+ yr N/A 3/2/2016    Procedure: LUMBAR EPIDURAL;  Surgeon: Corey Mcduffie MD;  Location: [facility]    Other surgical history  10/4/12    cysto-Dr. Calderón    Other surgical history N/A 5/16/2016    Procedure: LUMBAR LAMINECTOMY FUSION W/ BONE GRAFT 3 LEVEL;  Surgeon: CARLY Garcia MD;  Location: [facility] OR    Patient documented not to have experienced any of the following events N/A 12/10/2015    Procedure: LUMBAR EPIDURAL;  Surgeon: Rojas Bolanos MD;  Location: [facility]    Patient documented not to have experienced any of the following events N/A 1/25/2016    Procedure: LUMBAR EPIDURAL;  Surgeon: Rojas Bolanos MD;  Location: [facility]    Patient documented not to have experienced any of the following events N/A 3/2/2016    Procedure: LUMBAR EPIDURAL;  Surgeon: Corey Mcduffie MD;  Location: [facility]    Patient withough preoperative order for iv antibiotic surgical site infection prophylaxis. N/A 12/10/2015    Procedure: LUMBAR EPIDURAL;  Surgeon: Rojas Bolanos MD;  Location: [facility]    Patient withough preoperative order for iv antibiotic  surgical site infection prophylaxis. N/A 1/25/2016    Procedure: LUMBAR EPIDURAL;  Surgeon: Rojas Bolanos MD;  [place] [facility]    Patient withough preoperative order for iv antibiotic surgical site infection prophylaxis. N/A 3/2/2016    Procedure: LUMBAR EPIDURAL;  Surgeon: Corey Mcduffie MD;  Location: [facility]    Replace aortic valve open  2012    Valve repair       Family History   Problem Relation Age of Onset    Heart Attack Father     Heart Attack Brother     Diabetes Brother     Diabetes Brother       reports that he has never smoked. He has never used smokeless tobacco. He reports that he does not drink alcohol and does not use drugs.    Allergies:  Allergies   Allergen Reactions    Lisinopril Coughing     Dyspnea         Current Medications:    Current Facility-Administered Medications:     sodium chloride 0.9% infusion, , Intravenous, Continuous    acetaminophen (Tylenol Extra Strength) tab 500 mg, 500 mg, Oral, Q4H PRN    acetaminophen (Tylenol) tab 650 mg, 650 mg, Oral, Q4H PRN **OR** HYDROcodone-acetaminophen (Norco) 5-325 MG per tab 1 tablet, 1 tablet, Oral, Q4H PRN **OR** HYDROcodone-acetaminophen (Norco) 5-325 MG per tab 2 tablet, 2 tablet, Oral, Q4H PRN    HYDROmorphone (Dilaudid) 1 MG/ML injection 0.2 mg, 0.2 mg, Intravenous, Q2H PRN **OR** HYDROmorphone (Dilaudid) 1 MG/ML injection 0.4 mg, 0.4 mg, Intravenous, Q2H PRN **OR** HYDROmorphone (Dilaudid) 1 MG/ML injection 0.8 mg, 0.8 mg, Intravenous, Q2H PRN    polyethylene glycol (PEG 3350) (Miralax) 17 g oral packet 17 g, 17 g, Oral, Daily PRN    sennosides (Senokot) tab 17.2 mg, 17.2 mg, Oral, Nightly PRN    bisacodyl (Dulcolax) 10 MG rectal suppository 10 mg, 10 mg, Rectal, Daily PRN    ondansetron (Zofran) 4 MG/2ML injection 4 mg, 4 mg, Intravenous, Q6H PRN    metoclopramide (Reglan) 5 mg/mL injection 5 mg, 5 mg, Intravenous, Q8H PRN    glucose (Dex4) 15 GM/59ML oral liquid 15 g, 15 g, Oral, Q15  Min PRN **OR** glucose (Glutose) 40% oral gel 15 g, 15 g, Oral, Q15 Min PRN **OR** glucose-vitamin C (Dex-4) chewable tab 4 tablet, 4 tablet, Oral, Q15 Min PRN **OR** dextrose 50% injection 50 mL, 50 mL, Intravenous, Q15 Min PRN **OR** glucose (Dex4) 15 GM/59ML oral liquid 30 g, 30 g, Oral, Q15 Min PRN **OR** glucose (Glutose) 40% oral gel 30 g, 30 g, Oral, Q15 Min PRN **OR** glucose-vitamin C (Dex-4) chewable tab 8 tablet, 8 tablet, Oral, Q15 Min PRN    insulin aspart (NovoLOG) 100 Units/mL FlexPen 4-20 Units, 4-20 Units, Subcutaneous, TID AC and HS    atorvastatin (Lipitor) tab 80 mg, 80 mg, Oral, Nightly    carvedilol (Coreg) tab 12.5 mg, 12.5 mg, Oral, BID with meals    [START ON 6/26/2024] vancomycin (Vancocin) 1,000 mg in sodium chloride 0.9% 250 mL IVPB-ADDV, 10 mg/kg, Intravenous, Once per day on Monday Wednesday Friday    piperacillin-tazobactam (Zosyn) 3.375 g in dextrose 5% 100 mL IVPB-ADDV, 3.375 g, Intravenous, Q12H  Home Medications:  No current outpatient medications on file.       Review of Systems:  See HPI; A total of 12 systems reviewed and otherwise unremarkable.    Physical Exam:  Vital signs: Blood pressure 95/45, pulse 75, temperature 97.4 °F (36.3 °C), temperature source Oral, resp. rate 16, height 6' (1.829 m), weight 211 lb (95.7 kg), SpO2 96%.  General: No acute distress. Alert and oriented x 3.  HEENT: Moist mucous membranes. EOM-I. PERRL  Neck: No lymphadenopathy.  No JVD. No carotid bruits.  Respiratory: Clear to auscultation bilaterally.  No wheezes. No rhonchi.  Cardiovascular: S1, S2.  Regular rate and rhythm.  No murmurs. Equal pulses   Abdomen: Soft, nontender, nondistended.  Positive bowel sounds. No rebound tenderness   Neurologic: No focal neurological deficits.   Ext ; + edema; BLE wounds noted- dressing intact  Integument: No lesions. No erythema.  Psychiatric: Appropriate mood and affect.  Recent Labs     06/24/24  2218   WBC 14.3*   HGB 9.4*   MCV 93.6   .0        Recent Labs     06/24/24  2218   *   K 4.4   CL 95*   CO2 23.0   BUN 76*   CREATSERUM 10.80*   CA 9.1       Recent Labs     06/24/24  2218   ALT 22   AST 67*   ALB 2.7*       Imaging:  Reviwed     Impression:  ESRD - due to DM/HTN  - on HD ; plan for tx today  - will request IR for temp HD cath placement  - consult Dr. Nix for evaluation of clotted AVF  Foot infection - s/p recent  L foot toe amputation on 6/13  - on abx; wound care  - podiatry following  PAD- as above  CHF; s/p AVR; volume optimization w/ HD/UF  Anemia due to CKD- DALE w/ HD  DM/HTN  HL    Thank you for allowing me to participate in this patient's care.  Please feel free to call me with any questions or concerns.    Aj Bardales MD  6/25/2024  9:05 AM

## 2024-06-25 NOTE — CONSULTS
Infectious Disease Initial Consultation      Date of admission: 6/25/2024 12:55 AM     Date of service: 06/25/24 8:37 AM    Consult requested by: Yandel Ordoñez MD     Reason for consult: Surgical wound infection    Chief complaint: Surgical wound infection    History of present illness: Darin Bowens is a 78 year old male with peripheral vascular disease status post lower extremity stents, recently had a transmetatarsal amputation of the right foot and amputation of the left second digit on 6/13/2024, end-stage renal disease on hemodialysis via right AV fistula, type 2 diabetes on insulin, atrial fibrillation, who presents here with nausea and vomiting, clotted right AV fistula, intermittent fevers and foul-smelling drainage from his foot wounds.  Patient started having fevers last week, and he was started on Augmentin.  Since then, he noted that the foot swelling and redness has improved.  However, he started having foul odor coming from both the surgical wound sites.  In addition to that, he reports poor appetite along with nausea and vomiting.  No other constitutional symptoms.  Of note, he was at the dialysis center on the day of admission, was noted to have a clotted AV fistula.    In the emergency room, he was afebrile, hemodynamically stable.  Labs revealed leukocytosis white count of 14.3 with a left shift.  BMP was consistent with his end-stage renal disease.   LFTs showed a slightly elevated alk phos of 149 and AST of 67, otherwise unremarkable.  Lactic acid was slightly elevated at 2.1.  Drainage cultures and blood cultures were obtained, the patient and was started on IV Zosyn and IV vancomycin.  Reviewing the pathology from his amputation, osteomyelitis was noted on the right; however, no osteomyelitis was noted on the left.    Review of systems:  All other components of the review of systems are negative, except those described in the history of present illness.     Past Medical  History:    Aortic stenosis    Back problem    CAD (coronary artery disease)    Calculus of kidney    Cataract    CKD (chronic kidney disease) stage 4, GFR 15-29 ml/min (HCC)    Congestive heart disease (HCC)    Coronary atherosclerosis    DDD (degenerative disc disease), lumbar    Diabetes mellitus (HCC)    Dialysis patient ([facility])    fistula upper right arm/MWF    Dyslipidemia    Heart valve disease    High cholesterol    Hypertriglyceridemia    Hypoglycemic reaction    Muscle weakness    cane    Nausea and vomiting, unspecified vomiting type    Onychomycosis    DESI (obstructive sleep apnea) C-PAP     severe AHI 68, O2 sam 84%, CPAP 7    Other and unspecified hyperlipidemia    Overweight(278.02)    Renal disorder    S/P Coronary Artery Stents 12/2009    Sleep apnea    doesn't use CPAP    Type 1 diabetes mellitus (HCC)    Unspecified essential hypertension    Visual impairment    legally blind     Past Surgical History:   Procedure Laterality Date    Back surgery  5/16/16    L2-L5 Decomp poss uninstrumented fusion    Cabg      Cath percutaneous  transluminal coronary angioplasty      Cath transcatheter aortic valve replacement      Injection, w/wo contrast, dx/therapeutic substance, epidural/subarachnoid; lumbar/sacral N/A 12/10/2015    Procedure: LUMBAR EPIDURAL;  Surgeon: Rojas Bolanos MD;  Location: [facility]    Injection, w/wo contrast, dx/therapeutic substance, epidural/subarachnoid; lumbar/sacral N/A 1/25/2016    Procedure: LUMBAR EPIDURAL;  Surgeon: Rojas Bolanos MD;  Location: [facility]    Injection, w/wo contrast, dx/therapeutic substance, epidural/subarachnoid; lumbar/sacral N/A 3/2/2016    Procedure: LUMBAR EPIDURAL;  Surgeon: Corey Mcduffie MD;  [place] [facility]    M-sedaj by  phys perfrmg svc 5+ yr N/A 12/10/2015    Procedure: LUMBAR EPIDURAL;  Surgeon: Rojas Bolanos MD;  [place] [facility]    M-sedaj by   phys perfrmg svc 5+ yr N/A 1/25/2016    Procedure: LUMBAR EPIDURAL;  Surgeon: Rojas Bolanos MD;  [place] [facility]    M-sedaj by  phys perfrmg svc 5+ yr N/A 3/2/2016    Procedure: LUMBAR EPIDURAL;  Surgeon: Corey Mcduffie MD;  [place] [facility]    Other surgical history  10/4/12    cysto-Dr. Calderón    Other surgical history N/A 5/16/2016    Procedure: LUMBAR LAMINECTOMY FUSION W/ BONE GRAFT 3 LEVEL;  Surgeon: CARLY Garcia MD;  Location:  MAIN OR    Patient documented not to have experienced any of the following events N/A 12/10/2015    Procedure: LUMBAR EPIDURAL;  Surgeon: Rojas Bolanos MD;  [place]: [facility]    Patient documented not to have experienced any of the following events N/A 1/25/2016    Procedure: LUMBAR EPIDURAL;  Surgeon: Rojas Bolanos MD;  [place]: [facility]    Patient documented not to have experienced any of the following events N/A 3/2/2016    Procedure: LUMBAR EPIDURAL;  Surgeon: Corey Mcduffie MD;  [place]: [facility]    Patient withough preoperative order for iv antibiotic surgical site infection prophylaxis. N/A 12/10/2015    Procedure: LUMBAR EPIDURAL;  Surgeon: Rojas Bolanos MD;  [place]: [facility]    Patient withough preoperative order for iv antibiotic surgical site infection prophylaxis. N/A 1/25/2016    Procedure: LUMBAR EPIDURAL;  Surgeon: Rojas Bolanos MD;  [place]: [facility]    Patient withough preoperative order for iv antibiotic surgical site infection prophylaxis. N/A 3/2/2016    Procedure: LUMBAR EPIDURAL;  Surgeon: Corey Mcduffie MD;  Location: [facility]    Replace aortic valve open  2012    Valve repair       Social History     Socioeconomic History    Marital status:    Tobacco Use    Smoking status: Never    Smokeless tobacco: Never   Vaping Use    Vaping status: Never Used    Substance and Sexual Activity    Alcohol use: No     Alcohol/week: 0.0 standard drinks of alcohol    Drug use: No     Social Determinants of Health     Food Insecurity: No Food Insecurity (6/25/2024)    Food Insecurity     Food Insecurity: Never true   Transportation Needs: No Transportation Needs (6/25/2024)    Transportation Needs     Lack of Transportation: No   Social Connections: Low Risk  (9/7/2021)    Received from [facility]    Social Connections     Do you have someone you could call for help if needed?: Yes   Housing Stability: Low Risk  (6/25/2024)    Housing Stability     Housing Instability: No     Family History   Problem Relation Age of Onset    Heart Attack Father     Heart Attack Brother     Diabetes Brother     Diabetes Brother      Reviewed, see above    Medications:    sodium chloride    acetaminophen    acetaminophen **OR** HYDROcodone-acetaminophen **OR** HYDROcodone-acetaminophen    HYDROmorphone **OR** HYDROmorphone **OR** HYDROmorphone    polyethylene glycol (PEG 3350)    sennosides    bisacodyl    ondansetron    metoclopramide    glucose **OR** glucose **OR** glucose-vitamin C **OR** dextrose **OR** glucose **OR** glucose **OR** glucose-vitamin C    insulin aspart    atorvastatin    carvedilol    [START ON 6/26/2024] vancomycin    piperacillin-tazobactam     Allergies:  Allergies   Allergen Reactions    Lisinopril Coughing     Dyspnea         Physical Exam:  Vitals:    06/25/24 0800   BP: 95/45   Pulse: 75   Resp: 16   Temp: 97.4 °F (36.3 °C)     Vitals signs and nursing note reviewed.   Constitutional:       Appearance: Normal appearance.   HENT:      Head: Normocephalic and atraumatic.      Mouth: Mucous membranes are moist.   Neck:      Musculoskeletal: Neck supple.   Cardiovascular:      Rate and Rhythm: Normal rate.   Pulmonary:      Effort: Pulmonary effort is normal. No respiratory distress.   Musculoskeletal:      Right lower leg: +1 edema.  See pictures  below     Left lower leg: +1 edema.  See pictures below  Skin:     General: Skin is warm and dry.   Neurological:      General: No focal deficit present.      Mental Status: Alert and oriented to person, place, and time.                   Laboratory data:  I have independently reviewed all lab results; including old microbiological results.  Lab Results   Component Value Date    WBC 14.3 06/24/2024    HGB 9.4 06/24/2024    HCT 29.4 06/24/2024    .0 06/24/2024    CREATSERUM 10.80 06/24/2024    BUN 76 06/24/2024     06/24/2024    K 4.4 06/24/2024    CL 95 06/24/2024    CO2 23.0 06/24/2024     06/24/2024    CA 9.1 06/24/2024    ALB 2.7 06/24/2024    ALKPHO 149 06/24/2024    BILT 0.6 06/24/2024    TP 7.1 06/24/2024    AST 67 06/24/2024    ALT 22 06/24/2024        Recent Labs   Lab 06/24/24  2218   RBC 3.14*   HGB 9.4*   HCT 29.4*   MCV 93.6   MCH 29.9   MCHC 32.0   RDW 14.7   NEPRELIM 13.01*   WBC 14.3*   .0       Microbiology data:  No results found for this visit on 06/25/24.    Impression:  Darin Bowens is a 78 year old male with     Bilateral foot surgical wound infection in the setting of recent amputations of the right foot and amputation of the left second digit on 6/13/2024 with threat to limb  Pathology showed osteomyelitis on the right  Now with foul-smelling drainage, redness along with fever and leukocytosis concerning for sepsis with threat to life  Drainage and blood cultures pending  Currently on IV Zosyn and IV vancomycin  History of end stage renal disease  On hemodialysis through AV fistula that is now clotted  Management as per renal medicine  Antibiotics will be renally adjusted  Peripheral vascular disease  Increased risk of limb loss  Allergic reaction to Augmentin  The patient reports itchy rash since starting the Augmentin as an outpatient    Recommendations:     Check MRI of both feet to rule out underlying abscess, with and without contrast  Consult  podiatry  Continue to follow-up on drainage cultures and blood cultures  Discontinue IV Zosyn  Start metronidazole 500 mg twice daily  Start IV cefepime 1 g every 24 hours  Continue IV vancomycin, pharmacy to dose  Continue to monitor daily labs for antibiotic toxicity  Further recommendations will depend on the above work-up and clinical progress     The plan of care was discussed with the primary hospital team, Yandel Ordoñez MD     Recommendations were also discussed with the patient; all questions were answered.     Thank you for this consultation. Please don't hesitate to call the ID team for questions or any acute changes in patient's clinical condition.    Please note that this report has been produced using speech recognition software and may contain errors related to that system including, but not limited to, errors in grammar, punctuation, and spelling, as well as words and phrases that possibly may have been recognized inappropriately.  If there are any questions or concerns, contact the dictating provider for clarification.    The  [employer] Cures Act makes medical notes like these available to patients in the interest of transparency. Please be advised this is a medical document. Medical documents are intended to carry relevant information, facts as evident, and the clinical opinion of the practitioner. The medical note is intended as peer to peer communication and may appear blunt or direct. It is written in medical language and may contain abbreviations or verbiage that are unfamiliar.     Sadaf Hugo MD  [facility]. Tel: [phone]. Fax: [phone].     Darin Bowens : 1945 MRN: [id number] [facility]: [id number]

## 2024-06-25 NOTE — PLAN OF CARE
NURSING ADMISSION NOTE      Patient admitted via cart from ED. Oriented to room. Safety precautions initiated. Bed in lowest position. Call light within reach. Patient comfortable and vital signs stable. Dr Valenzuela notified of consult.     A&Ox4. NPO. IVF. Plan TBD.

## 2024-06-25 NOTE — PROGRESS NOTES
[facility] Pharmacy Dosing Service      Initial Pharmacokinetic Consult for Vancomycin Dosing     Darin Bowens is a 78 year old male who is being initiated on vancomycin therapy for cellulitis.  Pharmacy has been asked to dose vancomycin by Dr Valenzuela.  The initial treatment and monitoring approach will be non-AUC strategy.        Weight and Temperature:    Wt Readings from Last 1 Encounters:   24 95.7 kg (211 lb)        Temp Readings from Last 1 Encounters:   24 97.8 °F (36.6 °C) (Oral)      Labs:   Recent Labs   Lab 24  2218   CREATSERUM 10.80*      Estimated Creatinine Clearance: 6.4 mL/min (A) (based on SCr of 10.8 mg/dL (H)).     Recent Labs   Lab 24  2218   WBC 14.3*          The Pharmacokinetic Target is:    Trough/random 15-20 mg/L (applies to IV dosing in HD and IP dosing in APD)    Renal Dosing Considerations:    HD: Home regimen: MWF. Last session      Assessment/Plan:   Initial/Loading dose: Has received 2000 mg IV (20 mg/kg, capped at 2250 mg) x 1 loading dose.      Maintenance dose: Pharmacy will dose vancomycin at 1000 mg IV after each dialysis    Monitorin) Plan for vancomycin random level to be obtained before the 3rd dose    2) Pharmacy will order SCr as clinically indicated to assess renal function.    3) Pharmacy will monitor for toxicity and efficacy, adjust vancomycin dose and/or frequency, and order vancomycin levels as appropriate per the Pharmacy and Therapeutics Committee approved protocol until discontinuation of the medication.       We appreciate the opportunity to assist in the care of this patient.     Aj [facility]rella [facility]  2024  7:03 AM  [facility]  Pharmacy Extension: [phone]

## 2024-06-25 NOTE — CONSULTS
[facility]  Report of Consultation    Darin Bowens Patient Status:  Observation    1945 MRN [id number]   Location [facility] 3SW-A Attending Yandel Ordoñez MD   Hosp Day # 0 PCP Zachery Hall MD     Reason for Consultation:  ESRD    History of Present Illness:  Darin Bowens is a a(n) 78 year old male with hx of PAD, Afib on AC, DM, ESRD, s/p AVR, DESI who is admitted to hospital for evaluation /management of poor surgical healing.   Patient was also noted to have a clotted AVF yesterday  Denies any n/v  No cp/sob  No f/c      History:  Past Medical History:    Aortic stenosis    Back problem    CAD (coronary artery disease)    Calculus of kidney    Cataract    CKD (chronic kidney disease) stage 4, GFR 15-29 ml/min (HCC)    Congestive heart disease (HCC)    Coronary atherosclerosis    DDD (degenerative disc disease), lumbar    Diabetes mellitus (HCC)    Dialysis patient ([facility])    fistula upper right arm/MWF    Dyslipidemia    Heart valve disease    High cholesterol    Hypertriglyceridemia    Hypoglycemic reaction    Muscle weakness    cane    Nausea and vomiting, unspecified vomiting type    Onychomycosis    DESI (obstructive sleep apnea) C-PAP     severe AHI 68, O2 sam 84%, CPAP 7    Other and unspecified hyperlipidemia    Overweight(278.02)    Renal disorder    S/P Coronary Artery Stents 2009    Sleep apnea    doesn't use CPAP    Type 1 diabetes mellitus (HCC)    Unspecified essential hypertension    Visual impairment    legally blind     Past Surgical History:   Procedure Laterality Date    Back surgery  16    L2-L5 Decomp poss uninstrumented fusion    Cabg      Cath percutaneous  transluminal coronary angioplasty      Cath transcatheter aortic valve replacement      Injection, w/wo contrast, dx/therapeutic substance, epidural/subarachnoid; lumbar/sacral N/A 12/10/2015    Procedure: LUMBAR EPIDURAL;  Surgeon: Rojas Bolanos MD;  Location: [facility]    Injection, w/wo contrast, dx/therapeutic substance, epidural/subarachnoid; lumbar/sacral N/A 1/25/2016    Procedure: LUMBAR EPIDURAL;  Surgeon: Rojas Bolanos MD;  Location: [facility]    Injection, w/wo contrast, dx/therapeutic substance, epidural/subarachnoid; lumbar/sacral N/A 3/2/2016    Procedure: LUMBAR EPIDURAL;  Surgeon: Corey Mcduffie MD;  Location: [facility]    M-sedaj by  phys perfrmg svc 5+ yr N/A 12/10/2015    Procedure: LUMBAR EPIDURAL;  Surgeon: Rojas Bolanos MD;  Location: [facility]    M-sedaj by  phys perfrmg svc 5+ yr N/A 1/25/2016    Procedure: LUMBAR EPIDURAL;  Surgeon: Rojas Bolanos MD;  Location: [facility]    M-sedaj by  phys perfrmg svc 5+ yr N/A 3/2/2016    Procedure: LUMBAR EPIDURAL;  Surgeon: Corey Mcduffie MD;  Location: [facility]    Other surgical history  10/4/12    cysto-Dr. Calderón    Other surgical history N/A 5/16/2016    Procedure: LUMBAR LAMINECTOMY FUSION W/ BONE GRAFT 3 LEVEL;  Surgeon: CARLY Garcia MD;  Location: [facility] OR    Patient documented not to have experienced any of the following events N/A 12/10/2015    Procedure: LUMBAR EPIDURAL;  Surgeon: Rojas Bolanos MD;  Location: [facility]    Patient documented not to have experienced any of the following events N/A 1/25/2016    Procedure: LUMBAR EPIDURAL;  Surgeon: Rojas Bolanos MD;  Location: [facility]    Patient documented not to have experienced any of the following events N/A 3/2/2016    Procedure: LUMBAR EPIDURAL;  Surgeon: Corey Mcduffie MD;  Location: [facility]    Patient withough preoperative order for iv antibiotic surgical site infection prophylaxis. N/A 12/10/2015    Procedure: LUMBAR EPIDURAL;  Surgeon: Rojas Bolanos MD;  Location: [facility]    Patient withough preoperative order for iv antibiotic  surgical site infection prophylaxis. N/A 1/25/2016    Procedure: LUMBAR EPIDURAL;  Surgeon: Rojas Bolanos MD;  Location: [facility]    Patient withough preoperative order for iv antibiotic surgical site infection prophylaxis. N/A 3/2/2016    Procedure: LUMBAR EPIDURAL;  Surgeon: Corey Mcduffie MD;  Location: [facility]    Replace aortic valve open  2012    Valve repair       Family History   Problem Relation Age of Onset    Heart Attack Father     Heart Attack Brother     Diabetes Brother     Diabetes Brother       reports that he has never smoked. He has never used smokeless tobacco. He reports that he does not drink alcohol and does not use drugs.    Allergies:  Allergies   Allergen Reactions    Lisinopril Coughing     Dyspnea         Current Medications:    Current Facility-Administered Medications:     sodium chloride 0.9% infusion, , Intravenous, Continuous    acetaminophen (Tylenol Extra Strength) tab 500 mg, 500 mg, Oral, Q4H PRN    acetaminophen (Tylenol) tab 650 mg, 650 mg, Oral, Q4H PRN **OR** HYDROcodone-acetaminophen (Norco) 5-325 MG per tab 1 tablet, 1 tablet, Oral, Q4H PRN **OR** HYDROcodone-acetaminophen (Norco) 5-325 MG per tab 2 tablet, 2 tablet, Oral, Q4H PRN    HYDROmorphone (Dilaudid) 1 MG/ML injection 0.2 mg, 0.2 mg, Intravenous, Q2H PRN **OR** HYDROmorphone (Dilaudid) 1 MG/ML injection 0.4 mg, 0.4 mg, Intravenous, Q2H PRN **OR** HYDROmorphone (Dilaudid) 1 MG/ML injection 0.8 mg, 0.8 mg, Intravenous, Q2H PRN    polyethylene glycol (PEG 3350) (Miralax) 17 g oral packet 17 g, 17 g, Oral, Daily PRN    sennosides (Senokot) tab 17.2 mg, 17.2 mg, Oral, Nightly PRN    bisacodyl (Dulcolax) 10 MG rectal suppository 10 mg, 10 mg, Rectal, Daily PRN    ondansetron (Zofran) 4 MG/2ML injection 4 mg, 4 mg, Intravenous, Q6H PRN    metoclopramide (Reglan) 5 mg/mL injection 5 mg, 5 mg, Intravenous, Q8H PRN    glucose (Dex4) 15 GM/59ML oral liquid 15 g, 15 g, Oral, Q15  Min PRN **OR** glucose (Glutose) 40% oral gel 15 g, 15 g, Oral, Q15 Min PRN **OR** glucose-vitamin C (Dex-4) chewable tab 4 tablet, 4 tablet, Oral, Q15 Min PRN **OR** dextrose 50% injection 50 mL, 50 mL, Intravenous, Q15 Min PRN **OR** glucose (Dex4) 15 GM/59ML oral liquid 30 g, 30 g, Oral, Q15 Min PRN **OR** glucose (Glutose) 40% oral gel 30 g, 30 g, Oral, Q15 Min PRN **OR** glucose-vitamin C (Dex-4) chewable tab 8 tablet, 8 tablet, Oral, Q15 Min PRN    insulin aspart (NovoLOG) 100 Units/mL FlexPen 4-20 Units, 4-20 Units, Subcutaneous, TID AC and HS    atorvastatin (Lipitor) tab 80 mg, 80 mg, Oral, Nightly    carvedilol (Coreg) tab 12.5 mg, 12.5 mg, Oral, BID with meals    [START ON 6/26/2024] vancomycin (Vancocin) 1,000 mg in sodium chloride 0.9% 250 mL IVPB-ADDV, 10 mg/kg, Intravenous, Once per day on Monday Wednesday Friday    piperacillin-tazobactam (Zosyn) 3.375 g in dextrose 5% 100 mL IVPB-ADDV, 3.375 g, Intravenous, Q12H  Home Medications:  No current outpatient medications on file.       Review of Systems:  See HPI; A total of 12 systems reviewed and otherwise unremarkable.    Physical Exam:  Vital signs: Blood pressure 95/45, pulse 75, temperature 97.4 °F (36.3 °C), temperature source Oral, resp. rate 16, height 6' (1.829 m), weight 211 lb (95.7 kg), SpO2 96%.  General: No acute distress. Alert and oriented x 3.  HEENT: Moist mucous membranes. EOM-I. PERRL  Neck: No lymphadenopathy.  No JVD. No carotid bruits.  Respiratory: Clear to auscultation bilaterally.  No wheezes. No rhonchi.  Cardiovascular: S1, S2.  Regular rate and rhythm.  No murmurs. Equal pulses   Abdomen: Soft, nontender, nondistended.  Positive bowel sounds. No rebound tenderness   Neurologic: No focal neurological deficits.   Ext ; + edema; BLE wounds noted- dressing intact  Integument: No lesions. No erythema.  Psychiatric: Appropriate mood and affect.  Recent Labs     06/24/24  2218   WBC 14.3*   HGB 9.4*   MCV 93.6   .0        Recent Labs     06/24/24  2218   *   K 4.4   CL 95*   CO2 23.0   BUN 76*   CREATSERUM 10.80*   CA 9.1       Recent Labs     06/24/24  2218   ALT 22   AST 67*   ALB 2.7*       Imaging:  Reviwed     Impression:  ESRD - due to DM/HTN  - on HD ; plan for tx today  - will request IR for temp HD cath placement  - consult Dr. Nix for evaluation of clotted AVF  Foot infection - s/p recent  L foot toe amputation on 6/13  - on abx; wound care  - podiatry following  PAD- as above  CHF; s/p AVR; volume optimization w/ HD/UF  Anemia due to CKD- DALE w/ HD  DM/HTN  HL    Thank you for allowing me to participate in this patient's care.  Please feel free to call me with any questions or concerns.    Aj Bardales MD  6/25/2024  9:05 AM

## 2024-06-25 NOTE — DISCHARGE INSTRUCTIONS
Resume Home Health with [facility] [phone]    2000 ADA diet as tolerated.  Check you blood sugars before meals  Insulin pump as ordered  Nepro daily at breakfast  Alex daily at lunch and dinner    PT/OT eval and treat  Activity as tolerated  No strenuous activity  WBAT to BLE with post op shoes when out of bed  Continue to use your incentive spirometer once you are discharged for as long as you are taking pain medications. 10 breaths every hour while you are awake.    Notify your physician for:  1. Increased drainage or new drainage coming from incision.  2. If incision opens up.  3. Fevers greater than 101 on two consecutive readings taken 4 hours apart.  4. If wound starts to smell or turns red.  5. For pain not relieved by pain medications.  6. Persistent nausea or vomiting.  7. Any questions, problems, or concerns.    Wound care RN to see  Daily dressing changes to left foot and right foot:  : Remove dressing  To both feet surgical sites, apply 1/4 strength Dakin's solution soaked dressings to bilateral surgical sites daily  Cover with 4x4 gauze, ABD pad, kerlix.  Re-apply B heel offloading boots.

## 2024-06-25 NOTE — HOME CARE LIAISON
Current with [facility] for RN PT AND OT services. Will need ROBERT order if patient status changes to inpatient status. Informed RUKHSANA Pearson. Updated AVS with contact information

## 2024-06-25 NOTE — ED QUICK NOTES
Orders for admission, patient is aware of plan and ready to go upstairs. Any questions, please call ED RN Jennifer at extension [phone].     Patient Covid vaccination status: Fully vaccinated     COVID Test Ordered in ED: SARS-CoV-2/Flu A and B/RSV by PCR (GeneXpert)    COVID Suspicion at Admission: N/A    Running Infusions:      Mental Status/LOC at time of transport: A&Ox4    Other pertinent information:   CIWA score: N/A   NIH score:  N/A

## 2024-06-25 NOTE — ED INITIAL ASSESSMENT (HPI)
PT 2 WKS POST OP TOES AMPUTATED, TODAY WITH DISCOLORATION AND PAIN NOTED TO BOTH FEET.  HD TODAY, CUT SHORT DUE TO CLOTTING IN FISTULA

## 2024-06-26 ENCOUNTER — ANESTHESIA EVENT (OUTPATIENT)
Dept: SURGERY | Facility: HOSPITAL | Age: 79
End: 2024-06-26
Payer: MEDICARE

## 2024-06-26 ENCOUNTER — APPOINTMENT (OUTPATIENT)
Dept: MRI IMAGING | Facility: HOSPITAL | Age: 79
End: 2024-06-26
Attending: INTERNAL MEDICINE
Payer: MEDICARE

## 2024-06-26 LAB
ANION GAP SERPL CALC-SCNC: 11 MMOL/L (ref 0–18)
ANION GAP SERPL CALC-SCNC: 12 MMOL/L (ref 0–18)
ANTIBODY SCREEN: NEGATIVE
APTT PPP: 30.3 SECONDS (ref 23–36)
BASOPHILS # BLD AUTO: 0.03 X10(3) UL (ref 0–0.2)
BASOPHILS NFR BLD AUTO: 0.3 %
BUN BLD-MCNC: 45 MG/DL (ref 9–23)
BUN BLD-MCNC: 53 MG/DL (ref 9–23)
CALCIUM BLD-MCNC: 8.6 MG/DL (ref 8.5–10.1)
CALCIUM BLD-MCNC: 8.7 MG/DL (ref 8.5–10.1)
CHLORIDE SERPL-SCNC: 100 MMOL/L (ref 98–112)
CHLORIDE SERPL-SCNC: 101 MMOL/L (ref 98–112)
CO2 SERPL-SCNC: 23 MMOL/L (ref 21–32)
CO2 SERPL-SCNC: 25 MMOL/L (ref 21–32)
CREAT BLD-MCNC: 6.96 MG/DL
CREAT BLD-MCNC: 7.94 MG/DL
EGFRCR SERPLBLD CKD-EPI 2021: 6 ML/MIN/1.73M2 (ref 60–?)
EGFRCR SERPLBLD CKD-EPI 2021: 8 ML/MIN/1.73M2 (ref 60–?)
EOSINOPHIL # BLD AUTO: 0.06 X10(3) UL (ref 0–0.7)
EOSINOPHIL NFR BLD AUTO: 0.5 %
ERYTHROCYTE [DISTWIDTH] IN BLOOD BY AUTOMATED COUNT: 14.8 %
ERYTHROCYTE [DISTWIDTH] IN BLOOD BY AUTOMATED COUNT: 14.9 %
GLUCOSE BLD-MCNC: 196 MG/DL (ref 70–99)
GLUCOSE BLD-MCNC: 199 MG/DL (ref 70–99)
GLUCOSE BLD-MCNC: 211 MG/DL (ref 70–99)
GLUCOSE BLD-MCNC: 213 MG/DL (ref 70–99)
GLUCOSE BLD-MCNC: 249 MG/DL (ref 70–99)
GLUCOSE BLD-MCNC: 290 MG/DL (ref 70–99)
GLUCOSE BLD-MCNC: 317 MG/DL (ref 70–99)
GLUCOSE BLD-MCNC: 348 MG/DL (ref 70–99)
HBV SURFACE AG SER-ACNC: 0.12 [IU]/L
HBV SURFACE AG SERPL QL IA: NONREACTIVE
HCT VFR BLD AUTO: 22.1 %
HCT VFR BLD AUTO: 23.8 %
HGB BLD-MCNC: 7 G/DL
HGB BLD-MCNC: 7.4 G/DL
IMM GRANULOCYTES # BLD AUTO: 0.12 X10(3) UL (ref 0–1)
IMM GRANULOCYTES NFR BLD: 1 %
LYMPHOCYTES # BLD AUTO: 0.63 X10(3) UL (ref 1–4)
LYMPHOCYTES NFR BLD AUTO: 5.3 %
MCH RBC QN AUTO: 29.6 PG (ref 26–34)
MCH RBC QN AUTO: 29.8 PG (ref 26–34)
MCHC RBC AUTO-ENTMCNC: 31.1 G/DL (ref 31–37)
MCHC RBC AUTO-ENTMCNC: 31.7 G/DL (ref 31–37)
MCV RBC AUTO: 94 FL
MCV RBC AUTO: 95.2 FL
MONOCYTES # BLD AUTO: 0.92 X10(3) UL (ref 0.1–1)
MONOCYTES NFR BLD AUTO: 7.7 %
NEUTROPHILS # BLD AUTO: 10.14 X10 (3) UL (ref 1.5–7.7)
NEUTROPHILS # BLD AUTO: 10.14 X10(3) UL (ref 1.5–7.7)
NEUTROPHILS NFR BLD AUTO: 85.2 %
OSMOLALITY SERPL CALC.SUM OF ELEC: 301 MOSM/KG (ref 275–295)
OSMOLALITY SERPL CALC.SUM OF ELEC: 305 MOSM/KG (ref 275–295)
PLATELET # BLD AUTO: 203 10(3)UL (ref 150–450)
PLATELET # BLD AUTO: 241 10(3)UL (ref 150–450)
POTASSIUM SERPL-SCNC: 4.1 MMOL/L (ref 3.5–5.1)
POTASSIUM SERPL-SCNC: 4.2 MMOL/L (ref 3.5–5.1)
RBC # BLD AUTO: 2.35 X10(6)UL
RBC # BLD AUTO: 2.5 X10(6)UL
RH BLOOD TYPE: NEGATIVE
RH BLOOD TYPE: NEGATIVE
SODIUM SERPL-SCNC: 135 MMOL/L (ref 136–145)
SODIUM SERPL-SCNC: 137 MMOL/L (ref 136–145)
WBC # BLD AUTO: 10.8 X10(3) UL (ref 4–11)
WBC # BLD AUTO: 11.9 X10(3) UL (ref 4–11)

## 2024-06-26 PROCEDURE — 73720 MRI LWR EXTREMITY W/O&W/DYE: CPT | Performed by: INTERNAL MEDICINE

## 2024-06-26 PROCEDURE — 99233 SBSQ HOSP IP/OBS HIGH 50: CPT | Performed by: INTERNAL MEDICINE

## 2024-06-26 PROCEDURE — 30233N1 TRANSFUSION OF NONAUTOLOGOUS RED BLOOD CELLS INTO PERIPHERAL VEIN, PERCUTANEOUS APPROACH: ICD-10-PCS | Performed by: HOSPITALIST

## 2024-06-26 RX ORDER — SODIUM CHLORIDE 9 MG/ML
INJECTION, SOLUTION INTRAVENOUS ONCE
Status: DISCONTINUED | OUTPATIENT
Start: 2024-06-26 | End: 2024-07-12

## 2024-06-26 RX ORDER — HEPARIN SODIUM AND DEXTROSE 10000; 5 [USP'U]/100ML; G/100ML
1000 INJECTION INTRAVENOUS ONCE
Status: DISCONTINUED | OUTPATIENT
Start: 2024-06-26 | End: 2024-06-26

## 2024-06-26 RX ORDER — GADOTERATE MEGLUMINE 376.9 MG/ML
20 INJECTION INTRAVENOUS
Status: COMPLETED | OUTPATIENT
Start: 2024-06-26 | End: 2024-06-26

## 2024-06-26 RX ORDER — HEPARIN SODIUM 1000 [USP'U]/ML
2000 INJECTION, SOLUTION INTRAVENOUS; SUBCUTANEOUS
Status: DISCONTINUED | OUTPATIENT
Start: 2024-06-26 | End: 2024-07-02

## 2024-06-26 RX ORDER — ALBUMIN (HUMAN) 12.5 G/50ML
25 SOLUTION INTRAVENOUS
Status: ACTIVE | OUTPATIENT
Start: 2024-06-26 | End: 2024-06-28

## 2024-06-26 RX ORDER — HEPARIN SODIUM 5000 [USP'U]/ML
5000 INJECTION, SOLUTION INTRAVENOUS; SUBCUTANEOUS EVERY 8 HOURS SCHEDULED
Status: DISCONTINUED | OUTPATIENT
Start: 2024-06-26 | End: 2024-06-27

## 2024-06-26 RX ORDER — HEPARIN SODIUM 1000 [USP'U]/ML
5000 INJECTION, SOLUTION INTRAVENOUS; SUBCUTANEOUS ONCE
Status: DISCONTINUED | OUTPATIENT
Start: 2024-06-26 | End: 2024-06-26

## 2024-06-26 RX ORDER — HEPARIN SODIUM AND DEXTROSE 10000; 5 [USP'U]/100ML; G/100ML
INJECTION INTRAVENOUS CONTINUOUS
Status: DISCONTINUED | OUTPATIENT
Start: 2024-06-26 | End: 2024-06-26

## 2024-06-26 NOTE — PROGRESS NOTES
[facility]   part of [facility] Infectious Disease  Progress Note    Darin Bowens Patient Status:  Inpatient    1945 MRN [id number]   Location [facility] 3SW-A Attending Yandel Ordoñez MD   Hosp Day # 1 PCP Zachery Hall MD     Subjective:    Patient seen and examined resting in bed, just got back from MRI. Family at the bedside. He is tolerating the IV abx no new complaints or concerns.     Review of Systems:  Review of systems reviewed and negative except as mentioned    Objective:  Blood pressure 103/54, pulse 61, temperature 98.3 °F (36.8 °C), temperature source Oral, resp. rate 17, height 6' (1.829 m), weight 211 lb (95.7 kg), SpO2 99%.        Physical Exam:  General: Awake, alert, non-tox, NAD.  HEENT:  Oropharynx clear, trachea ML.  Heart: RRR S1S2 no murmurs.  Lungs: Essentially CTA b/l, no rhonchi, rales, wheezes.  Abdomen: Soft, NT/ND.  BS present.  No organomegaly.  Extremity: +b/l feet with dressing in place  Neurological: No focal deficits.  Derm:  Warm, dry, free from rashes.  + left chest HD cath    Lab Data Review:  Lab Results   Component Value Date    WBC 11.9 2024    HGB 7.0 2024    HCT 22.1 2024    .0 2024    CREATSERUM 6.96 2024    BUN 45 2024     2024    K 4.1 2024     2024    CO2 25.0 2024     2024    CA 8.6 2024        Cultures:  Hospital Encounter on 24   1. Aerobic Bacterial Culture     Status: Abnormal (Preliminary result)    Collection Time: 24  8:14 AM    Specimen: Foot,left; Other   Result Value Ref Range    Aerobic Culture Result 4+ growth Gram Negative Wojciech (A) N/A    Aerobic Culture Result 4+ growth Gram Negative Wojciech (A) N/A    Aerobic Smear No WBCs seen N/A    Aerobic Smear 1+ Gram Positive Cocci N/A    Aerobic Smear 1+ Gram Negative Rods N/A   2. Blood Culture     Status: None (Preliminary result)     Collection Time: 06/25/24  2:36 AM    Specimen: Blood,peripheral   Result Value Ref Range    Blood Culture Result No Growth 1 Day N/A        Radiology:  IR CENTRAL VENOUS ACCESS    Result Date: 6/25/2024  CONCLUSION:  Successful non-tunneled dialysis catheter placement via the left internal jugular vein.  Chronically occluded right internal jugular vein.   LOCATION:  [facility]    Dictated by (CST): Zana Lui MD on 6/25/2024 at 5:26 PM     Finalized by (CST): Zana Lui MD on 6/25/2024 at 5:32 PM          Assessment and Plan:    Bilateral foot surgical wound infection in the setting of recent amputations of the right foot and amputation of the left second digit on 6/13/2024   - Pathology showed osteomyelitis on the right  - Now with foul-smelling drainage, redness along with fever and leukocytosis   - drainage cul pending  - blood cultures NGTD  - podiatry following plans for eventual surgical intervention  - IV Vancomycin, cefepime and PO flagyl ongoing    History of end stage renal disease  - On hemodialysis through AV fistula that is now clotted  - now s/p temp HD cath placement   - Management as per renal medicine  - Antibiotics will be renally adjusted    Peripheral vascular disease  - Increased risk of limb loss  - recently underwent BLE angioplasty with Dr. Nix  - vascular consult pending     Allergic reaction to Augmentin  - The patient reports itchy rash since starting the Augmentin as an outpatient    Recommendations  - Continue IV Vancomycin, cefepime and PO flagyl  - Follow pending cultures  - Repeat labs tomorrow  - Further recommendations to follow      If you have any questions or concerns please call [facility] Infectious Disease at [phone].     SURESH Meek    6/26/2024  10:42 AM

## 2024-06-26 NOTE — CM/SW NOTE
06/26/24 1000   CM/ Referral Data   Referral Source    Reason for Referral Discharge planning   Informant Patient   Medical Hx   Does patient have an established PCP? Yes   Patient Info   Patient's Current Mental Status at Time of Assessment Alert;Oriented   Patient's Home Environment Condo/Apt no elevator   Number of Levels in Home 1   Number of Stair in Home 3 steps in   Patient lives with Spouse/Significant other   Patient Status Prior to Admission   Independent with ADLs and Mobility No   Pt. requires assistance with Driving   Services in place prior to admission DME/Supplies at home;Home Health Care;Dialysis   Home Health Provider Info [facility]   Type of DME/Supplies Straight Cane;Wheelchair   Dialysis Clinic [facility])   Scheduled Dialysis days M-W-F   Discharge Needs   Anticipated D/C needs Home health care;To be determined   Choice of Post-Acute Provider   Informed patient of right to choose their preferred provider Yes   List of appropriate post-acute services provided to patient/family with quality data No - Declined list   Patient/family choice [facility]     CM self referred to case for discharge planning.  Pt is a 78 year old female admitted with redness and swelling at surgical sites from recent Right transmetatarsal amputation, achilles tendon lengthening, and Left second toe amputation done by Dr Meza 6/13/24     Met w/pt to confirm current services:  [facility] and [facility] for HD    Pt awaiting MRI today and per podiatry note, he will need further surgical intervention.    / to remain available for support and/or discharge planning.     Florence Solomon MBA MSN, RN CTL/  [phone]

## 2024-06-26 NOTE — PLAN OF CARE
Pt A&Ox4, VSS on RA. Tele, A-fib, SCDs on. Pt denies pain at this time. BLE wounds open to air. HD tx completed. Plan for daily woundcare and continuing IV abx. Call light in reach, safety measures in place.     0115: MRI unable to take pt d/t recent dialysis treatment. Contrast cannot be given same day as HD per MRI.  0500: daily  woundcare per podiatry complete. BLE cleaned with betadine wrapped with kerlix.

## 2024-06-26 NOTE — ANESTHESIA PREPROCEDURE EVALUATION
PRE-OP EVALUATION    Patient Name: Darin Bowens    Admit Diagnosis: Malfunction of arteriovenous dialysis fistula, initial encounter ([facility]) [T82.590A]  Cellulitis of lower extremity, unspecified laterality [L03.119]  Nausea and vomiting, unspecified vomiting type [R11.2]    Pre-op Diagnosis: GANGRENE OF BOTH FOOT    RIGHT FOOT WOUND DEBRIDEMENT PARTIAL RESECTION OF 1ST AND 2ND METATARSAL, LEFT FOOT WOUND DEBRIDEMENT WITH POSSIBLE TRANSMETATARSAL AMPUTATION    Anesthesia Procedure: RIGHT FOOT WOUND DEBRIDEMENT PARTIAL RESECTION OF 1ST AND 2ND METATARSAL, LEFT FOOT WOUND DEBRIDEMENT WITH POSSIBLE TRANSMETATARSAL AMPUTATION (Bilateral)  TOE AMPUTATION (Left)    Surgeons and Role:     * Jose R Meza, DPM - Primary      Pre-op vitals reviewed.  Temp: 97.9 °F (36.6 °C)  Pulse: 87  Resp: 17  BP: 106/61  SpO2: 99 %  Body mass index is 28.62 kg/m².    Current medications reviewed.  Hospital Medications:   sodium chloride 0.9 % IV bolus 100 mL  100 mL Intravenous Q30 Min PRN    And    albumin human (Albumin) 25% injection 25 g  25 g Intravenous PRN Dialysis    sodium chloride 0.9% infusion   Intravenous Once    [COMPLETED] gadoterate meglumine (Dotarem) 10 MMOL/20ML injection 20 mL  20 mL Intravenous ONCE PRN    vancomycin (Vancocin) 1,000 mg in sodium chloride 0.9% 250 mL IVPB-ADDV  10 mg/kg Intravenous Once    heparin (Porcine) 1000 UNIT/ML injection - BOLUS IV 5,000 Units  5,000 Units Intravenous Once    heparin (Porcine) 00571 units/250 mL infusion (ACS/AFIB) INITIAL DOSE  1,000 Units/hr Intravenous Once    heparin (Porcine) 01126 units/250mL infusion ACS/AFIB CONTINUOUS  200-3,000 Units/hr Intravenous Continuous    [COMPLETED] ondansetron (Zofran) 4 MG/2ML injection 4 mg  4 mg Intravenous Once    [COMPLETED] cefTRIAXone (Rocephin) 1 g in sodium chloride 0.9% 100 mL IVPB-ADDV  1 g Intravenous Once    [COMPLETED] vancomycin (Vancocin) 2 g in sodium chloride 0.9% 500mL IVPB premix  2,000 mg Intravenous Once     acetaminophen (Tylenol Extra Strength) tab 500 mg  500 mg Oral Q4H PRN    acetaminophen (Tylenol) tab 650 mg  650 mg Oral Q4H PRN    Or    HYDROcodone-acetaminophen (Norco) 5-325 MG per tab 1 tablet  1 tablet Oral Q4H PRN    Or    HYDROcodone-acetaminophen (Norco) 5-325 MG per tab 2 tablet  2 tablet Oral Q4H PRN    HYDROmorphone (Dilaudid) 1 MG/ML injection 0.2 mg  0.2 mg Intravenous Q2H PRN    Or    HYDROmorphone (Dilaudid) 1 MG/ML injection 0.4 mg  0.4 mg Intravenous Q2H PRN    Or    HYDROmorphone (Dilaudid) 1 MG/ML injection 0.8 mg  0.8 mg Intravenous Q2H PRN    polyethylene glycol (PEG 3350) (Miralax) 17 g oral packet 17 g  17 g Oral Daily PRN    sennosides (Senokot) tab 17.2 mg  17.2 mg Oral Nightly PRN    bisacodyl (Dulcolax) 10 MG rectal suppository 10 mg  10 mg Rectal Daily PRN    ondansetron (Zofran) 4 MG/2ML injection 4 mg  4 mg Intravenous Q6H PRN    metoclopramide (Reglan) 5 mg/mL injection 5 mg  5 mg Intravenous Q8H PRN    glucose (Dex4) 15 GM/59ML oral liquid 15 g  15 g Oral Q15 Min PRN    Or    glucose (Glutose) 40% oral gel 15 g  15 g Oral Q15 Min PRN    Or    glucose-vitamin C (Dex-4) chewable tab 4 tablet  4 tablet Oral Q15 Min PRN    Or    dextrose 50% injection 50 mL  50 mL Intravenous Q15 Min PRN    Or    glucose (Dex4) 15 GM/59ML oral liquid 30 g  30 g Oral Q15 Min PRN    Or    glucose (Glutose) 40% oral gel 30 g  30 g Oral Q15 Min PRN    Or    glucose-vitamin C (Dex-4) chewable tab 8 tablet  8 tablet Oral Q15 Min PRN    insulin aspart (NovoLOG) 100 Units/mL FlexPen 4-20 Units  4-20 Units Subcutaneous TID AC and HS    atorvastatin (Lipitor) tab 80 mg  80 mg Oral Nightly    carvedilol (Coreg) tab 12.5 mg  12.5 mg Oral BID with meals    [COMPLETED] heparin (Porcine) 1000 UNIT/ML injection 1,500 Units  1.5 mL Intracatheter Once    ceFEPIme (Maxipime) 1 g in sodium chloride 0.9% 100 mL IVPB-MBP  1 g Intravenous Q24H    [COMPLETED] vancomycin (Vancocin) 1,000 mg in sodium chloride 0.9% 250 mL  IVPB-ADDV  10 mg/kg Intravenous Once    Vancomycin: PHARMACY DOSING  1 each Intravenous See Admin Instructions (RX holding)    metRONIDAZOLE (Flagyl) tab 500 mg  500 mg Oral 2 times per day    [COMPLETED] heparin (Porcine) 5000 UNIT/ML injection        [COMPLETED] lidocaine (Xylocaine) 1 % injection        [COMPLETED] iohexol (OMNIPAQUE) 350 MG/ML injection 20 mL  20 mL Injection ONCE PRN       Outpatient Medications:     Medications Prior to Admission   Medication Sig Dispense Refill Last Dose    dorzolamide 2 % Ophthalmic Solution Place 1 drop into both eyes in the morning and 1 drop before bedtime.       Netarsudil-Latanoprost (ROCKLATAN) 0.02-0.005 % Ophthalmic Solution Apply 1 drop to eye daily. Both eyes       atorvastatin 80 MG Oral Tab Take 1 tablet (80 mg total) by mouth nightly. 90 tablet 2     CLOPIDOGREL 75 MG Oral Tab TAKE 1 TABLET(75 MG) BY MOUTH DAILY 90 tablet 0     Insulin Lispro (HUMALOG) 100 UNIT/ML Subcutaneous Solution 95 units per day via insulin pump. 90 mL 1     Ergocalciferol (VITAMIN D2 OR) Take 50,000 Units by mouth every 30 (thirty) days.       rivaroxaban (XARELTO) 15 MG Oral Tab Take 1 tablet (15 mg total) by mouth daily with food. 90 tablet 3 6/24/2024    CALCIUM ACETATE 667 MG Oral Cap TAKE 1 CAPSULE BY MOUTH THREE TIMES DAILY WITH MEALS 270 capsule 0     COMBIGAN 0.2-0.5 % Ophthalmic Solution Place 1 drop into both eyes 2 (two) times daily.       amoxicillin clavulanate 875-125 MG Oral Tab Take 0.5 tablets by mouth 2 (two) times daily.       ketoconazole 2 % External Shampoo Apply 1 Application topically daily as needed for Itching.       Continuous Blood Gluc Sensor (DEXCOM G6 SENSOR) Does not apply Misc 1 Device Every 10 days. 3 each 1     carvedilol 12.5 MG Oral Tab Take 1 tablet (12.5 mg total) by mouth 2 (two) times daily with meals. Take one tablet (12.5mg total) by mouth two times a day 180 tablet 3     clindamycin 1 % External Lotion Apply twice daily to scalp and chest  alfonzo (Patient not taking: Reported on 6/13/2024) 60 mL 11     Continuous Blood Gluc Transmit (DEXCOM G6 TRANSMITTER) Does not apply Misc Change every 90 days 1 each 3     PTA Insulin via Pump Inject into the skin continuous. humalog insulin   [employer]  Basal Rate : 55.6 standard rate 1.90  I:C - 1 units/4 carbs  Correction Factor - unknown          Allergies: Augmentin [amoxicillin-pot clavulanate] and Lisinopril      Anesthesia Evaluation    Patient summary reviewed.    Anesthetic Complications  (-) history of anesthetic complications         GI/Hepatic/Renal    Negative GI/hepatic/renal ROS.         (+) chronic renal disease and ESRD and hemodialysis                   Cardiovascular        Exercise tolerance: poor     MET: <=4      (+) hypertension     (+) CAD    (+) CABG/stent    (+) valvular problems/murmurs     (+) dysrhythmias and atrial fibrillation  (+) CHF                Endo/Other      (+) diabetes and well controlled, type 1, using insulin                         Pulmonary                    (+) sleep apnea and CPAP      Neuro/Psych    Negative neuro/psych ROS.                          Visual impairement        Past Surgical History:   Procedure Laterality Date    Back surgery  5/16/16    L2-L5 Decomp poss uninstrumented fusion    Cabg      Cath percutaneous  transluminal coronary angioplasty      Cath transcatheter aortic valve replacement      Injection, w/wo contrast, dx/therapeutic substance, epidural/subarachnoid; lumbar/sacral N/A 12/10/2015    Procedure: LUMBAR EPIDURAL;  Surgeon: Rojas Bolanos MD;  Location: [facility]    Injection, w/wo contrast, dx/therapeutic substance, epidural/subarachnoid; lumbar/sacral N/A 1/25/2016    Procedure: LUMBAR EPIDURAL;  Surgeon: Rojas Bolanos MD;  Location: [facility]    Injection, w/wo contrast, dx/therapeutic substance, epidural/subarachnoid; lumbar/sacral N/A 3/2/2016    Procedure: LUMBAR EPIDURAL;  Surgeon:  Corey Mcduffie MD;  Location: [facility]    M-sedaj by  phys perfrmg svc 5+ yr N/A 12/10/2015    Procedure: LUMBAR EPIDURAL;  Surgeon: Rojas Bolanos MD;  Location: [facility]    M-sedaj by  phys perfrmg svc 5+ yr N/A 1/25/2016    Procedure: LUMBAR EPIDURAL;  Surgeon: Rojas Bolanos MD;  Location: [facility]    M-sedaj by  phys perfrmg sv 5+ yr N/A 3/2/2016    Procedure: LUMBAR EPIDURAL;  Surgeon: Corey Mcduffie MD;  Location: [facility]    Other surgical history  10/4/12    cysto-Dr. Calderón    Other surgical history N/A 5/16/2016    Procedure: LUMBAR LAMINECTOMY FUSION W/ BONE GRAFT 3 LEVEL;  Surgeon: CARLY Garcia MD;  Location: [facility] OR    Patient documented not to have experienced any of the following events N/A 12/10/2015    Procedure: LUMBAR EPIDURAL;  Surgeon: Rojas Bolanos MD;  Location: [facility]    Patient documented not to have experienced any of the following events N/A 1/25/2016    Procedure: LUMBAR EPIDURAL;  Surgeon: Rojas Bolanos MD;  Location: [facility]    Patient documented not to have experienced any of the following events N/A 3/2/2016    Procedure: LUMBAR EPIDURAL;  Surgeon: Corey Mcduffie MD;  Location: [facility]    Patient withough preoperative order for iv antibiotic surgical site infection prophylaxis. N/A 12/10/2015    Procedure: LUMBAR EPIDURAL;  Surgeon: Rojas Bolanos MD;  Location: [facility]    Patient withough preoperative order for iv antibiotic surgical site infection prophylaxis. N/A 1/25/2016    Procedure: LUMBAR EPIDURAL;  Surgeon: Rojas Bolanos MD;  Location: [facility]    Patient withough preoperative order for iv antibiotic surgical site infection prophylaxis. N/A 3/2/2016    Procedure: LUMBAR EPIDURAL;  Surgeon: Corey Mcduffie MD;  Location: [facility]     Replace aortic valve open  2012    Valve repair       Social History     Socioeconomic History    Marital status:    Tobacco Use    Smoking status: Never    Smokeless tobacco: Never   Vaping Use    Vaping status: Never Used   Substance and Sexual Activity    Alcohol use: No     Alcohol/week: 0.0 standard drinks of alcohol    Drug use: No     History   Drug Use No     Available pre-op labs reviewed.  Lab Results   Component Value Date    WBC 11.9 (H) 06/26/2024    RBC 2.35 (L) 06/26/2024    HGB 7.0 (L) 06/26/2024    HCT 22.1 (L) 06/26/2024    MCV 94.0 06/26/2024    MCH 29.8 06/26/2024    MCHC 31.7 06/26/2024    RDW 14.8 06/26/2024    .0 06/26/2024     Lab Results   Component Value Date     06/26/2024    K 4.1 06/26/2024     06/26/2024    CO2 25.0 06/26/2024    BUN 45 (H) 06/26/2024    CREATSERUM 6.96 (H) 06/26/2024     (H) 06/26/2024    CA 8.6 06/26/2024            Airway      Mallampati: II  Mouth opening: >3 FB  TM distance: > 6 cm  Neck ROM: full Cardiovascular    Cardiovascular exam normal.  Rhythm: regular  Rate: normal     Dental    Dentition appears grossly intact  Dental appliance(s): lower dentures and upper dentures       Pulmonary    Pulmonary exam normal.                 Other findings            ASA: 4   Plan: MAC and general  NPO status verified and         Comment: Chart check  Plan/risks discussed with: patient  Use of blood product(s) discussed with: patient            Present on Admission:   Anemia in chronic kidney disease, on chronic dialysis (HCC)

## 2024-06-26 NOTE — PROGRESS NOTES
[facility]  Progress Note    Darin Bowens Patient Status:  Observation    1945 MRN [id number]   Location [facility] 3SW-A Attending Yandel Ordoñez MD   Hosp Day # 1 PCP Zachery Hall MD      No acute events  S/p temp HD cath yesterday and subsequent HD  Feels OK  Plan for MRI w/ contrast today - reviewed risk of NSS w/ pt- will plan for daily HDx 3      History:  Past Medical History:    Aortic stenosis    Back problem    CAD (coronary artery disease)    Calculus of kidney    Cataract    CKD (chronic kidney disease) stage 4, GFR 15-29 ml/min (HCC)    Congestive heart disease (HCC)    Coronary atherosclerosis    DDD (degenerative disc disease), lumbar    Diabetes mellitus (HCC)    Dialysis patient (HCC)    fistula upper right arm/MWF    Dyslipidemia    Heart valve disease    High cholesterol    Hypertriglyceridemia    Hypoglycemic reaction    Muscle weakness    cane    Nausea and vomiting, unspecified vomiting type    Onychomycosis    DESI (obstructive sleep apnea) C-PAP     severe AHI 68, O2 sam 84%, CPAP 7    Other and unspecified hyperlipidemia    Overweight(278.02)    Renal disorder    S/P Coronary Artery Stents 2009    Sleep apnea    doesn't use CPAP    Type 1 diabetes mellitus (HCC)    Unspecified essential hypertension    Visual impairment    legally blind     Past Surgical History:   Procedure Laterality Date    Back surgery  16    L2-L5 Decomp poss uninstrumented fusion    Cabg      Cath percutaneous  transluminal coronary angioplasty      Cath transcatheter aortic valve replacement      Injection, w/wo contrast, dx/therapeutic substance, epidural/subarachnoid; lumbar/sacral N/A 12/10/2015    Procedure: LUMBAR EPIDURAL;  Surgeon: Rojas Bolanos MD;  Location: [facility]    Injection, w/wo contrast, dx/therapeutic substance, epidural/subarachnoid; lumbar/sacral N/A 2016    Procedure: LUMBAR EPIDURAL;  Surgeon: Rojas Bolanos MD;   Location: [facility]    Injection, w/wo contrast, dx/therapeutic substance, epidural/subarachnoid; lumbar/sacral N/A 3/2/2016    Procedure: LUMBAR EPIDURAL;  Surgeon: Corey Mcduffie MD;  Location: [facility]    M-sedaj by [facility] perfrmg [facility] 5+ yr N/A 12/10/2015    Procedure: LUMBAR EPIDURAL;  Surgeon: Rojas Bolanos MD;  Location: [facility]    M-sedaj by  phys perfrmg [facility] 5+ yr N/A 1/25/2016    Procedure: LUMBAR EPIDURAL;  Surgeon: Rojas Bolanos MD;  Location: [facility]    M-sedaj by [facility] perfrmg [facility] 5+ yr N/A 3/2/2016    Procedure: LUMBAR EPIDURAL;  Surgeon: Corey Mcduffie MD;  Location: [facility]    Other surgical history  10/4/12    cysto-Dr. Calderón    Other surgical history N/A 5/16/2016    Procedure: LUMBAR LAMINECTOMY FUSION W/ BONE GRAFT 3 LEVEL;  Surgeon: CARLY Garcia MD;  Location: [facility] OR    Patient documented not to have experienced any of the following events N/A 12/10/2015    Procedure: LUMBAR EPIDURAL;  Surgeon: Rojas Bolanos MD;  Location: [facility]    Patient documented not to have experienced any of the following events N/A 1/25/2016    Procedure: LUMBAR EPIDURAL;  Surgeon: Rojas Bolanos MD;  Location: [facility]    Patient documented not to have experienced any of the following events N/A 3/2/2016    Procedure: LUMBAR EPIDURAL;  Surgeon: Corey Mcduffie MD;  Location: [facility]    Patient withough preoperative order for iv antibiotic surgical site infection prophylaxis. N/A 12/10/2015    Procedure: LUMBAR EPIDURAL;  Surgeon: Rojas Bolanos MD;  Location: [facility]    Patient withough preoperative order for iv antibiotic surgical site infection prophylaxis. N/A 1/25/2016    Procedure: LUMBAR EPIDURAL;  Surgeon: Rojas Bolanos MD;  Location: [facility]    Patient withough  preoperative order for iv antibiotic surgical site infection prophylaxis. N/A 3/2/2016    Procedure: LUMBAR EPIDURAL;  Surgeon: Corey Mcduffie MD;  Location: [facility]    Replace aortic valve open  2012    Valve repair       Family History   Problem Relation Age of Onset    Heart Attack Father     Heart Attack Brother     Diabetes Brother     Diabetes Brother       reports that he has never smoked. He has never used smokeless tobacco. He reports that he does not drink alcohol and does not use drugs.    Allergies:  Allergies   Allergen Reactions    Augmentin [Amoxicillin-Pot Clavulanate] RASH    Lisinopril Coughing     Dyspnea         Current Medications:    Current Facility-Administered Medications:     acetaminophen (Tylenol Extra Strength) tab 500 mg, 500 mg, Oral, Q4H PRN    acetaminophen (Tylenol) tab 650 mg, 650 mg, Oral, Q4H PRN **OR** HYDROcodone-acetaminophen (Norco) 5-325 MG per tab 1 tablet, 1 tablet, Oral, Q4H PRN **OR** HYDROcodone-acetaminophen (Norco) 5-325 MG per tab 2 tablet, 2 tablet, Oral, Q4H PRN    HYDROmorphone (Dilaudid) 1 MG/ML injection 0.2 mg, 0.2 mg, Intravenous, Q2H PRN **OR** HYDROmorphone (Dilaudid) 1 MG/ML injection 0.4 mg, 0.4 mg, Intravenous, Q2H PRN **OR** HYDROmorphone (Dilaudid) 1 MG/ML injection 0.8 mg, 0.8 mg, Intravenous, Q2H PRN    polyethylene glycol (PEG 3350) (Miralax) 17 g oral packet 17 g, 17 g, Oral, Daily PRN    sennosides (Senokot) tab 17.2 mg, 17.2 mg, Oral, Nightly PRN    bisacodyl (Dulcolax) 10 MG rectal suppository 10 mg, 10 mg, Rectal, Daily PRN    ondansetron (Zofran) 4 MG/2ML injection 4 mg, 4 mg, Intravenous, Q6H PRN    metoclopramide (Reglan) 5 mg/mL injection 5 mg, 5 mg, Intravenous, Q8H PRN    glucose (Dex4) 15 GM/59ML oral liquid 15 g, 15 g, Oral, Q15 Min PRN **OR** glucose (Glutose) 40% oral gel 15 g, 15 g, Oral, Q15 Min PRN **OR** glucose-vitamin C (Dex-4) chewable tab 4 tablet, 4 tablet, Oral, Q15 Min PRN **OR** dextrose 50%  injection 50 mL, 50 mL, Intravenous, Q15 Min PRN **OR** glucose (Dex4) 15 GM/59ML oral liquid 30 g, 30 g, Oral, Q15 Min PRN **OR** glucose (Glutose) 40% oral gel 30 g, 30 g, Oral, Q15 Min PRN **OR** glucose-vitamin C (Dex-4) chewable tab 8 tablet, 8 tablet, Oral, Q15 Min PRN    insulin aspart (NovoLOG) 100 Units/mL FlexPen 4-20 Units, 4-20 Units, Subcutaneous, TID AC and HS    atorvastatin (Lipitor) tab 80 mg, 80 mg, Oral, Nightly    carvedilol (Coreg) tab 12.5 mg, 12.5 mg, Oral, BID with meals    sodium chloride 0.9 % IV bolus 100 mL, 100 mL, Intravenous, Q30 Min PRN **AND** albumin human (Albumin) 25% injection 25 g, 25 g, Intravenous, PRN Dialysis    ceFEPIme (Maxipime) 1 g in sodium chloride 0.9% 100 mL IVPB-MBP, 1 g, Intravenous, Q24H    Vancomycin: PHARMACY DOSING, 1 each, Intravenous, See Admin Instructions (RX holding)    metRONIDAZOLE (Flagyl) tab 500 mg, 500 mg, Oral, 2 times per day  Home Medications:  No current outpatient medications on file.       Review of Systems:  See HPI; A total of 12 systems reviewed and otherwise unremarkable.    Physical Exam:  Vital signs: Blood pressure 103/54, pulse 61, temperature 98.3 °F (36.8 °C), temperature source Oral, resp. rate 17, height 6' (1.829 m), weight 211 lb (95.7 kg), SpO2 99%.  General: No acute distress. Alert and oriented x 3.  HEENT: Moist mucous membranes. EOM-I. PERRL  Neck: No lymphadenopathy.  No JVD. No carotid bruits.  Respiratory: Clear to auscultation bilaterally.  No wheezes. No rhonchi.  Cardiovascular: S1, S2.  Regular rate and rhythm.  No murmurs. Equal pulses   Abdomen: Soft, nontender, nondistended.  Positive bowel sounds. No rebound tenderness   Neurologic: No focal neurological deficits.   Ext ; + edema; BLE wounds noted- dressing intact  Integument: No lesions. No erythema.  Psychiatric: Appropriate mood and affect.  Recent Labs     06/24/24  5058 06/26/24  0435   WBC 14.3* 11.9*   HGB 9.4* 7.0*   MCV 93.6 94.0   .0 203.0        Recent Labs     06/24/24 2218 06/26/24  0435   * 137   K 4.4 4.1   CL 95* 101   CO2 23.0 25.0   BUN 76* 45*   CREATSERUM 10.80* 6.96*   CA 9.1 8.6       Recent Labs     06/24/24 2218   ALT 22   AST 67*   ALB 2.7*           Imaging:  Reviwed     Impression:  ESRD - due to DM/HTN  - plan for HD today after MRI - I reviewed risk of NSF with patient in detail. Serial HD will be done in effort to remove the MRI contrast agent and help minimize risk.  - await surgery eval regarding AVF revision/intervention  Foot infection - s/p recent  L foot toe amputation on 6/13  - on abx; wound care  - podiatry /ID following; they have requested MRI w/ contrast as noted above  PAD- as above  CHF; s/p AVR; volume optimization w/ HD/UF  Anemia due to CKD- DALE w/ HD  DM/HTN  HL    Thank you for allowing me to participate in this patient's care.  Please feel free to call me with any questions or concerns.    Aj Bardales MD  6/26/2024

## 2024-06-26 NOTE — PROGRESS NOTES
Critical access hospital and [facility]  Hospitalist Progress Note                                                                     [facility]   part of [facility]        Darin Phanschmidt  7/17/1945    SUBJECTIVE:  Patient seen and examined.   Just returned from MRI.  Family bedside.  Denies CP/SOB.  NAD.       OBJECTIVE:  Temp:  [97.9 °F (36.6 °C)-98.3 °F (36.8 °C)] 97.9 °F (36.6 °C)  Pulse:  [56-87] 87  Resp:  [16-17] 17  BP: ()/(44-61) 106/61  SpO2:  [99 %-100 %] 99 %  Exam  Gen: No acute distress, alert and oriented x3, no focal neurologic deficits  Pulm: Lungs clear bilaterally, normal respiratory effort  CV: Heart with regular rate and rhythm, no murmur.  Normal PMI.    Abd: Abdomen soft, nontender, nondistended, no organomegaly, bowel sounds present  MSK: Full range of motion in extremities, no clubbing, no cyanosis. Both feet wrapped  Skin: no rashes or lesions    Labs:   Recent Labs   Lab 06/24/24 2218 06/26/24  0435   WBC 14.3* 11.9*   HGB 9.4* 7.0*   MCV 93.6 94.0   .0 203.0       Recent Labs   Lab 06/24/24 2218 06/26/24 0435   * 137   K 4.4 4.1   CL 95* 101   CO2 23.0 25.0   BUN 76* 45*   CREATSERUM 10.80* 6.96*   CA 9.1 8.6   * 196*       Recent Labs   Lab 06/24/24 2218   ALT 22   AST 67*   ALB 2.7*       Recent Labs   Lab 06/25/24  1737 06/25/24  2112 06/26/24  0511 06/26/24  0641 06/26/24  1412   PGLU 333* 225* 211* 213* 317*       Meds:   Scheduled:    sodium chloride   Intravenous Once    vancomycin  10 mg/kg Intravenous Once    insulin aspart  4-20 Units Subcutaneous TID AC and HS    atorvastatin  80 mg Oral Nightly    carvedilol  12.5 mg Oral BID with meals    cefepime  1 g Intravenous Q24H    Vancomycin IV  1 each Intravenous See Admin Instructions (RX holding)    metRONIDAZOLE  500 mg Oral 2 times per day     Continuous Infusions:   PRN:   sodium chloride **AND** albumin human    acetaminophen    acetaminophen **OR**  HYDROcodone-acetaminophen **OR** HYDROcodone-acetaminophen    HYDROmorphone **OR** HYDROmorphone **OR** HYDROmorphone    polyethylene glycol (PEG 3350)    sennosides    bisacodyl    ondansetron    metoclopramide    glucose **OR** glucose **OR** glucose-vitamin C **OR** dextrose **OR** glucose **OR** glucose **OR** glucose-vitamin C    Assessment/Plan:  Principal Problem:    Cellulitis of lower extremity, unspecified laterality  Active Problems:    Anemia in chronic kidney disease, on chronic dialysis ([facility])    Nausea and vomiting, unspecified vomiting type    Malfunction of arteriovenous dialysis fistula, initial encounter ([facility])    ESRD (end stage renal disease) ([facility])    78 year old male with PMH sig for CAD status post stents, PAD status post bilateral lower extremity stents, atrial fibrillation on Xarelto, diabetes mellitus type 2 on insulin pump, end-stage renal disease on dialysis, chronic CHF with LVH, status post AVR, history of stroke, hypertension, hyperlipidemia, DESI and recent right TMA and left foot second digit amputation on 6/13 by Dr. Meza presented with poor surgical wound healing.     Postop wound infection  R TMA, L foot 2nd digit amputation on 6/13, Dr. Meza/Podiatry  - obtain cultures if possible  - cont cefepime/flagyljennifero; consult ID  - consult Podiatry, will likely need further debridement/I&D - tentative plan for tomorrow   - elevated ESR/CRP  - MRI results pending   - Arterial US in April, likely has more distal PAD, may require further amputation, await vascular/podiatry evaluation      ESRD on HD  Malfunctioning AVF  - vascular consulted  - nephrology o/c      CAD s/p stents  PAD s/p b/l LE stents  Chronic CHF w/ LVH  S/p AVR  Atrial fibrillation  - coreg, lipitor  - holding xarelto for procedure - start heparin infusion prophylaxis      DM2  - insulin pump  - ISS/accucheks     Essential HTN  - coreg     Hyperlipidemia  - statin     DESI  - cpap at bedtime         FEN: regular diet,  PT/OT  Proph: SCDs, heparin gtt  Code status: Full code      Bello Barrios MD  [facility]  Message over [employer]  Pager: [phone]

## 2024-06-26 NOTE — CONSULTS
Darin Bowens 78 year old male with significant past medical history as listed below presents with worsening of his bilateral surgical wounds.  Patient reports seeing ischemic changes to his feet over the weekend with increased redness and order.  Patient reports he felt unwell over the weekend and was vomiting.  Currently denies any shortness of breath, chest pain, nausea fever vomiting chills.    Past Medical History:    Aortic stenosis    Back problem    CAD (coronary artery disease)    Calculus of kidney    Cataract    CKD (chronic kidney disease) stage 4, GFR 15-29 ml/min ([facility])    Congestive heart disease ([facility])    Coronary atherosclerosis    DDD (degenerative disc disease), lumbar    Diabetes mellitus ([facility])    Dialysis patient ([facility])    fistula upper right arm/MWF    Dyslipidemia    Heart valve disease    High cholesterol    Hypertriglyceridemia    Hypoglycemic reaction    Muscle weakness    cane    Nausea and vomiting, unspecified vomiting type    Onychomycosis    DESI (obstructive sleep apnea) C-PAP     severe AHI 68, O2 sam 84%, CPAP 7    Other and unspecified hyperlipidemia    Overweight(278.02)    Renal disorder    S/P Coronary Artery Stents 12/2009    Sleep apnea    doesn't use CPAP    Type 1 diabetes mellitus ([facility])    Unspecified essential hypertension    Visual impairment    legally blind     Past Surgical History:   Procedure Laterality Date    Back surgery  5/16/16    L2-L5 Decomp poss uninstrumented fusion    Cabg      Cath percutaneous  transluminal coronary angioplasty      Cath transcatheter aortic valve replacement      Injection, w/wo contrast, dx/therapeutic substance, epidural/subarachnoid; lumbar/sacral N/A 12/10/2015    Procedure: LUMBAR EPIDURAL;  Surgeon: Rojas Bolanos MD;  Location: [facility] [place] [facility]    Injection, w/wo contrast, dx/therapeutic substance, epidural/subarachnoid; lumbar/sacral N/A 1/25/2016    Procedure: LUMBAR EPIDURAL;  Surgeon: Rojas Bolanos MD;   Location: [facility]    Injection, w/wo contrast, dx/therapeutic substance, epidural/subarachnoid; lumbar/sacral N/A 3/2/2016    Procedure: LUMBAR EPIDURAL;  Surgeon: Corey Mcduffie MD;  Location: [facility]    M-sedaj by [facility] per[facility] 5+ yr N/A 12/10/2015    Procedure: LUMBAR EPIDURAL;  Surgeon: Rojas Bolanos MD;  Location: [facility]    M-sedaj by  phys perfrmg [facility] 5+ yr N/A 1/25/2016    Procedure: LUMBAR EPIDURAL;  Surgeon: Rojas Bolanos MD;  Location: [facility]    M-sedaj by [facility] per[facility] 5+ yr N/A 3/2/2016    Procedure: LUMBAR EPIDURAL;  Surgeon: Corey Mcduffie MD;  Location: [facility]    Other surgical history  10/4/12    cysto-Dr. Calderón    Other surgical history N/A 5/16/2016    Procedure: LUMBAR LAMINECTOMY FUSION W/ BONE GRAFT 3 LEVEL;  Surgeon: CARLY Garcia MD;  Location: [facility] OR    Patient documented not to have experienced any of the following events N/A 12/10/2015    Procedure: LUMBAR EPIDURAL;  Surgeon: Rojas Bolanos MD;  Location: [facility]    Patient documented not to have experienced any of the following events N/A 1/25/2016    Procedure: LUMBAR EPIDURAL;  Surgeon: Rojas Bolanos MD;  Location: [facility]    Patient documented not to have experienced any of the following events N/A 3/2/2016    Procedure: LUMBAR EPIDURAL;  Surgeon: Corey Mcduffie MD;  Location: [facility]    Patient withough preoperative order for iv antibiotic surgical site infection prophylaxis. N/A 12/10/2015    Procedure: LUMBAR EPIDURAL;  Surgeon: Rojas Bolanos MD;  Location: [facility]    Patient withough preoperative order for iv antibiotic surgical site infection prophylaxis. N/A 1/25/2016    Procedure: LUMBAR EPIDURAL;  Surgeon: Rojas Bolanos MD;  Location: [facility]    Patient withough  preoperative order for iv antibiotic surgical site infection prophylaxis. N/A 3/2/2016    Procedure: LUMBAR EPIDURAL;  Surgeon: Corey Mcduffie MD;  [place] [facility] [place] [facility]    Replace aortic valve open  2012    Valve repair       Family History   Problem Relation Age of Onset    Heart Attack Father     Heart Attack Brother     Diabetes Brother     Diabetes Brother      Social History     Socioeconomic History    Marital status:      Spouse name: Not on file    Number of children: Not on file    Years of education: Not on file    Highest education level: Not on file   Occupational History    Not on file   Tobacco Use    Smoking status: Never    Smokeless tobacco: Never   Vaping Use    Vaping status: Never Used   Substance and Sexual Activity    Alcohol use: No     Alcohol/week: 0.0 standard drinks of alcohol    Drug use: No    Sexual activity: Not on file   Other Topics Concern    Not on file   Social History Narrative    Not on file     Social Determinants of Health     Financial Resource Strain: Not on file   Food Insecurity: No Food Insecurity (6/25/2024)    Food Insecurity     Food Insecurity: Never true   Transportation Needs: No Transportation Needs (6/25/2024)    Transportation Needs     Lack of Transportation: No     Car Seat: Not on file   Physical Activity: Not on file   Stress: Not on file   Social Connections: Low Risk  (9/7/2021)    Received from [facility]    Social Connections     Do you have someone you could call for help if needed?: Yes   Housing Stability: Low Risk  (6/25/2024)    Housing Stability     Housing Instability: No     Housing Instability Emergency: Not on file     Crib or Bassinette: Not on file     Allergies   Allergen Reactions    Augmentin [Amoxicillin-Pot Clavulanate] RASH    Lisinopril Coughing     Dyspnea         ROS  Constitutional: negative for - fever, chills, weight change  Integument: Right TMA site gangrene, gangrene at second  toe amputation site, left foot  Respiratory: negative for - wheezing, SOB, chest congestion, cough  Cardiovascular: negative for - (+) lower extremity edema, cyanosis, palpitations, chest pain  Gastrointestinal: negative for - abdominal pain, diarrhea, heartburn, nausea  Musculoskeletal: Right foot TMA  Neurological: +numbness    Physical exam:   Vitals:    06/25/24 2051   BP: 102/56   Pulse: 70   Resp: 16   Temp: 98.1 °F (36.7 °C)     General: NAD  HEENT: EOMI, PERRLA  Respiratory: No wheezing, no rhonchi  Musculoskeletal: Right foot TMA  Neuro: No focal deficits  Psych: Mood and affect normal    Lower extremity exam: Pedal pulses nonpalpable.  Sensation diminished.  Right foot: Necrosis with bogginess, erythema and malodor noted along the medial aspect of the TMA site  Left foot: Necrosis at the second toe amputation site, ischemic changes of the great toe, third toe and fourth toe.  + Purulence    Recent Labs   Lab 06/24/24  2218   RBC 3.14*   HGB 9.4*   HCT 29.4*   MCV 93.6   MCH 29.9   MCHC 32.0   RDW 14.7   NEPRELIM 13.01*   WBC 14.3*   .0       Recent Labs   Lab 06/24/24  2218   *   BUN 76*   CREATSERUM 10.80*   EGFRCR 4*   CA 9.1   ALB 2.7*   *   K 4.4   CL 95*   CO2 23.0   ALKPHO 149*   AST 67*   ALT 22   BILT 0.6   TP 7.1     Last A1c value was 5.9% done 6/14/2024.    No results found for this visit on 06/25/24.    IR CENTRAL VENOUS ACCESS    Result Date: 6/25/2024  CONCLUSION:  Successful non-tunneled dialysis catheter placement via the left internal jugular vein.  Chronically occluded right internal jugular vein.   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 Nix    Patient will require surgical intervention  Patient is at high risk for further soft tissue loss, worsening infection, chronic nonhealing wounds and proximal amputation  Will plan for surgical intervention    Discussed with the patient's daughter at length.    Will follow    Thank you for the consult    Jose R Meza D.P.M.

## 2024-06-26 NOTE — PLAN OF CARE
Ok per Dr. Bardales for 1U PRBC's to be given post MRI's or with HD this evening as to not further delay scans.

## 2024-06-27 ENCOUNTER — ANESTHESIA (OUTPATIENT)
Dept: SURGERY | Facility: HOSPITAL | Age: 79
End: 2024-06-27
Payer: MEDICARE

## 2024-06-27 ENCOUNTER — APPOINTMENT (OUTPATIENT)
Dept: GENERAL RADIOLOGY | Facility: HOSPITAL | Age: 79
End: 2024-06-27
Attending: PODIATRIST
Payer: MEDICARE

## 2024-06-27 LAB
ANION GAP SERPL CALC-SCNC: 12 MMOL/L (ref 0–18)
BASOPHILS # BLD AUTO: 0.02 X10(3) UL (ref 0–0.2)
BASOPHILS NFR BLD AUTO: 0.2 %
BUN BLD-MCNC: 39 MG/DL (ref 9–23)
CALCIUM BLD-MCNC: 8.8 MG/DL (ref 8.5–10.1)
CHLORIDE SERPL-SCNC: 100 MMOL/L (ref 98–112)
CO2 SERPL-SCNC: 24 MMOL/L (ref 21–32)
CREAT BLD-MCNC: 5.71 MG/DL
EGFRCR SERPLBLD CKD-EPI 2021: 10 ML/MIN/1.73M2 (ref 60–?)
EOSINOPHIL # BLD AUTO: 0.09 X10(3) UL (ref 0–0.7)
EOSINOPHIL NFR BLD AUTO: 0.7 %
ERYTHROCYTE [DISTWIDTH] IN BLOOD BY AUTOMATED COUNT: 16.8 %
GLUCOSE BLD-MCNC: 105 MG/DL (ref 70–99)
GLUCOSE BLD-MCNC: 140 MG/DL (ref 70–99)
GLUCOSE BLD-MCNC: 159 MG/DL (ref 70–99)
GLUCOSE BLD-MCNC: 170 MG/DL (ref 70–99)
GLUCOSE BLD-MCNC: 189 MG/DL (ref 70–99)
GLUCOSE BLD-MCNC: 284 MG/DL (ref 70–99)
HCT VFR BLD AUTO: 24.4 %
HGB BLD-MCNC: 8.1 G/DL
IMM GRANULOCYTES # BLD AUTO: 0.14 X10(3) UL (ref 0–1)
IMM GRANULOCYTES NFR BLD: 1.1 %
LYMPHOCYTES # BLD AUTO: 0.68 X10(3) UL (ref 1–4)
LYMPHOCYTES NFR BLD AUTO: 5.5 %
MCH RBC QN AUTO: 29.1 PG (ref 26–34)
MCHC RBC AUTO-ENTMCNC: 33.2 G/DL (ref 31–37)
MCV RBC AUTO: 87.8 FL
MONOCYTES # BLD AUTO: 1.23 X10(3) UL (ref 0.1–1)
MONOCYTES NFR BLD AUTO: 9.9 %
NEUTROPHILS # BLD AUTO: 10.26 X10 (3) UL (ref 1.5–7.7)
NEUTROPHILS # BLD AUTO: 10.26 X10(3) UL (ref 1.5–7.7)
NEUTROPHILS NFR BLD AUTO: 82.6 %
OSMOLALITY SERPL CALC.SUM OF ELEC: 292 MOSM/KG (ref 275–295)
PLATELET # BLD AUTO: 154 10(3)UL (ref 150–450)
POTASSIUM SERPL-SCNC: 3.9 MMOL/L (ref 3.5–5.1)
RBC # BLD AUTO: 2.78 X10(6)UL
SODIUM SERPL-SCNC: 136 MMOL/L (ref 136–145)
VANCOMYCIN SERPL-MCNC: 27.9 UG/ML (ref ?–40)
WBC # BLD AUTO: 12.4 X10(3) UL (ref 4–11)

## 2024-06-27 PROCEDURE — 0QBP0ZZ EXCISION OF LEFT METATARSAL, OPEN APPROACH: ICD-10-PCS | Performed by: PODIATRIST

## 2024-06-27 PROCEDURE — 0Y6M0ZB DETACHMENT AT RIGHT FOOT, PARTIAL 2ND RAY, OPEN APPROACH: ICD-10-PCS | Performed by: PODIATRIST

## 2024-06-27 PROCEDURE — 99233 SBSQ HOSP IP/OBS HIGH 50: CPT | Performed by: INTERNAL MEDICINE

## 2024-06-27 PROCEDURE — 0Y6M0Z9 DETACHMENT AT RIGHT FOOT, PARTIAL 1ST RAY, OPEN APPROACH: ICD-10-PCS | Performed by: PODIATRIST

## 2024-06-27 RX ORDER — SODIUM CHLORIDE 9 MG/ML
INJECTION, SOLUTION INTRAVENOUS CONTINUOUS PRN
Status: DISCONTINUED | OUTPATIENT
Start: 2024-06-27 | End: 2024-06-27 | Stop reason: SURG

## 2024-06-27 RX ORDER — DIPHENHYDRAMINE HYDROCHLORIDE 50 MG/ML
INJECTION INTRAMUSCULAR; INTRAVENOUS
Status: COMPLETED
Start: 2024-06-27 | End: 2024-06-27

## 2024-06-27 RX ORDER — LABETALOL HYDROCHLORIDE 5 MG/ML
5 INJECTION, SOLUTION INTRAVENOUS EVERY 5 MIN PRN
Status: DISCONTINUED | OUTPATIENT
Start: 2024-06-27 | End: 2024-06-27 | Stop reason: HOSPADM

## 2024-06-27 RX ORDER — HYDROCODONE BITARTRATE AND ACETAMINOPHEN 5; 325 MG/1; MG/1
1 TABLET ORAL ONCE AS NEEDED
Status: DISCONTINUED | OUTPATIENT
Start: 2024-06-27 | End: 2024-06-27 | Stop reason: HOSPADM

## 2024-06-27 RX ORDER — HYDROMORPHONE HYDROCHLORIDE 1 MG/ML
0.4 INJECTION, SOLUTION INTRAMUSCULAR; INTRAVENOUS; SUBCUTANEOUS EVERY 5 MIN PRN
Status: DISCONTINUED | OUTPATIENT
Start: 2024-06-27 | End: 2024-06-27 | Stop reason: HOSPADM

## 2024-06-27 RX ORDER — DIPHENHYDRAMINE HYDROCHLORIDE 50 MG/ML
12.5 INJECTION INTRAMUSCULAR; INTRAVENOUS AS NEEDED
Status: DISCONTINUED | OUTPATIENT
Start: 2024-06-27 | End: 2024-06-27 | Stop reason: HOSPADM

## 2024-06-27 RX ORDER — MIDAZOLAM HYDROCHLORIDE 1 MG/ML
INJECTION INTRAMUSCULAR; INTRAVENOUS AS NEEDED
Status: DISCONTINUED | OUTPATIENT
Start: 2024-06-27 | End: 2024-06-27 | Stop reason: SURG

## 2024-06-27 RX ORDER — SODIUM HYPOCHLORITE 2.5 MG/ML
SOLUTION TOPICAL 2 TIMES DAILY
Status: DISCONTINUED | OUTPATIENT
Start: 2024-06-27 | End: 2024-07-02

## 2024-06-27 RX ORDER — METOCLOPRAMIDE HYDROCHLORIDE 5 MG/ML
5 INJECTION INTRAMUSCULAR; INTRAVENOUS EVERY 8 HOURS PRN
Status: DISCONTINUED | OUTPATIENT
Start: 2024-06-27 | End: 2024-06-27 | Stop reason: HOSPADM

## 2024-06-27 RX ORDER — HYDROMORPHONE HYDROCHLORIDE 1 MG/ML
0.6 INJECTION, SOLUTION INTRAMUSCULAR; INTRAVENOUS; SUBCUTANEOUS EVERY 5 MIN PRN
Status: DISCONTINUED | OUTPATIENT
Start: 2024-06-27 | End: 2024-06-27 | Stop reason: HOSPADM

## 2024-06-27 RX ORDER — ONDANSETRON 2 MG/ML
4 INJECTION INTRAMUSCULAR; INTRAVENOUS EVERY 6 HOURS PRN
Status: DISCONTINUED | OUTPATIENT
Start: 2024-06-27 | End: 2024-06-27 | Stop reason: HOSPADM

## 2024-06-27 RX ORDER — HYDROCODONE BITARTRATE AND ACETAMINOPHEN 5; 325 MG/1; MG/1
2 TABLET ORAL ONCE AS NEEDED
Status: DISCONTINUED | OUTPATIENT
Start: 2024-06-27 | End: 2024-06-27 | Stop reason: HOSPADM

## 2024-06-27 RX ORDER — NALOXONE HYDROCHLORIDE 0.4 MG/ML
0.08 INJECTION, SOLUTION INTRAMUSCULAR; INTRAVENOUS; SUBCUTANEOUS AS NEEDED
Status: DISCONTINUED | OUTPATIENT
Start: 2024-06-27 | End: 2024-06-27 | Stop reason: HOSPADM

## 2024-06-27 RX ORDER — ACETAMINOPHEN 500 MG
1000 TABLET ORAL ONCE AS NEEDED
Status: DISCONTINUED | OUTPATIENT
Start: 2024-06-27 | End: 2024-06-27 | Stop reason: HOSPADM

## 2024-06-27 RX ORDER — HYDROMORPHONE HYDROCHLORIDE 1 MG/ML
0.2 INJECTION, SOLUTION INTRAMUSCULAR; INTRAVENOUS; SUBCUTANEOUS EVERY 5 MIN PRN
Status: DISCONTINUED | OUTPATIENT
Start: 2024-06-27 | End: 2024-06-27 | Stop reason: HOSPADM

## 2024-06-27 RX ORDER — MEPERIDINE HYDROCHLORIDE 25 MG/ML
12.5 INJECTION INTRAMUSCULAR; INTRAVENOUS; SUBCUTANEOUS AS NEEDED
Status: DISCONTINUED | OUTPATIENT
Start: 2024-06-27 | End: 2024-06-27 | Stop reason: HOSPADM

## 2024-06-27 RX ADMIN — MIDAZOLAM HYDROCHLORIDE 1 MG: 1 INJECTION INTRAMUSCULAR; INTRAVENOUS at 11:49:00

## 2024-06-27 RX ADMIN — MIDAZOLAM HYDROCHLORIDE 1 MG: 1 INJECTION INTRAMUSCULAR; INTRAVENOUS at 11:39:00

## 2024-06-27 RX ADMIN — SODIUM CHLORIDE: 9 INJECTION, SOLUTION INTRAVENOUS at 11:39:00

## 2024-06-27 NOTE — CONSULTS
Vascular Surgery  New Patient Consultation    Name: Darin Bowens  MRN: [id number]  : 1945    Referring Provider: No ref. provider found    CC: Nonhealing wounds of the bilateral lower extremities    HPI: 78-year-old male with history of hypertension, hyperlipidemia, end-stage renal disease on dialysis, diabetes who is well-known to me who has undergone numerous interventions for limb salvage.  He recently sustained wounds and underwent attempted deep vein arterialization but was found to have severe infra malleoli are disease with his venous system and arterial system.  He was maximized and cleared to proceed with TMA.  He understood that he was at high risk for limb loss.  He has had progression in his left toe post toe amputations as well as has had some ischemia in the medial edge of his right TMA.  Vascular consulted. Denies any pain. Sitting comfortably on dialysis.    Past Medical History:    Aortic stenosis    Back problem    CAD (coronary artery disease)    Calculus of kidney    Cataract    CKD (chronic kidney disease) stage 4, GFR 15-29 ml/min ([facility])    Congestive heart disease ([facility])    Coronary atherosclerosis    DDD (degenerative disc disease), lumbar    Diabetes mellitus ([facility])    Dialysis patient ([facility])    fistula upper right arm/MWF    Dyslipidemia    Heart valve disease    High cholesterol    Hypertriglyceridemia    Hypoglycemic reaction    Muscle weakness    cane    Nausea and vomiting, unspecified vomiting type    Onychomycosis    DESI (obstructive sleep apnea) C-PAP     severe AHI 68, O2 sam 84%, CPAP 7    Other and unspecified hyperlipidemia    Overweight(278.02)    Renal disorder    S/P Coronary Artery Stents 2009    Sleep apnea    doesn't use CPAP    Type 1 diabetes mellitus ([facility])    Unspecified essential hypertension    Visual impairment    legally blind       Past Surgical History:   Procedure Laterality Date    Back surgery  16    L2-L5 Decomp poss uninstrumented  fusion    Cabg      Cath percutaneous  transluminal coronary angioplasty      Cath transcatheter aortic valve replacement      Injection, w/wo contrast, dx/therapeutic substance, epidural/subarachnoid; lumbar/sacral N/A 12/10/2015    Procedure: LUMBAR EPIDURAL;  Surgeon: Rojas Bolanos MD;  Location: [facility]    Injection, w/wo contrast, dx/therapeutic substance, epidural/subarachnoid; lumbar/sacral N/A 1/25/2016    Procedure: LUMBAR EPIDURAL;  Surgeon: Rojas Bolanos MD;  Location: [facility]    Injection, w/wo contrast, dx/therapeutic substance, epidural/subarachnoid; lumbar/sacral N/A 3/2/2016    Procedure: LUMBAR EPIDURAL;  Surgeon: Corey Mcduffie MD;  Location: [facility]    M-sedaj by  phys perfrmg [facility] 5+ yr N/A 12/10/2015    Procedure: LUMBAR EPIDURAL;  Surgeon: Rojas Bolanos MD;  Location: [facility]    M-sedaj by  phys perfrmg [facility]vc 5+ yr N/A 1/25/2016    Procedure: LUMBAR EPIDURAL;  Surgeon: Rojas Bolanos MD;  Location: [facility]    M-sedaj by  phys perfrmg [facility]vc 5+ yr N/A 3/2/2016    Procedure: LUMBAR EPIDURAL;  Surgeon: Corey Mcduffie MD;  Location: [facility]    Other surgical history  10/4/12    cysto-Dr. Calderón    Other surgical history N/A 5/16/2016    Procedure: LUMBAR LAMINECTOMY FUSION W/ BONE GRAFT 3 LEVEL;  Surgeon: CARLY Garcia MD;  Location:  MAIN OR    Patient documented not to have experienced any of the following events N/A 12/10/2015    Procedure: LUMBAR EPIDURAL;  Surgeon: Rojas Bolanos MD;  Location: [facility]    Patient documented not to have experienced any of the following events N/A 1/25/2016    Procedure: LUMBAR EPIDURAL;  Surgeon: Rojas Bolanos MD;  Location: [facility]    Patient documented not to have experienced any of the following events N/A 3/2/2016    Procedure: LUMBAR EPIDURAL;  Surgeon:  Corey Mcduffie MD;  Location: [facility]    Patient withough preoperative order for iv antibiotic surgical site infection prophylaxis. N/A 12/10/2015    Procedure: LUMBAR EPIDURAL;  Surgeon: Rojas Bolanos MD;  Location: [facility]    Patient withough preoperative order for iv antibiotic surgical site infection prophylaxis. N/A 1/25/2016    Procedure: LUMBAR EPIDURAL;  Surgeon: Rojas Bolanos MD;  Location: [facility]IN [facility]NAG[facility]    Patient withough preoperative order for iv antibiotic surgical site infection prophylaxis. N/A 3/2/2016    Procedure: LUMBAR EPIDURAL;  Surgeon: Corey Mcduffie MD;  Location: [facility]    Replace aortic valve open  2012    Valve repair         Allergies   Allergen Reactions    Augmentin [Amoxicillin-Pot Clavulanate] RASH    Lisinopril Coughing     Dyspnea         No current facility-administered medications on file prior to encounter.     Current Outpatient Medications on File Prior to Encounter   Medication Sig Dispense Refill    dorzolamide 2 % Ophthalmic Solution Place 1 drop into both eyes in the morning and 1 drop before bedtime.      Netarsudil-Latanoprost (ROCKLATAN) 0.02-0.005 % Ophthalmic Solution Apply 1 drop to eye daily. Both eyes      atorvastatin 80 MG Oral Tab Take 1 tablet (80 mg total) by mouth nightly. 90 tablet 2    CLOPIDOGREL 75 MG Oral Tab TAKE 1 TABLET(75 MG) BY MOUTH DAILY 90 tablet 0    Insulin Lispro (HUMALOG) 100 UNIT/ML Subcutaneous Solution 95 units per day via insulin pump. 90 mL 1    Ergocalciferol (VITAMIN D2 OR) Take 50,000 Units by mouth every 30 (thirty) days.      rivaroxaban (XARELTO) 15 MG Oral Tab Take 1 tablet (15 mg total) by mouth daily with food. 90 tablet 3    CALCIUM ACETATE 667 MG Oral Cap TAKE 1 CAPSULE BY MOUTH THREE TIMES DAILY WITH MEALS 270 capsule 0    COMBIGAN 0.2-0.5 % Ophthalmic Solution Place 1 drop into both eyes 2 (two) times daily.      amoxicillin  clavulanate 875-125 MG Oral Tab Take 0.5 tablets by mouth 2 (two) times daily.      ketoconazole 2 % External Shampoo Apply 1 Application topically daily as needed for Itching.      Continuous Blood Gluc Sensor (DEXCOM G6 SENSOR) Does not apply Misc 1 Device Every 10 days. 3 each 1    carvedilol 12.5 MG Oral Tab Take 1 tablet (12.5 mg total) by mouth 2 (two) times daily with meals. Take one tablet (12.5mg total) by mouth two times a day 180 tablet 3    clindamycin 1 % External Lotion Apply twice daily to scalp and chest rashes (Patient not taking: Reported on 6/13/2024) 60 mL 11    Continuous Blood Gluc Transmit (DEXCOM G6 TRANSMITTER) Does not apply Misc Change every 90 days 1 each 3    PTA Insulin via Pump Inject into the skin continuous. humalog insulin   [employer]  Basal Rate : 55.6 standard rate 1.90  I:C - 1 units/4 carbs  Correction Factor - unknown         Social History     Socioeconomic History    Marital status:    Tobacco Use    Smoking status: Never    Smokeless tobacco: Never   Vaping Use    Vaping status: Never Used   Substance and Sexual Activity    Alcohol use: No     Alcohol/week: 0.0 standard drinks of alcohol    Drug use: No        Family History   Problem Relation Age of Onset    Heart Attack Father     Heart Attack Brother     Diabetes Brother     Diabetes Brother        ROS:  See HPI above for pertinent positives and negatives.   Constitutional: No fevers, chills, fatigue or night sweats.  ENT: No mouth pain, neck pain, running nose, headaches or swollen glands.  Skin: No rashes, pruritus or skin changes,  Respiratory: Denies cough, wheezing or shortness of breath.  CV: Denies chest pain, palpitations, orthopnea, PND or dizziness.  Musculoskeletal: No joint pain, stiffness or swelling.  GI: No nausea, vomiting or diarrhea. No blood in stools.  Neurologic: No seizures, tremors, weakness or numbness.     Physical Examination  Vitals:    06/26/24 1430 06/26/24 1555 06/26/24 1729 06/26/24  1800   BP: 106/61 102/43     BP Location: Left arm Left arm     Pulse: 87 65 90    Resp: 17 17     Temp: 97.9 °F (36.6 °C) 98.1 °F (36.7 °C)  98.1 °F (36.7 °C)   TempSrc: Oral Oral     SpO2: 99% 98% 99%    Weight:       Height:          Skin no rashes or skin lesions identified  Neck supple; no LAD; trachea midline  RRR  CTAB; breathing comfortably  Abd soft, NT, ND;  no palpable abdominal masses.  RUE: Palpable radial and ulnar pulse  LUE: Palpable radial and ulnar pulse  RLE: palp Fem, nonpalp DP, nonpalp PT   LLE: palp Fem, nonpalp DP, nonpalp PT   Neurologic: CN II-XII grossly intact  5/5 sensorimotor function in the bilateral upper and lower extremities.  SEE MEDIA FOR foot wounds.      Labs:  Lab Results   Component Value Date    WBC 10.8 06/26/2024    HGB 7.4 (L) 06/26/2024    HCT 23.8 (L) 06/26/2024    .0 06/26/2024    NEPERCENT 85.2 06/26/2024    LYPERCENT 5.3 06/26/2024    MOPERCENT 7.7 06/26/2024    EOPERCENT 0.5 06/26/2024    BAPERCENT 0.3 06/26/2024    NE 10.14 (H) 06/26/2024    LYMABS 0.63 (L) 06/26/2024    MOABSO 0.92 06/26/2024    EOABSO 0.06 06/26/2024    BAABSO 0.03 06/26/2024       Imaging: NOne    Assessment and Plan:  78 year old male with numerous comorbidities who presents with atherosclerosis with ulcerations in the bilateral lower extremities.  He is at very high risk for limb loss.  I informed him that he is not a candidate for any further revascularization and should his foot amputation does not heal he will require major amputation.  He is cleared to proceed with Dr. Meza.  Vascular will sign off.  Should he have any further wound healing issues please contact vascular to consider major amputation. All questions and concerns were addressed.  The patient affirms understanding and agrees with the treatment plan.    Thank you for allowing me to participate in this patient's care.    Aleksey Nix MD  [facility]  Vascular Surgery

## 2024-06-27 NOTE — PROGRESS NOTES
[facility]   part of [facility] Infectious Disease  Progress Note    Darin Bowens Patient Status:  Inpatient    1945 MRN [id number]   Location [facility] 3SW-A Attending Yandel Ordoñez MD   Hosp Day # 2 PCP Zachery Hall MD     Subjective:    Patient seen and examined resting in bed, family at bedside. Family thinks some increased confusion today. Patient awake and answering questions appropriately for me.    Review of Systems:  Review of systems reviewed and negative except as mentioned    Objective:  Blood pressure 124/46, pulse 64, temperature 99.3 °F (37.4 °C), temperature source Oral, resp. rate 18, height 6' (1.829 m), weight 205 lb (93 kg), SpO2 96%.        Physical Exam:  General: Awake, alert, non-tox, NAD.  HEENT:  Oropharynx clear, trachea ML.  Heart: RRR S1S2 no murmurs.  Lungs: Essentially CTA b/l, no rhonchi, rales, wheezes.  Abdomen: Soft, NT/ND.  BS present.  No organomegaly.  Extremity: No edema. +b/l feet with dressing in place   Neurological: No focal deficits.  Derm:  Warm, dry, free from rashes.  Left chest HD cath     Lab Data Review:  Lab Results   Component Value Date    WBC 12.4 2024    HGB 8.1 2024    HCT 24.4 2024    .0 2024    CREATSERUM 5.71 2024    BUN 39 2024     2024    K 3.9 2024     2024    CO2 24.0 2024     2024    CA 8.8 2024    PTT 30.3 2024        Cultures:  Hospital Encounter on 24   1. Aerobic Bacterial Culture     Status: Abnormal (Preliminary result)    Collection Time: 24  8:14 AM    Specimen: Foot,left; Other   Result Value Ref Range    Aerobic Culture Result 4+ growth Enterobacter cloacae complex (A) N/A    Aerobic Culture Result 4+ growth Acinetobacter pittii (A) N/A    Aerobic Smear No WBCs seen N/A    Aerobic Smear 1+ Gram Positive Cocci N/A    Aerobic Smear 1+ Gram Negative Rods N/A        Susceptibility    Enterobacter cloacae complex -  (no method available)     Cefazolin >=64 Resistant      Cefepime <=1 Sensitive      Ceftriaxone <=1 Sensitive      Ciprofloxacin <=0.25 Sensitive      Gentamicin <=1 Sensitive      Meropenem <=0.25 Sensitive      Levofloxacin <=0.12 Sensitive      Piperacillin + Tazobactam <=4 Sensitive      Trimethoprim/Sulfa <=20 Sensitive    2. Blood Culture     Status: None (Preliminary result)    Collection Time: 06/25/24  2:36 AM    Specimen: Blood,peripheral   Result Value Ref Range    Blood Culture Result No Growth 2 Days N/A        Radiology:  MRI FOOT (W+WO), RIGHT (CPT=73720)    Result Date: 6/26/2024  CONCLUSION:  Postsurgical changes status post amputation of wall the distal digits.  Fluid along the surgical approach may represent postoperative seroma although superinfection cannot be excluded.  There is no evidence of osteomyelitis.   LOCATION:  [facility]   Dictated by (CST): Randy Vaz MD on 6/26/2024 at 3:28 PM     Finalized by (CST): Randy Vaz MD on 6/26/2024 at 3:32 PM       MRI FOOT (W+WO), LEFT (CPT=73720)    Result Date: 6/26/2024  CONCLUSION:  Postsurgical changes in the 2nd toe.  No evidence of osteomyelitis.  A fluid tract extending from the surgical site to the skin surface is noted.   LOCATION:  [facility]   Dictated by (CST): Randy Vaz MD on 6/26/2024 at 3:23 PM     Finalized by (CST): Randy Vaz MD on 6/26/2024 at 3:28 PM       IR CENTRAL VENOUS ACCESS    Result Date: 6/25/2024  CONCLUSION:  Successful non-tunneled dialysis catheter placement via the left internal jugular vein.  Chronically occluded right internal jugular vein.   LOCATION:  [facility]    Dictated by (CST): Zana Lui MD on 6/25/2024 at 5:26 PM     Finalized by (CST): Zana Lui MD on 6/25/2024 at 5:32 PM          Assessment and Plan:    Bilateral foot surgical wound infection in the setting of recent amputations of the right foot and amputation of the left second digit on 6/13/2024   -  Pathology showed osteomyelitis on the right  - Now with foul-smelling drainage, redness along with fever and leukocytosis   - drainage cul prelim with Enterobacter and Acinetobacter   - blood cultures NGTD  - podiatry following plans for eventual surgical intervention  - IV Vancomycin, cefepime and PO flagyl ongoing     History of end stage renal disease  - On hemodialysis through AV fistula that is now clotted  - now s/p temp HD cath placement   - Management as per renal medicine  - Antibiotics will be renally adjusted     Peripheral vascular disease  - Increased risk of limb loss  - recently underwent BLE angioplasty with Dr. Nix  - vascular consult reviewed not a candidate for any further revascularization     Allergic reaction to Augmentin  - The patient reports itchy rash since starting the Augmentin as an outpatient     Recommendations  - Continue IV Vancomycin, cefepime and PO flagyl  - Follow pending cultures  - Plans for OR today please send cul and path  - Repeat labs tomorrow  - Further recommendations to follow       If you have any questions or concerns please call [facility] and [facility] Infectious Disease at [phone].     SURESH Meek    6/27/2024  10:21 AM

## 2024-06-27 NOTE — PLAN OF CARE
Patient A&Ox3 . Had some confusion  and forgetfulness when he woke up . Able to reorient . Vital signs stable , tele with A-Fib . On room air . Denies any pain . Dressing to BL feet clean and dry . AFO boot to right leg . Completed dialysis and took out 1.3 liter . left Jugular temporary cath intact .  On IV and po antibiotics . Patient had BM today . Oliguric . NPO post midnight for surgery tomorrow .  Blood sugar 348 covered per sliding scale . Updated plan of care . All safety precautions in place . Reminded to \"call don't fall' . Will continue to monitor .

## 2024-06-27 NOTE — PLAN OF CARE
Spoke with Dr. Barrios, will dc Heparin drip and change to SubQ Heparin d/t Hgb of 7.0 and blood transfusion.

## 2024-06-27 NOTE — ANESTHESIA POSTPROCEDURE EVALUATION
[facility]    Darin Bowens Patient Status:  Inpatient   Age/Gender 78 year old male MRN [id number]   Location [facility] 3SW-A Attending Yandel Ordoñez MD   Hosp Day # 2 PCP Zachery Hall MD       Anesthesia Post-op Note    RIGHT FOOT WOUND DEBRIDEMENT PARTIAL RESECTION OF 1ST AND 2ND METATARSAL, LEFT FOOT WOUND DEBRIDEMENT WITH PARTIAL RESECTION OF 2ND METATARSAL    Procedure Summary       Date: 06/27/24 Room / Location:  MAIN OR 04 / [facility] MAIN OR    Anesthesia Start: 1139 Anesthesia Stop: 1236    Procedure: RIGHT FOOT WOUND DEBRIDEMENT PARTIAL RESECTION OF 1ST AND 2ND METATARSAL, LEFT FOOT WOUND DEBRIDEMENT WITH PARTIAL RESECTION OF 2ND METATARSAL (Bilateral: Foot) Diagnosis: (GANGRENE OF BOTH FOOT)    Surgeons: Jose R Meza DPM Anesthesiologist: Erma Mishra MD    Anesthesia Type: MAC ASA Status: 4            Anesthesia Type: MAC    Vitals Value Taken Time   /63 06/27/24 1334   Temp 99 °F (37.2 °C) 06/27/24 1334   Pulse 71 06/27/24 1350   Resp 17 06/27/24 1350   SpO2 98 % 06/27/24 1350   Vitals shown include unfiled device data.    Patient Location: PACU    Anesthesia Type: MAC    Airway Patency: patent    Postop Pain Control: adequate    Mental Status: mildly sedated but able to meaningfully participate in the post-anesthesia evaluation    Nausea/Vomiting: none    Cardiopulmonary/Hydration status: stable euvolemic    Complications: no apparent anesthesia related complications    Postop vital signs: stable    Dental Exam: Unchanged from Preop    Patient to be discharged from PACU when criteria met.

## 2024-06-27 NOTE — OPERATIVE REPORT
Date of surgery: 06/27/24     Preoperative Diagnosis:   Necrotic transmetatarsal amputation site, right foot  Necrotic second toe amputation site, right foot  Diabetic neuropathy  Peripheral arterial disease    Postoperative Diagnosis: Same     Procedure:   1.  Excisional wound debridement to level of bone, 24 cm², right foot  2.  Partial resection of first and second metatarsals, right foot  3.  Excisional wound debridement to level of bone, 10 cm², left foot  4.  Partial resection of second metatarsal, left foot     Surgeon: Jose R Meza D.P.M.  Anesthesia: MAC  EBL: 20 ml     Specimens:   Right foot soft tissues, first metatarsal bone for cultures and pathology  Left foot second metatarsal bone for cultures and pathology    Complications: None     Technique:  Patient was brought into the operating room and placed on the operating table in a supine position.  Patient was then placed under anesthesia.  A tourniquet was not utilized.  Local anesthetic block was not given since patient is insensate.  Both lower extremities were prepped and draped in the usual aseptic manner.    Attention was directed to the right foot transmetatarsal amputation site where there was necrosis along the medial aspect.  Using a 10 blade the necrotic soft tissues were sharply excised down to level of bone and passed off the field.  Significant necrosis of the deeper tissues was noted dorsally and plantarly.  Using blunt and sharp dissection all necrotic soft tissues were sharply excised to healthier tissues.  Tissue specimens were sent for cultures.    Next, soft tissues were freed off the distal aspects of the first and second metatarsals of the right foot.  Next a sagittal saw was utilized and distal portions of the first and second metatarsals were resected to healthy bleeding bone.  First metatarsal bone was sent for cultures and pathology.  Surgical site was then copiously irrigated with a pulse lavage.  Surgical site was then packed  open with wet-to-dry sterile dressings.    Next, attention was directed to the left foot where there was necrosis at the second toe amputation site and extending proximally along the dorsal forefoot.  Using a 10 blade all necrotic soft tissues were sharply excised to level of bone and passed off the field.  No significant bleeding was noted from the wound margins.  Purulence was noted and this was copiously irrigated with a pulse lavage.      Next, soft tissues were freed off the distal aspect of the second metatarsal of the left foot.  Next, a sagittal saw was utilized and the distal second metatarsal was resected and passed off the field.  Left second metatarsal bone was sent for pathology and cultures.  Surgical site was irrigated again with a pulse lavage.  Surgical site was then packed open with wet-to-dry sterile dressings.    Patient remains at high risk for chronic nonhealing wounds, further soft tissue loss and limb loss.  Patient has significant peripheral arterial disease.  Patient will require staged procedures for limb salvage.

## 2024-06-27 NOTE — PROGRESS NOTES
SPoke with IR, plan is for tunneled HD cath tomorrow at 1400.  RN to speak with ID tomorrow morning to get clearance for line placement after WBC results.  RN to call IR charge once pt is cleared by ID in the morning after ID rounds.

## 2024-06-27 NOTE — PROGRESS NOTES
[facility]  Hospitalist Progress Note                                                                     [facility]   part of [facility]        Darin MCINTYRE Gogo  7/17/1945    SUBJECTIVE:  Patient seen and examined.   Just returned from OR.  Denies CP/SOB.  NAD.       OBJECTIVE:  Temp:  [98.1 °F (36.7 °C)-99.3 °F (37.4 °C)] 99 °F (37.2 °C)  Pulse:  [61-90] 61  Resp:  [14-23] 18  BP: ()/(27-96) 135/63  SpO2:  [93 %-100 %] 98 %  Exam  Gen: No acute distress, alert and oriented x3, no focal neurologic deficits  Pulm: Lungs clear bilaterally, normal respiratory effort  CV: Heart with regular rate and rhythm, no murmur.  Normal PMI.    Abd: Abdomen soft, nontender, nondistended, no organomegaly, bowel sounds present  MSK: Full range of motion in extremities, no clubbing, no cyanosis. Both feet wrapped  Skin: no rashes or lesions    Labs:   Recent Labs   Lab 06/24/24 2218 06/26/24 0435 06/26/24  1535 06/27/24  0437   WBC 14.3* 11.9* 10.8 12.4*   HGB 9.4* 7.0* 7.4* 8.1*   MCV 93.6 94.0 95.2 87.8   .0 203.0 241.0 154.0       Recent Labs   Lab 06/24/24 2218 06/26/24 0435 06/26/24 1538 06/27/24 0437   * 137 135* 136   K 4.4 4.1 4.2 3.9   CL 95* 101 100 100   CO2 23.0 25.0 23.0 24.0   BUN 76* 45* 53* 39*   CREATSERUM 10.80* 6.96* 7.94* 5.71*   CA 9.1 8.6 8.7 8.8   * 196* 290* 105*       Recent Labs   Lab 06/24/24 2218   ALT 22   AST 67*   ALB 2.7*       Recent Labs   Lab 06/26/24 2121 06/26/24  2315 06/27/24  0538 06/27/24  1232 06/27/24  1449   PGLU 249* 348* 140* 170* 189*       Meds:   Scheduled:    epoetin matt  10,000 Units Intravenous Once in dialysis    sodium hypochlorite   Topical BID    [START ON 6/28/2024] apixaban  2.5 mg Oral BID    sodium chloride   Intravenous Once    insulin aspart  4-20 Units Subcutaneous TID AC and HS    atorvastatin  80 mg Oral Nightly    carvedilol  12.5 mg Oral BID with meals     cefepime  1 g Intravenous Q24H    Vancomycin IV  1 each Intravenous See Admin Instructions (RX holding)    metRONIDAZOLE  500 mg Oral 2 times per day     Continuous Infusions:   PRN:   sodium chloride **AND** albumin human    heparin    acetaminophen    acetaminophen **OR** HYDROcodone-acetaminophen **OR** HYDROcodone-acetaminophen    HYDROmorphone **OR** HYDROmorphone **OR** HYDROmorphone    polyethylene glycol (PEG 3350)    sennosides    bisacodyl    ondansetron    metoclopramide    glucose **OR** glucose **OR** glucose-vitamin C **OR** dextrose **OR** glucose **OR** glucose **OR** glucose-vitamin C    Assessment/Plan:  Principal Problem:    Cellulitis of lower extremity, unspecified laterality  Active Problems:    Anemia in chronic kidney disease, on chronic dialysis ([facility])    Nausea and vomiting, unspecified vomiting type    Malfunction of arteriovenous dialysis fistula, initial encounter ([facility])    ESRD (end stage renal disease) ([facility])    78 year old male with PMH sig for CAD status post stents, PAD status post bilateral lower extremity stents, atrial fibrillation on Xarelto, diabetes mellitus type 2 on insulin pump, end-stage renal disease on dialysis, chronic CHF with LVH, status post AVR, history of stroke, hypertension, hyperlipidemia, DESI and recent right TMA and left foot second digit amputation on 6/13 by Dr. Meza presented with poor surgical wound healing.     Postop wound infection  R TMA, L foot 2nd digit amputation on 6/13, Dr. Meza/Podiatry  - obtain cultures if possible  - cont cefepime/flagyl, vanco; consult ID  - consult Podiatry, will likely need further debridement/I&D - tentative plan for tomorrow   - elevated ESR/CRP  - MRI results reviewed   - Arterial US in April, likely has more distal PAD, may require further amputation, await vascular/podiatry evaluation   - s/p podiatry I&D of R foot (1st/2nd metatarsals) and L foot (2nd metatarsal) - await surgical cx/biopsy results     ESRD on  HD  Malfunctioning AVF  - vascular consulted  - nephrology o/c      CAD s/p stents  PAD s/p b/l LE stents  Chronic CHF w/ LVH  S/p AVR  Atrial fibrillation  - coreg, lipitor  - holding xarelto for procedure - can switch to eliquis tomorrow AM      DM2  - insulin pump  - ISS/accucheks     Essential HTN  - coreg     Hyperlipidemia  - statin     DESI  - cpap at bedtime         FEN: regular diet, PT/OT  Proph: SCDs, eliquis in AM    Code status: Full code      Bello Barrios MD  [facility] Hospitalist  Message over [employer]  Pager: [phone]

## 2024-06-27 NOTE — PROGRESS NOTES
Called [facility], they are aware of order for HD today. Informed them that pt just went to OR.  RN to call them when pt gets back from surgery.

## 2024-06-27 NOTE — PLAN OF CARE
Pt A & O x3, confused at times, appears to be seeing people who are not in the room, but is easily reoriented.  IV vanco and cefepime.  Dressings to Bilateral feet with gazue/kerlix/ABD. RN to reinforce PRN.  Tele-afib.  SCD LLE.  AFO R foot.  NWB RLE.  Heel touch to Left foot.  Pt up x mod assist.  Accu checks ACHS.  HD to come today, they were notified after pt arrived back to unit from PACU.  DESI no CPAP.  /IS.  Last BM 6/26.  Plan for tunneled HD cath conversion tomorrow.  Fistula RUE -/-, clotted off.  Family at bedside.  Pt voids once/day.

## 2024-06-27 NOTE — PROGRESS NOTES
[facility]  Progress Note    Darin Bowens Patient Status:  Observation    1945 MRN [id number]   [facility] 3SW-A Attending Yandel Ordoñez MD   Hosp Day # 2 PCP Zachery Hall MD      No acute events  HD today   Feels OK         Current Medications:    Current Facility-Administered Medications:     [Transfer Hold] epoetin matt (Epogen, Procrit) 79769 UNIT/ML injection 10,000 Units, 10,000 Units, Intravenous, Once in dialysis    [Transfer Hold] sodium chloride 0.9 % IV bolus 100 mL, 100 mL, Intravenous, Q30 Min PRN **AND** [Transfer Hold] albumin human (Albumin) 25% injection 25 g, 25 g, Intravenous, PRN Dialysis    [Transfer Hold] sodium chloride 0.9% infusion, , Intravenous, Once    [Transfer Hold] heparin (Porcine) 5000 UNIT/ML injection 5,000 Units, 5,000 Units, Subcutaneous, Q8H LIZBETH    [Transfer Hold] heparin (Porcine) 1000 UNIT/ML injection 2,000 Units, 2,000 Units, Intravenous, PRN Dialysis    [Transfer Hold] acetaminophen (Tylenol Extra Strength) tab 500 mg, 500 mg, Oral, Q4H PRN    [Transfer Hold] acetaminophen (Tylenol) tab 650 mg, 650 mg, Oral, Q4H PRN **OR** [Transfer Hold] HYDROcodone-acetaminophen (Norco) 5-325 MG per tab 1 tablet, 1 tablet, Oral, Q4H PRN **OR** [Transfer Hold] HYDROcodone-acetaminophen (Norco) 5-325 MG per tab 2 tablet, 2 tablet, Oral, Q4H PRN    [Transfer Hold] HYDROmorphone (Dilaudid) 1 MG/ML injection 0.2 mg, 0.2 mg, Intravenous, Q2H PRN **OR** [Transfer Hold] HYDROmorphone (Dilaudid) 1 MG/ML injection 0.4 mg, 0.4 mg, Intravenous, Q2H PRN **OR** [Transfer Hold] HYDROmorphone (Dilaudid) 1 MG/ML injection 0.8 mg, 0.8 mg, Intravenous, Q2H PRN    [Transfer Hold] polyethylene glycol (PEG 3350) (Miralax) 17 g oral packet 17 g, 17 g, Oral, Daily PRN    [Transfer Hold] sennosides (Senokot) tab 17.2 mg, 17.2 mg, Oral, Nightly PRN    [Transfer Hold] bisacodyl (Dulcolax) 10 MG rectal suppository 10 mg, 10 mg, Rectal, Daily PRN    [Transfer Hold]  ondansetron (Zofran) 4 MG/2ML injection 4 mg, 4 mg, Intravenous, Q6H PRN    [Transfer Hold] metoclopramide (Reglan) 5 mg/mL injection 5 mg, 5 mg, Intravenous, Q8H PRN    [Transfer Hold] glucose (Dex4) 15 GM/59ML oral liquid 15 g, 15 g, Oral, Q15 Min PRN **OR** [Transfer Hold] glucose (Glutose) 40% oral gel 15 g, 15 g, Oral, Q15 Min PRN **OR** [Transfer Hold] glucose-vitamin C (Dex-4) chewable tab 4 tablet, 4 tablet, Oral, Q15 Min PRN **OR** [Transfer Hold] dextrose 50% injection 50 mL, 50 mL, Intravenous, Q15 Min PRN **OR** [Transfer Hold] glucose (Dex4) 15 GM/59ML oral liquid 30 g, 30 g, Oral, Q15 Min PRN **OR** [Transfer Hold] glucose (Glutose) 40% oral gel 30 g, 30 g, Oral, Q15 Min PRN **OR** [Transfer Hold] glucose-vitamin C (Dex-4) chewable tab 8 tablet, 8 tablet, Oral, Q15 Min PRN    [Transfer Hold] insulin aspart (NovoLOG) 100 Units/mL FlexPen 4-20 Units, 4-20 Units, Subcutaneous, TID AC and HS    [Transfer Hold] atorvastatin (Lipitor) tab 80 mg, 80 mg, Oral, Nightly    [Transfer Hold] carvedilol (Coreg) tab 12.5 mg, 12.5 mg, Oral, BID with meals    ceFEPIme (Maxipime) 1 g in sodium chloride 0.9% 100 mL IVPB-MBP, 1 g, Intravenous, Q24H    Vancomycin: PHARMACY DOSING, 1 each, Intravenous, See Admin Instructions (RX holding)    metRONIDAZOLE (Flagyl) tab 500 mg, 500 mg, Oral, 2 times per day  Home Medications:  No current outpatient medications on file.       Review of Systems:  See HPI; A total of 12 systems reviewed and otherwise unremarkable.    Physical Exam:  Vital signs: Blood pressure 124/46, pulse 64, temperature 99.3 °F (37.4 °C), temperature source Oral, resp. rate 18, height 6' (1.829 m), weight 205 lb (93 kg), SpO2 96%.  General: No acute distress. Alert and oriented x 3.  HEENT: Moist mucous membranes. EOM-I. PERRL  Neck: No lymphadenopathy.  No JVD. No carotid bruits.  Respiratory: Clear to auscultation bilaterally.  No wheezes. No rhonchi.  Cardiovascular: S1, S2.  Regular rate and rhythm.  No  murmurs. Equal pulses   Abdomen: Soft, nontender, nondistended.  Positive bowel sounds. No rebound tenderness   Neurologic: No focal neurological deficits.   Ext ; + edema; BLE wounds noted- dressing intact  Integument: No lesions. No erythema.  Psychiatric: Appropriate mood and affect.    Recent Labs     06/24/24 2218 06/26/24 0435 06/26/24  1535 06/27/24 0437   WBC 14.3* 11.9* 10.8 12.4*   HGB 9.4* 7.0* 7.4* 8.1*   MCV 93.6 94.0 95.2 87.8   .0 203.0 241.0 154.0       Recent Labs     06/24/24 2218 06/26/24 0435 06/26/24  1538 06/27/24 0437   * 137 135* 136   K 4.4 4.1 4.2 3.9   CL 95* 101 100 100   CO2 23.0 25.0 23.0 24.0   BUN 76* 45* 53* 39*   CREATSERUM 10.80* 6.96* 7.94* 5.71*   CA 9.1 8.6 8.7 8.8       Recent Labs     06/24/24 2218   ALT 22   AST 67*   ALB 2.7*         Imaging:  Reviwed     Impression:  ESRD - due to DM/HTN  - plan for HD today after MRI - I reviewed risk of NSF with patient in detail. Serial HD will be done in effort to remove the MRI contrast agent and help minimize risk.  - plan for PC tomorrow  - will have pt f/u with Dr. Nix for o/p eval of new AVF/fistula revision  Foot infection - s/p recent  L foot toe amputation on 6/13  - on abx; wound care  - podiatry /ID following   PAD- as above  CHF; s/p AVR; volume optimization w/ HD/UF  Anemia due to CKD- DALE w/ HD  DM/HTN  HL    Thank you for allowing me to participate in this patient's care.  Please feel free to call me with any questions or concerns.    Aj Bardales MD  6/27/2024

## 2024-06-28 ENCOUNTER — APPOINTMENT (OUTPATIENT)
Dept: INTERVENTIONAL RADIOLOGY/VASCULAR | Facility: HOSPITAL | Age: 79
End: 2024-06-28
Attending: PODIATRIST
Payer: MEDICARE

## 2024-06-28 LAB
ANION GAP SERPL CALC-SCNC: 10 MMOL/L (ref 0–18)
BASOPHILS # BLD AUTO: 0.01 X10(3) UL (ref 0–0.2)
BASOPHILS NFR BLD AUTO: 0.1 %
BLOOD TYPE BARCODE: 600
BUN BLD-MCNC: 35 MG/DL (ref 9–23)
CALCIUM BLD-MCNC: 8.9 MG/DL (ref 8.5–10.1)
CHLORIDE SERPL-SCNC: 100 MMOL/L (ref 98–112)
CO2 SERPL-SCNC: 24 MMOL/L (ref 21–32)
CREAT BLD-MCNC: 5.64 MG/DL
EGFRCR SERPLBLD CKD-EPI 2021: 10 ML/MIN/1.73M2 (ref 60–?)
EOSINOPHIL # BLD AUTO: 0.06 X10(3) UL (ref 0–0.7)
EOSINOPHIL NFR BLD AUTO: 0.5 %
ERYTHROCYTE [DISTWIDTH] IN BLOOD BY AUTOMATED COUNT: 16.1 %
GLUCOSE BLD-MCNC: 192 MG/DL (ref 70–99)
GLUCOSE BLD-MCNC: 198 MG/DL (ref 70–99)
GLUCOSE BLD-MCNC: 199 MG/DL (ref 70–99)
GLUCOSE BLD-MCNC: 218 MG/DL (ref 70–99)
GLUCOSE BLD-MCNC: 287 MG/DL (ref 70–99)
HCT VFR BLD AUTO: 25.5 %
HGB BLD-MCNC: 8.2 G/DL
IMM GRANULOCYTES # BLD AUTO: 0.14 X10(3) UL (ref 0–1)
IMM GRANULOCYTES NFR BLD: 1.2 %
INR BLD: 1.48 (ref 0.8–1.2)
LYMPHOCYTES # BLD AUTO: 0.61 X10(3) UL (ref 1–4)
LYMPHOCYTES NFR BLD AUTO: 5.4 %
MCH RBC QN AUTO: 29.2 PG (ref 26–34)
MCHC RBC AUTO-ENTMCNC: 32.2 G/DL (ref 31–37)
MCV RBC AUTO: 90.7 FL
MONOCYTES # BLD AUTO: 1.04 X10(3) UL (ref 0.1–1)
MONOCYTES NFR BLD AUTO: 9.3 %
NEUTROPHILS # BLD AUTO: 9.35 X10 (3) UL (ref 1.5–7.7)
NEUTROPHILS # BLD AUTO: 9.35 X10(3) UL (ref 1.5–7.7)
NEUTROPHILS NFR BLD AUTO: 83.5 %
OSMOLALITY SERPL CALC.SUM OF ELEC: 291 MOSM/KG (ref 275–295)
PLATELET # BLD AUTO: 135 10(3)UL (ref 150–450)
POTASSIUM SERPL-SCNC: 4.3 MMOL/L (ref 3.5–5.1)
PROTHROMBIN TIME: 18 SECONDS (ref 11.6–14.8)
RBC # BLD AUTO: 2.81 X10(6)UL
SODIUM SERPL-SCNC: 134 MMOL/L (ref 136–145)
UNIT VOLUME: 350 ML
VANCOMYCIN SERPL-MCNC: 22.6 UG/ML (ref ?–40)
WBC # BLD AUTO: 11.2 X10(3) UL (ref 4–11)

## 2024-06-28 PROCEDURE — 0JH63XZ INSERTION OF TUNNELED VASCULAR ACCESS DEVICE INTO CHEST SUBCUTANEOUS TISSUE AND FASCIA, PERCUTANEOUS APPROACH: ICD-10-PCS | Performed by: STUDENT IN AN ORGANIZED HEALTH CARE EDUCATION/TRAINING PROGRAM

## 2024-06-28 PROCEDURE — 02H633Z INSERTION OF INFUSION DEVICE INTO RIGHT ATRIUM, PERCUTANEOUS APPROACH: ICD-10-PCS | Performed by: STUDENT IN AN ORGANIZED HEALTH CARE EDUCATION/TRAINING PROGRAM

## 2024-06-28 PROCEDURE — B518YZA FLUOROSCOPY OF SUPERIOR VENA CAVA USING OTHER CONTRAST, GUIDANCE: ICD-10-PCS | Performed by: STUDENT IN AN ORGANIZED HEALTH CARE EDUCATION/TRAINING PROGRAM

## 2024-06-28 PROCEDURE — 05PYX3Z REMOVAL OF INFUSION DEVICE FROM UPPER VEIN, EXTERNAL APPROACH: ICD-10-PCS | Performed by: STUDENT IN AN ORGANIZED HEALTH CARE EDUCATION/TRAINING PROGRAM

## 2024-06-28 PROCEDURE — 90935 HEMODIALYSIS ONE EVALUATION: CPT | Performed by: INTERNAL MEDICINE

## 2024-06-28 RX ORDER — MIDAZOLAM HYDROCHLORIDE 1 MG/ML
INJECTION INTRAMUSCULAR; INTRAVENOUS
Status: COMPLETED
Start: 2024-06-28 | End: 2024-06-28

## 2024-06-28 RX ORDER — LIDOCAINE HYDROCHLORIDE 10 MG/ML
INJECTION, SOLUTION INFILTRATION; PERINEURAL
Status: COMPLETED
Start: 2024-06-28 | End: 2024-06-28

## 2024-06-28 RX ORDER — CEFAZOLIN SODIUM 1 G/3ML
INJECTION, POWDER, FOR SOLUTION INTRAMUSCULAR; INTRAVENOUS
Status: COMPLETED
Start: 2024-06-28 | End: 2024-06-28

## 2024-06-28 RX ORDER — HEPARIN SODIUM 5000 [USP'U]/ML
INJECTION, SOLUTION INTRAVENOUS; SUBCUTANEOUS
Status: COMPLETED
Start: 2024-06-28 | End: 2024-06-28

## 2024-06-28 RX ORDER — LIDOCAINE HYDROCHLORIDE AND EPINEPHRINE BITARTRATE 20; .01 MG/ML; MG/ML
INJECTION, SOLUTION SUBCUTANEOUS
Status: COMPLETED
Start: 2024-06-28 | End: 2024-06-28

## 2024-06-28 NOTE — PROGRESS NOTES
Patient seen at bedside with his wife.  No acute events overnight.    Physical exam:   Vitals:    06/28/24 1232   BP: 101/51   Pulse: 69   Resp: 18   Temp:      General: NAD  HEENT: EOMI, PERRLA  Respiratory: No wheezing, no rhonchi  Musculoskeletal: Right foot TMA  Neuro: No focal deficits  Psych: Mood and affect normal    Lower extremity exam: Pedal pulses nonpalpable.  Sensation diminished.  Right foot: TMA site wound with fibrogranular base, some necrosis at the dorsal wound margins, no erythema, no purulence, exposed metatarsals  Left foot: Surgical site with fibronecrotic base, some ischemic changes to the great toe and second toe, no purulence, erythema decreasing    Recent Labs   Lab 06/26/24  0435 06/26/24  1535 06/27/24  0437 06/28/24 0437   RBC 2.35* 2.50* 2.78* 2.81*   HGB 7.0* 7.4* 8.1* 8.2*   HCT 22.1* 23.8* 24.4* 25.5*   MCV 94.0 95.2 87.8 90.7   MCH 29.8 29.6 29.1 29.2   MCHC 31.7 31.1 33.2 32.2   RDW 14.8 14.9 16.8 16.1   NEPRELIM 10.14*  --  10.26* 9.35*   WBC 11.9* 10.8 12.4* 11.2*   .0 241.0 154.0 135.0*       Recent Labs   Lab 06/24/24  2218 06/26/24 0435 06/26/24  1538 06/27/24  0437 06/28/24 0437   *   < > 290* 105* 192*   BUN 76*   < > 53* 39* 35*   CREATSERUM 10.80*   < > 7.94* 5.71* 5.64*   EGFRCR 4*   < > 6* 10* 10*   CA 9.1   < > 8.7 8.8 8.9   ALB 2.7*  --   --   --   --    *   < > 135* 136 134*   K 4.4   < > 4.2 3.9 4.3   CL 95*   < > 100 100 100   CO2 23.0   < > 23.0 24.0 24.0   ALKPHO 149*  --   --   --   --    AST 67*  --   --   --   --    ALT 22  --   --   --   --    BILT 0.6  --   --   --   --    TP 7.1  --   --   --   --     < > = values in this interval not displayed.     Last A1c value was 5.9% done 6/14/2024.    Hospital Encounter on 06/25/24   1. Tissue Aerobic Culture     Status: Abnormal (Preliminary result)    Collection Time: 06/27/24 12:13 PM    Specimen: Foot,left; Tissue   Result Value Ref Range    Tissue Culture Result 4+ growth Gram Negative Wojciech  (A) N/A    Tissue Smear 4+ WBCs seen (A) N/A    Tissue Smear 4+ Gram Negative Rods (A) N/A    Tissue Smear 2+ Gram positive cocci in pairs (A) N/A   2. Anaerobic Culture     Status: None (Preliminary result)    Collection Time: 06/27/24 12:13 PM    Specimen: Foot,left; Tissue   Result Value Ref Range    Anaerobic Culture Pending N/A   3. Aerobic Bacterial Culture     Status: Abnormal    Collection Time: 06/25/24  8:14 AM    Specimen: Foot,left; Other   Result Value Ref Range    Aerobic Culture Result  N/A     Mixture of organisms suggestive of normal skin justice    Aerobic Culture Result 4+ growth Enterobacter cloacae complex (A) N/A    Aerobic Culture Result 4+ growth Acinetobacter pittii (A) N/A    Aerobic Smear No WBCs seen N/A    Aerobic Smear 1+ Gram Positive Cocci N/A    Aerobic Smear 1+ Gram Negative Rods N/A       Susceptibility    Enterobacter cloacae complex -  (no method available)     Cefazolin >=64 Resistant      Cefepime <=1 Sensitive      Ceftriaxone <=1 Sensitive      Ciprofloxacin <=0.25 Sensitive      Gentamicin <=1 Sensitive      Meropenem <=0.25 Sensitive      Levofloxacin <=0.12 Sensitive      Piperacillin + Tazobactam <=4 Sensitive      Trimethoprim/Sulfa <=20 Sensitive     Acinetobacter pittii -  (no method available)     Ceftazidime <=1 Sensitive      Ciprofloxacin <1 Sensitive      Gentamicin <1 Sensitive      Meropenem <1 Sensitive      Levofloxacin <=0.25 Sensitive      Piperacillin + Tazobactam <16 Sensitive      Trimethoprim/Sulfa <=40 Sensitive    4. Blood Culture     Status: None (Preliminary result)    Collection Time: 06/25/24  2:36 AM    Specimen: Blood,peripheral   Result Value Ref Range    Blood Culture Result No Growth 3 Days N/A       IR CENTRAL VENOUS ACCESS    Result Date: 6/28/2024  CONCLUSION: Successful conversion of  left IJ non tunneled central venous dialysis catheter to left chest tunneled dialysis catheter  (27 cm tip to cuff Bard Hemosplit catheter). Catheter is ready  for use.  Recommend routine catheter care.   LOCATION:  [facility]    Dictated by (CST): Randy Maddox MD on 6/28/2024 at 12:09 PM     Finalized by (CST): Randy Maddox MD on 6/28/2024 at 12:15 PM       MRI FOOT (W+WO), RIGHT (CPT=73720)    Result Date: 6/26/2024  CONCLUSION:  Postsurgical changes status post amputation of wall the distal digits.  Fluid along the surgical approach may represent postoperative seroma although superinfection cannot be excluded.  There is no evidence of osteomyelitis.   LOCATION:  [facility]   Dictated by (CST): Randy Vaz MD on 6/26/2024 at 3:28 PM     Finalized by (CST): Randy Vaz MD on 6/26/2024 at 3:32 PM       MRI FOOT (W+WO), LEFT (CPT=73720)    Result Date: 6/26/2024  CONCLUSION:  Postsurgical changes in the 2nd toe.  No evidence of osteomyelitis.  A fluid tract extending from the surgical site to the skin surface is noted.   LOCATION:  [facility]   Dictated by (CST): Randy Vaz MD on 6/26/2024 at 3:23 PM     Finalized by (CST): Randy Vaz MD on 6/26/2024 at 3:28 PM       IR CENTRAL VENOUS ACCESS    Result Date: 6/25/2024  CONCLUSION:  Successful non-tunneled dialysis catheter placement via the left internal jugular vein.  Chronically occluded right internal jugular vein.   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  Some necrosis noted.  Left foot surgical site with ischemic changes, erythema decreasing.  Dakin's solution soaked  dressings applied.    Follow-up OR cultures  Continue IV antibiotics    Further soft tissue loss is expected.  He will require further surgical treatment.  Patient is at high risk for further soft tissue loss, worsening infection, chronic nonhealing wounds and proximal amputation    Will follow    Jose R Meza D.P.M.

## 2024-06-28 NOTE — PROGRESS NOTES
Infectious Disease Progress Note      Date of admission: 6/25/2024 12:55 AM     Reason for consult: Bilateral foot surgical wound infection with osteomyelitis    Subjective: Feels well.  Pain under control.  No nausea or vomiting.  No diarrhea.  No shortness of breath.  No cough or sputum production.    The rest of the systems were reviewed and found to be negative except was mentioned above    Interval events: This is a 78-year-old male patient with history of peripheral vascular disease, recently had a transmetatarsal amputation of the right foot and amputation of the left second digit on 6/13/2024, presents here with surgical wound infection and gangrene involving both his feet.  MRI of the right foot showing postsurgical changes along with fluid along the surgical approach that may represent postoperative seroma with question of abscess, no evidence of osteomyelitis.  MRI of the left foot showed postsurgical changes with fluid tracking extending from the surgical site to the skin surface.  Patient was taken to the OR on 6/27, excisional wound debridement to the level of bone along with partial resection of the first and second metatarsal on the right and excisional wound debridement to the level of bone and partial resection of the second metatarsal on the left.  Drainage cultures back on 6/25 are growing Enterobacter cloacae and Acinetobacter species.  Surgical cultures are growing gram-positive cocci in pairs and gram-negative rods, speciation and susceptibilities are pending.    Medications:    sodium hypochlorite    [Held by provider] apixaban    sodium chloride    heparin    acetaminophen    acetaminophen **OR** HYDROcodone-acetaminophen **OR** HYDROcodone-acetaminophen    HYDROmorphone **OR** HYDROmorphone **OR** HYDROmorphone    polyethylene glycol (PEG 3350)    sennosides    bisacodyl    ondansetron    metoclopramide    glucose **OR** glucose **OR** glucose-vitamin C **OR** dextrose **OR** glucose  **OR** glucose **OR** glucose-vitamin C    insulin aspart    atorvastatin    carvedilol    cefepime    Vancomycin IV    metRONIDAZOLE     Allergies:  Allergies   Allergen Reactions    Augmentin [Amoxicillin-Pot Clavulanate] RASH    Lisinopril Coughing     Dyspnea         Physical Exam:  Vitals:    06/28/24 0757   BP: 111/73   Pulse: 71   Resp: 16   Temp: 98.7 °F (37.1 °C)     Vitals signs and nursing note reviewed.   Constitutional:       Appearance: Normal appearance.   HENT:      Head: Normocephalic and atraumatic.      Mouth: Mucous membranes are moist.   Neck:      Musculoskeletal: Neck supple.   Cardiovascular:      Rate and Rhythm: Normal rate.    Pulmonary:      Effort: Pulmonary effort is normal. No respiratory distress.   Musculoskeletal:      Right lower leg: No edema.  Clean dressing noted     Left lower leg: No edema.  Clean dressing noted  Skin:     General: Skin is warm and dry.   Neurological:      General: No focal deficit present.      Mental Status: Alert and oriented to person, place, and time.       Laboratory data:  I have reviewed all the lab results independently.  Lab Results   Component Value Date    WBC 11.2 06/28/2024    HGB 8.2 06/28/2024    HCT 25.5 06/28/2024    .0 06/28/2024    CREATSERUM 5.64 06/28/2024    BUN 35 06/28/2024     06/28/2024    K 4.3 06/28/2024     06/28/2024    CO2 24.0 06/28/2024     06/28/2024    CA 8.9 06/28/2024    INR 1.48 06/28/2024      Recent Labs   Lab 06/28/24  0437   RBC 2.81*   HGB 8.2*   HCT 25.5*   MCV 90.7   MCH 29.2   MCHC 32.2   RDW 16.1   NEPRELIM 9.35*   WBC 11.2*   .0*      Microbiology data:  Hospital Encounter on 06/25/24   1. Tissue Aerobic Culture     Status: Abnormal (Preliminary result)    Collection Time: 06/27/24 12:13 PM    Specimen: Foot,left; Tissue   Result Value Ref Range    Tissue Culture Result 4+ growth Gram Negative Wojciech (A) N/A    Tissue Smear 4+ WBCs seen (A) N/A    Tissue Smear 4+ Gram Negative Rods  (A) N/A    Tissue Smear 2+ Gram positive cocci in pairs (A) N/A   2. Anaerobic Culture     Status: None (Preliminary result)    Collection Time: 06/27/24 12:13 PM    Specimen: Foot,left; Tissue   Result Value Ref Range    Anaerobic Culture Pending N/A   3. Aerobic Bacterial Culture     Status: Abnormal    Collection Time: 06/25/24  8:14 AM    Specimen: Foot,left; Other   Result Value Ref Range    Aerobic Culture Result  N/A     Mixture of organisms suggestive of normal skin justice    Aerobic Culture Result 4+ growth Enterobacter cloacae complex (A) N/A    Aerobic Culture Result 4+ growth Acinetobacter pittii (A) N/A    Aerobic Smear No WBCs seen N/A    Aerobic Smear 1+ Gram Positive Cocci N/A    Aerobic Smear 1+ Gram Negative Rods N/A       Susceptibility    Enterobacter cloacae complex -  (no method available)     Cefazolin >=64 Resistant      Cefepime <=1 Sensitive      Ceftriaxone <=1 Sensitive      Ciprofloxacin <=0.25 Sensitive      Gentamicin <=1 Sensitive      Meropenem <=0.25 Sensitive      Levofloxacin <=0.12 Sensitive      Piperacillin + Tazobactam <=4 Sensitive      Trimethoprim/Sulfa <=20 Sensitive     Acinetobacter pittii -  (no method available)     Ceftazidime <=1 Sensitive      Ciprofloxacin <1 Sensitive      Gentamicin <1 Sensitive      Meropenem <1 Sensitive      Levofloxacin <=0.25 Sensitive      Piperacillin + Tazobactam <16 Sensitive      Trimethoprim/Sulfa <=40 Sensitive    4. Blood Culture     Status: None (Preliminary result)    Collection Time: 06/25/24  2:36 AM    Specimen: Blood,peripheral   Result Value Ref Range    Blood Culture Result No Growth 3 Days N/A     Impression:  Darin Bowens is a 78 year old male with    Right-sided surgical wound infection with osteomyelitis with risk to limb  This is in the setting of right foot amputation on 6/13  Now status post excisional wound debridement to the level of bone along with partial resection of the first and second metatarsal on the  right on 6/27   Surgical cultures with gram-positive cocci and gram-negative rods  Drainage cultures from the time of admission grew Enterobacter cloacae and Acinetobacter  Currently on IV cefepime and IV vancomycin along with p.o. metronidazole  Left-sided surgical wound infection with osteomyelitis and gangrene with risk to limb  This is in the setting of recent foot surgery on 6/13  Now status post excisional wound debridement to level of bone along with partial resection of the second metatarsal on 6/27  Cultures with gram-positive cocci and gram-negative rods  Currently on IV cefepime IV vancomycin normal  Peripheral vascular disease  Vascular surgery following  High risk for limb loss  End stage renal disease  AV fistula clotted  Permacath planned for today  Allergic reaction to Augmentin  Now stopped  Rash resolved    Recommendations:    Continue to follow-up on surgical cultures  Continue IV cefepime, vancomycin along with p.o. metronidazole  Wound care as per podiatry  Continue to monitor daily labs for antibiotic toxicities  Further recommendations will depend on the above work-up and clinical progress     The plan of care was discussed with the primary hospital team, Vira Richardson MD     Recommendations were also discussed with the patient; all questions were answered.     Thank you for this consultation. Please don't hesitate to call the ID team for questions or any acute changes in patient's clinical condition.    Please note that this report has been produced using speech recognition software and may contain errors related to that system including, but not limited to, errors in grammar, punctuation, and spelling, as well as words and phrases that possibly may have been recognized inappropriately.  If there are any questions or concerns, contact the dictating provider for clarification.    The 21st Century Cures Act makes medical notes like these available to patients in the interest of transparency.  Please be advised this is a medical document. Medical documents are intended to carry relevant information, facts as evident, and the clinical opinion of the practitioner. The medical note is intended as peer to peer communication and may appear blunt or direct. It is written in medical language and may contain abbreviations or verbiage that are unfamiliar.     Sadaf Hugo MD  [facility]. Tel: [phone]. Fax: [phone].     Darin Bowens : 1945 MRN: [id number] [facility]: [id number]

## 2024-06-28 NOTE — PLAN OF CARE
Patient admitted for Cellulitis BLE.  Medication given as ordered.  IR tunneled cath. Plan for HD today, continued IV antibiotics.

## 2024-06-28 NOTE — PROGRESS NOTES
[facility] Hospitalist Progress Note       SUBJECTIVE:  Pt feels okay        Plan to exchange temp cath to tunneled cath 6/28, pt is NPO     Pt denies pain no SOB no Chest pain no foot pain       OBJECTIVE:  Scheduled Meds:    sodium hypochlorite   Topical BID    [Held by provider] apixaban  2.5 mg Oral BID    sodium chloride   Intravenous Once    insulin aspart  4-20 Units Subcutaneous TID AC and HS    atorvastatin  80 mg Oral Nightly    carvedilol  12.5 mg Oral BID with meals    cefepime  1 g Intravenous Q24H    Vancomycin IV  1 each Intravenous See Admin Instructions (RX holding)    metRONIDAZOLE  500 mg Oral 2 times per day     Continuous Infusions:   PRN Meds:   heparin    acetaminophen    acetaminophen **OR** HYDROcodone-acetaminophen **OR** HYDROcodone-acetaminophen    HYDROmorphone **OR** HYDROmorphone **OR** HYDROmorphone    polyethylene glycol (PEG 3350)    sennosides    bisacodyl    ondansetron    metoclopramide    glucose **OR** glucose **OR** glucose-vitamin C **OR** dextrose **OR** glucose **OR** glucose **OR** glucose-vitamin C    Vitals  Vitals:    06/28/24 0757   BP: 111/73   Pulse: 71   Resp: 16   Temp: 98.7 °F (37.1 °C)         Exam   Gen-    no acute distress, alert and oriented x 3   RESP-   Lungs CTA, normal respiratory effort  CV-      Heart RRR, no mgr  Abd-    soft, nondistended, nontender, bowel sounds present  Skin-    no rash  Neuro-  no focal neurologic deficits  Ext-      dressing in tact   Psych- alert and oriented x 3     Labs:     Chem:  Recent Labs   Lab 06/24/24 2218 06/26/24  0435 06/26/24  1538 06/27/24  0437 06/28/24  0437   * 137 135* 136 134*   K 4.4 4.1 4.2 3.9 4.3   CL 95* 101 100 100 100   CO2 23.0 25.0 23.0 24.0 24.0   BUN 76* 45* 53* 39* 35*   ALT 22  --   --   --   --    AST 67*  --   --   --   --    ALB 2.7*  --   --   --   --        HEM:  Recent Labs   Lab 06/24/24 2218 06/26/24  0435 06/26/24  1535 06/27/24  0437 06/28/24  0437   WBC 14.3* 11.9* 10.8 12.4* 11.2*    HGB 9.4* 7.0* 7.4* 8.1* 8.2*   .0 203.0 241.0 154.0 135.0*   MCV 93.6 94.0 95.2 87.8 90.7       Coagulation:  Recent Labs   Lab 06/24/24  2218 06/26/24  0435 06/26/24  1535 06/27/24  0437 06/28/24  0437 06/28/24  0730   PTT  --   --  30.3  --   --   --    INR  --   --   --   --   --  1.48*   HCT 29.4* 22.1* 23.8* 24.4* 25.5*  --    HGB 9.4* 7.0* 7.4* 8.1* 8.2*  --    .0 203.0 241.0 154.0 135.0*  --               AP:  78 year old male with PMH sig for CAD status post stents, PAD status post bilateral lower extremity stents, atrial fibrillation on Xarelto, diabetes mellitus type 2 on insulin pump, end-stage renal disease on dialysis, chronic CHF with LVH, status post AVR, history of stroke, hypertension, hyperlipidemia, DESI and recent right TMA and left foot second digit amputation on 6/13 by Dr. Meza presented with poor surgical wound healing.     Postop wound infection  R TMA, L foot 2nd digit amputation on 6/13, Dr. Meza/Podiatry  - obtain cultures if possible  - abx per ID consult  - Arterial US in April, likely has more distal PAD, may require further amputation, await vascular/podiatry evaluation - for now   - s/p podiatry I&D of R foot (1st/2nd metatarsals) and L foot (2nd metatarsal) - await surgical cx/biopsy results     [facility] on HD  Malfunctioning AVF  - vascular consulted  - nephrology o/c - p génesis for tunneled cathter today 6/28, eliquis on hold      CAD s/p stents  PAD s/p b/l LE stents  Chronic CHF w/ LVH  S/p AVR  Atrial fibrillation  - coreg, lipitor  - holding AC for procedure      DM2  - insulin pump  - ISS/accucheks     Essential HTN  - coreg     Hyperlipidemia  - statin     DESI  - cpap at bedtime         FEN: regular diet, PT/OT  Proph: SCDs, elqiuis when able     Code status: Full code             Vira Richardson MD

## 2024-06-28 NOTE — PLAN OF CARE
Patient A&Ox3-4 , occasional confusion ,easily reoriented . Vital signs stable . On room air . Tele controlled A.Fib . Denies any pain now . Dressing to both feet with some oozing ,reinforced as needed . Legs elevated on pillows . On iv and po antibiotics . Completed dialysis , I.5 litre out . Plan to exchange the temp cath to tunneled catheter by IR in am . NPO after midnight . Holding anticoagulants for the procedure . All safety precautions in place . Reminded to \"call don't fall\" . Will continue to monitor .

## 2024-06-28 NOTE — PROCEDURES
[facility]   part of [facility]  Procedure Note    Darin Bowens Patient Status:  Inpatient    1945 MRN [id number]   Location [facility] INTERVENTIONAL SUITES Attending Vira Richardson MD   Hosp Day # 3 PCP Zachery Hall MD     Procedure: Exchange of temp cath for perm dialysis catheter    Pre-Procedure Diagnosis:  CKD    Post-Procedure Diagnosis: Same    Anesthesia:  Local and Sedation    Findings:  Successful removal of left neck temp cath and placement of tunneled dialysis catheter via left internal jugular      Specimens: None    Blood Loss:  <5ml    Tourniquet Time: 0  Complications:  None  Drains:  None    Secondary Diagnosis:  None    Tan Padilla MD  2024

## 2024-06-28 NOTE — PROGRESS NOTES
[facility]  Progress Note    Darin Bowens Patient Status:  Observation    1945 MRN [id number]   [facility] 3SW-A Attending Yandel Ordoñez MD   Hosp Day # 3 PCP Zachery Hall MD      No acute events  PC placement today; HD today     Current Medications:    Current Facility-Administered Medications:     sodium hypochlorite (Dakin's) 0.25 % external solution, , Topical, BID    [Held by provider] apixaban (Eliquis) tab 2.5 mg, 2.5 mg, Oral, BID    sodium chloride 0.9% infusion, , Intravenous, Once    heparin (Porcine) 1000 UNIT/ML injection 2,000 Units, 2,000 Units, Intravenous, PRN Dialysis    acetaminophen (Tylenol Extra Strength) tab 500 mg, 500 mg, Oral, Q4H PRN    acetaminophen (Tylenol) tab 650 mg, 650 mg, Oral, Q4H PRN **OR** HYDROcodone-acetaminophen (Norco) 5-325 MG per tab 1 tablet, 1 tablet, Oral, Q4H PRN **OR** HYDROcodone-acetaminophen (Norco) 5-325 MG per tab 2 tablet, 2 tablet, Oral, Q4H PRN    HYDROmorphone (Dilaudid) 1 MG/ML injection 0.2 mg, 0.2 mg, Intravenous, Q2H PRN **OR** HYDROmorphone (Dilaudid) 1 MG/ML injection 0.4 mg, 0.4 mg, Intravenous, Q2H PRN **OR** HYDROmorphone (Dilaudid) 1 MG/ML injection 0.8 mg, 0.8 mg, Intravenous, Q2H PRN    polyethylene glycol (PEG 3350) (Miralax) 17 g oral packet 17 g, 17 g, Oral, Daily PRN    sennosides (Senokot) tab 17.2 mg, 17.2 mg, Oral, Nightly PRN    bisacodyl (Dulcolax) 10 MG rectal suppository 10 mg, 10 mg, Rectal, Daily PRN    ondansetron (Zofran) 4 MG/2ML injection 4 mg, 4 mg, Intravenous, Q6H PRN    metoclopramide (Reglan) 5 mg/mL injection 5 mg, 5 mg, Intravenous, Q8H PRN    glucose (Dex4) 15 GM/59ML oral liquid 15 g, 15 g, Oral, Q15 Min PRN **OR** glucose (Glutose) 40% oral gel 15 g, 15 g, Oral, Q15 Min PRN **OR** glucose-vitamin C (Dex-4) chewable tab 4 tablet, 4 tablet, Oral, Q15 Min PRN **OR** dextrose 50% injection 50 mL, 50 mL, Intravenous, Q15 Min PRN **OR** glucose (Dex4) 15 GM/59ML oral liquid 30 g,  30 g, Oral, Q15 Min PRN **OR** glucose (Glutose) 40% oral gel 30 g, 30 g, Oral, Q15 Min PRN **OR** glucose-vitamin C (Dex-4) chewable tab 8 tablet, 8 tablet, Oral, Q15 Min PRN    insulin aspart (NovoLOG) 100 Units/mL FlexPen 4-20 Units, 4-20 Units, Subcutaneous, TID AC and HS    atorvastatin (Lipitor) tab 80 mg, 80 mg, Oral, Nightly    carvedilol (Coreg) tab 12.5 mg, 12.5 mg, Oral, BID with meals    ceFEPIme (Maxipime) 1 g in sodium chloride 0.9% 100 mL IVPB-MBP, 1 g, Intravenous, Q24H    Vancomycin: PHARMACY DOSING, 1 each, Intravenous, See Admin Instructions (RX holding)    metRONIDAZOLE (Flagyl) tab 500 mg, 500 mg, Oral, 2 times per day  Home Medications:  No current outpatient medications on file.       Review of Systems:  See HPI; A total of 12 systems reviewed and otherwise unremarkable.    Physical Exam:  Vital signs: Blood pressure 111/73, pulse 71, temperature 98.7 °F (37.1 °C), temperature source Oral, resp. rate 16, height 6' (1.829 m), weight 198 lb 8 oz (90 kg), SpO2 92%.  General: No acute distress. Alert and oriented x 3.  HEENT: Moist mucous membranes. EOM-I. PERRL  Neck: No lymphadenopathy.  No JVD. No carotid bruits.  Respiratory: Clear to auscultation bilaterally.  No wheezes. No rhonchi.  Cardiovascular: S1, S2.  Regular rate and rhythm.  No murmurs. Equal pulses   Abdomen: Soft, nontender, nondistended.  Positive bowel sounds. No rebound tenderness   Neurologic: No focal neurological deficits.   Ext ; + edema; BLE wounds noted- dressing intact  Integument: No lesions. No erythema.  Psychiatric: Appropriate mood and affect.    Recent Labs     06/24/24  2218 06/26/24  0435 06/26/24  1535 06/27/24  0437   WBC 14.3* 11.9* 10.8 12.4*   HGB 9.4* 7.0* 7.4* 8.1*   MCV 93.6 94.0 95.2 87.8   .0 203.0 241.0 154.0       Recent Labs     06/24/24  2218 06/26/24  0435 06/26/24  1538 06/27/24  0437   * 137 135* 136   K 4.4 4.1 4.2 3.9   CL 95* 101 100 100   CO2 23.0 25.0 23.0 24.0   BUN 76* 45* 53*  39*   CREATSERUM 10.80* 6.96* 7.94* 5.71*   CA 9.1 8.6 8.7 8.8       Recent Labs     06/24/24  2218   ALT 22   AST 67*   ALB 2.7*         Imaging:  Reviwed     Impression:  ESRD - due to DM/HTN  - plan for HD today after MRI - I reviewed risk of NSF with patient in detail. Serial HD will be done in effort to remove the MRI contrast agent and help minimize risk.  - Plan for PC exchange; HD today- resume MWF schedule moving forward  - will have pt f/u with Dr. Nix for o/p eval of new AVF/fistula revision  Foot infection - s/p recent  L foot toe amputation on 6/13  - on abx; wound care  - podiatry /ID following   PAD- as above  CHF; s/p AVR; volume optimization w/ HD/UF  Anemia due to CKD- DALE w/ HD  DM/HTN  HL    Thank you for allowing me to participate in this patient's care.  Please feel free to call me with any questions or concerns.    Aj Bardales MD  6/28/2024

## 2024-06-28 NOTE — CM/SW NOTE
Chart reviewed for discharge planning.  Pt is POD 1 bilateral LE I&D/resections.  Noted temp cath removed and tunneled cath for HD placed today.  PT/OT evals pending.  Duly ID following for antibiotic recommendations.  / to remain available for support and/or discharge planning.     Lili Kennedy [facility]  Discharge Planner  [phone]

## 2024-06-29 ENCOUNTER — ANESTHESIA EVENT (OUTPATIENT)
Dept: SURGERY | Facility: HOSPITAL | Age: 79
End: 2024-06-29
Payer: MEDICARE

## 2024-06-29 DIAGNOSIS — M86.9 OSTEOMYELITIS (HCC): Primary | ICD-10-CM

## 2024-06-29 LAB
ANION GAP SERPL CALC-SCNC: 13 MMOL/L (ref 0–18)
BASOPHILS # BLD AUTO: 0.03 X10(3) UL (ref 0–0.2)
BASOPHILS NFR BLD AUTO: 0.2 %
BUN BLD-MCNC: 30 MG/DL (ref 9–23)
CALCIUM BLD-MCNC: 8.1 MG/DL (ref 8.5–10.1)
CHLORIDE SERPL-SCNC: 101 MMOL/L (ref 98–112)
CO2 SERPL-SCNC: 17 MMOL/L (ref 21–32)
CREAT BLD-MCNC: 5.17 MG/DL
EGFRCR SERPLBLD CKD-EPI 2021: 11 ML/MIN/1.73M2 (ref 60–?)
EOSINOPHIL # BLD AUTO: 0.09 X10(3) UL (ref 0–0.7)
EOSINOPHIL NFR BLD AUTO: 0.7 %
ERYTHROCYTE [DISTWIDTH] IN BLOOD BY AUTOMATED COUNT: 15.8 %
GLUCOSE BLD-MCNC: 220 MG/DL (ref 70–99)
GLUCOSE BLD-MCNC: 222 MG/DL (ref 70–99)
GLUCOSE BLD-MCNC: 261 MG/DL (ref 70–99)
GLUCOSE BLD-MCNC: 295 MG/DL (ref 70–99)
GLUCOSE BLD-MCNC: 346 MG/DL (ref 70–99)
HCT VFR BLD AUTO: 26.6 %
HGB BLD-MCNC: 8.6 G/DL
IMM GRANULOCYTES # BLD AUTO: 0.15 X10(3) UL (ref 0–1)
IMM GRANULOCYTES NFR BLD: 1.1 %
LYMPHOCYTES # BLD AUTO: 0.58 X10(3) UL (ref 1–4)
LYMPHOCYTES NFR BLD AUTO: 4.2 %
MCH RBC QN AUTO: 28.8 PG (ref 26–34)
MCHC RBC AUTO-ENTMCNC: 32.3 G/DL (ref 31–37)
MCV RBC AUTO: 89 FL
MONOCYTES # BLD AUTO: 1.22 X10(3) UL (ref 0.1–1)
MONOCYTES NFR BLD AUTO: 8.9 %
NEUTROPHILS # BLD AUTO: 11.7 X10 (3) UL (ref 1.5–7.7)
NEUTROPHILS # BLD AUTO: 11.7 X10(3) UL (ref 1.5–7.7)
NEUTROPHILS NFR BLD AUTO: 84.9 %
OSMOLALITY SERPL CALC.SUM OF ELEC: 285 MOSM/KG (ref 275–295)
PLATELET # BLD AUTO: 206 10(3)UL (ref 150–450)
POTASSIUM SERPL-SCNC: 4.4 MMOL/L (ref 3.5–5.1)
RBC # BLD AUTO: 2.99 X10(6)UL
SODIUM SERPL-SCNC: 131 MMOL/L (ref 136–145)
WBC # BLD AUTO: 13.8 X10(3) UL (ref 4–11)

## 2024-06-29 PROCEDURE — 99232 SBSQ HOSP IP/OBS MODERATE 35: CPT | Performed by: INTERNAL MEDICINE

## 2024-06-29 RX ORDER — CLOPIDOGREL BISULFATE 75 MG/1
75 TABLET ORAL DAILY
Status: DISCONTINUED | OUTPATIENT
Start: 2024-06-29 | End: 2024-07-12

## 2024-06-29 RX ORDER — INSULIN DEGLUDEC 100 U/ML
10 INJECTION, SOLUTION SUBCUTANEOUS DAILY
Status: DISCONTINUED | OUTPATIENT
Start: 2024-06-29 | End: 2024-07-09

## 2024-06-29 NOTE — PROGRESS NOTES
[facility]   part of [facility] Infectious Disease  Progress Note    Darin Bowens Patient Status:  Inpatient    1945 MRN [id number]   Location [facility] 3SW-A Attending Vira Richardson MD   Hosp Day # 4 PCP Zachery Hall MD     Subjective:    Patient seen and examined resting in bed, wife at bedside. He is tolerating the IV abx.     Review of Systems:  Review of systems reviewed and negative except as mentioned    Objective:  Blood pressure 103/46, pulse 65, temperature 98.2 °F (36.8 °C), temperature source Oral, resp. rate 16, height 6' (1.829 m), weight 198 lb 8 oz (90 kg), SpO2 99%.      Physical Exam:  General: Awake, alert, non-tox, NAD.  HEENT:  Oropharynx clear, trachea ML.  Heart: RRR S1S2 no murmurs.  Lungs: Essentially CTA b/l, no rhonchi, rales, wheezes.  Abdomen: Soft, NT/ND.  BS present.  No organomegaly.  Extremity: +b/l feet wrapped  Neurological: No focal deficits.  Derm:  Warm, dry, free from rashes.  + left chest HD cath              Lab Data Review:  Lab Results   Component Value Date    CREATSERUM 5.17 2024    BUN 30 2024     2024    K 4.4 2024     2024    CO2 17.0 2024     2024    CA 8.1 2024        Cultures:  Hospital Encounter on 24   1. Tissue Aerobic Culture     Status: Abnormal (Preliminary result)    Collection Time: 24 12:13 PM    Specimen: Foot,left; Tissue   Result Value Ref Range    Tissue Culture Result 4+ growth Enterobacter cloacae (A) N/A    Tissue Smear 4+ WBCs seen (A) N/A    Tissue Smear 4+ Gram Negative Rods (A) N/A    Tissue Smear 2+ Gram positive cocci in pairs (A) N/A       Susceptibility    Enterobacter cloacae -  (no method available)     Cefazolin >=64 Resistant      Cefepime <=1 Sensitive      Ceftriaxone <=1 Sensitive      Ciprofloxacin <=0.25 Sensitive      Gentamicin <=1 Sensitive      Meropenem <=0.25 Sensitive      Levofloxacin  <=0.12 Sensitive      Piperacillin + Tazobactam <=4 Sensitive      Trimethoprim/Sulfa <=20 Sensitive    2. Anaerobic Culture     Status: None (Preliminary result)    Collection Time: 06/27/24 12:13 PM    Specimen: Foot,left; Tissue   Result Value Ref Range    Anaerobic Culture Pending N/A   3. Aerobic Bacterial Culture     Status: Abnormal    Collection Time: 06/25/24  8:14 AM    Specimen: Foot,left; Other   Result Value Ref Range    Aerobic Culture Result  N/A     Mixture of organisms suggestive of normal skin justice    Aerobic Culture Result 4+ growth Enterobacter cloacae complex (A) N/A    Aerobic Culture Result 4+ growth Acinetobacter pittii (A) N/A    Aerobic Smear No WBCs seen N/A    Aerobic Smear 1+ Gram Positive Cocci N/A    Aerobic Smear 1+ Gram Negative Rods N/A       Susceptibility    Enterobacter cloacae complex -  (no method available)     Cefazolin >=64 Resistant      Cefepime <=1 Sensitive      Ceftriaxone <=1 Sensitive      Ciprofloxacin <=0.25 Sensitive      Gentamicin <=1 Sensitive      Meropenem <=0.25 Sensitive      Levofloxacin <=0.12 Sensitive      Piperacillin + Tazobactam <=4 Sensitive      Trimethoprim/Sulfa <=20 Sensitive     Acinetobacter pittii -  (no method available)     Ceftazidime <=1 Sensitive      Ciprofloxacin <1 Sensitive      Gentamicin <1 Sensitive      Meropenem <1 Sensitive      Levofloxacin <=0.25 Sensitive      Piperacillin + Tazobactam <16 Sensitive      Trimethoprim/Sulfa <=40 Sensitive    4. Blood Culture     Status: None (Preliminary result)    Collection Time: 06/25/24  2:36 AM    Specimen: Blood,peripheral   Result Value Ref Range    Blood Culture Result No Growth 4 Days N/A        Radiology:  IR CENTRAL VENOUS ACCESS    Result Date: 6/28/2024  CONCLUSION: Successful conversion of  left IJ non tunneled central venous dialysis catheter to left chest tunneled dialysis catheter  (27 cm tip to cuff Bard Hemosplit catheter). Catheter is ready for use.  Recommend routine  catheter care.   LOCATION:  [facility]    Dictated by (CST): Randy Maddox MD on 6/28/2024 at 12:09 PM     Finalized by (CST): Randy Maddox MD on 6/28/2024 at 12:15 PM       MRI FOOT (W+WO), RIGHT (CPT=73720)    Result Date: 6/26/2024  CONCLUSION:  Postsurgical changes status post amputation of wall the distal digits.  Fluid along the surgical approach may represent postoperative seroma although superinfection cannot be excluded.  There is no evidence of osteomyelitis.   LOCATION:  [facility]   Dictated by (CST): Randy Vaz MD on 6/26/2024 at 3:28 PM     Finalized by (CST): Randy Vaz MD on 6/26/2024 at 3:32 PM       MRI FOOT (W+WO), LEFT (CPT=73720)    Result Date: 6/26/2024  CONCLUSION:  Postsurgical changes in the 2nd toe.  No evidence of osteomyelitis.  A fluid tract extending from the surgical site to the skin surface is noted.   LOCATION:  [facility]   Dictated by (CST): Randy Vaz MD on 6/26/2024 at 3:23 PM     Finalized by (CST): Randy Vaz MD on 6/26/2024 at 3:28 PM       IR CENTRAL VENOUS ACCESS    Result Date: 6/25/2024  CONCLUSION:  Successful non-tunneled dialysis catheter placement via the left internal jugular vein.  Chronically occluded right internal jugular vein.   LOCATION:  [facility]    Dictated by (CST): Zana Lui MD on 6/25/2024 at 5:26 PM     Finalized by (CST): Zana Lui MD on 6/25/2024 at 5:32 PM          Assessment and Plan:    Right-sided surgical wound infection with osteomyelitis with risk to limb  - This is in the setting of right foot amputation on 6/13  - Now status post excisional wound debridement to the level of bone along with partial resection of the first and second metatarsal on the right on 6/27   - Surgical cultures with Enterobacter cloacae  - Drainage cultures from the time of admission grew Enterobacter cloacae and Acinetobacter  - Podiatry note reviewed plans for additional OR, possibly tomorrow  - Currently on IV cefepime and IV vancomycin along with p.o.  metronidazole    Left-sided surgical wound infection with osteomyelitis and gangrene with risk to limb  - This is in the setting of recent foot surgery on 6/13  - Now status post excisional wound debridement to level of bone along with partial resection of the second metatarsal on 6/27  - Surgical cultures with Enterobacter cloacae  - Podiatry note reviewed plans for additional OR, possibly tomorrow  - IV abx as above    Peripheral vascular disease  - Vascular surgery following  - High risk for limb loss    End stage renal disease  - AV fistula clotted  - Permacath planned for today    Allergic reaction to Augmentin   Now stopped, rahs resolved    Recommendations  - Continue IV cefepime, vancomycin along with p.o. metronidazole  - Follow pending cultures  - Plans for additional OR, please send cul and path  - Post op care per podiatry  - Daily labs while on IV abx  - Further recommendations to follow       If you have any questions or concerns please call [facility] at [phone].     SURESH Meek    6/29/2024  9:40 AM

## 2024-06-29 NOTE — PLAN OF CARE
Alert and oriented x 4. Vitals stable on room air. Bilateral foot wound dressings changed as ordered. Plan for return to OR tomorrow - patient and family updated with plan. Does not produce urine due to kidney disease. Dialysis catheter dressing clean and dry to left jugular. Tolerating diet as ordered. Up to chair with PT/OT. Plan of care discussed with patient and family.

## 2024-06-29 NOTE — ANESTHESIA PREPROCEDURE EVALUATION
PRE-OP EVALUATION    Patient Name: Darin Bowens    Admit Diagnosis: Malfunction of arteriovenous dialysis fistula, initial encounter ([facility]) [T82.590A]  Cellulitis of lower extremity, unspecified laterality [L03.119]  Nausea and vomiting, unspecified vomiting type [R11.2]    Pre-op Diagnosis: Osteomyelitis (Formerly [facility]) [M86.9]    RIGHT FOOT WOUND DEBRIDMENT & APPLICATION WOUND VAC AND LEFT TRANS-METARTASAL AMPUTATION W/ ACHILES TENDON    Anesthesia Procedure: RIGHT FOOT WOUND DEBRIDMENT & APPLICATION WOUND VAC AND LEFT TRANS-METARTASAL AMPUTATION W/ ACHILES TENDON (Bilateral)    Surgeons and Role:     * Jose R Meza, DPDEBBIE - Primary    Pre-op vitals reviewed.  Temp: 98.1 °F (36.7 °C)  Pulse: 65  Resp: 18  BP: 96/43  SpO2: 100 %  Body mass index is 26.92 kg/m².    Current medications reviewed.  Hospital Medications:   insulin degludec (Tresiba) 100 units/mL flextouch 10 Units  10 Units Subcutaneous Daily    clopidogrel (Plavix) tab 75 mg  75 mg Oral Daily    [COMPLETED] lidocaine-EPINEPHrine (Xylocaine-Epinephrine) 2 %-1:680352 injection        [COMPLETED] lidocaine (Xylocaine) 1 % injection        [COMPLETED] fentaNYL (Sublimaze) 50 mcg/mL injection        [COMPLETED] midazolam (Versed) 2 MG/2ML injection        [COMPLETED] ceFAZolin (Ancef) 1 g injection        [COMPLETED] ceFAZolin (Ancef) 1 g injection        [COMPLETED] heparin (Porcine) 5000 UNIT/ML injection        [COMPLETED] epoetin matt (Epogen, Procrit) 62268 UNIT/ML injection 10,000 Units  10,000 Units Intravenous Once in dialysis    sodium hypochlorite (Dakin's) 0.25 % external solution   Topical BID    apixaban (Eliquis) tab 2.5 mg  2.5 mg Oral BID    [] sodium chloride 0.9 % IV bolus 100 mL  100 mL Intravenous Q30 Min PRN    And    [] albumin human (Albumin) 25% injection 25 g  25 g Intravenous PRN Dialysis    sodium chloride 0.9% infusion   Intravenous Once    [COMPLETED] gadoterate meglumine (Dotarem) 10 MMOL/20ML injection 20 mL  20 mL  Intravenous ONCE PRN    [COMPLETED] vancomycin (Vancocin) 1,000 mg in sodium chloride 0.9% 250 mL IVPB-ADDV  10 mg/kg Intravenous Once    heparin (Porcine) 1000 UNIT/ML injection 2,000 Units  2,000 Units Intravenous PRN Dialysis    [COMPLETED] ondansetron (Zofran) 4 MG/2ML injection 4 mg  4 mg Intravenous Once    [COMPLETED] cefTRIAXone (Rocephin) 1 g in sodium chloride 0.9% 100 mL IVPB-ADDV  1 g Intravenous Once    [COMPLETED] vancomycin (Vancocin) 2 g in sodium chloride 0.9% 500mL IVPB premix  2,000 mg Intravenous Once    acetaminophen (Tylenol Extra Strength) tab 500 mg  500 mg Oral Q4H PRN    acetaminophen (Tylenol) tab 650 mg  650 mg Oral Q4H PRN    Or    HYDROcodone-acetaminophen (Norco) 5-325 MG per tab 1 tablet  1 tablet Oral Q4H PRN    Or    HYDROcodone-acetaminophen (Norco) 5-325 MG per tab 2 tablet  2 tablet Oral Q4H PRN    HYDROmorphone (Dilaudid) 1 MG/ML injection 0.2 mg  0.2 mg Intravenous Q2H PRN    Or    HYDROmorphone (Dilaudid) 1 MG/ML injection 0.4 mg  0.4 mg Intravenous Q2H PRN    Or    HYDROmorphone (Dilaudid) 1 MG/ML injection 0.8 mg  0.8 mg Intravenous Q2H PRN    polyethylene glycol (PEG 3350) (Miralax) 17 g oral packet 17 g  17 g Oral Daily PRN    sennosides (Senokot) tab 17.2 mg  17.2 mg Oral Nightly PRN    bisacodyl (Dulcolax) 10 MG rectal suppository 10 mg  10 mg Rectal Daily PRN    ondansetron (Zofran) 4 MG/2ML injection 4 mg  4 mg Intravenous Q6H PRN    metoclopramide (Reglan) 5 mg/mL injection 5 mg  5 mg Intravenous Q8H PRN    glucose (Dex4) 15 GM/59ML oral liquid 15 g  15 g Oral Q15 Min PRN    Or    glucose (Glutose) 40% oral gel 15 g  15 g Oral Q15 Min PRN    Or    glucose-vitamin C (Dex-4) chewable tab 4 tablet  4 tablet Oral Q15 Min PRN    Or    dextrose 50% injection 50 mL  50 mL Intravenous Q15 Min PRN    Or    glucose (Dex4) 15 GM/59ML oral liquid 30 g  30 g Oral Q15 Min PRN    Or    glucose (Glutose) 40% oral gel 30 g  30 g Oral Q15 Min PRN    Or    glucose-vitamin C (Dex-4)  chewable tab 8 tablet  8 tablet Oral Q15 Min PRN    insulin aspart (NovoLOG) 100 Units/mL FlexPen 4-20 Units  4-20 Units Subcutaneous TID AC and HS    atorvastatin (Lipitor) tab 80 mg  80 mg Oral Nightly    carvedilol (Coreg) tab 12.5 mg  12.5 mg Oral BID with meals    [COMPLETED] heparin (Porcine) 1000 UNIT/ML injection 1,500 Units  1.5 mL Intracatheter Once    ceFEPIme (Maxipime) 1 g in sodium chloride 0.9% 100 mL IVPB-MBP  1 g Intravenous Q24H    [COMPLETED] vancomycin (Vancocin) 1,000 mg in sodium chloride 0.9% 250 mL IVPB-ADDV  10 mg/kg Intravenous Once    Vancomycin: PHARMACY DOSING  1 each Intravenous See Admin Instructions (RX holding)    metRONIDAZOLE (Flagyl) tab 500 mg  500 mg Oral 2 times per day    [COMPLETED] heparin (Porcine) 5000 UNIT/ML injection        [COMPLETED] lidocaine (Xylocaine) 1 % injection        [COMPLETED] iohexol (OMNIPAQUE) 350 MG/ML injection 20 mL  20 mL Injection ONCE PRN       Outpatient Medications:     Medications Prior to Admission   Medication Sig Dispense Refill Last Dose    dorzolamide 2 % Ophthalmic Solution Place 1 drop into both eyes in the morning and 1 drop before bedtime.       Netarsudil-Latanoprost (ROCKLATAN) 0.02-0.005 % Ophthalmic Solution Apply 1 drop to eye daily. Both eyes       atorvastatin 80 MG Oral Tab Take 1 tablet (80 mg total) by mouth nightly. 90 tablet 2     CLOPIDOGREL 75 MG Oral Tab TAKE 1 TABLET(75 MG) BY MOUTH DAILY 90 tablet 0     Insulin Lispro (HUMALOG) 100 UNIT/ML Subcutaneous Solution 95 units per day via insulin pump. 90 mL 1     Ergocalciferol (VITAMIN D2 OR) Take 50,000 Units by mouth every 30 (thirty) days.       rivaroxaban (XARELTO) 15 MG Oral Tab Take 1 tablet (15 mg total) by mouth daily with food. 90 tablet 3 6/24/2024    CALCIUM ACETATE 667 MG Oral Cap TAKE 1 CAPSULE BY MOUTH THREE TIMES DAILY WITH MEALS 270 capsule 0     COMBIGAN 0.2-0.5 % Ophthalmic Solution Place 1 drop into both eyes 2 (two) times daily.       amoxicillin  clavulanate 875-125 MG Oral Tab Take 0.5 tablets by mouth 2 (two) times daily.       ketoconazole 2 % External Shampoo Apply 1 Application topically daily as needed for Itching.       Continuous Blood Gluc Sensor (DEXCOM G6 SENSOR) Does not apply Misc 1 Device Every 10 days. 3 each 1     carvedilol 12.5 MG Oral Tab Take 1 tablet (12.5 mg total) by mouth 2 (two) times daily with meals. Take one tablet (12.5mg total) by mouth two times a day 180 tablet 3     clindamycin 1 % External Lotion Apply twice daily to scalp and chest rashes (Patient not taking: Reported on 6/13/2024) 60 mL 11     Continuous Blood Gluc Transmit (DEXCOM G6 TRANSMITTER) Does not apply Misc Change every 90 days 1 each 3     PTA Insulin via Pump Inject into the skin continuous. humalog insulin   [facility] [employer]  Basal Rate : 55.6 standard rate 1.90  I:C - 1 units/4 carbs  Correction Factor - unknown          Allergies: Augmentin [amoxicillin-pot clavulanate] and Lisinopril      Anesthesia Evaluation    Patient summary reviewed.    Anesthetic Complications  (-) history of anesthetic complications         GI/Hepatic/Renal    Negative GI/hepatic/renal ROS.         (+) chronic renal disease and ESRD and hemodialysis                   Cardiovascular        Exercise tolerance: poor     MET: <=4      (+) hypertension     (+) CAD    (+) CABG/stent    (+) valvular problems/murmurs (s/p AVR)     (+) dysrhythmias and atrial fibrillation  (+) CHF                Endo/Other      (+) diabetes and well controlled, type 1, using insulin                         Pulmonary                    (+) sleep apnea and CPAP      Neuro/Psych    Negative neuro/psych ROS.                          Visual impairement        Past Surgical History:   Procedure Laterality Date    Back surgery  5/16/16    L2-L5 Decomp poss uninstrumented fusion    Cabg      Cath percutaneous  transluminal coronary angioplasty      Cath transcatheter aortic valve replacement      Injection, w/wo contrast,  dx/therapeutic substance, epidural/subarachnoid; lumbar/sacral N/A 12/10/2015    Procedure: LUMBAR EPIDURAL;  Surgeon: Rojas Bolanos MD;  Location: [facility]IN [facility]    Injection, w/wo contrast, dx/therapeutic substance, epidural/subarachnoid; lumbar/sacral N/A 1/25/2016    Procedure: LUMBAR EPIDURAL;  Surgeon: Rojas Bolanos MD;  Location: [facility]    Injection, w/wo contrast, dx/therapeutic substance, epidural/subarachnoid; lumbar/sacral N/A 3/2/2016    Procedure: LUMBAR EPIDURAL;  Surgeon: Corey Mcduffie MD;  Location: [facility]    M-sedaj by  phys perfrmg svc 5+ yr N/A 12/10/2015    Procedure: LUMBAR EPIDURAL;  Surgeon: Rojas Bolanos MD;  Location: [facility]    M-sedaj by  phys perfrmg svc 5+ yr N/A 1/25/2016    Procedure: LUMBAR EPIDURAL;  Surgeon: Rojas Bolanos MD;  Location: [facility]    M-sedaj by  phys perfrmg svc 5+ yr N/A 3/2/2016    Procedure: LUMBAR EPIDURAL;  Surgeon: oCrey Mcduffie MD;  Location: [facility]    Other surgical history  10/4/12    cysto-Dr. Calderón    Other surgical history N/A 5/16/2016    Procedure: LUMBAR LAMINECTOMY FUSION W/ BONE GRAFT 3 LEVEL;  Surgeon: CARLY Garcia MD;  Location:  MAIN OR    Patient documented not to have experienced any of the following events N/A 12/10/2015    Procedure: LUMBAR EPIDURAL;  Surgeon: Rojas Bolanos MD;  Location: [facility]    Patient documented not to have experienced any of the following events N/A 1/25/2016    Procedure: LUMBAR EPIDURAL;  Surgeon: Rojas Bolanos MD;  Location: [facility]    Patient documented not to have experienced any of the following events N/A 3/2/2016    Procedure: LUMBAR EPIDURAL;  Surgeon: Corey Mcduffie MD;  Location: [facility]    Patient withough preoperative order for iv antibiotic surgical site infection prophylaxis. N/A  12/10/2015    Procedure: LUMBAR EPIDURAL;  Surgeon: Rojas Bolanos MD;  Location: [facility]IN [facility]    Patient withough preoperative order for iv antibiotic surgical site infection prophylaxis. N/A 1/25/2016    Procedure: LUMBAR EPIDURAL;  Surgeon: Rojas Bolanos MD;  Location: [facility]IN [facility]    Patient withough preoperative order for iv antibiotic surgical site infection prophylaxis. N/A 3/2/2016    Procedure: LUMBAR EPIDURAL;  Surgeon: Corey Mcduffie MD;  Location: [facility]    Replace aortic valve open  2012    Valve repair       Social History     Socioeconomic History    Marital status:    Tobacco Use    Smoking status: Never    Smokeless tobacco: Never   Vaping Use    Vaping status: Never Used   Substance and Sexual Activity    Alcohol use: No     Alcohol/week: 0.0 standard drinks of alcohol    Drug use: No     History   Drug Use No     Available pre-op labs reviewed.  Lab Results   Component Value Date    WBC 13.8 (H) 06/29/2024    RBC 2.99 (L) 06/29/2024    HGB 8.6 (L) 06/29/2024    HCT 26.6 (L) 06/29/2024    MCV 89.0 06/29/2024    MCH 28.8 06/29/2024    MCHC 32.3 06/29/2024    RDW 15.8 06/29/2024    .0 06/29/2024     Lab Results   Component Value Date     (L) 06/29/2024    K 4.4 06/29/2024     06/29/2024    CO2 17.0 (L) 06/29/2024    BUN 30 (H) 06/29/2024    CREATSERUM 5.17 (H) 06/29/2024     (H) 06/29/2024    CA 8.1 (L) 06/29/2024     Lab Results   Component Value Date    INR 1.48 (H) 06/28/2024         Airway      Mallampati: III  Mouth opening: 3 FB  TM distance: 4 - 6 cm   Cardiovascular             Dental             Pulmonary                     Other findings              ASA: 3   Plan: MAC  NPO status verified and     Post-procedure pain management plan discussed with surgeon and patient.    Comment: MAC discussed in detail, as well as, possible conversion to GETA.  Risk of sore throat, cough, dental trauma, PONV  discussed.  All questions answered    Plan/risks discussed with: patient                Present on Admission:   Anemia in chronic kidney disease, on chronic dialysis (HCC)

## 2024-06-29 NOTE — PLAN OF CARE
Per IR MD ok to resume elequis 2.5mg tonight as per pt hx post hd cath placement/ exchange this afternoon.  Hd compleate last night, 1.5L taken off. Hd cath in place CDI, dressed by HD RN. On ra, river no cpap. Iv abx given post HD, as ordered. Tolerating diet. Pt completed day 3 of serial HD sessions, next will be as per pt usual Monday. Dressing to BLE changed as ordered, with dakin solution, now clean dry and intact, pt allowing me to elevate BLE with 1 pillow at this time. Bed alarm in place. Call light within reach, verbalized understanding of poc & call dont fall protocol.     Pt ot to eval today.  Pt requesting to dc to [facility] when able to dc, stating his wife cannot care for him at home. Pt son also wants this as dose wife per son via phone call. Will endorse to next rn so she may endorse to sw/cm later today.

## 2024-06-29 NOTE — PROGRESS NOTES
[facility]  Progress Note    Darin Bowens Patient Status:  Observation    1945 MRN [id number]   [facility] 3SW-A Attending Yandel Ordoñez MD   Hosp Day # 4 PCP Zachery Hall MD      No acute events       Current Medications:    Current Facility-Administered Medications:     sodium hypochlorite (Dakin's) 0.25 % external solution, , Topical, BID    apixaban (Eliquis) tab 2.5 mg, 2.5 mg, Oral, BID    sodium chloride 0.9% infusion, , Intravenous, Once    heparin (Porcine) 1000 UNIT/ML injection 2,000 Units, 2,000 Units, Intravenous, PRN Dialysis    acetaminophen (Tylenol Extra Strength) tab 500 mg, 500 mg, Oral, Q4H PRN    acetaminophen (Tylenol) tab 650 mg, 650 mg, Oral, Q4H PRN **OR** HYDROcodone-acetaminophen (Norco) 5-325 MG per tab 1 tablet, 1 tablet, Oral, Q4H PRN **OR** HYDROcodone-acetaminophen (Norco) 5-325 MG per tab 2 tablet, 2 tablet, Oral, Q4H PRN    HYDROmorphone (Dilaudid) 1 MG/ML injection 0.2 mg, 0.2 mg, Intravenous, Q2H PRN **OR** HYDROmorphone (Dilaudid) 1 MG/ML injection 0.4 mg, 0.4 mg, Intravenous, Q2H PRN **OR** HYDROmorphone (Dilaudid) 1 MG/ML injection 0.8 mg, 0.8 mg, Intravenous, Q2H PRN    polyethylene glycol (PEG 3350) (Miralax) 17 g oral packet 17 g, 17 g, Oral, Daily PRN    sennosides (Senokot) tab 17.2 mg, 17.2 mg, Oral, Nightly PRN    bisacodyl (Dulcolax) 10 MG rectal suppository 10 mg, 10 mg, Rectal, Daily PRN    ondansetron (Zofran) 4 MG/2ML injection 4 mg, 4 mg, Intravenous, Q6H PRN    metoclopramide (Reglan) 5 mg/mL injection 5 mg, 5 mg, Intravenous, Q8H PRN    glucose (Dex4) 15 GM/59ML oral liquid 15 g, 15 g, Oral, Q15 Min PRN **OR** glucose (Glutose) 40% oral gel 15 g, 15 g, Oral, Q15 Min PRN **OR** glucose-vitamin C (Dex-4) chewable tab 4 tablet, 4 tablet, Oral, Q15 Min PRN **OR** dextrose 50% injection 50 mL, 50 mL, Intravenous, Q15 Min PRN **OR** glucose (Dex4) 15 GM/59ML oral liquid 30 g, 30 g, Oral, Q15 Min PRN **OR** glucose  (Glutose) 40% oral gel 30 g, 30 g, Oral, Q15 Min PRN **OR** glucose-vitamin C (Dex-4) chewable tab 8 tablet, 8 tablet, Oral, Q15 Min PRN    insulin aspart (NovoLOG) 100 Units/mL FlexPen 4-20 Units, 4-20 Units, Subcutaneous, TID AC and HS    atorvastatin (Lipitor) tab 80 mg, 80 mg, Oral, Nightly    carvedilol (Coreg) tab 12.5 mg, 12.5 mg, Oral, BID with meals    ceFEPIme (Maxipime) 1 g in sodium chloride 0.9% 100 mL IVPB-MBP, 1 g, Intravenous, Q24H    Vancomycin: PHARMACY DOSING, 1 each, Intravenous, See Admin Instructions (RX holding)    metRONIDAZOLE (Flagyl) tab 500 mg, 500 mg, Oral, 2 times per day  Home Medications:  No current outpatient medications on file.       Review of Systems:  See HPI; A total of 12 systems reviewed and otherwise unremarkable.    Physical Exam:  Vital signs: Blood pressure 118/51, pulse 65, temperature 97.9 °F (36.6 °C), temperature source Oral, resp. rate 16, height 6' (1.829 m), weight 198 lb 8 oz (90 kg), SpO2 99%.  General: No acute distress. Alert and oriented x 3.  HEENT: Moist mucous membranes. EOM-I. PERRL  Neck: No lymphadenopathy.  No JVD. No carotid bruits.  Respiratory: Clear to auscultation bilaterally.  No wheezes. No rhonchi.  Cardiovascular: S1, S2.  Regular rate and rhythm.  No murmurs. Equal pulses   Abdomen: Soft, nontender, nondistended.  Positive bowel sounds. No rebound tenderness   Neurologic: No focal neurological deficits.   Ext ; + edema; BLE wounds noted- dressing intact  Integument: No lesions. No erythema.  Psychiatric: Appropriate mood and affect.    Recent Labs     06/26/24  1535 06/27/24  0437 06/28/24  0437 06/28/24  0730   WBC 10.8 12.4* 11.2*  --    HGB 7.4* 8.1* 8.2*  --    MCV 95.2 87.8 90.7  --    .0 154.0 135.0*  --    INR  --   --   --  1.48*       Recent Labs     06/26/24  1538 06/27/24  0437 06/28/24  0437 06/29/24  0455   * 136 134* 131*   K 4.2 3.9 4.3 4.4    100 100 101   CO2 23.0 24.0 24.0 17.0*   BUN 53* 39* 35* 30*    CREATSERUM 7.94* 5.71* 5.64* 5.17*   CA 8.7 8.8 8.9 8.1*       No results for input(s): \"ALT\", \"AST\", \"ALB\", \"AMYLASE\", \"LIPASE\", \"LDH\" in the last 72 hours.    Invalid input(s): \"ALPHOS\", \"TBIL\", \"DBIL\", \"TPROT\"      Imaging:  Reviwed     Impression:  ESRD - due to DM/HTN  - plan for HD today after MRI - I reviewed risk of NSF with patient in detail. Serial HD will be done in effort to remove the MRI contrast agent and help minimize risk.  S/p PC  - plan for next HD on Monday  - will have pt f/u with Dr. Nix for o/p eval of new AVF/fistula revision  Foot infection - s/p recent  L foot toe amputation on 6/13  - on abx; wound care  - podiatry /ID following   PAD- as above  CHF; s/p AVR; volume optimization w/ HD/UF  Anemia due to CKD- DALE w/ HD  DM/HTN  HL    Thank you for allowing me to participate in this patient's care.  Please feel free to call me with any questions or concerns.    Aj Bardales MD  6/29/2024

## 2024-06-29 NOTE — PROGRESS NOTES
Patient seen at bedside with his daughter.  No acute events overnight.    Physical exam:   Vitals:    06/29/24 0811   BP: 103/46   Pulse:    Resp:    Temp: 98.2 °F (36.8 °C)     General: NAD  HEENT: EOMI, PERRLA  Respiratory: No wheezing, no rhonchi  Musculoskeletal: Right foot TMA  Neuro: No focal deficits  Psych: Mood and affect normal    Lower extremity exam: Pedal pulses nonpalpable.  Sensation diminished.  Right foot: TMA site wound with fibronecrotic base, some necrosis at the dorsal wound margins, no erythema, no purulence, exposed metatarsals  Left foot: Surgical site with fibronecrotic base, ischemic changes to the great toe and second toe, no purulence. Necrotic ulcer of 3rd toe    Recent Labs   Lab 06/27/24 0437 06/28/24 0437 06/29/24  1022   RBC 2.78* 2.81* 2.99*   HGB 8.1* 8.2* 8.6*   HCT 24.4* 25.5* 26.6*   MCV 87.8 90.7 89.0   MCH 29.1 29.2 28.8   MCHC 33.2 32.2 32.3   RDW 16.8 16.1 15.8   NEPRELIM 10.26* 9.35* 11.70*   WBC 12.4* 11.2* 13.8*   .0 135.0* 206.0       Recent Labs   Lab 06/24/24 2218 06/26/24 0435 06/27/24 0437 06/28/24 0437 06/29/24  0455   *   < > 105* 192* 220*   BUN 76*   < > 39* 35* 30*   CREATSERUM 10.80*   < > 5.71* 5.64* 5.17*   EGFRCR 4*   < > 10* 10* 11*   CA 9.1   < > 8.8 8.9 8.1*   ALB 2.7*  --   --   --   --    *   < > 136 134* 131*   K 4.4   < > 3.9 4.3 4.4   CL 95*   < > 100 100 101   CO2 23.0   < > 24.0 24.0 17.0*   ALKPHO 149*  --   --   --   --    AST 67*  --   --   --   --    ALT 22  --   --   --   --    BILT 0.6  --   --   --   --    TP 7.1  --   --   --   --     < > = values in this interval not displayed.     Last A1c value was 5.9% done 6/14/2024.    Hospital Encounter on 06/25/24   1. Tissue Aerobic Culture     Status: Abnormal (Preliminary result)    Collection Time: 06/27/24 12:13 PM    Specimen: Foot,left; Tissue   Result Value Ref Range    Tissue Culture Result 4+ growth Enterobacter cloacae (A) N/A    Tissue Smear 4+ WBCs seen (A) N/A     Tissue Smear 4+ Gram Negative Rods (A) N/A    Tissue Smear 2+ Gram positive cocci in pairs (A) N/A       Susceptibility    Enterobacter cloacae -  (no method available)     Cefazolin >=64 Resistant      Cefepime <=1 Sensitive      Ceftriaxone <=1 Sensitive      Ciprofloxacin <=0.25 Sensitive      Gentamicin <=1 Sensitive      Meropenem <=0.25 Sensitive      Levofloxacin <=0.12 Sensitive      Piperacillin + Tazobactam <=4 Sensitive      Trimethoprim/Sulfa <=20 Sensitive    2. Anaerobic Culture     Status: None (Preliminary result)    Collection Time: 06/27/24 12:13 PM    Specimen: Foot,left; Tissue   Result Value Ref Range    Anaerobic Culture Pending N/A   3. Aerobic Bacterial Culture     Status: Abnormal    Collection Time: 06/25/24  8:14 AM    Specimen: Foot,left; Other   Result Value Ref Range    Aerobic Culture Result  N/A     Mixture of organisms suggestive of normal skin justice    Aerobic Culture Result 4+ growth Enterobacter cloacae complex (A) N/A    Aerobic Culture Result 4+ growth Acinetobacter pittii (A) N/A    Aerobic Smear No WBCs seen N/A    Aerobic Smear 1+ Gram Positive Cocci N/A    Aerobic Smear 1+ Gram Negative Rods N/A       Susceptibility    Enterobacter cloacae complex -  (no method available)     Cefazolin >=64 Resistant      Cefepime <=1 Sensitive      Ceftriaxone <=1 Sensitive      Ciprofloxacin <=0.25 Sensitive      Gentamicin <=1 Sensitive      Meropenem <=0.25 Sensitive      Levofloxacin <=0.12 Sensitive      Piperacillin + Tazobactam <=4 Sensitive      Trimethoprim/Sulfa <=20 Sensitive     Acinetobacter pittii -  (no method available)     Ceftazidime <=1 Sensitive      Ciprofloxacin <1 Sensitive      Gentamicin <1 Sensitive      Meropenem <1 Sensitive      Levofloxacin <=0.25 Sensitive      Piperacillin + Tazobactam <16 Sensitive      Trimethoprim/Sulfa <=40 Sensitive    4. Blood Culture     Status: None (Preliminary result)    Collection Time: 06/25/24  2:36 AM    Specimen:  Blood,peripheral   Result Value Ref Range    Blood Culture Result No Growth 4 Days N/A       IR CENTRAL VENOUS ACCESS    Result Date: 6/28/2024  CONCLUSION: Successful conversion of  left IJ non tunneled central venous dialysis catheter to left chest tunneled dialysis catheter  (27 cm tip to cuff Bard Hemosplit catheter). Catheter is ready for use.  Recommend routine catheter care.   LOCATION:  [facility]    Dictated by (CST): Ranyd Maddox MD on 6/28/2024 at 12:09 PM     Finalized by (CST): Randy Maddox MD on 6/28/2024 at 12:15 PM       MRI FOOT (W+WO), RIGHT (CPT=73720)    Result Date: 6/26/2024  CONCLUSION:  Postsurgical changes status post amputation of wall the distal digits.  Fluid along the surgical approach may represent postoperative seroma although superinfection cannot be excluded.  There is no evidence of osteomyelitis.   LOCATION:  [facility]   Dictated by (CST): Randy Vaz MD on 6/26/2024 at 3:28 PM     Finalized by (CST): Randy Vaz MD on 6/26/2024 at 3:32 PM       MRI FOOT (W+WO), LEFT (CPT=73720)    Result Date: 6/26/2024  CONCLUSION:  Postsurgical changes in the 2nd toe.  No evidence of osteomyelitis.  A fluid tract extending from the surgical site to the skin surface is noted.   LOCATION:  [facility]   Dictated by (CST): Randy Vaz MD on 6/26/2024 at 3:23 PM     Finalized by (CST): Randy Vaz MD on 6/26/2024 at 3:28 PM       IR CENTRAL VENOUS ACCESS    Result Date: 6/25/2024  CONCLUSION:  Successful non-tunneled dialysis catheter placement via the left internal jugular vein.  Chronically occluded right internal jugular vein.   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    OR cultures noted  Continue IV antibiotics    Further soft tissue loss is expected.  He will require further surgical treatment.  Patient is at high risk for further soft tissue loss, worsening infection, chronic nonhealing wounds and proximal amputation  Will try to salvage both his feet as much as possible.    Conservative and surgical treatment options at length with the patient and his daughter at bedside  Patient daughter verbalized understanding of the above and agreed to surgical treatment.  Plan for right foot wound debridement, application of wound VAC and left foot transmetatarsal amputation.    N.p.o. after midnight  Surgery tomorrow    Will follow    Jose R Meza D.P.M.

## 2024-06-29 NOTE — OCCUPATIONAL THERAPY NOTE
OCCUPATIONAL THERAPY EVALUATION - INPATIENT     Room Number: 353/353-A  Evaluation Date: 6/29/2024  Type of Evaluation: Initial  Presenting Problem: s/p L foot debridement and 2nd metatarsal partial resection 6/27, s/p R foot debridement and 1st/2nd metatarsal resection 6/27    Physician Order: IP Consult to Occupational Therapy  Reason for Therapy: ADL/IADL Dysfunction and Discharge Planning    OCCUPATIONAL THERAPY ASSESSMENT   Patient is currently functioning  baseline with ADL/mobility. Prior to admission, patient's baseline is assist for showers, cane ambulation in home.  Patient is requiring min (A) transfers and max (A) LB ADL as a result of the following impairments: WB restrictions, knowledge of adaptive techniques, LE skin integrity.  Occupational Therapy will continue to follow for duration of hospitalization.      Patient will benefit from continued skilled OT Services to promote return to prior level of function and safety with continuous assistance and gradual rehabilitative therapy       History Related to Current Admission: Patient is a 78 year old male admitted on 6/25/2024 with Presenting Problem: s/p L foot debridement and 2nd metatarsal partial resection 6/27, s/p R foot debridement and 1st/2nd metatarsal resection 6/27. Co-Morbidities : gangrene of R foot s/p R TMA with achilles lengthening and L 2nd toe amputation on 6/13 with DC to [facility] for 2 days; ESRD on HD, DM2, PAD, CHF, AVR    Pt discharged to [facility] after recent hospitalization, was only there 2 days prior to DC home. Pt states he was home 3 days before readmission. Per chart, family was having difficulty caring for him at home.     Recommendations for nursing staff:   Transfers: min (A) lateral transfer to LEFT  Toileting location: drop arm commode    EVALUATION SESSION  Patient Start of Session: semi supine  FUNCTIONAL TRANSFER ASSESSMENT     BED MOBILITY  Supine to Sit : Stand-by Assist  Scooting: Min ( A) lateral transfer to  LEFT    BALANCE ASSESSMENT     FUNCTIONAL ADL ASSESSMENT  LB Dressing Seated: Maximum Assist      ACTIVITY TOLERANCE: vitals wfl    EDUCATION PROVIDED  Patient: Role of Occupational Therapy; Plan of Care; Functional Transfer Techniques; Weight Bear Status; Surgical Precautions; Compensatory ADL Techniques  Patient's Response to Education: Verbalized Understanding      Equipment used: drop arm recliner  Demonstrates functional use, Would benefit from additional trial     Therapist comments: ADL/mobility as noted. Assist to don R PRAFO. Would benefit from L surgical shoe; discussed with RN. Requires RLE support to maintain NWB during lateral transfer to LEFT. Educated on drop arm equpiment.    Patient End of Session: Up in chair;Needs met;Call light within reach;RN aware of session/findings;All patient questions and concerns addressed (alarm pad unavailable; RN aware)    OCCUPATIONAL PROFILE    HOME SITUATION  Type of Home: Condo  Home Layout: One level  Lives With: Spouse    Toilet and Equipment: Comfort height toilet;3-in-1 commode  Shower/Tub and Equipment: Walk-in shower (shower)                     Prior Level of Function: Prior to recent admission pt had assist from spouse for showers, was otherwise mostly independent with ADL. Used cane in the home and wheelchair in the community. Since recent admission pt has been doing stand pivot transfers bed <> wheelchair <> commode with assist and RW. He has had assist from spouse or daughter for all LB ADL.    SUBJECTIVE   Pt states he's very itchy; RN notified.     PAIN ASSESSMENT  Rating: Unable to rate  Location: \"it's okay\" B feet  Management Techniques: Repositioning    OBJECTIVE  Precautions:  (R foot PRAFO)  Fall Risk: Standard fall risk    WEIGHT BEARING RESTRICTION  Weight Bearing Restriction: L lower extremity        R Lower Extremity: Non-Weight Bearing (PRAFO)  L Lower Extremity:  (Heel WB)    ASSESSMENTS    AM-PAC ‘6-Clicks’ Inpatient Daily Activity Short  Form  -   Putting on and taking off regular lower body clothing?: A Lot  -   Bathing (including washing, rinsing, drying)?: A Lot  -   Toileting, which includes using toilet, bedpan or urinal? : A Little  -   Putting on and taking off regular upper body clothing?: A Little  -   Taking care of personal grooming such as brushing teeth?: None  -   Eating meals?: None    AM-PAC Score:  Score: 18  Approx Degree of Impairment: 46.65%  Standardized Score (AM-PAC Scale): 38.66    PLAN  OT Treatment Plan: Balance activities;ADL training;Functional transfer training;Patient/Family education;Patient/Family training;Compensatory technique education;Equipment eval/education  Rehab Potential : Good  Frequency: 3-5x/week  Number of Visits to Meet Established Goals: 5    ADL Goals  Patient will perform toileting with supervision and AE PRN  Patient will perform LB dressing with supervision and AE PRN    Functional Transfer Goals    Patient will perform bed mobility sit to supine with supervision  Patient will perform bedside commode transfer with supervision    Additional Goals:  Patient will state WB precautions and maintain during transfers      Patient Evaluation Complexity Level:   Occupational Profile/Medical History LOW - Brief history including review of medical or therapy records    Specific performance deficits impacting engagement in ADL/IADL LOW  1 - 3 performance deficits    Client Assessment/Performance Deficits LOW - No comorbidities nor modifications of tasks    Clinical Decision Making LOW - Analysis of occupational profile, problem-focused assessments, limited treatment options    Overall Complexity LOW     OT Session Time: 25 minutes  Self-Care Home Management:  minutes  Therapeutic Activity: 15 minutes  Therapeutic Exercise:  minutes

## 2024-06-29 NOTE — PHYSICAL THERAPY NOTE
PHYSICAL THERAPY EVALUATION - INPATIENT     Room Number: 353/353-A  Evaluation Date: 6/29/2024  Type of Evaluation: Initial  Physician Order: PT Eval and Treat    Presenting Problem: cellulitis of LE  Co-Morbidities : B LE wounds, hx of TMA, 6/13 L foot amputaion, CHF, AVR, anemia,ESRD, DM,HTN  Reason for Therapy: Mobility Dysfunction and Discharge Planning    PHYSICAL THERAPY ASSESSMENT   Patient is currently functioning below baseline with transfers, gait, stair negotiation, and standing prolonged periods.  Prior to admission, patient's baseline is amb with RW as support although limited at household distance and has w/c to use for longer distances.  Patient at this time is restricted with NWB on the R LE and is heel bearing on the  L LE with plan for further procedure next week, as a result of the following impairments: pt is non-amb and is will be w/c bound and will needed supervision in lateral transfers bed<>recliner for overall safety.  Physical Therapy will continue to follow for duration of hospitalization.      Patient will benefit from continued skilled PT Services to promote return to prior level of function and safety with continuous assistance and gradual rehabilitative therapy .    PLAN  PT Treatment Plan: Bed mobility;Body mechanics;Patient education;Gait training;Strengthening;Stair training;Transfer training;Balance training;Range of motion;Family education  Rehab Potential : Good  Frequency (Obs): 3-5x/week  Number of Visits to Meet Established Goals: 5      CURRENT GOALS    Goal #1 Patient is able to demonstrate supine - sit EOB @ level: mod I     Goal #2 Patient is able to demonstrate transfers EOB to/from BSC at assistance level: supervision with maintenance of L heel bearing only     Goal #3 Pt will be ind/mod I in HEP for B LE while seated/supine     Goal #4    Goal #5    Goal #6    Goal Comments: Goals established on 6/29/2024      PHYSICAL THERAPY MEDICAL/SOCIAL HISTORY  History  related to current admission: Patient is a 78 year old male admitted on 6/25/2024 from home with a transmetatarsal amputation of the right foot and amputation of the left second digit on 6/13/2024, presents here with surgical wound infection and gangrene involving both his feet. OR on 6/27, excisional wound debridement to the level of bone along with partial resection of the first and second metatarsal on the right and excisional wound debridement to the level of bone and partial resection of the second metatarsal on the left.       Plan for right foot wound debridement, application of wound VAC and left foot transmetatarsal amputation.       HOME SITUATION  Type of Home: [facility]   Home Layout: One level                Lives With: Spouse  Drives: No  Patient Owned Equipment: Rolling walker       Prior Level of [place]: Prior to admission, patient's baseline is amb with RW as support although limited at household distance and has w/c to use for longer distances.      SUBJECTIVE  Plan for further  sx procedure on the L LE next week, per family      OBJECTIVE  Precautions:  (R foot PRAFO)  Fall Risk: High fall risk    WEIGHT BEARING RESTRICTION  Weight Bearing Restriction: R lower extremity;L lower extremity        R Lower Extremity: Non-Weight Bearing  L Lower Extremity:  (heel bearing only)    PAIN ASSESSMENT  Rating: Unable to rate          COGNITION  Overall Cognitive Status:  WFL - within functional limits    RANGE OF MOTION AND STRENGTH ASSESSMENT  Upper extremity ROM and strength are within functional limits     Lower extremity ROM is within functional limits     Lower extremity strength is within functional limits    BALANCE  Static Sitting: Good  Dynamic Sitting: Good  Static Standing: Dependent  Dynamic Standing: Dependent    ADDITIONAL TESTS        ACTIVITY TOLERANCE; fair          AM-PAC '6-Clicks' INPATIENT SHORT FORM - BASIC MOBILITY  How much difficulty does the patient currently have...  Patient  Difficulty: Turning over in bed (including adjusting bedclothes, sheets and blankets)?: None   Patient Difficulty: Sitting down on and standing up from a chair with arms (e.g., wheelchair, bedside commode, etc.): Unable   Patient Difficulty: Moving from lying on back to sitting on the side of the bed?: None   How much help from another person does the patient currently need...   Help from Another: Moving to and from a bed to a chair (including a wheelchair)?: Total   Help from Another: Need to walk in hospital room?: Total   Help from Another: Climbing 3-5 steps with a railing?: Total       AM-PAC Score:  Raw Score: 12   Approx Degree of Impairment: 68.66%   Standardized Score (AM-PAC Scale): 35.33   CMS Modifier (G-Code): CL    FUNCTIONAL ABILITY STATUS  Gait Assessment   Functional Mobility/Gait Assessment  Gait Assistance:  (pt is non-ambulatory)    Skilled Therapy Provided     Bed Mobility:  Rolling: mod I  Supine to sit: SBA   Sit to supine: SBA     Transfer Mobility:  Sit to stand: NT   Stand to sit: NT  Gait = non-amb    Therapist's Comments:   SBA in bed<>recliner transfers with heel bearing and good maintenance of NWB on the R LE  Recommend for post op shoe on the L LE for safety and protection on the L heel to use on transfers only. Nursing made aware  Instructed pt on HEP for B LE while seated/supine and educated on it's rationale with family instructed to remind pt  Noted decrease BP with nursing aware   Placed back in bed as per request  Addressed all issues and concerns. Nursing is aware of this visit.      Exercise/Education Provided:  Bed mobility  Body mechanics  Functional activity tolerated  ROM  Strengthening  Transfer training    Patient End of Session: Up in chair;Needs met;Call light within reach;RN aware of session/findings;All patient questions and concerns addressed      Patient Evaluation Complexity Level:  History High - 3 or more personal factors and/or co-morbidities   Examination of  body systems Moderate - addressing a total of 3 or more elements   Clinical Presentation Moderate - Evolving   Clinical Decision Making Moderate - Evolving       PT Session Time: 30 minutes  Gait Trainin minutes  Therapeutic Activity: 15 minutes  Therapeutic Exercise: 10 minutes

## 2024-06-29 NOTE — PROGRESS NOTES
[facility] Hospitalist Progress Note       SUBJECTIVE:     Pt feels well   Upset he is not able to go home today     OBJECTIVE:  Scheduled Meds:    sodium hypochlorite   Topical BID    apixaban  2.5 mg Oral BID    sodium chloride   Intravenous Once    insulin aspart  4-20 Units Subcutaneous TID AC and HS    atorvastatin  80 mg Oral Nightly    carvedilol  12.5 mg Oral BID with meals    cefepime  1 g Intravenous Q24H    Vancomycin IV  1 each Intravenous See Admin Instructions (RX holding)    metRONIDAZOLE  500 mg Oral 2 times per day     Continuous Infusions:   PRN Meds:   heparin    acetaminophen    acetaminophen **OR** HYDROcodone-acetaminophen **OR** HYDROcodone-acetaminophen    HYDROmorphone **OR** HYDROmorphone **OR** HYDROmorphone    polyethylene glycol (PEG 3350)    sennosides    bisacodyl    ondansetron    metoclopramide    glucose **OR** glucose **OR** glucose-vitamin C **OR** dextrose **OR** glucose **OR** glucose **OR** glucose-vitamin C    Vitals  Vitals:    06/29/24 0811   BP: 103/46   Pulse:    Resp:    Temp: 98.2 °F (36.8 °C)         Exam   Gen-    no acute distress, alert and oriented x 3   RESP-   Lungs CTA, normal respiratory effort  CV-      Heart RRR, no mgr  Abd-    soft, nondistended, nontender, bowel sounds present  Skin-    no rash  Neuro-  no focal neurologic deficits  Ext-     wrapped   Psych- alert and oriented x 3     Labs:     Chem:  Recent Labs   Lab 06/24/24 2218 06/26/24 0435 06/26/24  1538 06/27/24 0437 06/28/24 0437 06/29/24  0455   * 137 135* 136 134* 131*   K 4.4 4.1 4.2 3.9 4.3 4.4   CL 95* 101 100 100 100 101   CO2 23.0 25.0 23.0 24.0 24.0 17.0*   BUN 76* 45* 53* 39* 35* 30*   ALT 22  --   --   --   --   --    AST 67*  --   --   --   --   --    ALB 2.7*  --   --   --   --   --        HEM:  Recent Labs   Lab 06/24/24 2218 06/26/24 0435 06/26/24  1535 06/27/24  0437 06/28/24  0437   WBC 14.3* 11.9* 10.8 12.4* 11.2*   HGB 9.4* 7.0* 7.4* 8.1* 8.2*   .0 203.0 241.0 154.0  135.0*   MCV 93.6 94.0 95.2 87.8 90.7       Coagulation:  Recent Labs   Lab 06/24/24  2218 06/26/24  0435 06/26/24  1535 06/27/24  0437 06/28/24  0437 06/28/24  0730   PTT  --   --  30.3  --   --   --    INR  --   --   --   --   --  1.48*   HCT 29.4* 22.1* 23.8* 24.4* 25.5*  --    HGB 9.4* 7.0* 7.4* 8.1* 8.2*  --    .0 203.0 241.0 154.0 135.0*  --               AP:  78 year old male with PMH sig for CAD status post stents, PAD status post bilateral lower extremity stents, atrial fibrillation on Xarelto, diabetes mellitus type 2 on insulin pump, end-stage renal disease on dialysis, chronic CHF with LVH, status post AVR, history of stroke, hypertension, hyperlipidemia, DESI and recent right TMA and left foot second digit amputation on 6/13 by Dr. Meza presented with poor surgical wound healing.     Postop wound infection  R TMA, L foot 2nd digit amputation on 6/13, Dr. Meza/Podiatry  - s/p podiatry I&D of R foot (1st/2nd metatarsals) and L foot (2nd metatarsal) on 6/27- await surgical cx/biopsy results  - abx per ID consult  - Arterial US in April, likely has more distal PAD, may require further amputation, per vascular  pt is not a candidate for any further revascularization and should his foot amputation does not heal he will require major amputation.      ESRD on HD  Malfunctioning AVF  - vascular consulted  - nephrology o/c -s/p tunneled cathter on 6/28      CAD s/p stents  PAD s/p b/l LE stents  Chronic CHF w/ LVH  S/p AVR  Atrial fibrillation  - coreg, lipitor  - plavix   - per podiatry ok to resume AC - pt now on eliquis instead of his home xarelto due to his renal disease      DM2  - insulin pump - not on this admission  - ISS/accucheks - high dose SSI, pt sugars still high will add tresiba 10 units daily      Essential HTN  - coreg     Hyperlipidemia  - statin     DESI  - cpap at bedtime         FEN: regular diet, PT/OT  Proph: SCDs        Code status: Full code        MD MISTY RomeG  Hospitalist

## 2024-06-30 ENCOUNTER — ANESTHESIA (OUTPATIENT)
Dept: SURGERY | Facility: HOSPITAL | Age: 79
End: 2024-06-30
Payer: MEDICARE

## 2024-06-30 PROBLEM — T82.898A ARTERIOVENOUS FISTULA OCCLUSION (HCC): Status: ACTIVE | Noted: 2024-06-30

## 2024-06-30 LAB
ALBUMIN SERPL-MCNC: 1.9 G/DL (ref 3.4–5)
ALBUMIN/GLOB SERPL: 0.5 {RATIO} (ref 1–2)
ALP LIVER SERPL-CCNC: 112 U/L
ALT SERPL-CCNC: 9 U/L
ANION GAP SERPL CALC-SCNC: 11 MMOL/L (ref 0–18)
AST SERPL-CCNC: 38 U/L (ref 15–37)
BASOPHILS # BLD AUTO: 0.02 X10(3) UL (ref 0–0.2)
BASOPHILS NFR BLD AUTO: 0.2 %
BILIRUB SERPL-MCNC: 0.5 MG/DL (ref 0.1–2)
BUN BLD-MCNC: 50 MG/DL (ref 9–23)
CALCIUM BLD-MCNC: 9.2 MG/DL (ref 8.5–10.1)
CHLORIDE SERPL-SCNC: 98 MMOL/L (ref 98–112)
CO2 SERPL-SCNC: 23 MMOL/L (ref 21–32)
CREAT BLD-MCNC: 6.95 MG/DL
EGFRCR SERPLBLD CKD-EPI 2021: 8 ML/MIN/1.73M2 (ref 60–?)
EOSINOPHIL # BLD AUTO: 0.11 X10(3) UL (ref 0–0.7)
EOSINOPHIL NFR BLD AUTO: 0.9 %
ERYTHROCYTE [DISTWIDTH] IN BLOOD BY AUTOMATED COUNT: 15.7 %
GLOBULIN PLAS-MCNC: 3.7 G/DL (ref 2.8–4.4)
GLUCOSE BLD-MCNC: 109 MG/DL (ref 70–99)
GLUCOSE BLD-MCNC: 123 MG/DL (ref 70–99)
GLUCOSE BLD-MCNC: 165 MG/DL (ref 70–99)
GLUCOSE BLD-MCNC: 180 MG/DL (ref 70–99)
GLUCOSE BLD-MCNC: 216 MG/DL (ref 70–99)
HCT VFR BLD AUTO: 24.6 %
HGB BLD-MCNC: 7.9 G/DL
IMM GRANULOCYTES # BLD AUTO: 0.11 X10(3) UL (ref 0–1)
IMM GRANULOCYTES NFR BLD: 0.9 %
LYMPHOCYTES # BLD AUTO: 0.81 X10(3) UL (ref 1–4)
LYMPHOCYTES NFR BLD AUTO: 7 %
MCH RBC QN AUTO: 29.2 PG (ref 26–34)
MCHC RBC AUTO-ENTMCNC: 32.1 G/DL (ref 31–37)
MCV RBC AUTO: 90.8 FL
MONOCYTES # BLD AUTO: 1 X10(3) UL (ref 0.1–1)
MONOCYTES NFR BLD AUTO: 8.6 %
NEUTROPHILS # BLD AUTO: 9.55 X10 (3) UL (ref 1.5–7.7)
NEUTROPHILS # BLD AUTO: 9.55 X10(3) UL (ref 1.5–7.7)
NEUTROPHILS NFR BLD AUTO: 82.4 %
OSMOLALITY SERPL CALC.SUM OF ELEC: 288 MOSM/KG (ref 275–295)
PLATELET # BLD AUTO: 200 10(3)UL (ref 150–450)
POTASSIUM SERPL-SCNC: 4.1 MMOL/L (ref 3.5–5.1)
PROT SERPL-MCNC: 5.6 G/DL (ref 6.4–8.2)
RBC # BLD AUTO: 2.71 X10(6)UL
SODIUM SERPL-SCNC: 132 MMOL/L (ref 136–145)
WBC # BLD AUTO: 11.6 X10(3) UL (ref 4–11)

## 2024-06-30 PROCEDURE — 0Y6N0Z9 DETACHMENT AT LEFT FOOT, PARTIAL 1ST RAY, OPEN APPROACH: ICD-10-PCS | Performed by: PODIATRIST

## 2024-06-30 PROCEDURE — 0Y6N0ZD DETACHMENT AT LEFT FOOT, PARTIAL 4TH RAY, OPEN APPROACH: ICD-10-PCS | Performed by: PODIATRIST

## 2024-06-30 PROCEDURE — 99232 SBSQ HOSP IP/OBS MODERATE 35: CPT | Performed by: INTERNAL MEDICINE

## 2024-06-30 PROCEDURE — 0QBN0ZZ EXCISION OF RIGHT METATARSAL, OPEN APPROACH: ICD-10-PCS | Performed by: PODIATRIST

## 2024-06-30 PROCEDURE — 0Y6N0ZB DETACHMENT AT LEFT FOOT, PARTIAL 2ND RAY, OPEN APPROACH: ICD-10-PCS | Performed by: PODIATRIST

## 2024-06-30 PROCEDURE — 0Y6N0ZC DETACHMENT AT LEFT FOOT, PARTIAL 3RD RAY, OPEN APPROACH: ICD-10-PCS | Performed by: PODIATRIST

## 2024-06-30 PROCEDURE — 0L8P0ZZ DIVISION OF LEFT LOWER LEG TENDON, OPEN APPROACH: ICD-10-PCS | Performed by: PODIATRIST

## 2024-06-30 PROCEDURE — 0Y6N0ZF DETACHMENT AT LEFT FOOT, PARTIAL 5TH RAY, OPEN APPROACH: ICD-10-PCS | Performed by: PODIATRIST

## 2024-06-30 RX ORDER — INSULIN ASPART 100 [IU]/ML
INJECTION, SOLUTION INTRAVENOUS; SUBCUTANEOUS ONCE
Status: DISCONTINUED | OUTPATIENT
Start: 2024-06-30 | End: 2024-06-30 | Stop reason: HOSPADM

## 2024-06-30 RX ORDER — PHENYLEPHRINE HCL 10 MG/ML
VIAL (ML) INJECTION AS NEEDED
Status: DISCONTINUED | OUTPATIENT
Start: 2024-06-30 | End: 2024-06-30 | Stop reason: SURG

## 2024-06-30 RX ORDER — SODIUM CHLORIDE 9 MG/ML
INJECTION, SOLUTION INTRAVENOUS CONTINUOUS PRN
Status: DISCONTINUED | OUTPATIENT
Start: 2024-06-30 | End: 2024-06-30 | Stop reason: SURG

## 2024-06-30 RX ORDER — HYDROMORPHONE HYDROCHLORIDE 1 MG/ML
0.6 INJECTION, SOLUTION INTRAMUSCULAR; INTRAVENOUS; SUBCUTANEOUS EVERY 5 MIN PRN
Status: DISCONTINUED | OUTPATIENT
Start: 2024-06-30 | End: 2024-06-30 | Stop reason: HOSPADM

## 2024-06-30 RX ORDER — ONDANSETRON 2 MG/ML
4 INJECTION INTRAMUSCULAR; INTRAVENOUS EVERY 6 HOURS PRN
Status: DISCONTINUED | OUTPATIENT
Start: 2024-06-30 | End: 2024-06-30 | Stop reason: HOSPADM

## 2024-06-30 RX ORDER — SODIUM CHLORIDE, SODIUM LACTATE, POTASSIUM CHLORIDE, CALCIUM CHLORIDE 600; 310; 30; 20 MG/100ML; MG/100ML; MG/100ML; MG/100ML
INJECTION, SOLUTION INTRAVENOUS CONTINUOUS
Status: DISCONTINUED | OUTPATIENT
Start: 2024-06-30 | End: 2024-06-30 | Stop reason: HOSPADM

## 2024-06-30 RX ORDER — NALOXONE HYDROCHLORIDE 0.4 MG/ML
0.08 INJECTION, SOLUTION INTRAMUSCULAR; INTRAVENOUS; SUBCUTANEOUS AS NEEDED
Status: DISCONTINUED | OUTPATIENT
Start: 2024-06-30 | End: 2024-06-30 | Stop reason: HOSPADM

## 2024-06-30 RX ORDER — HYDROMORPHONE HYDROCHLORIDE 1 MG/ML
0.2 INJECTION, SOLUTION INTRAMUSCULAR; INTRAVENOUS; SUBCUTANEOUS EVERY 5 MIN PRN
Status: DISCONTINUED | OUTPATIENT
Start: 2024-06-30 | End: 2024-06-30 | Stop reason: HOSPADM

## 2024-06-30 RX ORDER — MIDAZOLAM HYDROCHLORIDE 1 MG/ML
INJECTION INTRAMUSCULAR; INTRAVENOUS AS NEEDED
Status: DISCONTINUED | OUTPATIENT
Start: 2024-06-30 | End: 2024-06-30 | Stop reason: SURG

## 2024-06-30 RX ORDER — BUPIVACAINE HYDROCHLORIDE 5 MG/ML
INJECTION, SOLUTION EPIDURAL; INTRACAUDAL AS NEEDED
Status: DISCONTINUED | OUTPATIENT
Start: 2024-06-30 | End: 2024-06-30 | Stop reason: HOSPADM

## 2024-06-30 RX ORDER — HYDROMORPHONE HYDROCHLORIDE 1 MG/ML
0.4 INJECTION, SOLUTION INTRAMUSCULAR; INTRAVENOUS; SUBCUTANEOUS EVERY 5 MIN PRN
Status: DISCONTINUED | OUTPATIENT
Start: 2024-06-30 | End: 2024-06-30 | Stop reason: HOSPADM

## 2024-06-30 RX ORDER — LIDOCAINE HYDROCHLORIDE 10 MG/ML
INJECTION, SOLUTION EPIDURAL; INFILTRATION; INTRACAUDAL; PERINEURAL AS NEEDED
Status: DISCONTINUED | OUTPATIENT
Start: 2024-06-30 | End: 2024-06-30 | Stop reason: SURG

## 2024-06-30 RX ORDER — HYDROXYZINE HYDROCHLORIDE 25 MG/1
25 TABLET, FILM COATED ORAL 3 TIMES DAILY PRN
Status: DISCONTINUED | OUTPATIENT
Start: 2024-06-30 | End: 2024-07-12

## 2024-06-30 RX ADMIN — PHENYLEPHRINE HCL 100 MCG: 10 MG/ML VIAL (ML) INJECTION at 12:24:00

## 2024-06-30 RX ADMIN — SODIUM CHLORIDE: 9 INJECTION, SOLUTION INTRAVENOUS at 11:48:00

## 2024-06-30 RX ADMIN — PHENYLEPHRINE HCL 100 MCG: 10 MG/ML VIAL (ML) INJECTION at 12:18:00

## 2024-06-30 RX ADMIN — PHENYLEPHRINE HCL 100 MCG: 10 MG/ML VIAL (ML) INJECTION at 12:48:00

## 2024-06-30 RX ADMIN — MIDAZOLAM HYDROCHLORIDE 2 MG: 1 INJECTION INTRAMUSCULAR; INTRAVENOUS at 11:52:00

## 2024-06-30 RX ADMIN — LIDOCAINE HYDROCHLORIDE 50 MG: 10 INJECTION, SOLUTION EPIDURAL; INFILTRATION; INTRACAUDAL; PERINEURAL at 11:52:00

## 2024-06-30 NOTE — PROGRESS NOTES
[facility]  Progress Note    Darin Bowens Patient Status:  Observation    1945 MRN [id number]   [facility] 3SW-A Attending Yandel Ordoñez MD   Hosp Day # 5 PCP Zachery Hall MD      No acute events       Current Medications:    Current Facility-Administered Medications:     bupivacaine PF (Marcaine) 0.5% injection, , , PRN    [Transfer Hold] insulin degludec (Tresiba) 100 units/mL flextouch 10 Units, 10 Units, Subcutaneous, Daily    [Transfer Hold] clopidogrel (Plavix) tab 75 mg, 75 mg, Oral, Daily    [Transfer Hold] sodium hypochlorite (Dakin's) 0.25 % external solution, , Topical, BID    [Transfer Hold] apixaban (Eliquis) tab 2.5 mg, 2.5 mg, Oral, BID    [Transfer Hold] sodium chloride 0.9% infusion, , Intravenous, Once    [Transfer Hold] heparin (Porcine) 1000 UNIT/ML injection 2,000 Units, 2,000 Units, Intravenous, PRN Dialysis    [Transfer Hold] acetaminophen (Tylenol Extra Strength) tab 500 mg, 500 mg, Oral, Q4H PRN    [Transfer Hold] acetaminophen (Tylenol) tab 650 mg, 650 mg, Oral, Q4H PRN **OR** [Transfer Hold] HYDROcodone-acetaminophen (Norco) 5-325 MG per tab 1 tablet, 1 tablet, Oral, Q4H PRN **OR** [Transfer Hold] HYDROcodone-acetaminophen (Norco) 5-325 MG per tab 2 tablet, 2 tablet, Oral, Q4H PRN    [Transfer Hold] HYDROmorphone (Dilaudid) 1 MG/ML injection 0.2 mg, 0.2 mg, Intravenous, Q2H PRN **OR** [Transfer Hold] HYDROmorphone (Dilaudid) 1 MG/ML injection 0.4 mg, 0.4 mg, Intravenous, Q2H PRN **OR** [Transfer Hold] HYDROmorphone (Dilaudid) 1 MG/ML injection 0.8 mg, 0.8 mg, Intravenous, Q2H PRN    [Transfer Hold] polyethylene glycol (PEG 3350) (Miralax) 17 g oral packet 17 g, 17 g, Oral, Daily PRN    [Transfer Hold] sennosides (Senokot) tab 17.2 mg, 17.2 mg, Oral, Nightly PRN    [Transfer Hold] bisacodyl (Dulcolax) 10 MG rectal suppository 10 mg, 10 mg, Rectal, Daily PRN    [Transfer Hold] ondansetron (Zofran) 4 MG/2ML injection 4 mg, 4 mg, Intravenous, Q6H  PRN    [Transfer Hold] metoclopramide (Reglan) 5 mg/mL injection 5 mg, 5 mg, Intravenous, Q8H PRN    [Transfer Hold] glucose (Dex4) 15 GM/59ML oral liquid 15 g, 15 g, Oral, Q15 Min PRN **OR** [Transfer Hold] glucose (Glutose) 40% oral gel 15 g, 15 g, Oral, Q15 Min PRN **OR** [Transfer Hold] glucose-vitamin C (Dex-4) chewable tab 4 tablet, 4 tablet, Oral, Q15 Min PRN **OR** [Transfer Hold] dextrose 50% injection 50 mL, 50 mL, Intravenous, Q15 Min PRN **OR** [Transfer Hold] glucose (Dex4) 15 GM/59ML oral liquid 30 g, 30 g, Oral, Q15 Min PRN **OR** [Transfer Hold] glucose (Glutose) 40% oral gel 30 g, 30 g, Oral, Q15 Min PRN **OR** [Transfer Hold] glucose-vitamin C (Dex-4) chewable tab 8 tablet, 8 tablet, Oral, Q15 Min PRN    [Transfer Hold] insulin aspart (NovoLOG) 100 Units/mL FlexPen 4-20 Units, 4-20 Units, Subcutaneous, TID AC and HS    [Transfer Hold] atorvastatin (Lipitor) tab 80 mg, 80 mg, Oral, Nightly    [Transfer Hold] carvedilol (Coreg) tab 12.5 mg, 12.5 mg, Oral, BID with meals    ceFEPIme (Maxipime) 1 g in sodium chloride 0.9% 100 mL IVPB-MBP, 1 g, Intravenous, Q24H    Vancomycin: PHARMACY DOSING, 1 each, Intravenous, See Admin Instructions (RX holding)    metRONIDAZOLE (Flagyl) tab 500 mg, 500 mg, Oral, 2 times per day    Facility-Administered Medications Ordered in Other Encounters:     midazolam (Versed) 2 MG/2ML injection, , Intravenous, PRN    fentaNYL (Sublimaze) 50 mcg/mL injection, , Intravenous, PRN    lidocaine PF (Xylocaine-MPF) 1% injection, , Intravenous, PRN    propofol (Diprivan) 10 MG/ML injection, , Intravenous, PRN    propofol (Diprivan) 10 mg/mL infusion premix, , Intravenous, Continuous PRN    ceFAZolin (Ancef) 2g in 10mL IV syringe premix, , Intravenous, PRN    sodium chloride 0.9% infusion, , Intravenous, Continuous PRN    phenylephrine (Myron-Synephrine) 10 MG/ML injection, , Intravenous, PRN  Home Medications:  No current outpatient medications on file.       Review of Systems:  See  HPI; A total of 12 systems reviewed and otherwise unremarkable.    Physical Exam:  Vital signs: Blood pressure 124/61, pulse 64, temperature 98.2 °F (36.8 °C), temperature source Oral, resp. rate 15, height 6' (1.829 m), weight 198 lb 8 oz (90 kg), SpO2 100%.  General: No acute distress. Alert and oriented x 3.  HEENT: Moist mucous membranes. EOM-I. PERRL  Neck: No lymphadenopathy.  No JVD. No carotid bruits.  Respiratory: Clear to auscultation bilaterally.  No wheezes. No rhonchi.  Cardiovascular: S1, S2.  Regular rate and rhythm.  No murmurs. Equal pulses   Abdomen: Soft, nontender, nondistended.  Positive bowel sounds. No rebound tenderness   Neurologic: No focal neurological deficits.   Ext ; + edema; BLE wounds noted- dressing intact  Integument: No lesions. No erythema.  Psychiatric: Appropriate mood and affect.    Recent Labs     06/28/24  0437 06/28/24  0730 06/29/24  1022 06/30/24  0402   WBC 11.2*  --  13.8* 11.6*   HGB 8.2*  --  8.6* 7.9*   MCV 90.7  --  89.0 90.8   .0*  --  206.0 200.0   INR  --  1.48*  --   --        Recent Labs     06/28/24  0437 06/29/24  0455 06/30/24  0402   * 131* 132*   K 4.3 4.4 4.1    101 98   CO2 24.0 17.0* 23.0   BUN 35* 30* 50*   CREATSERUM 5.64* 5.17* 6.95*   CA 8.9 8.1* 9.2       Recent Labs     06/30/24  0402   ALT 9*   AST 38*   ALB 1.9*       Imaging:  Reviwed     Impression:  ESRD - due to DM/HTN; clotted AVF  S/p PC  - plan for next HD tomorrow   - will have pt f/u with Dr. Nix for o/p eval of new AVF/fistula revision  Foot infection - s/p recent  L foot toe amputation on 6/13  - on abx; wound care  - podiatry /ID following   PAD- as above  CHF; s/p AVR; volume optimization w/ HD/UF  Anemia due to CKD- DALE w/ HD  DM/HTN  HL    Thank you for allowing me to participate in this patient's care.  Please feel free to call me with any questions or concerns.    Aj Bardales MD  6/30/2024

## 2024-06-30 NOTE — PROGRESS NOTES
[facility]   part of [facility] Infectious Disease  Progress Note    Darin Boewns Patient Status:  Inpatient    1945 MRN [id number]   Location [facility] 3SW-A Attending Vira Richardson MD   Hosp Day # 5 PCP Zachery Hall MD     Subjective:    Patient seen and examined resting in bed, family at bedside. He continues to tolerate the IV abx without any issues. Plans for OR today    Review of Systems:  Review of systems reviewed and negative except as mentioned    Objective:  Blood pressure 127/64, pulse 70, temperature 98.3 °F (36.8 °C), temperature source Oral, resp. rate 18, height 6' (1.829 m), weight 198 lb 8 oz (90 kg), SpO2 97%.      Physical Exam:  General: Awake, alert, non-tox, NAD.  HEENT:  Oropharynx clear, trachea ML.  Heart: RRR S1S2 no murmurs.  Lungs: Essentially CTA b/l, no rhonchi, rales, wheezes.  Abdomen: Soft, NT/ND.  BS present.  No organomegaly.  Extremity: No edema. +b/l feet wrapped  Neurological: No focal deficits.  Derm:  Warm, dry, free from rashes.  Right chest HD cath        Lab Data Review:  Lab Results   Component Value Date    WBC 11.6 2024    HGB 7.9 2024    HCT 24.6 2024    .0 2024    CREATSERUM 6.95 2024    BUN 50 2024     2024    K 4.1 2024    CL 98 2024    CO2 23.0 2024     2024    CA 9.2 2024    ALB 1.9 2024    ALKPHO 112 2024    BILT 0.5 2024    TP 5.6 2024    AST 38 2024    ALT 9 2024        Cultures:  Hospital Encounter on 24   1. Tissue Aerobic Culture     Status: Abnormal    Collection Time: 24 12:13 PM    Specimen: Foot,left; Tissue   Result Value Ref Range    Tissue Culture Result 4+ growth Enterobacter cloacae (A) N/A    Tissue Culture Result 1+ growth Gram positive justice N/A    Tissue Smear 4+ WBCs seen (A) N/A    Tissue Smear 4+ Gram Negative Rods (A) N/A    Tissue Smear 2+ Gram  positive cocci in pairs (A) N/A       Susceptibility    Enterobacter cloacae -  (no method available)     Cefazolin >=64 Resistant      Cefepime <=1 Sensitive      Ceftriaxone <=1 Sensitive      Ciprofloxacin <=0.25 Sensitive      Gentamicin <=1 Sensitive      Meropenem <=0.25 Sensitive      Levofloxacin <=0.12 Sensitive      Piperacillin + Tazobactam <=4 Sensitive      Trimethoprim/Sulfa <=20 Sensitive    2. Anaerobic Culture     Status: None (Preliminary result)    Collection Time: 06/27/24 12:13 PM    Specimen: Foot,left; Tissue   Result Value Ref Range    Anaerobic Culture Pending N/A   3. Aerobic Bacterial Culture     Status: Abnormal    Collection Time: 06/25/24  8:14 AM    Specimen: Foot,left; Other   Result Value Ref Range    Aerobic Culture Result  N/A     Mixture of organisms suggestive of normal skin justice    Aerobic Culture Result 4+ growth Enterobacter cloacae complex (A) N/A    Aerobic Culture Result 4+ growth Acinetobacter pittii (A) N/A    Aerobic Smear No WBCs seen N/A    Aerobic Smear 1+ Gram Positive Cocci N/A    Aerobic Smear 1+ Gram Negative Rods N/A       Susceptibility    Enterobacter cloacae complex -  (no method available)     Cefazolin >=64 Resistant      Cefepime <=1 Sensitive      Ceftriaxone <=1 Sensitive      Ciprofloxacin <=0.25 Sensitive      Gentamicin <=1 Sensitive      Meropenem <=0.25 Sensitive      Levofloxacin <=0.12 Sensitive      Piperacillin + Tazobactam <=4 Sensitive      Trimethoprim/Sulfa <=20 Sensitive     Acinetobacter pittii -  (no method available)     Ceftazidime <=1 Sensitive      Ciprofloxacin <1 Sensitive      Gentamicin <1 Sensitive      Meropenem <1 Sensitive      Levofloxacin <=0.25 Sensitive      Piperacillin + Tazobactam <16 Sensitive      Trimethoprim/Sulfa <=40 Sensitive    4. Blood Culture     Status: None    Collection Time: 06/25/24  2:36 AM    Specimen: Blood,peripheral   Result Value Ref Range    Blood Culture Result No Growth 5 Days N/A         Radiology:  IR CENTRAL VENOUS ACCESS    Result Date: 6/28/2024  CONCLUSION: Successful conversion of  left IJ non tunneled central venous dialysis catheter to left chest tunneled dialysis catheter  (27 cm tip to cuff Bard Hemosplit catheter). Catheter is ready for use.  Recommend routine catheter care.   LOCATION:  [facility]    Dictated by (CST): Randy Maddox MD on 6/28/2024 at 12:09 PM     Finalized by (CST): Randy Maddox MD on 6/28/2024 at 12:15 PM       MRI FOOT (W+WO), RIGHT (CPT=73720)    Result Date: 6/26/2024  CONCLUSION:  Postsurgical changes status post amputation of wall the distal digits.  Fluid along the surgical approach may represent postoperative seroma although superinfection cannot be excluded.  There is no evidence of osteomyelitis.   LOCATION:  [facility]   Dictated by (CST): Randy Vaz MD on 6/26/2024 at 3:28 PM     Finalized by (CST): Randy Vaz MD on 6/26/2024 at 3:32 PM       MRI FOOT (W+WO), LEFT (CPT=73720)    Result Date: 6/26/2024  CONCLUSION:  Postsurgical changes in the 2nd toe.  No evidence of osteomyelitis.  A fluid tract extending from the surgical site to the skin surface is noted.   LOCATION:  [facility]   Dictated by (CST): Randy Vaz MD on 6/26/2024 at 3:23 PM     Finalized by (CST): Randy Vaz MD on 6/26/2024 at 3:28 PM       IR CENTRAL VENOUS ACCESS    Result Date: 6/25/2024  CONCLUSION:  Successful non-tunneled dialysis catheter placement via the left internal jugular vein.  Chronically occluded right internal jugular vein.   LOCATION:  [facility]    Dictated by (CST): Zana Lui MD on 6/25/2024 at 5:26 PM     Finalized by (CST): Zana Lui MD on 6/25/2024 at 5:32 PM          Assessment and Plan:    Right-sided surgical wound infection with osteomyelitis with risk to limb  - This is in the setting of right foot amputation on 6/13  - Now status post excisional wound debridement to the level of bone along with partial resection of the first and second metatarsal on the right  on 6/27   - Surgical cultures with Enterobacter cloacae  - Drainage cultures from the time of admission grew Enterobacter cloacae and Acinetobacter  - Podiatry note reviewed plans for additional OR, possibly tomorrow  - Currently on IV cefepime and IV vancomycin along with p.o. metronidazole     Left-sided surgical wound infection with osteomyelitis and gangrene with risk to limb  - This is in the setting of recent foot surgery on 6/13  - Now status post excisional wound debridement to level of bone along with partial resection of the second metatarsal on 6/27  - Surgical cultures with Enterobacter cloacae  - Podiatry note reviewed plans for additional OR, today  - IV abx as above     Peripheral vascular disease  - Vascular surgery following  - High risk for limb loss     End stage renal disease  - AV fistula clotted  - s/p Permacath placement   - per nephrology     Allergic reaction to Augmentin   Now stopped, rahs resolved     Recommendations  - Continue IV cefepime, vancomycin along with p.o. metronidazole  - Follow pending cultures  - Plans for additional OR, please send cul and path  - Post op care per podiatry  - Daily labs while on IV abx  - Further recommendations to follow       If you have any questions or concerns please call [facility] Infectious Disease at [phone].     SURESH Meek    6/30/2024  6:09 AM

## 2024-06-30 NOTE — PROGRESS NOTES
Returned from PACU. Reports moderate pain but declines pain medication. States comfortable right now. Complains of itching, small red rash to chest, encouraged not to scratch >> notified hospitalist and received Atarax order. Bilateral feet dressings clean/dry/intact. Bilateral feet elevated on pillows. Plan of care discussed with patient spouse.

## 2024-06-30 NOTE — OPERATIVE REPORT
Date of surgery: 06/30/24     Preoperative Diagnosis:   Gangrene forefoot, left foot  Necrosis of transmetatarsal amputation site, right foot  Diabetic neuropathy  Peripheral arterial disease    Postoperative Diagnosis: Same     Procedure:   1.  Transmetatarsal amputation, left foot  2.  Achilles tendon lengthening, left  3.  Excisional wound debridement to level of bone, 30 cm², right foot  4.  Partial resection of the third and fourth metatarsals, right foot    Surgeon: Jose R Meza D.P.M.  Anesthesia: MAC  EBL: 100 ml     Specimens: Left foot soft tissue for cultures    Complications: None     Technique:  Patient was brought into the operating room and placed on the operating table in a supine position.  Patient was then placed under anesthesia.  A tourniquet was not utilized. Both lower extremities were prepped and draped in the usual aseptic manner.    A fishmouth type incision was made at the LEFT forefoot proximal to the metatarsophalangeal joint.  Incision was deepened directly down to bone.  Using blunt and sharp dissection the soft tissues were freed off the distal aspect of the metatarsals.  Next, a sagittal saw was utilized and all metatarsals were resected distally.  Sharp dissection was carried out and the forefoot was amputated and passed off the field and sent for specimen.  Bleeders were ligated as necessary.  Neurovascular structures were carefully protected.  All prominent tendons were sharply excised to allow them to retract back into the deeper soft tissues.  At this time purulence was noted from the plantar flap extending through the plantar musculatures.  Deep spaces were explored and opened and purulence was extruded out.  Surgical site was then copiously irrigated with Irrisept solution.  Good healthy bleeding was noted from the dorsal and plantar full-thickness flaps.  The plantar full-thickness flap was mobilized and repositioned dorsally for partial closure.  Next, deep structures were  reapproximated with 2-0 Vicryl and skin was partially closed on the lateral side.  The medial aspect of the surgical site was left open.    Next, three incisions were made at the distal Achilles tendon, 2 cm apart in the standard technique and proximal to the insertion.  Next, the ankle was placed in maximal dorsiflexion and Achilles tendon lengthening was done through these 3 incisions with excellent reduction of the equinus.  Surgical sites were then irrigated and closed with skin staples. Next, the transmetatarsal amputation surgical site was packed open with wet-to-dry sterile dressings on the left foot.    Attention was directed to the RIGHT foot. A, circumferential incision was around all nonviable necrotic soft tissue and deepened directly down to bone.  At this time it was noted that there was some purulence and necrosis coming along the lateral aspect of the transmetatarsal amputation site.  Hence, dissection was carried down in this location and all nonviable necrotic soft tissue was sharply excised to level of bone.  Deep spaces were explored to make sure there was no further purulence.  A total of 30 cm² of excisional wound debridement to level of bone was done on the right foot.    Next, soft tissues were freed off the distal aspects of the third and fourth metatarsals that were prominent.  A sagittal saw was then utilized and distal third and fourth metatarsals were resected to healthy bleeding bone.  Surgical site was then copiously irrigated with Irrisept solution.  The surgical site was then partially closed with 2-0 Vicryl and skin staples on the lateral aspect.  Wet-to-dry sterile dressings were then applied to the surgical site.    Patient tolerated the procedure well and was transferred to PACU in stable condition.  Patient remains at high risk for chronic nonhealing wounds, further soft tissue loss and limb loss.  Patient has significant peripheral arterial disease.  Patient will require staged  procedures for limb salvage.

## 2024-06-30 NOTE — PROGRESS NOTES
[facility] Hospitalist Progress Note       SUBJECTIVE:  Wound not healing well, podiatry planning on taking pt OR today for R food debridement and wound vac and left foot TMA     Pt feels okay this AM  NO chest pain no SOB noted     OBJECTIVE:  Scheduled Meds:    insulin degludec  10 Units Subcutaneous Daily    clopidogrel  75 mg Oral Daily    sodium hypochlorite   Topical BID    apixaban  2.5 mg Oral BID    sodium chloride   Intravenous Once    insulin aspart  4-20 Units Subcutaneous TID AC and HS    atorvastatin  80 mg Oral Nightly    carvedilol  12.5 mg Oral BID with meals    cefepime  1 g Intravenous Q24H    Vancomycin IV  1 each Intravenous See Admin Instructions (RX holding)    metRONIDAZOLE  500 mg Oral 2 times per day     Continuous Infusions:   PRN Meds:   heparin    acetaminophen    acetaminophen **OR** HYDROcodone-acetaminophen **OR** HYDROcodone-acetaminophen    HYDROmorphone **OR** HYDROmorphone **OR** HYDROmorphone    polyethylene glycol (PEG 3350)    sennosides    bisacodyl    ondansetron    metoclopramide    glucose **OR** glucose **OR** glucose-vitamin C **OR** dextrose **OR** glucose **OR** glucose **OR** glucose-vitamin C    Vitals  Vitals:    06/30/24 0838   BP: 124/61   Pulse: 64   Resp: 15   Temp: 98.2 °F (36.8 °C)         Exam   Gen-    no acute distress, alert and oriented x 3   RESP-   Lungs CTA, normal respiratory effort  CV-      Heart RRR, no mgr  Abd-    soft, nondistended, nontender, bowel sounds present  Skin-    no rash  Neuro-  no focal neurologic deficits  Ext-    dressing in tact   Psych- alert and oriented x 3     Labs:     Chem:  Recent Labs   Lab 06/24/24  2218 06/26/24  0435 06/26/24  1538 06/27/24  0437 06/28/24  0437 06/29/24  0455 06/30/24  0402   *   < > 135* 136 134* 131* 132*   K 4.4   < > 4.2 3.9 4.3 4.4 4.1   CL 95*   < > 100 100 100 101 98   CO2 23.0   < > 23.0 24.0 24.0 17.0* 23.0   BUN 76*   < > 53* 39* 35* 30* 50*   ALT 22  --   --   --   --   --  9*   AST 67*  --    --   --   --   --  38*   ALB 2.7*  --   --   --   --   --  1.9*    < > = values in this interval not displayed.       HEM:  Recent Labs   Lab 06/26/24  1535 06/27/24 0437 06/28/24 0437 06/29/24  1022 06/30/24  0402   WBC 10.8 12.4* 11.2* 13.8* 11.6*   HGB 7.4* 8.1* 8.2* 8.6* 7.9*   .0 154.0 135.0* 206.0 200.0   MCV 95.2 87.8 90.7 89.0 90.8       Coagulation:  Recent Labs   Lab 06/26/24  1535 06/27/24 0437 06/28/24 0437 06/28/24  0730 06/29/24  1022 06/30/24  0402   PTT 30.3  --   --   --   --   --    INR  --   --   --  1.48*  --   --    HCT 23.8* 24.4* 25.5*  --  26.6* 24.6*   HGB 7.4* 8.1* 8.2*  --  8.6* 7.9*   .0 154.0 135.0*  --  206.0 200.0              AP:  78 year old male with PMH sig for CAD status post stents, PAD status post bilateral lower extremity stents, atrial fibrillation on Xarelto, diabetes mellitus type 2 on insulin pump, end-stage renal disease on dialysis, chronic CHF with LVH, status post AVR, history of stroke, hypertension, hyperlipidemia, DESI and recent right TMA and left foot second digit amputation on 6/13 by Dr. Meza presented with poor surgical wound healing.     Postop wound infection  R TMA, L foot 2nd digit amputation on 6/13, Dr. Meza/Podiatry  - s/p podiatry I&D of R foot (1st/2nd metatarsals) and L foot (2nd metatarsal) on 6/27- growing enterobacter   - wound not healing well from 6/27 surgery so pt to go to OR 6/30  Plan is  for right foot wound debridement, application of wound VAC and left foot transmetatarsal amputation.   - abx per ID    - Arterial US in April, likely has more distal PAD, may require further amputation, per vascular  pt is not a candidate for any further revascularization and should his foot amputation does not heal he will require major amputation.      ESRD on HD  Malfunctioning AVF  - vascular consulted  - nephrology o/c -s/p tunneled cathter on 6/28      CAD s/p stents  PAD s/p b/l LE stents  Chronic CHF w/ LVH  S/p AVR  Atrial  fibrillation  - coreg, lipitor  - plavix   - per podiatry ok to resume AC - pt now on eliquis instead of his home xarelto due to his renal disease      DM2  - insulin pump - not on this admission  - ISS/accucheks - high dose SSI, tresiba daily 10 units     Anemia  - suspecting ABLA from surgery   - monitor for now   - denies BRBPR no melena      Essential HTN  - coreg     Hyperlipidemia  - statin     DESI  - cpap at bedtime         FEN: regular diet, PT/OT  Proph: SCDs eliquis     Code status: Full code         Vira Richardson MD   [facility] Hospitalist

## 2024-06-30 NOTE — ANESTHESIA POSTPROCEDURE EVALUATION
[facility]    Darin Bowens Patient Status:  Inpatient   Age/Gender 78 year old male MRN [id number]   Location [facility] SURGERY Attending Vira Richardson MD   Hosp Day # 5 PCP Zachery Hall MD       Anesthesia Post-op Note    RIGHT FOOT WOUND DEBRIDMENT & APPLICATION WOUND VAC AND LEFT TRANS-METARTASAL AMPUTATION W/ ACHILES TENDON    Procedure Summary       Date: 06/30/24 Room / Location:  MAIN OR 03 / [facility] MAIN OR    Anesthesia Start: 1148 Anesthesia Stop: 1305    Procedure: RIGHT FOOT WOUND DEBRIDMENT & APPLICATION WOUND VAC AND LEFT TRANS-METARTASAL AMPUTATION W/ ACHILES TENDON (Bilateral: Foot) Diagnosis:       Osteomyelitis (HCC)      (Osteomyelitis (HCC) [M86.9])    Surgeons: Jose R Meza DPM Anesthesiologist: Medardo Hernandez MD    Anesthesia Type: MAC ASA Status: 3            Anesthesia Type: MAC    Vitals Value Taken Time   BP 95/72 06/30/24 1305   Temp 97 06/30/24 1305   Pulse 65 06/30/24 1305   Resp 19 06/30/24 1305   SpO2 99 06/30/24 1305       Patient Location: PACU    Anesthesia Type: MAC    Airway Patency: patent    Postop Pain Control: adequate    Mental Status: preanesthetic baseline    Nausea/Vomiting: none    Cardiopulmonary/Hydration status: stable euvolemic    Complications: no apparent anesthesia related complications    Postop vital signs: stable    Dental Exam: Unchanged from Preop    Patient to be discharged from PACU when criteria met.

## 2024-06-30 NOTE — PLAN OF CARE
Alert and oriented x 4. Vitals stable on room air. Bilateral foot wound dressings clean and dry. Dialysis catheter dressing clean and dry to left jugular. NPO for surgery this morning. Plan of care discussed with patient and son.

## 2024-06-30 NOTE — PLAN OF CARE
On call podiatrist & on call Joann reid updated regarding elequis now on hold for surg in morning. On call JOANN reid updated regarding surg plans &  npo at midnight, no IVF after midnight per md D/t Pt Hx.  Per podiatrist, who is performing surg in morning, stating \"no need to hold\" in regards to anticoagulation for tonight's dose of elequis. Will give now as directed. On call hospitalist updated.    Pt refusing to have dressings changed tonight, as he stating he's having surgery on them anyway tomorrow.    Pt resting comfortably in bed at this time. Denies need for pain meds. Pt upset regarding need for surgery, need to get blood drawn, pokes related to blood sugar checks & insulin, pt educated on importance.

## 2024-07-01 LAB
ANION GAP SERPL CALC-SCNC: 13 MMOL/L (ref 0–18)
BASOPHILS # BLD AUTO: 0.02 X10(3) UL (ref 0–0.2)
BASOPHILS NFR BLD AUTO: 0.2 %
BUN BLD-MCNC: 67 MG/DL (ref 9–23)
CALCIUM BLD-MCNC: 9.4 MG/DL (ref 8.5–10.1)
CHLORIDE SERPL-SCNC: 98 MMOL/L (ref 98–112)
CO2 SERPL-SCNC: 19 MMOL/L (ref 21–32)
CREAT BLD-MCNC: 8.46 MG/DL
EGFRCR SERPLBLD CKD-EPI 2021: 6 ML/MIN/1.73M2 (ref 60–?)
EOSINOPHIL # BLD AUTO: 0.11 X10(3) UL (ref 0–0.7)
EOSINOPHIL NFR BLD AUTO: 1 %
ERYTHROCYTE [DISTWIDTH] IN BLOOD BY AUTOMATED COUNT: 15.7 %
GLUCOSE BLD-MCNC: 144 MG/DL (ref 70–99)
GLUCOSE BLD-MCNC: 152 MG/DL (ref 70–99)
GLUCOSE BLD-MCNC: 160 MG/DL (ref 70–99)
GLUCOSE BLD-MCNC: 227 MG/DL (ref 70–99)
GLUCOSE BLD-MCNC: 236 MG/DL (ref 70–99)
GLUCOSE BLD-MCNC: 265 MG/DL (ref 70–99)
HCT VFR BLD AUTO: 24.4 %
HGB BLD-MCNC: 7.7 G/DL
IMM GRANULOCYTES # BLD AUTO: 0.11 X10(3) UL (ref 0–1)
IMM GRANULOCYTES NFR BLD: 1 %
LYMPHOCYTES # BLD AUTO: 0.57 X10(3) UL (ref 1–4)
LYMPHOCYTES NFR BLD AUTO: 4.9 %
MAGNESIUM SERPL-MCNC: 2.2 MG/DL (ref 1.6–2.6)
MCH RBC QN AUTO: 29.3 PG (ref 26–34)
MCHC RBC AUTO-ENTMCNC: 31.6 G/DL (ref 31–37)
MCV RBC AUTO: 92.8 FL
MONOCYTES # BLD AUTO: 0.74 X10(3) UL (ref 0.1–1)
MONOCYTES NFR BLD AUTO: 6.4 %
NEUTROPHILS # BLD AUTO: 10 X10 (3) UL (ref 1.5–7.7)
NEUTROPHILS # BLD AUTO: 10 X10(3) UL (ref 1.5–7.7)
NEUTROPHILS NFR BLD AUTO: 86.5 %
OSMOLALITY SERPL CALC.SUM OF ELEC: 292 MOSM/KG (ref 275–295)
PLATELET # BLD AUTO: 288 10(3)UL (ref 150–450)
PLATELETS.RETICULATED NFR BLD AUTO: 1.4 % (ref 0–7)
POTASSIUM SERPL-SCNC: 4.7 MMOL/L (ref 3.5–5.1)
RBC # BLD AUTO: 2.63 X10(6)UL
SODIUM SERPL-SCNC: 130 MMOL/L (ref 136–145)
VANCOMYCIN SERPL-MCNC: 18.1 UG/ML (ref ?–40)
WBC # BLD AUTO: 11.6 X10(3) UL (ref 4–11)

## 2024-07-01 PROCEDURE — 99232 SBSQ HOSP IP/OBS MODERATE 35: CPT | Performed by: INTERNAL MEDICINE

## 2024-07-01 NOTE — CONGREGATE LIVING REVIEW
[facility] Authorization    The [facility] Review Committee has reviewed this case and the patient IS APPROVED for discharge to a facility for Short Term Skilled once the following procedure is followed:     - The physician discharge instructions (contained within the SHAMIKA note for SNF) must inlcude the below appropriate and approved COVID instructions to the facility    For questions regarding CLRC approval process, please contact the CM assigned to the case.  For questions regarding RN discharge workflow, please contact the unit Clinical Leader.

## 2024-07-01 NOTE — PHYSICAL THERAPY NOTE
PHYSICAL THERAPY RE- EVALUATION - INPATIENT     Room Number: 353/353-A  Evaluation Date: 7/1/2024  Type of Evaluation: Re-evaluation  Physician Order: PT Eval and Treat    Presenting Problem: cellulitis of LE  Co-Morbidities : gangrene of R foot s/p R TMA with achilles lengthening and L 2nd toe amputation on 6/13 with DC to [facility] for 2 days; ESRD on HD, DM2, PAD, CHF, AVR  Reason for Therapy: Mobility Dysfunction and Discharge Planning    Procedure 6/30/24 Dr Meza:    1.  Transmetatarsal amputation, left foot  2.  Achilles tendon lengthening, left  3.  Excisional wound debridement to level of bone, 30 cm², right foot  4.  Partial resection of the third and fourth metatarsals, right foot    -Per epic chat with podiatrist, Dr Meza, pt WBAT bilateral feet with post op shoes.    PHYSICAL THERAPY ASSESSMENT   Patient is currently functioning below baseline with bed mobility, transfers, and gait.  Prior to admission, patient's baseline is Xiao with RW for short distances, Xiao with wheelchair for longer distances.  Patient is requiring minimal assist and moderate assist as a result of the following impairments: decreased functional strength, decreased endurance/aerobic capacity, pain, impaired static and dyanmic standing balance, impaired coordination, impaired motor planning, and decreased muscular endurance.  Physical Therapy will continue to follow for duration of hospitalization.    Patient will benefit from continued skilled PT Services to promote return to prior level of function and safety with continuous assistance and gradual rehabilitative therapy .    PLAN  PT Treatment Plan: Bed mobility;Body mechanics;Coordination;Endurance;Energy conservation;Patient education;Family education;Gait training;Neuromuscular re-educate;Strengthening;Range of motion;Stoop training;Stair training;Transfer training;Balance training  Rehab Potential : Fair  Frequency (Obs): 3-5x/week  Number of Visits to Meet Established Goals:  5      CURRENT GOALS    Goal #1 Patient is able to demonstrate supine - sit EOB @ level: independent     Goal #2 Patient is able to demonstrate transfers Sit to/from Stand at assistance level: supervision     Goal #3 Patient is able to ambulate 25 feet with assist device: walker - rolling at assistance level: minimum assistance     Goal #4    Goal #5    Goal #6    Goal Comments: Goals established on 2024    PHYSICAL THERAPY MEDICAL/SOCIAL HISTORY  History related to current admission: Patient is a 78 year old male admitted on 2024 from wound clinic for worsening BL foot wounds.  Pt diagnosed with cellulitis.    HOME SITUATION  Type of Home: Condo   Home Layout: One level;Ramped entrance  Stairs to Enter : 3  Railing: Yes          Lives With: Spouse  Drives: No  Patient Owned Equipment: Rolling walker;Wheelchair       Prior Level of [place]: Per pt lives in one level condo with ramped entrance. Lives with spouse. Ambulates household distances with RW, longer distances via use of wheelchair.    SUBJECTIVE  \"I can't feel my feet.\"    OBJECTIVE  Precautions: Limb alert - right;Bed/chair alarm (post op shoes for both feet)  Fall Risk: High fall risk    WEIGHT BEARING RESTRICTION  Weight Bearing Restriction: L lower extremity;R lower extremity        R Lower Extremity: Weight Bearing as Tolerated (w/ post op shoe)  L Lower Extremity: Weight Bearing as Tolerated (w/ post op shoe)    PAIN ASSESSMENT  Ratin  Location: denies pain at this time reporting he is unable to feel his feet       COGNITION  Overall Cognitive Status:  WFL - within functional limits    RANGE OF MOTION AND STRENGTH ASSESSMENT  Upper extremity ROM and strength are within functional limits   Lower extremity ROM is within functional limits   Lower extremity strength is within functional limits       BALANCE  Static Sitting: Fair +  Dynamic Sitting: Fair  Static Standing: Poor +  Dynamic Standing: Poor +    ADDITIONAL TESTS                                     ACTIVITY TOLERANCE                         O2 WALK       NEUROLOGICAL FINDINGS                        AM-PAC '6-Clicks' INPATIENT SHORT FORM - BASIC MOBILITY  How much difficulty does the patient currently have...  Patient Difficulty: Turning over in bed (including adjusting bedclothes, sheets and blankets)?: A Little   Patient Difficulty: Sitting down on and standing up from a chair with arms (e.g., wheelchair, bedside commode, etc.): A Little   Patient Difficulty: Moving from lying on back to sitting on the side of the bed?: A Little   How much help from another person does the patient currently need...   Help from Another: Moving to and from a bed to a chair (including a wheelchair)?: A Little   Help from Another: Need to walk in hospital room?: A Lot   Help from Another: Climbing 3-5 steps with a railing?: A Lot       AM-PAC Score:  Raw Score: 16   Approx Degree of Impairment: 54.16%   Standardized Score (AM-PAC Scale): 40.78   CMS Modifier (G-Code): CK    FUNCTIONAL ABILITY STATUS  Gait Assessment   Functional Mobility/Gait Assessment  Gait Assistance: Moderate assistance  Distance (ft): 3  Assistive Device: Rolling walker    Skilled Therapy Provided     Bed Mobility:  Rolling: NT  Supine to sit: w/ HOB elevated Sariah- required therapist assist for leverage to pull to sitting position    Sit to supine: NT     Transfer Mobility:  Sit to stand: modA x 2 to RW v/c for hand placement   Stand to sit: modA x 2   Gait = modA x 2 w/ RW x 3 feet- sidesteps along EOB, sidesteps to bedside chair- assist with RW navigation as well    Therapist's Comments:   Patient presents sitting up in bed. Discussed role and goal of physical therapy in hospital setting. Pt in agreement to session. Educated on updated weight bearing status- WBAT to BL feet with post op shoes on. Pt verbalizes understanding.  Bed mobility with HOB elevated at Sariah. Once sitting EOB SBA to maintain static sitting balance. Blood noted to  be draining out of dressing- RN called to assist with dressing change. Post op shoes donned totalA. Sit to stand modA x 2 to RW. Able to complete weight shifting in standing, however pt reporting he needed a rest break. After seated rest break, sit to stand again modA x 2 to RW- able to take lateral steps along EOB and to bedside chair at modA; see above for specifics. Upright in chair at end of session- pt noted to be pretty fatigued. /46. RN aware of session.     Exercise/Education Provided:  Bed mobility  Body mechanics  Energy conservation  Functional activity tolerated  Posture  Transfer training    Patient End of Session: Up in chair;Needs met;Call light within reach;RN aware of session/findings;All patient questions and concerns addressed;Alarm set;Discussed recommendations with /    Patient Evaluation Complexity Level:  History Moderate - 1 or 2 personal factors and/or co-morbidities   Examination of body systems Low - addressing 1-2 elements   Clinical Presentation Low - Stable   Clinical Decision Making Low - Stable     PT Session Time: 25 minutes  Therapeutic Activity: 14 minutes

## 2024-07-01 NOTE — PLAN OF CARE
A/o x4. RA//IS encouraged. Tele: NSR. IV abx. 1800 ada accuchecks. LBM 6/26. Anuric . HD today. Pain managed with po pain medication. Cx pending. Dc planning tbd. POC updated with pt. All safety measures in place. Call light within reach instructed pt to call for help or assistance.

## 2024-07-01 NOTE — PROGRESS NOTES
[facility] Hospitalist Progress Note       SUBJECTIVE:  No f/c. Feeling better. Sleeping a lot. Itchy. Some pain. Getting HD      OBJECTIVE:  Scheduled Meds:    vancomycin  7.5 mg/kg Intravenous Once per day on Monday Wednesday Friday    insulin degludec  10 Units Subcutaneous Daily    clopidogrel  75 mg Oral Daily    sodium hypochlorite   Topical BID    apixaban  2.5 mg Oral BID    sodium chloride   Intravenous Once    insulin aspart  4-20 Units Subcutaneous TID AC and HS    atorvastatin  80 mg Oral Nightly    carvedilol  12.5 mg Oral BID with meals    cefepime  1 g Intravenous Q24H    metRONIDAZOLE  500 mg Oral 2 times per day     Continuous Infusions:   PRN Meds:   hydrOXYzine    heparin    acetaminophen    acetaminophen **OR** HYDROcodone-acetaminophen **OR** HYDROcodone-acetaminophen    HYDROmorphone **OR** HYDROmorphone **OR** HYDROmorphone    polyethylene glycol (PEG 3350)    sennosides    bisacodyl    ondansetron    metoclopramide    glucose **OR** glucose **OR** glucose-vitamin C **OR** dextrose **OR** glucose **OR** glucose **OR** glucose-vitamin C    Vitals  Vitals:    07/01/24 1219   BP: 125/46   Pulse: 76   Resp: 16   Temp:          Exam   Gen-    no acute distress, alert and oriented x 3   RESP-   Lungs CTA, normal respiratory effort  CV-      Heart RRR, no mgr  Abd-    soft, nondistended, nontender, bowel sounds present  Skin-    no rash  Neuro-  no focal neurologic deficits  Ext-    dressing in tact b/l   Psych- alert and oriented x 3     Labs:     Chem:  Recent Labs   Lab 06/24/24  2218 06/26/24  0435 06/27/24  0437 06/28/24  0437 06/29/24  0455 06/30/24  0402 07/01/24  0609   *   < > 136 134* 131* 132* 130*   K 4.4   < > 3.9 4.3 4.4 4.1 4.7   CL 95*   < > 100 100 101 98 98   CO2 23.0   < > 24.0 24.0 17.0* 23.0 19.0*   BUN 76*   < > 39* 35* 30* 50* 67*   MG  --   --   --   --   --   --  2.2   ALT 22  --   --   --   --  9*  --    AST 67*  --   --   --   --  38*  --    ALB 2.7*  --   --   --   --  1.9*   --     < > = values in this interval not displayed.       HEM:  Recent Labs   Lab 06/27/24  0437 06/28/24  0437 06/29/24  1022 06/30/24  0402 07/01/24  0609   WBC 12.4* 11.2* 13.8* 11.6* 11.6*   HGB 8.1* 8.2* 8.6* 7.9* 7.7*   .0 135.0* 206.0 200.0 288.0   MCV 87.8 90.7 89.0 90.8 92.8       Coagulation:  Recent Labs   Lab 06/26/24  1535 06/27/24  0437 06/28/24  0437 06/28/24  0730 06/29/24  1022 06/30/24  0402 07/01/24  0609   PTT 30.3  --   --   --   --   --   --    INR  --   --   --  1.48*  --   --   --    HCT 23.8* 24.4* 25.5*  --  26.6* 24.6* 24.4*   HGB 7.4* 8.1* 8.2*  --  8.6* 7.9* 7.7*   .0 154.0 135.0*  --  206.0 200.0 288.0              AP:  78 year old male with PMH sig for CAD status post stents, PAD status post bilateral lower extremity stents, atrial fibrillation on Xarelto, diabetes mellitus type 2 on insulin pump, end-stage renal disease on dialysis, chronic CHF with LVH, status post AVR, history of stroke, hypertension, hyperlipidemia, DESI and recent right TMA and left foot second digit amputation on 6/13 by Dr. Meza presented with poor surgical wound healing.     Postop wound infection  R TMA, L foot 2nd digit amputation on 6/13, Dr. Meza/Podiatry  L forefoot gangrene  Necrosis of transmetatarsal amputation site, right foot  - s/p podiatry I&D of R foot (1st/2nd metatarsals) and L foot (2nd metatarsal) on 6/27- growing enterobacter   - wound not healing well from 6/27 surgery so pt back to OR 6/30--> Transmetatarsal amputation, left foot; Achilles tendon lengthening, left; Excisional wound debridement to level of bone;  Partial resection of the third and fourth metatarsals, right foot  - abx per ID    - Arterial US in April, likely has more distal PAD, may require further amputation, per vascular  pt is not a candidate for any further revascularization and should his foot amputation does not heal he will require major amputation.      ESRD on HD  Malfunctioning AVF  - vascular  consulted  - nephrology o/c -s/p tunneled cathter on 6/28      CAD s/p stents  PAD s/p b/l LE stents  Chronic CHF w/ LVH  S/p AVR  Atrial fibrillation  - coreg, lipitor  - plavix   - per podiatry ok to resume AC - pt now on eliquis instead of his home xarelto due to his renal disease      DM2  - insulin pump - not on this admission  - ISS/accucheks - high dose SSI, tresiba daily 10 units     Anemia  - suspecting ABLA from surgery   - monitor for now hgb 7.9 to 7.7   - denies BRBPR no melena      Essential HTN  - coreg     Hyperlipidemia  - statin     DESI  - cpap at bedtime     Itching  - prn atarax, may be 2/2 meds/dialysis        FEN: regular diet, PT/OT  Proph: SCDs eliquis     Code status: Full code         Reviewed chart including previous progress notes. D/w pt/wife, RN     Mook Escalante MD  [facility] Hospitalist  [phone]  7/1/2024  3:44 PM

## 2024-07-01 NOTE — CM/SW NOTE
07/01/24 1200   Discharge Needs   Anticipated D/C needs Subacute rehab   Services Requested   Submitted to [facility] Yes   PASRR Level 1 Submitted Yes   Choice of Post-Acute Provider   Informed patient of right to choose their preferred provider Yes   List of appropriate post-acute services provided to patient/family with quality data Yes     Noted that therapy worked w/pt and Anticipated therapy need: Gradual Rehabilitative Therapy    Met w/pt and his wife at the bedside and they are interested in [facility].  Provided [facility] list with quality data and pt/wife will notify SW/CM of choice    / to remain available for support and/or discharge planning.     Florence FAUSTINA MSN, RN CTL/  [id number]

## 2024-07-01 NOTE — PROGRESS NOTES
[facility]   part of [facility]     Nephrology Progress Note    Darin Bowens Patient Status:  Inpatient    1945 MRN [id number]   Location [facility] 3SW-A Attending Mook Escalante MD   Hosp Day # 6 PCP Zachery Hall MD       SUBJECTIVE:  Relatively stable this morning although does note ongoing pain to the feet bilaterally        Physical Exam:   /50 (BP Location: Left arm)   Pulse 76   Temp 98.5 °F (36.9 °C) (Oral)   Resp 19   Ht 6' (1.829 m)   Wt 198 lb 8 oz (90 kg)   SpO2 90%   BMI 26.92 kg/m²   Temp (24hrs), Av.9 °F (36.6 °C), Min:97 °F (36.1 °C), Max:98.5 °F (36.9 °C)       Intake/Output Summary (Last 24 hours) at 2024 0954  Last data filed at 2024 0834  Gross per 24 hour   Intake 120 ml   Output 100 ml   Net 20 ml     Last 3 Weights   24 0600 198 lb 8 oz (90 kg)   24 0648 205 lb (93 kg)   24 0543 211 lb (95.7 kg)   24 2000 211 lb (95.7 kg)   06/15/24 0008 211 lb (95.7 kg)   24 0545 211 lb 6.4 oz (95.9 kg)   24 1324 209 lb 7 oz (95 kg)   24 1120 210 lb (95.3 kg)   24 1342 210 lb (95.3 kg)   22 1253 210 lb (95.3 kg)     General: Alert and oriented in no apparent distress.  HEENT: No scleral icterus, MMM  Neck: Supple, no SHERI or thyromegaly  Cardiac: Regular rate and rhythm, S1, S2 normal, no murmur or rub  Lungs: Clear without wheezes, rales, rhonchi.    Extremities: No significant pitting edema, bilateral dressings in place over the feet   neurologic: Alert and oriented, cranial nerves grossly intact, moving all extremities  Skin: Warm and dry, no rash        Labs:     Recent Labs   Lab 24  0437 24  04324  0730 24  1022 24  0402 24  0609   WBC 12.4* 11.2*  --  13.8* 11.6* 11.6*   HGB 8.1* 8.2*  --  8.6* 7.9* 7.7*   MCV 87.8 90.7  --  89.0 90.8 92.8   .0 135.0*  --  206.0 200.0 288.0   INR  --   --  1.48*  --   --   --        Recent Labs   Lab  06/27/24  0437 06/28/24  0437 06/29/24  0455 06/30/24  0402 07/01/24  0609    134* 131* 132* 130*   K 3.9 4.3 4.4 4.1 4.7    100 101 98 98   CO2 24.0 24.0 17.0* 23.0 19.0*   BUN 39* 35* 30* 50* 67*   CREATSERUM 5.71* 5.64* 5.17* 6.95* 8.46*   CA 8.8 8.9 8.1* 9.2 9.4   MG  --   --   --   --  2.2   * 192* 220* 109* 152*       Recent Labs   Lab 06/24/24 2218 06/30/24  0402   ALT 22 9*   AST 67* 38*   ALB 2.7* 1.9*       Recent Labs   Lab 06/30/24  0502 06/30/24  1302 06/30/24  1525 06/30/24  2100 07/01/24  0507   PGLU 123* 165* 180* 216* 160*       Meds:   Current Facility-Administered Medications   Medication Dose Route Frequency    vancomycin (Vancocin) 750 mg in sodium chloride 0.9% 250 mL IVPB-ADDV  7.5 mg/kg Intravenous Once per day on Monday Wednesday Friday    epoetin matt (Epogen, Procrit) 66913 UNIT/ML injection 10,000 Units  10,000 Units Intravenous Once in dialysis    hydrOXYzine (Atarax) tab 25 mg  25 mg Oral TID PRN    insulin degludec (Tresiba) 100 units/mL flextouch 10 Units  10 Units Subcutaneous Daily    clopidogrel (Plavix) tab 75 mg  75 mg Oral Daily    sodium hypochlorite (Dakin's) 0.25 % external solution   Topical BID    apixaban (Eliquis) tab 2.5 mg  2.5 mg Oral BID    sodium chloride 0.9% infusion   Intravenous Once    heparin (Porcine) 1000 UNIT/ML injection 2,000 Units  2,000 Units Intravenous PRN Dialysis    acetaminophen (Tylenol Extra Strength) tab 500 mg  500 mg Oral Q4H PRN    acetaminophen (Tylenol) tab 650 mg  650 mg Oral Q4H PRN    Or    HYDROcodone-acetaminophen (Norco) 5-325 MG per tab 1 tablet  1 tablet Oral Q4H PRN    Or    HYDROcodone-acetaminophen (Norco) 5-325 MG per tab 2 tablet  2 tablet Oral Q4H PRN    HYDROmorphone (Dilaudid) 1 MG/ML injection 0.2 mg  0.2 mg Intravenous Q2H PRN    Or    HYDROmorphone (Dilaudid) 1 MG/ML injection 0.4 mg  0.4 mg Intravenous Q2H PRN    Or    HYDROmorphone (Dilaudid) 1 MG/ML injection 0.8 mg  0.8 mg Intravenous Q2H PRN     polyethylene glycol (PEG 3350) (Miralax) 17 g oral packet 17 g  17 g Oral Daily PRN    sennosides (Senokot) tab 17.2 mg  17.2 mg Oral Nightly PRN    bisacodyl (Dulcolax) 10 MG rectal suppository 10 mg  10 mg Rectal Daily PRN    ondansetron (Zofran) 4 MG/2ML injection 4 mg  4 mg Intravenous Q6H PRN    metoclopramide (Reglan) 5 mg/mL injection 5 mg  5 mg Intravenous Q8H PRN    glucose (Dex4) 15 GM/59ML oral liquid 15 g  15 g Oral Q15 Min PRN    Or    glucose (Glutose) 40% oral gel 15 g  15 g Oral Q15 Min PRN    Or    glucose-vitamin C (Dex-4) chewable tab 4 tablet  4 tablet Oral Q15 Min PRN    Or    dextrose 50% injection 50 mL  50 mL Intravenous Q15 Min PRN    Or    glucose (Dex4) 15 GM/59ML oral liquid 30 g  30 g Oral Q15 Min PRN    Or    glucose (Glutose) 40% oral gel 30 g  30 g Oral Q15 Min PRN    Or    glucose-vitamin C (Dex-4) chewable tab 8 tablet  8 tablet Oral Q15 Min PRN    insulin aspart (NovoLOG) 100 Units/mL FlexPen 4-20 Units  4-20 Units Subcutaneous TID AC and HS    atorvastatin (Lipitor) tab 80 mg  80 mg Oral Nightly    carvedilol (Coreg) tab 12.5 mg  12.5 mg Oral BID with meals    ceFEPIme (Maxipime) 1 g in sodium chloride 0.9% 100 mL IVPB-MBP  1 g Intravenous Q24H    metRONIDAZOLE (Flagyl) tab 500 mg  500 mg Oral 2 times per day         Impression/Plan:        ESRD - due to DM/HTN; clotted AVF  S/p PC clotted AV fistula  - will have pt f/u with Dr. Nix for o/p eval of new AVF/fistula revision  Foot infection - s/p B foot/toe amputations  - on abx; wound care  - podiatry /ID following   PAD- as above  CHF; s/p AVR; volume optimization w/ HD/UF  Anemia due to CKD- DALE w/ HD      Questions/concerns were discussed with patient and/or family by bedside.          Sean Flores MD  7/1/2024  9:54 AM

## 2024-07-01 NOTE — PROGRESS NOTES
[facility] Dosing Service      Follow Up Pharmacokinetic Consult for Vancomycin Dosing     Darin Bowens is a 78 year old male who is receiving vancomycin therapy for  surgical wound infection with osteomyelitis . Patient is on day 7 of vancomycin and is currently receiving doses based on levels around the hemodialysis schedule.. The current treatment and monitoring approach is non-AUC strategy.        Weight and Temperature:    Wt Readings from Last 1 Encounters:   24 90 kg (198 lb 8 oz)         Temp Readings from Last 1 Encounters:   24 98.5 °F (36.9 °C) (Oral)      Labs:   Recent Labs   Lab 24  0455 24  0402 24  0609   CREATSERUM 5.17* 6.95* 8.46*      Estimated Creatinine Clearance: 8.2 mL/min (A) (based on SCr of 8.46 mg/dL (H)).     Recent Labs   Lab 24  1022 24  0402 24  0609   WBC 13.8* 11.6* 11.6*        Vancomycin Levels:  Lab Results   Component Value Date/Time    VANCR 18.1 2024 06:09 AM    VANCR 22.6 2024 04:37 AM       Corresponding 24 h-AUC: N/A     The Pharmacokinetic Target is:    Trough/random 15-20 mg/L (applies to IV dosing in HD and IP dosing in APD)    Renal Dosing Considerations:    HD: Current regimen: planned for MWF schedule to resume today per nephrology notes     Assessment/Plan:   Maintenance Regimen: Adjust vancomycin to 750 mg IV after each dialysis    Monitorin) Plan for vancomycin random level to be obtained in 5 - 7 days    2) Pharmacy will monitor for toxicity and efficacy, adjust vancomycin dose and/or frequency, and order vancomycin levels as appropriate per the Pharmacy and Therapeutics Committee approved protocol until discontinuation of the medication.       We appreciate the opportunity to assist in the care of this patient.     Leonor Cuevas, PharmD  2024  8:45 AM  [facility]armacy Extension: [phone]

## 2024-07-01 NOTE — PLAN OF CARE
Patient A&Ox4, forgetful at times per NOC RN. Lethargic, but easily aroused. Post op dressings intact. Tolerating diet. Anuric related to renal disease. Left jugular permacath intact and accessed. Clarified patient WB status order with Dr. Meza. See orders. Plan to see PT/OT and continued antibiotics pending cultures. Plan of care reviewed with patient. Patient demonstrates understanding.

## 2024-07-01 NOTE — OCCUPATIONAL THERAPY NOTE
OCCUPATIONAL THERAPY EVALUATION - INPATIENT     Room Number: 353/353-A  Evaluation Date: 7/1/2024  Type of Evaluation: Re-evaluation  Presenting Problem: s/p L foot debridement and 2nd metatarsal partial resection 6/27, s/p R foot debridement and 1st/2nd metatarsal resection 6/27    Physician Order: IP Consult to Occupational Therapy  Reason for Therapy: ADL/IADL Dysfunction and Discharge Planning    OCCUPATIONAL THERAPY ASSESSMENT   Patient is currently functioning below baseline with toileting, bathing, lower body dressing, bed mobility, transfers, and dynamic standing balance. Prior to admission, patient's baseline is assist for showers, cane ambulation in home.  Patient is requiring moderate assist and maximum assist as a result of the following impairments: decreased functional strength, decreased endurance, and impaired standing balance. Occupational Therapy will continue to follow for duration of hospitalization.    Patient will benefit from continued skilled OT Services to promote return to prior level of function and safety with continuous assistance and gradual rehabilitative therapy       History Related to Current Admission: Patient is a 78 year old male admitted on 6/25/2024 with Presenting Problem: s/p L foot debridement and 2nd metatarsal partial resection 6/27, s/p R foot debridement and 1st/2nd metatarsal resection 6/27. 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 See below. Per Epic chat with podiatrist, WBAT both legs with post-op shoes on.     Date of surgery: 06/30/24  Procedure:   1.  Transmetatarsal amputation, left foot  2.  Achilles tendon lengthening, left  3.  Excisional wound debridement to level of bone, 30 cm², right foot  4.  Partial resection of the third and fourth metatarsals, right foot    Per initial OT eval on  6/29/24, \"Pt discharged to [facility] after recent hospitalization, was only there 2 days prior to DC home. Pt states he was home 3 days before readmission. Per chart, family was having difficulty caring for him at home.\"    WEIGHT BEARING RESTRICTION  Weight Bearing Restriction: R lower extremity;L lower extremity        R Lower Extremity: Weight Bearing as Tolerated (post op shoe)  L Lower Extremity: Weight Bearing as Tolerated (post op shoe)    Recommendations for nursing staff:   Transfers: mod A x 2  Toileting location: bedside commode    EVALUATION SESSION:  Patient Start of Session: seated at edge of bed with PT  FUNCTIONAL TRANSFER ASSESSMENT  Sit to Stand: Edge of Bed  Edge of Bed: Maximum Assist (mod A x 2)    BED MOBILITY  Supine to Sit : Minimal Assist  Sit to Supine (OT): Not Tested  Scooting: cga    BALANCE ASSESSMENT     FUNCTIONAL ADL ASSESSMENT  LB Dressing Seated: Maximum Assist (post op shoes, seated)    COGNITION  Overall Cognitive Status:  WFL - within functional limits    Upper Extremity   ROM: within functional limits   Strength: within functional limits     EDUCATION PROVIDED  Patient: Role of Occupational Therapy; Plan of Care; Functional Transfer Techniques; Fall Prevention; Posture/Positioning; Surgical Precautions  Patient's Response to Education: Requires Further Education    Equipment used: rw  Demonstrates functional use,      Therapist comments: seated at edge of bed with PT. Per PT, min A supine to sit.   SBA static sitting balance.  Noted blood draining through the dressing. RN in the room to change the dressing.  Max A to jae post-op shoes on both feet.  Educated the pt about hand/rw placement and sequencing for weight shifting before standing. Mod A x 2 to stand. After sitting down to rest, mod A x 2 to stand and to take steps to the chair. Seated /46, no dizziness. Updated RN about the session.     Patient End of Session: Up in chair;Needs met;Call light within reach;RN aware of  session/findings;All patient questions and concerns addressed    OCCUPATIONAL PROFILE    HOME SITUATION  Type of Home: Condo  Home Layout: One level  Lives With: Spouse    Toilet and Equipment: Comfort height toilet;3-in-1 commode  Shower/Tub and Equipment: Walk-in shower (shower)             Drives: No       Prior Level of Function: Per initial eval 24,Prior to recent admission pt had assist from spouse for showers, was otherwise mostly independent with ADL. Used cane in the home and wheelchair in the community. Since recent admission pt has been doing stand pivot transfers bed <> wheelchair <> commode with assist and RW. He has had assist from spouse or daughter for all LB ADL.     PAIN ASSESSMENT  Ratin  Location: mentioned that \"pain medicine is helping.\"  Management Techniques: Repositioning    OBJECTIVE  Precautions: Bed/chair alarm (post op shoes, both feet)  Fall Risk: High fall risk      ASSESSMENTS    AM-PAC ‘6-Clicks’ Inpatient Daily Activity Short Form  -   Putting on and taking off regular lower body clothing?: A Lot  -   Bathing (including washing, rinsing, drying)?: A Lot  -   Toileting, which includes using toilet, bedpan or urinal? : A Lot  -   Putting on and taking off regular upper body clothing?: A Little  -   Taking care of personal grooming such as brushing teeth?: None  -   Eating meals?: None    AM-PAC Score:  Score: 17  Approx Degree of Impairment: 50.11%  Standardized Score (AM-PAC Scale): 37.26    ADDITIONAL TESTS     NEUROLOGICAL FINDINGS      COGNITION ASSESSMENTS       PLAN  OT Treatment Plan: Balance activities;Energy conservation/work simplification techniques;ADL training;Functional transfer training;UE strengthening/ROM;Patient/Family education;Patient/Family training;Equipment eval/education;Compensatory technique education  Rehab Potential : Fair  Frequency: 3x/week  Number of Visits to Meet Established Goals: 5    ADL Goals   Patient will perform upper body dressing:   with modified independent  Patient will perform lower body dressing:  with min assist  Patient will perform toileting: with min assist    Functional Transfer Goals  Patient will transfer to toilet:  with min assist    UE Exercise Program Goal  Patient will be independent with bilateral AROM HEP (home exercise program).    Patient Evaluation Complexity Level:   Occupational Profile/Medical History MODERATE - Expanded review of history including review of medical or therapy record   Specific performance deficits impacting engagement in ADL/IADL MODERATE  3 - 5 performance deficits   Client Assessment/Performance Deficits MODERATE - Comorbidities and min to mod modifications of tasks    Clinical Decision Making LOW - Analysis of occupational profile, problem-focused assessments, limited treatment options    Overall Complexity LOW     OT Session Time: 18 minutes  Self-Care Home Management:2  minutes  Therapeutic Activity: 10 minutes

## 2024-07-01 NOTE — PHYSICAL THERAPY NOTE
Attempted to see pt for f/u therapy session. Pt occupied with dialysis with this time. Will re-attempt as schedule allows and as long as pt remains medically appropriate. RN aware.

## 2024-07-01 NOTE — PROGRESS NOTES
[facility]   part of [facility] Infectious Disease Progress Note    Darin Bowens Patient Status:  Inpatient    1945 MRN [id number]   Location [facility] 3SW-A Attending Vira Richardson MD   Hosp Day # 6 PCP Zachery Hall MD     Subjective:  Chart reviewed, pt is now s/p transmetatarsal amputation and achilles tendon lengthening on the left and wound debridement to level of bone and partial resection of 3rd and 4th MT on the right yesterday.   Seen bedside during HD.  He reports he is doing ok.  Sleepy.  Dressings have not been changed yet.       Objective:    Allergies:  Allergies   Allergen Reactions    Augmentin [Amoxicillin-Pot Clavulanate] RASH    Lisinopril Coughing     Dyspnea         Medications:    Current Facility-Administered Medications:     epoetin matt (Epogen, Procrit) 25390 UNIT/ML injection 10,000 Units, 10,000 Units, Intravenous, Once in dialysis    hydrOXYzine (Atarax) tab 25 mg, 25 mg, Oral, TID PRN    insulin degludec (Tresiba) 100 units/mL flextouch 10 Units, 10 Units, Subcutaneous, Daily    clopidogrel (Plavix) tab 75 mg, 75 mg, Oral, Daily    sodium hypochlorite (Dakin's) 0.25 % external solution, , Topical, BID    apixaban (Eliquis) tab 2.5 mg, 2.5 mg, Oral, BID    sodium chloride 0.9% infusion, , Intravenous, Once    heparin (Porcine) 1000 UNIT/ML injection 2,000 Units, 2,000 Units, Intravenous, PRN Dialysis    acetaminophen (Tylenol Extra Strength) tab 500 mg, 500 mg, Oral, Q4H PRN    acetaminophen (Tylenol) tab 650 mg, 650 mg, Oral, Q4H PRN **OR** HYDROcodone-acetaminophen (Norco) 5-325 MG per tab 1 tablet, 1 tablet, Oral, Q4H PRN **OR** HYDROcodone-acetaminophen (Norco) 5-325 MG per tab 2 tablet, 2 tablet, Oral, Q4H PRN    HYDROmorphone (Dilaudid) 1 MG/ML injection 0.2 mg, 0.2 mg, Intravenous, Q2H PRN **OR** HYDROmorphone (Dilaudid) 1 MG/ML injection 0.4 mg, 0.4 mg, Intravenous, Q2H PRN **OR** HYDROmorphone (Dilaudid) 1 MG/ML injection 0.8 mg, 0.8  mg, Intravenous, Q2H PRN    polyethylene glycol (PEG 3350) (Miralax) 17 g oral packet 17 g, 17 g, Oral, Daily PRN    sennosides (Senokot) tab 17.2 mg, 17.2 mg, Oral, Nightly PRN    bisacodyl (Dulcolax) 10 MG rectal suppository 10 mg, 10 mg, Rectal, Daily PRN    ondansetron (Zofran) 4 MG/2ML injection 4 mg, 4 mg, Intravenous, Q6H PRN    metoclopramide (Reglan) 5 mg/mL injection 5 mg, 5 mg, Intravenous, Q8H PRN    glucose (Dex4) 15 GM/59ML oral liquid 15 g, 15 g, Oral, Q15 Min PRN **OR** glucose (Glutose) 40% oral gel 15 g, 15 g, Oral, Q15 Min PRN **OR** glucose-vitamin C (Dex-4) chewable tab 4 tablet, 4 tablet, Oral, Q15 Min PRN **OR** dextrose 50% injection 50 mL, 50 mL, Intravenous, Q15 Min PRN **OR** glucose (Dex4) 15 GM/59ML oral liquid 30 g, 30 g, Oral, Q15 Min PRN **OR** glucose (Glutose) 40% oral gel 30 g, 30 g, Oral, Q15 Min PRN **OR** glucose-vitamin C (Dex-4) chewable tab 8 tablet, 8 tablet, Oral, Q15 Min PRN    insulin aspart (NovoLOG) 100 Units/mL FlexPen 4-20 Units, 4-20 Units, Subcutaneous, TID AC and HS    atorvastatin (Lipitor) tab 80 mg, 80 mg, Oral, Nightly    carvedilol (Coreg) tab 12.5 mg, 12.5 mg, Oral, BID with meals    ceFEPIme (Maxipime) 1 g in sodium chloride 0.9% 100 mL IVPB-MBP, 1 g, Intravenous, Q24H    Vancomycin: PHARMACY DOSING, 1 each, Intravenous, See Admin Instructions (RX holding)    metRONIDAZOLE (Flagyl) tab 500 mg, 500 mg, Oral, 2 times per day    Physical Exam:  General: Alert, orientated x3.  Cooperative.  No apparent distress.  Vital Signs:  Blood pressure 132/60, pulse 68, temperature 98.5 °F (36.9 °C), temperature source Oral, resp. rate 15, height 6' (1.829 m), weight 198 lb 8 oz (90 kg), SpO2 100%.   Temp (24hrs), Av.9 °F (36.6 °C), Min:97 °F (36.1 °C), Max:98.5 °F (36.9 °C)      HEENT: Exam is unremarkable.  Without scleral icterus.  Mucous membranes are moist. PERRLA.  Oropharynx is clear.  Lungs: Clear to auscultation bilaterally.  Cardiac: Regular rate   Abdomen:   Soft, non-distended, non-tender, with no rebound or guarding.   Extremities:  No lower extremity edema noted.  Without clubbing or cyanosis.    Skin: bilateral feet wrapped  Neurologic: Cranial nerves are grossly intact.      Labs:  Lab Results   Component Value Date    WBC 11.6 07/01/2024    HGB 7.7 07/01/2024    HCT 24.4 07/01/2024    .0 07/01/2024    CREATSERUM 8.46 07/01/2024    BUN 67 07/01/2024     07/01/2024    K 4.7 07/01/2024    CL 98 07/01/2024    CO2 19.0 07/01/2024     07/01/2024    CA 9.4 07/01/2024    MG 2.2 07/01/2024       Radiology:      Assessment/Plan:    1.  RLE surgical wound infection with OM  -s/p   -cultures with enterobacter cloacae and acinetobacter  -s/p excisional wound debridement to bone with partial resection of first and second MT on 6/27/24  -surgical cultures with enterobacter cloacae  -now back to OR yesterday 6/30/24 for repeat wound debridement and partial resection of 3rd and 4th MT  -on IV Vancomycin, Cefepime and PO Flagyl    2. LLE surgical wound infection with OM  -s/p surgery on L foot on 6/13/24  -s/p wound debridement to level of bone with partial resection of second MT on 6/27/24  -surgical cultures with enterobacter cloacae  -now s/p OR yesterday as above for TMA and achilles tendon lengthening  -on IV Vancomycin, Cefepime and PO Flagyl    3. PVD  -per vascular    4. ESRD  -on HD  -renally dosing abx  -AV fistula clotted and now s/p permacath placement    5. Antibiotic allergy  -developed rash on Augmentin    DISPO:  Continue IV Vancomycin, Cefepime and PO Flagyl.  Follow OR cultures and pathology.  Anticipate 6 weeks of IV rx (likely same regimen) with HD post surgery.  Will continue to follow with further recs.       If you have any questions or concerns please call [facility] at [phone].     SEMAJ Ivory  7/1/2024  7:11 AM

## 2024-07-02 LAB
ANION GAP SERPL CALC-SCNC: 10 MMOL/L (ref 0–18)
BUN BLD-MCNC: 52 MG/DL (ref 9–23)
CALCIUM BLD-MCNC: 8.9 MG/DL (ref 8.5–10.1)
CHLORIDE SERPL-SCNC: 101 MMOL/L (ref 98–112)
CO2 SERPL-SCNC: 23 MMOL/L (ref 21–32)
CREAT BLD-MCNC: 7.26 MG/DL
EGFRCR SERPLBLD CKD-EPI 2021: 7 ML/MIN/1.73M2 (ref 60–?)
ERYTHROCYTE [DISTWIDTH] IN BLOOD BY AUTOMATED COUNT: 15.8 %
GLUCOSE BLD-MCNC: 129 MG/DL (ref 70–99)
GLUCOSE BLD-MCNC: 130 MG/DL (ref 70–99)
GLUCOSE BLD-MCNC: 256 MG/DL (ref 70–99)
GLUCOSE BLD-MCNC: 304 MG/DL (ref 70–99)
GLUCOSE BLD-MCNC: 307 MG/DL (ref 70–99)
GLUCOSE BLD-MCNC: 309 MG/DL (ref 70–99)
HCT VFR BLD AUTO: 22.1 %
HGB BLD-MCNC: 7 G/DL
MAGNESIUM SERPL-MCNC: 2.2 MG/DL (ref 1.6–2.6)
MCH RBC QN AUTO: 28.7 PG (ref 26–34)
MCHC RBC AUTO-ENTMCNC: 31.7 G/DL (ref 31–37)
MCV RBC AUTO: 90.6 FL
OSMOLALITY SERPL CALC.SUM OF ELEC: 294 MOSM/KG (ref 275–295)
PLATELET # BLD AUTO: 219 10(3)UL (ref 150–450)
POTASSIUM SERPL-SCNC: 4 MMOL/L (ref 3.5–5.1)
RBC # BLD AUTO: 2.44 X10(6)UL
SODIUM SERPL-SCNC: 134 MMOL/L (ref 136–145)
WBC # BLD AUTO: 11.1 X10(3) UL (ref 4–11)

## 2024-07-02 PROCEDURE — 99232 SBSQ HOSP IP/OBS MODERATE 35: CPT | Performed by: INTERNAL MEDICINE

## 2024-07-02 PROCEDURE — 2W1LX6Z COMPRESSION OF RIGHT LOWER EXTREMITY USING PRESSURE DRESSING: ICD-10-PCS | Performed by: PODIATRIST

## 2024-07-02 RX ORDER — METRONIDAZOLE 500 MG/1
500 TABLET ORAL 2 TIMES DAILY
Qty: 74 TABLET | Refills: 0 | Status: SHIPPED | OUTPATIENT
Start: 2024-07-02 | End: 2024-08-08

## 2024-07-02 RX ORDER — SULFAMETHOXAZOLE AND TRIMETHOPRIM 800; 160 MG/1; MG/1
2 TABLET ORAL DAILY
Status: DISCONTINUED | OUTPATIENT
Start: 2024-07-02 | End: 2024-07-12

## 2024-07-02 RX ORDER — SULFAMETHOXAZOLE AND TRIMETHOPRIM 800; 160 MG/1; MG/1
2 TABLET ORAL DAILY
Status: DISCONTINUED | OUTPATIENT
Start: 2024-07-03 | End: 2024-07-02

## 2024-07-02 RX ORDER — SULFAMETHOXAZOLE AND TRIMETHOPRIM 800; 160 MG/1; MG/1
1 TABLET ORAL EVERY 12 HOURS SCHEDULED
Status: DISCONTINUED | OUTPATIENT
Start: 2024-07-02 | End: 2024-07-02

## 2024-07-02 RX ORDER — HEPARIN SODIUM 1000 [USP'U]/ML
1.5 INJECTION, SOLUTION INTRAVENOUS; SUBCUTANEOUS
Status: COMPLETED | OUTPATIENT
Start: 2024-07-03 | End: 2024-07-03

## 2024-07-02 RX ORDER — ALBUMIN (HUMAN) 12.5 G/50ML
25 SOLUTION INTRAVENOUS
Status: ACTIVE | OUTPATIENT
Start: 2024-07-02 | End: 2024-07-04

## 2024-07-02 RX ORDER — CEFEPIME 1 G/50ML
2 INJECTION, SOLUTION INTRAVENOUS
Qty: 1500 ML | Refills: 0 | Status: SHIPPED | OUTPATIENT
Start: 2024-07-03 | End: 2024-08-08

## 2024-07-02 NOTE — PROGRESS NOTES
Pt A&O x 4-lethargic, on RA,  , TELE-afib, Pain controlled with current regimen, up w/ sit to stand, , dressing-c/d/i, Iv abx, plan: woundcare to see and place BLE woundvac today.

## 2024-07-02 NOTE — CM/SW NOTE
Informed pt's wife requesting new list of accepting [facility] facilities.  Met with pt/spouse and new list given.  Encouraged pt's wife to call once facility chosen and approval will be needed from [facility] facility's on site HD provider.  / to remain available for support and/or discharge planning.     Lili Kennedy [facility]  Discharge Planner  [phone]    Addendum:  Informed by [facility] that they no longer have HD availability.  Updated pt's son who stated preference for [facility].  Facility reserved in [facility].  Received confirmation they have HD beds available.  Await medical clearance for DC.  / to remain available for support and/or discharge planning.     [facility]  P:[phone]  F:[phone]

## 2024-07-02 NOTE — PROGRESS NOTES
Patient seen at bedside with his wife.  No acute events overnight.    Physical exam:   Vitals:    07/01/24 1950   BP: 116/47   Pulse: 66   Resp: 16   Temp: 98.5 °F (36.9 °C)     General: NAD  HEENT: EOMI, PERRLA  Respiratory: No wheezing, no rhonchi  Musculoskeletal: Bilateral TMA  Neuro: No focal deficits  Psych: Mood and affect normal    Lower extremity exam: Pedal pulses nonpalpable.  Sensation diminished.  Right foot: TMA site wound with fibrogranular base, exposed bone, viable wound margins with no erythema or purulence  Left foot: TMA site with medial wound open with granular base, exposed bone, viable wound margins and no erythema or purulence    Recent Labs   Lab 06/29/24  1022 06/30/24  0402 07/01/24  0609   RBC 2.99* 2.71* 2.63*   HGB 8.6* 7.9* 7.7*   HCT 26.6* 24.6* 24.4*   MCV 89.0 90.8 92.8   MCH 28.8 29.2 29.3   MCHC 32.3 32.1 31.6   RDW 15.8 15.7 15.7   NEPRELIM 11.70* 9.55* 10.00*   WBC 13.8* 11.6* 11.6*   .0 200.0 288.0       Recent Labs   Lab 06/29/24  0455 06/30/24  0402 07/01/24  0609   * 109* 152*   BUN 30* 50* 67*   CREATSERUM 5.17* 6.95* 8.46*   EGFRCR 11* 8* 6*   CA 8.1* 9.2 9.4   ALB  --  1.9*  --    * 132* 130*   K 4.4 4.1 4.7    98 98   CO2 17.0* 23.0 19.0*   ALKPHO  --  112  --    AST  --  38*  --    ALT  --  9*  --    BILT  --  0.5  --    TP  --  5.6*  --      Last A1c value was 5.9% done 6/14/2024.    Hospital Encounter on 06/25/24   1. Tissue Aerobic Culture     Status: Abnormal (Preliminary result)    Collection Time: 06/30/24 12:11 PM    Specimen: Foot,left; Tissue   Result Value Ref Range    Tissue Culture Result 4+ growth Gram Negative Wojciech (A) N/A   2. Anaerobic Culture     Status: None (Preliminary result)    Collection Time: 06/27/24 12:13 PM    Specimen: Foot,left; Tissue   Result Value Ref Range    Anaerobic Culture No Anaerobes to date N/A   3. Aerobic Bacterial Culture     Status: Abnormal    Collection Time: 06/25/24  8:14 AM    Specimen: Foot,left;  Other   Result Value Ref Range    Aerobic Culture Result  N/A     Mixture of organisms suggestive of normal skin justice    Aerobic Culture Result 4+ growth Enterobacter cloacae complex (A) N/A    Aerobic Culture Result 4+ growth Acinetobacter pittii (A) N/A    Aerobic Smear No WBCs seen N/A    Aerobic Smear 1+ Gram Positive Cocci N/A    Aerobic Smear 1+ Gram Negative Rods N/A       Susceptibility    Enterobacter cloacae complex -  (no method available)     Cefazolin >=64 Resistant      Cefepime <=1 Sensitive      Ceftriaxone <=1 Sensitive      Ciprofloxacin <=0.25 Sensitive      Gentamicin <=1 Sensitive      Meropenem <=0.25 Sensitive      Levofloxacin <=0.12 Sensitive      Piperacillin + Tazobactam <=4 Sensitive      Trimethoprim/Sulfa <=20 Sensitive     Acinetobacter pittii -  (no method available)     Ceftazidime <=1 Sensitive      Ciprofloxacin <1 Sensitive      Gentamicin <1 Sensitive      Meropenem <1 Sensitive      Levofloxacin <=0.25 Sensitive      Piperacillin + Tazobactam <16 Sensitive      Trimethoprim/Sulfa <=40 Sensitive    4. Blood Culture     Status: None    Collection Time: 06/25/24  2:36 AM    Specimen: Blood,peripheral   Result Value Ref Range    Blood Culture Result No Growth 5 Days N/A       IR CENTRAL VENOUS ACCESS    Result Date: 6/28/2024  CONCLUSION: Successful conversion of  left IJ non tunneled central venous dialysis catheter to left chest tunneled dialysis catheter  (27 cm tip to cuff Bard Hemosplit catheter). Catheter is ready for use.  Recommend routine catheter care.   LOCATION:  [place]    Dictated by (CST): Randy Maddox MD on 6/28/2024 at 12:09 PM     Finalized by (CST): Randy Maddox MD on 6/28/2024 at 12:15 PM       MRI FOOT (W+WO), RIGHT (CPT=73720)    Result Date: 6/26/2024  CONCLUSION:  Postsurgical changes status post amputation of wall the distal digits.  Fluid along the surgical approach may represent postoperative seroma although superinfection cannot be excluded.  There is  no evidence of osteomyelitis.   LOCATION:  [facility]   Dictated by (CST): Randy Vaz MD on 6/26/2024 at 3:28 PM     Finalized by (CST): Randy Vaz MD on 6/26/2024 at 3:32 PM       MRI FOOT (W+WO), LEFT (CPT=73720)    Result Date: 6/26/2024  CONCLUSION:  Postsurgical changes in the 2nd toe.  No evidence of osteomyelitis.  A fluid tract extending from the surgical site to the skin surface is noted.   LOCATION:  [facility]   Dictated by (CST): Randy Vaz MD on 6/26/2024 at 3:23 PM     Finalized by (CST): Randy Vaz MD on 6/26/2024 at 3:28 PM       IR CENTRAL VENOUS ACCESS    Result Date: 6/25/2024  CONCLUSION:  Successful non-tunneled dialysis catheter placement via the left internal jugular vein.  Chronically occluded right internal jugular vein.   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  Left forefoot gangrene  Cellulitis  Peripheral arterial disease  Diabetic neuropathy    Patient is status post right foot TMA site revision, left foot TMA and Achilles tendon lengthening done on 6/30/2024    Right foot TMA site revision doing well with viable wound margins along the medial aspect  Left foot TMA site with no purulence noted today  Intraoperatively there was some purulence noted from the left foot   Dakin's solution soaked dressings applied bilaterally    Follow-up OR cultures  Continue IV antibiotics  The patient will require long-term IV antibiotics    Wound consult placed for wound VAC placement    Patient is at high risk for further soft tissue loss, worsening infection, chronic nonhealing wounds and proximal amputation  Will try to salvage both his feet as much as possible.    Will follow    Jose R Meza D.P.M.

## 2024-07-02 NOTE — DIETARY NOTE
[facility]   part of [facility]    NUTRITION ASSESSMENT    Unable to diagnose malnutrition criteria at this time.    NUTRITION INTERVENTION:    Meal and Snacks - Monitor and encourage adequate PO intake. Renal, Carb Controlled: 60 gm CHO per meal.  Medical Food Supplements -  Gamino Nepro at breakfast and orange Alex BID; at lunch and dinner . Rationale/use for oral supplements discussed.  Nutrition Education - Provided handout on Tips for Increasing Protein.   Vitamin and Mineral Supplements - recommend Multivitamin with minerals. However, pt declined MVI. Stated, \"I already take enough pills.\"      PATIENT STATUS:     7/2/24- 79 y/o male admitted with cellulitis of LE and malfunction of dialysis fistula. Pt seen d/t length of stay. Pt w/ poor surgical wound healing, fever, and multiple episodes of emesis PTA. Pt's spouse present at time of visit. Pt reported fair to good appetite. Observed finished lunch tray at bedside. Pt consumed 100% of lunch, including chicken marsala. Recorded PO intakes vary:0-100% with 75% or greater most meals. Denied any N/V/D/C. Pt agreeable to try ONS to help maximize protein intake to aid w/ wound healing.   -6/27- s/p R foot wound debridement, partial resection of 1st and 2nd metatarsal and L foot wound debridement w/ partial resection of 2nd metatarsal.   -6/30-s/p R foot wound debridement and application of wound vac and L transmetatarsal amputation w/ Achilles tendon.   Wound Care is following. Pt w/ wound vac to B/L feet.   PMH:HTN, HLD, CAD, s/p stents, PAD, CHF, CVA, DM2, ESRD on HD, recent R TMA and L foot-2nd digit amputation on 6/13/24.             ANTHROPOMETRICS:  Ht: 182.9 cm (6')  Wt: 90 kg (198 lb 8 oz).   BMI: Body mass index is 26.92 kg/m².  IBW: 81 kg      WEIGHT HISTORY:     Pt reported usual wt of 211 lb. Denied any unintentional wt loss.     Wt trending down 13 lb (6.2%) this admit (211 lb down to 198 lb)? Some wt changes may be related to fluid shifts  w/ HD.     Wt Readings from Last 10 Encounters:   06/28/24 90 kg (198 lb 8 oz)   06/15/24 95.7 kg (211 lb)   06/04/24 95.3 kg (210 lb)   05/20/24 95.3 kg (210 lb)   04/07/22 95.3 kg (210 lb)   12/21/21 95.3 kg (210 lb)   11/30/21 106.1 kg (234 lb)   11/02/21 105.7 kg (233 lb)   10/19/21 105.7 kg (233 lb)   10/01/21 101.6 kg (224 lb)        NUTRITION:  Diet:       Procedures    Renal diet Renal; Calorie Restriction/Carb Controlled: 1800 kcal/60 grams; Is Patient on Accuchecks? Yes      Food Allergies: No  Cultural/Ethnic/Sabianism Preferences Addressed: Yes    Percent Meals Eaten (last 3 days)       Date/Time Percent Meals Eaten (%)    06/29/24 0813 100 %    06/29/24 1230 100 %    07/01/24 0834 70 %          GI system review:  Last BM:6/26    Skin and wounds: wounds to B/L feet, s/p I&D and wound vacs    NUTRITION RELATED PHYSICAL FINDINGS:     1. Body Fat/Muscle Mass: no wasting noted     2. Fluid Accumulation: none per RN documentation     NUTRITION PRESCRIPTION: 81 kg (IBW)  Calories: 9893-9181 calories/day ( 27-32 calories per kg )  Protein: 105-146 grams protein/day (1.3-1.8 grams protein per kg)  Fluid: 1000 ml + urine output or per MD discretion    NUTRITION DIAGNOSIS/PROBLEM:  Increased nutrient needs related to  wound healing  as evidenced by  wounds to B/L feet, s/p I&D and B/L wound vacs.       MONITOR AND EVALUATE/NUTRITION GOALS:  PO intake of 75% of meals TID - New  PO intake of 75% of oral nutrition supplement/s - New  Provide nutrition adequate for wound healing - New  Achieve and maintain dry wt +/- 1 to 2 lbs - New      MEDICATIONS:  IV abx, Insulin, Norco    LABS:  Glu:129, Na++:134, K+:4.0, BUN:52, Cr:7.26, GFR:7    Pt is at High nutrition risk    Ramya Castillo MS, RD, LDN  Clinical Dietitian  Ext:[phone]

## 2024-07-02 NOTE — PLAN OF CARE
Patient A&Ox4. Lethargic, but easily aroused and oriented. BLE dressings intact. Tolerating diet. Anuric. LBM 6/26., patient states he will be able to have BM today, denies symptoms of constipation. Patient WBAT with post op shoes. Plan to see wound care for wound vac placement. PT recc [facility], [facility] placement pending. Safety measures in place.

## 2024-07-02 NOTE — PROGRESS NOTES
[facility]   part of [facility]     Nephrology Progress Note    Darin Bowens Patient Status:  Inpatient    1945 MRN [id number]   Formerly [facility] 3SW-A Attending Mook Escalante MD   Hosp Day # 7 PCP Zachery Hall MD       SUBJECTIVE:  No acute events overnight  + pain issues      Current Facility-Administered Medications:     vancomycin (Vancocin) 750 mg in sodium chloride 0.9% 250 mL IVPB-ADDV, 7.5 mg/kg, Intravenous, Once per day on     hydrOXYzine (Atarax) tab 25 mg, 25 mg, Oral, TID PRN    insulin degludec (Tresiba) 100 units/mL flextouch 10 Units, 10 Units, Subcutaneous, Daily    clopidogrel (Plavix) tab 75 mg, 75 mg, Oral, Daily    sodium hypochlorite (Dakin's) 0.25 % external solution, , Topical, BID    apixaban (Eliquis) tab 2.5 mg, 2.5 mg, Oral, BID    sodium chloride 0.9% infusion, , Intravenous, Once    heparin (Porcine) 1000 UNIT/ML injection 2,000 Units, 2,000 Units, Intravenous, PRN Dialysis    acetaminophen (Tylenol Extra Strength) tab 500 mg, 500 mg, Oral, Q4H PRN    acetaminophen (Tylenol) tab 650 mg, 650 mg, Oral, Q4H PRN **OR** HYDROcodone-acetaminophen (Norco) 5-325 MG per tab 1 tablet, 1 tablet, Oral, Q4H PRN **OR** HYDROcodone-acetaminophen (Norco) 5-325 MG per tab 2 tablet, 2 tablet, Oral, Q4H PRN    HYDROmorphone (Dilaudid) 1 MG/ML injection 0.2 mg, 0.2 mg, Intravenous, Q2H PRN **OR** HYDROmorphone (Dilaudid) 1 MG/ML injection 0.4 mg, 0.4 mg, Intravenous, Q2H PRN **OR** HYDROmorphone (Dilaudid) 1 MG/ML injection 0.8 mg, 0.8 mg, Intravenous, Q2H PRN    polyethylene glycol (PEG 3350) (Miralax) 17 g oral packet 17 g, 17 g, Oral, Daily PRN    sennosides (Senokot) tab 17.2 mg, 17.2 mg, Oral, Nightly PRN    bisacodyl (Dulcolax) 10 MG rectal suppository 10 mg, 10 mg, Rectal, Daily PRN    ondansetron (Zofran) 4 MG/2ML injection 4 mg, 4 mg, Intravenous, Q6H PRN    metoclopramide (Reglan) 5 mg/mL injection 5 mg, 5 mg, Intravenous, Q8H  PRN    glucose (Dex4) 15 GM/59ML oral liquid 15 g, 15 g, Oral, Q15 Min PRN **OR** glucose (Glutose) 40% oral gel 15 g, 15 g, Oral, Q15 Min PRN **OR** glucose-vitamin C (Dex-4) chewable tab 4 tablet, 4 tablet, Oral, Q15 Min PRN **OR** dextrose 50% injection 50 mL, 50 mL, Intravenous, Q15 Min PRN **OR** glucose (Dex4) 15 GM/59ML oral liquid 30 g, 30 g, Oral, Q15 Min PRN **OR** glucose (Glutose) 40% oral gel 30 g, 30 g, Oral, Q15 Min PRN **OR** glucose-vitamin C (Dex-4) chewable tab 8 tablet, 8 tablet, Oral, Q15 Min PRN    insulin aspart (NovoLOG) 100 Units/mL FlexPen 4-20 Units, 4-20 Units, Subcutaneous, TID AC and HS    atorvastatin (Lipitor) tab 80 mg, 80 mg, Oral, Nightly    carvedilol (Coreg) tab 12.5 mg, 12.5 mg, Oral, BID with meals    ceFEPIme (Maxipime) 1 g in sodium chloride 0.9% 100 mL IVPB-MBP, 1 g, Intravenous, Q24H    metRONIDAZOLE (Flagyl) tab 500 mg, 500 mg, Oral, 2 times per day      Physical Exam:   /60 (BP Location: Left arm)   Pulse 51   Temp 98.1 °F (36.7 °C) (Oral)   Resp 12   Ht 6' (1.829 m)   Wt 198 lb 8 oz (90 kg)   SpO2 100%   BMI 26.92 kg/m²   Temp (24hrs), Av.2 °F (36.8 °C), Min:98 °F (36.7 °C), Max:98.5 °F (36.9 °C)       Intake/Output Summary (Last 24 hours) at 2024 0907  Last data filed at 2024 1950  Gross per 24 hour   Intake 100 ml   Output --   Net 100 ml     Last 3 Weights   24 0600 198 lb 8 oz (90 kg)   24 0648 205 lb (93 kg)   24 0543 211 lb (95.7 kg)   24 2000 211 lb (95.7 kg)   06/15/24 0008 211 lb (95.7 kg)   24 0545 211 lb 6.4 oz (95.9 kg)   24 1324 209 lb 7 oz (95 kg)   24 1120 210 lb (95.3 kg)   24 1342 210 lb (95.3 kg)   22 1253 210 lb (95.3 kg)     General: Alert and oriented in no apparent distress.  HEENT: No scleral icterus, MMM  Neck: Supple, no HSERI or thyromegaly  Cardiac: Regular rate and rhythm, S1, S2 normal, no murmur or rub  Lungs: Clear without wheezes, rales, rhonchi.    Extremities:  No significant pitting edema, bilateral dressings in place over the feet   neurologic: Alert and oriented, cranial nerves grossly intact, moving all extremities  Skin: Warm and dry, no rash        Recent Labs     06/29/24  1022 06/30/24  0402 07/01/24  0609 07/02/24  0506   WBC 13.8* 11.6* 11.6* 11.1*   HGB 8.6* 7.9* 7.7* 7.0*   MCV 89.0 90.8 92.8 90.6   .0 200.0 288.0 219.0       Recent Labs     06/30/24  0402 07/01/24  0609 07/02/24  0506   * 130* 134*   K 4.1 4.7 4.0   CL 98 98 101   CO2 23.0 19.0* 23.0   BUN 50* 67* 52*   CREATSERUM 6.95* 8.46* 7.26*   CA 9.2 9.4 8.9   MG  --  2.2 2.2       Recent Labs     06/30/24  0402   ALT 9*   AST 38*   ALB 1.9*         Impression/Plan:        ESRD - due to DM/HTN; clotted AVF  S/p PC clotted AV fistula  - will have pt f/u with Dr. Nix for o/p eval of new AVF/fistula revision  Foot infection - s/p B foot/toe amputations  - on abx; wound care  - podiatry /ID following   PAD- as above  CHF; s/p AVR; volume optimization w/ HD/UF  Anemia due to CKD- DALE w/ HD      Questions/concerns were discussed with patient and/or family by bedside.      Aj Bardales  7/2/2024

## 2024-07-02 NOTE — CONSULTS
[facility]  Report of Inpatient Wound Care Consultation    Darin Bowens Patient Status:  Inpatient    1945 MRN [id number]   Location [facility] 3SW-A Attending Mook Escalante MD   Hosp Day # 7 PCP Zachery Hall MD     Reason for Consultation:  Bilateral foot vac    History of Present Illness:  Darin Bowens is a a(n) 78 year old male. Patient seen with preceptor wound care RN. Patient complains of  Pain, inpatient RN administered IV Dilaudid. Patient tolerated the placement of NPWT well.  Patient with multiple comorbidities, with skin breakdown described below.       History:  Past Medical History:    Aortic stenosis    Back problem    CAD (coronary artery disease)    Calculus of kidney    Cataract    CKD (chronic kidney disease) stage 4, GFR 15-29 ml/min ([facility])    Congestive heart disease (HCC)    Coronary atherosclerosis    DDD (degenerative disc disease), lumbar    Diabetes mellitus (HCC)    Dialysis patient ([facility])    fistula upper right arm/MWF    Dyslipidemia    Heart valve disease    High cholesterol    Hypertriglyceridemia    Hypoglycemic reaction    Muscle weakness    cane    Nausea and vomiting, unspecified vomiting type    Onychomycosis    DESI (obstructive sleep apnea) C-PAP     severe AHI 68, O2 sam 84%, CPAP 7    Other and unspecified hyperlipidemia    Overweight(278.02)    Renal disorder    S/P Coronary Artery Stents 2009    Sleep apnea    doesn't use CPAP    Type 1 diabetes mellitus (HCC)    Unspecified essential hypertension    Visual impairment    legally blind     Past Surgical History:   Procedure Laterality Date    Back surgery  18    L2-L5 Decomp poss uninstrumented fusion    Cabg      Cath percutaneous  transluminal coronary angioplasty      Cath transcatheter aortic valve replacement      Injection, w/wo contrast, dx/therapeutic substance, epidural/subarachnoid; lumbar/sacral N/A 12/10/2015    Procedure: LUMBAR EPIDURAL;  Surgeon: Luis Miguel  MD Rojas;  Location: [facility]    Injection, w/wo contrast, dx/therapeutic substance, epidural/subarachnoid; lumbar/sacral N/A 1/25/2016    Procedure: LUMBAR EPIDURAL;  Surgeon: Roajs Bolanos MD;  Location: [facility]    Injection, w/wo contrast, dx/therapeutic substance, epidural/subarachnoid; lumbar/sacral N/A 3/2/2016    Procedure: LUMBAR EPIDURAL;  Surgeon: Corey Mcduffie MD;  Location: [facility]    M-sedaj by  phys perfrmg svc 5+ yr N/A 12/10/2015    Procedure: LUMBAR EPIDURAL;  Surgeon: Rojas Bolanos MD;  Location: [facility]    M-sedaj by  phys perfrmg svc 5+ yr N/A 1/25/2016    Procedure: LUMBAR EPIDURAL;  Surgeon: Rojas Bolanos MD;  Location: [facility]    M-sedaj by  phys perfrmg svc 5+ yr N/A 3/2/2016    Procedure: LUMBAR EPIDURAL;  Surgeon: Corey Mcduffie MD;  Location: [facility]    Other surgical history  10/4/12    cysto-Dr. Calderón    Other surgical history N/A 5/16/2016    Procedure: LUMBAR LAMINECTOMY FUSION W/ BONE GRAFT 3 LEVEL;  Surgeon: CARLY Garcia MD;  Location: [facility]    Patient documented not to have experienced any of the following events N/A 12/10/2015    Procedure: LUMBAR EPIDURAL;  Surgeon: Rojas Bolanos MD;  Location: [facility]    Patient documented not to have experienced any of the following events N/A 1/25/2016    Procedure: LUMBAR EPIDURAL;  Surgeon: Rojas Bolanos MD;  Location: [facility]    Patient documented not to have experienced any of the following events N/A 3/2/2016    Procedure: LUMBAR EPIDURAL;  Surgeon: Corey Mcduffie MD;  Location: [facility]    Patient withough preoperative order for iv antibiotic surgical site infection prophylaxis. N/A 12/10/2015    Procedure: LUMBAR EPIDURAL;  Surgeon: Rojsa Bolanos MD;  Location: [facility]    Patient  withough preoperative order for iv antibiotic surgical site infection prophylaxis. N/A 1/25/2016    Procedure: LUMBAR EPIDURAL;  Surgeon: Rojas Bolanos MD;  Location: [facility]    Patient withough preoperative order for iv antibiotic surgical site infection prophylaxis. N/A 3/2/2016    Procedure: LUMBAR EPIDURAL;  Surgeon: Corey Mcduffie MD;  Location: [facility]    Replace aortic valve open  2012    Valve repair        reports that he has never smoked. He has never used smokeless tobacco. He reports that he does not drink alcohol and does not use drugs.      Allergies:  @ALLERGY    Laboratory Data:    Recent Labs   Lab 06/26/24  1535 06/26/24  1538 06/28/24  0730 06/28/24  1233 06/30/24  0402 06/30/24  0502 07/01/24  0609 07/01/24  1207 07/01/24  2106 07/01/24  2325 07/02/24  0506 07/02/24  0510   WBC 10.8   < >  --    < > 11.6*  --  11.6*  --   --   --  11.1*  --    HGB 7.4*   < >  --    < > 7.9*  --  7.7*  --   --   --  7.0*  --    HCT 23.8*   < >  --    < > 24.6*  --  24.4*  --   --   --  22.1*  --    .0   < >  --    < > 200.0  --  288.0  --   --   --  219.0  --    CREATSERUM  --    < >  --    < > 6.95*  --  8.46*  --   --   --  7.26*  --    BUN  --    < >  --    < > 50*  --  67*  --   --   --  52*  --    GLU  --    < >  --    < > 109*  --  152*  --   --   --  129*  --    CA  --    < >  --    < > 9.2  --  9.4  --   --   --  8.9  --    ALB  --   --   --   --  1.9*  --   --   --   --   --   --   --    TP  --   --   --   --  5.6*  --   --   --   --   --   --   --    PTT 30.3  --   --   --   --   --   --   --   --   --   --   --    INR  --   --  1.48*  --   --   --   --   --   --   --   --   --    PGLU  --    < >  --    < >  --    < >  --    < > 265* 236*  --  130*    < > = values in this interval not displayed.         ASSESSMENT:  Wound 06/27/24 Dorsal;Right (Active)   Date First Assessed/Time First Assessed: 06/27/24 1204   Primary Wound Type: Incision  Wound  Location Orientation: Dorsal;Right      Assessments 7/2/2024 11:05 AM   Wound Image         Wound 06/27/24 Dorsal;Left (Active)   Date First Assessed/Time First Assessed: 06/27/24 1205   Primary Wound Type: Incision  Wound Location Orientation: Dorsal;Left      Assessments 7/2/2024 11:01 AM   Wound Image     Drainage Amount Moderate   Drainage Description Sanguineous   Treatments Wound Vac - Neg Pressure   Wound Vac Brand KCI   Dressing Adaptic;Gauze;Wound vac sponge;Kerlix roll   Dressing Changed New   Dressing Status Clean;Dry;Intact   Wound Length (cm) 2 cm   Wound Width (cm) 11.5 cm (open 5.5)   Wound Surface Area (cm^2) 23 cm^2   Wound Depth (cm) 5.3 cm   Wound Volume (cm^3) 121.9 cm^3   Margins Well-defined edges   Non-staged Wound Description Full thickness   María-wound Assessment Maceration   Wound Granulation Tissue Red;Pink;Firm   Wound Bed Granulation (%) 90 %   Wound Odor None   Exposed Structure Muscle;Bone (10%)   Shape 10% muscle, bone   Tunneling? No   Undermining? No   Sinus Tracts? No      Pedal pulses: palpable  Edema : Non-pitting    Wound Cleaning and Dressings:  Wound cleansing:  normal saline  Wound cleaning frequency: 3 times a week  Wound product: non-woven gauze to stapled area; adaptic to open wound bed, black foam; Y connector  Dressing change frequency:  Change dressing 3x per week  UPON DISCHARGE remove NPWT and place a wet-to-dry dressing.       Negative Pressure Wound Therapy:  NPWT: KCI vac therapy, black form at 125 mmHg continuous. RN to change dressing 3x/week unless indicated below. Change cannister once a week and as needed.    ALL VAC SUPPLIES CAN BE OBTAINED FROM [employer] ([employer])     STAFF RN'S ARE RESPONSIBLE TO ASSESS THE WOUND VAC EACH SHIFT TO ENSURE THAT IT IS PLUGGED IN, TURNED ON, AND THE CANISTER IS NOT FILLED WITH MILIND BLOOD. REPORT ISSUES TO SURGEON/ATTENDING DOCTOR OR WOUND CARE STAFF IF AVAILABLE.    Miscellaneous/Additional Orders:  Off loading: pillow  under legs to offload heels. Turn and reposition Q2H and as needed.     If patient is Diabetic: want to make sure blood sugars are within a controled range for wound healing.     Protein intake: depending on providers recommendations and patients kidney functions - if kidneys are good then recommend patient to increase protein intake (Boost, Alex, Ensure, Premiere Protein)     Additional comments: One black foam piece for each wound and one piece of adaptic for each wound was used, connected with a Y connector. Next NPWT change is due 7/5/24.     Recommendations: d/w RN Olesya to closely watch for continued bleeding from the right foot. If bleeding persists today contact podiatry.       Thank you for this consultation and for allowing me to participate in the care of your patient.  Please call [phone] if you have any questions about this consultation and plan of care.     Time Spent 45 Minutes.    Thank you,  Radha Woodard RN  Wound/Ostomy/Continence nurse    7/2/2024  11:46 AM

## 2024-07-02 NOTE — PROGRESS NOTES
Infectious Disease Progress Note      Date of admission: 6/25/2024 12:55 AM     Reason for consult: Bilateral foot surgical wound infection with osteomyelitis    Subjective: Feels well.  Pain under control.  No nausea or vomiting.  No diarrhea.  No shortness of breath.  No cough or sputum production.    The rest of the systems were reviewed and found to be negative except was mentioned above    Interval events: This is a 78-year-old male patient with history of peripheral vascular disease, recently had a transmetatarsal amputation of the right foot and amputation of the left second digit on 6/13/2024, presents here with surgical wound infection and gangrene involving both his feet.  MRI of the right foot showing postsurgical changes along with fluid along the surgical approach that may represent postoperative seroma with question of abscess, no evidence of osteomyelitis.  MRI of the left foot showed postsurgical changes with fluid tracking extending from the surgical site to the skin surface.  Patient was taken to the OR on 6/27, excisional wound debridement to the level of bone along with partial resection of the first and second metatarsal on the right and excisional wound debridement to the level of bone and partial resection of the second metatarsal on the left.  Drainage cultures back on 6/25 are growing Enterobacter cloacae and Acinetobacter species.  Surgical cultures were Enterobacter cloacae and stenotrophomonas.    Medications:    [START ON 7/3/2024] epoetin matt    [START ON 7/3/2024] sodium chloride **AND** albumin human    [START ON 7/3/2024] heparin    vancomycin    hydrOXYzine    insulin degludec    clopidogrel    sodium hypochlorite    apixaban    sodium chloride    acetaminophen    acetaminophen **OR** HYDROcodone-acetaminophen **OR** HYDROcodone-acetaminophen    HYDROmorphone **OR** HYDROmorphone **OR** HYDROmorphone    polyethylene glycol (PEG 3350)    sennosides    bisacodyl    ondansetron     metoclopramide    glucose **OR** glucose **OR** glucose-vitamin C **OR** dextrose **OR** glucose **OR** glucose **OR** glucose-vitamin C    insulin aspart    atorvastatin    carvedilol    cefepime    metRONIDAZOLE     Allergies:  Allergies   Allergen Reactions    Augmentin [Amoxicillin-Pot Clavulanate] RASH    Lisinopril Coughing     Dyspnea         Physical Exam:  Vitals:    07/02/24 0751   BP: 138/60   Pulse: 51   Resp: 12   Temp: 98.1 °F (36.7 °C)     Vitals signs and nursing note reviewed.   Constitutional:       Appearance: Normal appearance.   HENT:      Head: Normocephalic and atraumatic.      Mouth: Mucous membranes are moist.   Neck:      Musculoskeletal: Neck supple.   Cardiovascular:      Rate and Rhythm: Normal rate.    Pulmonary:      Effort: Pulmonary effort is normal. No respiratory distress.   Musculoskeletal:      Right lower leg: No edema.  Clean dressing noted     Left lower leg: No edema.  Clean dressing noted  Skin:     General: Skin is warm and dry.   Neurological:      General: No focal deficit present.      Mental Status: Alert and oriented to person, place, and time.       Laboratory data:  I have reviewed all the lab results independently.  Lab Results   Component Value Date    WBC 11.1 07/02/2024    HGB 7.0 07/02/2024    HCT 22.1 07/02/2024    .0 07/02/2024    CREATSERUM 7.26 07/02/2024    BUN 52 07/02/2024     07/02/2024    K 4.0 07/02/2024     07/02/2024    CO2 23.0 07/02/2024     07/02/2024    CA 8.9 07/02/2024    MG 2.2 07/02/2024      Recent Labs   Lab 07/01/24  0609 07/02/24  0506   RBC 2.63* 2.44*   HGB 7.7* 7.0*   HCT 24.4* 22.1*   MCV 92.8 90.6   MCH 29.3 28.7   MCHC 31.6 31.7   RDW 15.7 15.8   NEPRELIM 10.00*  --    WBC 11.6* 11.1*   .0 219.0      Microbiology data:  Hospital Encounter on 06/25/24   1. Tissue Aerobic Culture     Status: Abnormal    Collection Time: 06/30/24 12:11 PM    Specimen: Foot,left; Tissue   Result Value Ref Range    Tissue  Culture Result 4+ growth Enterobacter cloacae (A) N/A    Tissue Culture Result 2+ growth Stenotrophomonas maltophilia (A) N/A    Tissue Smear 4+ WBCs seen (A) N/A    Tissue Smear 3+ Gram positive cocci in pairs (A) N/A    Tissue Smear 4+ Gram Negative Rods (A) N/A       Susceptibility    Enterobacter cloacae -  (no method available)     Cefazolin >=64 Resistant      Cefepime <=1 Sensitive      Ceftriaxone <=1 Sensitive      Ciprofloxacin <=0.25 Sensitive      Gentamicin <=1 Sensitive      Meropenem <=0.25 Sensitive      Levofloxacin <=0.12 Sensitive      Piperacillin + Tazobactam <=4 Sensitive      Trimethoprim/Sulfa <=20 Sensitive    2. Anaerobic Culture     Status: None (Preliminary result)    Collection Time: 06/27/24 12:13 PM    Specimen: Foot,left; Tissue   Result Value Ref Range    Anaerobic Culture No Anaerobes to date N/A   3. Aerobic Bacterial Culture     Status: Abnormal    Collection Time: 06/25/24  8:14 AM    Specimen: Foot,left; Other   Result Value Ref Range    Aerobic Culture Result  N/A     Mixture of organisms suggestive of normal skin justice    Aerobic Culture Result 4+ growth Enterobacter cloacae complex (A) N/A    Aerobic Culture Result 4+ growth Acinetobacter pittii (A) N/A    Aerobic Smear No WBCs seen N/A    Aerobic Smear 1+ Gram Positive Cocci N/A    Aerobic Smear 1+ Gram Negative Rods N/A       Susceptibility    Enterobacter cloacae complex -  (no method available)     Cefazolin >=64 Resistant      Cefepime <=1 Sensitive      Ceftriaxone <=1 Sensitive      Ciprofloxacin <=0.25 Sensitive      Gentamicin <=1 Sensitive      Meropenem <=0.25 Sensitive      Levofloxacin <=0.12 Sensitive      Piperacillin + Tazobactam <=4 Sensitive      Trimethoprim/Sulfa <=20 Sensitive     Acinetobacter pittii -  (no method available)     Ceftazidime <=1 Sensitive      Ciprofloxacin <1 Sensitive      Gentamicin <1 Sensitive      Meropenem <1 Sensitive      Levofloxacin <=0.25 Sensitive      Piperacillin +  Tazobactam <16 Sensitive      Trimethoprim/Sulfa <=40 Sensitive    4. Blood Culture     Status: None    Collection Time: 06/25/24  2:36 AM    Specimen: Blood,peripheral   Result Value Ref Range    Blood Culture Result No Growth 5 Days N/A     Impression:  Darin Bowens is a 78 year old male with    Right-sided surgical wound infection with osteomyelitis with risk to limb  This is in the setting of right foot amputation on 6/13  Now status post excisional wound debridement to the level of bone along with partial resection of the first and second metatarsal on the right on 6/27   Surgical cultures with Enterobacter cloacae and stenotrophomonas  Drainage cultures from the time of admission grew Enterobacter cloacae and Acinetobacter  Currently on IV cefepime and IV vancomycin along with p.o. metronidazole  Left-sided surgical wound infection with osteomyelitis and gangrene with risk to limb  This is in the setting of recent foot surgery on 6/13  Now status post excisional wound debridement to level of bone along with partial resection of the second metatarsal on 6/27  Cultures with gram-positive cocci and gram-negative rods  Currently on IV cefepime IV vancomycin normal  Peripheral vascular disease  Vascular surgery following  High risk for limb loss  End stage renal disease  AV fistula clotted  Permacath in place  Allergic reaction to Augmentin  Now stopped  Rash resolved    Recommendations:    Discontinue IV vancomycin  Start Bactrim DS 1 tablet twice daily, renally dosed for stenotrophomonas osteomyelitis  Continue IV cefepime, along with p.o. metronidazole  Plan on 6 weeks of therapy through 8/7/2024  Weekly CBC with differential and CMP, sed rate and CRP.  Fax results to [facility]. Fax: [phone]. Tel: [phone].  PICC line care as per protocol.  Wound care as per podiatry  Continue to monitor daily labs for antibiotic toxicities  Further recommendations will depend on the above work-up  and clinical progress     The plan of care was discussed with the primary hospital team, Mook Escalante MD     Recommendations were also discussed with the patient; all questions were answered.     Thank you for this consultation. Please don't hesitate to call the ID team for questions or any acute changes in patient's clinical condition.    Please note that this report has been produced using speech recognition software and may contain errors related to that system including, but not limited to, errors in grammar, punctuation, and spelling, as well as words and phrases that possibly may have been recognized inappropriately.  If there are any questions or concerns, contact the dictating provider for clarification.    The  [employer] Cures Act makes medical notes like these available to patients in the interest of transparency. Please be advised this is a medical document. Medical documents are intended to carry relevant information, facts as evident, and the clinical opinion of the practitioner. The medical note is intended as peer to peer communication and may appear blunt or direct. It is written in medical language and may contain abbreviations or verbiage that are unfamiliar.     Sadaf Hugo MD  [facility]. Tel: [phone]. Fax: [phone].     Darin Bowens : 1945 MRN: [id number] [facility]: [id number]

## 2024-07-03 PROBLEM — A49.8 INFECTION DUE TO STENOTROPHOMONAS MALTOPHILIA: Status: ACTIVE | Noted: 2024-07-03

## 2024-07-03 LAB
ANION GAP SERPL CALC-SCNC: 13 MMOL/L (ref 0–18)
ANTIBODY SCREEN: NEGATIVE
BUN BLD-MCNC: 69 MG/DL (ref 9–23)
CALCIUM BLD-MCNC: 9.4 MG/DL (ref 8.5–10.1)
CHLORIDE SERPL-SCNC: 100 MMOL/L (ref 98–112)
CO2 SERPL-SCNC: 21 MMOL/L (ref 21–32)
CREAT BLD-MCNC: 8.56 MG/DL
EGFRCR SERPLBLD CKD-EPI 2021: 6 ML/MIN/1.73M2 (ref 60–?)
ERYTHROCYTE [DISTWIDTH] IN BLOOD BY AUTOMATED COUNT: 15.9 %
GLUCOSE BLD-MCNC: 155 MG/DL (ref 70–99)
GLUCOSE BLD-MCNC: 166 MG/DL (ref 70–99)
GLUCOSE BLD-MCNC: 172 MG/DL (ref 70–99)
GLUCOSE BLD-MCNC: 90 MG/DL (ref 70–99)
GLUCOSE BLD-MCNC: 99 MG/DL (ref 70–99)
HCT VFR BLD AUTO: 19.9 %
HGB BLD-MCNC: 6.6 G/DL
HGB BLD-MCNC: 8.1 G/DL
MCH RBC QN AUTO: 29.5 PG (ref 26–34)
MCHC RBC AUTO-ENTMCNC: 33.2 G/DL (ref 31–37)
MCV RBC AUTO: 88.8 FL
OSMOLALITY SERPL CALC.SUM OF ELEC: 298 MOSM/KG (ref 275–295)
PLATELET # BLD AUTO: 213 10(3)UL (ref 150–450)
POTASSIUM SERPL-SCNC: 4.2 MMOL/L (ref 3.5–5.1)
RBC # BLD AUTO: 2.24 X10(6)UL
RH BLOOD TYPE: NEGATIVE
SODIUM SERPL-SCNC: 134 MMOL/L (ref 136–145)
WBC # BLD AUTO: 10.9 X10(3) UL (ref 4–11)

## 2024-07-03 PROCEDURE — 99232 SBSQ HOSP IP/OBS MODERATE 35: CPT | Performed by: INTERNAL MEDICINE

## 2024-07-03 RX ORDER — SODIUM CHLORIDE 9 MG/ML
INJECTION, SOLUTION INTRAVENOUS ONCE
Status: COMPLETED | OUTPATIENT
Start: 2024-07-03 | End: 2024-07-03

## 2024-07-03 NOTE — PROGRESS NOTES
Infectious Disease Progress Note      Date of admission: 6/25/2024 12:55 AM     Reason for consult: Bilateral foot surgical wound infection with osteomyelitis    Subjective: The patient is complaining of severe pain in both his feet.  His pain medications are not working for him.  No nausea or vomiting.  No diarrhea.    The rest of the systems were reviewed and found to be negative except was mentioned above    Interval events: This is a 78-year-old male patient with history of peripheral vascular disease, recently had a transmetatarsal amputation of the right foot and amputation of the left second digit on 6/13/2024, presents here with surgical wound infection and gangrene involving both his feet.  MRI of the right foot showing postsurgical changes along with fluid along the surgical approach that may represent postoperative seroma with question of abscess, no evidence of osteomyelitis.  MRI of the left foot showed postsurgical changes with fluid tracking extending from the surgical site to the skin surface.  Patient was taken to the OR on 6/27, excisional wound debridement to the level of bone along with partial resection of the first and second metatarsal on the right and excisional wound debridement to the level of bone and partial resection of the second metatarsal on the left.  Drainage cultures back on 6/25 are growing Enterobacter cloacae and Acinetobacter species.  Surgical cultures were Enterobacter cloacae and stenotrophomonas.    Medications:    epoetin matt    sodium chloride **AND** albumin human    heparin    sulfamethoxazole-trimethoprim DS    hydrOXYzine    insulin degludec    [Held by provider] clopidogrel    [Held by provider] apixaban    sodium chloride    acetaminophen    acetaminophen **OR** HYDROcodone-acetaminophen **OR** HYDROcodone-acetaminophen    HYDROmorphone **OR** HYDROmorphone **OR** HYDROmorphone    polyethylene glycol (PEG 3350)    sennosides    bisacodyl    ondansetron     metoclopramide    glucose **OR** glucose **OR** glucose-vitamin C **OR** dextrose **OR** glucose **OR** glucose **OR** glucose-vitamin C    insulin aspart    atorvastatin    carvedilol    cefepime    metRONIDAZOLE     Allergies:  Allergies   Allergen Reactions    Augmentin [Amoxicillin-Pot Clavulanate] RASH    Lisinopril Coughing     Dyspnea         Physical Exam:  Vitals:    07/03/24 0943   BP: 108/44   Pulse: 67   Resp: 16   Temp: 98.3 °F (36.8 °C)     Vitals signs and nursing note reviewed.   Constitutional:       Appearance: Normal appearance.   HENT:      Head: Normocephalic and atraumatic.      Mouth: Mucous membranes are moist.   Neck:      Musculoskeletal: Neck supple.   Cardiovascular:      Rate and Rhythm: Normal rate.    Pulmonary:      Effort: Pulmonary effort is normal. No respiratory distress.   Musculoskeletal:      Right lower leg: No edema.  Clean dressing noted     Left lower leg: No edema.  Clean dressing noted  Skin:     General: Skin is warm and dry.   Neurological:      General: No focal deficit present.      Mental Status: Alert and oriented to person, place, and time.       Laboratory data:  I have reviewed all the lab results independently.  Lab Results   Component Value Date    WBC 10.9 07/03/2024    HGB 6.6 07/03/2024    HCT 19.9 07/03/2024    .0 07/03/2024    CREATSERUM 8.56 07/03/2024    BUN 69 07/03/2024     07/03/2024    K 4.2 07/03/2024     07/03/2024    CO2 21.0 07/03/2024    GLU 99 07/03/2024    CA 9.4 07/03/2024      Recent Labs   Lab 07/01/24  0609 07/02/24  0506 07/03/24  0439   RBC 2.63*   < > 2.24*   HGB 7.7*   < > 6.6*   HCT 24.4*   < > 19.9*   MCV 92.8   < > 88.8   MCH 29.3   < > 29.5   MCHC 31.6   < > 33.2   RDW 15.7   < > 15.9   NEPRELIM 10.00*  --   --    WBC 11.6*   < > 10.9   .0   < > 213.0    < > = values in this interval not displayed.      Microbiology data:  Hospital Encounter on 06/25/24   1. Tissue Aerobic Culture     Status: Abnormal     Collection Time: 06/30/24 12:11 PM    Specimen: Foot,left; Tissue   Result Value Ref Range    Tissue Culture Result 4+ growth Enterobacter cloacae (A) N/A    Tissue Culture Result 2+ growth Stenotrophomonas maltophilia (A) N/A    Tissue Smear 4+ WBCs seen (A) N/A    Tissue Smear 3+ Gram positive cocci in pairs (A) N/A    Tissue Smear 4+ Gram Negative Rods (A) N/A       Susceptibility    Enterobacter cloacae -  (no method available)     Cefazolin >=64 Resistant      Cefepime <=1 Sensitive      Ceftriaxone <=1 Sensitive      Ciprofloxacin <=0.25 Sensitive      Gentamicin <=1 Sensitive      Meropenem <=0.25 Sensitive      Levofloxacin <=0.12 Sensitive      Piperacillin + Tazobactam <=4 Sensitive      Trimethoprim/Sulfa <=20 Sensitive    2. Anaerobic Culture     Status: None (Preliminary result)    Collection Time: 06/27/24 12:13 PM    Specimen: Foot,left; Tissue   Result Value Ref Range    Anaerobic Culture No Anaerobes to date N/A   3. Aerobic Bacterial Culture     Status: Abnormal    Collection Time: 06/25/24  8:14 AM    Specimen: Foot,left; Other   Result Value Ref Range    Aerobic Culture Result  N/A     Mixture of organisms suggestive of normal skin justice    Aerobic Culture Result 4+ growth Enterobacter cloacae complex (A) N/A    Aerobic Culture Result 4+ growth Acinetobacter pittii (A) N/A    Aerobic Smear No WBCs seen N/A    Aerobic Smear 1+ Gram Positive Cocci N/A    Aerobic Smear 1+ Gram Negative Rods N/A       Susceptibility    Enterobacter cloacae complex -  (no method available)     Cefazolin >=64 Resistant      Cefepime <=1 Sensitive      Ceftriaxone <=1 Sensitive      Ciprofloxacin <=0.25 Sensitive      Gentamicin <=1 Sensitive      Meropenem <=0.25 Sensitive      Levofloxacin <=0.12 Sensitive      Piperacillin + Tazobactam <=4 Sensitive      Trimethoprim/Sulfa <=20 Sensitive     Acinetobacter pittii -  (no method available)     Ceftazidime <=1 Sensitive      Ciprofloxacin <1 Sensitive      Gentamicin <1  Sensitive      Meropenem <1 Sensitive      Levofloxacin <=0.25 Sensitive      Piperacillin + Tazobactam <16 Sensitive      Trimethoprim/Sulfa <=40 Sensitive    4. Blood Culture     Status: None    Collection Time: 06/25/24  2:36 AM    Specimen: Blood,peripheral   Result Value Ref Range    Blood Culture Result No Growth 5 Days N/A     Impression:  Darin Bowens is a 78 year old male with    Right-sided surgical wound infection with osteomyelitis with risk to limb  This is in the setting of right foot amputation on 6/13  Now status post excisional wound debridement to the level of bone along with partial resection of the first and second metatarsal on the right on 6/27   Surgical cultures with Enterobacter cloacae and stenotrophomonas  Drainage cultures from the time of admission grew Enterobacter cloacae and Acinetobacter  Currently on IV cefepime along with p.o. metronidazole and p.o. Bactrim  Left-sided surgical wound infection with osteomyelitis and gangrene with risk to limb  This is in the setting of recent foot surgery on 6/13  Now status post excisional wound debridement to level of bone along with partial resection of the second metatarsal on 6/27  Cultures with gram-positive cocci and gram-negative rods  Currently on IV cefepime, p.o. metronidazole and Bactrim  Peripheral vascular disease  Vascular surgery following  High risk for limb loss  End stage renal disease  AV fistula clotted  Permacath in place  Allergic reaction to Augmentin  Now stopped  Rash resolved    Recommendations:    Continue Bactrim DS 2 tablet daily, renally dosed for stenotrophomonas osteomyelitis  Continue IV cefepime, along with p.o. metronidazole  Plan on 6 weeks of therapy through 8/7/2024  Weekly CBC with differential and CMP, sed rate and CRP.  Fax results to [facility]. Fax: [phone]. Tel: [phone].  PICC line care as per protocol.  Wound care as per podiatry  Reengage with podiatry given his severe  pain since the wound VAC was been placed  Continue to monitor daily labs for antibiotic toxicities  Further recommendations will depend on the above work-up and clinical progress     The plan of care was discussed with the primary hospital team, Mook Escalante MD     Recommendations were also discussed with the patient; all questions were answered.     Thank you for this consultation. Please don't hesitate to call the ID team for questions or any acute changes in patient's clinical condition.    Please note that this report has been produced using speech recognition software and may contain errors related to that system including, but not limited to, errors in grammar, punctuation, and spelling, as well as words and phrases that possibly may have been recognized inappropriately.  If there are any questions or concerns, contact the dictating provider for clarification.    The 21st Century Cures Act makes medical notes like these available to patients in the interest of transparency. Please be advised this is a medical document. Medical documents are intended to carry relevant information, facts as evident, and the clinical opinion of the practitioner. The medical note is intended as peer to peer communication and may appear blunt or direct. It is written in medical language and may contain abbreviations or verbiage that are unfamiliar.     Sadaf Hugo MD  [facility]. Tel: [phone]. Fax: [phone].     Darin Bowens : 1945 MRN: [id number] [facility]: [id number]

## 2024-07-03 NOTE — CM/SW NOTE
Updates sent to N for HD approval.  Informed them that pt will need wound vac with Y connection for bilateral LE.  Liaison Jennifer to confirm when vac can be available at NH and if wound care staff will be available to apply vac given July 4 holiday this week.  / to remain available for support and/or discharge planning.     Niru Griffin  P:[phone]  F:[phone]    [facility]  Discharge Planner  [phone]

## 2024-07-03 NOTE — OCCUPATIONAL THERAPY NOTE
IP OT chart reviewed. Pt with Hgb of 6.6. Pt on therapy hold as until pt receives one unit of RBC and labs are re-drawn with value above 7.0 per department protocol.

## 2024-07-03 NOTE — PROGRESS NOTES
[facility]   part of [facility]     Nephrology Progress Note    Darin Bowens Patient Status:  Inpatient    1945 MRN [id number]   Location [facility] 3SW-A Attending Mook Escalante MD   Hosp Day # 8 PCP Zachery Hall MD       SUBJECTIVE:  C/o B foot pain/burning        Physical Exam:   /54 (BP Location: Left arm)   Pulse 61   Temp 98 °F (36.7 °C) (Oral)   Resp 16   Ht 6' (1.829 m)   Wt 198 lb 8 oz (90 kg)   SpO2 90%   BMI 26.92 kg/m²   Temp (24hrs), Av.2 °F (36.8 °C), Min:97.8 °F (36.6 °C), Max:98.6 °F (37 °C)       Intake/Output Summary (Last 24 hours) at 7/3/2024 0814  Last data filed at 2024 1545  Gross per 24 hour   Intake --   Output 25 ml   Net -25 ml     Last 3 Weights   24 0600 198 lb 8 oz (90 kg)   24 0648 205 lb (93 kg)   24 0543 211 lb (95.7 kg)   24 2000 211 lb (95.7 kg)   06/15/24 0008 211 lb (95.7 kg)   24 0545 211 lb 6.4 oz (95.9 kg)   24 1324 209 lb 7 oz (95 kg)   24 1120 210 lb (95.3 kg)   24 1342 210 lb (95.3 kg)   22 1253 210 lb (95.3 kg)     General: Alert and oriented in no apparent distress.  HEENT: No scleral icterus, MMM  Neck: Supple, no SHERI or thyromegaly  Cardiac: Regular rate and rhythm, S1, S2 normal, no murmur or rub  Lungs: Clear without wheezes, rales, rhonchi.    Extremities: No significant pitting edema, bilateral dressings in place over the feet   neurologic: Alert and oriented, cranial nerves grossly intact, moving all extremities  Skin: Warm and dry, no rash        Labs:     Recent Labs   Lab 24  0730 24  1022 24  0402 24  0609 24  0506 24  0439   WBC  --  13.8* 11.6* 11.6* 11.1* 10.9   HGB  --  8.6* 7.9* 7.7* 7.0* 6.6*   MCV  --  89.0 90.8 92.8 90.6 88.8   PLT  --  206.0 200.0 288.0 219.0 213.0   INR 1.48*  --   --   --   --   --        Recent Labs   Lab 24  0455 24  0402 24  0609 24  0506 24  0439    * 132* 130* 134* 134*   K 4.4 4.1 4.7 4.0 4.2    98 98 101 100   CO2 17.0* 23.0 19.0* 23.0 21.0   BUN 30* 50* 67* 52* 69*   CREATSERUM 5.17* 6.95* 8.46* 7.26* 8.56*   CA 8.1* 9.2 9.4 8.9 9.4   MG  --   --  2.2 2.2  --    * 109* 152* 129* 99       Recent Labs   Lab 06/30/24  0402   ALT 9*   AST 38*   ALB 1.9*       Recent Labs   Lab 07/02/24  1155 07/02/24  1808 07/02/24  1817 07/02/24  2122 07/03/24  0538   PGLU 256* 307* 304* 309* 90       Meds:   Current Facility-Administered Medications   Medication Dose Route Frequency    sodium chloride 0.9% infusion   Intravenous Once    epoetin matt (Epogen, Procrit) 38749 UNIT/ML injection 10,000 Units  10,000 Units Intravenous Once in dialysis    sodium chloride 0.9 % IV bolus 100 mL  100 mL Intravenous Q30 Min PRN    And    albumin human (Albumin) 25% injection 25 g  25 g Intravenous PRN Dialysis    heparin (Porcine) 1000 UNIT/ML injection 1,500 Units  1.5 mL Intracatheter Once    sulfamethoxazole-trimethoprim DS (Bactrim DS) 800-160 MG per tab 2 tablet  2 tablet Oral Daily    hydrOXYzine (Atarax) tab 25 mg  25 mg Oral TID PRN    insulin degludec (Tresiba) 100 units/mL flextouch 10 Units  10 Units Subcutaneous Daily    [Held by provider] clopidogrel (Plavix) tab 75 mg  75 mg Oral Daily    [Held by provider] apixaban (Eliquis) tab 2.5 mg  2.5 mg Oral BID    sodium chloride 0.9% infusion   Intravenous Once    acetaminophen (Tylenol Extra Strength) tab 500 mg  500 mg Oral Q4H PRN    acetaminophen (Tylenol) tab 650 mg  650 mg Oral Q4H PRN    Or    HYDROcodone-acetaminophen (Norco) 5-325 MG per tab 1 tablet  1 tablet Oral Q4H PRN    Or    HYDROcodone-acetaminophen (Norco) 5-325 MG per tab 2 tablet  2 tablet Oral Q4H PRN    HYDROmorphone (Dilaudid) 1 MG/ML injection 0.2 mg  0.2 mg Intravenous Q2H PRN    Or    HYDROmorphone (Dilaudid) 1 MG/ML injection 0.4 mg  0.4 mg Intravenous Q2H PRN    Or    HYDROmorphone (Dilaudid) 1 MG/ML injection 0.8 mg  0.8 mg  Intravenous Q2H PRN    polyethylene glycol (PEG 3350) (Miralax) 17 g oral packet 17 g  17 g Oral Daily PRN    sennosides (Senokot) tab 17.2 mg  17.2 mg Oral Nightly PRN    bisacodyl (Dulcolax) 10 MG rectal suppository 10 mg  10 mg Rectal Daily PRN    ondansetron (Zofran) 4 MG/2ML injection 4 mg  4 mg Intravenous Q6H PRN    metoclopramide (Reglan) 5 mg/mL injection 5 mg  5 mg Intravenous Q8H PRN    glucose (Dex4) 15 GM/59ML oral liquid 15 g  15 g Oral Q15 Min PRN    Or    glucose (Glutose) 40% oral gel 15 g  15 g Oral Q15 Min PRN    Or    glucose-vitamin C (Dex-4) chewable tab 4 tablet  4 tablet Oral Q15 Min PRN    Or    dextrose 50% injection 50 mL  50 mL Intravenous Q15 Min PRN    Or    glucose (Dex4) 15 GM/59ML oral liquid 30 g  30 g Oral Q15 Min PRN    Or    glucose (Glutose) 40% oral gel 30 g  30 g Oral Q15 Min PRN    Or    glucose-vitamin C (Dex-4) chewable tab 8 tablet  8 tablet Oral Q15 Min PRN    insulin aspart (NovoLOG) 100 Units/mL FlexPen 4-20 Units  4-20 Units Subcutaneous TID AC and HS    atorvastatin (Lipitor) tab 80 mg  80 mg Oral Nightly    carvedilol (Coreg) tab 12.5 mg  12.5 mg Oral BID with meals    ceFEPIme (Maxipime) 1 g in sodium chloride 0.9% 100 mL IVPB-MBP  1 g Intravenous Q24H    metRONIDAZOLE (Flagyl) tab 500 mg  500 mg Oral 2 times per day         Impression/Plan:        ESRD - due to DM/HTN; clotted AVF  S/p PC clotted AV fistula  - will have pt f/u with Dr. Nix for o/p eval of new AVF/fistula revision  Foot infection - s/p B foot/toe amputations  - on abx; wound care  - podiatry /ID following- on TMP/SMX for stenotrophomonas  PAD- as above  CHF; s/p AVR; volume optimization w/ HD/UF  Anemia- chronically due to ESRD.  Has been drifting down post-op.  Agree with transfusion today.  Cont DALE      Questions/concerns were discussed with patient and/or family by bedside.      Sean Flores MD  7/3/2024  8:15 AM

## 2024-07-03 NOTE — PROGRESS NOTES
Notified Dr. Meza of 6.6 hemoglobin via Secure message. This RN wrote \"Hemoglobin was 6.6 today, eliquis and plavix still on hold. We are transfusing him now. He does have about 75 mL of bloody drainage in wound vac. Are you concerned with bleeding from the incision sites? Also, he is still pending DC, but if he were to DC later tonight or tomorrow, wound vac would be removed. SW is inquiring if a RN will be available to put a wound vac on, but bc of the holiday, there may not be staff able to put on the wound vac. How long is he able to have the wounds with wet to dry dressings on DC until wound vac is applied? Sorry for all the questions, just want to clarify thanks!\" Hospitalist aware. Awaiting response.    1133: received message from Dr. Meza stating not to DC and he will see patient today.

## 2024-07-03 NOTE — PROGRESS NOTES
[facility] Hospitalist Progress Note       SUBJECTIVE:  Having pain in feet, more in night per wife. 100/46.     OBJECTIVE:  Scheduled Meds:    [Held by provider] apixaban  2.5 mg Oral BID    epoetin matt  10,000 Units Intravenous Once in dialysis    heparin  1.5 mL Intracatheter Once    sulfamethoxazole-trimethoprim DS  2 tablet Oral Daily    insulin degludec  10 Units Subcutaneous Daily    [Held by provider] clopidogrel  75 mg Oral Daily    sodium chloride   Intravenous Once    insulin aspart  4-20 Units Subcutaneous TID AC and HS    atorvastatin  80 mg Oral Nightly    carvedilol  12.5 mg Oral BID with meals    cefepime  1 g Intravenous Q24H    metRONIDAZOLE  500 mg Oral 2 times per day     Continuous Infusions:   PRN Meds:   sodium chloride **AND** albumin human    hydrOXYzine    acetaminophen    acetaminophen **OR** HYDROcodone-acetaminophen **OR** HYDROcodone-acetaminophen    HYDROmorphone **OR** HYDROmorphone **OR** HYDROmorphone    polyethylene glycol (PEG 3350)    sennosides    bisacodyl    ondansetron    metoclopramide    glucose **OR** glucose **OR** glucose-vitamin C **OR** dextrose **OR** glucose **OR** glucose **OR** glucose-vitamin C    Vitals  Vitals:    07/03/24 1200   BP: 100/46   Pulse: 63   Resp: 12   Temp: 98.1 °F (36.7 °C)         Exam   Gen-    no acute distress, alert and oriented x 3   RESP-   Lungs CTA, normal respiratory effort  CV-      Heart RRR, no mgr  Abd-    soft, nondistended, nontender, bowel sounds present  Skin-    no rash  Neuro-  no focal neurologic deficits  Ext-    dressing in tact b/l   Psych- alert and oriented x 3     Labs:     Chem:  Recent Labs   Lab 06/29/24  0455 06/30/24  0402 07/01/24  0609 07/02/24  0506 07/03/24  0439   * 132* 130* 134* 134*   K 4.4 4.1 4.7 4.0 4.2    98 98 101 100   CO2 17.0* 23.0 19.0* 23.0 21.0   BUN 30* 50* 67* 52* 69*   MG  --   --  2.2 2.2  --    ALT  --  9*  --   --   --    AST  --  38*  --   --   --    ALB  --  1.9*  --   --   --         HEM:  Recent Labs   Lab 06/29/24  1022 06/30/24  0402 07/01/24  0609 07/02/24  0506 07/03/24  0439   WBC 13.8* 11.6* 11.6* 11.1* 10.9   HGB 8.6* 7.9* 7.7* 7.0* 6.6*   .0 200.0 288.0 219.0 213.0   MCV 89.0 90.8 92.8 90.6 88.8       Coagulation:  Recent Labs   Lab 06/26/24  1535 06/27/24  0437 06/28/24  0730 06/29/24  1022 06/30/24  0402 07/01/24  0609 07/02/24  0506 07/03/24  0439   PTT 30.3  --   --   --   --   --   --   --    INR  --   --  1.48*  --   --   --   --   --    HCT 23.8*   < >  --  26.6* 24.6* 24.4* 22.1* 19.9*   HGB 7.4*   < >  --  8.6* 7.9* 7.7* 7.0* 6.6*   .0   < >  --  206.0 200.0 288.0 219.0 213.0    < > = values in this interval not displayed.              AP:  78 year old male with PMH sig for CAD status post stents, PAD status post bilateral lower extremity stents, atrial fibrillation on Xarelto, diabetes mellitus type 2 on insulin pump, end-stage renal disease on dialysis, chronic CHF with LVH, status post AVR, history of stroke, hypertension, hyperlipidemia, DESI and recent right TMA and left foot second digit amputation on 6/13 by Dr. Meza presented with poor surgical wound healing.       Postop wound infection  R TMA, L foot 2nd digit amputation on 6/13, Dr. Meza/Podiatry  L forefoot gangrene  Necrosis of transmetatarsal amputation site, right foot  - s/p podiatry I&D of R foot (1st/2nd metatarsals) and L foot (2nd metatarsal) on 6/27- growing enterobacter   - wound not healing well from 6/27 surgery so pt back to OR 6/30--> Transmetatarsal amputation, left foot; Achilles tendon lengthening, left; Excisional wound debridement to level of bone;  Partial resection of the third and fourth metatarsals, right foot  - abx per ID    -Discontinued IV vancomycin  -Bactrim DS 1 tablet twice daily, renally dosed for stenotrophomonas osteomyelitis  - Continue IV cefepime, along with p.o. metronidazole  - Plan on 6 weeks of therapy through 8/7/2024  - Arterial US in April, likely has  more distal PAD, may require further amputation, per vascular  pt is not a candidate for any further revascularization and should his foot amputation does not heal he will require major amputation.      ESRD on HD  Malfunctioning AVF  - vascular consulted, o/p eval of new AVF/fistula revision   - nephrology o/c -s/p tunneled cathter on 6/28        CAD s/p stents  PAD s/p b/l LE stents  Chronic CHF w/ LVH  S/p AVR  Atrial fibrillation  - coreg (Hold SBP < 110 given anemia), lipitor  - plavix on hold   - per podiatry was ok to resume AC - eliquis instead of his home xarelto due to his renal disease. However now stopped d/t anemia       DM2  - insulin pump - not on this admission  - ISS/accucheks - high dose SSI, tresiba daily 10 units   - elevated in PM  - may add carb count/meal time insulin if continues to be high    Anemia  - suspecting ABLA from surgery   - monitor for now hgb 7.9 to 7.7 to 7.0 to 6.6 --> PRBC 7/3  - denies BRBPR no melena   - notified podiatry to re-eval wound/wound vac, d/w Dr. Boss     Essential HTN  - coreg     Hyperlipidemia  - statin     DESI  - cpap at bedtime     Itching  - prn atarax, may be 2/2 meds/dialysis        FEN: regular diet, PT/OT  Proph: SCDs eliquis on hold now given anemia     Code status: Full code      D/w pt/wife, RN, MD [place] and MD Mark . [facility] hospitalist to resume care in AM, will discuss plan with them      Mook Escalante MD  [facility] Hospitalist  [phone]  7/3/2024  2:26 PM

## 2024-07-03 NOTE — PHYSICAL THERAPY NOTE
IP PT following . Per chart review, pt with Hg 6.6. pt to receive transfusion. Will continue to follow.

## 2024-07-03 NOTE — PLAN OF CARE
Patient A&Ox4. VSS on RA, , tele Afib. Patient reports severe pain; IV and PO pain medications given. Notified Nephro this morning of 6.6 hemoglobin, orders received to give PRBC now or during dialysis. Dialysis made aware and unable to see patient at this time. Transfusion started, patient tolerating well. BLE dressings intact. Wound vac in place. See flowsheets. LBM 7/2. Patient WBAT with post op shoes. Plan to DC to [facility] pending medical clearance. Safety measures in place.

## 2024-07-03 NOTE — PROGRESS NOTES
[facility] Hospitalist Progress Note       SUBJECTIVE:  Feet burning, pins/needles. No LH/dizziness. Taking PO ok. Felt ok after dialysis.     OBJECTIVE:  Scheduled Meds:    epoetin matt  10,000 Units Intravenous Once in dialysis    heparin  1.5 mL Intracatheter Once    sulfamethoxazole-trimethoprim DS  2 tablet Oral Daily    insulin degludec  10 Units Subcutaneous Daily    [Held by provider] clopidogrel  75 mg Oral Daily    [Held by provider] apixaban  2.5 mg Oral BID    sodium chloride   Intravenous Once    insulin aspart  4-20 Units Subcutaneous TID AC and HS    atorvastatin  80 mg Oral Nightly    carvedilol  12.5 mg Oral BID with meals    cefepime  1 g Intravenous Q24H    metRONIDAZOLE  500 mg Oral 2 times per day     Continuous Infusions:   PRN Meds:   sodium chloride **AND** albumin human    hydrOXYzine    acetaminophen    acetaminophen **OR** HYDROcodone-acetaminophen **OR** HYDROcodone-acetaminophen    HYDROmorphone **OR** HYDROmorphone **OR** HYDROmorphone    polyethylene glycol (PEG 3350)    sennosides    bisacodyl    ondansetron    metoclopramide    glucose **OR** glucose **OR** glucose-vitamin C **OR** dextrose **OR** glucose **OR** glucose **OR** glucose-vitamin C    Vitals  Vitals:    07/02/24 2350   BP: 115/56   Pulse: 71   Resp: 14   Temp: 98.6 °F (37 °C)         Exam   Gen-    no acute distress, alert and oriented x 3   RESP-   Lungs CTA, normal respiratory effort  CV-      Heart RRR, no mgr  Abd-    soft, nondistended, nontender, bowel sounds present  Skin-    no rash  Neuro-  no focal neurologic deficits  Ext-    dressing in tact b/l   Psych- alert and oriented x 3     Labs:     Chem:  Recent Labs   Lab 06/28/24  0437 06/29/24  0455 06/30/24  0402 07/01/24  0609 07/02/24  0506   * 131* 132* 130* 134*   K 4.3 4.4 4.1 4.7 4.0    101 98 98 101   CO2 24.0 17.0* 23.0 19.0* 23.0   BUN 35* 30* 50* 67* 52*   MG  --   --   --  2.2 2.2   ALT  --   --  9*  --   --    AST  --   --  38*  --   --    ALB   --   --  1.9*  --   --        HEM:  Recent Labs   Lab 06/28/24  0437 06/29/24  1022 06/30/24  0402 07/01/24  0609 07/02/24  0506   WBC 11.2* 13.8* 11.6* 11.6* 11.1*   HGB 8.2* 8.6* 7.9* 7.7* 7.0*   .0* 206.0 200.0 288.0 219.0   MCV 90.7 89.0 90.8 92.8 90.6       Coagulation:  Recent Labs   Lab 06/26/24  1535 06/27/24  0437 06/28/24  0437 06/28/24  0730 06/29/24  1022 06/30/24  0402 07/01/24  0609 07/02/24  0506   PTT 30.3  --   --   --   --   --   --   --    INR  --   --   --  1.48*  --   --   --   --    HCT 23.8*   < > 25.5*  --  26.6* 24.6* 24.4* 22.1*   HGB 7.4*   < > 8.2*  --  8.6* 7.9* 7.7* 7.0*   .0   < > 135.0*  --  206.0 200.0 288.0 219.0    < > = values in this interval not displayed.              AP:  78 year old male with PMH sig for CAD status post stents, PAD status post bilateral lower extremity stents, atrial fibrillation on Xarelto, diabetes mellitus type 2 on insulin pump, end-stage renal disease on dialysis, chronic CHF with LVH, status post AVR, history of stroke, hypertension, hyperlipidemia, DESI and recent right TMA and left foot second digit amputation on 6/13 by Dr. Meza presented with poor surgical wound healing.     Postop wound infection  R TMA, L foot 2nd digit amputation on 6/13, Dr. Meza/Podiatry  L forefoot gangrene  Necrosis of transmetatarsal amputation site, right foot  - s/p podiatry I&D of R foot (1st/2nd metatarsals) and L foot (2nd metatarsal) on 6/27- growing enterobacter   - wound not healing well from 6/27 surgery so pt back to OR 6/30--> Transmetatarsal amputation, left foot; Achilles tendon lengthening, left; Excisional wound debridement to level of bone;  Partial resection of the third and fourth metatarsals, right foot  - abx per ID    -Discontinue IV vancomycin  -Bactrim DS 1 tablet twice daily, renally dosed for stenotrophomonas osteomyelitis  - Continue IV cefepime, along with p.o. metronidazole  - Plan on 6 weeks of therapy through 8/7/2024  - Arterial  US in April, likely has more distal PAD, may require further amputation, per vascular  pt is not a candidate for any further revascularization and should his foot amputation does not heal he will require major amputation.      ESRD on HD  Malfunctioning AVF  - vascular consulted  - nephrology o/c -s/p tunneled cathter on 6/28      CAD s/p stents  PAD s/p b/l LE stents  Chronic CHF w/ LVH  S/p AVR  Atrial fibrillation  - coreg, lipitor  - plavix   - per podiatry ok to resume AC - pt now on eliquis instead of his home xarelto due to his renal disease      DM2  - insulin pump - not on this admission  - ISS/accucheks - high dose SSI, tresiba daily 10 units   - elevated in PM  - may add meal time insulin    Anemia  - suspecting ABLA from surgery   - monitor for now hgb 7.9 to 7.7 to 7.0   - denies BRBPR no melena      Essential HTN  - coreg     Hyperlipidemia  - statin     DESI  - cpap at bedtime     Itching  - prn atarax, may be 2/2 meds/dialysis        FEN: regular diet, PT/OT  Proph: SCDs eliquis on hold now given anemia     Code status: Full code         D/w pt/wife, RN. Total time 80 min including d/o anemia, elevated BG, foot wound, MS, [phone].     Mook Escalante MD  [facility] Hospitalist  [phone]  7/2/2024  3:44 PM

## 2024-07-03 NOTE — PROGRESS NOTES
Pt A&O x 4-lethargic but alert, on RA,  , TELE-afib, Pain controlled with current regimen, up w/ sit to stand, , dressing-c/d/i, woundvac BLE in place, Iv abx, L internal jugular -permacath, dialysis m/w/f, QID accucheck-SSI, hgb trending down today MD aware.

## 2024-07-04 LAB
ALBUMIN SERPL-MCNC: 1.8 G/DL (ref 3.4–5)
ALBUMIN/GLOB SERPL: 0.5 {RATIO} (ref 1–2)
ALP LIVER SERPL-CCNC: 105 U/L
ALT SERPL-CCNC: <6 U/L
ANION GAP SERPL CALC-SCNC: 11 MMOL/L (ref 0–18)
AST SERPL-CCNC: 18 U/L (ref 15–37)
BILIRUB SERPL-MCNC: 0.4 MG/DL (ref 0.1–2)
BLOOD TYPE BARCODE: 600
BUN BLD-MCNC: 46 MG/DL (ref 9–23)
CALCIUM BLD-MCNC: 9 MG/DL (ref 8.5–10.1)
CHLORIDE SERPL-SCNC: 101 MMOL/L (ref 98–112)
CO2 SERPL-SCNC: 24 MMOL/L (ref 21–32)
CREAT BLD-MCNC: 5.96 MG/DL
EGFRCR SERPLBLD CKD-EPI 2021: 9 ML/MIN/1.73M2 (ref 60–?)
ERYTHROCYTE [DISTWIDTH] IN BLOOD BY AUTOMATED COUNT: 16.2 %
GLOBULIN PLAS-MCNC: 3.8 G/DL (ref 2.8–4.4)
GLUCOSE BLD-MCNC: 129 MG/DL (ref 70–99)
GLUCOSE BLD-MCNC: 142 MG/DL (ref 70–99)
GLUCOSE BLD-MCNC: 152 MG/DL (ref 70–99)
GLUCOSE BLD-MCNC: 181 MG/DL (ref 70–99)
GLUCOSE BLD-MCNC: 259 MG/DL (ref 70–99)
HCT VFR BLD AUTO: 23.4 %
HGB BLD-MCNC: 7.7 G/DL
MAGNESIUM SERPL-MCNC: 2.2 MG/DL (ref 1.6–2.6)
MCH RBC QN AUTO: 29.3 PG (ref 26–34)
MCHC RBC AUTO-ENTMCNC: 32.9 G/DL (ref 31–37)
MCV RBC AUTO: 89 FL
OSMOLALITY SERPL CALC.SUM OF ELEC: 296 MOSM/KG (ref 275–295)
PLATELET # BLD AUTO: 197 10(3)UL (ref 150–450)
POTASSIUM SERPL-SCNC: 4.2 MMOL/L (ref 3.5–5.1)
PROT SERPL-MCNC: 5.6 G/DL (ref 6.4–8.2)
RBC # BLD AUTO: 2.63 X10(6)UL
SODIUM SERPL-SCNC: 136 MMOL/L (ref 136–145)
UNIT VOLUME: 350 ML
WBC # BLD AUTO: 9.5 X10(3) UL (ref 4–11)

## 2024-07-04 PROCEDURE — 99232 SBSQ HOSP IP/OBS MODERATE 35: CPT | Performed by: INTERNAL MEDICINE

## 2024-07-04 NOTE — PHYSICAL THERAPY NOTE
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 for 2 days; ESRD on HD, DM2, PAD, CHF, AVR    Reason for Therapy: Mobility Dysfunction and Discharge Planning     Procedure 6/30/24 Dr Meza:    1.  Transmetatarsal amputation, left foot  2.  Achilles tendon lengthening, left  3.  Excisional wound debridement to level of bone, 30 cm², right foot  4.  Partial resection of the third and fourth metatarsals, right foot     -Per epic chat with podiatrist, Dr Meza, pt WBAT bilateral feet with post op shoes.       ASSESSMENT   Patient demonstrates fair progress this session, goals  remain in progress.    Patient continues to function below baseline with bed mobility, transfers, and gait.  Contributing factors to remaining limitations include decreased functional strength, decreased endurance/aerobic capacity, pain, impaired sitting and standing balance, decreased muscular endurance, and medical status.  Next session anticipate patient to progress bed mobility, transfers, and gait.  Physical Therapy will continue to follow patient for duration of hospitalization.    Patient continues to benefit from continued skilled PT services: to promote return to prior level of function and safety with continuous assistance and gradual rehabilitative therapy .    PLAN  PT Treatment Plan: Bed mobility;Body mechanics;Coordination;Endurance;Energy conservation;Patient education;Family education;Gait training;Neuromuscular re-educate;Strengthening;Range of motion;Stoop training;Stair training;Transfer training;Balance training  Rehab Potential : Fair  Frequency (Obs): 3-5x/week    CURRENT GOALS     Goal #1 Patient is able to demonstrate supine - sit EOB @ level: independent      Goal #2 Patient  is able to demonstrate transfers Sit to/from Stand at assistance level: supervision      Goal #3 Patient is able to ambulate 25 feet with assist device: walker - rolling at assistance level: minimum assistance      Goal #4     Goal #5     Goal #6     Goal Comments: Goals established on 2024 all goals ongoing      PHYSICAL THERAPY MEDICAL/SOCIAL HISTORY  History related to current admission: Patient is a 78 year old male admitted on 2024 from wound clinic for worsening BL foot wounds.  Pt diagnosed with cellulitis.     HOME SITUATION  Type of Home: Condo   Home Layout: One level;Ramped entrance  Stairs to Enter : 3  Railing: Yes  Lives With: Spouse  Drives: No  Patient Owned Equipment: Rolling walker;Wheelchair     Prior Level of [place]: Per pt lives in one level condo with ramped entrance. Lives with spouse. Ambulates household distances with RW, longer distances via use of wheelchair.      SUBJECTIVE  \" I feel fine, I guess I can do that\"    OBJECTIVE  Precautions: Limb alert - right;Bed/chair alarm (post op shoes for both feet)    WEIGHT BEARING RESTRICTION  Weight Bearing Restriction: L lower extremity;R lower extremity   R Lower Extremity: Weight Bearing as Tolerated (w/ post op shoe)  L Lower Extremity: Weight Bearing as Tolerated (w/ post op shoe)    PAIN ASSESSMENT   Ratin  Location: B feet  Management Techniques: Breathing techniques;Relaxation;Repositioning    BALANCE                                                                                                                       Static Sitting: Fair +  Dynamic Sitting: Fair           Static Standing: Poor +  Dynamic Standing: Poor +    AM-PAC '6-Clicks' INPATIENT SHORT FORM - BASIC MOBILITY  How much difficulty does the patient currently have...  Patient Difficulty: Turning over in bed (including adjusting bedclothes, sheets and blankets)?: A Little   Patient Difficulty: Sitting down on and standing up from a chair with  arms (e.g., wheelchair, bedside commode, etc.): A Little   Patient Difficulty: Moving from lying on back to sitting on the side of the bed?: A Little   How much help from another person does the patient currently need...   Help from Another: Moving to and from a bed to a chair (including a wheelchair)?: A Little   Help from Another: Need to walk in hospital room?: A Little   Help from Another: Climbing 3-5 steps with a railing?: A Lot       AM-PAC Score:  Raw Score: 17   Approx Degree of Impairment: 50.57%   Standardized Score (AM-PAC Scale): 42.13   CMS Modifier (G-Code): CK    FUNCTIONAL ABILITY STATUS  Gait Assessment   Functional Mobility/Gait Assessment  Gait Assistance: Minimum assistance  Distance (ft): 3  Assistive Device: Rolling walker  Pattern: Shuffle (decreased chiqui, uneven stride length)    Skilled Therapy Provided    Bed Mobility:  Rolling: not tested   Supine<>Sit: min A   Sit<>Supine: not tested     Transfer Mobility:  Sit<>Stand: mod A   Stand<>Sit: mod A   Gait: min A with RW    Therapist's Comments: RN cleared pt for session. Pt received elevated supine in bed with spouse in room, both agreeable to session. Pt performed above functional mobility with increased time to initiate and complete task. Pt sat EOB approx 10 mins with supervision. Donned B post op shoes with total A. Sit to stand performed with mod A with bed height elevated. Pt with shuffling gait, requires min A with RW, min verbal cueing required for proper technique and proper use of RW. Stand to sit transfer with mod A for eccentric control. Pt performed B LE exercises, see below for details. Rest periods given in between. Pt left in chair with spouse in room and dinner tray. RN made aware of session. Wound vac on B LE intact.       THERAPEUTIC EXERCISES  Lower Extremity Alternating marching  Hip AB/AD  LAQ     Position Sitting     Repetitions   10   Sets   1     Patient End of Session: Up in chair;Needs met;With  staff;Call  light within reach;RN aware of session/findings;All patient questions and concerns addressed;Family present    PT Session Time: 30 minutes  Gait Training:  minutes  Therapeutic Activity: 15 minutes  Therapeutic Exercise: 15 minutes   Neuromuscular Re-education:  minutes

## 2024-07-04 NOTE — PROGRESS NOTES
[facility]   part of [facility]     Nephrology Progress Note    Darin Bowens Patient Status:  Inpatient    1945 MRN [id number]   Formerly [facility] 3SW-A Attending Mook Escalante MD   Hosp Day # 9 PCP Zachery Hall MD       SUBJECTIVE:    No acute events      Current Facility-Administered Medications:     [Held by provider] apixaban (Eliquis) tab 2.5 mg, 2.5 mg, Oral, BID    sodium chloride 0.9 % IV bolus 100 mL, 100 mL, Intravenous, Q30 Min PRN **AND** [] albumin human (Albumin) 25% injection 25 g, 25 g, Intravenous, PRN Dialysis    sulfamethoxazole-trimethoprim DS (Bactrim DS) 800-160 MG per tab 2 tablet, 2 tablet, Oral, Daily    hydrOXYzine (Atarax) tab 25 mg, 25 mg, Oral, TID PRN    insulin degludec (Tresiba) 100 units/mL flextouch 10 Units, 10 Units, Subcutaneous, Daily    [Held by provider] clopidogrel (Plavix) tab 75 mg, 75 mg, Oral, Daily    sodium chloride 0.9% infusion, , Intravenous, Once    acetaminophen (Tylenol Extra Strength) tab 500 mg, 500 mg, Oral, Q4H PRN    acetaminophen (Tylenol) tab 650 mg, 650 mg, Oral, Q4H PRN **OR** HYDROcodone-acetaminophen (Norco) 5-325 MG per tab 1 tablet, 1 tablet, Oral, Q4H PRN **OR** HYDROcodone-acetaminophen (Norco) 5-325 MG per tab 2 tablet, 2 tablet, Oral, Q4H PRN    HYDROmorphone (Dilaudid) 1 MG/ML injection 0.2 mg, 0.2 mg, Intravenous, Q2H PRN **OR** HYDROmorphone (Dilaudid) 1 MG/ML injection 0.4 mg, 0.4 mg, Intravenous, Q2H PRN **OR** HYDROmorphone (Dilaudid) 1 MG/ML injection 0.8 mg, 0.8 mg, Intravenous, Q2H PRN    polyethylene glycol (PEG 3350) (Miralax) 17 g oral packet 17 g, 17 g, Oral, Daily PRN    sennosides (Senokot) tab 17.2 mg, 17.2 mg, Oral, Nightly PRN    bisacodyl (Dulcolax) 10 MG rectal suppository 10 mg, 10 mg, Rectal, Daily PRN    ondansetron (Zofran) 4 MG/2ML injection 4 mg, 4 mg, Intravenous, Q6H PRN    metoclopramide (Reglan) 5 mg/mL injection 5 mg, 5 mg, Intravenous, Q8H PRN    glucose (Dex4) 15  GM/59ML oral liquid 15 g, 15 g, Oral, Q15 Min PRN **OR** glucose (Glutose) 40% oral gel 15 g, 15 g, Oral, Q15 Min PRN **OR** glucose-vitamin C (Dex-4) chewable tab 4 tablet, 4 tablet, Oral, Q15 Min PRN **OR** dextrose 50% injection 50 mL, 50 mL, Intravenous, Q15 Min PRN **OR** glucose (Dex4) 15 GM/59ML oral liquid 30 g, 30 g, Oral, Q15 Min PRN **OR** glucose (Glutose) 40% oral gel 30 g, 30 g, Oral, Q15 Min PRN **OR** glucose-vitamin C (Dex-4) chewable tab 8 tablet, 8 tablet, Oral, Q15 Min PRN    insulin aspart (NovoLOG) 100 Units/mL FlexPen 4-20 Units, 4-20 Units, Subcutaneous, TID AC and HS    atorvastatin (Lipitor) tab 80 mg, 80 mg, Oral, Nightly    carvedilol (Coreg) tab 12.5 mg, 12.5 mg, Oral, BID with meals    ceFEPIme (Maxipime) 1 g in sodium chloride 0.9% 100 mL IVPB-MBP, 1 g, Intravenous, Q24H    metRONIDAZOLE (Flagyl) tab 500 mg, 500 mg, Oral, 2 times per day          Physical Exam:   /57 (BP Location: Right arm)   Pulse 67   Temp 98.6 °F (37 °C) (Oral)   Resp 20   Ht 6' (1.829 m)   Wt 198 lb 8 oz (90 kg)   SpO2 100%   BMI 26.92 kg/m²   Temp (24hrs), Av.2 °F (36.8 °C), Min:97.7 °F (36.5 °C), Max:98.6 °F (37 °C)       Intake/Output Summary (Last 24 hours) at 2024 1042  Last data filed at 2024 1011  Gross per 24 hour   Intake 774 ml   Output 0 ml   Net 774 ml     Last 3 Weights   24 0600 198 lb 8 oz (90 kg)   24 0648 205 lb (93 kg)   24 0543 211 lb (95.7 kg)   24 211 lb (95.7 kg)   06/15/24 0008 211 lb (95.7 kg)   24 0545 211 lb 6.4 oz (95.9 kg)   24 1324 209 lb 7 oz (95 kg)   24 1120 210 lb (95.3 kg)   24 1342 210 lb (95.3 kg)   22 1253 210 lb (95.3 kg)     General: Alert and oriented in no apparent distress.  HEENT: No scleral icterus, MMM  Neck: Supple, no SHERI or thyromegaly  Cardiac: Regular rate and rhythm, S1, S2 normal, no murmur or rub  Lungs: Clear without wheezes, rales, rhonchi.    Extremities: No significant  pitting edema, bilateral dressings in place over the feet   neurologic: Alert and oriented, cranial nerves grossly intact, moving all extremities  Skin: Warm and dry, no rash      Recent Labs     07/02/24  0506 07/03/24  0439 07/03/24  1425 07/04/24  0459   WBC 11.1* 10.9  --  9.5   HGB 7.0* 6.6* 8.1* 7.7*   MCV 90.6 88.8  --  89.0   .0 213.0  --  197.0       Recent Labs     07/02/24  0506 07/03/24  0439 07/04/24  0458   * 134* 136   K 4.0 4.2 4.2    100 101   CO2 23.0 21.0 24.0   BUN 52* 69* 46*   CREATSERUM 7.26* 8.56* 5.96*   CA 8.9 9.4 9.0   MG 2.2  --  2.2       Recent Labs     07/04/24 0458   ALT <6*   AST 18   ALB 1.8*       Impression/Plan:        ESRD - due to DM/HTN; clotted AVF  S/p PC clotted AV fistula  - will have pt f/u with Dr. Nix for o/p eval of new AVF/fistula revision  Foot infection - s/p B foot/toe amputations  - on abx; wound care  - podiatry /ID following- on TMP/SMX for stenotrophomonas  PAD- as above  CHF; s/p AVR; volume optimization w/ HD/UF  Anemia- chronically due to ESRD.  Monitor;  Cont DALE      Questions/concerns were discussed with patient and/or family by bedside.    Aj Bardales  7/4/2024

## 2024-07-04 NOTE — PLAN OF CARE
Pt tolerated HD, 2L was taken off. VSS, on IV and oral abx. Dsg intact, wound vac in place, zero output at present. Both legs kept elevated on pillows to offload heels. Plan for surgical closure of lt foot in next few days by Dr Meza.

## 2024-07-04 NOTE — PROGRESS NOTES
[facility] Hospitalist Progress Note       SUBJECTIVE:  Feels okay, dealing with some pain today. No sob.     OBJECTIVE:  Scheduled Meds:    [START ON 2024] epoetin matt  10,000 Units Intravenous Once in dialysis    [Held by provider] apixaban  2.5 mg Oral BID    sulfamethoxazole-trimethoprim DS  2 tablet Oral Daily    insulin degludec  10 Units Subcutaneous Daily    [Held by provider] clopidogrel  75 mg Oral Daily    sodium chloride   Intravenous Once    insulin aspart  4-20 Units Subcutaneous TID AC and HS    atorvastatin  80 mg Oral Nightly    carvedilol  12.5 mg Oral BID with meals    cefepime  1 g Intravenous Q24H    metRONIDAZOLE  500 mg Oral 2 times per day     Continuous Infusions:   PRN Meds:   sodium chloride **AND** [] albumin human    hydrOXYzine    acetaminophen    acetaminophen **OR** HYDROcodone-acetaminophen **OR** HYDROcodone-acetaminophen    HYDROmorphone **OR** HYDROmorphone **OR** HYDROmorphone    polyethylene glycol (PEG 3350)    sennosides    bisacodyl    ondansetron    metoclopramide    glucose **OR** glucose **OR** glucose-vitamin C **OR** dextrose **OR** glucose **OR** glucose **OR** glucose-vitamin C    Vitals  Vitals:    24 1116   BP: 119/58   Pulse: 54   Resp: 20   Temp: 98.6 °F (37 °C)         Exam   Gen-    no acute distress, alert and oriented x 3   RESP-   Lungs CTA, normal respiratory effort  CV-      Heart RRR, no mgr  Abd-    soft, nondistended, nontender, bowel sounds present  Skin-    no rash  Neuro-  no focal neurologic deficits  Ext-    b/l wound vacs  Psych- alert and oriented x 3     Labs:     Chem:  Recent Labs   Lab 24  0402 24  0609 24  0506 24  0439 24  0458   * 130* 134* 134* 136   K 4.1 4.7 4.0 4.2 4.2   CL 98 98 101 100 101   CO2 23.0 19.0* 23.0 21.0 24.0   BUN 50* 67* 52* 69* 46*   MG  --  2.2 2.2  --  2.2   ALT 9*  --   --   --  <6*   AST 38*  --   --   --  18   ALB 1.9*  --   --   --  1.8*       HEM:  Recent Labs   Lab  06/30/24  0402 07/01/24  0609 07/02/24  0506 07/03/24  0439 07/03/24  1425 07/04/24  0459   WBC 11.6* 11.6* 11.1* 10.9  --  9.5   HGB 7.9* 7.7* 7.0* 6.6* 8.1* 7.7*   .0 288.0 219.0 213.0  --  197.0   MCV 90.8 92.8 90.6 88.8  --  89.0       Coagulation:  Recent Labs   Lab 06/28/24  0730 06/29/24  1022 06/30/24  0402 07/01/24  0609 07/02/24  0506 07/03/24  0439 07/03/24  1425 07/04/24  0459   INR 1.48*  --   --   --   --   --   --   --    HCT  --    < > 24.6* 24.4* 22.1* 19.9*  --  23.4*   HGB  --    < > 7.9* 7.7* 7.0* 6.6* 8.1* 7.7*   PLT  --    < > 200.0 288.0 219.0 213.0  --  197.0    < > = values in this interval not displayed.              AP:  78 year old male with PMH sig for CAD status post stents, PAD status post bilateral lower extremity stents, atrial fibrillation on Xarelto, diabetes mellitus type 2 on insulin pump, end-stage renal disease on dialysis, chronic CHF with LVH, status post AVR, history of stroke, hypertension, hyperlipidemia, DESI and recent right TMA and left foot second digit amputation on 6/13 by Dr. Meza presented with poor surgical wound healing.       Postop wound infection R TMA 6/13 due to nonhealing ulcers and PVD.   Excisional debridement to level of bone along with partial resection of R 1st and 2nd MT 6/27  - surgical cultures with enterobacter cloacae and stenotrophomonas  - drainage cultures from admit with enterobacter and acinetobacter  - on iv cefepime, flagyl and now bactrim since 7/2    L foot 2nd digit amputation on 6/13  Excisional wound debridement to level of bone along with partial resection of second MT 6/27  - culture polymicrobial with enterobacter and stenotrophomonas  - on abx as above.    PVD  - Arterial US in April, likely has more distal PAD, may require further amputation, per vascular  pt is not a candidate for any further revascularization and should his foot amputation does not heal he will require major amputation.      ESRD on HD  Malfunctioning  AVF  - vascular consulted, o/p eval of new AVF/fistula revision   - nephrology o/c -s/p tunneled catheter on 6/28        CAD s/p stents  PAD s/p b/l LE stents  Chronic CHF w/ LVH  S/p AVR  Atrial fibrillation  - coreg (Hold SBP < 110 given anemia), lipitor  - plavix on hold   - per podiatry was ok to resume AC - eliquis instead of his home xarelto due to his renal disease. However now stopped d/t anemia       DM2  - insulin pump - not on this admission  - ISS/accucheks - high dose SSI, tresiba daily 10 units   - may add carb count/meal time insulin if continues to be high    Anemia  - suspecting ABLA from surgery   - monitor for now hgb 7.9 to 7.7 to 7.0 to 6.6 --> PRBC 7/3. Hgb improved to 8.1 now down to 7.7  - denies BRBPR no melena      Essential HTN  - coreg     Hyperlipidemia  - statin     DESI  - cpap at bedtime     Itching  - prn atarax, may be 2/2 meds/dialysis        FEN: regular diet, PT/OT  Proph: SCDs eliquis on hold now given anemia     Code status: Full code

## 2024-07-04 NOTE — CM/SW NOTE
SW reached out to [facility] to see if there is an update regarding wound vac application. Awaiting response.     SW will continue to follow for plan of care changes and remain available for any additional DC needs or concerns.     Jazmin Cary MSW, LSW  Discharge Planner   [phone]

## 2024-07-04 NOTE — PROGRESS NOTES
Received message from Dr Meza. To hold discharge for now, planning on surgical site closure on lt foot in the next few days, do not place kerlix on both feet, ok to leave NIGEL.

## 2024-07-04 NOTE — PROGRESS NOTES
[facility]   part of [facility] Infectious Disease Consult    Darin Bowens Patient Status:  Inpatient    1945 MRN [id number]   Location [facility] 3SW-A Attending Mook Escalante MD   Hosp Day # 9 PCP MD Darin Varela seen and examined,   Afebrile,   Previous entries noted,   Family at the bed side,   Laying down,       History:  Past Medical History:    Aortic stenosis    Back problem    CAD (coronary artery disease)    Calculus of kidney    Cataract    CKD (chronic kidney disease) stage 4, GFR 15-29 ml/min ([facility])    Congestive heart disease (HCC)    Coronary atherosclerosis    DDD (degenerative disc disease), lumbar    Diabetes mellitus (HCC)    Dialysis patient ([facility])    fistula upper right arm/MWF    Dyslipidemia    Heart valve disease    High cholesterol    Hypertriglyceridemia    Hypoglycemic reaction    Muscle weakness    cane    Nausea and vomiting, unspecified vomiting type    Onychomycosis    DESI (obstructive sleep apnea) C-PAP     severe AHI 68, O2 sam 84%, CPAP 7    Other and unspecified hyperlipidemia    Overweight(278.02)    Renal disorder    S/P Coronary Artery Stents 2009    Sleep apnea    doesn't use CPAP    Type 1 diabetes mellitus (HCC)    Unspecified essential hypertension    Visual impairment    legally blind     Past Surgical History:   Procedure Laterality Date    Back surgery  16    L2-L5 Decomp poss uninstrumented fusion    Cabg      Cath percutaneous  transluminal coronary angioplasty      Cath transcatheter aortic valve replacement      Injection, w/wo contrast, dx/therapeutic substance, epidural/subarachnoid; lumbar/sacral N/A 12/10/2015    Procedure: LUMBAR EPIDURAL;  Surgeon: Rojas Bolanos MD;  Location: [facility]    Injection, w/wo contrast, dx/therapeutic substance, epidural/subarachnoid; lumbar/sacral N/A 2016    Procedure: LUMBAR EPIDURAL;  Surgeon: Rojas Bolanos MD;   Location: [facility]    Injection, w/wo contrast, dx/therapeutic substance, epidural/subarachnoid; lumbar/sacral N/A 3/2/2016    Procedure: LUMBAR EPIDURAL;  Surgeon: Corey Mcduffie MD;  Location: [facility]    M-sedaj by [facility] perfrmg [facility] 5+ yr N/A 12/10/2015    Procedure: LUMBAR EPIDURAL;  Surgeon: Rojas Bolanos MD;  Location: [facility]    M-sedaj by  phys perfrmg [facility] 5+ yr N/A 1/25/2016    Procedure: LUMBAR EPIDURAL;  Surgeon: Rojas Bolanos MD;  Location: [facility]    M-sedaj by [facility] perfrmg [facility] 5+ yr N/A 3/2/2016    Procedure: LUMBAR EPIDURAL;  Surgeon: Corey Mcduffie MD;  Location: [facility]    Other surgical history  10/4/12    cysto-Dr. Calderón    Other surgical history N/A 5/16/2016    Procedure: LUMBAR LAMINECTOMY FUSION W/ BONE GRAFT 3 LEVEL;  Surgeon: CARLY Garcia MD;  Location: [facility] OR    Patient documented not to have experienced any of the following events N/A 12/10/2015    Procedure: LUMBAR EPIDURAL;  Surgeon: Rojas Bolanos MD;  Location: [facility]    Patient documented not to have experienced any of the following events N/A 1/25/2016    Procedure: LUMBAR EPIDURAL;  Surgeon: Rojas Bolanos MD;  Location: [facility]    Patient documented not to have experienced any of the following events N/A 3/2/2016    Procedure: LUMBAR EPIDURAL;  Surgeon: Corey Mcduffie MD;  Location: [facility]    Patient withough preoperative order for iv antibiotic surgical site infection prophylaxis. N/A 12/10/2015    Procedure: LUMBAR EPIDURAL;  Surgeon: Rojas Bolanos MD;  Location: [facility]    Patient withough preoperative order for iv antibiotic surgical site infection prophylaxis. N/A 1/25/2016    Procedure: LUMBAR EPIDURAL;  Surgeon: Rojas Bolanos MD;  Location: [facility]    Patient withough  preoperative order for iv antibiotic surgical site infection prophylaxis. N/A 3/2/2016    Procedure: LUMBAR EPIDURAL;  Surgeon: Corey Mcduffie MD;  Location: [facility]    Replace aortic valve open  2012    Valve repair       Family History   Problem Relation Age of Onset    Heart Attack Father     Heart Attack Brother     Diabetes Brother     Diabetes Brother       reports that he has never smoked. He has never used smokeless tobacco. He reports that he does not drink alcohol and does not use drugs.    Allergies:  Allergies   Allergen Reactions    Augmentin [Amoxicillin-Pot Clavulanate] RASH    Lisinopril Coughing     Dyspnea         Medications:    Current Facility-Administered Medications:     [START ON 2024] epoetin matt (Epogen, Procrit) 44430 UNIT/ML injection 10,000 Units, 10,000 Units, Intravenous, Once in dialysis    [Held by provider] apixaban (Eliquis) tab 2.5 mg, 2.5 mg, Oral, BID    sodium chloride 0.9 % IV bolus 100 mL, 100 mL, Intravenous, Q30 Min PRN **AND** [] albumin human (Albumin) 25% injection 25 g, 25 g, Intravenous, PRN Dialysis    sulfamethoxazole-trimethoprim DS (Bactrim DS) 800-160 MG per tab 2 tablet, 2 tablet, Oral, Daily    hydrOXYzine (Atarax) tab 25 mg, 25 mg, Oral, TID PRN    insulin degludec (Tresiba) 100 units/mL flextouch 10 Units, 10 Units, Subcutaneous, Daily    [Held by provider] clopidogrel (Plavix) tab 75 mg, 75 mg, Oral, Daily    sodium chloride 0.9% infusion, , Intravenous, Once    acetaminophen (Tylenol Extra Strength) tab 500 mg, 500 mg, Oral, Q4H PRN    acetaminophen (Tylenol) tab 650 mg, 650 mg, Oral, Q4H PRN **OR** HYDROcodone-acetaminophen (Norco) 5-325 MG per tab 1 tablet, 1 tablet, Oral, Q4H PRN **OR** HYDROcodone-acetaminophen (Norco) 5-325 MG per tab 2 tablet, 2 tablet, Oral, Q4H PRN    HYDROmorphone (Dilaudid) 1 MG/ML injection 0.2 mg, 0.2 mg, Intravenous, Q2H PRN **OR** HYDROmorphone (Dilaudid) 1 MG/ML injection 0.4 mg, 0.4 mg,  Intravenous, Q2H PRN **OR** HYDROmorphone (Dilaudid) 1 MG/ML injection 0.8 mg, 0.8 mg, Intravenous, Q2H PRN    polyethylene glycol (PEG 3350) (Miralax) 17 g oral packet 17 g, 17 g, Oral, Daily PRN    sennosides (Senokot) tab 17.2 mg, 17.2 mg, Oral, Nightly PRN    bisacodyl (Dulcolax) 10 MG rectal suppository 10 mg, 10 mg, Rectal, Daily PRN    ondansetron (Zofran) 4 MG/2ML injection 4 mg, 4 mg, Intravenous, Q6H PRN    metoclopramide (Reglan) 5 mg/mL injection 5 mg, 5 mg, Intravenous, Q8H PRN    glucose (Dex4) 15 GM/59ML oral liquid 15 g, 15 g, Oral, Q15 Min PRN **OR** glucose (Glutose) 40% oral gel 15 g, 15 g, Oral, Q15 Min PRN **OR** glucose-vitamin C (Dex-4) chewable tab 4 tablet, 4 tablet, Oral, Q15 Min PRN **OR** dextrose 50% injection 50 mL, 50 mL, Intravenous, Q15 Min PRN **OR** glucose (Dex4) 15 GM/59ML oral liquid 30 g, 30 g, Oral, Q15 Min PRN **OR** glucose (Glutose) 40% oral gel 30 g, 30 g, Oral, Q15 Min PRN **OR** glucose-vitamin C (Dex-4) chewable tab 8 tablet, 8 tablet, Oral, Q15 Min PRN    insulin aspart (NovoLOG) 100 Units/mL FlexPen 4-20 Units, 4-20 Units, Subcutaneous, TID AC and HS    atorvastatin (Lipitor) tab 80 mg, 80 mg, Oral, Nightly    carvedilol (Coreg) tab 12.5 mg, 12.5 mg, Oral, BID with meals    ceFEPIme (Maxipime) 1 g in sodium chloride 0.9% 100 mL IVPB-MBP, 1 g, Intravenous, Q24H    metRONIDAZOLE (Flagyl) tab 500 mg, 500 mg, Oral, 2 times per day    Review of Systems:   Constitutional: Negative for anorexia, chills, fatigue, fevers, malaise, night sweats and weight loss.  Eyes: Negative for visual disturbance, irritation and redness.  Ears, nose, mouth, throat, and face: Negative for hearing loss, tinnitus, nasal congestion, snoring, sore throat, hoarseness and voice change.  Respiratory: Negative for cough, sputum, hemoptysis, chest pain, wheezing, dyspnea on exertion, or stridor.  Cardiovascular: Negative for chest pain, palpitations, irregular heart beats, syncope, fatigue, orthopnea,  paroxysmal nocturnal dyspnea, lower extremity edema.  Gastrointestinal: Negative for dysphagia, odynophagia, reflux symptoms, nausea, vomiting, change in bowel habits, diarrhea, constipation and abdominal pain.  Integument/breast: Negative for rash, skin lesions, and pruritus.  Hematologic/lymphatic: Negative for easy bruising, bleeding, and lymphadenopathy.  Musculoskeletal: Negative for myalgias, arthralgias, muscle weakness.  Neurological: Negative for headaches, dizziness, seizures, memory problems, trouble swallowing, speech problems, gait problems and weakness.  Behavioral/Psych: Negative for active tobacco use.  Endocrine: No history of of diabetes, thyroid disorder.  All other review of systems are negative.    Vital signs in last 24 hours:  Patient Vitals for the past 24 hrs:   BP Temp Temp src Pulse Resp SpO2   07/04/24 1116 119/58 98.6 °F (37 °C) Oral 54 20 100 %   07/04/24 0759 122/57 98.6 °F (37 °C) Oral 67 20 100 %   07/04/24 0400 119/45 98.5 °F (36.9 °C) Oral 71 18 97 %   07/04/24 0000 122/44 98.2 °F (36.8 °C) Oral 84 16 99 %   07/03/24 2000 139/62 97.7 °F (36.5 °C) Oral 81 16 98 %   07/03/24 1755 149/57 -- -- 62 -- 100 %       Physical Exam:   General: alert, cooperative, oriented.  No respiratory distress.   Head: Normocephalic, without obvious abnormality, atraumatic.   Eyes: Conjunctivae/corneas clear.     Nose: Nares normal.   Throat: Lips, mucosa, and tongue normal.  No thrush noted.   Neck: Soft, supple neck; trachea midline,   Lungs: CTAB, normal and equal bilateral chest rise   Chest wall: No tenderness or deformity.   Heart: Regular rate and rhythm, normal S1S2, no murmur.   Abdomen: soft, non-tender, non-distended,positive BS.   Extremity: VAC In place, no erythema,    Skin: No rashes or lesions.   Neurological: Alert, interactive, no focal deficits    Labs:  Lab Results   Component Value Date    WBC 9.5 07/04/2024    HGB 7.7 07/04/2024    HCT 23.4 07/04/2024    .0 07/04/2024     CREATSERUM 5.96 07/04/2024    BUN 46 07/04/2024     07/04/2024    K 4.2 07/04/2024     07/04/2024    CO2 24.0 07/04/2024     07/04/2024    CA 9.0 07/04/2024    ALB 1.8 07/04/2024    ALKPHO 105 07/04/2024    BILT 0.4 07/04/2024    TP 5.6 07/04/2024    AST 18 07/04/2024    ALT <6 07/04/2024    MG 2.2 07/04/2024       Radiology:  Reviewed       Cultures:  Reviewed     Assessment and Plan:    1.  Post operative infection of R TMA site: admitted with drainage, odor and N/ Vomiting,   - S/ R TMA due to non healing uclers and PVD on 6/13,  - S/P excisional debridement to the level of bone along with partial resection of R 1st & 2nd MT on 6/27,  - Surgical cultures with Enterobacter cloacae and stenotrophomonas,   - Drainage cultures from admission with Enterobacter cloacae and Acinetobacter,  - On IV Cefepime, Falgyl, 6/27 - present and PO Bactrim 7/02 - present,     2.   Post operative wound infection after L second digit amputation on 6/13/24.   - S/P excisional wound debridement to level of bone along with partial resection of the second MT on 6/27  - Cul polymicrobial with Enterobacter Cloacae and Stenotrophomonas Maltophilia,   - On IV Cefepime, Flagyl and Bactrim as above,     3.    DM with end organ damage: Needs optimal control,   - PVD with Vascular surgery following,     4.    ESRD: On HD, AV Fistula clotted and now has Perma cath in place,     5.    Allergy to Augmentin, caused rash,     6.    Disposition: in house, an elderly male with DM and end organ damage admitted with post operative infection after a recent surgery on both R and L feet, needing I & D, deep infection needing debridement to the bone,   - Follow Pending cul,  - Central PICC line by IR, If OK with Nephrology,    - Continue IV Cefepime, PO Flagyl and PO Bactrim, Plan for six weeks of abx through 8/07/24, CBC CMP CRP weekly, will need PICC Line for daily Cefepime, Both Flagyl and Bactrim can be given orally, Discussed with  patient, RN, all questions answered, further recommendations to follow,   Thank you for consulting [facility] for Darin Bowens.  If you have any questions or concerns please call [facility] Infectious Disease at [phone].     Jolie Mejia MD  7/4/2024  12:48 PM

## 2024-07-04 NOTE — PLAN OF CARE
Patient being treated for cellulitis BLE. Wound vac patent. Vitals stable, pain controlled. Dressing dry and intact.

## 2024-07-04 NOTE — PROGRESS NOTES
Patient seen at bedside with his wife.  Pain has subsided.    Physical exam:   Vitals:    07/03/24 1755   BP: 149/57   Pulse: 62   Resp:    Temp:      General: NAD  HEENT: EOMI, PERRLA  Respiratory: No wheezing, no rhonchi  Musculoskeletal: Bilateral TMA  Neuro: No focal deficits  Psych: Mood and affect normal    Lower extremity exam: Pedal pulses nonpalpable.  Sensation diminished.  Right foot: TMA site wound with fibrogranular base, exposed bone, viable wound margins with no erythema or purulence  Left foot: TMA site with medial wound open with granular base, exposed bone, viable wound margins and no erythema or purulence    Recent Labs   Lab 06/29/24  1022 06/30/24  0402 07/01/24  0609 07/02/24  0506 07/03/24  0439 07/03/24  1425   RBC 2.99* 2.71* 2.63* 2.44* 2.24*  --    HGB 8.6* 7.9* 7.7* 7.0* 6.6* 8.1*   HCT 26.6* 24.6* 24.4* 22.1* 19.9*  --    MCV 89.0 90.8 92.8 90.6 88.8  --    MCH 28.8 29.2 29.3 28.7 29.5  --    MCHC 32.3 32.1 31.6 31.7 33.2  --    RDW 15.8 15.7 15.7 15.8 15.9  --    NEPRELIM 11.70* 9.55* 10.00*  --   --   --    WBC 13.8* 11.6* 11.6* 11.1* 10.9  --    .0 200.0 288.0 219.0 213.0  --        Recent Labs   Lab 06/30/24  0402 07/01/24  0609 07/02/24  0506 07/03/24  0439   * 152* 129* 99   BUN 50* 67* 52* 69*   CREATSERUM 6.95* 8.46* 7.26* 8.56*   EGFRCR 8* 6* 7* 6*   CA 9.2 9.4 8.9 9.4   ALB 1.9*  --   --   --    * 130* 134* 134*   K 4.1 4.7 4.0 4.2   CL 98 98 101 100   CO2 23.0 19.0* 23.0 21.0   ALKPHO 112  --   --   --    AST 38*  --   --   --    ALT 9*  --   --   --    BILT 0.5  --   --   --    TP 5.6*  --   --   --      Last A1c value was 5.9% done 6/14/2024.    Hospital Encounter on 06/25/24   1. Tissue Aerobic Culture     Status: Abnormal    Collection Time: 06/30/24 12:11 PM    Specimen: Foot,left; Tissue   Result Value Ref Range    Tissue Culture Result 4+ growth Enterobacter cloacae (A) N/A    Tissue Culture Result 2+ growth Stenotrophomonas maltophilia (A) N/A     Tissue Smear 4+ WBCs seen (A) N/A    Tissue Smear 3+ Gram positive cocci in pairs (A) N/A    Tissue Smear 4+ Gram Negative Rods (A) N/A       Susceptibility    Enterobacter cloacae -  (no method available)     Cefazolin >=64 Resistant      Cefepime <=1 Sensitive      Ceftriaxone <=1 Sensitive      Ciprofloxacin <=0.25 Sensitive      Gentamicin <=1 Sensitive      Meropenem <=0.25 Sensitive      Levofloxacin <=0.12 Sensitive      Piperacillin + Tazobactam <=4 Sensitive      Trimethoprim/Sulfa <=20 Sensitive    2. Anaerobic Culture     Status: None (Preliminary result)    Collection Time: 06/27/24 12:13 PM    Specimen: Foot,left; Tissue   Result Value Ref Range    Anaerobic Culture No Anaerobes to date N/A   3. Aerobic Bacterial Culture     Status: Abnormal    Collection Time: 06/25/24  8:14 AM    Specimen: Foot,left; Other   Result Value Ref Range    Aerobic Culture Result  N/A     Mixture of organisms suggestive of normal skin justice    Aerobic Culture Result 4+ growth Enterobacter cloacae complex (A) N/A    Aerobic Culture Result 4+ growth Acinetobacter pittii (A) N/A    Aerobic Smear No WBCs seen N/A    Aerobic Smear 1+ Gram Positive Cocci N/A    Aerobic Smear 1+ Gram Negative Rods N/A       Susceptibility    Enterobacter cloacae complex -  (no method available)     Cefazolin >=64 Resistant      Cefepime <=1 Sensitive      Ceftriaxone <=1 Sensitive      Ciprofloxacin <=0.25 Sensitive      Gentamicin <=1 Sensitive      Meropenem <=0.25 Sensitive      Levofloxacin <=0.12 Sensitive      Piperacillin + Tazobactam <=4 Sensitive      Trimethoprim/Sulfa <=20 Sensitive     Acinetobacter pittii -  (no method available)     Ceftazidime <=1 Sensitive      Ciprofloxacin <1 Sensitive      Gentamicin <1 Sensitive      Meropenem <1 Sensitive      Levofloxacin <=0.25 Sensitive      Piperacillin + Tazobactam <16 Sensitive      Trimethoprim/Sulfa <=40 Sensitive    4. Blood Culture     Status: None    Collection Time: 06/25/24  2:36  AM    Specimen: Blood,peripheral   Result Value Ref Range    Blood Culture Result No Growth 5 Days N/A       IR CENTRAL VENOUS ACCESS    Result Date: 6/28/2024  CONCLUSION: Successful conversion of  left IJ non tunneled central venous dialysis catheter to left chest tunneled dialysis catheter  (27 cm tip to cuff Bard Hemosplit catheter). Catheter is ready for use.  Recommend routine catheter care.   LOCATION:  [facility]    Dictated by (CST): Randy Maddox MD on 6/28/2024 at 12:09 PM     Finalized by (CST): Randy Maddox MD on 6/28/2024 at 12:15 PM       MRI FOOT (W+WO), RIGHT (CPT=73720)    Result Date: 6/26/2024  CONCLUSION:  Postsurgical changes status post amputation of wall the distal digits.  Fluid along the surgical approach may represent postoperative seroma although superinfection cannot be excluded.  There is no evidence of osteomyelitis.   LOCATION:  [facility]   Dictated by (CST): Randy Vaz MD on 6/26/2024 at 3:28 PM     Finalized by (CST): Randy Vaz MD on 6/26/2024 at 3:32 PM       MRI FOOT (W+WO), LEFT (CPT=73720)    Result Date: 6/26/2024  CONCLUSION:  Postsurgical changes in the 2nd toe.  No evidence of osteomyelitis.  A fluid tract extending from the surgical site to the skin surface is noted.   LOCATION:  [facility]   Dictated by (CST): Randy Vaz MD on 6/26/2024 at 3:23 PM     Finalized by (CST): Randy Vaz MD on 6/26/2024 at 3:28 PM       IR CENTRAL VENOUS ACCESS    Result Date: 6/25/2024  CONCLUSION:  Successful non-tunneled dialysis catheter placement via the left internal jugular vein.  Chronically occluded right internal jugular vein.   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  Left forefoot gangrene  Cellulitis  Peripheral arterial disease  Diabetic neuropathy    Patient is status post right foot TMA site  revision, left foot TMA and Achilles tendon lengthening done on 6/30/2024    Bilateral wound vacs in place  His pain has subsided.  Patient received 1 unit PRBC.  No active drainage noted from the right foot wound VAC.    OR cultures noted  Continue IV antibiotics  The patient will require long-term IV antibiotics    Patient is at high risk for further soft tissue loss, worsening infection, chronic nonhealing wounds and proximal amputation  Will try to salvage both his feet as much as possible.    Will plan on closure of the left foot TMA site in the next few days depending on viability of the plantar full-thickness  Hold discharge for now    Discussed with wife and son.    Jose R Meza D.P.M.

## 2024-07-05 ENCOUNTER — APPOINTMENT (OUTPATIENT)
Dept: GENERAL RADIOLOGY | Facility: HOSPITAL | Age: 79
End: 2024-07-05
Attending: INTERNAL MEDICINE
Payer: MEDICARE

## 2024-07-05 ENCOUNTER — APPOINTMENT (OUTPATIENT)
Dept: CV DIAGNOSTICS | Facility: HOSPITAL | Age: 79
End: 2024-07-05
Attending: INTERNAL MEDICINE
Payer: MEDICARE

## 2024-07-05 ENCOUNTER — ANESTHESIA (OUTPATIENT)
Dept: SURGERY | Facility: HOSPITAL | Age: 79
End: 2024-07-05
Payer: MEDICARE

## 2024-07-05 ENCOUNTER — ANESTHESIA EVENT (OUTPATIENT)
Dept: SURGERY | Facility: HOSPITAL | Age: 79
End: 2024-07-05
Payer: MEDICARE

## 2024-07-05 LAB
ANION GAP SERPL CALC-SCNC: 9 MMOL/L (ref 0–18)
BUN BLD-MCNC: 64 MG/DL (ref 9–23)
CALCIUM BLD-MCNC: 9.1 MG/DL (ref 8.5–10.1)
CHLORIDE SERPL-SCNC: 104 MMOL/L (ref 98–112)
CO2 SERPL-SCNC: 20 MMOL/L (ref 21–32)
CREAT BLD-MCNC: 7.68 MG/DL
EGFRCR SERPLBLD CKD-EPI 2021: 7 ML/MIN/1.73M2 (ref 60–?)
ERYTHROCYTE [DISTWIDTH] IN BLOOD BY AUTOMATED COUNT: 16.1 %
GLUCOSE BLD-MCNC: 132 MG/DL (ref 70–99)
GLUCOSE BLD-MCNC: 137 MG/DL (ref 70–99)
GLUCOSE BLD-MCNC: 140 MG/DL (ref 70–99)
GLUCOSE BLD-MCNC: 148 MG/DL (ref 70–99)
GLUCOSE BLD-MCNC: 183 MG/DL (ref 70–99)
HCT VFR BLD AUTO: 23.4 %
HGB BLD-MCNC: 7.6 G/DL
MAGNESIUM SERPL-MCNC: 2.4 MG/DL (ref 1.6–2.6)
MCH RBC QN AUTO: 29.6 PG (ref 26–34)
MCHC RBC AUTO-ENTMCNC: 32.5 G/DL (ref 31–37)
MCV RBC AUTO: 91.1 FL
OSMOLALITY SERPL CALC.SUM OF ELEC: 296 MOSM/KG (ref 275–295)
PLATELET # BLD AUTO: 204 10(3)UL (ref 150–450)
POTASSIUM SERPL-SCNC: 4.6 MMOL/L (ref 3.5–5.1)
RBC # BLD AUTO: 2.57 X10(6)UL
SODIUM SERPL-SCNC: 133 MMOL/L (ref 136–145)
WBC # BLD AUTO: 9 X10(3) UL (ref 4–11)

## 2024-07-05 PROCEDURE — 99232 SBSQ HOSP IP/OBS MODERATE 35: CPT | Performed by: INTERNAL MEDICINE

## 2024-07-05 PROCEDURE — 0QBN0ZZ EXCISION OF RIGHT METATARSAL, OPEN APPROACH: ICD-10-PCS | Performed by: PODIATRIST

## 2024-07-05 PROCEDURE — 0QBP0ZZ EXCISION OF LEFT METATARSAL, OPEN APPROACH: ICD-10-PCS | Performed by: PODIATRIST

## 2024-07-05 PROCEDURE — 71045 X-RAY EXAM CHEST 1 VIEW: CPT | Performed by: INTERNAL MEDICINE

## 2024-07-05 RX ORDER — ONDANSETRON 2 MG/ML
INJECTION INTRAMUSCULAR; INTRAVENOUS AS NEEDED
Status: DISCONTINUED | OUTPATIENT
Start: 2024-07-05 | End: 2024-07-05 | Stop reason: SURG

## 2024-07-05 RX ORDER — BUPIVACAINE HYDROCHLORIDE 5 MG/ML
INJECTION, SOLUTION EPIDURAL; INTRACAUDAL AS NEEDED
Status: DISCONTINUED | OUTPATIENT
Start: 2024-07-05 | End: 2024-07-05 | Stop reason: HOSPADM

## 2024-07-05 RX ORDER — CEFAZOLIN SODIUM 1 G/3ML
INJECTION, POWDER, FOR SOLUTION INTRAMUSCULAR; INTRAVENOUS AS NEEDED
Status: DISCONTINUED | OUTPATIENT
Start: 2024-07-05 | End: 2024-07-05 | Stop reason: SURG

## 2024-07-05 RX ORDER — SODIUM CHLORIDE 9 MG/ML
INJECTION, SOLUTION INTRAVENOUS CONTINUOUS
Status: DISCONTINUED | OUTPATIENT
Start: 2024-07-05 | End: 2024-07-05 | Stop reason: HOSPADM

## 2024-07-05 RX ORDER — LIDOCAINE HYDROCHLORIDE 10 MG/ML
INJECTION, SOLUTION EPIDURAL; INFILTRATION; INTRACAUDAL; PERINEURAL AS NEEDED
Status: DISCONTINUED | OUTPATIENT
Start: 2024-07-05 | End: 2024-07-05 | Stop reason: SURG

## 2024-07-05 RX ORDER — SODIUM CHLORIDE 9 MG/ML
INJECTION, SOLUTION INTRAVENOUS CONTINUOUS PRN
Status: DISCONTINUED | OUTPATIENT
Start: 2024-07-05 | End: 2024-07-05 | Stop reason: SURG

## 2024-07-05 RX ORDER — ONDANSETRON 2 MG/ML
4 INJECTION INTRAMUSCULAR; INTRAVENOUS EVERY 6 HOURS PRN
Status: DISCONTINUED | OUTPATIENT
Start: 2024-07-05 | End: 2024-07-05 | Stop reason: HOSPADM

## 2024-07-05 RX ORDER — NALOXONE HYDROCHLORIDE 0.4 MG/ML
0.08 INJECTION, SOLUTION INTRAMUSCULAR; INTRAVENOUS; SUBCUTANEOUS AS NEEDED
Status: DISCONTINUED | OUTPATIENT
Start: 2024-07-05 | End: 2024-07-05 | Stop reason: HOSPADM

## 2024-07-05 RX ADMIN — LIDOCAINE HYDROCHLORIDE 50 MG: 10 INJECTION, SOLUTION EPIDURAL; INFILTRATION; INTRACAUDAL; PERINEURAL at 15:17:00

## 2024-07-05 RX ADMIN — SODIUM CHLORIDE: 9 INJECTION, SOLUTION INTRAVENOUS at 15:15:00

## 2024-07-05 RX ADMIN — CEFAZOLIN SODIUM 2 G: 1 INJECTION, POWDER, FOR SOLUTION INTRAMUSCULAR; INTRAVENOUS at 15:21:00

## 2024-07-05 RX ADMIN — ONDANSETRON 4 MG: 2 INJECTION INTRAMUSCULAR; INTRAVENOUS at 15:23:00

## 2024-07-05 RX ADMIN — SODIUM CHLORIDE: 9 INJECTION, SOLUTION INTRAVENOUS at 15:51:00

## 2024-07-05 NOTE — PLAN OF CARE
SUSAN rubio called & updated regarding pts surg around 1400 tomorrow, per rep they will do pts HD 1st shift instead of 2nd. Per podiatry md pt to go for surg tomorrow around 1400, pt aware. Npo at midnight. Md took down wound vac dressing to BLE, now pt has wet to dry in place, clean dry and intact. Iv abx infusing per orders. Vss at this time. Pt will be npo after midnight as ordered. Call light within reach. Bed alarm in place.

## 2024-07-05 NOTE — OCCUPATIONAL THERAPY NOTE
IP OT attempted. Per RN, pt to have further sx for debridement and VAC application. OT continue to follow.

## 2024-07-05 NOTE — ANESTHESIA POSTPROCEDURE EVALUATION
[facility]    Darin Bowens Patient Status:  Inpatient   Age/Gender 78 year old male MRN [id number]   Location [facility] SURGERY Attending Mook Escalante MD   Hosp Day # 10 PCP Zachery Hall MD       Anesthesia Post-op Note    BILATERAL FOOT IRRIGATION & DEBRIDEMENT    Procedure Summary       Date: 07/05/24 Room / Location:  MAIN OR 11 / [facility] MAIN OR    Anesthesia Start: 1506 Anesthesia Stop: 1555    Procedure: BILATERAL FOOT IRRIGATION & DEBRIDEMENT (Bilateral: Foot) Diagnosis:       Open wound of foot with complication, sequela      (Open wound of foot with complication, sequela [S91.309S])    Surgeons: Jose R Meza DPM Anesthesiologist: Andres Rutherford MD    Anesthesia Type: MAC ASA Status: 3            Anesthesia Type: MAC    Vitals Value Taken Time   BP 92/47 07/05/24 1601   Temp 97.5 07/05/24 1602   Pulse 64 07/05/24 1602   Resp 17 07/05/24 1602   SpO2 99 % 07/05/24 1602   Vitals shown include unfiled device data.    Patient Location: PACU    Anesthesia Type: MAC    Airway Patency: patent    Postop Pain Control: adequate    Mental Status: mildly sedated but able to meaningfully participate in the post-anesthesia evaluation    Nausea/Vomiting: none    Cardiopulmonary/Hydration status: stable euvolemic    Complications: no apparent anesthesia related complications    Postop vital signs: stable    Dental Exam: Unchanged from Preop    Patient to be discharged from PACU when criteria met.

## 2024-07-05 NOTE — OPERATIVE REPORT
Date of surgery: 07/5/2024     Preoperative Diagnosis:   Open transmetatarsal amputation site, left foot  Open transmetatarsal amputation site, right foot  Diabetic neuropathy  Peripheral arterial disease    Postoperative Diagnosis: Same     Procedure:   1.  Excisional wound debridement to level of bone, 35 cm², right foot  2.  Excisional wound debridement to level of bone, 25 cm², left foot    Surgeon: Jose R Meza D.P.M.  Anesthesia: MAC  EBL: 5 ml     Specimens: None    Complications: None     Technique:  Patient was brought into the operating room and placed on the operating table in a supine position.  Patient was then placed under anesthesia.  A tourniquet was not utilized. Both lower extremities were prepped and draped in the usual aseptic manner.  Total 20 cc of 0.5% Marcaine was injected to both feet.    Attention was directed to the RIGHT foot open TMA site along the medial aspect.  Using a 10 blade all nonviable skin, deep soft tissues were excised out and passed off the field.  Rongeur was utilized and any prominent and nonviable bone of the exposed metatarsals were sharply debrided.  Deep spaces were explored and no purulence was noted.  Wound margins were debrided by 1 mm to allow for healthy bleeding.  Surgical site was copiously irrigated with Irrisept solution.  A total of 35 cm² of excisional wound debridement to level of bone was done.    Next, attention was directed to the LEFT foot open TMA site along the medial aspect.  Necrosis of the plantar full-thickness flap was noted.  Using a 10 blade all the necrotic portions of the plantar full-thickness flaps were excised and passed off the field.  All deep tissues were debrided and excised out to healthy bleeding tissues.  A rongeur was utilized and any prominent bone nonviable bone and soft tissues were sharply debrided.  Deep spaces were explored and no purulence was noted.  Wound margins were debrided by 1 mm to allow for healthy bleeding.  Surgical  site was copiously irrigated with Irrisept solution.  A total of 25 cm² of excisional wound debridement to level of bone was done.     1 g of vancomycin powder was placed on both surgical sites.  Well-padded wet-to-dry sterile dressings were applied bilaterally.  Patient tolerated the procedure well and was transferred to PACU in stable condition.  Patient remains at high risk for chronic nonhealing wounds, further soft tissue loss and limb loss.  Patient has significant peripheral arterial disease and will require staged procedures for limb salvage.

## 2024-07-05 NOTE — ANESTHESIA PREPROCEDURE EVALUATION
PRE-OP EVALUATION    Patient Name: Darin Bowens    Admit Diagnosis: Malfunction of arteriovenous dialysis fistula, initial encounter ([facility]) [T82.590A]  Cellulitis of lower extremity, unspecified laterality [L03.119]  Nausea and vomiting, unspecified vomiting type [R11.2]    Pre-op Diagnosis: Open wound of foot with complication, sequela [S91.309S]    BILATERAL FOOT IRRIGATION & DEBRIDEMENT, APPLICATION OF WOUND VAC    Anesthesia Procedure: BILATERAL FOOT IRRIGATION & DEBRIDEMENT, APPLICATION OF WOUND VAC (Bilateral)    Surgeons and Role:     * Jose R Meza DPM - Primary    Pre-op vitals reviewed.  Temp: 98.1 °F (36.7 °C)  Pulse: 62  Resp: 17  BP: 125/48  SpO2: 100 %  Body mass index is 26.92 kg/m².    Current medications reviewed.  Hospital Medications:   [Transfer Hold] epoetin matt (Epogen, Procrit) 12423 UNIT/ML injection 10,000 Units  10,000 Units Intravenous Once in dialysis    [COMPLETED] sodium chloride 0.9% infusion   Intravenous Once    [Held by provider] apixaban (Eliquis) tab 2.5 mg  2.5 mg Oral BID    [COMPLETED] epoetin matt (Epogen, Procrit) 13970 UNIT/ML injection 10,000 Units  10,000 Units Intravenous Once in dialysis    [] sodium chloride 0.9 % IV bolus 100 mL  100 mL Intravenous Q30 Min PRN    And    [] albumin human (Albumin) 25% injection 25 g  25 g Intravenous PRN Dialysis    [COMPLETED] heparin (Porcine) 1000 UNIT/ML injection 1,500 Units  1.5 mL Intracatheter Once    sulfamethoxazole-trimethoprim DS (Bactrim DS) 800-160 MG per tab 2 tablet  2 tablet Oral Daily    [COMPLETED] epoetin matt (Epogen, Procrit) 61550 UNIT/ML injection 10,000 Units  10,000 Units Intravenous Once in dialysis    [Transfer Hold] hydrOXYzine (Atarax) tab 25 mg  25 mg Oral TID PRN    [Transfer Hold] insulin degludec (Tresiba) 100 units/mL flextouch 10 Units  10 Units Subcutaneous Daily    [Held by provider] clopidogrel (Plavix) tab 75 mg  75 mg Oral Daily    [COMPLETED] lidocaine-EPINEPHrine  (Xylocaine-Epinephrine) 2 %-1:389936 injection        [COMPLETED] lidocaine (Xylocaine) 1 % injection        [COMPLETED] fentaNYL (Sublimaze) 50 mcg/mL injection        [COMPLETED] midazolam (Versed) 2 MG/2ML injection        [COMPLETED] ceFAZolin (Ancef) 1 g injection        [COMPLETED] ceFAZolin (Ancef) 1 g injection        [COMPLETED] heparin (Porcine) 5000 UNIT/ML injection        [COMPLETED] epoetin matt (Epogen, Procrit) 27528 UNIT/ML injection 10,000 Units  10,000 Units Intravenous Once in dialysis    [] sodium chloride 0.9 % IV bolus 100 mL  100 mL Intravenous Q30 Min PRN    And    [] albumin human (Albumin) 25% injection 25 g  25 g Intravenous PRN Dialysis    [Transfer Hold] sodium chloride 0.9% infusion   Intravenous Once    [COMPLETED] gadoterate meglumine (Dotarem) 10 MMOL/20ML injection 20 mL  20 mL Intravenous ONCE PRN    [COMPLETED] vancomycin (Vancocin) 1,000 mg in sodium chloride 0.9% 250 mL IVPB-ADDV  10 mg/kg Intravenous Once    [COMPLETED] ondansetron (Zofran) 4 MG/2ML injection 4 mg  4 mg Intravenous Once    [COMPLETED] cefTRIAXone (Rocephin) 1 g in sodium chloride 0.9% 100 mL IVPB-ADDV  1 g Intravenous Once    [COMPLETED] vancomycin (Vancocin) 2 g in sodium chloride 0.9% 500mL IVPB premix  2,000 mg Intravenous Once    [Transfer Hold] acetaminophen (Tylenol Extra Strength) tab 500 mg  500 mg Oral Q4H PRN    [Transfer Hold] acetaminophen (Tylenol) tab 650 mg  650 mg Oral Q4H PRN    Or    [Transfer Hold] HYDROcodone-acetaminophen (Norco) 5-325 MG per tab 1 tablet  1 tablet Oral Q4H PRN    Or    [Transfer Hold] HYDROcodone-acetaminophen (Norco) 5-325 MG per tab 2 tablet  2 tablet Oral Q4H PRN    [Transfer Hold] HYDROmorphone (Dilaudid) 1 MG/ML injection 0.2 mg  0.2 mg Intravenous Q2H PRN    Or    [Transfer Hold] HYDROmorphone (Dilaudid) 1 MG/ML injection 0.4 mg  0.4 mg Intravenous Q2H PRN    Or    [Transfer Hold] HYDROmorphone (Dilaudid) 1 MG/ML injection 0.8 mg  0.8 mg Intravenous  Q2H PRN    [Transfer Hold] polyethylene glycol (PEG 3350) (Miralax) 17 g oral packet 17 g  17 g Oral Daily PRN    [Transfer Hold] sennosides (Senokot) tab 17.2 mg  17.2 mg Oral Nightly PRN    [Transfer Hold] bisacodyl (Dulcolax) 10 MG rectal suppository 10 mg  10 mg Rectal Daily PRN    [Transfer Hold] ondansetron (Zofran) 4 MG/2ML injection 4 mg  4 mg Intravenous Q6H PRN    [Transfer Hold] metoclopramide (Reglan) 5 mg/mL injection 5 mg  5 mg Intravenous Q8H PRN    [Transfer Hold] glucose (Dex4) 15 GM/59ML oral liquid 15 g  15 g Oral Q15 Min PRN    Or    [Transfer Hold] glucose (Glutose) 40% oral gel 15 g  15 g Oral Q15 Min PRN    Or    [Transfer Hold] glucose-vitamin C (Dex-4) chewable tab 4 tablet  4 tablet Oral Q15 Min PRN    Or    [Transfer Hold] dextrose 50% injection 50 mL  50 mL Intravenous Q15 Min PRN    Or    [Transfer Hold] glucose (Dex4) 15 GM/59ML oral liquid 30 g  30 g Oral Q15 Min PRN    Or    [Transfer Hold] glucose (Glutose) 40% oral gel 30 g  30 g Oral Q15 Min PRN    Or    [Transfer Hold] glucose-vitamin C (Dex-4) chewable tab 8 tablet  8 tablet Oral Q15 Min PRN    [Transfer Hold] insulin aspart (NovoLOG) 100 Units/mL FlexPen 4-20 Units  4-20 Units Subcutaneous TID AC and HS    [Transfer Hold] atorvastatin (Lipitor) tab 80 mg  80 mg Oral Nightly    [Transfer Hold] carvedilol (Coreg) tab 12.5 mg  12.5 mg Oral BID with meals    [COMPLETED] heparin (Porcine) 1000 UNIT/ML injection 1,500 Units  1.5 mL Intracatheter Once    ceFEPIme (Maxipime) 1 g in sodium chloride 0.9% 100 mL IVPB-MBP  1 g Intravenous Q24H    [COMPLETED] vancomycin (Vancocin) 1,000 mg in sodium chloride 0.9% 250 mL IVPB-ADDV  10 mg/kg Intravenous Once    metRONIDAZOLE (Flagyl) tab 500 mg  500 mg Oral 2 times per day    [COMPLETED] heparin (Porcine) 5000 UNIT/ML injection        [COMPLETED] lidocaine (Xylocaine) 1 % injection        [COMPLETED] iohexol (OMNIPAQUE) 350 MG/ML injection 20 mL  20 mL Injection ONCE PRN       Outpatient  Medications:     Medications Prior to Admission   Medication Sig Dispense Refill Last Dose    dorzolamide 2 % Ophthalmic Solution Place 1 drop into both eyes in the morning and 1 drop before bedtime.       Netarsudil-Latanoprost (ROCKLATAN) 0.02-0.005 % Ophthalmic Solution Apply 1 drop to eye daily. Both eyes       atorvastatin 80 MG Oral Tab Take 1 tablet (80 mg total) by mouth nightly. 90 tablet 2     CLOPIDOGREL 75 MG Oral Tab TAKE 1 TABLET(75 MG) BY MOUTH DAILY 90 tablet 0     Insulin Lispro (HUMALOG) 100 UNIT/ML Subcutaneous Solution 95 units per day via insulin pump. 90 mL 1     Ergocalciferol (VITAMIN D2 OR) Take 50,000 Units by mouth every 30 (thirty) days.       rivaroxaban (XARELTO) 15 MG Oral Tab Take 1 tablet (15 mg total) by mouth daily with food. 90 tablet 3 6/24/2024    CALCIUM ACETATE 667 MG Oral Cap TAKE 1 CAPSULE BY MOUTH THREE TIMES DAILY WITH MEALS 270 capsule 0     COMBIGAN 0.2-0.5 % Ophthalmic Solution Place 1 drop into both eyes 2 (two) times daily.       amoxicillin clavulanate 875-125 MG Oral Tab Take 0.5 tablets by mouth 2 (two) times daily.       ketoconazole 2 % External Shampoo Apply 1 Application topically daily as needed for Itching.       Continuous Blood Gluc Sensor (DEXCOM G6 SENSOR) Does not apply Misc 1 Device Every 10 days. 3 each 1     carvedilol 12.5 MG Oral Tab Take 1 tablet (12.5 mg total) by mouth 2 (two) times daily with meals. Take one tablet (12.5mg total) by mouth two times a day 180 tablet 3     clindamycin 1 % External Lotion Apply twice daily to scalp and chest rashes (Patient not taking: Reported on 6/13/2024) 60 mL 11     Continuous Blood Gluc Transmit (DEXCOM G6 TRANSMITTER) Does not apply Misc Change every 90 days 1 each 3     PTA Insulin via Pump Inject into the skin continuous. humalog insulin   [employer]  Basal Rate : 55.6 standard rate 1.90  I:C - 1 units/4 carbs  Correction Factor - unknown          Allergies: Augmentin [amoxicillin-pot clavulanate] and  Lisinopril      Anesthesia Evaluation    Patient summary reviewed.    Anesthetic Complications  (-) history of anesthetic complications         GI/Hepatic/Renal    Negative GI/hepatic/renal ROS.         (+) chronic renal disease and ESRD and hemodialysis                   Cardiovascular        Exercise tolerance: poor     MET: <=4      (+) hypertension     (+) CAD    (+) CABG/stent    (+) valvular problems/murmurs (s/p AVR)     (+) dysrhythmias and atrial fibrillation  (+) CHF                Endo/Other      (+) diabetes and well controlled, type 1, using insulin                         Pulmonary                    (+) sleep apnea and CPAP      Neuro/Psych    Negative neuro/psych ROS.                          Visual impairement        Past Surgical History:   Procedure Laterality Date    Back surgery  5/16/16    L2-L5 Decomp poss uninstrumented fusion    Cabg      Cath percutaneous  transluminal coronary angioplasty      Cath transcatheter aortic valve replacement      Injection, w/wo contrast, dx/therapeutic substance, epidural/subarachnoid; lumbar/sacral N/A 12/10/2015    Procedure: LUMBAR EPIDURAL;  Surgeon: Rojas Bolanos MD;  Location: [facility]    Injection, w/wo contrast, dx/therapeutic substance, epidural/subarachnoid; lumbar/sacral N/A 1/25/2016    Procedure: LUMBAR EPIDURAL;  Surgeon: Rojas Bolanos MD;  Location: [facility]    Injection, w/wo contrast, dx/therapeutic substance, epidural/subarachnoid; lumbar/sacral N/A 3/2/2016    Procedure: LUMBAR EPIDURAL;  Surgeon: Corey Mcduffie MD;  Location: [facility]    [facility]A 12/10/2015    Procedure: LUMBAR EPIDURAL;  Surgeon: Rojas Bolanos MD;  Location: [facility]    M-sedaj by [facility] 5+ yr N/A 1/25/2016    Procedure: LUMBAR EPIDURAL;  Surgeon: Rojas Bolanos MD;  Location: [facility]    M-sedaj by  phys  [facility] 5+ yr N/A 3/2/2016    Procedure: LUMBAR EPIDURAL;  Surgeon: Corey Mcduffie MD;  Location: [facility]    Other surgical history  10/4/12    cysto-Dr. Calderón    Other surgical history N/A 5/16/2016    Procedure: LUMBAR LAMINECTOMY FUSION W/ BONE GRAFT 3 LEVEL;  Surgeon: CARLY Garcia MD;  Location:  MAIN OR    Patient documented not to have experienced any of the following events N/A 12/10/2015    Procedure: LUMBAR EPIDURAL;  Surgeon: Rojas Bolanos MD;  Location: [facility]    Patient documented not to have experienced any of the following events N/A 1/25/2016    Procedure: LUMBAR EPIDURAL;  Surgeon: Rojas Bolanos MD;  Location: [facility]    Patient documented not to have experienced any of the following events N/A 3/2/2016    Procedure: LUMBAR EPIDURAL;  Surgeon: Corey Mcduffie MD;  Location: [facility]    Patient withough preoperative order for iv antibiotic surgical site infection prophylaxis. N/A 12/10/2015    Procedure: LUMBAR EPIDURAL;  Surgeon: Rojas Bolanos MD;  Location: [facility]    Patient withough preoperative order for iv antibiotic surgical site infection prophylaxis. N/A 1/25/2016    Procedure: LUMBAR EPIDURAL;  Surgeon: Rojas Bolanos MD;  Location: [facility]    Patient withough preoperative order for iv antibiotic surgical site infection prophylaxis. N/A 3/2/2016    Procedure: LUMBAR EPIDURAL;  Surgeon: Corey Mcduffie MD;  Location: [facility]    Replace aortic valve open  2012    Valve repair       Social History     Socioeconomic History    Marital status:    Tobacco Use    Smoking status: Never    Smokeless tobacco: Never   Vaping Use    Vaping status: Never Used   Substance and Sexual Activity    Alcohol use: No     Alcohol/week: 0.0 standard drinks of alcohol    Drug use: No     History   Drug Use No     Available pre-op  labs reviewed.  Lab Results   Component Value Date    WBC 9.0 07/05/2024    RBC 2.57 (L) 07/05/2024    HGB 7.6 (L) 07/05/2024    HCT 23.4 (L) 07/05/2024    MCV 91.1 07/05/2024    MCH 29.6 07/05/2024    MCHC 32.5 07/05/2024    RDW 16.1 07/05/2024    .0 07/05/2024     Lab Results   Component Value Date     (L) 07/05/2024    K 4.6 07/05/2024     07/05/2024    CO2 20.0 (L) 07/05/2024    BUN 64 (H) 07/05/2024    CREATSERUM 7.68 (H) 07/05/2024     (H) 07/05/2024    CA 9.1 07/05/2024     Lab Results   Component Value Date    INR 1.48 (H) 06/28/2024         Airway      Mallampati: III  Mouth opening: >3 FB  TM distance: 4 - 6 cm  Neck ROM: full Cardiovascular    Cardiovascular exam normal.         Dental             Pulmonary    Pulmonary exam normal.                 Other findings              ASA: 3   Plan: MAC  NPO status verified and patient meets guidelines.          Plan/risks discussed with: patient                Present on Admission:   Anemia in ESRD (end-stage renal disease) ([facility])

## 2024-07-05 NOTE — PROGRESS NOTES
Patient seen at bedside in [facility]. No acute events noted overnight    Physical exam:   Vitals:    07/04/24 1933   BP: 100/55   Pulse: 63   Resp: 18   Temp: 98.4 °F (36.9 °C)     General: NAD  HEENT: EOMI, PERRLA  Respiratory: No wheezing, no rhonchi  Musculoskeletal: Bilateral TMA  Neuro: No focal deficits  Psych: Mood and affect normal    Lower extremity exam: Pedal pulses nonpalpable.  Sensation diminished.  Right foot: TMA site wound with fibrogranular base, exposed bone, some necrosis noted to wound margins  Left foot: TMA site with medial wound open with fibrogranular base, exposed bone, some necrosis noted to wound margins and necrosis of plantar flap    Recent Labs   Lab 06/29/24  1022 06/30/24  0402 07/01/24  0609 07/02/24  0506 07/03/24  0439 07/03/24  1425 07/04/24  0459   RBC 2.99* 2.71* 2.63* 2.44* 2.24*  --  2.63*   HGB 8.6* 7.9* 7.7* 7.0* 6.6* 8.1* 7.7*   HCT 26.6* 24.6* 24.4* 22.1* 19.9*  --  23.4*   MCV 89.0 90.8 92.8 90.6 88.8  --  89.0   MCH 28.8 29.2 29.3 28.7 29.5  --  29.3   MCHC 32.3 32.1 31.6 31.7 33.2  --  32.9   RDW 15.8 15.7 15.7 15.8 15.9  --  16.2   NEPRELIM 11.70* 9.55* 10.00*  --   --   --   --    WBC 13.8* 11.6* 11.6* 11.1* 10.9  --  9.5   .0 200.0 288.0 219.0 213.0  --  197.0       Recent Labs   Lab 06/30/24  0402 07/01/24  0609 07/02/24  0506 07/03/24  0439 07/04/24  0458   *   < > 129* 99 129*   BUN 50*   < > 52* 69* 46*   CREATSERUM 6.95*   < > 7.26* 8.56* 5.96*   EGFRCR 8*   < > 7* 6* 9*   CA 9.2   < > 8.9 9.4 9.0   ALB 1.9*  --   --   --  1.8*   *   < > 134* 134* 136   K 4.1   < > 4.0 4.2 4.2   CL 98   < > 101 100 101   CO2 23.0   < > 23.0 21.0 24.0   ALKPHO 112  --   --   --  105   AST 38*  --   --   --  18   ALT 9*  --   --   --  <6*   BILT 0.5  --   --   --  0.4   TP 5.6*  --   --   --  5.6*    < > = values in this interval not displayed.     Last A1c value was 5.9% done 6/14/2024.    Hospital Encounter on 06/25/24   1. Tissue Aerobic Culture     Status:  Abnormal    Collection Time: 06/30/24 12:11 PM    Specimen: Foot,left; Tissue   Result Value Ref Range    Tissue Culture Result 4+ growth Enterobacter cloacae (A) N/A    Tissue Culture Result 2+ growth Stenotrophomonas maltophilia (A) N/A    Tissue Smear 4+ WBCs seen (A) N/A    Tissue Smear 3+ Gram positive cocci in pairs (A) N/A    Tissue Smear 4+ Gram Negative Rods (A) N/A       Susceptibility    Enterobacter cloacae -  (no method available)     Cefazolin >=64 Resistant      Cefepime <=1 Sensitive      Ceftriaxone <=1 Sensitive      Ciprofloxacin <=0.25 Sensitive      Gentamicin <=1 Sensitive      Meropenem <=0.25 Sensitive      Levofloxacin <=0.12 Sensitive      Piperacillin + Tazobactam <=4 Sensitive      Trimethoprim/Sulfa <=20 Sensitive    2. Anaerobic Culture     Status: None    Collection Time: 06/30/24 12:11 PM    Specimen: Foot,left; Tissue   Result Value Ref Range    Anaerobic Culture No Anaerobes isolated N/A   3. Aerobic Bacterial Culture     Status: Abnormal    Collection Time: 06/25/24  8:14 AM    Specimen: Foot,left; Other   Result Value Ref Range    Aerobic Culture Result  N/A     Mixture of organisms suggestive of normal skin justice    Aerobic Culture Result 4+ growth Enterobacter cloacae complex (A) N/A    Aerobic Culture Result 4+ growth Acinetobacter pittii (A) N/A    Aerobic Smear No WBCs seen N/A    Aerobic Smear 1+ Gram Positive Cocci N/A    Aerobic Smear 1+ Gram Negative Rods N/A       Susceptibility    Enterobacter cloacae complex -  (no method available)     Cefazolin >=64 Resistant      Cefepime <=1 Sensitive      Ceftriaxone <=1 Sensitive      Ciprofloxacin <=0.25 Sensitive      Gentamicin <=1 Sensitive      Meropenem <=0.25 Sensitive      Levofloxacin <=0.12 Sensitive      Piperacillin + Tazobactam <=4 Sensitive      Trimethoprim/Sulfa <=20 Sensitive     Acinetobacter pittii -  (no method available)     Ceftazidime <=1 Sensitive      Ciprofloxacin <1 Sensitive      Gentamicin <1  Sensitive      Meropenem <1 Sensitive      Levofloxacin <=0.25 Sensitive      Piperacillin + Tazobactam <16 Sensitive      Trimethoprim/Sulfa <=40 Sensitive    4. Blood Culture     Status: None    Collection Time: 06/25/24  2:36 AM    Specimen: Blood,peripheral   Result Value Ref Range    Blood Culture Result No Growth 5 Days N/A       IR CENTRAL VENOUS ACCESS    Result Date: 6/28/2024  CONCLUSION: Successful conversion of  left IJ non tunneled central venous dialysis catheter to left chest tunneled dialysis catheter  (27 cm tip to cuff Bard Hemosplit catheter). Catheter is ready for use.  Recommend routine catheter care.   LOCATION:  [facility]    Dictated by (CST): Randy Maddox MD on 6/28/2024 at 12:09 PM     Finalized by (CST): Randy Maddox MD on 6/28/2024 at 12:15 PM       MRI FOOT (W+WO), RIGHT (CPT=73720)    Result Date: 6/26/2024  CONCLUSION:  Postsurgical changes status post amputation of wall the distal digits.  Fluid along the surgical approach may represent postoperative seroma although superinfection cannot be excluded.  There is no evidence of osteomyelitis.   LOCATION:  [facility]   Dictated by (CST): Randy Vaz MD on 6/26/2024 at 3:28 PM     Finalized by (CST): Randy Vaz MD on 6/26/2024 at 3:32 PM       MRI FOOT (W+WO), LEFT (CPT=73720)    Result Date: 6/26/2024  CONCLUSION:  Postsurgical changes in the 2nd toe.  No evidence of osteomyelitis.  A fluid tract extending from the surgical site to the skin surface is noted.   LOCATION:  [facility]   Dictated by (CST): Rnady Vaz MD on 6/26/2024 at 3:23 PM     Finalized by (CST): Randy Vaz MD on 6/26/2024 at 3:28 PM       IR CENTRAL VENOUS ACCESS    Result Date: 6/25/2024  CONCLUSION:  Successful non-tunneled dialysis catheter placement via the left internal jugular vein.  Chronically occluded right internal jugular vein.   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  Left forefoot gangrene  Cellulitis  Peripheral arterial disease  Diabetic neuropathy    Patient is status post right foot TMA site revision, left foot TMA and Achilles tendon lengthening done on 6/30/2024    VAC removed today  Some necrosis noted of bilateral wound margins plantar flap of left foot  Wet to dry dressings applied  Hold VAC    OR cultures noted  Continue IV antibiotics  The patient will require long-term IV antibiotics    Patient is at high risk for further soft tissue loss, worsening infection, chronic nonhealing wounds and proximal amputation  Will try to salvage both his feet as much as possible.    HBO consult placed    OR tomorrow for debridement and VAC application  NPO after midnight    Jose R Meza D.P.M.

## 2024-07-05 NOTE — WOUND PROGRESS NOTE
[facility]  Inpatient Wound Care Contact Note    Darin Bowens Patient Status:  Inpatient    1945 MRN [id number]   Location [facility] 3SW-A Attending Mook Escalante MD   Hosp Day # 10 PCP Zachery Hall MD     Per Dr. Meza, plan to take to OR for further I&D on 24, will need new orders to see post surgery, Team aware.  MD removed the VAC on 24 and wet to moist dressing was placed.      Thank you,    Lindy Brown, PT, MPT  [facility]  [address] [phone]

## 2024-07-05 NOTE — PROGRESS NOTES
[facility] Hospitalist Progress Note       SUBJECTIVE:  Pain controlled. Awaiting surgery later today.    OBJECTIVE:  Scheduled Meds:    epoetin matt  10,000 Units Intravenous Once in dialysis    [Held by provider] apixaban  2.5 mg Oral BID    sulfamethoxazole-trimethoprim DS  2 tablet Oral Daily    insulin degludec  10 Units Subcutaneous Daily    [Held by provider] clopidogrel  75 mg Oral Daily    sodium chloride   Intravenous Once    insulin aspart  4-20 Units Subcutaneous TID AC and HS    atorvastatin  80 mg Oral Nightly    carvedilol  12.5 mg Oral BID with meals    cefepime  1 g Intravenous Q24H    metRONIDAZOLE  500 mg Oral 2 times per day     Continuous Infusions:   PRN Meds:   hydrOXYzine    acetaminophen    acetaminophen **OR** HYDROcodone-acetaminophen **OR** HYDROcodone-acetaminophen    HYDROmorphone **OR** HYDROmorphone **OR** HYDROmorphone    polyethylene glycol (PEG 3350)    sennosides    bisacodyl    ondansetron    metoclopramide    glucose **OR** glucose **OR** glucose-vitamin C **OR** dextrose **OR** glucose **OR** glucose **OR** glucose-vitamin C    Vitals  Vitals:    07/05/24 0756   BP: 138/65   Pulse: 64   Resp: 17   Temp: 97.4 °F (36.3 °C)         Exam   Gen-    no acute distress, alert and oriented x 3   RESP-   Lungs CTA, normal respiratory effort  CV-      Heart RRR, no mgr  Abd-    soft, nondistended, nontender, bowel sounds present  Skin-    no rash  Neuro-  no focal neurologic deficits  Ext-    b/l LE wrapped  Psych- alert and oriented x 3     Labs:     Chem:  Recent Labs   Lab 06/30/24  0402 07/01/24  0609 07/02/24  0506 07/03/24  0439 07/04/24  0458 07/05/24  0441   * 130* 134* 134* 136 133*   K 4.1 4.7 4.0 4.2 4.2 4.6   CL 98 98 101 100 101 104   CO2 23.0 19.0* 23.0 21.0 24.0 20.0*   BUN 50* 67* 52* 69* 46* 64*   MG  --  2.2 2.2  --  2.2 2.4   ALT 9*  --   --   --  <6*  --    AST 38*  --   --   --  18  --    ALB 1.9*  --   --   --  1.8*  --        HEM:  Recent Labs   Lab 06/30/24  3448  07/01/24  0609 07/02/24  0506 07/03/24  0439 07/03/24  1425 07/04/24  0459   WBC 11.6* 11.6* 11.1* 10.9  --  9.5   HGB 7.9* 7.7* 7.0* 6.6* 8.1* 7.7*   .0 288.0 219.0 213.0  --  197.0   MCV 90.8 92.8 90.6 88.8  --  89.0       Coagulation:  Recent Labs   Lab 06/30/24  0402 07/01/24  0609 07/02/24  0506 07/03/24  0439 07/03/24  1425 07/04/24  0459   HCT 24.6* 24.4* 22.1* 19.9*  --  23.4*   HGB 7.9* 7.7* 7.0* 6.6* 8.1* 7.7*   .0 288.0 219.0 213.0  --  197.0              AP:  78 year old male with PMH sig for CAD status post stents, PAD status post bilateral lower extremity stents, atrial fibrillation on Xarelto, diabetes mellitus type 2 on insulin pump, end-stage renal disease on dialysis, chronic CHF with LVH, status post AVR, history of stroke, hypertension, hyperlipidemia, DESI and recent right TMA and left foot second digit amputation on 6/13 by Dr. Meza presented with poor surgical wound healing.       Postop wound infection R TMA 6/13 due to nonhealing ulcers and PVD.   Excisional debridement to level of bone along with partial resection of R 1st and 2nd MT 6/27  - surgical cultures with enterobacter cloacae and stenotrophomonas  - drainage cultures from admit with enterobacter and acinetobacter  - on iv cefepime, flagyl and now bactrim since 7/2  - further debridement today    L foot 2nd digit amputation on 6/13  Excisional wound debridement to level of bone along with partial resection of second MT 6/27  - culture polymicrobial with enterobacter and stenotrophomonas  - on abx as above.  - further debridement today    PVD  - Arterial US in April, likely has more distal PAD, may require further amputation, per vascular  pt is not a candidate for any further revascularization and should his foot amputation does not heal he will require major amputation.      ESRD on HD  Malfunctioning AVF  - vascular consulted, o/p eval of new AVF/fistula revision   - nephrology o/c -s/p tunneled catheter on 6/28         CAD s/p stents  PAD s/p b/l LE stents  Chronic CHF w/ LVH  S/p AVR  Atrial fibrillation  - coreg (Hold SBP < 110 given anemia), lipitor  - plavix on hold   - per podiatry was ok to resume AC - eliquis instead of his home xarelto due to his renal disease. However now stopped d/t anemia. Now going to OR today and awaiting repeat hgb. Will need to discuss anticoag plan with podiatry afterwards       DM2  - insulin pump - not on this admission  - ISS/accucheks - high dose SSI, tresiba daily 10 units   - may add carb count/meal time insulin if continues to be high    Anemia  - suspecting ABLA from surgery   - monitor for now hgb 7.9 to 7.7 to 7.0 to 6.6 --> PRBC 7/3. Hgb improved to 8.1 now down to 7.7  - denies BRBPR no melena      Essential HTN  - coreg     Hyperlipidemia  - statin     DESI  - cpap at bedtime     Itching  - prn atarax, may be 2/2 meds/dialysis        FEN: regular diet, PT/OT  Proph: SCDs eliquis on hold now given anemia     Code status: Full code

## 2024-07-05 NOTE — PROGRESS NOTES
[facility]   part of [facility]     Nephrology Progress Note    Darin Bowens Patient Status:  Inpatient    1945 MRN [id number]   Location [facility] 3SW-A Attending Mook Escalante MD   Hosp Day # 10 PCP Zachery Hall MD       SUBJECTIVE  Remains frustrated with hospital stay        Physical Exam:   /65 (BP Location: Left arm)   Pulse 64   Temp 97.4 °F (36.3 °C) (Oral)   Resp 17   Ht 6' (1.829 m)   Wt 198 lb 8 oz (90 kg)   SpO2 98%   BMI 26.92 kg/m²   Temp (24hrs), Av.2 °F (36.8 °C), Min:97.4 °F (36.3 °C), Max:98.8 °F (37.1 °C)       Intake/Output Summary (Last 24 hours) at 2024 1214  Last data filed at 2024 1837  Gross per 24 hour   Intake 480 ml   Output --   Net 480 ml     Last 3 Weights   24 0600 198 lb 8 oz (90 kg)   24 0648 205 lb (93 kg)   24 0543 211 lb (95.7 kg)   24 2000 211 lb (95.7 kg)   06/15/24 0008 211 lb (95.7 kg)   24 0545 211 lb 6.4 oz (95.9 kg)   24 1324 209 lb 7 oz (95 kg)   24 1120 210 lb (95.3 kg)   24 1342 210 lb (95.3 kg)   22 1253 210 lb (95.3 kg)     General: Alert and oriented in no apparent distress.  HEENT: No scleral icterus, MMM  Neck: Supple, no SHERI or thyromegaly  Cardiac: Regular rate and rhythm, S1, S2 normal, no murmur or rub  Lungs: Clear without wheezes, rales, rhonchi.    Extremities: No significant pitting edema, bilateral dressings in place over the feet   neurologic: Alert and oriented, cranial nerves grossly intact, moving all extremities  Skin: Warm and dry, no rash        Labs:     Recent Labs   Lab 24  0609 24  0506 24  0439 24  1425 24  0459 24  1044   WBC 11.6* 11.1* 10.9  --  9.5 9.0   HGB 7.7* 7.0* 6.6* 8.1* 7.7* 7.6*   MCV 92.8 90.6 88.8  --  89.0 91.1   .0 219.0 213.0  --  197.0 204.0       Recent Labs   Lab 24  0609 24  0506 24  0439 24  0458 24  0441   * 134* 134* 136  133*   K 4.7 4.0 4.2 4.2 4.6   CL 98 101 100 101 104   CO2 19.0* 23.0 21.0 24.0 20.0*   BUN 67* 52* 69* 46* 64*   CREATSERUM 8.46* 7.26* 8.56* 5.96* 7.68*   CA 9.4 8.9 9.4 9.0 9.1   MG 2.2 2.2  --  2.2 2.4   * 129* 99 129* 137*       Recent Labs   Lab 06/30/24  0402 07/04/24  0458   ALT 9* <6*   AST 38* 18   ALB 1.9* 1.8*       Recent Labs   Lab 07/04/24  0520 07/04/24  1148 07/04/24  1559 07/04/24  2118 07/05/24  0517   PGLU 142* 152* 181* 259* 148*       Meds:   Current Facility-Administered Medications   Medication Dose Route Frequency    epoetin matt (Epogen, Procrit) 03765 UNIT/ML injection 10,000 Units  10,000 Units Intravenous Once in dialysis    [Held by provider] apixaban (Eliquis) tab 2.5 mg  2.5 mg Oral BID    sulfamethoxazole-trimethoprim DS (Bactrim DS) 800-160 MG per tab 2 tablet  2 tablet Oral Daily    hydrOXYzine (Atarax) tab 25 mg  25 mg Oral TID PRN    insulin degludec (Tresiba) 100 units/mL flextouch 10 Units  10 Units Subcutaneous Daily    [Held by provider] clopidogrel (Plavix) tab 75 mg  75 mg Oral Daily    sodium chloride 0.9% infusion   Intravenous Once    acetaminophen (Tylenol Extra Strength) tab 500 mg  500 mg Oral Q4H PRN    acetaminophen (Tylenol) tab 650 mg  650 mg Oral Q4H PRN    Or    HYDROcodone-acetaminophen (Norco) 5-325 MG per tab 1 tablet  1 tablet Oral Q4H PRN    Or    HYDROcodone-acetaminophen (Norco) 5-325 MG per tab 2 tablet  2 tablet Oral Q4H PRN    HYDROmorphone (Dilaudid) 1 MG/ML injection 0.2 mg  0.2 mg Intravenous Q2H PRN    Or    HYDROmorphone (Dilaudid) 1 MG/ML injection 0.4 mg  0.4 mg Intravenous Q2H PRN    Or    HYDROmorphone (Dilaudid) 1 MG/ML injection 0.8 mg  0.8 mg Intravenous Q2H PRN    polyethylene glycol (PEG 3350) (Miralax) 17 g oral packet 17 g  17 g Oral Daily PRN    sennosides (Senokot) tab 17.2 mg  17.2 mg Oral Nightly PRN    bisacodyl (Dulcolax) 10 MG rectal suppository 10 mg  10 mg Rectal Daily PRN    ondansetron (Zofran) 4 MG/2ML injection 4 mg   4 mg Intravenous Q6H PRN    metoclopramide (Reglan) 5 mg/mL injection 5 mg  5 mg Intravenous Q8H PRN    glucose (Dex4) 15 GM/59ML oral liquid 15 g  15 g Oral Q15 Min PRN    Or    glucose (Glutose) 40% oral gel 15 g  15 g Oral Q15 Min PRN    Or    glucose-vitamin C (Dex-4) chewable tab 4 tablet  4 tablet Oral Q15 Min PRN    Or    dextrose 50% injection 50 mL  50 mL Intravenous Q15 Min PRN    Or    glucose (Dex4) 15 GM/59ML oral liquid 30 g  30 g Oral Q15 Min PRN    Or    glucose (Glutose) 40% oral gel 30 g  30 g Oral Q15 Min PRN    Or    glucose-vitamin C (Dex-4) chewable tab 8 tablet  8 tablet Oral Q15 Min PRN    insulin aspart (NovoLOG) 100 Units/mL FlexPen 4-20 Units  4-20 Units Subcutaneous TID AC and HS    atorvastatin (Lipitor) tab 80 mg  80 mg Oral Nightly    carvedilol (Coreg) tab 12.5 mg  12.5 mg Oral BID with meals    ceFEPIme (Maxipime) 1 g in sodium chloride 0.9% 100 mL IVPB-MBP  1 g Intravenous Q24H    metRONIDAZOLE (Flagyl) tab 500 mg  500 mg Oral 2 times per day         Impression/Plan:        ESRD - due to DM/HTN; clotted AVF.  HD tomorrow then again on Monday  S/p PC clotted AV fistula  - will have pt f/u with Dr. Nix for o/p eval of new AVF/fistula revision  Foot infection - s/p B foot/toe amputations  - on abx; wound care  - podiatry /ID following- on TMP/SMX for stenotrophomonas, plan for OR today  PAD- as above  CHF; s/p AVR; volume optimization w/ HD/UF  Anemia- chronically due to ESRD. DALE for goal hgb 10-11 gms      Questions/concerns were discussed with patient and/or family by bedside.      Sean Flores MD  7/5/2024  12:15 PM

## 2024-07-05 NOTE — PROGRESS NOTES
Infectious Disease Progress Note      Date of admission: 6/25/2024 12:55 AM     Reason for consult: Bilateral foot surgical wound infection with osteomyelitis    Subjective: The patient is feeling better.  Pain is better controlled.  No nausea or vomiting.  No diarrhea.  No shortness of breath.  No cough or sputum production.    The rest of the systems were reviewed and found to be negative except was mentioned above    Interval events: This is a 78-year-old male patient with history of peripheral vascular disease, recently had a transmetatarsal amputation of the right foot and amputation of the left second digit on 6/13/2024, presents here with surgical wound infection and gangrene involving both his feet.  MRI of the right foot showing postsurgical changes along with fluid along the surgical approach that may represent postoperative seroma with question of abscess, no evidence of osteomyelitis.  MRI of the left foot showed postsurgical changes with fluid tracking extending from the surgical site to the skin surface.  Patient was taken to the OR on 6/27, excisional wound debridement to the level of bone along with partial resection of the first and second metatarsal on the right and excisional wound debridement to the level of bone and partial resection of the second metatarsal on the left.  Drainage cultures back on 6/25 are growing Enterobacter cloacae and Acinetobacter species.  Surgical cultures were Enterobacter cloacae and stenotrophomonas.    Medications:    epoetin matt    [Held by provider] apixaban    sulfamethoxazole-trimethoprim DS    hydrOXYzine    insulin degludec    [Held by provider] clopidogrel    sodium chloride    acetaminophen    acetaminophen **OR** HYDROcodone-acetaminophen **OR** HYDROcodone-acetaminophen    HYDROmorphone **OR** HYDROmorphone **OR** HYDROmorphone    polyethylene glycol (PEG 3350)    sennosides    bisacodyl    ondansetron    metoclopramide    glucose **OR** glucose **OR**  glucose-vitamin C **OR** dextrose **OR** glucose **OR** glucose **OR** glucose-vitamin C    insulin aspart    atorvastatin    carvedilol    cefepime    metRONIDAZOLE     Allergies:  Allergies   Allergen Reactions    Augmentin [Amoxicillin-Pot Clavulanate] RASH    Lisinopril Coughing     Dyspnea         Physical Exam:  Vitals:    07/05/24 0756   BP: 138/65   Pulse: 64   Resp: 17   Temp: 97.4 °F (36.3 °C)     Vitals signs and nursing note reviewed.   Constitutional:       Appearance: Normal appearance.   HENT:      Head: Normocephalic and atraumatic.      Mouth: Mucous membranes are moist.   Neck:      Musculoskeletal: Neck supple.   Cardiovascular:      Rate and Rhythm: Normal rate.    Pulmonary:      Effort: Pulmonary effort is normal. No respiratory distress.   Musculoskeletal:      Right lower leg: No edema.  Clean dressing noted     Left lower leg: No edema.  Clean dressing noted  Skin:     General: Skin is warm and dry.   Neurological:      General: No focal deficit present.      Mental Status: Alert and oriented to person, place, and time.       Laboratory data:  I have reviewed all the lab results independently.  Lab Results   Component Value Date    CREATSERUM 7.68 07/05/2024    BUN 64 07/05/2024     07/05/2024    K 4.6 07/05/2024     07/05/2024    CO2 20.0 07/05/2024     07/05/2024    CA 9.1 07/05/2024    MG 2.4 07/05/2024      Recent Labs   Lab 07/01/24  0609 07/02/24  0506 07/04/24  0459   RBC 2.63*   < > 2.63*   HGB 7.7*   < > 7.7*   HCT 24.4*   < > 23.4*   MCV 92.8   < > 89.0   MCH 29.3   < > 29.3   MCHC 31.6   < > 32.9   RDW 15.7   < > 16.2   NEPRELIM 10.00*  --   --    WBC 11.6*   < > 9.5   .0   < > 197.0    < > = values in this interval not displayed.      Microbiology data:  Hospital Encounter on 06/25/24   1. Tissue Aerobic Culture     Status: Abnormal    Collection Time: 06/30/24 12:11 PM    Specimen: Foot,left; Tissue   Result Value Ref Range    Tissue Culture Result 4+  growth Enterobacter cloacae (A) N/A    Tissue Culture Result 2+ growth Stenotrophomonas maltophilia (A) N/A    Tissue Smear 4+ WBCs seen (A) N/A    Tissue Smear 3+ Gram positive cocci in pairs (A) N/A    Tissue Smear 4+ Gram Negative Rods (A) N/A       Susceptibility    Enterobacter cloacae -  (no method available)     Cefazolin >=64 Resistant      Cefepime <=1 Sensitive      Ceftriaxone <=1 Sensitive      Ciprofloxacin <=0.25 Sensitive      Gentamicin <=1 Sensitive      Meropenem <=0.25 Sensitive      Levofloxacin <=0.12 Sensitive      Piperacillin + Tazobactam <=4 Sensitive      Trimethoprim/Sulfa <=20 Sensitive    2. Anaerobic Culture     Status: None    Collection Time: 06/30/24 12:11 PM    Specimen: Foot,left; Tissue   Result Value Ref Range    Anaerobic Culture No Anaerobes isolated N/A   3. Aerobic Bacterial Culture     Status: Abnormal    Collection Time: 06/25/24  8:14 AM    Specimen: Foot,left; Other   Result Value Ref Range    Aerobic Culture Result  N/A     Mixture of organisms suggestive of normal skin justice    Aerobic Culture Result 4+ growth Enterobacter cloacae complex (A) N/A    Aerobic Culture Result 4+ growth Acinetobacter pittii (A) N/A    Aerobic Smear No WBCs seen N/A    Aerobic Smear 1+ Gram Positive Cocci N/A    Aerobic Smear 1+ Gram Negative Rods N/A       Susceptibility    Enterobacter cloacae complex -  (no method available)     Cefazolin >=64 Resistant      Cefepime <=1 Sensitive      Ceftriaxone <=1 Sensitive      Ciprofloxacin <=0.25 Sensitive      Gentamicin <=1 Sensitive      Meropenem <=0.25 Sensitive      Levofloxacin <=0.12 Sensitive      Piperacillin + Tazobactam <=4 Sensitive      Trimethoprim/Sulfa <=20 Sensitive     Acinetobacter pittii -  (no method available)     Ceftazidime <=1 Sensitive      Ciprofloxacin <1 Sensitive      Gentamicin <1 Sensitive      Meropenem <1 Sensitive      Levofloxacin <=0.25 Sensitive      Piperacillin + Tazobactam <16 Sensitive       Trimethoprim/Sulfa <=40 Sensitive    4. Blood Culture     Status: None    Collection Time: 06/25/24  2:36 AM    Specimen: Blood,peripheral   Result Value Ref Range    Blood Culture Result No Growth 5 Days N/A     Impression:  Darin Bowens is a 78 year old male with    Right-sided surgical wound infection with osteomyelitis with risk to limb  This is in the setting of right foot amputation on 6/13  Now status post excisional wound debridement to the level of bone along with partial resection of the first and second metatarsal on the right on 6/27   Surgical cultures with Enterobacter cloacae and stenotrophomonas  Drainage cultures from the time of admission grew Enterobacter cloacae and Acinetobacter  Currently on IV cefepime along with p.o. metronidazole and p.o. Bactrim  Plan for further debridement and wound VAC placement on 7/5  Left-sided surgical wound infection with osteomyelitis and gangrene with risk to limb  This is in the setting of recent foot surgery on 6/13  Now status post excisional wound debridement to level of bone along with partial resection of the second metatarsal on 6/27  Cultures with gram-positive cocci and gram-negative rods  Currently on IV cefepime, p.o. metronidazole and Bactrim  Plan for further debridement and wound VAC placement on 7/5  Peripheral vascular disease  Vascular surgery following  High risk for limb loss  End stage renal disease  AV fistula clotted  Permacath in place  Allergic reaction to Augmentin  Now stopped  Rash resolved    Recommendations:    Continue Bactrim DS 2 tablet daily, renally dosed for stenotrophomonas osteomyelitis  Continue IV cefepime, along with p.o. metronidazole  Plan to dose cefepime 2 g with hemodialysis 3 times weekly so a tunneled line will not be needed  Plan on 6 weeks of therapy through now through 8/15/2024 given plan for further debridement on 7/5  Weekly CBC with differential and CMP, sed rate and CRP.  Fax results to [facility]  [facility]. Fax: [phone]. Tel: [phone].  PICC line care as per protocol.  Wound care as per podiatry  Continue to monitor daily labs for antibiotic toxicities  Further recommendations will depend on the above work-up and clinical progress     The plan of care was discussed with the primary hospital team, Mook Escalante MD     Recommendations were also discussed with the patient; all questions were answered.     Thank you for this consultation. Please don't hesitate to call the ID team for questions or any acute changes in patient's clinical condition.    Please note that this report has been produced using speech recognition software and may contain errors related to that system including, but not limited to, errors in grammar, punctuation, and spelling, as well as words and phrases that possibly may have been recognized inappropriately.  If there are any questions or concerns, contact the dictating provider for clarification.    The  [employer] C[employer] Act makes medical notes like these available to patients in the interest of transparency. Please be advised this is a medical document. Medical documents are intended to carry relevant information, facts as evident, and the clinical opinion of the practitioner. The medical note is intended as peer to peer communication and may appear blunt or direct. It is written in medical language and may contain abbreviations or verbiage that are unfamiliar.     MD [facility]. Tel: [phone]. Fax: [phone].     Darin Bowens : 1945 MRN: [id number] [facility]: [id number]

## 2024-07-05 NOTE — CONSULTS
[facility]   part of [facility]  HYPERBARIC OXYGEN THERAPY CONSULTATION NOTE  LUCIEN MOLINA MD    Darin Bowens Patient Status:  Inpatient    1945 MRN [id number]   Location [facility] 3SW-A Attending Mook Escalante MD   Hosp Day # 10 PCP Zachery Hall MD     Date:  2024  Date of Admission:  2024    History of Present Illness:  Darin Bowens is a(n) 78 year old male.   Admitted with infected nonhealing diabetic foot ulcer. He is s/p R TMA, L foot 2nd digit amputation on , Dr. Meza/Podiatry.  Admitted with IV antibiotics - ID, podiatry, vascular  consulted  MRI of the right foot showing postsurgical changes along with fluid along the surgical approach that may represent postoperative seroma with question of abscess, no evidence of osteomyelitis. MRI of the left foot showed postsurgical changes with fluid tracking extending from the surgical site to the skin surface.   Patient was taken to the OR on , excisional wound debridement to the level of bone along with partial resection of the first and second metatarsal on the right and excisional wound debridement to the level of bone and partial resection of the second metatarsal on the left. Drainage cultures back on  are growing Enterobacter cloacae and Acinetobacter species. Surgical cultures were Enterobacter cloacae and stenotrophomonas.     34   1.  Transmetatarsal amputation, left foot  2.  Achilles tendon lengthening, left  3.  Excisional wound debridement to level of bone, 30 cm², right foot  4.  Partial resection of the third and fourth metatarsals, right foot    24:  1.  Excisional wound debridement to level of bone, 24 cm², right foot  2.  Partial resection of first and second metatarsals, right foot  3.  Excisional wound debridement to level of bone, 10 cm², left foot  4.  Partial resection of second metatarsal, left foot    24  1.  Transmetatarsal amputation of the right  foot  2.  Right Achilles tendon lengthening  3.  Left second toe amputation at the metatarsophalangeal joint.    He is already on xarelto and lipitor - held for procedure.    PMH:  PMH sig for CAD status post stents, PAD status post bilateral lower extremity stents, atrial fibrillation on Xarelto, diabetes mellitus type 2 on insulin pump, end-stage renal disease on dialysis, chronic CHF with LVH, status post AVR, history of stroke, hypertension, hyperlipidemia, DESI and recent right TMA and left foot second digit amputation on 6/13 by Dr. Meza     Microbiology data:          Hospital Encounter on 06/25/24   1. Tissue Aerobic Culture     Status: Abnormal     Collection Time: 06/30/24 12:11 PM     Specimen: Foot,left; Tissue   Result Value Ref Range     Tissue Culture Result 4+ growth Enterobacter cloacae (A) N/A     Tissue Culture Result 2+ growth Stenotrophomonas maltophilia (A) N/A     Tissue Smear 4+ WBCs seen (A) N/A     Tissue Smear 3+ Gram positive cocci in pairs (A) N/A     Tissue Smear 4+ Gram Negative Rods (A) N/A       Susceptibility     Enterobacter cloacae -  (no method available)       Cefazolin >=64 Resistant         Cefepime <=1 Sensitive         Ceftriaxone <=1 Sensitive         Ciprofloxacin <=0.25 Sensitive         Gentamicin <=1 Sensitive         Meropenem <=0.25 Sensitive         Levofloxacin <=0.12 Sensitive         Piperacillin + Tazobactam <=4 Sensitive         Trimethoprim/Sulfa <=20 Sensitive     2. Anaerobic Culture     Status: None     Collection Time: 06/30/24 12:11 PM     Specimen: Foot,left; Tissue   Result Value Ref Range     Anaerobic Culture No Anaerobes isolated N/A   3. Aerobic Bacterial Culture     Status: Abnormal     Collection Time: 06/25/24  8:14 AM     Specimen: Foot,left; Other   Result Value Ref Range     Aerobic Culture Result   N/A       Mixture of organisms suggestive of normal skin justice     Aerobic Culture Result 4+ growth Enterobacter cloacae complex (A) N/A      Aerobic Culture Result 4+ growth Acinetobacter pittii (A) N/A     Aerobic Smear No WBCs seen N/A     Aerobic Smear 1+ Gram Positive Cocci N/A     Aerobic Smear 1+ Gram Negative Rods N/A       Susceptibility     Enterobacter cloacae complex -  (no method available)       Cefazolin >=64 Resistant         Cefepime <=1 Sensitive         Ceftriaxone <=1 Sensitive         Ciprofloxacin <=0.25 Sensitive         Gentamicin <=1 Sensitive         Meropenem <=0.25 Sensitive         Levofloxacin <=0.12 Sensitive         Piperacillin + Tazobactam <=4 Sensitive         Trimethoprim/Sulfa <=20 Sensitive       Acinetobacter pittii -  (no method available)       Ceftazidime <=1 Sensitive         Ciprofloxacin <1 Sensitive         Gentamicin <1 Sensitive         Meropenem <1 Sensitive         Levofloxacin <=0.25 Sensitive         Piperacillin + Tazobactam <16 Sensitive         Trimethoprim/Sulfa <=40 Sensitive     4. Blood Culture     Status: None     Collection Time: 06/25/24  2:36 AM     Specimen: Blood,peripheral   Result Value Ref Range     Blood Culture Result No Growth 5 Days N/A     Patient here for HBOT evaluation.  Reason for HBOT : DFU DEGROOT STAGE 4     Does the patient have a prior history of HBO treatments?  no     Does the patient have a history of ear disease including prior tympanic membrane injury of pressure equalization tubes?  no     Does the patient have a history of problems equalizing his ears during air travel?  no     Does the patient have a history of claustrophobia?  no     Does the patient have a history of uncorrected cataracts? no     Does the patient have a history of pneumothorax?  no     Does the patient have a history of seizures?  no     Does the patient have a history of cardiomyopathy?  no     Is the patient on Home Oxygen?  no     Chemotherapy? no     Radiation therapy? no    History:  Past Medical History:    Aortic stenosis    Back problem    CAD (coronary artery disease)    Calculus of kidney     Cataract    CKD (chronic kidney disease) stage 4, GFR 15-29 ml/min (HCC)    Congestive heart disease (HCC)    Coronary atherosclerosis    DDD (degenerative disc disease), lumbar    Diabetes mellitus (HCC)    Dialysis patient ([facility])    fistula upper right arm/MWF    Dyslipidemia    Heart valve disease    High cholesterol    Hypertriglyceridemia    Hypoglycemic reaction    Muscle weakness    cane    Nausea and vomiting, unspecified vomiting type    Onychomycosis    DESI (obstructive sleep apnea) C-PAP     severe AHI 68, O2 sam 84%, CPAP 7    Other and unspecified hyperlipidemia    Overweight(278.02)    Renal disorder    S/P Coronary Artery Stents 12/2009    Sleep apnea    doesn't use CPAP    Type 1 diabetes mellitus (HCC)    Unspecified essential hypertension    Visual impairment    legally blind     Past Surgical History:   Procedure Laterality Date    Back surgery  5/16/16    L2-L5 Decomp poss uninstrumented fusion    Cabg      Cath percutaneous  transluminal coronary angioplasty      Cath transcatheter aortic valve replacement      Injection, w/wo contrast, dx/therapeutic substance, epidural/subarachnoid; lumbar/sacral N/A 12/10/2015    Procedure: LUMBAR EPIDURAL;  Surgeon: Rojas Bolanos MD;  Location: [facility]    Injection, w/wo contrast, dx/therapeutic substance, epidural/subarachnoid; lumbar/sacral N/A 1/25/2016    Procedure: LUMBAR EPIDURAL;  Surgeon: Rojas Bolanos MD;  Location: [facility]    Injection, w/wo contrast, dx/therapeutic substance, epidural/subarachnoid; lumbar/sacral N/A 3/2/2016    Procedure: LUMBAR EPIDURAL;  Surgeon: Corey Mcduffie MD;  Location: [facility]    M-sedaj by  phys perfrmg svc 5+ yr N/A 12/10/2015    Procedure: LUMBAR EPIDURAL;  Surgeon: Rojas Bolanos MD;  Location: [facility]    M-sedaj by  phys perfrmg svc 5+ yr N/A 1/25/2016    Procedure: LUMBAR EPIDURAL;  Surgeon: Rojas Bolanos MD;   Location: [facility]    M-sedaj by  phys perfrmg svc 5+ yr N/A 3/2/2016    Procedure: LUMBAR EPIDURAL;  Surgeon: Corey Mcduffie MD;  Location: [facility]    Other surgical history  10/4/12    cysto-Dr. Calderón    Other surgical history N/A 5/16/2016    Procedure: LUMBAR LAMINECTOMY FUSION W/ BONE GRAFT 3 LEVEL;  Surgeon: CRALY Garcia MD;  Location:  MAIN OR    Patient documented not to have experienced any of the following events N/A 12/10/2015    Procedure: LUMBAR EPIDURAL;  Surgeon: Rojas Bolanos MD;  Location: [facility]    Patient documented not to have experienced any of the following events N/A 1/25/2016    Procedure: LUMBAR EPIDURAL;  Surgeon: Rojas Bolanos MD;  Location: [facility]    Patient documented not to have experienced any of the following events N/A 3/2/2016    Procedure: LUMBAR EPIDURAL;  Surgeon: Corey Mcduffie MD;  Location: [facility]    Patient withough preoperative order for iv antibiotic surgical site infection prophylaxis. N/A 12/10/2015    Procedure: LUMBAR EPIDURAL;  Surgeon: Rojas Bolanos MD;  Location: [facility]    Patient withough preoperative order for iv antibiotic surgical site infection prophylaxis. N/A 1/25/2016    Procedure: LUMBAR EPIDURAL;  Surgeon: Rojas Bolanos MD;  Location: [facility]    Patient withough preoperative order for iv antibiotic surgical site infection prophylaxis. N/A 3/2/2016    Procedure: LUMBAR EPIDURAL;  Surgeon: Corey Mcduffie MD;  Location: [facility]    Replace aortic valve open  2012    Valve repair       Family History   Problem Relation Age of Onset    Heart Attack Father     Heart Attack Brother     Diabetes Brother     Diabetes Brother       reports that he has never smoked. He has never used smokeless tobacco. He reports that he does not drink alcohol and does not use  drugs.    Allergies:  Allergies   Allergen Reactions    Augmentin [Amoxicillin-Pot Clavulanate] RASH    Lisinopril Coughing     Dyspnea         Home Medications:  Medications Prior to Admission   Medication Sig Dispense Refill Last Dose    dorzolamide 2 % Ophthalmic Solution Place 1 drop into both eyes in the morning and 1 drop before bedtime.       Netarsudil-Latanoprost (ROCKLATAN) 0.02-0.005 % Ophthalmic Solution Apply 1 drop to eye daily. Both eyes       atorvastatin 80 MG Oral Tab Take 1 tablet (80 mg total) by mouth nightly. 90 tablet 2     CLOPIDOGREL 75 MG Oral Tab TAKE 1 TABLET(75 MG) BY MOUTH DAILY 90 tablet 0     Insulin Lispro (HUMALOG) 100 UNIT/ML Subcutaneous Solution 95 units per day via insulin pump. 90 mL 1     Ergocalciferol (VITAMIN D2 OR) Take 50,000 Units by mouth every 30 (thirty) days.       rivaroxaban (XARELTO) 15 MG Oral Tab Take 1 tablet (15 mg total) by mouth daily with food. 90 tablet 3 6/24/2024    CALCIUM ACETATE 667 MG Oral Cap TAKE 1 CAPSULE BY MOUTH THREE TIMES DAILY WITH MEALS 270 capsule 0     COMBIGAN 0.2-0.5 % Ophthalmic Solution Place 1 drop into both eyes 2 (two) times daily.       amoxicillin clavulanate 875-125 MG Oral Tab Take 0.5 tablets by mouth 2 (two) times daily.       ketoconazole 2 % External Shampoo Apply 1 Application topically daily as needed for Itching.       Continuous Blood Gluc Sensor (DEXCOM G6 SENSOR) Does not apply Misc 1 Device Every 10 days. 3 each 1     carvedilol 12.5 MG Oral Tab Take 1 tablet (12.5 mg total) by mouth 2 (two) times daily with meals. Take one tablet (12.5mg total) by mouth two times a day 180 tablet 3     clindamycin 1 % External Lotion Apply twice daily to scalp and chest rashes (Patient not taking: Reported on 6/13/2024) 60 mL 11     Continuous Blood Gluc Transmit (DEXCOM G6 TRANSMITTER) Does not apply Misc Change every 90 days 1 each 3     PTA Insulin via Pump Inject into the skin continuous. humalog insulin   [employer]  Basal Rate :  55.6 standard rate 1.90  I:C - 1 units/4 carbs  Correction Factor - unknown          Review of Systems:  Pertinent items are noted in HPI.    Physical Exam:  /48 (BP Location: Left arm)   Pulse 62   Temp 98.1 °F (36.7 °C) (Oral)   Resp 17   Ht 72\"   Wt 198 lb 8 oz (90 kg)   SpO2 100%   BMI 26.92 kg/m²   General appearance: alert, appears stated age, and cooperative  Head: Normocephalic, without obvious abnormality, atraumatic  Eyes: conjunctivae/corneas clear. PERRL, EOM's intact. Fundi benign.  Ears: normal TM's and external ear canals both ears  Lungs: clear to auscultation bilaterally  Chest wall: no tenderness  Heart: S1, S2 normal, no murmur, click, rub or gallop, regular rate and rhythm    Wounds not examined today as he is about to go fir surgery  Pictures and wound nurse records reviewed.     Wound 06/27/24 Dorsal;Right (Active)   Date First Assessed/Time First Assessed: 06/27/24 1204   Primary Wound Type: Incision  Wound Location Orientation: Dorsal;Right      Assessments 6/27/2024 12:25 PM 7/5/2024  9:00 AM   Site Assessment -- Unable to assess   Closure -- Unable to assess   Drainage Amount -- Other (Comment)   Drainage Description -- Sanguineous   Treatments Packings Wound Vac - Neg Pressure   Dressing 4x4s;[place];Webril Adaptic;Wound vac sponge;Gauze;Kerlix roll   Dressing Status -- Clean;Dry;Intact       Active Orders   Date Order Priority Status Authorizing Provider   07/02/24 1204 Wound care Routine Active Jose R Meza DPM       Wound 06/27/24 Dorsal;Left (Active)   Date First Assessed/Time First Assessed: 06/27/24 1205   Primary Wound Type: Incision  Wound Location Orientation: Dorsal;Left      Assessments 6/27/2024 12:25 PM 7/5/2024  9:00 AM   Site Assessment -- Unable to assess   Closure -- Unable to assess   Drainage Amount -- Other (Comment)   Drainage Description -- Sanguineous   Treatments Packings Wound Vac - Neg Pressure   Dressing 4x4s;[place];Webril Adaptic;Gauze;Wound vac  sponge;Kerlix roll   Dressing Status -- Clean;Dry;Intact       Active Orders   Date Order Priority Status Authorizing Provider   07/02/24 1204 Wound care Routine Active Jose R eMza DPM       Wound 06/30/24 Foot Right (Active)   Date First Assessed: 06/30/24   Primary Wound Type: Incision  Location: Foot  Wound Location Orientation: Right      Assessments 6/30/2024 12:51 PM 7/5/2024  9:00 AM   Site Assessment -- Intact;Bleeding;Excoriated;Fragile   Closure -- Not approximated   Drainage Amount -- Small   Drainage Description -- Serosanguineous   Treatments -- Cleansed;Site Care;Packings   Dressing 4x4s;ABD Pad;Ace bandage;Kerlix roll;Webril Wound vac sponge   Dressing Changed -- Changed   Dressing Status Clean;Dry Clean;Dry;Intact;Dressing Changed       No associated orders.       Wound 06/30/24 Foot Left (Active)   Date First Assessed: 06/30/24   Primary Wound Type: Incision  Location: Foot  Wound Location Orientation: Left      Assessments 6/30/2024 12:51 PM 7/5/2024  9:00 AM   Site Assessment -- Bleeding;Edema;Fragile;Intact   Closure -- Not approximated   Drainage Amount -- Small   Drainage Description -- Sanguineous   Treatments -- Cleansed;Packings;Site Care   Dressing 4x4s;ABD Pad;Ace bandage;Kerlix roll 4x4s;ABD Pad;Gauze;Kerlix roll;Tape   Dressing Changed -- Changed   Dressing Status Clean Clean;Dry;Intact;Dressing Changed       No associated orders.       Negative Pressure Wound Therapy Foot Right;Left (Active)   Placement Date/Time: 07/02/24 1200   Inserted by: Wound Care RN  Wound Type: Surgical  Location: Foot  Wound Location Orientation: Right;Left      Assessments 7/2/2024  3:45 PM 7/5/2024  9:00 AM   Machine Status (On) Yes No   Site Assessment Unable to assess Unable to assess   María-wound Assessment Unable to assess Unable to assess   Cycle Continuous --   Target Pressure (mmHg) 125 --   Drainage Description Sanguineous --   Dressing Status Clean;Dry;Intact --   Canister Changed No --   Output  (mL) 25 mL --       No associated orders.                   Laboratory Data:   Lab Results   Component Value Date    WBC 9.0 07/05/2024    HGB 7.6 07/05/2024    HCT 23.4 07/05/2024    .0 07/05/2024    CREATSERUM 7.68 07/05/2024    BUN 64 07/05/2024     07/05/2024    K 4.6 07/05/2024     07/05/2024    CO2 20.0 07/05/2024     07/05/2024    CA 9.1 07/05/2024    MG 2.4 07/05/2024       Imaging:  IR CENTRAL VENOUS ACCESS    Result Date: 6/28/2024  CONCLUSION: Successful conversion of  left IJ non tunneled central venous dialysis catheter to left chest tunneled dialysis catheter  (27 cm tip to cuff Bard Hemosplit catheter). Catheter is ready for use.  Recommend routine catheter care.   LOCATION:  [facility]    Dictated by (CST): Randy Maddox MD on 6/28/2024 at 12:09 PM     Finalized by (CST): Randy Maddox MD on 6/28/2024 at 12:15 PM       MRI FOOT (W+WO), RIGHT (CPT=73720)    Result Date: 6/26/2024  CONCLUSION:  Postsurgical changes status post amputation of wall the distal digits.  Fluid along the surgical approach may represent postoperative seroma although superinfection cannot be excluded.  There is no evidence of osteomyelitis.   LOCATION:  [facility]   Dictated by (CST): Randy Vaz MD on 6/26/2024 at 3:28 PM     Finalized by (CST): Randy Vaz MD on 6/26/2024 at 3:32 PM       MRI FOOT (W+WO), LEFT (CPT=73720)    Result Date: 6/26/2024  CONCLUSION:  Postsurgical changes in the 2nd toe.  No evidence of osteomyelitis.  A fluid tract extending from the surgical site to the skin surface is noted.   LOCATION:  [facility]   Dictated by (CST): Randy Vaz MD on 6/26/2024 at 3:23 PM     Finalized by (CST): Randy Vaz MD on 6/26/2024 at 3:28 PM       IR CENTRAL VENOUS ACCESS    Result Date: 6/25/2024  CONCLUSION:  Successful non-tunneled dialysis catheter placement via the left internal jugular vein.  Chronically occluded right internal jugular vein.   LOCATION:  [facility]    Dictated by (CST): Sania  MD Zana on 6/25/2024 at 5:26 PM     Finalized by (CST): Zana Lui MD on 6/25/2024 at 5:32 PM          Assessment:    DIABETIC FOOT ULCERS DEGROOT GRADE 4+   Left-sided surgical wound infection with osteomyelitis and gangrene   Right-sided surgical wound infection with osteomyelitis - s/p amp 6/13, status post excisional wound debridement to the level of bone along with partial resection of the first and second metatarsal on the right on 6/27   HIGH RISK LIMB LOSS.   Surgical wound infection.   Enterococcus cloacae wound infection.   Stenotrophomonas wound infection.   Acinetobacter wound infection.   Peripheral arterial disease - s/p revascularizarion maximally - on xarelto and  lipitor  ESRD on HD      Patient Active Problem List   Diagnosis    HTN (hypertension)    Dyslipidemia    DESI (obstructive sleep apnea) C-PAP     Heart valve replaced by transplant #23 pericardial bioprosthesis 3/19/12    Coronary artery disease involving native coronary artery of native heart without angina pectoris    Chronic diastolic heart failure (HCC)    Anemia associated with chronic renal failure    B12 deficiency    Anemia in ESRD (end-stage renal disease) (HCC)    ESRD on hemodialysis (HCC)    Status post aortic valve replacement    Stable proliferative diabetic retinopathy of both eyes associated with type 2 diabetes mellitus ([facility])    Closed fracture of neck of left femur, initial encounter ([facility])    Atrial fibrillation, chronic ([facility])    Diabetes mellitus type 1, with complication, on long term insulin pump ([facility])    Gangrene of right foot ([facility])    PAD (peripheral artery disease) ([facility])    Cellulitis of lower extremity, unspecified laterality    Nausea and vomiting, unspecified vomiting type    Malfunction of arteriovenous dialysis fistula, initial encounter ([facility])    ESRD (end stage renal disease) ([facility])    Arteriovenous fistula occlusion (HCC)    Infection due to Stenotrophomonas maltophilia           Plan:    Check 2d ECHO  Check  CXR  EKG noted.   HBO tech consult.   If the above test results come out OK, we can plan to stat HBO treatments on Monday.   D/w daughter in detail.     Debridement and wound vac placement planned for today.  Antibiotics to continue per ID - IV cefepime, metronidazole, bactrim. Plan for 6 weeks through 8/15/24    Patient is an appropriate candidate for hyperbaric oxygen therapy. Hyperbaric Oxygen Therapy. HBO would be an essential adjunct in the resolution and treatment of  his nonhealing DFU with high risk for limb loss. This patient has sufficient physiologic and psychological stamina to undergo the rigors of Hyperbaric Oxygen Therapy- pending review of echo and EKG. As such I recommend the following: - 30 Hyperbaric Oxygen Treatments at 2.5 LIGIA in 100% Oxygen for 90 minutes per treatment with air break.     HBOT informed consent-  I have discussed the possible benefits of hyperbaric oxygen therapy with this patient. I have also presented and described the risks, including: Air Gas Embolism, Pneumothorax, Central Nervous System and Pulmonary Oxygen Toxicity, Flash Pulmonary Edema, Hypoglycemia, Reversible Visual Refractive Changes, Ear and Sinus Yusef-Trauma, and Confinement Anxiety. The patient has verbalized understanding of these risks, and is still willing to undergo hyperbaric oxygen therapy. The patient understands the significant time and transportation commitment involved in daily treatments of up to two hours duration and has stated that they are willing to commit to this therapy.      D/w daughter in detail.   D/w nurse  D/w HBO    D/w Dr. Derrick Eisenberg MD  7/5/2024  12:42 PM

## 2024-07-05 NOTE — DIETARY NOTE
[facility]   part of [facility]    NUTRITION ASSESSMENT    Unable to diagnose malnutrition criteria at this time.    NUTRITION INTERVENTION:    Meal and Snacks - Monitor and encourage adequate PO intake. Suggest more liberal diet.  Medical Food Supplements -  Gamino Nepro at breakfast and orange Alex BID; at lunch and dinner . Rationale/use for oral supplements discussed.  Nutrition Education - Provided handout on Tips for Increasing Protein.   Vitamin and Mineral Supplements - recommend Multivitamin with minerals. However, pt declined MVI. Stated, \"I already take enough pills.\"      PATIENT STATUS:   Tolerating diet and ONS. Frustrated with restrictions this is likely due to renal diet -highly suggest advancing diet to only CHO controlled 75 gm/meal. NPO now going for I&D 7/6. +BM. No new wt.  7/2/24- 79 y/o male admitted with cellulitis of LE and malfunction of dialysis fistula. Pt seen d/t length of stay. Pt w/ poor surgical wound healing, fever, and multiple episodes of emesis PTA. Pt's spouse present at time of visit. Pt reported fair to good appetite. Observed finished lunch tray at bedside. Pt consumed 100% of lunch, including chicken marsala. Recorded PO intakes vary:0-100% with 75% or greater most meals. Denied any N/V/D/C. Pt agreeable to try ONS to help maximize protein intake to aid w/ wound healing.   -6/27- s/p R foot wound debridement, partial resection of 1st and 2nd metatarsal and L foot wound debridement w/ partial resection of 2nd metatarsal.   -6/30-s/p R foot wound debridement and application of wound vac and L transmetatarsal amputation w/ Achilles tendon.   Wound Care is following. Pt w/ wound vac to B/L feet.   PMH:HTN, HLD, CAD, s/p stents, PAD, CHF, CVA, DM2, ESRD on HD, recent R TMA and L foot-2nd digit amputation on 6/13/24.             ANTHROPOMETRICS:  Ht: 182.9 cm (6')  Wt: 90 kg (198 lb 8 oz).   BMI: Body mass index is 26.92 kg/m².  IBW: 81 kg      WEIGHT HISTORY:     Pt  reported usual wt of 211 lb. Denied any unintentional wt loss.     Wt trending down 13 lb (6.2%) this admit (211 lb down to 198 lb)? Some wt changes may be related to fluid shifts w/ HD.     Wt Readings from Last 10 Encounters:   06/28/24 90 kg (198 lb 8 oz)   06/15/24 95.7 kg (211 lb)   06/04/24 95.3 kg (210 lb)   05/20/24 95.3 kg (210 lb)   04/07/22 95.3 kg (210 lb)   12/21/21 95.3 kg (210 lb)   11/30/21 106.1 kg (234 lb)   11/02/21 105.7 kg (233 lb)   10/19/21 105.7 kg (233 lb)   10/01/21 101.6 kg (224 lb)        NUTRITION:  Diet:       Procedures    NPO      Food Allergies: No  Cultural/Ethnic/Buddhist Preferences Addressed: Yes    Percent Meals Eaten (last 3 days)       Date/Time Percent Meals Eaten (%)    07/04/24 1011 75 %    07/04/24 1837 100 %          GI system review:  Last BM:7/2    Skin and wounds: wounds to B/L feet, s/p I&D and wound vacs    NUTRITION RELATED PHYSICAL FINDINGS:     1. Body Fat/Muscle Mass: no wasting noted     2. Fluid Accumulation: none per RN documentation     NUTRITION PRESCRIPTION: 81 kg (IBW)  Calories: 6793-9487 calories/day ( 27-32 calories per kg )  Protein: 105-146 grams protein/day (1.3-1.8 grams protein per kg)  Fluid: 1000 ml + urine output or per MD discretion    NUTRITION DIAGNOSIS/PROBLEM:  Increased nutrient needs related to  wound healing  as evidenced by  wounds to B/L feet, s/p I&D and B/L wound vacs.       MONITOR AND EVALUATE/NUTRITION GOALS:  PO intake of 75% of meals TID - Met, continues  PO intake of 75% of oral nutrition supplement/s - Met, continues  Provide nutrition adequate for wound healing - Ongoing  Achieve and maintain dry wt +/- 1 to 2 lbs - Ongoing      MEDICATIONS:  IV abx, Insulin, Norco    LABS:  noted    Pt is at High nutrition risk      Nereida Cantu RD, LDN  Clinical Nutrition  [id number]

## 2024-07-05 NOTE — ANESTHESIA PROCEDURE NOTES
Peripheral IV  Date/Time: 7/5/2024 3:16 PM  Inserted by: Andres Rutherford MD    Placement  Needle size: 22 G  Laterality: left  Location: hand  Local anesthetic: none  Site prep: alcohol  Technique: anatomical landmarks  Attempts: 1

## 2024-07-06 ENCOUNTER — APPOINTMENT (OUTPATIENT)
Dept: CV DIAGNOSTICS | Facility: HOSPITAL | Age: 79
End: 2024-07-06
Attending: PODIATRIST
Payer: MEDICARE

## 2024-07-06 LAB
DEPRECATED HBV CORE AB SER IA-ACNC: 2542 NG/ML
ERYTHROCYTE [DISTWIDTH] IN BLOOD BY AUTOMATED COUNT: 15.8 %
GLUCOSE BLD-MCNC: 140 MG/DL (ref 70–99)
GLUCOSE BLD-MCNC: 167 MG/DL (ref 70–99)
GLUCOSE BLD-MCNC: 185 MG/DL (ref 70–99)
GLUCOSE BLD-MCNC: 245 MG/DL (ref 70–99)
HCT VFR BLD AUTO: 25.4 %
HGB BLD-MCNC: 7.9 G/DL
IRON SATN MFR SERPL: 30 %
IRON SERPL-MCNC: 37 UG/DL
MCH RBC QN AUTO: 28.9 PG (ref 26–34)
MCHC RBC AUTO-ENTMCNC: 31.1 G/DL (ref 31–37)
MCV RBC AUTO: 93 FL
PLATELET # BLD AUTO: 208 10(3)UL (ref 150–450)
RBC # BLD AUTO: 2.73 X10(6)UL
TIBC SERPL-MCNC: 124 UG/DL (ref 240–450)
TRANSFERRIN SERPL-MCNC: 83 MG/DL (ref 200–360)
WBC # BLD AUTO: 9.1 X10(3) UL (ref 4–11)

## 2024-07-06 PROCEDURE — 99232 SBSQ HOSP IP/OBS MODERATE 35: CPT | Performed by: INTERNAL MEDICINE

## 2024-07-06 PROCEDURE — 93306 TTE W/DOPPLER COMPLETE: CPT | Performed by: PODIATRIST

## 2024-07-06 RX ORDER — HEPARIN SODIUM 1000 [USP'U]/ML
INJECTION, SOLUTION INTRAVENOUS; SUBCUTANEOUS
Status: DISCONTINUED
Start: 2024-07-06 | End: 2024-07-06 | Stop reason: WASHOUT

## 2024-07-06 RX ORDER — HEPARIN SODIUM 1000 [USP'U]/ML
1000 INJECTION, SOLUTION INTRAVENOUS; SUBCUTANEOUS
Status: COMPLETED | OUTPATIENT
Start: 2024-07-06 | End: 2024-07-12

## 2024-07-06 RX ORDER — HEPARIN SODIUM 1000 [USP'U]/ML
INJECTION, SOLUTION INTRAVENOUS; SUBCUTANEOUS
Status: COMPLETED
Start: 2024-07-06 | End: 2024-07-06

## 2024-07-06 NOTE — PLAN OF CARE
Alert and oriented x 4. VSS. Denies pain. BLE warm- pt denies numbness or tingling. Weak dorsi and plantar flexion on bilateral feet. ABD with kerlix and tape is C/D/I. Minimal urine output which is baseline for pt due to ESRD. Last BM 7/2/24. Tolerating diet. Plan for dialysis in AM and continue abx. Plan of care discussed with patient.

## 2024-07-06 NOTE — PROGRESS NOTES
[facility]   part of [facility]     Nephrology Progress Note    Darin Bowens Patient Status:  Inpatient    1945 MRN [id number]   Location [facility] 3SW-A Attending Mook Escalante MD   Hosp Day # 11 PCP Zachery Hall MD       SUBJECTIVE  Feels a bit better today        Physical Exam:   /58 (BP Location: Left arm)   Pulse 74   Temp 98.5 °F (36.9 °C) (Oral)   Resp 16   Ht 6' (1.829 m)   Wt 208 lb 5.4 oz (94.5 kg)   SpO2 99%   BMI 28.26 kg/m²   Temp (24hrs), Av.9 °F (36.6 °C), Min:97 °F (36.1 °C), Max:99 °F (37.2 °C)       Intake/Output Summary (Last 24 hours) at 2024 0727  Last data filed at 2024 1551  Gross per 24 hour   Intake 200 ml   Output 5 ml   Net 195 ml     Last 3 Weights   24 0400 208 lb 5.4 oz (94.5 kg)   24 0600 198 lb 8 oz (90 kg)   24 0648 205 lb (93 kg)   24 0543 211 lb (95.7 kg)   24 2000 211 lb (95.7 kg)   06/15/24 0008 211 lb (95.7 kg)   24 0545 211 lb 6.4 oz (95.9 kg)   24 1324 209 lb 7 oz (95 kg)   24 1120 210 lb (95.3 kg)   24 1342 210 lb (95.3 kg)   22 1253 210 lb (95.3 kg)     General: Alert and oriented in no apparent distress.  HEENT: No scleral icterus, MMM  Neck: Supple, no SHERI or thyromegaly  Cardiac: Regular rate and rhythm, S1, S2 normal, no murmur or rub  Lungs: Clear without wheezes, rales, rhonchi.    Extremities: No significant pitting edema, bilateral dressings in place over the feet   neurologic: Alert and oriented, cranial nerves grossly intact, moving all extremities  Skin: Warm and dry, no rash        Labs:     Recent Labs   Lab 24  0609 24  0506 24  0439 24  1425 24  0459 24  1044   WBC 11.6* 11.1* 10.9  --  9.5 9.0   HGB 7.7* 7.0* 6.6* 8.1* 7.7* 7.6*   MCV 92.8 90.6 88.8  --  89.0 91.1   .0 219.0 213.0  --  197.0 204.0       Recent Labs   Lab 24  0609 24  0506 24  0439 24  0455  07/05/24  0441   * 134* 134* 136 133*   K 4.7 4.0 4.2 4.2 4.6   CL 98 101 100 101 104   CO2 19.0* 23.0 21.0 24.0 20.0*   BUN 67* 52* 69* 46* 64*   CREATSERUM 8.46* 7.26* 8.56* 5.96* 7.68*   CA 9.4 8.9 9.4 9.0 9.1   MG 2.2 2.2  --  2.2 2.4   * 129* 99 129* 137*       Recent Labs   Lab 06/30/24  0402 07/04/24  0458   ALT 9* <6*   AST 38* 18   ALB 1.9* 1.8*       Recent Labs   Lab 07/05/24  0517 07/05/24  1325 07/05/24  1600 07/05/24  2111 07/06/24  0451   PGLU 148* 140* 132* 183* 140*       Meds:   Current Facility-Administered Medications   Medication Dose Route Frequency    epoetin matt (Epogen, Procrit) 95506 UNIT/ML injection 10,000 Units  10,000 Units Intravenous Once in dialysis    [Held by provider] apixaban (Eliquis) tab 2.5 mg  2.5 mg Oral BID    sulfamethoxazole-trimethoprim DS (Bactrim DS) 800-160 MG per tab 2 tablet  2 tablet Oral Daily    hydrOXYzine (Atarax) tab 25 mg  25 mg Oral TID PRN    insulin degludec (Tresiba) 100 units/mL flextouch 10 Units  10 Units Subcutaneous Daily    [Held by provider] clopidogrel (Plavix) tab 75 mg  75 mg Oral Daily    sodium chloride 0.9% infusion   Intravenous Once    acetaminophen (Tylenol Extra Strength) tab 500 mg  500 mg Oral Q4H PRN    acetaminophen (Tylenol) tab 650 mg  650 mg Oral Q4H PRN    Or    HYDROcodone-acetaminophen (Norco) 5-325 MG per tab 1 tablet  1 tablet Oral Q4H PRN    Or    HYDROcodone-acetaminophen (Norco) 5-325 MG per tab 2 tablet  2 tablet Oral Q4H PRN    HYDROmorphone (Dilaudid) 1 MG/ML injection 0.2 mg  0.2 mg Intravenous Q2H PRN    Or    HYDROmorphone (Dilaudid) 1 MG/ML injection 0.4 mg  0.4 mg Intravenous Q2H PRN    Or    HYDROmorphone (Dilaudid) 1 MG/ML injection 0.8 mg  0.8 mg Intravenous Q2H PRN    polyethylene glycol (PEG 3350) (Miralax) 17 g oral packet 17 g  17 g Oral Daily PRN    sennosides (Senokot) tab 17.2 mg  17.2 mg Oral Nightly PRN    bisacodyl (Dulcolax) 10 MG rectal suppository 10 mg  10 mg Rectal Daily PRN     ondansetron (Zofran) 4 MG/2ML injection 4 mg  4 mg Intravenous Q6H PRN    metoclopramide (Reglan) 5 mg/mL injection 5 mg  5 mg Intravenous Q8H PRN    glucose (Dex4) 15 GM/59ML oral liquid 15 g  15 g Oral Q15 Min PRN    Or    glucose (Glutose) 40% oral gel 15 g  15 g Oral Q15 Min PRN    Or    glucose-vitamin C (Dex-4) chewable tab 4 tablet  4 tablet Oral Q15 Min PRN    Or    dextrose 50% injection 50 mL  50 mL Intravenous Q15 Min PRN    Or    glucose (Dex4) 15 GM/59ML oral liquid 30 g  30 g Oral Q15 Min PRN    Or    glucose (Glutose) 40% oral gel 30 g  30 g Oral Q15 Min PRN    Or    glucose-vitamin C (Dex-4) chewable tab 8 tablet  8 tablet Oral Q15 Min PRN    insulin aspart (NovoLOG) 100 Units/mL FlexPen 4-20 Units  4-20 Units Subcutaneous TID AC and HS    atorvastatin (Lipitor) tab 80 mg  80 mg Oral Nightly    carvedilol (Coreg) tab 12.5 mg  12.5 mg Oral BID with meals    ceFEPIme (Maxipime) 1 g in sodium chloride 0.9% 100 mL IVPB-MBP  1 g Intravenous Q24H    metRONIDAZOLE (Flagyl) tab 500 mg  500 mg Oral 2 times per day         Impression/Plan:        ESRD - due to DM/HTN; clotted AVF.  HD today then again on Monday  S/p PC due to clotted AV fistula  - will have pt f/u with Dr. Nix for o/p eval of new AVF/fistula revision  Foot infection - s/p B foot/toe amputations  - on abx; wound care  - podiatry /ID following- on TMP/SMX for stenotrophomonas,  PAD- as above  CHF; s/p AVR; volume optimization w/ HD/UF  Anemia- chronically due to ESRD. DALE for goal hgb 10-11 gms      Questions/concerns were discussed with patient and/or family by bedside.      Sean Flores MD  7/6/2024  7:27 AM

## 2024-07-06 NOTE — PROGRESS NOTES
[facility] Hospitalist Progress Note       SUBJECTIVE:  Feels okay, pain controlled. No sob. Getting HD    OBJECTIVE:  Scheduled Meds:    epoetin matt  10,000 Units Intravenous Once in dialysis    [Held by provider] apixaban  2.5 mg Oral BID    sulfamethoxazole-trimethoprim DS  2 tablet Oral Daily    insulin degludec  10 Units Subcutaneous Daily    [Held by provider] clopidogrel  75 mg Oral Daily    sodium chloride   Intravenous Once    insulin aspart  4-20 Units Subcutaneous TID AC and HS    atorvastatin  80 mg Oral Nightly    carvedilol  12.5 mg Oral BID with meals    cefepime  1 g Intravenous Q24H    metRONIDAZOLE  500 mg Oral 2 times per day     Continuous Infusions:   PRN Meds:   hydrOXYzine    acetaminophen    acetaminophen **OR** HYDROcodone-acetaminophen **OR** HYDROcodone-acetaminophen    HYDROmorphone **OR** HYDROmorphone **OR** HYDROmorphone    polyethylene glycol (PEG 3350)    sennosides    bisacodyl    ondansetron    metoclopramide    glucose **OR** glucose **OR** glucose-vitamin C **OR** dextrose **OR** glucose **OR** glucose **OR** glucose-vitamin C    Vitals  Vitals:    07/06/24 0820   BP: 125/50   Pulse: 58   Resp: 18   Temp: 98.3 °F (36.8 °C)         Exam   Gen-    no acute distress, alert and oriented x 3   RESP-   Lungs CTA, normal respiratory effort  CV-      Heart RRR, no mgr  Abd-    soft, nondistended, nontender, bowel sounds present  Skin-    no rash  Neuro-  no focal neurologic deficits  Ext-    b/l LE wrapped  Psych- alert and oriented x 3     Labs:     Chem:  Recent Labs   Lab 06/30/24  0402 07/01/24  0609 07/02/24  0506 07/03/24  0439 07/04/24  0458 07/05/24  0441   * 130* 134* 134* 136 133*   K 4.1 4.7 4.0 4.2 4.2 4.6   CL 98 98 101 100 101 104   CO2 23.0 19.0* 23.0 21.0 24.0 20.0*   BUN 50* 67* 52* 69* 46* 64*   MG  --  2.2 2.2  --  2.2 2.4   ALT 9*  --   --   --  <6*  --    AST 38*  --   --   --  18  --    ALB 1.9*  --   --   --  1.8*  --        HEM:  Recent Labs   Lab 07/01/24  0609  07/02/24  0506 07/03/24  0439 07/03/24  1425 07/04/24  0459 07/05/24  1044   WBC 11.6* 11.1* 10.9  --  9.5 9.0   HGB 7.7* 7.0* 6.6* 8.1* 7.7* 7.6*   .0 219.0 213.0  --  197.0 204.0   MCV 92.8 90.6 88.8  --  89.0 91.1       Coagulation:  Recent Labs   Lab 07/01/24  0609 07/02/24  0506 07/03/24  0439 07/03/24  1425 07/04/24  0459 07/05/24  1044   HCT 24.4* 22.1* 19.9*  --  23.4* 23.4*   HGB 7.7* 7.0* 6.6* 8.1* 7.7* 7.6*   .0 219.0 213.0  --  197.0 204.0              AP:  78 year old male with PMH sig for CAD status post stents, PAD status post bilateral lower extremity stents, atrial fibrillation on Xarelto, diabetes mellitus type 2 on insulin pump, end-stage renal disease on dialysis, chronic CHF with LVH, status post AVR, history of stroke, hypertension, hyperlipidemia, DESI and recent right TMA and left foot second digit amputation on 6/13 by Dr. Meza presented with poor surgical wound healing.       Postop wound infection R TMA 6/13 due to nonhealing ulcers and PVD.   Excisional debridement to level of bone along with partial resection of R 1st and 2nd MT 6/27  - surgical cultures with enterobacter cloacae and stenotrophomonas  - drainage cultures from admit with enterobacter and acinetobacter  - on iv cefepime, flagyl and bactrim since 7/2  - s/p further debridement 7/5    L foot 2nd digit amputation on 6/13  Excisional wound debridement to level of bone along with partial resection of second MT 6/27  - culture polymicrobial with enterobacter and stenotrophomonas  - on abx as above.  - s/p further debridement 7/5    PVD  - Arterial US in April, likely has more distal PAD, may require further amputation, per vascular  pt is not a candidate for any further revascularization and should his foot amputation does not heal he will require major amputation.      ESRD on HD  Malfunctioning AVF  - vascular consulted, o/p eval of new AVF/fistula revision   - nephrology o/c -s/p tunneled catheter on 6/28         CAD s/p stents  PAD s/p b/l LE stents  Chronic CHF w/ LVH  S/p AVR  Atrial fibrillation  - coreg (Hold SBP < 110 given anemia), lipitor  - plavix on hold due to anemia  - per podiatry was ok to resume AC - eliquis instead of his home xarelto due to his renal disease. However now stopped d/t anemia. - Since that time, hgb has been in high 7s range. Per podiatry okay to resume both. Will restart today and monitor hgb.      DM2  - insulin pump - not on this admission  - ISS/accucheks - high dose SSI, tresiba daily 10 units   - may add carb count/meal time insulin if continues to be high    Anemia  - suspecting ABLA from surgery   - monitor for now hgb 7.9 to 7.7 to 7.0 to 6.6 --> PRBC 7/3.   - hgb has been in high 7s-8s since. Will resume blood thinners and reassess  - check iron levels  - denies BRBPR no melena      Essential HTN  - coreg     Hyperlipidemia  - statin     DESI  - cpap at bedtime     Itching  - prn atarax, may be 2/2 meds/dialysis        FEN: regular diet, PT/OT  Proph: SCDs eliquis     Code status: Full code

## 2024-07-06 NOTE — PHYSICAL THERAPY NOTE
PHYSICAL THERAPY TREATMENT NOTE - INPATIENT    Room Number: 353/353-A       Session: 2     Number of Visits to Meet Established Goals: 5    Presenting Problem: cellulitis of LE  Co-Morbidities : gangrene of R foot s/p R TMA with achilles lengthening and L 2nd toe amputation on 6/13 with DC to [facility] for 2 days; ESRD on HD, DM2, PAD, CHF, AVR    Reason for Therapy: Mobility Dysfunction and Discharge Planning     Procedure 6/30/24 Dr Meza:    1.  Transmetatarsal amputation, left foot  2.  Achilles tendon lengthening, left  3.  Excisional wound debridement to level of bone, 30 cm², right foot  4.  Partial resection of the third and fourth metatarsals, right foot     -Per epic chat with podiatrist, Dr Meza, pt WBAT bilateral feet with post op shoes.    Repeat debridement on 7/5 for BLE - no change in weight bearing status/activity orders    PHYSICAL THERAPY MEDICAL/SOCIAL HISTORY  History related to current admission: Patient is a 78 year old male admitted on 6/25/2024 from wound clinic for worsening BL foot wounds.  Pt diagnosed with cellulitis.     HOME SITUATION  Type of Home: Condo   Home Layout: One level;Ramped entrance  Stairs to Enter : 3  Railing: Yes  Lives With: Spouse  Drives: No  Patient Owned Equipment: Rolling walker;Wheelchair     Prior Level of [employer]: Per pt lives in one level condo with ramped entrance. Lives with spouse. Ambulates household distances with RW, longer distances via use of wheelchair.         ASSESSMENT   Patient demonstrates fair progress this session, goals  remain in progress.    Patient continues to function below baseline with bed mobility, transfers, and gait.  Contributing factors to remaining limitations include decreased functional strength, decreased endurance/aerobic capacity, pain, impaired sitting and standing balance, decreased muscular endurance, and medical status.  Next session anticipate patient to progress bed mobility, transfers, and gait.  Physical Therapy  will continue to follow patient for duration of hospitalization.    Patient continues to benefit from continued skilled PT services: to promote return to prior level of function and safety with continuous assistance and gradual rehabilitative therapy .    PLAN  PT Treatment Plan: Bed mobility;Body mechanics;Coordination;Endurance;Energy conservation;Patient education;Family education;Gait training;Neuromuscular re-educate;Strengthening;Range of motion;Stoop training;Stair training;Transfer training;Balance training  Rehab Potential : Fair  Frequency (Obs): 3-5x/week    CURRENT GOALS     Goal #1 Patient is able to demonstrate supine - sit EOB @ level: independent      Goal #2 Patient is able to demonstrate transfers Sit to/from Stand at assistance level: supervision      Goal #3 Patient is able to ambulate 25 feet with assist device: walker - rolling at assistance level: minimum assistance      Goal #4     Goal #5     Goal #6     Goal Comments: Goals established on 2024 all goals ongoing    SUBJECTIVE  \"Yeah I can't do that today\"    OBJECTIVE  Precautions: Limb alert - right;Bed/chair alarm (post op shoes for both feet)    WEIGHT BEARING RESTRICTION  Weight Bearing Restriction: L lower extremity;R lower extremity   R Lower Extremity: Weight Bearing as Tolerated (w/ post op shoe)  L Lower Extremity: Weight Bearing as Tolerated (w/ post op shoe)    PAIN ASSESSMENT   Ratin  Location: B feet  Management Techniques: Breathing techniques;Relaxation;Repositioning    BALANCE                                                                                                                       Static Sitting: Fair +  Dynamic Sitting: Fair           Static Standing: Poor +  Dynamic Standing: Poor +    AM-PAC '6-Clicks' INPATIENT SHORT FORM - BASIC MOBILITY  How much difficulty does the patient currently have...  Patient Difficulty: Turning over in bed (including adjusting bedclothes, sheets and blankets)?:  A Little   Patient Difficulty: Sitting down on and standing up from a chair with arms (e.g., wheelchair, bedside commode, etc.): A Little   Patient Difficulty: Moving from lying on back to sitting on the side of the bed?: A Little   How much help from another person does the patient currently need...   Help from Another: Moving to and from a bed to a chair (including a wheelchair)?: A Little   Help from Another: Need to walk in hospital room?: A Little   Help from Another: Climbing 3-5 steps with a railing?: A Lot       AM-PAC Score:  Raw Score: 17   Approx Degree of Impairment: 50.57%   Standardized Score (AM-PAC Scale): 42.13   CMS Modifier (G-Code): CK    FUNCTIONAL ABILITY STATUS  Gait Assessment   Functional Mobility/Gait Assessment  Gait Assistance: Not tested  Distance (ft): 0  Assistive Device: Rolling walker  Pattern: Shuffle (decreased chiqui, uneven stride length)    Skilled Therapy Provided    Bed Mobility:  Rolling: Min A   Supine<>Sit:    Sit<>Supine:      Transfer Mobility:  Sit<>Stand:    Stand<>Sit:    Gait:     Therapist's Comments: Pt reporting \"sliding down\" in the bed - requesting to boost.  Writer placed bed into [place] position, and Pt instructed to reach for upper bed railings - able to boost with Min A of writer.      Pt placed into chair position - reviewed therex.  Pt denied need for formal handout.     Pt encouraged to get up to bed with nursing staff later.        THERAPEUTIC EXERCISES  Lower Extremity Alternating marching  Hip AB/AD  LAQ     Position Sitting     Repetitions   10   Sets   1     Patient End of Session: Needs met;Call light within reach;RN aware of session/findings;All patient questions and concerns addressed;In bed;Alarm set    PT Session Time: 8 minutes  Gait Training:  minutes  Therapeutic Activity: 8 minutes  Therapeutic Exercise:  minutes   Neuromuscular Re-education:  minutes

## 2024-07-06 NOTE — PROGRESS NOTES
Infectious Disease Progress Note      Date of admission: 6/25/2024 12:55 AM     Reason for consult: Bilateral foot surgical wound infection with osteomyelitis    Subjective: The patient is feeling better.  Pain is better controlled.  No nausea or vomiting.  No diarrhea.  No shortness of breath.  No cough or sputum production.    The rest of the systems were reviewed and found to be negative except was mentioned above    Interval events: This is a 78-year-old male patient with history of peripheral vascular disease, recently had a transmetatarsal amputation of the right foot and amputation of the left second digit on 6/13/2024, presents here with surgical wound infection and gangrene involving both his feet.  MRI of the right foot showing postsurgical changes along with fluid along the surgical approach that may represent postoperative seroma with question of abscess, no evidence of osteomyelitis.  MRI of the left foot showed postsurgical changes with fluid tracking extending from the surgical site to the skin surface.  Patient was taken to the OR on 6/27, excisional wound debridement to the level of bone along with partial resection of the first and second metatarsal on the right and excisional wound debridement to the level of bone and partial resection of the second metatarsal on the left.  Drainage cultures back on 6/25 are growing Enterobacter cloacae and Acinetobacter species.  Surgical cultures were Enterobacter cloacae and stenotrophomonas.  Was taken to the OR for another debridement on 7/5/2024 both his feet down to about    Medications:    epoetin matt    [Held by provider] apixaban    sulfamethoxazole-trimethoprim DS    hydrOXYzine    insulin degludec    [Held by provider] clopidogrel    sodium chloride    acetaminophen    acetaminophen **OR** HYDROcodone-acetaminophen **OR** HYDROcodone-acetaminophen    HYDROmorphone **OR** HYDROmorphone **OR** HYDROmorphone    polyethylene glycol (PEG 3350)     sennosides    bisacodyl    ondansetron    metoclopramide    glucose **OR** glucose **OR** glucose-vitamin C **OR** dextrose **OR** glucose **OR** glucose **OR** glucose-vitamin C    insulin aspart    atorvastatin    carvedilol    cefepime    metRONIDAZOLE     Allergies:  Allergies   Allergen Reactions    Augmentin [Amoxicillin-Pot Clavulanate] RASH    Lisinopril Coughing     Dyspnea         Physical Exam:  Vitals:    07/06/24 0820   BP: 125/50   Pulse: 58   Resp: 18   Temp: 98.3 °F (36.8 °C)     Vitals signs and nursing note reviewed.   Constitutional:       Appearance: Normal appearance.   HENT:      Head: Normocephalic and atraumatic.      Mouth: Mucous membranes are moist.   Neck:      Musculoskeletal: Neck supple.   Cardiovascular:      Rate and Rhythm: Normal rate.    Pulmonary:      Effort: Pulmonary effort is normal. No respiratory distress.   Musculoskeletal:      Right lower leg: No edema.  Clean dressing noted     Left lower leg: No edema.  Clean dressing noted  Skin:     General: Skin is warm and dry.   Neurological:      General: No focal deficit present.      Mental Status: Alert and oriented to person, place, and time.       Laboratory data:  I have reviewed all the lab results independently.  Lab Results   Component Value Date    WBC 9.0 07/05/2024    HGB 7.6 07/05/2024    HCT 23.4 07/05/2024    .0 07/05/2024      Recent Labs   Lab 07/01/24  0609 07/02/24  0506 07/05/24  1044   RBC 2.63*   < > 2.57*   HGB 7.7*   < > 7.6*   HCT 24.4*   < > 23.4*   MCV 92.8   < > 91.1   MCH 29.3   < > 29.6   MCHC 31.6   < > 32.5   RDW 15.7   < > 16.1   NEPRELIM 10.00*  --   --    WBC 11.6*   < > 9.0   .0   < > 204.0    < > = values in this interval not displayed.      Microbiology data:  Hospital Encounter on 06/25/24   1. Tissue Aerobic Culture     Status: Abnormal    Collection Time: 06/30/24 12:11 PM    Specimen: Foot,left; Tissue   Result Value Ref Range    Tissue Culture Result 4+ growth Enterobacter  cloacae (A) N/A    Tissue Culture Result 2+ growth Stenotrophomonas maltophilia (A) N/A    Tissue Smear 4+ WBCs seen (A) N/A    Tissue Smear 3+ Gram positive cocci in pairs (A) N/A    Tissue Smear 4+ Gram Negative Rods (A) N/A       Susceptibility    Enterobacter cloacae -  (no method available)     Cefazolin >=64 Resistant      Cefepime <=1 Sensitive      Ceftriaxone <=1 Sensitive      Ciprofloxacin <=0.25 Sensitive      Gentamicin <=1 Sensitive      Meropenem <=0.25 Sensitive      Levofloxacin <=0.12 Sensitive      Piperacillin + Tazobactam <=4 Sensitive      Trimethoprim/Sulfa <=20 Sensitive    2. Anaerobic Culture     Status: None    Collection Time: 06/30/24 12:11 PM    Specimen: Foot,left; Tissue   Result Value Ref Range    Anaerobic Culture No Anaerobes isolated N/A   3. Aerobic Bacterial Culture     Status: Abnormal    Collection Time: 06/25/24  8:14 AM    Specimen: Foot,left; Other   Result Value Ref Range    Aerobic Culture Result  N/A     Mixture of organisms suggestive of normal skin justice    Aerobic Culture Result 4+ growth Enterobacter cloacae complex (A) N/A    Aerobic Culture Result 4+ growth Acinetobacter pittii (A) N/A    Aerobic Smear No WBCs seen N/A    Aerobic Smear 1+ Gram Positive Cocci N/A    Aerobic Smear 1+ Gram Negative Rods N/A       Susceptibility    Enterobacter cloacae complex -  (no method available)     Cefazolin >=64 Resistant      Cefepime <=1 Sensitive      Ceftriaxone <=1 Sensitive      Ciprofloxacin <=0.25 Sensitive      Gentamicin <=1 Sensitive      Meropenem <=0.25 Sensitive      Levofloxacin <=0.12 Sensitive      Piperacillin + Tazobactam <=4 Sensitive      Trimethoprim/Sulfa <=20 Sensitive     Acinetobacter pittii -  (no method available)     Ceftazidime <=1 Sensitive      Ciprofloxacin <1 Sensitive      Gentamicin <1 Sensitive      Meropenem <1 Sensitive      Levofloxacin <=0.25 Sensitive      Piperacillin + Tazobactam <16 Sensitive      Trimethoprim/Sulfa <=40 Sensitive     4. Blood Culture     Status: None    Collection Time: 06/25/24  2:36 AM    Specimen: Blood,peripheral   Result Value Ref Range    Blood Culture Result No Growth 5 Days N/A     Impression:  Darin Bowens is a 78 year old male with    Right-sided surgical wound infection with osteomyelitis with risk to limb  This is in the setting of right foot amputation on 6/13  Now status post excisional wound debridement to the level of bone along with partial resection of the first and second metatarsal on the right on 6/27   Surgical cultures with Enterobacter cloacae and stenotrophomonas  Drainage cultures from the time of admission grew Enterobacter cloacae and Acinetobacter  Currently on IV cefepime along with p.o. metronidazole and p.o. Bactrim  Status post another debridement down to bone on 7/5  Left-sided surgical wound infection with osteomyelitis and gangrene with risk to limb  This is in the setting of recent foot surgery on 6/13  Now status post excisional wound debridement to level of bone along with partial resection of the second metatarsal on 6/27  Cultures with gram-positive cocci and gram-negative rods  Currently on IV cefepime, p.o. metronidazole and Bactrim  Status post another debridement down to bone on 7/5  Peripheral vascular disease  Vascular surgery following  High risk for limb loss  End stage renal disease  AV fistula clotted  Permacath in place  Allergic reaction to Augmentin  Now stopped  Rash resolved    Recommendations:    Continue Bactrim DS 2 tablet daily, renally dosed for stenotrophomonas osteomyelitis  Continue IV cefepime, along with p.o. metronidazole  Plan to dose cefepime 2 g with hemodialysis 3 times weekly so a tunneled line will not be needed  Plan on 6 weeks of therapy through now through 8/15/2024 from last debridement on 7/5  Weekly CBC with differential and CMP, sed rate and CRP.  Fax results to [facility]. Fax: [phone]. Tel: [phone].  PICC line care  as per protocol.  Wound care as per podiatry  Continue to monitor daily labs for antibiotic toxicities  Further recommendations will depend on the above work-up and clinical progress     The plan of care was discussed with the primary hospital team, Lacey Alcaraz MD     Recommendations were also discussed with the patient; all questions were answered.     Thank you for this consultation. Please don't hesitate to call the ID team for questions or any acute changes in patient's clinical condition.    Please note that this report has been produced using speech recognition software and may contain errors related to that system including, but not limited to, errors in grammar, punctuation, and spelling, as well as words and phrases that possibly may have been recognized inappropriately.  If there are any questions or concerns, contact the dictating provider for clarification.    The 21st Century Cures Act makes medical notes like these available to patients in the interest of transparency. Please be advised this is a medical document. Medical documents are intended to carry relevant information, facts as evident, and the clinical opinion of the practitioner. The medical note is intended as peer to peer communication and may appear blunt or direct. It is written in medical language and may contain abbreviations or verbiage that are unfamiliar.     Sadaf Hugo MD  [facility]. Tel: [phone]. Fax: [phone].     Darin Bowens : 1945 MRN: [id number] [facility]: [id number]

## 2024-07-06 NOTE — PLAN OF CARE
Alert and oriented x 4. Vitals stable on room air. Pain managed on PO medications. Dressings clean, dry, intact. Oliguric, ESRD. Dialysis today, 2 L fluid removed. Last BM 07/05. Tolerating diet. Tolerating PO and IV abx. Refusing SCD. Safety precautions in place.    1235 New order from dr. Meza for daily wet to dry dressing, resume Eliquis and Plavix.

## 2024-07-07 LAB
BASOPHILS # BLD AUTO: 0.04 X10(3) UL (ref 0–0.2)
BASOPHILS NFR BLD AUTO: 0.5 %
EOSINOPHIL # BLD AUTO: 0.11 X10(3) UL (ref 0–0.7)
EOSINOPHIL NFR BLD AUTO: 1.4 %
ERYTHROCYTE [DISTWIDTH] IN BLOOD BY AUTOMATED COUNT: 15.9 %
GLUCOSE BLD-MCNC: 140 MG/DL (ref 70–99)
GLUCOSE BLD-MCNC: 218 MG/DL (ref 70–99)
GLUCOSE BLD-MCNC: 264 MG/DL (ref 70–99)
GLUCOSE BLD-MCNC: 281 MG/DL (ref 70–99)
HCT VFR BLD AUTO: 24.7 %
HGB BLD-MCNC: 7.9 G/DL
IMM GRANULOCYTES # BLD AUTO: 0.09 X10(3) UL (ref 0–1)
IMM GRANULOCYTES NFR BLD: 1.1 %
LYMPHOCYTES # BLD AUTO: 0.75 X10(3) UL (ref 1–4)
LYMPHOCYTES NFR BLD AUTO: 9.6 %
MCH RBC QN AUTO: 29.2 PG (ref 26–34)
MCHC RBC AUTO-ENTMCNC: 32 G/DL (ref 31–37)
MCV RBC AUTO: 91.1 FL
MONOCYTES # BLD AUTO: 0.82 X10(3) UL (ref 0.1–1)
MONOCYTES NFR BLD AUTO: 10.5 %
NEUTROPHILS # BLD AUTO: 6.03 X10 (3) UL (ref 1.5–7.7)
NEUTROPHILS # BLD AUTO: 6.03 X10(3) UL (ref 1.5–7.7)
NEUTROPHILS NFR BLD AUTO: 76.9 %
PLATELET # BLD AUTO: 188 10(3)UL (ref 150–450)
RBC # BLD AUTO: 2.71 X10(6)UL
WBC # BLD AUTO: 7.8 X10(3) UL (ref 4–11)

## 2024-07-07 RX ORDER — HEPARIN SODIUM 1000 [USP'U]/ML
1.5 INJECTION, SOLUTION INTRAVENOUS; SUBCUTANEOUS
Status: COMPLETED | OUTPATIENT
Start: 2024-07-08 | End: 2024-07-08

## 2024-07-07 RX ORDER — ALBUMIN (HUMAN) 12.5 G/50ML
25 SOLUTION INTRAVENOUS
Status: DISCONTINUED | OUTPATIENT
Start: 2024-07-07 | End: 2024-07-09

## 2024-07-07 RX ORDER — HEPARIN SODIUM 1000 [USP'U]/ML
1.5 INJECTION, SOLUTION INTRAVENOUS; SUBCUTANEOUS
Status: DISCONTINUED | OUTPATIENT
Start: 2024-07-07 | End: 2024-07-07

## 2024-07-07 NOTE — PROGRESS NOTES
[facility] Hospitalist Progress Note       SUBJECTIVE:  Wife at bedside. Pt getting wound dressing change    OBJECTIVE:  Scheduled Meds:    [START ON 7/8/2024] epoetin matt  10,000 Units Intravenous Once in dialysis    heparin  1.5 mL Intracatheter Once    apixaban  2.5 mg Oral BID    sulfamethoxazole-trimethoprim DS  2 tablet Oral Daily    insulin degludec  10 Units Subcutaneous Daily    clopidogrel  75 mg Oral Daily    sodium chloride   Intravenous Once    insulin aspart  4-20 Units Subcutaneous TID AC and HS    atorvastatin  80 mg Oral Nightly    carvedilol  12.5 mg Oral BID with meals    cefepime  1 g Intravenous Q24H    metRONIDAZOLE  500 mg Oral 2 times per day     Continuous Infusions:   PRN Meds:   sodium chloride **AND** albumin human    heparin    hydrOXYzine    acetaminophen    acetaminophen **OR** HYDROcodone-acetaminophen **OR** HYDROcodone-acetaminophen    HYDROmorphone **OR** HYDROmorphone **OR** HYDROmorphone    polyethylene glycol (PEG 3350)    sennosides    bisacodyl    ondansetron    metoclopramide    glucose **OR** glucose **OR** glucose-vitamin C **OR** dextrose **OR** glucose **OR** glucose **OR** glucose-vitamin C    Vitals  Vitals:    07/07/24 1214   BP: 107/45   Pulse: 65   Resp: 16   Temp: 98.2 °F (36.8 °C)         Exam   Gen-    no acute distress, alert and oriented x 3   RESP-   Lungs CTA, normal respiratory effort  CV-      Heart RRR, no mgr  Abd-    soft, nondistended, nontender, bowel sounds present  Skin-    no rash  Neuro-  no focal neurologic deficits  Ext-   seen during dressing change- L foot with healthy pink tissue, R foot with ?developing mild grey area towards lateral end of wound  Psych- alert and oriented x 3     Labs:     Chem:  Recent Labs   Lab 07/01/24  0609 07/02/24  0506 07/03/24  0439 07/04/24  0458 07/05/24  0441   * 134* 134* 136 133*   K 4.7 4.0 4.2 4.2 4.6   CL 98 101 100 101 104   CO2 19.0* 23.0 21.0 24.0 20.0*   BUN 67* 52* 69* 46* 64*   MG 2.2 2.2  --  2.2 2.4    ALT  --   --   --  <6*  --    AST  --   --   --  18  --    ALB  --   --   --  1.8*  --        HEM:  Recent Labs   Lab 07/03/24 0439 07/03/24 1425 07/04/24 0459 07/05/24  1044 07/06/24  1201 07/07/24  0741   WBC 10.9  --  9.5 9.0 9.1 7.8   HGB 6.6* 8.1* 7.7* 7.6* 7.9* 7.9*   .0  --  197.0 204.0 208.0 188.0   MCV 88.8  --  89.0 91.1 93.0 91.1       Coagulation:  Recent Labs   Lab 07/03/24 0439 07/03/24 1425 07/04/24 0459 07/05/24  1044 07/06/24  1201 07/07/24  0741   HCT 19.9*  --  23.4* 23.4* 25.4* 24.7*   HGB 6.6* 8.1* 7.7* 7.6* 7.9* 7.9*   .0  --  197.0 204.0 208.0 188.0              AP:  78 year old male with PMH sig for CAD status post stents, PAD status post bilateral lower extremity stents, atrial fibrillation on Xarelto, diabetes mellitus type 2 on insulin pump, end-stage renal disease on dialysis, chronic CHF with LVH, status post AVR, history of stroke, hypertension, hyperlipidemia, DESI and recent right TMA and left foot second digit amputation on 6/13 by Dr. Meza presented with poor surgical wound healing.       Postop wound infection R TMA 6/13 due to nonhealing ulcers and PVD.   Excisional debridement to level of bone along with partial resection of R 1st and 2nd MT 6/27  - surgical cultures with enterobacter cloacae and stenotrophomonas  - drainage cultures from admit with enterobacter and acinetobacter  - on iv cefepime, flagyl and bactrim since 7/2  - s/p further debridement 7/5    L foot 2nd digit amputation on 6/13  Excisional wound debridement to level of bone along with partial resection of second MT 6/27  - culture polymicrobial with enterobacter and stenotrophomonas  - on abx as above.  - s/p further debridement 7/5    PVD  - Arterial US in April, likely has more distal PAD, may require further amputation, per vascular  pt is not a candidate for any further revascularization and should his foot amputation does not heal he will require major amputation.      ESRD on  HD  Malfunctioning AVF  - vascular consulted, o/p eval of new AVF/fistula revision   - nephrology o/c -s/p tunneled catheter on 6/28        CAD s/p stents  PAD s/p b/l LE stents  Chronic CHF w/ LVH  S/p AVR  Atrial fibrillation  - coreg (Hold SBP < 110 given anemia), lipitor  - plavix on hold due to anemia  - per podiatry was ok to resume AC - eliquis instead of his home xarelto due to his renal disease. However now stopped d/t anemia. - Since that time, hgb has been in high 7s range. Per podiatry okay to resume both. Cont to trend hgb     DM2  - insulin pump - not on this admission  - ISS/accucheks - high dose SSI, tresiba daily 10 units   - may add carb count/meal time insulin if continues to be high    Anemia  - suspecting ABLA from surgery   - monitor for now hgb 7.9 to 7.7 to 7.0 to 6.6 --> PRBC 7/3.   - hgb has been in high 7s-8s since. Back on blood thinners  - iron levels c/w ACD  - denies BRBPR no melena      Essential HTN  - coreg     Hyperlipidemia  - statin     DESI  - cpap at bedtime     Itching  - prn atarax, may be 2/2 meds/dialysis        FEN: regular diet, PT/OT  Proph: SCDs eliquis     Code status: Full code

## 2024-07-07 NOTE — PLAN OF CARE
Alert and oriented x 4. VSS. Pain controlled with prn norco. Doppler used for tibial pulses. Bilateral feet with soft dressing is C/D/I.  Oliguric with hx ESRD and dialysis earlier today. Last BM 7/5/24. Tolerating diet. Up to chair. Plan is to continue IV abx, and be seen by wound care for placement of wound vac. Plan of care discussed with patient.

## 2024-07-07 NOTE — PROGRESS NOTES
Infectious Disease Progress Note      Date of admission: 6/25/2024 12:55 AM     Reason for consult: Bilateral foot surgical wound infection with osteomyelitis    Subjective: The patient is feeling better.  Pain is better controlled.  No nausea or vomiting.  No diarrhea.  No shortness of breath.  No cough or sputum production.    The rest of the systems were reviewed and found to be negative except was mentioned above    Interval events: This is a 78-year-old male patient with history of peripheral vascular disease, recently had a transmetatarsal amputation of the right foot and amputation of the left second digit on 6/13/2024, presents here with surgical wound infection and gangrene involving both his feet.  MRI of the right foot showing postsurgical changes along with fluid along the surgical approach that may represent postoperative seroma with question of abscess, no evidence of osteomyelitis.  MRI of the left foot showed postsurgical changes with fluid tracking extending from the surgical site to the skin surface.  Patient was taken to the OR on 6/27, excisional wound debridement to the level of bone along with partial resection of the first and second metatarsal on the right and excisional wound debridement to the level of bone and partial resection of the second metatarsal on the left.  Drainage cultures back on 6/25 are growing Enterobacter cloacae and Acinetobacter species.  Surgical cultures were Enterobacter cloacae and stenotrophomonas.  Was taken to the OR for another debridement on 7/5/2024 both his feet down to about    Medications:    heparin    apixaban    sulfamethoxazole-trimethoprim DS    hydrOXYzine    insulin degludec    clopidogrel    sodium chloride    acetaminophen    acetaminophen **OR** HYDROcodone-acetaminophen **OR** HYDROcodone-acetaminophen    HYDROmorphone **OR** HYDROmorphone **OR** HYDROmorphone    polyethylene glycol (PEG 3350)    sennosides    bisacodyl    ondansetron     metoclopramide    glucose **OR** glucose **OR** glucose-vitamin C **OR** dextrose **OR** glucose **OR** glucose **OR** glucose-vitamin C    insulin aspart    atorvastatin    carvedilol    cefepime    metRONIDAZOLE     Allergies:  Allergies   Allergen Reactions    Augmentin [Amoxicillin-Pot Clavulanate] RASH    Lisinopril Coughing     Dyspnea         Physical Exam:  Vitals:    07/07/24 0747   BP: 136/57   Pulse: 76   Resp: 16   Temp: 98.2 °F (36.8 °C)     Vitals signs and nursing note reviewed.   Constitutional:       Appearance: Normal appearance.   HENT:      Head: Normocephalic and atraumatic.      Mouth: Mucous membranes are moist.   Neck:      Musculoskeletal: Neck supple.   Cardiovascular:      Rate and Rhythm: Normal rate.    Pulmonary:      Effort: Pulmonary effort is normal. No respiratory distress.   Musculoskeletal:      Right lower leg: No edema.  Clean dressing noted     Left lower leg: No edema.  Clean dressing noted  Skin:     General: Skin is warm and dry.   Neurological:      General: No focal deficit present.      Mental Status: Alert and oriented to person, place, and time.       Laboratory data:  I have reviewed all the lab results independently.  Lab Results   Component Value Date    WBC 7.8 07/07/2024    HGB 7.9 07/07/2024    HCT 24.7 07/07/2024    .0 07/07/2024      Recent Labs   Lab 07/07/24  0741   RBC 2.71*   HGB 7.9*   HCT 24.7*   MCV 91.1   MCH 29.2   MCHC 32.0   RDW 15.9   NEPRELIM 6.03   WBC 7.8   .0      Microbiology data:  Hospital Encounter on 06/25/24   1. Tissue Aerobic Culture     Status: Abnormal    Collection Time: 06/30/24 12:11 PM    Specimen: Foot,left; Tissue   Result Value Ref Range    Tissue Culture Result 4+ growth Enterobacter cloacae (A) N/A    Tissue Culture Result 2+ growth Stenotrophomonas maltophilia (A) N/A    Tissue Smear 4+ WBCs seen (A) N/A    Tissue Smear 3+ Gram positive cocci in pairs (A) N/A    Tissue Smear 4+ Gram Negative Rods (A) N/A        Susceptibility    Enterobacter cloacae -  (no method available)     Cefazolin >=64 Resistant      Cefepime <=1 Sensitive      Ceftriaxone <=1 Sensitive      Ciprofloxacin <=0.25 Sensitive      Gentamicin <=1 Sensitive      Meropenem <=0.25 Sensitive      Levofloxacin <=0.12 Sensitive      Piperacillin + Tazobactam <=4 Sensitive      Trimethoprim/Sulfa <=20 Sensitive    2. Anaerobic Culture     Status: None    Collection Time: 06/30/24 12:11 PM    Specimen: Foot,left; Tissue   Result Value Ref Range    Anaerobic Culture No Anaerobes isolated N/A   3. Aerobic Bacterial Culture     Status: Abnormal    Collection Time: 06/25/24  8:14 AM    Specimen: Foot,left; Other   Result Value Ref Range    Aerobic Culture Result  N/A     Mixture of organisms suggestive of normal skin justice    Aerobic Culture Result 4+ growth Enterobacter cloacae complex (A) N/A    Aerobic Culture Result 4+ growth Acinetobacter pittii (A) N/A    Aerobic Smear No WBCs seen N/A    Aerobic Smear 1+ Gram Positive Cocci N/A    Aerobic Smear 1+ Gram Negative Rods N/A       Susceptibility    Enterobacter cloacae complex -  (no method available)     Cefazolin >=64 Resistant      Cefepime <=1 Sensitive      Ceftriaxone <=1 Sensitive      Ciprofloxacin <=0.25 Sensitive      Gentamicin <=1 Sensitive      Meropenem <=0.25 Sensitive      Levofloxacin <=0.12 Sensitive      Piperacillin + Tazobactam <=4 Sensitive      Trimethoprim/Sulfa <=20 Sensitive     Acinetobacter pittii -  (no method available)     Ceftazidime <=1 Sensitive      Ciprofloxacin <1 Sensitive      Gentamicin <1 Sensitive      Meropenem <1 Sensitive      Levofloxacin <=0.25 Sensitive      Piperacillin + Tazobactam <16 Sensitive      Trimethoprim/Sulfa <=40 Sensitive    4. Blood Culture     Status: None    Collection Time: 06/25/24  2:36 AM    Specimen: Blood,peripheral   Result Value Ref Range    Blood Culture Result No Growth 5 Days N/A     Impression:  Darin Phanschmidt is a 78 year old male  with    Right-sided surgical wound infection with osteomyelitis with risk to limb  This is in the setting of right foot amputation on 6/13  Now status post excisional wound debridement to the level of bone along with partial resection of the first and second metatarsal on the right on 6/27   Surgical cultures with Enterobacter cloacae and stenotrophomonas  Drainage cultures from the time of admission grew Enterobacter cloacae and Acinetobacter  Currently on IV cefepime along with p.o. metronidazole and p.o. Bactrim  Status post another debridement down to bone on 7/5  Left-sided surgical wound infection with osteomyelitis and gangrene with risk to limb  This is in the setting of recent foot surgery on 6/13  Now status post excisional wound debridement to level of bone along with partial resection of the second metatarsal on 6/27  Cultures with gram-positive cocci and gram-negative rods  Currently on IV cefepime, p.o. metronidazole and Bactrim  Status post another debridement down to bone on 7/5  Peripheral vascular disease  Vascular surgery following  High risk for limb loss  End stage renal disease  AV fistula clotted  Permacath in place  Allergic reaction to Augmentin  Now stopped  Rash resolved    Recommendations:    Continue Bactrim DS 2 tablet daily, renally dosed for stenotrophomonas osteomyelitis  Continue IV cefepime, along with p.o. metronidazole  Plan to dose cefepime 2 g with hemodialysis 3 times weekly so a tunneled line will not be needed  Plan on 6 weeks of therapy through now through 8/15/2024 from last debridement on 7/5  Wound management as per wound care and podiatry  Weekly CBC with differential and CMP, sed rate and CRP.  Fax results to [facility]. Fax: [phone]. Tel: [phone].  PICC line care as per protocol.  Continue to monitor daily labs for antibiotic toxicities  Further recommendations will depend on the above work-up and clinical progress     The plan of care was  discussed with the primary hospital team, Lacey Alcaraz MD     Recommendations were also discussed with the patient; all questions were answered.     Thank you for this consultation. Please don't hesitate to call the ID team for questions or any acute changes in patient's clinical condition.    Please note that this report has been produced using speech recognition software and may contain errors related to that system including, but not limited to, errors in grammar, punctuation, and spelling, as well as words and phrases that possibly may have been recognized inappropriately.  If there are any questions or concerns, contact the dictating provider for clarification.    The  [employer] C[employer] Act makes medical notes like these available to patients in the interest of transparency. Please be advised this is a medical document. Medical documents are intended to carry relevant information, facts as evident, and the clinical opinion of the practitioner. The medical note is intended as peer to peer communication and may appear blunt or direct. It is written in medical language and may contain abbreviations or verbiage that are unfamiliar.     Sadaf Hugo MD  [facility]. Tel: [phone]. Fax: [phone].     Darin Bowens : 1945 MRN: [id number] [facility]: [id number]

## 2024-07-07 NOTE — OCCUPATIONAL THERAPY NOTE
OCCUPATIONAL THERAPY TREATMENT NOTE - INPATIENT     Room Number: 353/353-A  Session: 1 after re-eval  Number of Visits to Meet Established Goals: 5    Presenting Problem: s/p L foot debridement and 2nd metatarsal partial resection 6/27, s/p R foot debridement and 1st/2nd metatarsal resection 6/27  Prior Level of Function: Per initial eval 6/29/24,Prior to recent admission pt had assist from spouse for showers, was otherwise mostly independent with ADL. Used cane in the home and wheelchair in the community. Since recent admission pt has been doing stand pivot transfers bed <> wheelchair <> commode with assist and RW. He has had assist from spouse or daughter for all LB ADL.     ASSESSMENT   Patient demonstrates fair progress this session, goals remain in progress.    Patient continues to function below baseline with toileting, lower body dressing, bed mobility, transfers, static standing balance, dynamic standing balance, and functional standing tolerance.   Contributing factors to remaining limitations include decreased functional strength, decreased endurance, pain, impaired standing balance, impaired coordination, and decreased muscular endurance.  Next session anticipate patient to progress toileting, lower body dressing, bed mobility, transfers, static standing balance, dynamic standing balance, and functional standing tolerance.  Occupational Therapy will continue to follow patient for duration of hospitalization.    Patient continues to benefit from continued skilled OT services: to promote return to prior level of function and safety with continuous assistance and gradual rehabilitative therapy .          OT Device Recommendations: TBD    History: Patient is a 78 year old male admitted on 6/25/2024 with Presenting Problem: s/p L foot debridement and 2nd metatarsal partial resection 6/27, s/p R foot debridement and 1st/2nd metatarsal resection 6/27. Co-Morbidities : gangrene of R foot s/p R TMA with achilles  lengthening and L 2nd toe amputation on 6/13 with DC to [facility] for 2 days; ESRD on HD, DM2, PAD, CHF, AVR     ** Re-evaluated 7/1 after another surgery. See below. Per Epic chat with podiatrist, WBAT both legs with post-op shoes on.      Date of surgery: 06/30/24  Procedure:   1.  Transmetatarsal amputation, left foot  2.  Achilles tendon lengthening, left  3.  Excisional wound debridement to level of bone, 30 cm², right foot  4.  Partial resection of the third and fourth metatarsals, right foot     Per initial OT eval on 6/29/24, \"Pt discharged to [facility] after recent hospitalization, was only there 2 days prior to DC home. Pt states he was home 3 days before readmission. Per chart, family was having difficulty caring for him at home.\"    07/05  Preoperative Diagnosis:   Open transmetatarsal amputation site, left foot  Open transmetatarsal amputation site, right foot  Diabetic neuropathy  Peripheral arterial disease     Postoperative Diagnosis: Same     Procedure:   1.  Excisional wound debridement to level of bone, 35 cm², right foot  2.  Excisional wound debridement to level of bone, 25 cm², left foot    Repeat debridement on 7/5 for BLE - no change in weight bearing status/activity orders      WEIGHT BEARING RESTRICTION  Weight Bearing Restriction: L lower extremity;R lower extremity        R Lower Extremity: Weight Bearing as Tolerated (w/ post op shoe)  L Lower Extremity: Weight Bearing as Tolerated (w/ post op shoe)    Recommendations for nursing staff:   Transfers: 2 person  Toileting location: bedside commode    TREATMENT SESSION:  Patient Start of Session: semi supine in bed  FUNCTIONAL TRANSFER ASSESSMENT  Sit to Stand: Edge of Bed  Edge of Bed: Maximum Assist    BED MOBILITY  Supine to Sit : Moderate Assist  Sit to Supine (OT): Not Tested  Scooting: Mod A    BALANCE ASSESSMENT     FUNCTIONAL ADL ASSESSMENT  LB Dressing Seated: Maximum Assist (bilat post-up shoes)      ACTIVITY TOLERANCE: fair                          O2 SATURATIONS       EDUCATION PROVIDED  Patient: Role of Occupational Therapy; Plan of Care; Discharge Recommendations; Functional Transfer Techniques; Fall Prevention; Posture/Positioning; Energy Conservation; Proper Body Mechanics  Patient's Response to Education: Verbalized Understanding; Requires Further Education      Equipment used: RW  Demonstrates functional use, Would benefit from additional trial     Therapist comments: Pt received semi supine in bed, cooperative for OT session. Pt agreeable for OOB activity/mobility in preparation for bedside commode transfer. Pt performs bed mobility at mod A for upper trunk and B LE management to sit EOB. Mod A required for pr to scoot to EOB. Sit to stand transfer performed at max A with bed height elevated. Pt demos side step transfer to bedside chair with RW requiring min A. Pt left in chair with all needs.  Patient End of Session: Up in chair;Needs met;Call light within reach;RN aware of session/findings;All patient questions and concerns addressed;Alarm set    SUBJECTIVE  Pt pleasant and cooperative for OT.    PAIN ASSESSMENT  Ratin  Location: B feet  Management Techniques: Repositioning     OBJECTIVE  Precautions: Limb alert - right;Bed/chair alarm (post op shoes for both feet)    AM-PAC ‘6-Clicks’ Inpatient Daily Activity Short Form  -   Putting on and taking off regular lower body clothing?: A Lot  -   Bathing (including washing, rinsing, drying)?: A Lot  -   Toileting, which includes using toilet, bedpan or urinal? : A Lot  -   Putting on and taking off regular upper body clothing?: A Little  -   Taking care of personal grooming such as brushing teeth?: None  -   Eating meals?: None    AM-PAC Score:  Score: 17  Approx Degree of Impairment: 50.11%  Standardized Score (AM-PAC Scale): 37.26    PLAN  OT Treatment Plan: Balance activities;Energy conservation/work simplification techniques;ADL training;Functional transfer training;UE  strengthening/ROM;Patient/Family education;Patient/Family training;Equipment eval/education;Compensatory technique education  Rehab Potential : Fair  Frequency: 3x/week    OT Goals:     All goals ongoing 07/07    ADL Goals   Patient will perform upper body dressing:  with modified independent  Patient will perform lower body dressing:  with min assist  Patient will perform toileting: with min assist     Functional Transfer Goals  Patient will transfer to toilet:  with min assist     UE Exercise Program Goal  Patient will be independent with bilateral AROM HEP (home exercise program).    OT Session Time: 15 minutes  Therapeutic Activity: 15 minutes

## 2024-07-07 NOTE — PLAN OF CARE
Pt A & O x4, on RA.  CCPO/IS.  Tele-afib.  Renal/ADA diet.  Accu check ACHS.  Plavix/eliquis.  Last BM 7/5.  HD tomorrow.  WBAT with post op shoes.  PT worked with pt, pt got up with max 1 assist and walker from bed to chair today.  Wet to dry dressing changes to both feet daily.  Wound care to replace wound vacs tomorrow.  IV cefepime daily.  Hyperbaric treatment tomorrow?  Plan on DC to [facility] when ready.

## 2024-07-08 ENCOUNTER — APPOINTMENT (OUTPATIENT)
Dept: GENERAL RADIOLOGY | Facility: HOSPITAL | Age: 79
End: 2024-07-08
Attending: INTERNAL MEDICINE
Payer: MEDICARE

## 2024-07-08 ENCOUNTER — HBO THERAPY VISIT (OUTPATIENT)
Dept: WOUND CARE | Facility: HOSPITAL | Age: 79
End: 2024-07-08
Payer: MEDICARE

## 2024-07-08 VITALS
HEART RATE: 71 BPM | RESPIRATION RATE: 10 BRPM | SYSTOLIC BLOOD PRESSURE: 136 MMHG | TEMPERATURE: 97 F | DIASTOLIC BLOOD PRESSURE: 69 MMHG

## 2024-07-08 DIAGNOSIS — E11.69 DIABETIC FOOT ULCER WITH OSTEOMYELITIS (HCC): ICD-10-CM

## 2024-07-08 DIAGNOSIS — I73.9 PAD (PERIPHERAL ARTERY DISEASE) (HCC): ICD-10-CM

## 2024-07-08 DIAGNOSIS — L08.9 INFECTED WOUND: ICD-10-CM

## 2024-07-08 DIAGNOSIS — E11.621 DIABETIC FOOT ULCER WITH OSTEOMYELITIS (HCC): ICD-10-CM

## 2024-07-08 DIAGNOSIS — L97.509 DIABETIC FOOT ULCER WITH OSTEOMYELITIS (HCC): ICD-10-CM

## 2024-07-08 DIAGNOSIS — M86.9 DIABETIC FOOT ULCER WITH OSTEOMYELITIS (HCC): ICD-10-CM

## 2024-07-08 DIAGNOSIS — N18.6 ESRD (END STAGE RENAL DISEASE) (HCC): ICD-10-CM

## 2024-07-08 DIAGNOSIS — T14.8XXA INFECTED WOUND: ICD-10-CM

## 2024-07-08 DIAGNOSIS — I96 GANGRENE OF RIGHT FOOT (HCC): Primary | ICD-10-CM

## 2024-07-08 DIAGNOSIS — L97.514 ULCER OF TOE OF RIGHT FOOT, WITH NECROSIS OF BONE (HCC): ICD-10-CM

## 2024-07-08 DIAGNOSIS — L97.524 ULCER OF TOE OF LEFT FOOT, WITH NECROSIS OF BONE (HCC): ICD-10-CM

## 2024-07-08 LAB
ERYTHROCYTE [DISTWIDTH] IN BLOOD BY AUTOMATED COUNT: 15.7 %
GLUCOSE BLD-MCNC: 122 MG/DL (ref 70–99)
GLUCOSE BLD-MCNC: 125 MG/DL (ref 70–99)
GLUCOSE BLD-MCNC: 130 MG/DL (ref 70–99)
GLUCOSE BLD-MCNC: 135 MG/DL (ref 70–99)
GLUCOSE BLD-MCNC: 170 MG/DL (ref 70–99)
GLUCOSE BLD-MCNC: 183 MG/DL (ref 70–99)
GLUCOSE BLD-MCNC: 277 MG/DL (ref 70–99)
HCT VFR BLD AUTO: 23.3 %
HGB BLD-MCNC: 7.6 G/DL
MCH RBC QN AUTO: 29.2 PG (ref 26–34)
MCHC RBC AUTO-ENTMCNC: 32.6 G/DL (ref 31–37)
MCV RBC AUTO: 89.6 FL
PLATELET # BLD AUTO: 209 10(3)UL (ref 150–450)
PLATELETS.RETICULATED NFR BLD AUTO: 1.1 % (ref 0–7)
RBC # BLD AUTO: 2.6 X10(6)UL
WBC # BLD AUTO: 7.9 X10(3) UL (ref 4–11)

## 2024-07-08 PROCEDURE — 5A05121 EXTRACORPOREAL HYPERBARIC OXYGENATION, INTERMITTENT: ICD-10-PCS | Performed by: INTERNAL MEDICINE

## 2024-07-08 PROCEDURE — 71045 X-RAY EXAM CHEST 1 VIEW: CPT | Performed by: INTERNAL MEDICINE

## 2024-07-08 PROCEDURE — 99183 HYPERBARIC OXYGEN THERAPY: CPT

## 2024-07-08 PROCEDURE — 82962 GLUCOSE BLOOD TEST: CPT | Performed by: INTERNAL MEDICINE

## 2024-07-08 PROCEDURE — 99232 SBSQ HOSP IP/OBS MODERATE 35: CPT | Performed by: INTERNAL MEDICINE

## 2024-07-08 NOTE — PLAN OF CARE
Hemodialysis today.  Removed 2 liters.  Tolerated well.  Dressings to bilateral feet clean, dry, intact.  To wound care dept for hyperbaric 02 treatment.   Informed wife (Jocelyne) of procedure time and duration.  Instructed on plan of care, call for all asst needed, discomfort felt.  Verbalized understanding and in agreement w/plan.  To outpatient wound clinic via wheel chair, with wound vac  for application post treatment.

## 2024-07-08 NOTE — PROGRESS NOTES
.  Subjective  Chief Complaint   Patient presents with    Urticaria     beginning of Nov. pt is scabbing and bruising from this. arms, legs, back, chest. and spreading. pt denies any new hygiene and/or cleaning products, and no new foods. pt thought to be weather change at first and used lotion and benadryl. pt now using Dreft to see if that worked for clothing. NOthing working. pt does states they were in working on ceiling and insulation had fallen?  Flu Vaccine       HPI     Pt here with complaints of itching   Has not slept in weeks due to severe itching/  Benadryl helps with the itching. Shes only taken it once or twice at night because it makers her feel drowsy even after awakening. Cetaphil Lotion is not helping. Itching is most severe around tricep area bilaterally. Does not have significant rash. Minimal bumps around her neck area. Cold seems to help the itching. Pt denies any new soaps, clothing, or other possible irritants. Her son lives with her and he does not itch. Dog was examined for fleas and no fleas were found. There are no active problems to display for this patient.     Past Medical History:   Diagnosis Date    Abnormal Pap smear of cervix 9/2015    HPV 18, cytology neg; colpo normal    Heart disease     Migraines     TOF (tetralogy of Fallot)      Past Surgical History:   Procedure Laterality Date    BREAST REDUCTION SURGERY      CARDIAC SURGERY      open heart surgery at age 3   [facility]  2013     Family History   Problem Relation Age of Onset    Heart Disease Father     Cancer Maternal Grandmother         lung    Heart Disease Paternal Grandmother      Social History     Social History    Marital status:      Spouse name: N/A    Number of children: N/A    Years of education: N/A     Social History Main Topics    Smoking status: Never Smoker    Smokeless tobacco: Never Used    Alcohol use 0.0 oz/week      Comment: occ    [facility] Hospitalist Progress Note       SUBJECTIVE:  Doing okay. Had hd. Not very hungry. Only had 3 italian ice today. Does not like the food on his current diet and wants to be able to eat some meat.    OBJECTIVE:  Scheduled Meds:    apixaban  2.5 mg Oral BID    sulfamethoxazole-trimethoprim DS  2 tablet Oral Daily    insulin degludec  10 Units Subcutaneous Daily    clopidogrel  75 mg Oral Daily    sodium chloride   Intravenous Once    insulin aspart  4-20 Units Subcutaneous TID AC and HS    atorvastatin  80 mg Oral Nightly    carvedilol  12.5 mg Oral BID with meals    cefepime  1 g Intravenous Q24H    metRONIDAZOLE  500 mg Oral 2 times per day     Continuous Infusions:   PRN Meds:   sodium chloride **AND** albumin human    heparin    hydrOXYzine    acetaminophen    acetaminophen **OR** HYDROcodone-acetaminophen **OR** HYDROcodone-acetaminophen    HYDROmorphone **OR** HYDROmorphone **OR** HYDROmorphone    polyethylene glycol (PEG 3350)    sennosides    bisacodyl    ondansetron    metoclopramide    glucose **OR** glucose **OR** glucose-vitamin C **OR** dextrose **OR** glucose **OR** glucose **OR** glucose-vitamin C    Vitals  Vitals:    07/08/24 1145   BP: 123/52   Pulse: 59   Resp: 12   Temp: 97.8 °F (36.6 °C)         Exam   Gen-    no acute distress, alert and oriented x 3   RESP-   Lungs CTA, normal respiratory effort  CV-      Heart RRR, no mgr  Abd-    soft, nondistended, nontender, bowel sounds present  Skin-    no rash  Neuro-  no focal neurologic deficits  Ext-   LE wrapped  Psych- alert and oriented x 3     Labs:     Chem:  Recent Labs   Lab 07/02/24  0506 07/03/24  0439 07/04/24  0458 07/05/24  0441   * 134* 136 133*   K 4.0 4.2 4.2 4.6    100 101 104   CO2 23.0 21.0 24.0 20.0*   BUN 52* 69* 46* 64*   MG 2.2  --  2.2 2.4   ALT  --   --  <6*  --    AST  --   --  18  --    ALB  --   --  1.8*  --        HEM:  Recent Labs   Lab 07/04/24  0459 07/05/24  1044 07/06/24  1201 07/07/24  0741 07/08/24  0605    WBC 9.5 9.0 9.1 7.8 7.9   HGB 7.7* 7.6* 7.9* 7.9* 7.6*   .0 204.0 208.0 188.0 209.0   MCV 89.0 91.1 93.0 91.1 89.6       Coagulation:  Recent Labs   Lab 07/04/24  0459 07/05/24  1044 07/06/24  1201 07/07/24  0741 07/08/24  0605   HCT 23.4* 23.4* 25.4* 24.7* 23.3*   HGB 7.7* 7.6* 7.9* 7.9* 7.6*   .0 204.0 208.0 188.0 209.0              AP:  78 year old male with PMH sig for CAD status post stents, PAD status post bilateral lower extremity stents, atrial fibrillation on Xarelto, diabetes mellitus type 2 on insulin pump, end-stage renal disease on dialysis, chronic CHF with LVH, status post AVR, history of stroke, hypertension, hyperlipidemia, DESI and recent right TMA and left foot second digit amputation on 6/13 by Dr. Meza presented with poor surgical wound healing.       Postop wound infection R TMA 6/13 due to nonhealing ulcers and PVD.   Excisional debridement to level of bone along with partial resection of R 1st and 2nd MT 6/27  - surgical cultures with enterobacter cloacae and stenotrophomonas  - drainage cultures from admit with enterobacter and acinetobacter  - on iv cefepime, flagyl and bactrim since 7/2  - s/p further debridement 7/5  - hyperbaric oxygen therapy    L foot 2nd digit amputation on 6/13  Excisional wound debridement to level of bone along with partial resection of second MT 6/27  - culture polymicrobial with enterobacter and stenotrophomonas  - on abx as above.  - s/p further debridement 7/5  - hyperbaric oxygen therapy    PVD  - Arterial US in April, likely has more distal PAD, may require further amputation, per vascular  pt is not a candidate for any further revascularization and should his foot amputation does not heal he will require major amputation.      ESRD on HD  Malfunctioning AVF  - vascular consulted, o/p eval of new AVF/fistula revision   - nephrology o/c -s/p tunneled catheter on 6/28        CAD s/p stents  PAD s/p b/l LE stents  Chronic CHF w/ LVH  S/p  AVR  Atrial fibrillation  - coreg (Hold SBP < 110 given anemia), lipitor  - plavix and doac had been held for a few days to anemia. Has since been restarted. Cont to trend hgb     DM2  - insulin pump - not on this admission  - ISS/accucheks - high dose SSI, tresiba daily 10 units   - may add carb count/meal time insulin if continues to be high    Anemia  - suspecting ABLA from surgery   - monitor for now hgb 7.9 to 7.7 to 7.0 to 6.6 --> PRBC 7/3.   - hgb has been in high 7s-8s since. Back on blood thinners  - iron levels c/w ACD  - denies BRBPR no melena      Essential HTN  - coreg     Hyperlipidemia  - statin     DESI  - cpap at bedtime     Itching  - prn atarax, may be 2/2 meds/dialysis        FEN: carb controlled diet (no need for renal), PT/OT  Proph: SCDs eliquis     Code status: Full code    Dispo: [facility] working on MARELY rubio was planned but cannot accommodate daily HBO. Getting further clarification from podiatry.

## 2024-07-08 NOTE — PLAN OF CARE
Aox4, feet dressings c/d/I, denying pain at this time, on room air , refusing scds, on Eliquis + Plavix, on tele a-fib, Right arm precautions from non-functioning AV fisutla, Permacath in place c/d/I, HD to be completed tomorrow, oliguric, WBAT with bilateral post-op shoes, up max x1, plan for hyperbaric therapy and wound care team to see tomorrow, IV abx at PR. MBM RORO, no further needs at this time.

## 2024-07-08 NOTE — PROGRESS NOTES
Infectious Disease Progress Note      Date of admission: 6/25/2024 12:55 AM     Reason for consult: Bilateral foot surgical wound infection with osteomyelitis    Subjective: The patient is feeling better, pain 2/10 today.  Pain is better controlled.  No nausea or vomiting.  No diarrhea.  No shortness of breath.  No cough or sputum production.    The rest of the systems were reviewed and found to be negative except was mentioned above    Interval events: This is a 78-year-old male patient with history of peripheral vascular disease, recently had a transmetatarsal amputation of the right foot and amputation of the left second digit on 6/13/2024, presents here with surgical wound infection and gangrene involving both his feet.  MRI of the right foot showing postsurgical changes along with fluid along the surgical approach that may represent postoperative seroma with question of abscess, no evidence of osteomyelitis.  MRI of the left foot showed postsurgical changes with fluid tracking extending from the surgical site to the skin surface.  Patient was taken to the OR on 6/27, excisional wound debridement to the level of bone along with partial resection of the first and second metatarsal on the right and excisional wound debridement to the level of bone and partial resection of the second metatarsal on the left.  Drainage cultures back on 6/25 are growing Enterobacter cloacae and Acinetobacter species.  Surgical cultures were Enterobacter cloacae and stenotrophomonas.  Was taken to the OR for another debridement on 7/5/2024 both his feet down to about    Medications:    epoetin matt    sodium chloride **AND** albumin human    heparin    heparin    apixaban    sulfamethoxazole-trimethoprim DS    hydrOXYzine    insulin degludec    clopidogrel    sodium chloride    acetaminophen    acetaminophen **OR** HYDROcodone-acetaminophen **OR** HYDROcodone-acetaminophen    HYDROmorphone **OR** HYDROmorphone **OR** HYDROmorphone     polyethylene glycol (PEG 3350)    sennosides    bisacodyl    ondansetron    metoclopramide    glucose **OR** glucose **OR** glucose-vitamin C **OR** dextrose **OR** glucose **OR** glucose **OR** glucose-vitamin C    insulin aspart    atorvastatin    carvedilol    cefepime    metRONIDAZOLE     Allergies:  Allergies   Allergen Reactions    Augmentin [Amoxicillin-Pot Clavulanate] RASH    Lisinopril Coughing     Dyspnea         Physical Exam:  Vitals:    07/08/24 0755   BP: 130/51   Pulse: 60   Resp: 11   Temp: 97.5 °F (36.4 °C)     Vitals signs and nursing note reviewed.   Constitutional:       Appearance: Normal appearance.   HENT:      Head: Normocephalic and atraumatic.      Mouth: Mucous membranes are moist.   Neck:      Musculoskeletal: Neck supple.   Cardiovascular:      Rate and Rhythm: Normal rate.    Pulmonary:      Effort: Pulmonary effort is normal. No respiratory distress.   Musculoskeletal:      Right lower leg: No edema.  Clean dressing noted     Left lower leg: No edema.  Clean dressing noted  Skin:     General: Skin is warm and dry.   Neurological:      General: No focal deficit present.      Mental Status: Alert and oriented to person, place, and time.       Laboratory data:  I have reviewed all the lab results independently.  Lab Results   Component Value Date    WBC 7.9 07/08/2024    HGB 7.6 07/08/2024    HCT 23.3 07/08/2024    .0 07/08/2024      Recent Labs   Lab 07/07/24  0741 07/08/24  0605   RBC 2.71* 2.60*   HGB 7.9* 7.6*   HCT 24.7* 23.3*   MCV 91.1 89.6   MCH 29.2 29.2   MCHC 32.0 32.6   RDW 15.9 15.7   NEPRELIM 6.03  --    WBC 7.8 7.9   .0 209.0      Microbiology data:  Hospital Encounter on 06/25/24   1. Tissue Aerobic Culture     Status: Abnormal    Collection Time: 06/30/24 12:11 PM    Specimen: Foot,left; Tissue   Result Value Ref Range    Tissue Culture Result 4+ growth Enterobacter cloacae (A) N/A    Tissue Culture Result 2+ growth Stenotrophomonas maltophilia (A) N/A     Tissue Smear 4+ WBCs seen (A) N/A    Tissue Smear 3+ Gram positive cocci in pairs (A) N/A    Tissue Smear 4+ Gram Negative Rods (A) N/A       Susceptibility    Enterobacter cloacae -  (no method available)     Cefazolin >=64 Resistant      Cefepime <=1 Sensitive      Ceftriaxone <=1 Sensitive      Ciprofloxacin <=0.25 Sensitive      Gentamicin <=1 Sensitive      Meropenem <=0.25 Sensitive      Levofloxacin <=0.12 Sensitive      Piperacillin + Tazobactam <=4 Sensitive      Trimethoprim/Sulfa <=20 Sensitive    2. Anaerobic Culture     Status: None    Collection Time: 06/30/24 12:11 PM    Specimen: Foot,left; Tissue   Result Value Ref Range    Anaerobic Culture No Anaerobes isolated N/A   3. Aerobic Bacterial Culture     Status: Abnormal    Collection Time: 06/25/24  8:14 AM    Specimen: Foot,left; Other   Result Value Ref Range    Aerobic Culture Result  N/A     Mixture of organisms suggestive of normal skin justice    Aerobic Culture Result 4+ growth Enterobacter cloacae complex (A) N/A    Aerobic Culture Result 4+ growth Acinetobacter pittii (A) N/A    Aerobic Smear No WBCs seen N/A    Aerobic Smear 1+ Gram Positive Cocci N/A    Aerobic Smear 1+ Gram Negative Rods N/A       Susceptibility    Enterobacter cloacae complex -  (no method available)     Cefazolin >=64 Resistant      Cefepime <=1 Sensitive      Ceftriaxone <=1 Sensitive      Ciprofloxacin <=0.25 Sensitive      Gentamicin <=1 Sensitive      Meropenem <=0.25 Sensitive      Levofloxacin <=0.12 Sensitive      Piperacillin + Tazobactam <=4 Sensitive      Trimethoprim/Sulfa <=20 Sensitive     Acinetobacter pittii -  (no method available)     Ceftazidime <=1 Sensitive      Ciprofloxacin <1 Sensitive      Gentamicin <1 Sensitive      Meropenem <1 Sensitive      Levofloxacin <=0.25 Sensitive      Piperacillin + Tazobactam <16 Sensitive      Trimethoprim/Sulfa <=40 Sensitive    4. Blood Culture     Status: None    Collection Time: 06/25/24  2:36 AM    Specimen:  Blood,peripheral   Result Value Ref Range    Blood Culture Result No Growth 5 Days N/A     Impression:  Darin Bowens is a 78 year old male with    Right-sided surgical wound infection with osteomyelitis with risk to limb  This is in the setting of right foot amputation on 6/13  Now status post excisional wound debridement to the level of bone along with partial resection of the first and second metatarsal on the right on 6/27   Surgical cultures with Enterobacter cloacae and stenotrophomonas  Drainage cultures from the time of admission grew Enterobacter cloacae and Acinetobacter  Currently on IV cefepime along with p.o. metronidazole and p.o. Bactrim  Status post another debridement down to bone on 7/5  The patient remains high risk for further limb loss  Left-sided surgical wound infection with osteomyelitis and gangrene with risk to limb  This is in the setting of recent foot surgery on 6/13  Now status post excisional wound debridement to level of bone along with partial resection of the second metatarsal on 6/27  Cultures with gram-positive cocci and gram-negative rods  Currently on IV cefepime, p.o. metronidazole and Bactrim  Status post another debridement down to bone on 7/5  The patient remains high risk for further limb loss  Peripheral vascular disease  Vascular surgery following  High risk for limb loss  End stage renal disease  AV fistula clotted  Permacath in place  Allergic reaction to Augmentin  Now stopped  Rash resolved    Recommendations:    Continue Bactrim DS 2 tablet daily, renally dosed for stenotrophomonas osteomyelitis  Continue IV cefepime, along with p.o. metronidazole  Plan to dose cefepime 2 g with hemodialysis 3 times weekly so a tunneled line will not be needed  Plan on 6 weeks of therapy through now through 8/15/2024 from last debridement on 7/5  Wound management as per wound care and podiatry  Weekly CBC with differential and CMP, sed rate and CRP.  Fax results to [facility]. Fax: [phone]. Tel: [phone].  PICC line care as per protocol.  Continue to monitor daily labs for antibiotic toxicities  Further recommendations will depend on the above work-up and clinical progress     The plan of care was discussed with the primary hospital team, Lacey Alcaraz MD     Recommendations were also discussed with the patient; all questions were answered.     Thank you for this consultation. Please don't hesitate to call the ID team for questions or any acute changes in patient's clinical condition.    Please note that this report has been produced using speech recognition software and may contain errors related to that system including, but not limited to, errors in grammar, punctuation, and spelling, as well as words and phrases that possibly may have been recognized inappropriately.  If there are any questions or concerns, contact the dictating provider for clarification.    The  [employer] C[employer] Act makes medical notes like these available to patients in the interest of transparency. Please be advised this is a medical document. Medical documents are intended to carry relevant information, facts as evident, and the clinical opinion of the practitioner. The medical note is intended as peer to peer communication and may appear blunt or direct. It is written in medical language and may contain abbreviations or verbiage that are unfamiliar.     MD [facility]. Tel: [phone]. Fax: [phone].     Darin Bowens : 1945 MRN: [id number] [facility]: [id number]

## 2024-07-08 NOTE — PLAN OF CARE
Returned from wound care/hyperbaric treatment.  Wound vac functioning to bilateral feet, dressings intact.  Wife at bedside.  Instructed to call for all asst needed, discomfort felt.  Verbalized understanding.

## 2024-07-08 NOTE — PROGRESS NOTES
HBO Treatment Details      Patient Name:    Darin Bowens  MRN:  [id number]        YOB: 1945  Today's Date:  2024  Physician/Provider: Mini Eisenberg MD  Facility: [facility]  Diagnosis:   1. Gangrene of right foot (HCC)    2. Diabetic foot ulcer with osteomyelitis (HCC)    3. Ulcer of toe of right foot, with necrosis of bone (HCC)    4. Ulcer of toe of left foot, with necrosis of bone (HCC)    5. PAD (peripheral artery disease) (HCC)    6. ESRD (end stage renal disease) (HCC)    7. Infected wound            Treatment Course Number:    Total Treatments Ordered:     Ordering Physician: Elgin  Indication: Diabetic wounds of lower extremities  Risk Factors: Barotrauma;Seizure;Hypoglycemia  HBO Treatment Start Date: 24  HBO Treatment Number:  1  Treatment Length:Other (Comment)  Treatment Depth: 2.0 LIGIA  HBO Treatment End Date:   HBO Discharge Outcome: Treatment Complete        HBO Treatment Details:  In-Patient Visit: no  Chamber Type:  Hard sided monoplace  Chamber #:    HBO Dive Lo  Treatment Protocol:  2.5 LIGIA with 10 min air break      Treatment Details:  Pressurized Rate: 1.5 psi/min  Dive Rate Down:  2.0 psi/min  Dive Rate Up:  2.0 psi/min  Started Compression: 1358  Reached Compression: 1410  Patient on Air:    Patient Taken off Air:   Total Compression Time: 12 (Minutes)  Total Holding Time: 90 (Minutes)  Started Decompression: 1540  Reached Surface: 1410  Total Decompression Time: 7 (Minutes)  Total Airbreaks:   (Minutes)  Total Time of Treatment:   (Minutes)                         Vital Signs:  HBO Glucose: Pre 122 pox 170 - (Reference Range: 100-350 mg/dl)        Vitals:    24 1344 24 1655   BP: 116/62 136/69   Pulse: 73 71   Resp: 10 10   Temp: 97.2 °F (36.2 °C) 97.4 °F (36.3 °C)       Treatment Response:  Symptoms Noted During Treatment: None   Treatment Aborted:  No      Treatment Notes:

## 2024-07-08 NOTE — CM/SW NOTE
Noted plan for daily hyperbaric treatments with recommendation for 30 total sessions.  Spoke with Jennifer from [facility] in [place].  Unsure that plan for daily HBO is compatible with RORO.  Pt would need to receive daily HD M-F in addition to possible HBO, leaving little time for rehab program and possibly exceeding allowable time off site.  She will confirm with their administration.  / to remain available for support and/or discharge planning.     Lili Kennedy, [facility]  Discharge Planner  [phone]    Addendum:  Spoke with Jennifer from [facility] who confirmed they are unable to accept pt for RORO if daily HBO is needed.  Updated Dr Alcaraz who will reach out to podiatry to determine if treatments can be postponed until after RORO.

## 2024-07-08 NOTE — PROGRESS NOTES
[facility]   part of [facility]     Nephrology Progress Note    Darin Bowens Patient Status:  Inpatient    1945 MRN [id number]   Location [facility] 3SW-A Attending Mook Escalante MD   Hosp Day # 13 PCP Zachery Hall MD       SUBJECTIVE  Stable this AM        Physical Exam:   /54 (BP Location: Left arm)   Pulse 59   Temp 97.9 °F (36.6 °C) (Oral)   Resp 13   Ht 6' (1.829 m)   Wt 212 lb 4.9 oz (96.3 kg)   SpO2 98%   BMI 28.79 kg/m²   Temp (24hrs), Av.2 °F (36.8 °C), Min:97.9 °F (36.6 °C), Max:98.4 °F (36.9 °C)       Intake/Output Summary (Last 24 hours) at 2024 0737  Last data filed at 2024 1800  Gross per 24 hour   Intake 840 ml   Output --   Net 840 ml     Last 3 Weights   24 0530 212 lb 4.9 oz (96.3 kg)   24 0400 208 lb 5.4 oz (94.5 kg)   24 0600 198 lb 8 oz (90 kg)   24 0648 205 lb (93 kg)   24 0543 211 lb (95.7 kg)   24 2000 211 lb (95.7 kg)   06/15/24 0008 211 lb (95.7 kg)   24 0545 211 lb 6.4 oz (95.9 kg)   24 1324 209 lb 7 oz (95 kg)   24 1120 210 lb (95.3 kg)   24 1342 210 lb (95.3 kg)   22 1253 210 lb (95.3 kg)     General: Alert and oriented in no apparent distress.  HEENT: No scleral icterus, MMM  Neck: Supple, no SHERI or thyromegaly  Cardiac: Regular rate and rhythm, S1, S2 normal, no murmur or rub  Lungs: Clear without wheezes, rales, rhonchi.    Extremities: No significant pitting edema, bilateral dressings in place over the feet   neurologic: Alert and oriented, cranial nerves grossly intact, moving all extremities  Skin: Warm and dry, no rash        Labs:     Recent Labs   Lab 24  0439 24  1425 24  0459 24  1044 24  1201 24  0741   WBC 10.9  --  9.5 9.0 9.1 7.8   HGB 6.6* 8.1* 7.7* 7.6* 7.9* 7.9*   MCV 88.8  --  89.0 91.1 93.0 91.1   .0  --  197.0 204.0 208.0 188.0       Recent Labs   Lab 24  0506 24  0439  07/04/24  0458 07/05/24  0441   * 134* 136 133*   K 4.0 4.2 4.2 4.6    100 101 104   CO2 23.0 21.0 24.0 20.0*   BUN 52* 69* 46* 64*   CREATSERUM 7.26* 8.56* 5.96* 7.68*   CA 8.9 9.4 9.0 9.1   MG 2.2  --  2.2 2.4   * 99 129* 137*       Recent Labs   Lab 07/04/24  0458   ALT <6*   AST 18   ALB 1.8*       Recent Labs   Lab 07/07/24  0512 07/07/24  1259 07/07/24  1759 07/07/24  2115 07/08/24  0528   PGLU 140* 281* 264* 218* 135*       Meds:   Current Facility-Administered Medications   Medication Dose Route Frequency    epoetin matt (Epogen, Procrit) 59847 UNIT/ML injection 10,000 Units  10,000 Units Intravenous Once in dialysis    sodium chloride 0.9 % IV bolus 100 mL  100 mL Intravenous Q30 Min PRN    And    albumin human (Albumin) 25% injection 25 g  25 g Intravenous PRN Dialysis    heparin (Porcine) 1000 UNIT/ML injection 1,500 Units  1.5 mL Intracatheter Once    heparin (Porcine) 1000 UNIT/ML injection 1,000 Units  1,000 Units Intravenous PRN Dialysis    apixaban (Eliquis) tab 2.5 mg  2.5 mg Oral BID    sulfamethoxazole-trimethoprim DS (Bactrim DS) 800-160 MG per tab 2 tablet  2 tablet Oral Daily    hydrOXYzine (Atarax) tab 25 mg  25 mg Oral TID PRN    insulin degludec (Tresiba) 100 units/mL flextouch 10 Units  10 Units Subcutaneous Daily    clopidogrel (Plavix) tab 75 mg  75 mg Oral Daily    sodium chloride 0.9% infusion   Intravenous Once    acetaminophen (Tylenol Extra Strength) tab 500 mg  500 mg Oral Q4H PRN    acetaminophen (Tylenol) tab 650 mg  650 mg Oral Q4H PRN    Or    HYDROcodone-acetaminophen (Norco) 5-325 MG per tab 1 tablet  1 tablet Oral Q4H PRN    Or    HYDROcodone-acetaminophen (Norco) 5-325 MG per tab 2 tablet  2 tablet Oral Q4H PRN    HYDROmorphone (Dilaudid) 1 MG/ML injection 0.2 mg  0.2 mg Intravenous Q2H PRN    Or    HYDROmorphone (Dilaudid) 1 MG/ML injection 0.4 mg  0.4 mg Intravenous Q2H PRN    Or    HYDROmorphone (Dilaudid) 1 MG/ML injection 0.8 mg  0.8 mg Intravenous  Q2H PRN    polyethylene glycol (PEG 3350) (Miralax) 17 g oral packet 17 g  17 g Oral Daily PRN    sennosides (Senokot) tab 17.2 mg  17.2 mg Oral Nightly PRN    bisacodyl (Dulcolax) 10 MG rectal suppository 10 mg  10 mg Rectal Daily PRN    ondansetron (Zofran) 4 MG/2ML injection 4 mg  4 mg Intravenous Q6H PRN    metoclopramide (Reglan) 5 mg/mL injection 5 mg  5 mg Intravenous Q8H PRN    glucose (Dex4) 15 GM/59ML oral liquid 15 g  15 g Oral Q15 Min PRN    Or    glucose (Glutose) 40% oral gel 15 g  15 g Oral Q15 Min PRN    Or    glucose-vitamin C (Dex-4) chewable tab 4 tablet  4 tablet Oral Q15 Min PRN    Or    dextrose 50% injection 50 mL  50 mL Intravenous Q15 Min PRN    Or    glucose (Dex4) 15 GM/59ML oral liquid 30 g  30 g Oral Q15 Min PRN    Or    glucose (Glutose) 40% oral gel 30 g  30 g Oral Q15 Min PRN    Or    glucose-vitamin C (Dex-4) chewable tab 8 tablet  8 tablet Oral Q15 Min PRN    insulin aspart (NovoLOG) 100 Units/mL FlexPen 4-20 Units  4-20 Units Subcutaneous TID AC and HS    atorvastatin (Lipitor) tab 80 mg  80 mg Oral Nightly    carvedilol (Coreg) tab 12.5 mg  12.5 mg Oral BID with meals    ceFEPIme (Maxipime) 1 g in sodium chloride 0.9% 100 mL IVPB-MBP  1 g Intravenous Q24H    metRONIDAZOLE (Flagyl) tab 500 mg  500 mg Oral 2 times per day         Impression/Plan:        ESRD - due to DM/HTN; clotted AVF.  HD today then again on Monday  S/p PC due to clotted AV fistula  - will have pt f/u with Dr. Nix for o/p eval of new AVF/fistula revision  Foot infection - s/p B foot/toe amputations  - on abx; wound care  - podiatry /ID following- on TMP/SMX for stenotrophomonas, cefepine/flagyl for enterobacter and acinetobacter  PAD- as above  CHF; s/p AVR; volume optimization w/ HD/UF  Anemia- chronically due to ESRD. DALE for goal hgb 10-11 gms      Questions/concerns were discussed with patient and/or family by bedside.      Sean Flores MD  7/8/2024  7:38 AM

## 2024-07-08 NOTE — CONSULTS
[facility]  Inpatient Wound Care Contact Note    Darin Bowens Patient Status:  Inpatient    1945 MRN [id number]   Location [facility] 3SW-A Attending Lacey Alcaraz MD   Hosp Day # 13 PCP Zachery Hall MD     Attempted to see patient for follow up wound care assessment/session.  Patient is currently unavailable secondary to gone to Hyperbaric Oxygen Therapy treatment.      We will continue to follow this patient while in-house and assist with wound care discharge planning.    Thank you,    Lindy Brown, PT, MPT  [facility]  [address] [address]  [phone]

## 2024-07-08 NOTE — PROGRESS NOTES
Pre/Post Treatment Evaluation    First treatment today.   Pt is not v interactive - unless we encourage him to speak.   He tends to want to keep his eyes closed most of the time, but opens eyes and talks when asked a question  D/w floor nurse - He is not on any narcotic meds - this is his baseline per nurse.   He just finished dialysis.   BS was 122 - gave multiple juice boxes - uptrending.     Pre-Treatment Lung Evaluation: Clear to auscultation bilaterally    Left Ear Pre-Treatment Evaluation  Left Ear Clear: Yes  Left Ear Intact: Yes  Left Cerumen Removal: No  Pre Left teed stgstrstastdstest:st st1st grade    Right Ear Pre-Treatment Evaluation  Right Ear Clear: Yes  Right Ear Intact: Yes  Right Cerumen Removal: No  Pre Right teed stgstrstastdstest:st st1st grade      Post-Treatment Lung Evaluation: Clear to auscultation bilaterally    Left Ear Post-Treatment Evaluation  Left Ear Clear: Yes  Left Ear Intact: Yes  Left Cerumen Removal: No  Post Left teed stgstrstastdstest:st st1st grade    Right Ear Post-Treatment Evaluation  Right Ear Clear: Yes  Right Ear Intact: Yes  Right Cerumen Removal: No  Post Right teed stgstrstastdstest:st st1st grade    Dive was uneventful.   I supervised this Hyperbaric treatment and was immediately available throughout the course of the treatment.  There is a trained emergency support team and ICU available at this facility to assist with complications.    Mini Eisenberg MD, 07/08/24, 4:33 PM

## 2024-07-09 ENCOUNTER — HBO THERAPY VISIT (OUTPATIENT)
Dept: WOUND CARE | Facility: HOSPITAL | Age: 79
End: 2024-07-09
Payer: MEDICARE

## 2024-07-09 VITALS
SYSTOLIC BLOOD PRESSURE: 114 MMHG | RESPIRATION RATE: 12 BRPM | DIASTOLIC BLOOD PRESSURE: 66 MMHG | HEART RATE: 66 BPM | TEMPERATURE: 97 F

## 2024-07-09 DIAGNOSIS — I96 GANGRENE OF RIGHT FOOT (HCC): Primary | ICD-10-CM

## 2024-07-09 DIAGNOSIS — I73.9 PAD (PERIPHERAL ARTERY DISEASE) (HCC): ICD-10-CM

## 2024-07-09 DIAGNOSIS — M86.9 DIABETIC FOOT ULCER WITH OSTEOMYELITIS (HCC): ICD-10-CM

## 2024-07-09 DIAGNOSIS — N18.6 ESRD (END STAGE RENAL DISEASE) (HCC): ICD-10-CM

## 2024-07-09 DIAGNOSIS — L97.509 DIABETIC FOOT ULCER WITH OSTEOMYELITIS (HCC): ICD-10-CM

## 2024-07-09 DIAGNOSIS — E11.69 DIABETIC FOOT ULCER WITH OSTEOMYELITIS (HCC): ICD-10-CM

## 2024-07-09 DIAGNOSIS — L97.524 ULCER OF TOE OF LEFT FOOT, WITH NECROSIS OF BONE (HCC): ICD-10-CM

## 2024-07-09 DIAGNOSIS — E11.621 DIABETIC FOOT ULCER WITH OSTEOMYELITIS (HCC): ICD-10-CM

## 2024-07-09 DIAGNOSIS — L97.514 ULCER OF TOE OF RIGHT FOOT, WITH NECROSIS OF BONE (HCC): ICD-10-CM

## 2024-07-09 LAB
ALBUMIN SERPL-MCNC: 1.9 G/DL (ref 3.4–5)
ALP LIVER SERPL-CCNC: 119 U/L
ALT SERPL-CCNC: 7 U/L
AST SERPL-CCNC: 14 U/L (ref 15–37)
BILIRUB DIRECT SERPL-MCNC: <0.1 MG/DL (ref 0–0.2)
BILIRUB SERPL-MCNC: 0.4 MG/DL (ref 0.1–2)
ERYTHROCYTE [DISTWIDTH] IN BLOOD BY AUTOMATED COUNT: 15.6 %
GLUCOSE BLD-MCNC: 136 MG/DL (ref 70–99)
GLUCOSE BLD-MCNC: 193 MG/DL (ref 70–99)
GLUCOSE BLD-MCNC: 244 MG/DL (ref 70–99)
GLUCOSE BLD-MCNC: 271 MG/DL (ref 70–99)
GLUCOSE BLD-MCNC: 272 MG/DL (ref 70–99)
GLUCOSE BLD-MCNC: 304 MG/DL (ref 70–99)
HCT VFR BLD AUTO: 25.7 %
HGB BLD-MCNC: 8 G/DL
MCH RBC QN AUTO: 28.9 PG (ref 26–34)
MCHC RBC AUTO-ENTMCNC: 31.1 G/DL (ref 31–37)
MCV RBC AUTO: 92.8 FL
PLATELET # BLD AUTO: 198 10(3)UL (ref 150–450)
PROT SERPL-MCNC: 5.8 G/DL (ref 6.4–8.2)
RBC # BLD AUTO: 2.77 X10(6)UL
WBC # BLD AUTO: 8.5 X10(3) UL (ref 4–11)

## 2024-07-09 PROCEDURE — 99232 SBSQ HOSP IP/OBS MODERATE 35: CPT | Performed by: INTERNAL MEDICINE

## 2024-07-09 PROCEDURE — 99183 HYPERBARIC OXYGEN THERAPY: CPT | Performed by: NURSE PRACTITIONER

## 2024-07-09 RX ORDER — ALBUMIN (HUMAN) 12.5 G/50ML
25 SOLUTION INTRAVENOUS
Status: DISCONTINUED | OUTPATIENT
Start: 2024-07-09 | End: 2024-07-11

## 2024-07-09 RX ORDER — HEPARIN SODIUM 1000 [USP'U]/ML
1.5 INJECTION, SOLUTION INTRAVENOUS; SUBCUTANEOUS
Status: DISCONTINUED | OUTPATIENT
Start: 2024-07-09 | End: 2024-07-11

## 2024-07-09 RX ORDER — INSULIN DEGLUDEC 100 U/ML
10 INJECTION, SOLUTION SUBCUTANEOUS EVERY 24 HOURS
Status: DISCONTINUED | OUTPATIENT
Start: 2024-07-09 | End: 2024-07-12

## 2024-07-09 NOTE — PLAN OF CARE
Aox4, feet dressings c/d/I with Wound vac working with no issues, pain controlled on ordered medication, on room air , refusing scds, on Eliquis + Plavix, on tele a-fib, Right arm precautions from non-functioning AV fisutla, Permacath in place c/d/I, oliguric, WBAT with bilateral post-op shoes, up max x1, plan for hyperbaric therapy to be continued, IV abx at [place]. [facility]O, no further needs at this time.

## 2024-07-09 NOTE — PROGRESS NOTES
[facility]   part of [facility]     Nephrology Progress Note    Darin Bowens Patient Status:  Inpatient    1945 MRN [id number]   Location [facility] 3SW-A Attending Mook Escalante MD   Hosp Day # 14 PCP Zachery Hall MD       SUBJECTIVE  Stable this AM, had hyperbaric tx yest        Physical Exam:   /54 (BP Location: Left arm)   Pulse 62   Temp 97.4 °F (36.3 °C) (Oral)   Resp 19   Ht 6' (1.829 m)   Wt 212 lb 4.9 oz (96.3 kg)   SpO2 100%   BMI 28.79 kg/m²   Temp (24hrs), Av.9 °F (36.6 °C), Min:97.2 °F (36.2 °C), Max:98.6 °F (37 °C)       Intake/Output Summary (Last 24 hours) at 2024 0854  Last data filed at 2024 1330  Gross per 24 hour   Intake 50 ml   Output 2000 ml   Net -1950 ml     Last 3 Weights   24 0530 212 lb 4.9 oz (96.3 kg)   24 0400 208 lb 5.4 oz (94.5 kg)   24 0600 198 lb 8 oz (90 kg)   24 0648 205 lb (93 kg)   24 0543 211 lb (95.7 kg)   24 2000 211 lb (95.7 kg)   06/15/24 0008 211 lb (95.7 kg)   24 0545 211 lb 6.4 oz (95.9 kg)   24 1324 209 lb 7 oz (95 kg)   24 1120 210 lb (95.3 kg)   24 1342 210 lb (95.3 kg)   22 1253 210 lb (95.3 kg)     General: Alert and oriented in no apparent distress.  HEENT: No scleral icterus, MMM  Neck: Supple, no SHERI or thyromegaly  Cardiac: Regular rate and rhythm, S1, S2 normal, no murmur or rub  Lungs: Clear without wheezes, rales, rhonchi.    Extremities: No significant pitting edema, bilateral dressings in place over the feet   neurologic:  moving all extremities  Skin: Warm and dry, no rash        Labs:     Recent Labs   Lab 24  1044 24  1201 24  0741 24  0605 24  0458   WBC 9.0 9.1 7.8 7.9 8.5   HGB 7.6* 7.9* 7.9* 7.6* 8.0*   MCV 91.1 93.0 91.1 89.6 92.8   .0 208.0 188.0 209.0 198.0       Recent Labs   Lab 24  0439 24  0458 24  0441   * 136 133*   K 4.2 4.2 4.6    101 104    CO2 21.0 24.0 20.0*   BUN 69* 46* 64*   CREATSERUM 8.56* 5.96* 7.68*   CA 9.4 9.0 9.1   MG  --  2.2 2.4   GLU 99 129* 137*       Recent Labs   Lab 07/04/24  0458   ALT <6*   AST 18   ALB 1.8*       Recent Labs   Lab 07/08/24  1335 07/08/24  1547 07/08/24  1741 07/08/24  2132 07/09/24  0522   PGLU 125* 170* 183* 277* 193*       Meds:   Current Facility-Administered Medications   Medication Dose Route Frequency    sodium chloride 0.9 % IV bolus 100 mL  100 mL Intravenous Q30 Min PRN    And    albumin human (Albumin) 25% injection 25 g  25 g Intravenous PRN Dialysis    heparin (Porcine) 1000 UNIT/ML injection 1,000 Units  1,000 Units Intravenous PRN Dialysis    apixaban (Eliquis) tab 2.5 mg  2.5 mg Oral BID    sulfamethoxazole-trimethoprim DS (Bactrim DS) 800-160 MG per tab 2 tablet  2 tablet Oral Daily    hydrOXYzine (Atarax) tab 25 mg  25 mg Oral TID PRN    insulin degludec (Tresiba) 100 units/mL flextouch 10 Units  10 Units Subcutaneous Daily    clopidogrel (Plavix) tab 75 mg  75 mg Oral Daily    sodium chloride 0.9% infusion   Intravenous Once    acetaminophen (Tylenol Extra Strength) tab 500 mg  500 mg Oral Q4H PRN    acetaminophen (Tylenol) tab 650 mg  650 mg Oral Q4H PRN    Or    HYDROcodone-acetaminophen (Norco) 5-325 MG per tab 1 tablet  1 tablet Oral Q4H PRN    Or    HYDROcodone-acetaminophen (Norco) 5-325 MG per tab 2 tablet  2 tablet Oral Q4H PRN    HYDROmorphone (Dilaudid) 1 MG/ML injection 0.2 mg  0.2 mg Intravenous Q2H PRN    Or    HYDROmorphone (Dilaudid) 1 MG/ML injection 0.4 mg  0.4 mg Intravenous Q2H PRN    Or    HYDROmorphone (Dilaudid) 1 MG/ML injection 0.8 mg  0.8 mg Intravenous Q2H PRN    polyethylene glycol (PEG 3350) (Miralax) 17 g oral packet 17 g  17 g Oral Daily PRN    sennosides (Senokot) tab 17.2 mg  17.2 mg Oral Nightly PRN    bisacodyl (Dulcolax) 10 MG rectal suppository 10 mg  10 mg Rectal Daily PRN    ondansetron (Zofran) 4 MG/2ML injection 4 mg  4 mg Intravenous Q6H PRN     metoclopramide (Reglan) 5 mg/mL injection 5 mg  5 mg Intravenous Q8H PRN    glucose (Dex4) 15 GM/59ML oral liquid 15 g  15 g Oral Q15 Min PRN    Or    glucose (Glutose) 40% oral gel 15 g  15 g Oral Q15 Min PRN    Or    glucose-vitamin C (Dex-4) chewable tab 4 tablet  4 tablet Oral Q15 Min PRN    Or    dextrose 50% injection 50 mL  50 mL Intravenous Q15 Min PRN    Or    glucose (Dex4) 15 GM/59ML oral liquid 30 g  30 g Oral Q15 Min PRN    Or    glucose (Glutose) 40% oral gel 30 g  30 g Oral Q15 Min PRN    Or    glucose-vitamin C (Dex-4) chewable tab 8 tablet  8 tablet Oral Q15 Min PRN    insulin aspart (NovoLOG) 100 Units/mL FlexPen 4-20 Units  4-20 Units Subcutaneous TID AC and HS    atorvastatin (Lipitor) tab 80 mg  80 mg Oral Nightly    carvedilol (Coreg) tab 12.5 mg  12.5 mg Oral BID with meals    ceFEPIme (Maxipime) 1 g in sodium chloride 0.9% 100 mL IVPB-MBP  1 g Intravenous Q24H    metRONIDAZOLE (Flagyl) tab 500 mg  500 mg Oral 2 times per day         Impression/Plan:        ESRD - due to DM/HTN; clotted AVF.  HD to cont MWF per usual routine  S/p PC due to clotted AV fistula  - will have pt f/u with Dr. Nix for o/p eval of new AVF/fistula revision  Foot infection - s/p B foot/toe amputations  - on abx; wound care, hyperbaric treatment  - podiatry /ID following- on TMP/SMX for stenotrophomonas, cefepine/flagyl for enterobacter and acinetobacter  PAD- as above  CHF; s/p AVR; volume optimization w/ HD/UF  Anemia- chronically due to ESRD. DALE for goal hgb 10-11 gms      Questions/concerns were discussed with patient and/or family by bedside.      Sean Flores MD  7/9/2024  8:55 AM

## 2024-07-09 NOTE — PROGRESS NOTES
[facility]   part of [facility] Infectious Disease Progress Note    Darin Bowens Patient Status:  Inpatient    1945 MRN [id number]   [facility] 3SW-A Attending Lacey lAcaraz MD   Hosp Day # 14 PCP Zachery Hall MD     Subjective:  Pt with no new complaints.  He states his pain is well controlled.     Objective:    Allergies:  Allergies   Allergen Reactions    Augmentin [Amoxicillin-Pot Clavulanate] RASH    Lisinopril Coughing     Dyspnea         Medications:    Current Facility-Administered Medications:     [START ON 7/10/2024] epoetin matt (Epogen, Procrit) 80315 UNIT/ML injection 10,000 Units, 10,000 Units, Intravenous, Once in dialysis    sodium chloride 0.9 % IV bolus 100 mL, 100 mL, Intravenous, Q30 Min PRN **AND** albumin human (Albumin) 25% injection 25 g, 25 g, Intravenous, PRN Dialysis    heparin (Porcine) 1000 UNIT/ML injection 1,500 Units, 1.5 mL, Intracatheter, Once    insulin degludec (Tresiba) 100 units/mL flextouch 10 Units, 10 Units, Subcutaneous, Q24H    heparin (Porcine) 1000 UNIT/ML injection 1,000 Units, 1,000 Units, Intravenous, PRN Dialysis    apixaban (Eliquis) tab 2.5 mg, 2.5 mg, Oral, BID    sulfamethoxazole-trimethoprim DS (Bactrim DS) 800-160 MG per tab 2 tablet, 2 tablet, Oral, Daily    hydrOXYzine (Atarax) tab 25 mg, 25 mg, Oral, TID PRN    clopidogrel (Plavix) tab 75 mg, 75 mg, Oral, Daily    sodium chloride 0.9% infusion, , Intravenous, Once    acetaminophen (Tylenol Extra Strength) tab 500 mg, 500 mg, Oral, Q4H PRN    acetaminophen (Tylenol) tab 650 mg, 650 mg, Oral, Q4H PRN **OR** HYDROcodone-acetaminophen (Norco) 5-325 MG per tab 1 tablet, 1 tablet, Oral, Q4H PRN **OR** HYDROcodone-acetaminophen (Norco) 5-325 MG per tab 2 tablet, 2 tablet, Oral, Q4H PRN    HYDROmorphone (Dilaudid) 1 MG/ML injection 0.2 mg, 0.2 mg, Intravenous, Q2H PRN **OR** HYDROmorphone (Dilaudid) 1 MG/ML injection 0.4 mg, 0.4 mg, Intravenous, Q2H PRN **OR**  HYDROmorphone (Dilaudid) 1 MG/ML injection 0.8 mg, 0.8 mg, Intravenous, Q2H PRN    polyethylene glycol (PEG 3350) (Miralax) 17 g oral packet 17 g, 17 g, Oral, Daily PRN    sennosides (Senokot) tab 17.2 mg, 17.2 mg, Oral, Nightly PRN    bisacodyl (Dulcolax) 10 MG rectal suppository 10 mg, 10 mg, Rectal, Daily PRN    ondansetron (Zofran) 4 MG/2ML injection 4 mg, 4 mg, Intravenous, Q6H PRN    metoclopramide (Reglan) 5 mg/mL injection 5 mg, 5 mg, Intravenous, Q8H PRN    glucose (Dex4) 15 GM/59ML oral liquid 15 g, 15 g, Oral, Q15 Min PRN **OR** glucose (Glutose) 40% oral gel 15 g, 15 g, Oral, Q15 Min PRN **OR** glucose-vitamin C (Dex-4) chewable tab 4 tablet, 4 tablet, Oral, Q15 Min PRN **OR** dextrose 50% injection 50 mL, 50 mL, Intravenous, Q15 Min PRN **OR** glucose (Dex4) 15 GM/59ML oral liquid 30 g, 30 g, Oral, Q15 Min PRN **OR** glucose (Glutose) 40% oral gel 30 g, 30 g, Oral, Q15 Min PRN **OR** glucose-vitamin C (Dex-4) chewable tab 8 tablet, 8 tablet, Oral, Q15 Min PRN    insulin aspart (NovoLOG) 100 Units/mL FlexPen 4-20 Units, 4-20 Units, Subcutaneous, TID AC and HS    atorvastatin (Lipitor) tab 80 mg, 80 mg, Oral, Nightly    carvedilol (Coreg) tab 12.5 mg, 12.5 mg, Oral, BID with meals    ceFEPIme (Maxipime) 1 g in sodium chloride 0.9% 100 mL IVPB-MBP, 1 g, Intravenous, Q24H    metRONIDAZOLE (Flagyl) tab 500 mg, 500 mg, Oral, 2 times per day    Physical Exam:  General: Alert, orientated x3.  Cooperative.  No apparent distress.  Vital Signs:  Blood pressure 112/42, pulse 56, temperature 98.2 °F (36.8 °C), temperature source Oral, resp. rate (!) 51, height 6' (1.829 m), weight 212 lb 4.9 oz (96.3 kg), SpO2 99%.   Temp (24hrs), Av.9 °F (36.6 °C), Min:97.2 °F (36.2 °C), Max:98.6 °F (37 °C)      HEENT: Exam is unremarkable.  Without scleral icterus.  Mucous membranes are moist. PERRLA.  Oropharynx is clear.  Neck: No tenderness to palpitation.  Full range of motion to flexion and extension, lateral rotation and  lateral flexion of cervical spine.  No JVD. Supple.   Lungs: Clear to auscultation bilaterally.  Cardiac: Regular rate and rhythm. No murmur.  Abdomen:  Soft, non-distended, non-tender, with no rebound or guarding.   Extremities:  No lower extremity edema noted.  Without clubbing or cyanosis.    Skin: LE covered, NPWT in place.    Neurologic: Cranial nerves are grossly intact.  Motor strength and sensory examination is grossly normal.  No focal neurologic deficit.    Labs:  Lab Results   Component Value Date    WBC 8.5 07/09/2024    HGB 8.0 07/09/2024    HCT 25.7 07/09/2024    .0 07/09/2024    ALB 1.9 07/09/2024    ALKPHO 119 07/09/2024    BILT 0.4 07/09/2024    TP 5.8 07/09/2024    AST 14 07/09/2024    ALT 7 07/09/2024         Assessment/Plan:    1.  Post-op R foot infection of R foot  -s/p TMA on 6/13  -s/p I&D down to bone with partial resection of 1st and 2nd MT on 6/27  -s/p repeat I&D down to bone on 7/5  -wound cultures with enterobacter and acinetobacter  -OR cultures with enterobacter  -wound on consult, pt receiving HBO  -on IV cefepime, PO metronidazole and PO bactrim  2.  Post-op left foot wound infection with OM  -s/p L 2nd toe amputation on 6/13  -s/p I&D down to bone with partial resection of 2nd toe on 6/27  -s/p I&D down to bone on 7/5  -cultures with enterobacter and stenotrophomonas  -on abx above  3.  PAD  4.  ESRD on HD  5.  Dispo  -IV cefepime with HD, PO bactrim and PO metronidazole through 8/15    If you have any questions or concerns please call [facility] Infectious Disease at [phone].     DELMI Chin

## 2024-07-09 NOTE — WOUND PROGRESS NOTE
[facility]  Inpatient Wound Care Contact Note    Darin Bowens Patient Status:  Inpatient    1945 MRN [id number]   Location [facility] 3SW-A Attending Lacey Alcaraz MD   Hosp Day # 14 PCP Zachery Hall MD     Patient was seen in HBO on 24, The Negative Pressure Wound Therapy Dressing was placed in HBO after the treatment, refer to the HBO notes for details.      Inpatient Wound Care will plan to see for next dressing change on 7/10/24.  Team aware.     Thank you,    Lindy Brown, PT, MPT  [facility]  [address] [address] [phone]

## 2024-07-09 NOTE — CONSULTS
[facility]  Report of Inpatient Wound Care Consultation    Darin Bowens Patient Status:  Inpatient    1945 MRN [id number]   Location [facility] 3SW-A Attending Lacey Alcaraz MD   Hosp Day # 14 PCP Zachery Hall MD     Reason for Consultation:  B TMA sites post I&D    History of Present Illness:  Darin Bowens is a a(n) 78 year old male.  Patient with multiple comorbidities.    SUBJECTIVE:  I am okay, I am not having any pain.       History:  Past Medical History:    Aortic stenosis    Back problem    CAD (coronary artery disease)    Calculus of kidney    Cataract    CKD (chronic kidney disease) stage 4, GFR 15-29 ml/min (HCC)    Congestive heart disease (HCC)    Coronary atherosclerosis    DDD (degenerative disc disease), lumbar    Diabetes mellitus (HCC)    Dialysis patient (HCC)    fistula upper right arm/MWF    Dyslipidemia    Heart valve disease    High cholesterol    Hypertriglyceridemia    Hypoglycemic reaction    Muscle weakness    cane    Nausea and vomiting, unspecified vomiting type    Onychomycosis    DESI (obstructive sleep apnea) C-PAP     severe AHI 68, O2 sam 84%, CPAP 7    Other and unspecified hyperlipidemia    Overweight(278.02)    Renal disorder    S/P Coronary Artery Stents 2009    Sleep apnea    doesn't use CPAP    Type 1 diabetes mellitus (HCC)    Unspecified essential hypertension    Visual impairment    legally blind     Past Surgical History:   Procedure Laterality Date    Back surgery  18    L2-L5 Decomp poss uninstrumented fusion    Cabg      Cath percutaneous  transluminal coronary angioplasty      Cath transcatheter aortic valve replacement      Injection, w/wo contrast, dx/therapeutic substance, epidural/subarachnoid; lumbar/sacral N/A 12/10/2015    Procedure: LUMBAR EPIDURAL;  Surgeon: Rojas Bolanos MD;  Location: [facility]    Injection, w/wo contrast, dx/therapeutic substance, epidural/subarachnoid; lumbar/sacral N/A  1/25/2016    Procedure: LUMBAR EPIDURAL;  Surgeon: Rojas Bolanos MD;  Location: [facility]    Injection, w/wo contrast, dx/therapeutic substance, epidural/subarachnoid; lumbar/sacral N/A 3/2/2016    Procedure: LUMBAR EPIDURAL;  Surgeon: Corey Mcduffie MD;  [place] [facility]    M-sedaj by  phys perfrmg svc 5+ yr N/A 12/10/2015    Procedure: LUMBAR EPIDURAL;  Surgeon: Rojas Bolanos MD;  [place] [facility]    M-sedaj by  phys perfrmg svc 5+ yr N/A 1/25/2016    Procedure: LUMBAR EPIDURAL;  Surgeon: Rojas Bolanos MD;  Location: [facility]    M-sedaj by  phys perfrmg svc 5+ yr N/A 3/2/2016    Procedure: LUMBAR EPIDURAL;  Surgeon: Corey Mcduffie MD;  Location: [facility]    Other surgical history  10/4/12    cysto-Dr. Calderón    Other surgical history N/A 5/16/2016    Procedure: LUMBAR LAMINECTOMY FUSION W/ BONE GRAFT 3 LEVEL;  Surgeon: CARLY Garcia MD;  Location:  MAIN OR    Patient documented not to have experienced any of the following events N/A 12/10/2015    Procedure: LUMBAR EPIDURAL;  Surgeon: Rojas Bolanos MD;  Location: [facility]    Patient documented not to have experienced any of the following events N/A 1/25/2016    Procedure: LUMBAR EPIDURAL;  Surgeon: Rojas Bolanos MD;  Location: [facility]    Patient documented not to have experienced any of the following events N/A 3/2/2016    Procedure: LUMBAR EPIDURAL;  Surgeon: Corey Mcduffie MD;  Location: [facility]    Patient withough preoperative order for iv antibiotic surgical site infection prophylaxis. N/A 12/10/2015    Procedure: LUMBAR EPIDURAL;  Surgeon: Rojas Bolanos MD;  Location: [facility]    Patient withough preoperative order for iv antibiotic surgical site infection prophylaxis. N/A 1/25/2016    Procedure: LUMBAR EPIDURAL;  Surgeon: Luis Miguel  MD Rojas;  Location: [facility]    Patient withough preoperative order for iv antibiotic surgical site infection prophylaxis. N/A 3/2/2016    Procedure: LUMBAR EPIDURAL;  Surgeon: Corey Mcduffie MD;  Location: [facility]    Replace aortic valve open  2012    Valve repair        reports that he has never smoked. He has never used smokeless tobacco. He reports that he does not drink alcohol and does not use drugs.      Allergies:  @ALLERGY    Laboratory Data:    Recent Labs   Lab 07/03/24  0439 07/03/24  0538 07/04/24  0458 07/04/24  0459 07/05/24  0441 07/05/24  0517 07/07/24  0741 07/07/24  1259 07/08/24  0605 07/08/24  1213 07/09/24  0458 07/09/24  0522 07/09/24  0849 07/09/24  1144 07/09/24  1318   WBC 10.9  --   --    < >  --    < > 7.8  --  7.9  --  8.5  --   --   --   --    HGB 6.6*   < >  --    < >  --    < > 7.9*  --  7.6*  --  8.0*  --   --   --   --    HCT 19.9*  --   --    < >  --    < > 24.7*  --  23.3*  --  25.7*  --   --   --   --    .0  --   --    < >  --    < > 188.0  --  209.0  --  198.0  --   --   --   --    CREATSERUM 8.56*  --  5.96*  --  7.68*  --   --   --   --   --   --   --   --   --   --    BUN 69*  --  46*  --  64*  --   --   --   --   --   --   --   --   --   --    GLU 99  --  129*  --  137*  --   --   --   --   --   --   --   --   --   --    CA 9.4  --  9.0  --  9.1  --   --   --   --   --   --   --   --   --   --    ALB  --   --  1.8*  --   --   --   --   --   --   --   --   --  1.9*  --   --    TP  --   --  5.6*  --   --   --   --   --   --   --   --   --  5.8*  --   --    PGLU  --    < >  --    < >  --    < >  --    < >  --    < >  --  193*  --  272* 304*    < > = values in this interval not displayed.         ASSESSMENT:    Wound 07/05/24 Foot Left (Active)   Date First Assessed/Time First Assessed: 07/05/24 1520   Location: Foot  Wound Location Orientation: Left      Assessments 7/9/2024  1:23 PM   Wound Image          Wound 07/05/24  Foot Right (Active)   Date First Assessed/Time First Assessed: 07/05/24 (c) 5607   Location: Foot  Wound Location Orientation: Right      Assessments 7/9/2024  1:24 PM   Wound Image                           Patient seen in HBO pre-treatment, removed the B Negative Pressure Wound Therapy from both B TMA sites without incident, black spongue and Sorbac removed from both wound bases.     B feet warm, +capillary refill at 4 seconds.    The L TMA site measures at (5.6cm x 4.3cm x 4.1cm) with exposed bone in the wound bed, dusky appearing adipose tissue, dusky appearing incision line also noted, necrosis along the inferior periwound margin appreciated, +odor to the wound appreciated, minimal serosanguinous drainage noted.     The R TMA site measures at (7.6cm x 4.5cm x 4.3cm) with exposed bone in the wound bed, dusky appearing adipose tissue, dusky appearing incision line also noted, necrosis along the inferior periwound margin appreciated, +odor to the wound appreciated, minimal serosanguinous drainage noted.     Contacted both Dr. Meza (Podatry) as well as Dr. Eisenberg (Wound Care) and explained status of the wounds and if they would like to visualize at this treatment session, both MD's were unable but made aware of concern as to the status of both wounds.  Discussed with Dr. Meza and recommend hold the use of Negative Pressure Wound Therapy and start daily 1/4 Dakin's dressing daily, MD agreeable.  RN aware of this change in treatment.     In addition, also checked and patient does have B heel offloading boots in his room, RN advised to re-apply when he returns from the room.      Wound care to apply dressing post HBO treatment which includes 1/4 strength Dakin's gauze, 4x4 gauze, ABD pad,kerlix, tape.  Wound care plans to be determined by Dr. Meza at this time, RN to change dressings daily with 1/4 strength Dakin's.  Team as well as patient and RN aware of the above.       Additional Notes:  Patient in HBO  treatment, Wound Care HBO team to apply dressing post treatment as noted above.         Thank you for this consultation and for allowing me to participate in the care of your patient.      Time Spent 30 Minutes.    Thank you,  Lindy Brown, PT, MPT  Wound Care Clinician  [facility]  7/9/2024

## 2024-07-09 NOTE — PROGRESS NOTES
HBO Treatment Details      Patient Name:    Darin Bowens  MRN:  [id number]        YOB: 1945  Today's Date:  2024  Physician/Provider: DELMI Wheeler  Facility: [facility]  Diagnosis:   1. Gangrene of right foot (HCC)    2. Diabetic foot ulcer with osteomyelitis (HCC)    3. Ulcer of toe of right foot, with necrosis of bone (HCC)    4. Ulcer of toe of left foot, with necrosis of bone (HCC)    5. PAD (peripheral artery disease) (HCC)    6. ESRD (end stage renal disease) (HCC)            Treatment Course Number: 2  Total Treatments Ordered:  30  Ordering Physician: Elgin  Indication: Diabetic wounds of lower extremities  Risk Factors: Seizure;Barotrauma  HBO Treatment Start Date:   HBO Treatment Number:  2  Treatment Length:Other (Comment)  Treatment Depth: 2.0 LIGIA  HBO Treatment End Date:   HBO Discharge Outcome: Treatment Complete        HBO Treatment Details:  In-Patient Visit: no  Chamber Type:  Hard sided monoplace  Chamber #: 2  HBO Dive Lo  Treatment Protocol:  2.0 LIGIA without an air break      Treatment Details:  Pressurized Rate: 1.5 psi/min  Dive Rate Down:  1.5 psi/min  Dive Rate Up:  2.0 psi/min  Started Compression: 1347  Reached Compression: 1356  Patient on Air:    Patient Taken off Air:   Total Compression Time: 9 (Minutes)  Total Holding Time: 90 (Minutes)  Started Decompression: 1526  Reached Surface: 1356  Total Decompression Time: 7 (Minutes)  Total Airbreaks:   (Minutes)  Total Time of Treatment:   (Minutes)                         Vital Signs:  HBO Glucose: Pre 304 post 271 - (Reference Range: 100-350 mg/dl)        Vitals:    24 1337 24 1632   BP: 108/66 114/66   Pulse: 73 66   Resp: 10 12   Temp: 97.7 °F (36.5 °C) 97.3 °F (36.3 °C)       Treatment Response:  Symptoms Noted During Treatment: None   Treatment Aborted:  No      Treatment Notes:

## 2024-07-09 NOTE — PROGRESS NOTES
[facility] Hospitalist Progress Note       SUBJECTIVE:  Sleepy, states that having HD and hbo yesterday was very tiring.   HBO dept needed glc to be more elevated so he drank extra OJ prior to that therapy.    OBJECTIVE:  Scheduled Meds:    [START ON 7/10/2024] epoetin matt  10,000 Units Intravenous Once in dialysis    heparin  1.5 mL Intracatheter Once    insulin degludec  10 Units Subcutaneous Q24H    apixaban  2.5 mg Oral BID    sulfamethoxazole-trimethoprim DS  2 tablet Oral Daily    clopidogrel  75 mg Oral Daily    sodium chloride   Intravenous Once    insulin aspart  4-20 Units Subcutaneous TID AC and HS    atorvastatin  80 mg Oral Nightly    carvedilol  12.5 mg Oral BID with meals    cefepime  1 g Intravenous Q24H    metRONIDAZOLE  500 mg Oral 2 times per day     Continuous Infusions:   PRN Meds:   sodium chloride **AND** albumin human    heparin    hydrOXYzine    acetaminophen    acetaminophen **OR** HYDROcodone-acetaminophen **OR** HYDROcodone-acetaminophen    HYDROmorphone **OR** HYDROmorphone **OR** HYDROmorphone    polyethylene glycol (PEG 3350)    sennosides    bisacodyl    ondansetron    metoclopramide    glucose **OR** glucose **OR** glucose-vitamin C **OR** dextrose **OR** glucose **OR** glucose **OR** glucose-vitamin C    Vitals  Vitals:    07/09/24 1130   BP: 112/42   Pulse: 56   Resp: (!) 51   Temp: 98.2 °F (36.8 °C)         Exam   Gen-    no acute distress, alert and oriented x 3   RESP-   Lungs CTA, normal respiratory effort  CV-      Heart RRR, no mgr  Abd-    soft, nondistended, nontender, bowel sounds present  Skin-    no rash  Neuro-  no focal neurologic deficits  Ext-   LE wrapped  Psych- alert and oriented x 3     Labs:     Chem:  Recent Labs   Lab 07/03/24  0439 07/04/24  0458 07/05/24  0441 07/09/24  0849   * 136 133*  --    K 4.2 4.2 4.6  --     101 104  --    CO2 21.0 24.0 20.0*  --    BUN 69* 46* 64*  --    MG  --  2.2 2.4  --    ALT  --  <6*  --  7*   AST  --  18  --  14*   ALB   --  1.8*  --  1.9*       HEM:  Recent Labs   Lab 07/05/24  1044 07/06/24  1201 07/07/24  0741 07/08/24  0605 07/09/24  0458   WBC 9.0 9.1 7.8 7.9 8.5   HGB 7.6* 7.9* 7.9* 7.6* 8.0*   .0 208.0 188.0 209.0 198.0   MCV 91.1 93.0 91.1 89.6 92.8       Coagulation:  Recent Labs   Lab 07/05/24  1044 07/06/24  1201 07/07/24  0741 07/08/24  0605 07/09/24  0458   HCT 23.4* 25.4* 24.7* 23.3* 25.7*   HGB 7.6* 7.9* 7.9* 7.6* 8.0*   .0 208.0 188.0 209.0 198.0              AP:  78 year old male with PMH sig for CAD status post stents, PAD status post bilateral lower extremity stents, atrial fibrillation on Xarelto, diabetes mellitus type 2 on insulin pump, end-stage renal disease on dialysis, chronic CHF with LVH, status post AVR, history of stroke, hypertension, hyperlipidemia, DESI and recent right TMA and left foot second digit amputation on 6/13 by Dr. Meza presented with poor surgical wound healing.       Postop wound infection R TMA 6/13 due to nonhealing ulcers and PVD.   Excisional debridement to level of bone along with partial resection of R 1st and 2nd MT 6/27  - surgical cultures with enterobacter cloacae and stenotrophomonas  - drainage cultures from admit with enterobacter and acinetobacter  - on iv cefepime, flagyl and bactrim since 7/2  - s/p further debridement 7/5  - hyperbaric oxygen therapy until this friday    L foot 2nd digit amputation on 6/13  Excisional wound debridement to level of bone along with partial resection of second MT 6/27  - culture polymicrobial with enterobacter and stenotrophomonas  - on abx as above.  - s/p further debridement 7/5  - hyperbaric oxygen therapy until this friday    PVD  - Arterial US in April, likely has more distal PAD, may require further amputation, per vascular  pt is not a candidate for any further revascularization and should his foot amputation does not heal he will require major amputation.      ESRD on HD  Malfunctioning AVF  - vascular consulted, o/p  eval of new AVF/fistula revision   - nephrology o/c -s/p tunneled catheter on 6/28        CAD s/p stents  PAD s/p b/l LE stents  Chronic CHF w/ LVH  S/p AVR  Atrial fibrillation  - coreg (Hold SBP < 110 given anemia), lipitor  - plavix and doac had been held for a few days to anemia. Has since been restarted. Cont to trend hgb     DM2  - insulin pump - not on this admission  - ISS/accucheks - high dose SSI, tresiba daily 10 units   - added mealtime insulin but for HBO apparently need glc to be higher    Anemia  - suspecting ABLA from surgery   - monitor for now hgb 7.9 to 7.7 to 7.0 to 6.6 --> PRBC 7/3.   - hgb has been in high 7s-8s since. Back on blood thinners  - iron levels c/w ACD  - denies BRBPR no melena      Essential HTN  - coreg     Hyperlipidemia  - statin     DESI  - cpap at bedtime     Itching  - prn atarax, may be 2/2 meds/dialysis        FEN: carb controlled diet (no need for renal), PT/OT  Proph: SCDs eliquis     Code status: Full code    Dispo: Planning to keep pt for HBO treatment until this Friday as logistically not possible for him to get HBO while at [facility]. Plan for dc Friday

## 2024-07-09 NOTE — PROGRESS NOTES
Pre/Post Treatment Evaluation        This is treatment #2.  Patient diving at 2.0 LIGIA without airbreaks due to patient's inability to utilize the air mask for aibreaks.      Patient states he is feeling a little better today as compared to yesterday.  Tolerated the dive, just uncomfortable due to positioning.    Inpatient wound care assessed wounds prior to patient dive, no longer appropriate for wound vac due to necrosis.  Right heel slightly dusky in appearance prior to dive, pink in color post dive. Left heel intact without any discoloration. Communication with inpatient team including surgeon.  Dakins recommended.      Consider Tcom for local tissue viability and assessment of vascular flow to the lower extremities     Please assure patient has heel offloading boots on when in bed at all times.       Pre-Treatment Lung Evaluation: Clear to auscultation bilaterally    Left Ear Pre-Treatment Evaluation  Left Ear Clear: Yes  Left Ear Intact: Yes  Left Cerumen Removal: No  Pre Left teed stgstrstastdstest:st st1st grade    Right Ear Pre-Treatment Evaluation  Right Ear Clear: Yes  Right Ear Intact: Yes  Right Cerumen Removal: No  Pre Right teed stgstrstastdstest:st st1st grade      Post-Treatment Lung Evaluation: Clear to auscultation bilaterally    Left Ear Post-Treatment Evaluation  Left Ear Clear: Yes  Left Ear Intact: Yes  Left Cerumen Removal: No  Post Left teed stgstrstastdstest:st st1st grade    Right Ear Post-Treatment Evaluation  Right Ear Clear: Yes  Right Ear Intact: Yes  Right Cerumen Removal: No  Post Right teed stgstrstastdstest:st st1st grade      I supervised this Hyperbaric treatment and was immediately available throughout the course of the treatment with a hospital approved hyperbaric physician available by phone.  In addition, there is a trained emergency support team and ICU available at this facility to assist with complications.    DELMI Wheeler, 07/09/24, 3:55 PM

## 2024-07-09 NOTE — CM/SW NOTE
Plan for patient to remain in hospital this week and receive daily HBO treatments.  Anticipate DC Friday to [facility] and no additional HBO planned while pt at [facility].  Updated Niru Griffin via AIDIN in order to confirm bed available for pt's admission Friday.  Await response.  / to remain available for support and/or discharge planning.     Niru Griffin  P:[phone]  F:[phone]    Lili Kennedy [facility]  Discharge Planner  [phone]

## 2024-07-10 ENCOUNTER — APPOINTMENT (OUTPATIENT)
Facility: HOSPITAL | Age: 79
End: 2024-07-10
Attending: HOSPITALIST
Payer: MEDICARE

## 2024-07-10 ENCOUNTER — HBO THERAPY VISIT (OUTPATIENT)
Dept: WOUND CARE | Facility: HOSPITAL | Age: 79
End: 2024-07-10
Attending: NURSE PRACTITIONER
Payer: MEDICARE

## 2024-07-10 VITALS
RESPIRATION RATE: 8 BRPM | SYSTOLIC BLOOD PRESSURE: 133 MMHG | HEART RATE: 87 BPM | DIASTOLIC BLOOD PRESSURE: 67 MMHG | TEMPERATURE: 98 F

## 2024-07-10 DIAGNOSIS — L97.524 ULCER OF TOE OF LEFT FOOT, WITH NECROSIS OF BONE (HCC): ICD-10-CM

## 2024-07-10 DIAGNOSIS — M86.9 DIABETIC FOOT ULCER WITH OSTEOMYELITIS (HCC): Primary | ICD-10-CM

## 2024-07-10 DIAGNOSIS — I73.9 PAD (PERIPHERAL ARTERY DISEASE) (HCC): ICD-10-CM

## 2024-07-10 DIAGNOSIS — E11.69 DIABETIC FOOT ULCER WITH OSTEOMYELITIS (HCC): Primary | ICD-10-CM

## 2024-07-10 DIAGNOSIS — L97.509 DIABETIC FOOT ULCER WITH OSTEOMYELITIS (HCC): Primary | ICD-10-CM

## 2024-07-10 DIAGNOSIS — I96 GANGRENE OF RIGHT FOOT (HCC): ICD-10-CM

## 2024-07-10 DIAGNOSIS — L97.514 ULCER OF TOE OF RIGHT FOOT, WITH NECROSIS OF BONE (HCC): ICD-10-CM

## 2024-07-10 DIAGNOSIS — E11.621 DIABETIC FOOT ULCER WITH OSTEOMYELITIS (HCC): Primary | ICD-10-CM

## 2024-07-10 DIAGNOSIS — N18.6 ESRD (END STAGE RENAL DISEASE) (HCC): ICD-10-CM

## 2024-07-10 LAB
ANION GAP SERPL CALC-SCNC: <0 MMOL/L (ref 0–18)
BUN BLD-MCNC: 20 MG/DL (ref 9–23)
CALCIUM BLD-MCNC: 6.8 MG/DL (ref 8.5–10.1)
CHLORIDE SERPL-SCNC: 123 MMOL/L (ref 98–112)
CO2 SERPL-SCNC: 20 MMOL/L (ref 21–32)
CREAT BLD-MCNC: 2.92 MG/DL
EGFRCR SERPLBLD CKD-EPI 2021: 22 ML/MIN/1.73M2 (ref 60–?)
ERYTHROCYTE [DISTWIDTH] IN BLOOD BY AUTOMATED COUNT: 15.8 %
GLUCOSE BLD-MCNC: 171 MG/DL (ref 70–99)
GLUCOSE BLD-MCNC: 206 MG/DL (ref 70–99)
GLUCOSE BLD-MCNC: 212 MG/DL (ref 70–99)
GLUCOSE BLD-MCNC: 88 MG/DL (ref 70–99)
GLUCOSE BLD-MCNC: 91 MG/DL (ref 70–99)
GLUCOSE BLD-MCNC: 92 MG/DL (ref 70–99)
HCT VFR BLD AUTO: 25.8 %
HGB BLD-MCNC: 7.7 G/DL
MAGNESIUM SERPL-MCNC: 1.4 MG/DL (ref 1.6–2.6)
MCH RBC QN AUTO: 29.3 PG (ref 26–34)
MCHC RBC AUTO-ENTMCNC: 29.8 G/DL (ref 31–37)
MCV RBC AUTO: 98.1 FL
OSMOLALITY SERPL CALC.SUM OF ELEC: 292 MOSM/KG (ref 275–295)
PLATELET # BLD AUTO: 171 10(3)UL (ref 150–450)
POTASSIUM SERPL-SCNC: 2.6 MMOL/L (ref 3.5–5.1)
RBC # BLD AUTO: 2.63 X10(6)UL
SODIUM SERPL-SCNC: 140 MMOL/L (ref 136–145)
WBC # BLD AUTO: 6.3 X10(3) UL (ref 4–11)

## 2024-07-10 PROCEDURE — 82962 GLUCOSE BLOOD TEST: CPT | Performed by: INTERNAL MEDICINE

## 2024-07-10 PROCEDURE — 99232 SBSQ HOSP IP/OBS MODERATE 35: CPT | Performed by: INTERNAL MEDICINE

## 2024-07-10 NOTE — PROGRESS NOTES
Patient seen at bedside in NAD.  No acute events overnight.  Completed HBO today.    Bilateral TMA site wounds with fibronecrotic base, no erythema or purulence and some sloughing of soft tissues.  Dakin's solution soaked dressings applied.  Continue IV antibiotics per ID    Complete course of HBO till Friday  DC planning to rehab on Friday  Will reconsider application of wound VAC on follow-up at [place] wound clinic.  Patient will see Dr. Eisenberg at the wound clinic.    Jose R Meza D.P.M.

## 2024-07-10 NOTE — PROGRESS NOTES
Pre/Post Treatment Evaluation      Pre-Treatment Lung Evaluation: Clear to auscultation bilaterally    Left Ear Pre-Treatment Evaluation  Left Ear Clear: Yes  Left Ear Intact: Yes  Left Cerumen Removal: No  Pre Left teed stgstrstastdstest:st st1st grade    Right Ear Pre-Treatment Evaluation  Right Ear Clear: Yes  Right Ear Intact: Yes  Right Cerumen Removal: No  Pre Right teed stgstrstastdstest:st st1st grade      Post-Treatment Lung Evaluation: Clear to auscultation bilaterally    Left Ear Post-Treatment Evaluation  Left Ear Clear: Yes  Left Ear Intact: Yes  Left Cerumen Removal: No  Post Left teed stgstrstastdstest:st st1st grade    Right Ear Post-Treatment Evaluation  Right Ear Clear: Yes  Right Ear Intact: Yes  Right Cerumen Removal: No  Post Right teed stgstrstastdstest:st st1st grade      I supervised this Hyperbaric treatment and was immediately available throughout the course of the treatment.  There is a trained emergency support team and ICU available at this facility to assist with complications.    Mini Eisenberg MD, 07/10/24, 3:50 PM

## 2024-07-10 NOTE — PROGRESS NOTES
[facility]   part of [facility]     Nephrology Progress Note    Darin Bowens Patient Status:  Inpatient    1945 MRN [id number]   Location [facility] 3SW-A Attending Mook Escalante MD   Hosp Day # 15 PCP Zachery Hall MD       SUBJECTIVE  Stable this AM, seen on dialysis        Physical Exam:   /64 (BP Location: Left arm)   Pulse 67   Temp 98.1 °F (36.7 °C) (Oral)   Resp 22   Ht 6' (1.829 m)   Wt 212 lb 4.9 oz (96.3 kg)   SpO2 99%   BMI 28.79 kg/m²   Temp (24hrs), Av.9 °F (36.6 °C), Min:97.3 °F (36.3 °C), Max:98.2 °F (36.8 °C)     No intake or output data in the 24 hours ending 07/10/24 0843    Last 3 Weights   24 0530 212 lb 4.9 oz (96.3 kg)   24 0400 208 lb 5.4 oz (94.5 kg)   24 0600 198 lb 8 oz (90 kg)   24 0648 205 lb (93 kg)   24 0543 211 lb (95.7 kg)   24 2000 211 lb (95.7 kg)   06/15/24 0008 211 lb (95.7 kg)   24 0545 211 lb 6.4 oz (95.9 kg)   24 1324 209 lb 7 oz (95 kg)   24 1120 210 lb (95.3 kg)   24 1342 210 lb (95.3 kg)   22 1253 210 lb (95.3 kg)     General: Asleep but arouses, oriented  in no apparent distress.  HEENT: No scleral icterus, MMM  Neck: Supple, no SHERI or thyromegaly  Cardiac: Regular rate and rhythm, S1, S2 normal, no murmur or rub  Lungs: Clear without wheezes, rales, rhonchi.    Extremities: No significant pitting edema, bilateral dressings in place over the feet   neurologic:  moving all extremities  Skin: Warm and dry, no rash        Labs:     Recent Labs   Lab 24  1044 24  1201 24  0741 24  0605 24  0458   WBC 9.0 9.1 7.8 7.9 8.5   HGB 7.6* 7.9* 7.9* 7.6* 8.0*   MCV 91.1 93.0 91.1 89.6 92.8   .0 208.0 188.0 209.0 198.0       Recent Labs   Lab 24  0458 24  0441    133*   K 4.2 4.6    104   CO2 24.0 20.0*   BUN 46* 64*   CREATSERUM 5.96* 7.68*   CA 9.0 9.1   MG 2.2 2.4   * 137*       Recent Labs   Lab  07/04/24  0458 07/09/24  0849   ALT <6* 7*   AST 18 14*   ALB 1.8* 1.9*       Recent Labs   Lab 07/09/24  1318 07/09/24  1536 07/09/24  1737 07/09/24  2035 07/10/24  0509   PGLU 304* 271* 244* 136* 88       Meds:   Current Facility-Administered Medications   Medication Dose Route Frequency    epoetin matt (Epogen, Procrit) 12318 UNIT/ML injection 10,000 Units  10,000 Units Intravenous Once in dialysis    sodium chloride 0.9 % IV bolus 100 mL  100 mL Intravenous Q30 Min PRN    And    albumin human (Albumin) 25% injection 25 g  25 g Intravenous PRN Dialysis    heparin (Porcine) 1000 UNIT/ML injection 1,500 Units  1.5 mL Intracatheter Once    insulin degludec (Tresiba) 100 units/mL flextouch 10 Units  10 Units Subcutaneous Q24H    insulin aspart (NovoLOG) 100 Units/mL FlexPen 5 Units  5 Units Subcutaneous TID CC    heparin (Porcine) 1000 UNIT/ML injection 1,000 Units  1,000 Units Intravenous PRN Dialysis    apixaban (Eliquis) tab 2.5 mg  2.5 mg Oral BID    sulfamethoxazole-trimethoprim DS (Bactrim DS) 800-160 MG per tab 2 tablet  2 tablet Oral Daily    hydrOXYzine (Atarax) tab 25 mg  25 mg Oral TID PRN    clopidogrel (Plavix) tab 75 mg  75 mg Oral Daily    sodium chloride 0.9% infusion   Intravenous Once    acetaminophen (Tylenol Extra Strength) tab 500 mg  500 mg Oral Q4H PRN    acetaminophen (Tylenol) tab 650 mg  650 mg Oral Q4H PRN    Or    HYDROcodone-acetaminophen (Norco) 5-325 MG per tab 1 tablet  1 tablet Oral Q4H PRN    Or    HYDROcodone-acetaminophen (Norco) 5-325 MG per tab 2 tablet  2 tablet Oral Q4H PRN    HYDROmorphone (Dilaudid) 1 MG/ML injection 0.2 mg  0.2 mg Intravenous Q2H PRN    Or    HYDROmorphone (Dilaudid) 1 MG/ML injection 0.4 mg  0.4 mg Intravenous Q2H PRN    Or    HYDROmorphone (Dilaudid) 1 MG/ML injection 0.8 mg  0.8 mg Intravenous Q2H PRN    polyethylene glycol (PEG 3350) (Miralax) 17 g oral packet 17 g  17 g Oral Daily PRN    sennosides (Senokot) tab 17.2 mg  17.2 mg Oral Nightly PRN     bisacodyl (Dulcolax) 10 MG rectal suppository 10 mg  10 mg Rectal Daily PRN    ondansetron (Zofran) 4 MG/2ML injection 4 mg  4 mg Intravenous Q6H PRN    metoclopramide (Reglan) 5 mg/mL injection 5 mg  5 mg Intravenous Q8H PRN    glucose (Dex4) 15 GM/59ML oral liquid 15 g  15 g Oral Q15 Min PRN    Or    glucose (Glutose) 40% oral gel 15 g  15 g Oral Q15 Min PRN    Or    glucose-vitamin C (Dex-4) chewable tab 4 tablet  4 tablet Oral Q15 Min PRN    Or    dextrose 50% injection 50 mL  50 mL Intravenous Q15 Min PRN    Or    glucose (Dex4) 15 GM/59ML oral liquid 30 g  30 g Oral Q15 Min PRN    Or    glucose (Glutose) 40% oral gel 30 g  30 g Oral Q15 Min PRN    Or    glucose-vitamin C (Dex-4) chewable tab 8 tablet  8 tablet Oral Q15 Min PRN    insulin aspart (NovoLOG) 100 Units/mL FlexPen 4-20 Units  4-20 Units Subcutaneous TID AC and HS    atorvastatin (Lipitor) tab 80 mg  80 mg Oral Nightly    carvedilol (Coreg) tab 12.5 mg  12.5 mg Oral BID with meals    ceFEPIme (Maxipime) 1 g in sodium chloride 0.9% 100 mL IVPB-MBP  1 g Intravenous Q24H    metRONIDAZOLE (Flagyl) tab 500 mg  500 mg Oral 2 times per day         Impression/Plan:        ESRD - due to DM/HTN; clotted AVF.  HD to cont MWF per usual routine  S/p PC due to clotted AV fistula  - will have pt f/u with Dr. Nix for o/p eval of new AVF/fistula revision  Foot infection - s/p B foot/toe amputations  - on abx; wound care, hyperbaric treatment  - podiatry /ID following- on TMP/SMX for stenotrophomonas, cefepine/flagyl for enterobacter and acinetobacter  PAD- as above  CHF; s/p AVR; volume optimization w/ HD/UF  Anemia- chronically due to ESRD. DALE for goal hgb 10-11 gms      Questions/concerns were discussed with patient and/or family by bedside.    Sean Flores MD  7/10/2024  8:43 AM

## 2024-07-10 NOTE — PROGRESS NOTES
CC: follow-up hospital admission LE wounds    SUBJECTIVE:  Interval History:      In bed, having lunch, wife at bs    OBJECTIVE:  Scheduled Meds:    heparin  1.5 mL Intracatheter Once    insulin degludec  10 Units Subcutaneous Q24H    insulin aspart  5 Units Subcutaneous TID CC    apixaban  2.5 mg Oral BID    sulfamethoxazole-trimethoprim DS  2 tablet Oral Daily    clopidogrel  75 mg Oral Daily    sodium chloride   Intravenous Once    insulin aspart  4-20 Units Subcutaneous TID AC and HS    atorvastatin  80 mg Oral Nightly    carvedilol  12.5 mg Oral BID with meals    cefepime  1 g Intravenous Q24H    metRONIDAZOLE  500 mg Oral 2 times per day     Continuous Infusions:   PRN Meds:   sodium chloride **AND** albumin human    heparin    hydrOXYzine    acetaminophen    acetaminophen **OR** HYDROcodone-acetaminophen **OR** HYDROcodone-acetaminophen    HYDROmorphone **OR** HYDROmorphone **OR** HYDROmorphone    polyethylene glycol (PEG 3350)    sennosides    bisacodyl    ondansetron    metoclopramide    glucose **OR** glucose **OR** glucose-vitamin C **OR** dextrose **OR** glucose **OR** glucose **OR** glucose-vitamin C    PHYSICAL EXAM  Vital signs: Temp:  [97.3 °F (36.3 °C)-98.2 °F (36.8 °C)] 98.1 °F (36.7 °C)  Pulse:  [52-73] 67  Resp:  [10-22] 22  BP: (105-129)/(39-66) 113/64  SpO2:  [94 %-100 %] 100 %      GENERAL - NAD, AAO  EYES- sclera anicteric   HENT- normocephalic   NECK - supple  CV- RRR  RESP - decreased at bases, normal resp effort  ABDOMEN- soft, NT/ND   EXT-  bl dressing over feet       Data Review:   Labs:   Recent Labs   Lab 07/06/24  1201 07/07/24  0741 07/08/24  0605 07/09/24  0458 07/10/24  1117   WBC 9.1 7.8 7.9 8.5 6.3   HGB 7.9* 7.9* 7.6* 8.0* 7.7*   MCV 93.0 91.1 89.6 92.8 98.1   .0 188.0 209.0 198.0 171.0       Recent Labs   Lab 07/04/24  0458 07/05/24  0441    133*   K 4.2 4.6    104   CO2 24.0 20.0*   BUN 46* 64*   CREATSERUM 5.96* 7.68*   CA 9.0 9.1   MG 2.2 2.4   *  137*       Recent Labs   Lab 07/04/24  0458 07/09/24  0849   ALT <6* 7*   AST 18 14*   ALB 1.8* 1.9*       Recent Labs   Lab 07/09/24  1536 07/09/24  1737 07/09/24  2035 07/10/24  0509 07/10/24  1042   PGLU 271* 244* 136* 88 91           ASSESSMENT/PLAN:    78 year old male with PMH sig for CAD status post stents, PAD status post bilateral lower extremity stents, atrial fibrillation on Xarelto, diabetes mellitus type 2 on insulin pump, end-stage renal disease on dialysis, chronic CHF with LVH, status post AVR, history of stroke, hypertension, hyperlipidemia, DESI and recent right TMA and left foot second digit amputation on 6/13 by Dr. Meza presented with poor surgical wound healing.        Postop wound infection R TMA 6/13 due to nonhealing ulcers and PVD.   Excisional debridement to level of bone along with partial resection of R 1st and 2nd MT 6/27  - surgical cultures with enterobacter cloacae and stenotrophomonas  - drainage cultures from admit with enterobacter and acinetobacter  - on iv cefepime, flagyl and bactrim since 7/2  - s/p further debridement 7/5  - hyperbaric oxygen therapy until this friday     L foot 2nd digit amputation on 6/13  Excisional wound debridement to level of bone along with partial resection of second MT 6/27  - culture polymicrobial with enterobacter and stenotrophomonas  - on abx as above.  - s/p further debridement 7/5  - hyperbaric oxygen therapy until this friday     PVD  - Arterial US in April, likely has more distal PAD, may require further amputation, per vascular  pt is not a candidate for any further revascularization and should his foot amputation does not heal he will require major amputation.      ESRD on HD  Malfunctioning AVF  - vascular consulted, o/p eval of new AVF/fistula revision   - nephrology o/c -s/p tunneled catheter on 6/28         CAD s/p stents  PAD s/p b/l LE stents  Chronic CHF w/ LVH  S/p AVR  Atrial fibrillation  - coreg (Hold SBP < 110 given anemia),  lipitor  - plavix and doac had been held for a few days to anemia. Has since been restarted. Cont to trend hgb - stable     DM2  - insulin pump - not on this admission  - ISS/accucheks - high dose SSI, tresiba daily 10 units   - added mealtime insulin but for HBO apparently need glc to be higher     Anemia  - suspecting ABLA from surgery   - monitor for now hgb 7.9 to 7.7 to 7.0 to 6.6 --> PRBC 7/3.   - hgb has been in high 7s-8s since. Back on blood thinners  - iron levels c/w ACD  - denies BRBPR no melena      Essential HTN  - coreg  Bp controlled     Hyperlipidemia  - statin     DESI  - cpap at bedtime      Itching  - prn atarax, may be 2/2 meds/dialysis        FEN: carb controlled diet (no need for renal), PT/OT  Proph: SCDs eliquis     Code status: Full code     Dispo: Planning to keep pt for HBO treatment until this Friday as logistically not possible for him to get HBO while at [facility]. Plan for dc Friday        Will continue to follow while hospitalized. Please page me or the on-call hospitalist with questions or concerns.    Paul Kaplan Hospitalist  [phone]  Answering Service: [phone]

## 2024-07-10 NOTE — CM/SW NOTE
Spoke with Jennifer from [facility] regarding possibly DC Friday to [facility].  She confirmed pt can be accepted once inpatient HBO treatments are completed.  Facility can provide wound vac.    Spoke with pt's son, Cricket ([phone]) regarding DC planning.  He confirmed pt/family remain in agreement with DC plan for [facility] at [facility].  Discussed possible DC Friday pending medical clearance.  Will update pt/family once DC orders in place and arrangements made for pt's discharge.  No other questions/concerns at this time.  / to remain available for support and/or discharge planning.     [facility]  P:[phone]  F:[phone]    Lili Kennedy [facility]  Discharge Planner  [phone]

## 2024-07-10 NOTE — PROGRESS NOTES
HBO Treatment Details      Patient Name:    Darin Bowens  MRN:  [id number]        YOB: 1945  Today's Date:  7/10/2024  Physician/Provider: Mini Eisenberg MD  Facility: [place]  Diagnosis:   1. Diabetic foot ulcer with osteomyelitis (HCC)    2. Gangrene of right foot (HCC)    3. Ulcer of toe of right foot, with necrosis of bone (HCC)    4. Ulcer of toe of left foot, with necrosis of bone (HCC)    5. PAD (peripheral artery disease) (HCC)    6. ESRD (end stage renal disease) (HCC)            Treatment Course Number: 3  Total Treatments Ordered:  30  Ordering Physician: Elgin  Indication: Diabetic wounds of lower extremities  Risk Factors: Barotrauma;Seizure;Hypoglycemia  HBO Treatment Start Date:   HBO Treatment Number:  3  Treatment Length:Other (Comment)  Treatment Depth: 2.0 LIGIA  HBO Treatment End Date:   HBO Discharge Outcome: Treatment Complete        HBO Treatment Details:  In-Patient Visit: yes  Chamber Type:  Hardsided monoplace  Chamber #: 2  HBO Dive Lo  Treatment Protocol:  2.0 LIGIA without an air break      Treatment Details:  Pressurized Rate: 2.0 psi/min  Dive Rate Down:  2.0 psi/min  Dive Rate Up:  2.0 psi/min  Started Compression: 1315  Reached Compression: 1323  Patient on Air:    Patient Taken off Air:   Total Compression Time: 8 (Minutes)  Total Holding Time: 90 (Minutes)  Started Decompression: 1453  Reached Surface: 1323  Total Decompression Time: 7 (Minutes)  Total Airbreaks:   (Minutes)  Total Time of Treatment:   (Minutes)                         Vital Signs:  HBO Glucose: Pre 157 post 171 - (Reference Range: 100-350 mg/dl)        Vitals:    07/10/24 1311 07/10/24 1635   BP: 100/53 133/67   Pulse: 69 87   Resp: 10 (!) 8   Temp: 97.3 °F (36.3 °C) 97.7 °F (36.5 °C)       Treatment Response:  Symptoms Noted During Treatment: None   Treatment Aborted:  No      Treatment Notes:

## 2024-07-10 NOTE — PROGRESS NOTES
Infectious Disease Progress Note      Date of admission: 6/25/2024 12:55 AM     Reason for consult: Bilateral foot surgical wound infection with osteomyelitis    Subjective: The patient is feeling better, pain 2/10 today.  Pain is better controlled.  No nausea or vomiting.  No diarrhea.  No shortness of breath.  No cough or sputum production.    The rest of the systems were reviewed and found to be negative except was mentioned above    Interval events: This is a 78-year-old male patient with history of peripheral vascular disease, recently had a transmetatarsal amputation of the right foot and amputation of the left second digit on 6/13/2024, presents here with surgical wound infection and gangrene involving both his feet.  MRI of the right foot showing postsurgical changes along with fluid along the surgical approach that may represent postoperative seroma with question of abscess, no evidence of osteomyelitis.  MRI of the left foot showed postsurgical changes with fluid tracking extending from the surgical site to the skin surface.  Patient was taken to the OR on 6/27, excisional wound debridement to the level of bone along with partial resection of the first and second metatarsal on the right and excisional wound debridement to the level of bone and partial resection of the second metatarsal on the left.  Drainage cultures back on 6/25 are growing Enterobacter cloacae and Acinetobacter species.  Surgical cultures were Enterobacter cloacae and stenotrophomonas.  Was taken to the OR for another debridement on 7/5/2024 both his feet down to about    Medications:    epoetin matt    sodium chloride **AND** albumin human    heparin    insulin degludec    insulin aspart    heparin    apixaban    sulfamethoxazole-trimethoprim DS    hydrOXYzine    clopidogrel    sodium chloride    acetaminophen    acetaminophen **OR** HYDROcodone-acetaminophen **OR** HYDROcodone-acetaminophen    HYDROmorphone **OR** HYDROmorphone  **OR** HYDROmorphone    polyethylene glycol (PEG 3350)    sennosides    bisacodyl    ondansetron    metoclopramide    glucose **OR** glucose **OR** glucose-vitamin C **OR** dextrose **OR** glucose **OR** glucose **OR** glucose-vitamin C    insulin aspart    atorvastatin    carvedilol    cefepime    metRONIDAZOLE     Allergies:  Allergies   Allergen Reactions    Augmentin [Amoxicillin-Pot Clavulanate] RASH    Lisinopril Coughing     Dyspnea         Physical Exam:  Vitals:    07/10/24 0830   BP: 113/64   Pulse: 67   Resp: 22   Temp: 98.1 °F (36.7 °C)     Vitals signs and nursing note reviewed.   Constitutional:       Appearance: Normal appearance.   HENT:      Head: Normocephalic and atraumatic.      Mouth: Mucous membranes are moist.   Neck:      Musculoskeletal: Neck supple.   Cardiovascular:      Rate and Rhythm: Normal rate.    Pulmonary:      Effort: Pulmonary effort is normal. No respiratory distress.   Musculoskeletal:      Right lower leg: No edema.  Clean dressing noted     Left lower leg: No edema.  Clean dressing noted  Skin:     General: Skin is warm and dry.   Neurological:      General: No focal deficit present.      Mental Status: Alert and oriented to person, place, and time.       Laboratory data:  I have reviewed all the lab results independently.        Recent Labs   Lab 07/07/24  0741 07/08/24  0605 07/09/24  0458   RBC 2.71*   < > 2.77*   HGB 7.9*   < > 8.0*   HCT 24.7*   < > 25.7*   MCV 91.1   < > 92.8   MCH 29.2   < > 28.9   MCHC 32.0   < > 31.1   RDW 15.9   < > 15.6   NEPRELIM 6.03  --   --    WBC 7.8   < > 8.5   .0   < > 198.0    < > = values in this interval not displayed.      Microbiology data:  Hospital Encounter on 06/25/24   1. Tissue Aerobic Culture     Status: Abnormal    Collection Time: 06/30/24 12:11 PM    Specimen: Foot,left; Tissue   Result Value Ref Range    Tissue Culture Result 4+ growth Enterobacter cloacae (A) N/A    Tissue Culture Result 2+ growth Stenotrophomonas  maltophilia (A) N/A    Tissue Smear 4+ WBCs seen (A) N/A    Tissue Smear 3+ Gram positive cocci in pairs (A) N/A    Tissue Smear 4+ Gram Negative Rods (A) N/A       Susceptibility    Enterobacter cloacae -  (no method available)     Cefazolin >=64 Resistant      Cefepime <=1 Sensitive      Ceftriaxone <=1 Sensitive      Ciprofloxacin <=0.25 Sensitive      Gentamicin <=1 Sensitive      Meropenem <=0.25 Sensitive      Levofloxacin <=0.12 Sensitive      Piperacillin + Tazobactam <=4 Sensitive      Trimethoprim/Sulfa <=20 Sensitive    2. Anaerobic Culture     Status: None    Collection Time: 06/30/24 12:11 PM    Specimen: Foot,left; Tissue   Result Value Ref Range    Anaerobic Culture No Anaerobes isolated N/A   3. Aerobic Bacterial Culture     Status: Abnormal    Collection Time: 06/25/24  8:14 AM    Specimen: Foot,left; Other   Result Value Ref Range    Aerobic Culture Result  N/A     Mixture of organisms suggestive of normal skin justice    Aerobic Culture Result 4+ growth Enterobacter cloacae complex (A) N/A    Aerobic Culture Result 4+ growth Acinetobacter pittii (A) N/A    Aerobic Smear No WBCs seen N/A    Aerobic Smear 1+ Gram Positive Cocci N/A    Aerobic Smear 1+ Gram Negative Rods N/A       Susceptibility    Enterobacter cloacae complex -  (no method available)     Cefazolin >=64 Resistant      Cefepime <=1 Sensitive      Ceftriaxone <=1 Sensitive      Ciprofloxacin <=0.25 Sensitive      Gentamicin <=1 Sensitive      Meropenem <=0.25 Sensitive      Levofloxacin <=0.12 Sensitive      Piperacillin + Tazobactam <=4 Sensitive      Trimethoprim/Sulfa <=20 Sensitive     Acinetobacter pittii -  (no method available)     Ceftazidime <=1 Sensitive      Ciprofloxacin <1 Sensitive      Gentamicin <1 Sensitive      Meropenem <1 Sensitive      Levofloxacin <=0.25 Sensitive      Piperacillin + Tazobactam <16 Sensitive      Trimethoprim/Sulfa <=40 Sensitive    4. Blood Culture     Status: None    Collection Time: 06/25/24   2:36 AM    Specimen: Blood,peripheral   Result Value Ref Range    Blood Culture Result No Growth 5 Days N/A     Impression:  Darin Bowens is a 78 year old male with    Right-sided surgical wound infection with osteomyelitis with risk to limb  This is in the setting of right foot amputation on 6/13  Now status post excisional wound debridement to the level of bone along with partial resection of the first and second metatarsal on the right on 6/27   Surgical cultures with Enterobacter cloacae and stenotrophomonas  Drainage cultures from the time of admission grew Enterobacter cloacae and Acinetobacter  Currently on IV cefepime along with p.o. metronidazole and p.o. Bactrim  Status post another debridement down to bone on 7/5  Currently undergoing hyperbaric treatments  The patient remains high risk for further limb loss  Left-sided surgical wound infection with osteomyelitis and gangrene with risk to limb  This is in the setting of recent foot surgery on 6/13  Now status post excisional wound debridement to level of bone along with partial resection of the second metatarsal on 6/27  Cultures with gram-positive cocci and gram-negative rods  Currently on IV cefepime, p.o. metronidazole and Bactrim  Status post another debridement down to bone on 7/5  Currently undergoing hyperbaric treatments  The patient remains high risk for further limb loss  Peripheral vascular disease  Vascular surgery following  High risk for limb loss  End stage renal disease  AV fistula clotted  Permacath in place  Allergic reaction to Augmentin  Now stopped  Rash resolved    Recommendations:    Continue Bactrim DS 2 tablet daily, renally dosed for stenotrophomonas osteomyelitis  Continue IV cefepime, along with p.o. metronidazole  Plan to dose cefepime 2 g with hemodialysis 3 times weekly so a tunneled line will not be needed  Plan on 6 weeks of therapy through now through 8/15/2024 from last debridement on 7/5  Wound management as  per wound care and podiatry  Weekly CBC with differential and CMP, sed rate and CRP.  Fax results to [facility]. Fax: [phone]. Tel: [phone].  PICC line care as per protocol.  Continue to monitor daily labs for antibiotic toxicities  Further recommendations will depend on the above work-up and clinical progress     The plan of care was discussed with the primary hospital team, Lacey Alcaraz MD     Recommendations were also discussed with the patient; all questions were answered.     Thank you for this consultation. Please don't hesitate to call the ID team for questions or any acute changes in patient's clinical condition.    Please note that this report has been produced using speech recognition software and may contain errors related to that system including, but not limited to, errors in grammar, punctuation, and spelling, as well as words and phrases that possibly may have been recognized inappropriately.  If there are any questions or concerns, contact the dictating provider for clarification.    The  [employer] C[employer] Act makes medical notes like these available to patients in the interest of transparency. Please be advised this is a medical document. Medical documents are intended to carry relevant information, facts as evident, and the clinical opinion of the practitioner. The medical note is intended as peer to peer communication and may appear blunt or direct. It is written in medical language and may contain abbreviations or verbiage that are unfamiliar.     MD [facility]. Tel: [phone]. Fax: [phone].     Darin Bowens : 1945 MRN: [id number] [facility]: [id number]

## 2024-07-10 NOTE — PROGRESS NOTES
Pt A&O x 4 , on ra , tele-afib , Pain controlled with current regimen, is encouraged, iv abx and po abx, dressing-changed and c/d/I, hyperbaric tx until Friday, dialysis-m/w/f, ashvin murray, dialysis RN states he will be here today around 7am, offloading boots in place, plan: dc to [facility] on Friday, pain control.

## 2024-07-11 ENCOUNTER — HBO THERAPY VISIT (OUTPATIENT)
Dept: WOUND CARE | Facility: HOSPITAL | Age: 79
End: 2024-07-11
Attending: NURSE PRACTITIONER
Payer: MEDICARE

## 2024-07-11 VITALS
DIASTOLIC BLOOD PRESSURE: 55 MMHG | HEART RATE: 89 BPM | TEMPERATURE: 98 F | RESPIRATION RATE: 10 BRPM | SYSTOLIC BLOOD PRESSURE: 105 MMHG

## 2024-07-11 DIAGNOSIS — M86.9 DIABETIC FOOT ULCER WITH OSTEOMYELITIS (HCC): Primary | ICD-10-CM

## 2024-07-11 DIAGNOSIS — E11.69 DIABETIC FOOT ULCER WITH OSTEOMYELITIS (HCC): Primary | ICD-10-CM

## 2024-07-11 DIAGNOSIS — L97.509 DIABETIC FOOT ULCER WITH OSTEOMYELITIS (HCC): Primary | ICD-10-CM

## 2024-07-11 DIAGNOSIS — I96 GANGRENE OF RIGHT FOOT (HCC): ICD-10-CM

## 2024-07-11 DIAGNOSIS — E11.621 DIABETIC FOOT ULCER WITH OSTEOMYELITIS (HCC): Primary | ICD-10-CM

## 2024-07-11 LAB
ANION GAP SERPL CALC-SCNC: 8 MMOL/L (ref 0–18)
BUN BLD-MCNC: 46 MG/DL (ref 9–23)
CALCIUM BLD-MCNC: 9.6 MG/DL (ref 8.5–10.1)
CHLORIDE SERPL-SCNC: 102 MMOL/L (ref 98–112)
CO2 SERPL-SCNC: 18 MMOL/L (ref 21–32)
CREAT BLD-MCNC: 5.47 MG/DL
EGFRCR SERPLBLD CKD-EPI 2021: 10 ML/MIN/1.73M2 (ref 60–?)
ERYTHROCYTE [DISTWIDTH] IN BLOOD BY AUTOMATED COUNT: 16.6 %
GLUCOSE BLD-MCNC: 133 MG/DL (ref 70–99)
GLUCOSE BLD-MCNC: 157 MG/DL (ref 70–99)
GLUCOSE BLD-MCNC: 177 MG/DL (ref 70–99)
GLUCOSE BLD-MCNC: 186 MG/DL (ref 70–99)
GLUCOSE BLD-MCNC: 187 MG/DL (ref 70–99)
GLUCOSE BLD-MCNC: 200 MG/DL (ref 70–99)
GLUCOSE BLD-MCNC: 71 MG/DL (ref 70–99)
GLUCOSE BLD-MCNC: 74 MG/DL (ref 70–99)
HCT VFR BLD AUTO: 35 %
HGB BLD-MCNC: 10.3 G/DL
MAGNESIUM SERPL-MCNC: 2.2 MG/DL (ref 1.6–2.6)
MCH RBC QN AUTO: 29.7 PG (ref 26–34)
MCHC RBC AUTO-ENTMCNC: 29.4 G/DL (ref 31–37)
MCV RBC AUTO: 100.9 FL
OSMOLALITY SERPL CALC.SUM OF ELEC: 276 MOSM/KG (ref 275–295)
PLATELET # BLD AUTO: 193 10(3)UL (ref 150–450)
PLATELETS.RETICULATED NFR BLD AUTO: 0.7 % (ref 0–7)
POTASSIUM SERPL-SCNC: 4.5 MMOL/L (ref 3.5–5.1)
RBC # BLD AUTO: 3.47 X10(6)UL
SODIUM SERPL-SCNC: 128 MMOL/L (ref 136–145)
WBC # BLD AUTO: 6.4 X10(3) UL (ref 4–11)

## 2024-07-11 PROCEDURE — 99232 SBSQ HOSP IP/OBS MODERATE 35: CPT | Performed by: INTERNAL MEDICINE

## 2024-07-11 PROCEDURE — 99183 HYPERBARIC OXYGEN THERAPY: CPT | Performed by: FAMILY MEDICINE

## 2024-07-11 RX ORDER — HEPARIN SODIUM 1000 [USP'U]/ML
1.5 INJECTION, SOLUTION INTRAVENOUS; SUBCUTANEOUS
Status: DISCONTINUED | OUTPATIENT
Start: 2024-07-11 | End: 2024-07-11

## 2024-07-11 RX ORDER — HEPARIN SODIUM 1000 [USP'U]/ML
1.5 INJECTION, SOLUTION INTRAVENOUS; SUBCUTANEOUS
Status: DISCONTINUED | OUTPATIENT
Start: 2024-07-12 | End: 2024-07-12

## 2024-07-11 RX ORDER — ALBUMIN (HUMAN) 12.5 G/50ML
25 SOLUTION INTRAVENOUS
Status: DISCONTINUED | OUTPATIENT
Start: 2024-07-11 | End: 2024-07-12

## 2024-07-11 NOTE — PROGRESS NOTES
HBO Treatment Details      Patient Name:    Darin Bowens  MRN:  [id number]        YOB: 1945  Today's Date:  2024  Physician/Provider: Dejuan Smith MD  Facility: [facility]  Diagnosis:   1. Diabetic foot ulcer with osteomyelitis (HCC)    2. Gangrene of right foot (HCC)            Treatment Course Number: 4  Total Treatments Ordered:  30  Ordering Physician: Elgin  Indication: Diabetic wounds of lower extremities  Risk Factors: Barotrauma;Seizure;Hypoglycemia  HBO Treatment Start Date:   HBO Treatment Number:  4  Treatment Length:Other (Comment)  Treatment Depth: 2.0 LIGIA  HBO Treatment End Date:   HBO Discharge Outcome: Treatment Complete        HBO Treatment Details:  In-Patient Visit: no  Chamber Type:  Hard sided monoplace  Chamber #: 2  HBO Dive Lo  Treatment Protocol:  2.0 LIGIA without an air break      Treatment Details:  Pressurized Rate: 2.0 psi/min  Dive Rate Down:  2.0 psi/min  Dive Rate Up:  2.0 psi/min  Started Compression: 1345  Reached Compression: 1353  Patient on Air:    Patient Taken off Air:   Total Compression Time: 8 (Minutes)  Total Holding Time: 90 (Minutes)  Started Decompression: 1523  Reached Surface: 1353  Total Decompression Time: 7 (Minutes)  Total Airbreaks:   (Minutes)  Total Time of Treatment:   (Minutes)                         Vital Signs:  HBO Glucose: Pre 187 post 186 - (Reference Range: 100-350 mg/dl)        Vitals:    24 1335 24 1605   BP: 124/74 105/55   Pulse: 61 89   Resp: 10 10   Temp: 97.5 °F (36.4 °C) 97.5 °F (36.4 °C)       Treatment Response:  Symptoms Noted During Treatment: None   Treatment Aborted:  No      Treatment Notes:

## 2024-07-11 NOTE — PROGRESS NOTES
CC: follow-up hospital admission LE wounds    SUBJECTIVE:  Interval History:      Just returned from HBO  No new comlaints    OBJECTIVE:  Scheduled Meds:    [START ON 7/12/2024] epoetin matt  10,000 Units Intravenous Once in dialysis    [START ON 7/12/2024] heparin  1.5 mL Intracatheter Once    insulin degludec  10 Units Subcutaneous Q24H    insulin aspart  5 Units Subcutaneous TID CC    apixaban  2.5 mg Oral BID    sulfamethoxazole-trimethoprim DS  2 tablet Oral Daily    clopidogrel  75 mg Oral Daily    sodium chloride   Intravenous Once    insulin aspart  4-20 Units Subcutaneous TID AC and HS    atorvastatin  80 mg Oral Nightly    carvedilol  12.5 mg Oral BID with meals    cefepime  1 g Intravenous Q24H    metRONIDAZOLE  500 mg Oral 2 times per day     Continuous Infusions:   PRN Meds:   sodium chloride **AND** albumin human    heparin    hydrOXYzine    acetaminophen    acetaminophen **OR** HYDROcodone-acetaminophen **OR** HYDROcodone-acetaminophen    HYDROmorphone **OR** HYDROmorphone **OR** HYDROmorphone    polyethylene glycol (PEG 3350)    sennosides    bisacodyl    ondansetron    metoclopramide    glucose **OR** glucose **OR** glucose-vitamin C **OR** dextrose **OR** glucose **OR** glucose **OR** glucose-vitamin C    PHYSICAL EXAM  Vital signs: Temp:  [97.6 °F (36.4 °C)-98.1 °F (36.7 °C)] 98.1 °F (36.7 °C)  Pulse:  [47-87] 57  Resp:  [8-34] 16  BP: (102-134)/(40-67) 109/60  SpO2:  [96 %-100 %] 100 %      GENERAL - NAD, AAO  EYES- sclera anicteric   HENT- normocephalic   NECK - supple  CV- RRR  RESP - decreased at bases, normal resp effort  ABDOMEN- soft, NT/ND   EXT-  bl dressing over feet       Data Review:   Labs:   Recent Labs   Lab 07/07/24  0741 07/08/24  0605 07/09/24  0458 07/10/24  1117 07/11/24  0533   WBC 7.8 7.9 8.5 6.3 6.4   HGB 7.9* 7.6* 8.0* 7.7* 10.3*   MCV 91.1 89.6 92.8 98.1 100.9*   .0 209.0 198.0 171.0 193.0       Recent Labs   Lab 07/05/24  0441 07/10/24  1117 07/11/24  0533   NA  133* 140 128*   K 4.6 2.6* 4.5    123* 102   CO2 20.0* 20.0* 18.0*   BUN 64* 20 46*   CREATSERUM 7.68* 2.92* 5.47*   CA 9.1 6.8* 9.6   MG 2.4 1.4* 2.2   * 92 71       Recent Labs   Lab 07/09/24  0849   ALT 7*   AST 14*   ALB 1.9*       Recent Labs   Lab 07/10/24  1713 07/10/24  2113 07/11/24  0528 07/11/24  1155 07/11/24  1329   PGLU 212* 206* 74 133* 187*           ASSESSMENT/PLAN:    78 year old male with PMH sig for CAD status post stents, PAD status post bilateral lower extremity stents, atrial fibrillation on Xarelto, diabetes mellitus type 2 on insulin pump, end-stage renal disease on dialysis, chronic CHF with LVH, status post AVR, history of stroke, hypertension, hyperlipidemia, DESI and recent right TMA and left foot second digit amputation on 6/13 by Dr. Meza presented with poor surgical wound healing.        Postop wound infection R TMA 6/13 due to nonhealing ulcers and PVD.   Excisional debridement to level of bone along with partial resection of R 1st and 2nd MT 6/27  - surgical cultures with enterobacter cloacae and stenotrophomonas  - drainage cultures from admit with enterobacter and acinetobacter  - on iv cefepime, flagyl and bactrim since 7/2  - s/p further debridement 7/5  - hyperbaric oxygen therapy until this friday     L foot 2nd digit amputation on 6/13  Excisional wound debridement to level of bone along with partial resection of second MT 6/27  - culture polymicrobial with enterobacter and stenotrophomonas  - on abx as above.  - s/p further debridement 7/5  - hyperbaric oxygen therapy until this friday     PVD  - Arterial US in April, likely has more distal PAD, may require further amputation, per vascular  pt is not a candidate for any further revascularization and should his foot amputation does not heal he will require major amputation.      ESRD on HD  Malfunctioning AVF  - vascular consulted, o/p eval of new AVF/fistula revision   - nephrology o/c -s/p tunneled catheter on  6/28         CAD s/p stents  PAD s/p b/l LE stents  Chronic CHF w/ LVH  S/p AVR  Atrial fibrillation  - coreg (Hold SBP < 110 given anemia), lipitor  - plavix and doac had been held for a few days to anemia. Has since been restarted. Cont to trend hgb - stable     DM2  - insulin pump - not on this admission  - ISS/accucheks - high dose SSI, tresiba daily 10 units   - added mealtime insulin but for HBO apparently need glc to be higher     Anemia  - suspecting ABLA from surgery   - monitor for now hgb 7.9 to 7.7 to 7.0 to 6.6 --> PRBC 7/3.   - hgb has been in high 7s-8s since. Back on blood thinners  - iron levels c/w ACD  - denies BRBPR no melena      Essential HTN  - coreg  Bp controlled     Hyperlipidemia  - statin     DESI  - cpap at bedtime      Itching  - prn atarax, may be 2/2 meds/dialysis        FEN: carb controlled diet (no need for renal), PT/OT  Proph: SCDs eliquis     Code status: Full code     Dispo: Planning to keep pt for HBO treatment until this Friday as logistically not possible for him to get HBO while at [facility]. Plan for dc Friday        Will continue to follow while hospitalized. Please page me or the on-call hospitalist with questions or concerns.    Paul Kaplan Hospitalist  [phone]  Answering Service: [phone]

## 2024-07-11 NOTE — PLAN OF CARE
Pt. On bed, Aox3, V/S taken, physical assessment completed. Due med given. Instructed to use IS. Denies pain at this time. Fall precaution maintained. Lowered bed, bed alarm activated. Instructed to push call light button when needed assistance. Will monitor

## 2024-07-11 NOTE — PLAN OF CARE
Pt A&Ox4. VSS on RA. Telemetry monitoring, Afib. Left jugular permacath intact. Dressings to BLE dry and intact. Denies pain at this time. WBAT to BLE with post-op shoes. Plan for HBO tx at 1300. HD scheduled for tomorrow AM, [facility] notified. POC update with pt, verbalizes understanding. Call light within reach. Will continue to monitor.

## 2024-07-11 NOTE — PROGRESS NOTES
[facility]   part of [facility] Infectious Disease Progress Note    Darin Bowens Patient Status:  Inpatient    1945 MRN [id number]   [facility] 3SW-A Attending Lacey Alcaraz MD   Hosp Day # 16 PCP Zachery Hall MD     Subjective:  Pt with no new complaints.  He states his pain is well controlled and rates pain at 1.    Objective:    Allergies:  Allergies   Allergen Reactions    Augmentin [Amoxicillin-Pot Clavulanate] RASH    Lisinopril Coughing     Dyspnea         Medications:    Current Facility-Administered Medications:     [START ON 2024] epoetin matt (Epogen, Procrit) 21720 UNIT/ML injection 10,000 Units, 10,000 Units, Intravenous, Once in dialysis    sodium chloride 0.9 % IV bolus 100 mL, 100 mL, Intravenous, Q30 Min PRN **AND** albumin human (Albumin) 25% injection 25 g, 25 g, Intravenous, PRN Dialysis    heparin (Porcine) 1000 UNIT/ML injection 1,500 Units, 1.5 mL, Intracatheter, Once    sodium chloride 0.9 % IV bolus 100 mL, 100 mL, Intravenous, Q30 Min PRN **AND** [DISCONTINUED] albumin human (Albumin) 25% injection 25 g, 25 g, Intravenous, PRN Dialysis    insulin degludec (Tresiba) 100 units/mL flextouch 10 Units, 10 Units, Subcutaneous, Q24H    insulin aspart (NovoLOG) 100 Units/mL FlexPen 5 Units, 5 Units, Subcutaneous, TID CC    heparin (Porcine) 1000 UNIT/ML injection 1,000 Units, 1,000 Units, Intravenous, PRN Dialysis    apixaban (Eliquis) tab 2.5 mg, 2.5 mg, Oral, BID    sulfamethoxazole-trimethoprim DS (Bactrim DS) 800-160 MG per tab 2 tablet, 2 tablet, Oral, Daily    hydrOXYzine (Atarax) tab 25 mg, 25 mg, Oral, TID PRN    clopidogrel (Plavix) tab 75 mg, 75 mg, Oral, Daily    sodium chloride 0.9% infusion, , Intravenous, Once    acetaminophen (Tylenol Extra Strength) tab 500 mg, 500 mg, Oral, Q4H PRN    acetaminophen (Tylenol) tab 650 mg, 650 mg, Oral, Q4H PRN **OR** HYDROcodone-acetaminophen (Norco) 5-325 MG per tab 1 tablet, 1 tablet, Oral,  Q4H PRN **OR** HYDROcodone-acetaminophen (Norco) 5-325 MG per tab 2 tablet, 2 tablet, Oral, Q4H PRN    HYDROmorphone (Dilaudid) 1 MG/ML injection 0.2 mg, 0.2 mg, Intravenous, Q2H PRN **OR** HYDROmorphone (Dilaudid) 1 MG/ML injection 0.4 mg, 0.4 mg, Intravenous, Q2H PRN **OR** HYDROmorphone (Dilaudid) 1 MG/ML injection 0.8 mg, 0.8 mg, Intravenous, Q2H PRN    polyethylene glycol (PEG 3350) (Miralax) 17 g oral packet 17 g, 17 g, Oral, Daily PRN    sennosides (Senokot) tab 17.2 mg, 17.2 mg, Oral, Nightly PRN    bisacodyl (Dulcolax) 10 MG rectal suppository 10 mg, 10 mg, Rectal, Daily PRN    ondansetron (Zofran) 4 MG/2ML injection 4 mg, 4 mg, Intravenous, Q6H PRN    metoclopramide (Reglan) 5 mg/mL injection 5 mg, 5 mg, Intravenous, Q8H PRN    glucose (Dex4) 15 GM/59ML oral liquid 15 g, 15 g, Oral, Q15 Min PRN **OR** glucose (Glutose) 40% oral gel 15 g, 15 g, Oral, Q15 Min PRN **OR** glucose-vitamin C (Dex-4) chewable tab 4 tablet, 4 tablet, Oral, Q15 Min PRN **OR** dextrose 50% injection 50 mL, 50 mL, Intravenous, Q15 Min PRN **OR** glucose (Dex4) 15 GM/59ML oral liquid 30 g, 30 g, Oral, Q15 Min PRN **OR** glucose (Glutose) 40% oral gel 30 g, 30 g, Oral, Q15 Min PRN **OR** glucose-vitamin C (Dex-4) chewable tab 8 tablet, 8 tablet, Oral, Q15 Min PRN    insulin aspart (NovoLOG) 100 Units/mL FlexPen 4-20 Units, 4-20 Units, Subcutaneous, TID AC and HS    atorvastatin (Lipitor) tab 80 mg, 80 mg, Oral, Nightly    carvedilol (Coreg) tab 12.5 mg, 12.5 mg, Oral, BID with meals    ceFEPIme (Maxipime) 1 g in sodium chloride 0.9% 100 mL IVPB-MBP, 1 g, Intravenous, Q24H    metRONIDAZOLE (Flagyl) tab 500 mg, 500 mg, Oral, 2 times per day    Physical Exam:  General: Alert, orientated x3.  Cooperative.  No apparent distress.  Vital Signs:  Blood pressure 134/63, pulse 51, temperature 97.6 °F (36.4 °C), temperature source Oral, resp. rate (!) 34, height 6' (1.829 m), weight 212 lb 4.9 oz (96.3 kg), SpO2 100%.   Temp (24hrs), Av.7 °F  (36.5 °C), Min:97.3 °F (36.3 °C), Max:98.1 °F (36.7 °C)      HEENT: Exam is unremarkable.  Without scleral icterus.  Mucous membranes are moist. PERRLA.  Oropharynx is clear.  Neck: No tenderness to palpitation.  Full range of motion to flexion and extension, lateral rotation and lateral flexion of cervical spine.  No JVD. Supple.   Lungs: Clear to auscultation bilaterally.  Cardiac: Regular rate and rhythm. No murmur.  Abdomen:  Soft, non-distended, non-tender, with no rebound or guarding.   Extremities:  No lower extremity edema noted.  Without clubbing or cyanosis.    Skin: LE covered, NPWT in place.  Images in epic reviewed  Neurologic: Cranial nerves are grossly intact.  Motor strength and sensory examination is grossly normal.  No focal neurologic deficit.    Labs:  Lab Results   Component Value Date    WBC 6.4 07/11/2024    HGB 10.3 07/11/2024    HCT 35.0 07/11/2024    .0 07/11/2024    CREATSERUM 5.47 07/11/2024    BUN 46 07/11/2024     07/11/2024    K 4.5 07/11/2024     07/11/2024    CO2 18.0 07/11/2024    GLU 71 07/11/2024    CA 9.6 07/11/2024    MG 2.2 07/11/2024         Assessment/Plan:    1.  Post-op R foot infection of R foot  -s/p TMA on 6/13  -s/p I&D down to bone with partial resection of 1st and 2nd MT on 6/27  -s/p repeat I&D down to bone on 7/5  -wound cultures with enterobacter and acinetobacter  -OR cultures with enterobacter  -wound on consult, pt receiving HBO through 7/12  -on IV cefepime, PO metronidazole and PO bactrim  2.  Post-op left foot wound infection with OM  -s/p L 2nd toe amputation on 6/13  -s/p I&D down to bone with partial resection of 2nd toe on 6/27  -s/p I&D down to bone on 7/5  -cultures with enterobacter and stenotrophomonas  -on abx above  3.  PAD  4.  ESRD on HD  5.  Dispo  -IV cefepime with HD, PO bactrim and PO metronidazole through 8/15    If you have any questions or concerns please call [facility] Infectious Disease at [phone].      Jose Tidwell, APRN

## 2024-07-11 NOTE — PHYSICAL THERAPY NOTE
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  Transmetatarsal amputation, left foot  2.  Achilles tendon lengthening, left  3.  Excisional wound debridement to level of bone, 30 cm², right foot  4.  Partial resection of the third and fourth metatarsals, right foot     -Per epic chat with podiatrist, Dr Meza, pt WBAT bilateral feet with post op shoes.    Repeat debridement on 7/5 for BLE - no change in weight bearing status/activity orders    PHYSICAL THERAPY MEDICAL/SOCIAL HISTORY  History related to current admission: Patient is a 78 year old male admitted on 6/25/2024 from wound clinic for worsening BL foot wounds.  Pt diagnosed with cellulitis.     HOME SITUATION  Type of Home: Condo   Home Layout: One level;Ramped entrance  Stairs to Enter : 3  Railing: Yes  Lives With: Spouse  Drives: No  Patient Owned Equipment: Rolling walker;Wheelchair     Prior Level of [place]: Per pt lives in one level condo with ramped entrance. Lives with spouse. Ambulates household distances with RW, longer distances via use of wheelchair.         ASSESSMENT   Patient demonstrates fair progress this session, goals  remain in progress.    Patient continues to function below baseline with bed mobility, transfers, and gait.  Contributing factors to remaining limitations include decreased functional strength, decreased endurance/aerobic capacity, pain, impaired sitting and standing balance, decreased muscular endurance, and medical status.  Next session anticipate patient to progress bed mobility, transfers, and gait.  Physical Therapy  will continue to follow patient for duration of hospitalization.    Patient continues to benefit from continued skilled PT services: to promote return to prior level of function and safety with continuous assistance and gradual rehabilitative therapy .    PLAN  PT Treatment Plan: Bed mobility;Body mechanics;Coordination;Endurance;Energy conservation;Patient education;Family education;Gait training;Neuromuscular re-educate;Strengthening;Range of motion;Stoop training;Stair training;Transfer training;Balance training  Rehab Potential : Fair  Frequency (Obs): 3-5x/week    CURRENT GOALS     Goal #1 Patient is able to demonstrate supine - sit EOB @ level: independent      Goal #2 Patient is able to demonstrate transfers Sit to/from Stand at assistance level: supervision      Goal #3 Patient is able to ambulate 25 feet with assist device: walker - rolling at assistance level: minimum assistance      Goal #4     Goal #5     Goal #6     Goal Comments: Goals established on 2024 all goals ongoing    SUBJECTIVE  Pt requires encouragement to participate and increase mobility   \"They told me I have to stay until next week for more treatment\" referring to wound care     OBJECTIVE  Precautions: Limb alert - right;Bed/chair alarm (post op shoes for both feet)    WEIGHT BEARING RESTRICTION  Weight Bearing Restriction: L lower extremity;R lower extremity   R Lower Extremity: Weight Bearing as Tolerated (w/ post op shoe)  L Lower Extremity: Weight Bearing as Tolerated (w/ post op shoe)    PAIN ASSESSMENT   Ratin  Location: pt denies pain  Management Techniques: Breathing techniques;Relaxation;Repositioning    BALANCE                                                                                                                       Static Sitting: Fair -  Dynamic Sitting: Fair -           Static Standing: Poor +  Dynamic Standing: Poor    AM-PAC '6-Clicks' INPATIENT SHORT FORM - BASIC MOBILITY  How much  difficulty does the patient currently have...  Patient Difficulty: Turning over in bed (including adjusting bedclothes, sheets and blankets)?: A Little   Patient Difficulty: Sitting down on and standing up from a chair with arms (e.g., wheelchair, bedside commode, etc.): A Lot   Patient Difficulty: Moving from lying on back to sitting on the side of the bed?: A Little   How much help from another person does the patient currently need...   Help from Another: Moving to and from a bed to a chair (including a wheelchair)?: A Lot   Help from Another: Need to walk in hospital room?: A Lot   Help from Another: Climbing 3-5 steps with a railing?: Total       AM-PAC Score:  Raw Score: 13   Approx Degree of Impairment: 64.91%   Standardized Score (AM-PAC Scale): 36.74   CMS Modifier (G-Code): CL    FUNCTIONAL ABILITY STATUS  Gait Assessment   Functional Mobility/Gait Assessment  Gait Assistance: Moderate assistance;Maximum assistance  Distance (ft): 1,3  Assistive Device: Rolling walker  Pattern: Shuffle;R Flexed knee    Skilled Therapy Provided  Therapist educated pt on benefits of mobility to prevent further deconditioning   TA to don post op shoes B feet - pt demos fair to fair- seated balance, denies dizziness , denies pain   Pt gait trained in room and halls c mod/max x 2 c RW  Therapist cued pt for hand /foot placement, sequencing for STS t/f and increased step length and upright posture during gait training. Chair follow provided as pt fatigues quickly     Bed Mobility:  Rolling:    Supine<>Sit: min A cues for sequencing     Sit<>Supine:      Transfer Mobility:  Sit<>Stand: mod/max A x 2   Stand<>Sit:  Mod A   Gait: min x 2 initially, mod /max x 2 as pt fatigues     Therapist's Comments: pt denies pain s/p amb       THERAPEUTIC EXERCISES  Lower Extremity Ankle pumps  Knee extension  SLR     Position Sitting     Repetitions   5   Sets   1     Patient End of Session: Up in chair;Needs met;Call light within reach;RN  aware of session/findings;All patient questions and concerns addressed;Alarm set    PT Session Time:23 minutes  Gait Training: 10 minutes  Therapeutic Activity: 13 minutes  Therapeutic Exercise:  minutes   Neuromuscular Re-education:  minutes

## 2024-07-11 NOTE — PROGRESS NOTES
Pre/Post Treatment Evaluation      Pre-Treatment Lung Evaluation: Clear to auscultation bilaterally    Left Ear Pre-Treatment Evaluation  Left Ear Clear: Yes  Left Ear Intact: Yes  Left Cerumen Removal: No  Pre Left teed stgstrstastdstest:st st1st grade    Right Ear Pre-Treatment Evaluation  Right Ear Clear: Yes  Right Ear Intact: Yes  Right Cerumen Removal: No  Pre Right teed stgstrstastdstest:st st1st grade      Post-Treatment Lung Evaluation: Clear to auscultation bilaterally    Left Ear Post-Treatment Evaluation  Left Ear Clear: Yes  Left Ear Intact: Yes  Left Cerumen Removal: No  Post Left teed stgstrstastdstest:st st1st grade    Right Ear Post-Treatment Evaluation  Right Ear Clear: Yes  Right Ear Intact: Yes  Right Cerumen Removal: No  Post Right teed stgstrstastdstest:st st1st grade      I supervised this Hyperbaric treatment and was immediately available throughout the course of the treatment with a hospital approved hyperbaric physician available by phone.  In addition, there is a trained emergency support team and ICU available at this facility to assist with complications.

## 2024-07-11 NOTE — CM/SW NOTE
Informed pt unable to receive HBO Friday.  Per Mds, today will be last HBO session.  Updated Jennifer from [facility].  Wound vac currently on hold.  Anticipate DC 7/12 after HD.  / to remain available for support and/or discharge planning.     [facility] Griffin  P:[phone]  F:[phone]    Lili Kennedy, [facility]  Discharge Planner  [phone]

## 2024-07-11 NOTE — OCCUPATIONAL THERAPY NOTE
OCCUPATIONAL THERAPY TREATMENT NOTE - INPATIENT     Room Number: 353/353-A  Session: 2 after re-eval  Number of Visits to Meet Established Goals: 5    Presenting Problem: s/p L foot debridement and 2nd metatarsal partial resection 6/27, s/p R foot debridement and 1st/2nd metatarsal resection 6/27  Prior Level of Function: Per initial eval 6/29/24,Prior to recent admission pt had assist from spouse for showers, was otherwise mostly independent with ADL. Used cane in the home and wheelchair in the community. Since recent admission pt has been doing stand pivot transfers bed <> wheelchair <> commode with assist and RW. He has had assist from spouse or daughter for all LB ADL.     ASSESSMENT   Patient demonstrates fair progress this session, goals remain in progress.    Patient continues to function below baseline with toileting, lower body dressing, bed mobility, transfers, static standing balance, dynamic standing balance, and functional standing tolerance.   Contributing factors to remaining limitations include decreased functional strength, decreased endurance, pain, impaired standing balance, impaired coordination, and decreased muscular endurance.  Next session anticipate patient to progress toileting, lower body dressing, bed mobility, transfers, static standing balance, dynamic standing balance, and functional standing tolerance.  Occupational Therapy will continue to follow patient for duration of hospitalization.    Patient continues to benefit from continued skilled OT services: to promote return to prior level of function and safety with continuous assistance and gradual rehabilitative therapy .          OT Device Recommendations: TBD    History: Patient is a 78 year old male admitted on 6/25/2024 with Presenting Problem: s/p L foot debridement and 2nd metatarsal partial resection 6/27, s/p R foot debridement and 1st/2nd metatarsal resection 6/27. Co-Morbidities : gangrene of R foot s/p R TMA with achilles  lengthening and L 2nd toe amputation on 6/13 with DC to [facility] for 2 days; ESRD on HD, DM2, PAD, CHF, AVR     ** Re-evaluated 7/1 after another surgery. See below. Per Epic chat with podiatrist, WBAT both legs with post-op shoes on.      Date of surgery: 06/30/24  Procedure:   1.  Transmetatarsal amputation, left foot  2.  Achilles tendon lengthening, left  3.  Excisional wound debridement to level of bone, 30 cm², right foot  4.  Partial resection of the third and fourth metatarsals, right foot     Per initial OT eval on 6/29/24, \"Pt discharged to [facility] after recent hospitalization, was only there 2 days prior to DC home. Pt states he was home 3 days before readmission. Per chart, family was having difficulty caring for him at home.\"    07/05  Preoperative Diagnosis:   Open transmetatarsal amputation site, left foot  Open transmetatarsal amputation site, right foot  Diabetic neuropathy  Peripheral arterial disease     Postoperative Diagnosis: Same     Procedure:   1.  Excisional wound debridement to level of bone, 35 cm², right foot  2.  Excisional wound debridement to level of bone, 25 cm², left foot    Repeat debridement on 7/5 for BLE - no change in weight bearing status/activity orders      WEIGHT BEARING RESTRICTION  Weight Bearing Restriction: L lower extremity;R lower extremity        R Lower Extremity: Weight Bearing as Tolerated (w/ post op shoe)  L Lower Extremity: Weight Bearing as Tolerated (w/ post op shoe)    Recommendations for nursing staff:   Transfers: 2 person  Toileting location: bedside commode    TREATMENT SESSION:  Patient Start of Session: semi supine in bed  FUNCTIONAL TRANSFER ASSESSMENT  Sit to Stand: Edge of Bed; Chair  Edge of Bed: Maximum Assist (mod A x 2)  Chair: Maximum Assist (max A x 2)    BED MOBILITY  Supine to Sit : Moderate Assist  Sit to Supine (OT): Not Tested  Scooting: Mod A    BALANCE ASSESSMENT     FUNCTIONAL ADL ASSESSMENT  LB Dressing Seated: Maximum Assist (bilat post-up  shoes)      ACTIVITY TOLERANCE: fair                         O2 SATURATIONS       EDUCATION PROVIDED  Patient: Role of Occupational Therapy; Plan of Care; Discharge Recommendations; Functional Transfer Techniques; Fall Prevention; Surgical Precautions; Posture/Positioning; Energy Conservation; Proper Body Mechanics  Patient's Response to Education: Verbalized Understanding; Returned Demonstration      Equipment used: RW  Demonstrates functional use, Would benefit from additional trial     Therapist comments: Pt received semi supine in bed, cooperative for OT session. Pt agreeable for OOB activity/mobility in preparation for bedside commode transfer. Pt performs bed mobility at mod A for upper trunk and B LE management to sit EOB. Mod A required for pt to scoot to EOB. Sit to stand transfer performed at mod A x 2 with bed height elevated. Pt demos side step transfer to bedside chair with RW requiring min A x 2 in preparation for bedside commode transfer. Pt then taken out to hallway for further activity. Sit to stand transfer performed at mod A x 2 with cues provided for hand placement and bilateral feet placement. Pt engages in short distance functional mobility with RW requiring mod A x 2 and close chair follow for increased safety in preparation for ambulatory toilet transfer. Cues provided for feet placement to decrease risk of falling. Pt left in chair with all needs.  Patient End of Session: Up in chair;Needs met;Call light within reach;RN aware of session/findings;All patient questions and concerns addressed;Alarm set    SUBJECTIVE  Pt pleasant and cooperative for OT.    PAIN ASSESSMENT  Ratin  Location: denies  Management Techniques: Repositioning     OBJECTIVE  Precautions: Limb alert - right;Bed/chair alarm (post op shoes for both feet)    AM-PAC ‘6-Clicks’ Inpatient Daily Activity Short Form  -   Putting on and taking off regular lower body clothing?: A Lot  -   Bathing (including washing, rinsing,  drying)?: A Lot  -   Toileting, which includes using toilet, bedpan or urinal? : A Lot  -   Putting on and taking off regular upper body clothing?: A Little  -   Taking care of personal grooming such as brushing teeth?: None  -   Eating meals?: None    AM-PAC Score:  Score: 17  Approx Degree of Impairment: 50.11%  Standardized Score (AM-PAC Scale): 37.26    PLAN  OT Treatment Plan: Balance activities;Energy conservation/work simplification techniques;ADL training;Functional transfer training;UE strengthening/ROM;Patient/Family education;Patient/Family training;Equipment eval/education;Compensatory technique education  Rehab Potential : Fair  Frequency: 3x/week    OT Goals:     All goals ongoing 07/11    ADL Goals   Patient will perform upper body dressing:  with modified independent  Patient will perform lower body dressing:  with min assist  Patient will perform toileting: with min assist     Functional Transfer Goals  Patient will transfer to toilet:  with min assist     UE Exercise Program Goal  Patient will be independent with bilateral AROM HEP (home exercise program).    OT Session Time: 25 minutes  Therapeutic Activity: 25 minutes

## 2024-07-11 NOTE — PROGRESS NOTES
[facility]   part of [facility]     Nephrology Progress Note    Darin Bowens Patient Status:  Inpatient    1945 MRN [id number]   Location [facility] 3SW-A Attending Mook Escalante MD   Hosp Day # 16 PCP Zachery Hall MD       SUBJECTIVE  No acute events noted        Physical Exam:   /50 (BP Location: Left arm)   Pulse 51   Temp 98 °F (36.7 °C) (Oral)   Resp 18   Ht 6' (1.829 m)   Wt 212 lb 4.9 oz (96.3 kg)   SpO2 96%   BMI 28.79 kg/m²   Temp (24hrs), Av.8 °F (36.6 °C), Min:97.3 °F (36.3 °C), Max:98.1 °F (36.7 °C)       Intake/Output Summary (Last 24 hours) at 2024 0708  Last data filed at 7/10/2024 1205  Gross per 24 hour   Intake 240 ml   Output --   Net 240 ml       Last 3 Weights   24 0530 212 lb 4.9 oz (96.3 kg)   24 0400 208 lb 5.4 oz (94.5 kg)   24 0600 198 lb 8 oz (90 kg)   24 0648 205 lb (93 kg)   24 0543 211 lb (95.7 kg)   24 2000 211 lb (95.7 kg)   06/15/24 0008 211 lb (95.7 kg)   24 0545 211 lb 6.4 oz (95.9 kg)   24 1324 209 lb 7 oz (95 kg)   24 1120 210 lb (95.3 kg)   24 1342 210 lb (95.3 kg)   22 1253 210 lb (95.3 kg)     General: Asleep but arouses, oriented  in no apparent distress.  HEENT: No scleral icterus, MMM  Neck: Supple, no SHERI or thyromegaly  Cardiac: Regular rate and rhythm, S1, S2 normal, no murmur or rub  Lungs: Clear without wheezes, rales, rhonchi.    Extremities: No significant pitting edema, bilateral dressings in place over the feet   neurologic:  moving all extremities  Skin: Warm and dry, no rash        Labs:     Recent Labs   Lab 24  0741 24  0605 24  0458 07/10/24  1117 24  0533   WBC 7.8 7.9 8.5 6.3 6.4   HGB 7.9* 7.6* 8.0* 7.7* 10.3*   MCV 91.1 89.6 92.8 98.1 100.9*   .0 209.0 198.0 171.0 193.0       Recent Labs   Lab 24  0441 07/10/24  1117 24  0533   * 140 128*   K 4.6 2.6* 4.5    123* 102   CO2  20.0* 20.0* 18.0*   BUN 64* 20 46*   CREATSERUM 7.68* 2.92* 5.47*   CA 9.1 6.8* 9.6   MG 2.4 1.4* 2.2   * 92 71       Recent Labs   Lab 07/09/24  0849   ALT 7*   AST 14*   ALB 1.9*       Recent Labs   Lab 07/10/24  1042 07/10/24  1505 07/10/24  1713 07/10/24  2113 07/11/24  0528   PGLU 91 171* 212* 206* 74       Meds:   Current Facility-Administered Medications   Medication Dose Route Frequency    sodium chloride 0.9 % IV bolus 100 mL  100 mL Intravenous Q30 Min PRN    And    albumin human (Albumin) 25% injection 25 g  25 g Intravenous PRN Dialysis    heparin (Porcine) 1000 UNIT/ML injection 1,500 Units  1.5 mL Intracatheter Once    insulin degludec (Tresiba) 100 units/mL flextouch 10 Units  10 Units Subcutaneous Q24H    insulin aspart (NovoLOG) 100 Units/mL FlexPen 5 Units  5 Units Subcutaneous TID CC    heparin (Porcine) 1000 UNIT/ML injection 1,000 Units  1,000 Units Intravenous PRN Dialysis    apixaban (Eliquis) tab 2.5 mg  2.5 mg Oral BID    sulfamethoxazole-trimethoprim DS (Bactrim DS) 800-160 MG per tab 2 tablet  2 tablet Oral Daily    hydrOXYzine (Atarax) tab 25 mg  25 mg Oral TID PRN    clopidogrel (Plavix) tab 75 mg  75 mg Oral Daily    sodium chloride 0.9% infusion   Intravenous Once    acetaminophen (Tylenol Extra Strength) tab 500 mg  500 mg Oral Q4H PRN    acetaminophen (Tylenol) tab 650 mg  650 mg Oral Q4H PRN    Or    HYDROcodone-acetaminophen (Norco) 5-325 MG per tab 1 tablet  1 tablet Oral Q4H PRN    Or    HYDROcodone-acetaminophen (Norco) 5-325 MG per tab 2 tablet  2 tablet Oral Q4H PRN    HYDROmorphone (Dilaudid) 1 MG/ML injection 0.2 mg  0.2 mg Intravenous Q2H PRN    Or    HYDROmorphone (Dilaudid) 1 MG/ML injection 0.4 mg  0.4 mg Intravenous Q2H PRN    Or    HYDROmorphone (Dilaudid) 1 MG/ML injection 0.8 mg  0.8 mg Intravenous Q2H PRN    polyethylene glycol (PEG 3350) (Miralax) 17 g oral packet 17 g  17 g Oral Daily PRN    sennosides (Senokot) tab 17.2 mg  17.2 mg Oral Nightly PRN     bisacodyl (Dulcolax) 10 MG rectal suppository 10 mg  10 mg Rectal Daily PRN    ondansetron (Zofran) 4 MG/2ML injection 4 mg  4 mg Intravenous Q6H PRN    metoclopramide (Reglan) 5 mg/mL injection 5 mg  5 mg Intravenous Q8H PRN    glucose (Dex4) 15 GM/59ML oral liquid 15 g  15 g Oral Q15 Min PRN    Or    glucose (Glutose) 40% oral gel 15 g  15 g Oral Q15 Min PRN    Or    glucose-vitamin C (Dex-4) chewable tab 4 tablet  4 tablet Oral Q15 Min PRN    Or    dextrose 50% injection 50 mL  50 mL Intravenous Q15 Min PRN    Or    glucose (Dex4) 15 GM/59ML oral liquid 30 g  30 g Oral Q15 Min PRN    Or    glucose (Glutose) 40% oral gel 30 g  30 g Oral Q15 Min PRN    Or    glucose-vitamin C (Dex-4) chewable tab 8 tablet  8 tablet Oral Q15 Min PRN    insulin aspart (NovoLOG) 100 Units/mL FlexPen 4-20 Units  4-20 Units Subcutaneous TID AC and HS    atorvastatin (Lipitor) tab 80 mg  80 mg Oral Nightly    carvedilol (Coreg) tab 12.5 mg  12.5 mg Oral BID with meals    ceFEPIme (Maxipime) 1 g in sodium chloride 0.9% 100 mL IVPB-MBP  1 g Intravenous Q24H    metRONIDAZOLE (Flagyl) tab 500 mg  500 mg Oral 2 times per day         Impression/Plan:        ESRD - due to DM/HTN; clotted AVF.  HD to cont MWF per usual routine  S/p PC due to clotted AV fistula  - will have pt f/u with Dr. Nix for o/p eval of new AVF/fistula revision  Foot infection - s/p B foot/toe amputations  - on abx; wound care, hyperbaric treatment until 7/12  - podiatry /ID following- on TMP/SMX for stenotrophomonas, cefepine/flagyl for enterobacter and acinetobacter  PAD- as above  CHF; s/p AVR; volume optimization w/ HD/UF  Anemia- chronically due to ESRD. DALE for goal hgb 10-11 gms      Sean Flores MD  7/11/2024  7:08 AM

## 2024-07-12 ENCOUNTER — APPOINTMENT (OUTPATIENT)
Dept: WOUND CARE | Facility: HOSPITAL | Age: 79
End: 2024-07-12
Attending: NURSE PRACTITIONER
Payer: MEDICARE

## 2024-07-12 VITALS
TEMPERATURE: 98 F | BODY MASS INDEX: 27.83 KG/M2 | OXYGEN SATURATION: 96 % | WEIGHT: 205.5 LBS | HEART RATE: 55 BPM | HEIGHT: 72 IN | SYSTOLIC BLOOD PRESSURE: 121 MMHG | DIASTOLIC BLOOD PRESSURE: 47 MMHG | RESPIRATION RATE: 21 BRPM

## 2024-07-12 LAB
ANION GAP SERPL CALC-SCNC: 9 MMOL/L (ref 0–18)
BUN BLD-MCNC: 60 MG/DL (ref 9–23)
CALCIUM BLD-MCNC: 9.5 MG/DL (ref 8.5–10.1)
CHLORIDE SERPL-SCNC: 100 MMOL/L (ref 98–112)
CO2 SERPL-SCNC: 25 MMOL/L (ref 21–32)
CREAT BLD-MCNC: 6.78 MG/DL
EGFRCR SERPLBLD CKD-EPI 2021: 8 ML/MIN/1.73M2 (ref 60–?)
ERYTHROCYTE [DISTWIDTH] IN BLOOD BY AUTOMATED COUNT: 16.2 %
GLUCOSE BLD-MCNC: 146 MG/DL (ref 70–99)
GLUCOSE BLD-MCNC: 150 MG/DL (ref 70–99)
GLUCOSE BLD-MCNC: 151 MG/DL (ref 70–99)
HCT VFR BLD AUTO: 26.9 %
HGB BLD-MCNC: 8.4 G/DL
MAGNESIUM SERPL-MCNC: 2.3 MG/DL (ref 1.6–2.6)
MCH RBC QN AUTO: 29.4 PG (ref 26–34)
MCHC RBC AUTO-ENTMCNC: 31.2 G/DL (ref 31–37)
MCV RBC AUTO: 94.1 FL
OSMOLALITY SERPL CALC.SUM OF ELEC: 298 MOSM/KG (ref 275–295)
PLATELET # BLD AUTO: 201 10(3)UL (ref 150–450)
POTASSIUM SERPL-SCNC: 4.4 MMOL/L (ref 3.5–5.1)
RBC # BLD AUTO: 2.86 X10(6)UL
SODIUM SERPL-SCNC: 134 MMOL/L (ref 136–145)
WBC # BLD AUTO: 7.5 X10(3) UL (ref 4–11)

## 2024-07-12 PROCEDURE — 99232 SBSQ HOSP IP/OBS MODERATE 35: CPT | Performed by: INTERNAL MEDICINE

## 2024-07-12 RX ORDER — HYDROCODONE BITARTRATE AND ACETAMINOPHEN 5; 325 MG/1; MG/1
1 TABLET ORAL EVERY 6 HOURS PRN
Qty: 30 TABLET | Refills: 0 | Status: SHIPPED | OUTPATIENT
Start: 2024-07-12

## 2024-07-12 RX ORDER — SULFAMETHOXAZOLE AND TRIMETHOPRIM 800; 160 MG/1; MG/1
2 TABLET ORAL DAILY
Qty: 68 TABLET | Refills: 0 | Status: SHIPPED | OUTPATIENT
Start: 2024-07-12 | End: 2024-07-12

## 2024-07-12 RX ORDER — SULFAMETHOXAZOLE AND TRIMETHOPRIM 800; 160 MG/1; MG/1
1 TABLET ORAL DAILY
Qty: 34 TABLET | Refills: 0 | Status: SHIPPED | OUTPATIENT
Start: 2024-07-12 | End: 2024-08-15

## 2024-07-12 NOTE — DIETARY NOTE
[facility]   part of [facility]    NUTRITION ASSESSMENT    Pt does not meet malnutrition criteria at this time.    NUTRITION INTERVENTION:    Meal and Snacks - Monitor and encourage adequate PO intake. Carb Controlled: 75 gm CHO per meal.  Medical Food Supplements -  Nepro at breakfast and orange Alex BID; at lunch and dinner . Rationale/use for oral supplements discussed.  Nutrition Education - Provided handout on Tips for Increasing Protein.   Vitamin and Mineral Supplements - recommend Multivitamin with minerals. However, pt declined MVI. Stated, \"I already take enough pills.\"    PATIENT STATUS:     7/12- Pt w/ good appetite. Recorded PO intakes 85%,100% past 2 meals. Had HD this AM, so did not have breakfast. Getting ready to order meal. Not consuming all of Nepro. Noted 2 Nepros at pt's bedside. Pt has been consuming Alex per family report. Provided coupons for ONS.   Encouraged pt to keep up PO intakes and to consume protein foods w/ each meal. Plans are for discharge today.    7/8- Diet adv, RD liberalized diet due to pt frustration. HD to resume today. Good PO intakes and alex on board. 100% x most recent meals. Follow per protocol.     7/5- Tolerating diet and ONS. Frustrated with restrictions this is likely due to renal diet -highly suggest advancing diet to only CHO controlled 75 gm/meal. NPO now going for I&D 7/6. +BM. No new wt.    7/2/24- 77 y/o male admitted with cellulitis of LE and malfunction of dialysis fistula. Pt seen d/t length of stay. Pt w/ poor surgical wound healing, fever, and multiple episodes of emesis PTA. Pt's spouse present at time of visit. Pt reported fair to good appetite. Observed finished lunch tray at bedside. Pt consumed 100% of lunch, including chicken marsala. Recorded PO intakes vary:0-100% with 75% or greater most meals. Denied any N/V/D/C. Pt agreeable to try ONS to help maximize protein intake to aid w/ wound healing.   -6/27- s/p R foot wound debridement,  partial resection of 1st and 2nd metatarsal and L foot wound debridement w/ partial resection of 2nd metatarsal.   -6/30-s/p R foot wound debridement and application of wound vac and L transmetatarsal amputation w/ Achilles tendon.   Wound Care is following. Pt w/ wound vac to B/L feet.   PMH:HTN, HLD, CAD, s/p stents, PAD, CHF, CVA, DM2, ESRD on HD, recent R TMA and L foot-2nd digit amputation on 6/13/24.           ANTHROPOMETRICS:  Ht: 182.9 cm (6')  Wt: 93.2 kg (205 lb 8 oz).   BMI: Body mass index is 27.87 kg/m².  IBW: 81 kg    WEIGHT HISTORY:     Wts fluctuating this admit:198 lb to 211 lb. Current wt:205 lb. Some wt changes may be related to fluid shifts w/ HD.     Wt Readings from Last 10 Encounters:   07/12/24 93.2 kg (205 lb 8 oz)   06/15/24 95.7 kg (211 lb)   06/04/24 95.3 kg (210 lb)   05/20/24 95.3 kg (210 lb)   04/07/22 95.3 kg (210 lb)   12/21/21 95.3 kg (210 lb)   11/30/21 106.1 kg (234 lb)   11/02/21 105.7 kg (233 lb)   10/19/21 105.7 kg (233 lb)   10/01/21 101.6 kg (224 lb)      NUTRITION:  Diet:       Procedures    Carbohydrate controlled diet 2000 kcal/75 grams; Is Patient on Accuchecks? Yes      Food Allergies: No  Cultural/Ethnic/Jain Preferences Addressed: Yes    Percent Meals Eaten (last 3 days)       Date/Time Percent Meals Eaten (%)    07/10/24 1205 85 %    07/11/24 0923 100 %          GI system review:  Last BM:7/10    Skin and wounds: wounds to B/L feet, s/p I&D and wound vacs    NUTRITION RELATED PHYSICAL FINDINGS:     1. Body Fat/Muscle Mass: no wasting noted     2. Fluid Accumulation:  edema to B/L feet      NUTRITION PRESCRIPTION: 81 kg (IBW)  Calories: 9049-5853 calories/day ( 27-32 calories per kg )  Protein: 105-146 grams protein/day (1.3-1.8 grams protein per kg)  Fluid: 1000 ml + urine output or per MD discretion    NUTRITION DIAGNOSIS/PROBLEM:  Increased nutrient needs related to  wound healing  as evidenced by  wounds to B/L feet, s/p I&D and B/L wound vacs.     MONITOR AND  EVALUATE/NUTRITION GOALS:  PO intake of 75% of meals TID - mostly met, ongoing  PO intake of 75% of oral nutrition supplement/s - partly met, ongoing  Provide nutrition adequate for wound healing - Ongoing  Achieve and maintain dry wt +/- 1 to 2 lbs - Ongoing    MEDICATIONS:  IV abx, Insulin, Norco    LABS:  POC Glu:, Glu:151, Na++:134, K+:4.4, BUN:60, Cr:6.78, GFR:8    Pt is at Moderate nutrition risk    Ramya Castillo MS, RD, LDN  Clinical Dietitian  Ext:[phone]

## 2024-07-12 NOTE — PLAN OF CARE
Pt a&O. On room air. Tele and  monitoring maintained per protocol. Tolerating diet with fair appetite. Blood sugars monitored and insulin given as ordered. Last BM 7/10/24. Pt is oliguric. HD today. 2 Liters removed. Epogen given per HD RN. Tolerating atbx as ordered . Permacath to Lt jugular. Drsg to BLE C/D. WBAT to BLE in post op shoes when out of bed. Participating in therapy as ordered. Up with mod assist using walker and gait belt. Pt reminded to \"call; don't fall\". Pt ready for dc to [facility]. Pt's spouse, Jocelyne, and daughter, Gracia, at bedside and updated with plan of care.     Rx sent with pt.    Pts dentures found in wound care clinic. They were left there yesterday after his HBO treatment. Dentures returned to pt.

## 2024-07-12 NOTE — CM/SW NOTE
Plan for DC to [facility] today.  Updated Jennifer from [facility] who confirmed pt can be accepted for admission today.  Ambulance transport scheduled for 1pm.  PCS form completed and available for RN to print.  Met with pt's son, Cricket who expressed agreement with DC plan and updated pt's wife.  Updated RN.  No further needs at this time.  / to remain available for support and/or discharge planning.     [facility]  P:[phone]  F:[phone]    [facility]  [phone]    [facility]  Discharge Planner  [phone]

## 2024-07-12 NOTE — PLAN OF CARE
Aox4, feet dressings c/d/I, denying pain at this time, on room air , refusing scds, on Eliquis + Plavix, on tele a-fib, Right arm precautions from non-functioning AV fisutla, Left Permacath in place c/d/I, HD to be completed today prior to dc, oliguric, WBAT with bilateral post-op shoes, up mod x1, daily wet-to-dry dressing changes plan for Dc to [facility], IV abx at dc.  no further needs at this time.

## 2024-07-12 NOTE — PROGRESS NOTES
[facility]   part of [facility]     Nephrology Progress Note    Darin Bowens Patient Status:  Inpatient    1945 MRN [id number]   Location [facility] 3SW-A Attending Mook Escalante MD   Hosp Day # 17 PCP Zachery Hall MD       SUBJECTIVE  S/p HD earlier this AM, stable      Physical Exam:   /48 (BP Location: Left arm)   Pulse 59   Temp 97.9 °F (36.6 °C) (Oral)   Resp 16   Ht 6' (1.829 m)   Wt 205 lb 8 oz (93.2 kg)   SpO2 96%   BMI 27.87 kg/m²   Temp (24hrs), Av.9 °F (36.6 °C), Min:97.5 °F (36.4 °C), Max:98.3 °F (36.8 °C)       Intake/Output Summary (Last 24 hours) at 2024 1125  Last data filed at 2024 1150  Gross per 24 hour   Intake 360 ml   Output --   Net 360 ml       Last 3 Weights   24 0548 205 lb 8 oz (93.2 kg)   24 0530 212 lb 4.9 oz (96.3 kg)   24 0400 208 lb 5.4 oz (94.5 kg)   24 0600 198 lb 8 oz (90 kg)   24 0648 205 lb (93 kg)   24 0543 211 lb (95.7 kg)   24 2000 211 lb (95.7 kg)   06/15/24 0008 211 lb (95.7 kg)   24 0545 211 lb 6.4 oz (95.9 kg)   24 1324 209 lb 7 oz (95 kg)   24 1120 210 lb (95.3 kg)   24 1342 210 lb (95.3 kg)   22 1253 210 lb (95.3 kg)     General: Asleep but arouses, oriented  in no apparent distress.  HEENT: No scleral icterus, MMM  Neck: Supple, no SHERI or thyromegaly  Cardiac: Regular rate and rhythm, S1, S2 normal, no murmur or rub  Lungs: Clear without wheezes, rales, rhonchi.    Extremities: No significant pitting edema, bilateral dressings in place over the feet   neurologic:  moving all extremities  Skin: Warm and dry, no rash        Labs:     Recent Labs   Lab 24  0605 24  0458 07/10/24  1117 24  0533 24  0532   WBC 7.9 8.5 6.3 6.4 7.5   HGB 7.6* 8.0* 7.7* 10.3* 8.4*   MCV 89.6 92.8 98.1 100.9* 94.1   .0 198.0 171.0 193.0 201.0       Recent Labs   Lab 07/10/24  1117 24  0533 24  0532    128*  134*   K 2.6* 4.5 4.4   * 102 100   CO2 20.0* 18.0* 25.0   BUN 20 46* 60*   CREATSERUM 2.92* 5.47* 6.78*   CA 6.8* 9.6 9.5   MG 1.4* 2.2 2.3   GLU 92 71 151*       Recent Labs   Lab 07/09/24  0849   ALT 7*   AST 14*   ALB 1.9*       Recent Labs   Lab 07/11/24  1329 07/11/24  1532 07/11/24  1619 07/11/24  2108 07/12/24  0523   PGLU 187* 186* 177* 200* 146*       Meds:   Current Facility-Administered Medications   Medication Dose Route Frequency    sodium chloride 0.9 % IV bolus 100 mL  100 mL Intravenous Q30 Min PRN    And    albumin human (Albumin) 25% injection 25 g  25 g Intravenous PRN Dialysis    heparin (Porcine) 1000 UNIT/ML injection 1,500 Units  1.5 mL Intracatheter Once    insulin degludec (Tresiba) 100 units/mL flextouch 10 Units  10 Units Subcutaneous Q24H    insulin aspart (NovoLOG) 100 Units/mL FlexPen 5 Units  5 Units Subcutaneous TID CC    apixaban (Eliquis) tab 2.5 mg  2.5 mg Oral BID    sulfamethoxazole-trimethoprim DS (Bactrim DS) 800-160 MG per tab 2 tablet  2 tablet Oral Daily    hydrOXYzine (Atarax) tab 25 mg  25 mg Oral TID PRN    clopidogrel (Plavix) tab 75 mg  75 mg Oral Daily    sodium chloride 0.9% infusion   Intravenous Once    acetaminophen (Tylenol Extra Strength) tab 500 mg  500 mg Oral Q4H PRN    acetaminophen (Tylenol) tab 650 mg  650 mg Oral Q4H PRN    Or    HYDROcodone-acetaminophen (Norco) 5-325 MG per tab 1 tablet  1 tablet Oral Q4H PRN    Or    HYDROcodone-acetaminophen (Norco) 5-325 MG per tab 2 tablet  2 tablet Oral Q4H PRN    HYDROmorphone (Dilaudid) 1 MG/ML injection 0.2 mg  0.2 mg Intravenous Q2H PRN    Or    HYDROmorphone (Dilaudid) 1 MG/ML injection 0.4 mg  0.4 mg Intravenous Q2H PRN    Or    HYDROmorphone (Dilaudid) 1 MG/ML injection 0.8 mg  0.8 mg Intravenous Q2H PRN    polyethylene glycol (PEG 3350) (Miralax) 17 g oral packet 17 g  17 g Oral Daily PRN    sennosides (Senokot) tab 17.2 mg  17.2 mg Oral Nightly PRN    bisacodyl (Dulcolax) 10 MG rectal suppository 10  mg  10 mg Rectal Daily PRN    ondansetron (Zofran) 4 MG/2ML injection 4 mg  4 mg Intravenous Q6H PRN    metoclopramide (Reglan) 5 mg/mL injection 5 mg  5 mg Intravenous Q8H PRN    glucose (Dex4) 15 GM/59ML oral liquid 15 g  15 g Oral Q15 Min PRN    Or    glucose (Glutose) 40% oral gel 15 g  15 g Oral Q15 Min PRN    Or    glucose-vitamin C (Dex-4) chewable tab 4 tablet  4 tablet Oral Q15 Min PRN    Or    dextrose 50% injection 50 mL  50 mL Intravenous Q15 Min PRN    Or    glucose (Dex4) 15 GM/59ML oral liquid 30 g  30 g Oral Q15 Min PRN    Or    glucose (Glutose) 40% oral gel 30 g  30 g Oral Q15 Min PRN    Or    glucose-vitamin C (Dex-4) chewable tab 8 tablet  8 tablet Oral Q15 Min PRN    insulin aspart (NovoLOG) 100 Units/mL FlexPen 4-20 Units  4-20 Units Subcutaneous TID AC and HS    atorvastatin (Lipitor) tab 80 mg  80 mg Oral Nightly    carvedilol (Coreg) tab 12.5 mg  12.5 mg Oral BID with meals    ceFEPIme (Maxipime) 1 g in sodium chloride 0.9% 100 mL IVPB-MBP  1 g Intravenous Q24H    metRONIDAZOLE (Flagyl) tab 500 mg  500 mg Oral 2 times per day         Impression/Plan:        ESRD - due to DM/HTN; clotted AVF.  HD to cont MWF per usual routine  S/p PC due to clotted AV fistula  - will have pt f/u with Dr. Nix for o/p eval of new AVF/fistula revision  Foot infection - s/p B foot/toe amputations  - on abx; wound care, hyperbaric treatment until 7/12  - podiatry /ID following- on TMP/SMX for stenotrophomonas, cefepine/flagyl for enterobacter and acinetobacter  PAD- as above  CHF; s/p AVR; volume optimization w/ HD/UF  Anemia- chronically due to ESRD. DALE for goal hgb 10-11 gms    OK for discharge from nephrology standpoint      Sean Flores MD  7/12/2024  11:26 AM

## 2024-07-12 NOTE — PROGRESS NOTES
[facility]   part of [facility] Infectious Disease Progress Note    Darin Bowens Patient Status:  Inpatient    1945 MRN [id number]   [facility] 3SW-A Attending Lacey Alcaraz MD   Hosp Day # 17 PCP Zachery Hall MD     Subjective:  Pt sleeping comfortably.     Objective:    Allergies:  Allergies   Allergen Reactions    Augmentin [Amoxicillin-Pot Clavulanate] RASH    Lisinopril Coughing     Dyspnea         Medications:    Current Facility-Administered Medications:     sodium chloride 0.9 % IV bolus 100 mL, 100 mL, Intravenous, Q30 Min PRN **AND** albumin human (Albumin) 25% injection 25 g, 25 g, Intravenous, PRN Dialysis    heparin (Porcine) 1000 UNIT/ML injection 1,500 Units, 1.5 mL, Intracatheter, Once    insulin degludec (Tresiba) 100 units/mL flextouch 10 Units, 10 Units, Subcutaneous, Q24H    insulin aspart (NovoLOG) 100 Units/mL FlexPen 5 Units, 5 Units, Subcutaneous, TID CC    apixaban (Eliquis) tab 2.5 mg, 2.5 mg, Oral, BID    sulfamethoxazole-trimethoprim DS (Bactrim DS) 800-160 MG per tab 2 tablet, 2 tablet, Oral, Daily    hydrOXYzine (Atarax) tab 25 mg, 25 mg, Oral, TID PRN    clopidogrel (Plavix) tab 75 mg, 75 mg, Oral, Daily    sodium chloride 0.9% infusion, , Intravenous, Once    acetaminophen (Tylenol Extra Strength) tab 500 mg, 500 mg, Oral, Q4H PRN    acetaminophen (Tylenol) tab 650 mg, 650 mg, Oral, Q4H PRN **OR** HYDROcodone-acetaminophen (Norco) 5-325 MG per tab 1 tablet, 1 tablet, Oral, Q4H PRN **OR** HYDROcodone-acetaminophen (Norco) 5-325 MG per tab 2 tablet, 2 tablet, Oral, Q4H PRN    HYDROmorphone (Dilaudid) 1 MG/ML injection 0.2 mg, 0.2 mg, Intravenous, Q2H PRN **OR** HYDROmorphone (Dilaudid) 1 MG/ML injection 0.4 mg, 0.4 mg, Intravenous, Q2H PRN **OR** HYDROmorphone (Dilaudid) 1 MG/ML injection 0.8 mg, 0.8 mg, Intravenous, Q2H PRN    polyethylene glycol (PEG 3350) (Miralax) 17 g oral packet 17 g, 17 g, Oral, Daily PRN    sennosides (Senokot)  tab 17.2 mg, 17.2 mg, Oral, Nightly PRN    bisacodyl (Dulcolax) 10 MG rectal suppository 10 mg, 10 mg, Rectal, Daily PRN    ondansetron (Zofran) 4 MG/2ML injection 4 mg, 4 mg, Intravenous, Q6H PRN    metoclopramide (Reglan) 5 mg/mL injection 5 mg, 5 mg, Intravenous, Q8H PRN    glucose (Dex4) 15 GM/59ML oral liquid 15 g, 15 g, Oral, Q15 Min PRN **OR** glucose (Glutose) 40% oral gel 15 g, 15 g, Oral, Q15 Min PRN **OR** glucose-vitamin C (Dex-4) chewable tab 4 tablet, 4 tablet, Oral, Q15 Min PRN **OR** dextrose 50% injection 50 mL, 50 mL, Intravenous, Q15 Min PRN **OR** glucose (Dex4) 15 GM/59ML oral liquid 30 g, 30 g, Oral, Q15 Min PRN **OR** glucose (Glutose) 40% oral gel 30 g, 30 g, Oral, Q15 Min PRN **OR** glucose-vitamin C (Dex-4) chewable tab 8 tablet, 8 tablet, Oral, Q15 Min PRN    insulin aspart (NovoLOG) 100 Units/mL FlexPen 4-20 Units, 4-20 Units, Subcutaneous, TID AC and HS    atorvastatin (Lipitor) tab 80 mg, 80 mg, Oral, Nightly    carvedilol (Coreg) tab 12.5 mg, 12.5 mg, Oral, BID with meals    ceFEPIme (Maxipime) 1 g in sodium chloride 0.9% 100 mL IVPB-MBP, 1 g, Intravenous, Q24H    metRONIDAZOLE (Flagyl) tab 500 mg, 500 mg, Oral, 2 times per day    Physical Exam:  General: Sleeping comfortably.  No apparent distress.  Vital Signs:  Blood pressure 127/48, pulse 59, temperature 97.9 °F (36.6 °C), temperature source Oral, resp. rate 16, height 6' (1.829 m), weight 205 lb 8 oz (93.2 kg), SpO2 96%.   Temp (24hrs), Av.9 °F (36.6 °C), Min:97.5 °F (36.4 °C), Max:98.3 °F (36.8 °C)      HEENT: Exam is unremarkable.  Without scleral icterus.  Mucous membranes are moist. PERRLA.  Oropharynx is clear.  Neck: No tenderness to palpitation.  Full range of motion to flexion and extension, lateral rotation and lateral flexion of cervical spine.  No JVD. Supple.   Lungs: Clear to auscultation bilaterally.  Cardiac: Regular rate and rhythm. No murmur.  Abdomen:  Soft, non-distended, non-tender, with no rebound or  guarding.   Extremities:  No lower extremity edema noted.  Without clubbing or cyanosis.    Skin: LE covered, NPWT in place.  Images in epic reviewed  Neurologic: Cranial nerves are grossly intact.  Motor strength and sensory examination is grossly normal.  No focal neurologic deficit.    Labs:  Lab Results   Component Value Date    WBC 7.5 07/12/2024    HGB 8.4 07/12/2024    HCT 26.9 07/12/2024    .0 07/12/2024    CREATSERUM 6.78 07/12/2024    BUN 60 07/12/2024     07/12/2024    K 4.4 07/12/2024     07/12/2024    CO2 25.0 07/12/2024     07/12/2024    CA 9.5 07/12/2024    MG 2.3 07/12/2024         Assessment/Plan:    1.  Post-op R foot infection of R foot  -s/p TMA on 6/13  -s/p I&D down to bone with partial resection of 1st and 2nd MT on 6/27  -s/p repeat I&D down to bone on 7/5  -wound cultures with enterobacter and acinetobacter  -OR cultures with enterobacter  -wound on consult, pt receiving HBO through 7/12  -on IV cefepime, PO metronidazole and PO bactrim  2.  Post-op left foot wound infection with OM  -s/p L 2nd toe amputation on 6/13  -s/p I&D down to bone with partial resection of 2nd toe on 6/27  -s/p I&D down to bone on 7/5  -cultures with enterobacter and stenotrophomonas  -on abx above  3.  PAD  4.  ESRD on HD  5.  Dispo  -IV cefepime with HD, PO bactrim and PO metronidazole through 8/15    If you have any questions or concerns please call [facility] Infectious Disease at [phone].     DELMI Chin

## 2024-07-12 NOTE — PROGRESS NOTES
1055: Pt is scheduled for dc today at 1 pm to [facility]. Rx have been put in Epic for maxipime and flagyl on dc, but not for the bactrim. Per dr Castillo's note from yesterday, pt should be on all three for discharge. Page sent to Dr Hugo to please address and complete complete AVS. Awaiting response.    1107: spoke to Dr Tidwell. Orders received

## 2024-07-12 NOTE — DISCHARGE SUMMARY
[facility]ist Discharge Summary    Patient ID  Darin Bowens  [id number]  78 year old  7/17/1945    Admit date: 6/25/2024    Discharge date: 07/12/24    Attending: Paul Stratton DO     Primary Care Physician: Zachery Hall MD      Reason for admission: LE wound    Discharge condition: stable    Disposition: [facility]    Important follow up:  -PCP within 7 d  -specialists:      -labs:    -radiology:      Additional patient instructions       Discharge med list     Medication List        START taking these medications      cefepime HCl 1 GM/50ML Soln  Commonly known as: MAXIPIME  Inject 100 mL (2 g total) into the vein 3 (three) times a week. To be given with hemodialysis 3 times a week.  Weekly CBC with differential and CMP, sed rate and CRP.  Fax results to [facility]. Fax: [phone]. Tel: [phone].  PICC line care as per protocol.     metRONIDAZOLE 500 MG Tabs  Commonly known as: Flagyl  Take 1 tablet (500 mg total) by mouth in the morning and 1 tablet (500 mg total) before bedtime.            CONTINUE taking these medications      Dexcom G6 Sensor Misc  1 Device Every 10 days.     Dexcom G6 Transmitter Misc  Change every 90 days            STOP taking these medications      amoxicillin clavulanate 875-125 MG Tabs  Commonly known as: Augmentin            ASK your doctor about these medications      atorvastatin 80 MG Tabs  Commonly known as: Lipitor  Take 1 tablet (80 mg total) by mouth nightly.     calcium acetate 667 MG Caps  Commonly known as: Phoslo  TAKE 1 CAPSULE BY MOUTH THREE TIMES DAILY WITH MEALS     carvedilol 12.5 MG Tabs  Commonly known as: Coreg  Take 1 tablet (12.5 mg total) by mouth 2 (two) times daily with meals. Take one tablet (12.5mg total) by mouth two times a day     clindamycin 1 % Lotn  Commonly known as: Clindagel  Apply twice daily to scalp and chest rashes     clopidogrel 75 MG Tabs  Commonly known as: Plavix  TAKE 1 TABLET(75 MG) BY MOUTH DAILY      Combigan 0.2-0.5 % Soln  Generic drug: Brimonidine Tartrate-Timolol     dorzolamide 2 % Soln  Commonly known as: Trusopt     insulin lispro 100 UNIT/ML Soln  Commonly known as: HumaLOG  95 units per day via insulin pump.     ketoconazole 2 % Sham  Commonly known as: Nizoral     PTA Insulin via Pump     rivaroxaban 15 MG Tabs  Commonly known as: Xarelto  Take 1 tablet (15 mg total) by mouth daily with food.     Rocklatan 0.02-0.005 % Soln  Generic drug: Netarsudil-Latanoprost     VITAMIN D2 OR               Where to Get Your Medications        These medications were sent to [address], [address] [phone], [phone]  [address] [place] [address], [place] [address], [facility] [address]      Phone: [phone]   metRONIDAZOLE 500 MG Tabs       You can get these medications from any pharmacy    Bring a paper prescription for each of these medications  cefepime HCl 1 GM/50ML Soln         Discharge Diagnoses:    Postop wound infection R TMA 6/13 due to nonhealing ulcers and PVD.   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/13  Excisional wound debridement to level of bone along with partial resection of second MT 6/27    PVD   ESRD on HD  Malfunctioning AVF  CAD s/p stents  PAD s/p b/l LE stents  Chronic CHF w/ LVH  S/p AVR  Atrial fibrillation  DM2   Anemia   HTN  HLD  DESI    Consults:  IP CONSULT TO HOSPITALIST  IP CONSULT TO PODIATRY  IP CONSULT TO VASCULAR SURGERY  IP CONSULT TO NEPHROLOGY  IP CONSULT TO PHARMACY  IP CONSULT TO INFECTIOUS DISEASE  IP CONSULT TO SOCIAL WORK  IP CONSULT TO VASCULAR SURGERY  IP CONSULT TO HOSPITALIST  IP CONSULT TO SOCIAL WORK  CONSULT TO WOUND OSTOMY  CONSULT TO HYPERBARIC OXYGEN THERAPY (HBO)  IP CONSULT TO CASE MANAGEMENT  IP CONSULT TO SOCIAL WORK  CONSULT TO WOUND OSTOMY  IP CONSULT TO VASCULAR ACCESS TEAM    Radiology:  XR CHEST AP PORTABLE  (CPT=71045)    Result  Date: 2024  PROCEDURE:  XR CHEST AP PORTABLE  (CPT=71045)  TECHNIQUE:  AP chest radiograph was obtained.  COMPARISON:  [facility] , XR, XR CHEST AP PORTABLE  (CPT=71045), 2024, 1:29 PM.  INDICATIONS:  mild vascular congestion  PATIENT STATED HISTORY: (As transcribed by Technologist)  Patient offered no additional history at this time.     FINDINGS:   Left-sided central line with tip near the right atrium is noted.  Median sternotomy wires are stable.  Sequelae of valve replacement is noted.  Mildly prominent cardiac silhouette.  Apically redistribution of pulmonary vasculature.  Interstitial abnormalities the lungs are noted.   Left infrahilar opacity may represent a developing consolidation.  No pneumothorax.           CONCLUSION:   1. Increased pulmonary vascular congestion with mild interstitial edema.  2. Increased retrocardiac opacity may represent a developing consolidation.   LOCATION:  [facility]      Dictated by (CST): Carter Alvarado MD on 2024 at 9:35 AM     Finalized by (CST): Carter Alvarado MD on 2024 at 9:36 AM       CARD ECHO 2D DOPPLER CONTRAST (CPT=93306)    Result Date: 2024  Transthoracic Echocardiogram Name:Darin Bowens Date: 2024 :  1945 Ht:  (72in)  BP: 131 / 58 MRN:  [id number]    Age:  78years    Wt:  (208lb) HR: 68bpm Loc:  [facility]       Gndr: M          BSA: 2.17m^2 Sonographer: Natacha PRECIADO Ordering:    Jose R Meza Consulting:  Patience Barrios ---------------------------------------------------------------------------- History/Indications:   Congestive heart failure. ---------------------------------------------------------------------------- Procedure information:  A transthoracic complete 2D study was performed. Additional evaluation included M-mode, complete spectral Doppler, and color Doppler.  Patient status:  Inpatient.  Location:  Bedside.    Comparison was made to the study of 2020.    This was a routine study. Transthoracic  echocardiography for ventricular function evaluation. Image quality was suboptimal. The study was technically limited due to body habitus. Intravenous contrast (Definity) was administered to opacify the LV. ECG rhythm:   Atrial fibrillation ---------------------------------------------------------------------------- Conclusions: 1. Left ventricle: The cavity size was normal. Wall thickness was normal.    Systolic function was normal. The estimated ejection fraction was 55-60%,    by visual assessment. No diagnostic evidence for regional wall motion    abnormalities. Left ventricular diastolic function is indeterminate. 2. Right ventricle: The cavity size was normal. Systolic function was    normal. 3. Left atrium: The atrium was moderately dilated. 4. Right atrium: The atrium was mildly dilated. 5. Aortic valve: There was a 23 mm bioprosthetic vavle present Transvalvular    velocity was increased, due to stenosis. The findings were consistent    with mild stenosis. There was no significant regurgitation. The peak    systolic velocity was 2.75m/sec. The mean systolic gradient was 16mm Hg    at a heart rate of 68 bpm. The valve area (VTI) was 1.53cm^2. The valve    area (VTI) index was 0.71cm^2/m^2. 6. Mitral valve: The annulus was mildly calcified. The leaflets were mildly    thickened and mildly calcified. Transvalvular velocity was increased. The    findings were consistent with mild stenosis. There was mild    regurgitation. The mean diastolic gradient was 5mm Hg at a heart rate of    64 bpm 7. Pulmonary arteries: Systolic pressure could not be accurately estimated. Impressions:  No previous study from [facility] was available for comparison. * ---------------------------------------------------------------------------- * Findings: Left ventricle:  The cavity size was normal. Wall thickness was normal. Systolic function was normal. The estimated ejection fraction was 55-60%, by visual assessment.  No diagnostic evidence for regional wall motion abnormalities. Left ventricular diastolic function is indeterminate. Left atrium:  The atrium was moderately dilated. Right ventricle:  The cavity size was normal. Systolic function was normal. Right atrium:  The atrium was mildly dilated. Mitral valve:  The annulus was mildly calcified. The leaflets were mildly thickened and mildly calcified.  Doppler:  Transvalvular velocity was increased. The findings were consistent with mild stenosis. There was mild regurgitation.    The valve area (LVOT continuity) was 1.53cm^2. The valve area index (LVOT continuity) was 0.71cm^2/m^2.    The mean diastolic gradient was 5mm Hg at a heart rate of 64 bpm Aortic valve:  There was a 23 mm bioprosthetic vavle present  Doppler: Transvalvular velocity was increased, due to stenosis. The findings were consistent with mild stenosis. There was no significant regurgitation. The valve area (VTI) was 1.53cm^2. The valve area (VTI) index was 0.71cm^2/m^2.    The mean systolic gradient was 16mm Hg at a heart rate of 68 bpm. The peak systolic gradient was 30mm Hg. Tricuspid valve:  The valve is structurally normal. Leaflet separation was normal.  Doppler:  Transvalvular velocity was within the normal range. There was no evidence for stenosis. There was no significant regurgitation. Pulmonic valve:   Not well visualized.  Doppler:  Transvalvular velocity was within the normal range. There was no evidence for stenosis. There was no significant regurgitation. Pericardium:   There was no pericardial effusion. Aorta: Aortic root: The aortic root was normal-sized. Pulmonary arteries: The main pulmonary artery was normal-sized.  Systolic pressure could not be accurately estimated. Systemic veins: Inferior vena cava: The IVC was dilated. ---------------------------------------------------------------------------- Measurements  Left              Value             Ref       02/07/2019  ventricle  IVS                0.7   cm          0.6 - 1.0 ----------  thickness,  ED, PLAX  LV ID, ED,    (H) 6.2   cm          4.2 - 5.8 5.5  PLAX  LV ID, ES,        3.8   cm          2.5 - 4.0 3.6  PLAX  LV PW             1.0   cm          0.6 - 1.0 ----------  thickness,  ED, PLAX  IVS/LV PW         0.72              --------- ----------  ratio, ED,  PLAX  LV PW/LV ID       0.16              --------- ----------  ratio, ED,  PLAX  LV ejection       69    %           52 - 72   63  fraction  Stroke            41    ml/m^2      --------- 53  volume/bsa,  2D  LVOT              Value             Ref       02/07/2019  LVOT ID           2.2   cm          --------- ----------  LVOT peak         1.42  m/sec       --------- ----------  velocity, S  LVOT VTI, S       23.2  cm          --------- ----------  LVOT peak         8     mm Hg       --------- ----------  gradient, S  LVOT mean         3     mm Hg       --------- ----------  gradient, S  Stroke volume     88    ml          --------- ----------  (SV), LVOT DP  Cardiac           6.3   L/min       --------- ----------  output (Qs),  LVOT DP  Cardiac index     2.9   L/(min-m^2) --------- ----------  (Qs/bsa),  LVOT DP  Stroke index      41    ml/m^2      --------- ----------  (SV/bsa),  LVOT DP  Aortic valve      Value             Ref       02/07/2019  Aortic valve      2.75  m/sec       --------- 3.46  peak  velocity, S  Aortic valve      50.7  cm          --------- 65.5  VTI, S  Aortic mean       16    mm Hg       --------- 30  gradient, S  Aortic peak       30    mm Hg       --------- 48  gradient, S  Aortic valve      1.53  cm^2        --------- 1.77  area, VTI  Aortic valve      0.71  cm^2/m^2    --------- 0.77  area/bsa, VTI  Velocity          0.51              --------- ----------  ratio, peak,  LVOT/AV  Aortic root       Value             Ref       02/07/2019  Aortic root       3.9   cm          2.8 - 4.3 ----------  ID, ED  Ascending         Value             Ref       02/07/2019   aorta  Ascending         3.1   cm          2.2 - 3.8 ----------  aorta ID,  A-P, ED  Left atrium       Value             Ref       02/07/2019  LA ID, A-P,   (H) 4.6   cm          3.0 - 4.0 ----------  ES  LA volume, S  (H) 144   ml          18 - 58   124  LA            (H) 66    ml/m^2      16 - 34   54  volume/bsa, S  LA volume,    (H) 145   ml          18 - 58   138  ES, 1-p A4C  LA volume,    (H) 141   ml          18 - 58   113  ES, 1-p A2C  LA volume,        149   ml          --------- ----------  ES, A/L  LA            (H) 69    ml/m^2      16 - 34   ----------  volume/bsa,  ES, A/L  LA/aortic         1.18              --------- ----------  root ratio  Mitral valve      Value             Ref       02/07/2019  Mitral mean       5     mm Hg       --------- 8  gradient, D  Mitral peak       15    mm Hg       --------- 26  gradient, D  Mitral valve      1.53  cm^2        --------- 2.14  area, LVOT  continuity  Mitral valve      0.71  cm^2/m^2    --------- 0.93  area/bsa,  LVOT  continuity  Mitral regurg     57.5  cm          --------- ----------  VTI, PISA  Inferior vena     Value             Ref       02/07/2019  cava  ID                1.9   cm          <=2.1     ---------- Legend: (L)  and  (H)  dakota values outside specified reference range. ---------------------------------------------------------------------------- Prepared and electronically signed by Amadeo Ward MD 07/07/2024 12:56     XR CHEST AP PORTABLE  (CPT=71045)    Result Date: 7/5/2024  PROCEDURE:  XR CHEST AP PORTABLE  (CPT=71045)  TECHNIQUE:  AP chest radiograph was obtained.  COMPARISON:  [facility] , XR, XR CHEST AP PORTABLE  (CPT=71045), 6/10/2021, 6:33 PM.  INDICATIONS:  CHF , pre-procedure  PATIENT STATED HISTORY: (As transcribed by Technologist)  Patient offered no additional history at this time.    FINDINGS:  There is a total left IJ hemodialysis catheter with the tip at the SVC right atrial junction.  There is cardiomegaly which is stable.   There is mild vascular redistribution to the upper lobes without agustin interstitial edema.  No alveolar edema.  There is no pleural effusion or pneumothorax.  There is a tortuous, ectatic thoracic aorta with aortic calcification.  Sternotomy wires and surgical clips are noted.  There is a prosthetic heart valve seen.              CONCLUSION:  1. Cardiomegaly with mild vascular congestion.  No agustin edema. 2. Status post heart valve surgery. 3. Left IJ tunneled hemodialysis catheter.    LOCATION:  [facility]      Dictated by (CST): Yuniel Giron MD on 7/05/2024 at 2:21 PM     Finalized by (CST): Yuniel Giron MD on 7/05/2024 at 2:23 PM       IR CENTRAL VENOUS ACCESS    Result Date: 6/28/2024  PROCEDURE:  IR PERMANENT CATHETER INSERTION EXCHNGE CHECK  INDICATIONS:  HD access  DESCRIPTION OF PROCEDURE:  Witnessed verbal and written informed consent was obtained.  Time out was performed by the staff.   TECHNIQUE:  Prior to the procedure, I discussed with the patient and/or legal representative the potential benefits, risks, and side effects of this procedure, the likelihood of the patient achieving goals; and the potential problems that might occur during recuperation.  I discussed reasonable alternatives to the procedure, including risks, benefits, steps to prevent infection and side effects related to the alternatives, and risks related to not receiving this procedure. A witnessed verbal and signed consent was obtained and documented in the patient's chart.  IV was checked and maintained by the nurse. Moderate conscious sedation was performed under continuous pulse oximetry and cardiac monitoring under my direct supervision.  The radiology nurse was in attendance throughout the exam. Moderate conscious sedation of 1 mg Versed and 50 mcg of Fentanyl was given.  Patient was assessed and monitoring of oxygen saturation, heart rate, and blood pressure by the nursing staff and myself during the exam for a total  intraservice time of 16 minutes of conscious sedation time.  Ten mL of 1% Lidocaine and 5 mL of 1% Lidocaine with epinephrine were used. 2 g of Ancef IV were given preprocedurally.  The patient was placed supine on the angiographic table and the left neck/chest and catheter were prepped and covered with a full body drape in the usual sterile fashion.  All operators present for the case performed standard pre-procedural prep including hand washing, sterile gloves, gown, mask, and cap.  All aspects of the maximum sterile barrier technique were followed.  A preprocedural time out was performed with all physicians, technologists, and nurses involved with the procedure.  Local anesthesia was obtained by 1% lidocaine.  Amplatz guidewire was advanced through the existing temporary dialysis catheter into the IVC under fluoroscopic guidance.  The existing catheter was removed over the wire.  An appropriate length tunnel was created on the left chest after administration of lidocaine.  The new 27 cm tunnel dialysis catheter was advanced through the tunnel.  A 15 Setswana peel-away sheath was placed over the wire.  The wire and inner dilator were removed.  The tunnel dialysis catheter was advanced through the sheath into the right atrium, the sheath was removed.  Both lumens of the new catheter flushed and returned easily.  Spot digital radiograph revealed the tip of the dual lumen catheter appropriate positioning in the right atrium.  The catheter tubing was smooth in its contour.  The catheter was secured and heparinized.  Sterile gauze and transparent dressing was applied.  The patient tolerated the procedure well without immediate complication.  Routine post procedure instructions were communicated to the patient and nursing staff and documented in the chart.  CONTRAST:  0  ESTIMATED BLOOD LOSS: Less than 5cc.  FLUOROSCOPY TIME/DOSE:  0.5 minutes  AIR KERMA:  3.62 mGy            CONCLUSION: Successful conversion of  left IJ  non tunneled central venous dialysis catheter to left chest tunneled dialysis catheter  (27 cm tip to cuff Bard Hemosplit catheter). Catheter is ready for use.  Recommend routine catheter care.   LOCATION:  [facility]    Dictated by ([facility]): Randy Maddox MD on 6/28/2024 at 12:09 PM     Finalized by (CST): Randy Maddox MD on 6/28/2024 at 12:15 PM       MRI FOOT (W+WO), RIGHT (CPT=73720)    Result Date: 6/26/2024  PROCEDURE:  MRI FOOT (W+WO), RIGHT (CPT=73720)  LOCATION:   COMPARISON:  None.  INDICATIONS:  Rule out abscess, ecent surgery  TECHNIQUE:  A variety of imaging planes and parameters were utilized for visualization of suspected pathology.  Images were performed without and with intravenous gadolinium contrast.  PATIENT STATED HISTORY:(As transcribed by Technologist)  Patient has had all 5 toes amputated. Right foot pain.   CONTRAST USED:  19 mL of Dotarem  FINDINGS:  Postsurgical changes status post amputation of all 5 digits at the distal aspects of the metatarsals.  Expected edema at the surgical margin compatible with postsurgical changes noted.  There is no abnormal marrow signal to suggest osteomyelitis.  Small amount of fluid along the surgical approach likely represents postoperative seroma.  A more focal fluid collection around the distal aspect of the 1st metatarsal is noted measuring up to 3.9 x 1.3 x 3.5 cm.  Superinfection cannot be completely excluded.            CONCLUSION:  Postsurgical changes status post amputation of wall the distal digits.  Fluid along the surgical approach may represent postoperative seroma although superinfection cannot be excluded.  There is no evidence of osteomyelitis.   LOCATION:  [facility]   Dictated by ([facility]): Randy Vaz MD on 6/26/2024 at 3:28 PM     Finalized by (CST): Randy Vaz MD on 6/26/2024 at 3:32 PM       MRI FOOT (W+WO), LEFT (CPT=73720)    Result Date: 6/26/2024  PROCEDURE:  MRI FOOT (W+WO), LEFT (CPT=73720)  LOCATION:   COMPARISON:  None.  INDICATIONS:   Rule out abscess, recent amputation  TECHNIQUE:  A variety of imaging planes and parameters were utilized for visualization of suspected pathology.  Images were performed without and with intravenous gadolinium contrast.  PATIENT STATED HISTORY:(As transcribed by Technologist)  Second toe removed, pus drainage on side of foot. Lateral pain mid foot to toes.   CONTRAST USED:  19 mL of Dotarem  FINDINGS:  Post amputations of the second toe at the metatarsophalangeal joint is noted.  No abnormal marrow signal is noted to suggest osteomyelitis.  A fluid tract extending from the surgical margin to the skin surface is noted measuring up to 1.5 cm in length (series 13, image 10).   Mild edema in the subcutaneous soft tissues may represent cellulitis versus postsurgical change.  Correlate clinically.  Mild-to-moderate osteoarthritic changes are noted in the midfoot with subchondral cyst formation in the navicula and lateral cuneiform            CONCLUSION:  Postsurgical changes in the 2nd toe.  No evidence of osteomyelitis.  A fluid tract extending from the surgical site to the skin surface is noted.   LOCATION:  [facility]   Dictated by (CST): Randy Vaz MD on 6/26/2024 at 3:23 PM     Finalized by (CST): Randy Vaz MD on 6/26/2024 at 3:28 PM       IR CENTRAL VENOUS ACCESS    Result Date: 6/25/2024  PROCEDURE:  IR CENTRAL VENOUS CATHETER INSERTION  COMPARISON:  None.  INDICATIONS:  Renal insufficiency  DESCRIPTION OF PROCEDURE:  Witnessed verbal and written informed consent was obtained.  Time out was performed by the staff.   Diagnostic ultrasound was performed of the neck to assess the patency and size of both internal jugular veins, and the relationship of the jugular veins to the carotid arteries.  Permanent images were stored in the PACS system.  An ultrasound was perform with sterile gel & sterile probe covers.  After the ultrasound exam, all operators present for the case performed standard pre-procedural prep  including hand washing, sterile gloves, gown, mask, and cap. The skin over the neck and chest was prepped and draped in the usual sterile manner using maximal barrier technique, and approximately 5 cc of 1% Lidocaine local anesthetic was applied.  The right internal jugular vein was punctured with a micro-access needle using direct real-time ultrasound guidance.  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  Attention was then directed to the left internal jugular vein.  An image was saved with ultrasound documenting patency of the vessel.  This was accessed using a micropuncture needle and a micro our was passed centrally.  An exchange was made for an Amplatz wire which was positioned in the superior vena cava using fluoroscopic guidance.  Over a guidewire, a series of fascial dilators were used to dilate the tract. A 20 cm triple-lumen non-tunneled catheter was advanced to the cavoatrial junction and secured to the skin with nonabsorbable suture and a sterile dressing. The lumen flushed satisfactorily. A completion image was performed demonstrating expected positioning of the catheter at the cavoatrial junction. The patient tolerated the procedure well and there were no immediate complications.  Fluoroscopic time: 0.5 minutes. A single fluoroscopic image was obtained.  Total DAP:  2817 mGy cm2            CONCLUSION:  Successful non-tunneled dialysis catheter placement via the left internal jugular vein.  Chronically occluded right internal jugular vein.   LOCATION:  [facility]    Dictated by (CST): Zana Lui MD on 6/25/2024 at 5:26 PM     Finalized by (CST): Zana Lui MD on 6/25/2024 at 5:32 PM         Operative reports:  Procedure(s) (LRB):  BILATERAL FOOT IRRIGATION & DEBRIDEMENT (Bilateral)    Hospital course:    78 year old male with PMH sig for CAD status post stents, PAD status post bilateral lower extremity stents,  atrial fibrillation on Xarelto, diabetes mellitus type 2 on insulin pump, end-stage renal disease on dialysis, chronic CHF with LVH, status post AVR, history of stroke, hypertension, hyperlipidemia, DESI and recent right TMA and left foot second digit amputation on 6/13 by Dr. Meza presented with poor surgical wound healing.        Postop wound infection R TMA 6/13 due to nonhealing ulcers and PVD.   Excisional debridement to level of bone along with partial resection of R 1st and 2nd MT 6/27  - surgical cultures with enterobacter cloacae and stenotrophomonas  - drainage cultures from admit with enterobacter and acinetobacter  - on iv cefepime, flagyl and bactrim since 7/2. Plan for dc with IV cefepime with HD, PO bactrim and PO flagyul though 08/15  - s/p further debridement 7/5  - hyperbaric oxygen therapy in house.   Wound dressing changes per podiatry      L foot 2nd digit amputation on 6/13  Excisional wound debridement to level of bone along with partial resection of second MT 6/27  - culture polymicrobial with enterobacter and stenotrophomonas  - on abx as above.  - s/p further debridement 7/5     PVD  - Arterial US in April, likely has more distal PAD, may require further amputation, per vascular  pt is not a candidate for any further revascularization and should his foot amputation does not heal he will require major amputation.      ESRD on HD  Malfunctioning AVF  - vascular consulted, o/p eval of new AVF/fistula revision   - nephrology o/c -s/p tunneled catheter on 6/28       CAD s/p stents  PAD s/p b/l LE stents  Chronic CHF w/ LVH  S/p AVR  Atrial fibrillation  - coreg (Hold SBP < 110 given anemia), lipitor  - plavix and doac had been held for a few days to anemia. Has since been restarted. Cont to trend hgb - stable     DM2  - home meds at dc     Anemia  - suspecting ABLA from surgery   - monitor for now hgb 7.9 to 7.7 to 7.0 to 6.6 --> PRBC 7/3.   - hgb has been in high 7s-8s since. Back on blood  thinners  - iron levels c/w ACD  - denies BRBPR no melena      Essential HTN  - coreg  Bp controlled     Hyperlipidemia  - statin     DESI  - cpap at bedtime      Itching  - prn atarax, may be 2/2 meds/dialysis    Day of discharge exam:  Vitals:    07/12/24 0808   BP: 127/48   Pulse: 59   Resp: 16   Temp: 97.9 °F (36.6 °C)     Getting HD now  Feels tired    No acute distress   Lungs clear  Heart regular  Abdomen benign    Total time coordinating care 32 min      Patient and/or family had opportunity to ask questions and expressed understanding and agreement with therapeutic plan as outlined         Paul Kaplan Hospitalist  [phone]  Answering Service: [phone]

## 2024-07-14 ENCOUNTER — EXTERNAL FACILITY (OUTPATIENT)
Dept: FAMILY MEDICINE CLINIC | Facility: CLINIC | Age: 79
End: 2024-07-14

## 2024-07-14 DIAGNOSIS — Z96.41 DIABETES MELLITUS TYPE 1, WITH COMPLICATION, ON LONG TERM INSULIN PUMP (HCC): ICD-10-CM

## 2024-07-14 DIAGNOSIS — L03.119 CELLULITIS OF LOWER EXTREMITY, UNSPECIFIED LATERALITY: Primary | ICD-10-CM

## 2024-07-14 DIAGNOSIS — I48.20 ATRIAL FIBRILLATION, CHRONIC (HCC): ICD-10-CM

## 2024-07-14 DIAGNOSIS — D63.1 ANEMIA IN ESRD (END-STAGE RENAL DISEASE) (HCC): ICD-10-CM

## 2024-07-14 DIAGNOSIS — Z99.2 END STAGE RENAL DISEASE ON DIALYSIS (HCC): ICD-10-CM

## 2024-07-14 DIAGNOSIS — E10.8 DIABETES MELLITUS TYPE 1, WITH COMPLICATION, ON LONG TERM INSULIN PUMP (HCC): ICD-10-CM

## 2024-07-14 DIAGNOSIS — I10 PRIMARY HYPERTENSION: ICD-10-CM

## 2024-07-14 DIAGNOSIS — N18.6 ANEMIA IN ESRD (END-STAGE RENAL DISEASE) (HCC): ICD-10-CM

## 2024-07-14 DIAGNOSIS — N18.6 END STAGE RENAL DISEASE ON DIALYSIS (HCC): ICD-10-CM

## 2024-07-14 DIAGNOSIS — I73.9 PAD (PERIPHERAL ARTERY DISEASE) (HCC): ICD-10-CM

## 2024-07-14 DIAGNOSIS — I50.32 CHRONIC DIASTOLIC HEART FAILURE (HCC): ICD-10-CM

## 2024-07-14 PROCEDURE — G0317 PROLNG NSG FAC E/M EA ADDL 15 MIN: HCPCS | Performed by: FAMILY MEDICINE

## 2024-07-14 PROCEDURE — 99306 1ST NF CARE HIGH MDM 50: CPT | Performed by: FAMILY MEDICINE

## 2024-07-15 ENCOUNTER — TELEPHONE (OUTPATIENT)
Dept: WOUND CARE | Facility: HOSPITAL | Age: 79
End: 2024-07-15

## 2024-07-15 ENCOUNTER — APPOINTMENT (OUTPATIENT)
Dept: WOUND CARE | Facility: HOSPITAL | Age: 79
End: 2024-07-15
Attending: NURSE PRACTITIONER
Payer: MEDICARE

## 2024-07-15 NOTE — PROGRESS NOTES
Darin MCINTYRE Gogo Author: Klaus Baig MD     1945 MRN [id number]   [facility]  Admission 24      [facility] Discharge 24 PCP Zachery Hall MD   Hospital of Discharge  [facility]        CC --admitted to [facility] from [facility] for subacute rehab, postop wound infection    H.P.I Darin Bowens is a 78 year old male with past medical history significant for coronary artery disease peripheral vascular disease atrial fibrillation diabetes mellitus end-stage renal disease on dialysis chronic congestive heart failure, history of aortic valve replacement CVA hypertension hyperlipidemia who recently underwent right transmetatarsal amputation as well as left foot second digit amputation on 2024 presented to the emergency room with poor wound healing and infection.  Patient was placed on IV antibiotics vancomycin and Zosyn.  He underwent right foot wound debridement partial resection of first and second metatarsal as well as left foot wound debridement with partial resection of second metatarsal on   Patient's antibiotics were changed to cefepime Flagyl and Bactrim after surgical cultures grew Enterobacter and stenotrophomonas.  Patient underwent further debridement on   Patient was placed on hyperbaric oxygen therapy daily until .  During his hospitalization patient underwent dialysis 3 times a week.  That was complicated by malfunctioning AV fistula.  Vascular surgery was consulted tunneled catheter was placed on   Anticoagulation with Plavix and aspirin was held for few days due to anemia, it was restarted after trending the hemoglobin.  His anemia was suspected due to blood loss from surgery, he underwent transfusion on 7/3  His hyperbaric oxygen therapy ended on the day of discharge  Patient was transferred to [facility] on IV cefepime and oral metronidazole  Discharge diagnosis  Postop wound infection s/p debridement on  feet  Right foot partial resection of first and second metatarsal  Left foot partial resection of second metatarsal  Peripheral vascular disease  End-stage renal disease on hemodialysis  Chronic congestive heart failure  Type 2 diabetes mellitus  Anemia  Hypertension  Hyperlipidemia  Obstructive sleep apnea      Patient seen in his room  He is doing well and denies any complaints  Has bandage on both feet  His appetite is good  Denies any chest pain shortness of breath dizziness headache nausea vomiting diarrhea abdominal pain      Past Medical History:    Aortic stenosis    Back problem    CAD (coronary artery disease)    Calculus of kidney    Cataract    CKD (chronic kidney disease) stage 4, GFR 15-29 ml/min ([facility])    Congestive heart disease (HCC)    Coronary atherosclerosis    DDD (degenerative disc disease), lumbar    Diabetes mellitus ([facility])    Dialysis patient ([facility])    fistula upper right arm/MWF    Dyslipidemia    Heart valve disease    High cholesterol    Hypertriglyceridemia    Hypoglycemic reaction    Muscle weakness    cane    Nausea and vomiting, unspecified vomiting type    Onychomycosis    DESI (obstructive sleep apnea) C-PAP     severe AHI 68, O2 sam 84%, CPAP 7    Other and unspecified hyperlipidemia    Overweight(278.02)    Renal disorder    S/P Coronary Artery Stents 12/2009    Sleep apnea    doesn't use CPAP    Type 1 diabetes mellitus ([facility])    Unspecified essential hypertension    Visual impairment    legally blind     Past Surgical History:   Procedure Laterality Date    Back surgery  5/16/16    L2-L5 Decomp poss uninstrumented fusion    Cabg      Cath percutaneous  transluminal coronary angioplasty      Cath transcatheter aortic valve replacement      Injection, w/wo contrast, dx/therapeutic substance, epidural/subarachnoid; lumbar/sacral N/A 12/10/2015    Procedure: LUMBAR EPIDURAL;  Surgeon: Rojas Bolanos MD;  Location: [facility]    Injection, w/wo contrast,  dx/therapeutic substance, epidural/subarachnoid; lumbar/sacral N/A 1/25/2016    Procedure: LUMBAR EPIDURAL;  Surgeon: Rojas Bolanos MD;  Location: [facility]    Injection, w/wo contrast, dx/therapeutic substance, epidural/subarachnoid; lumbar/sacral N/A 3/2/2016    Procedure: LUMBAR EPIDURAL;  Surgeon: Corey Mcduffie MD;  Location: [facility]    M-sedaj by  phys perfrmg svc 5+ yr N/A 12/10/2015    Procedure: LUMBAR EPIDURAL;  Surgeon: Rojas Bolanos MD;  Location: [facility]    M-sedaj by  phys perfrmg svc 5+ yr N/A 1/25/2016    Procedure: LUMBAR EPIDURAL;  Surgeon: Rojas Bolanos MD;  Location: [facility]    M-sedaj by  phys perfrmg svc 5+ yr N/A 3/2/2016    Procedure: LUMBAR EPIDURAL;  Surgeon: Corey Mcduffie MD;  Location: [facility]    Other surgical history  10/4/12    cysto-Dr. Calderón    Other surgical history N/A 5/16/2016    Procedure: LUMBAR LAMINECTOMY FUSION W/ BONE GRAFT 3 LEVEL;  Surgeon: CARLY Garcia MD;  Location: [facility] OR    Patient documented not to have experienced any of the following events N/A 12/10/2015    Procedure: LUMBAR EPIDURAL;  Surgeon: Rojas Bolanos MD;  Location: [facility]    Patient documented not to have experienced any of the following events N/A 1/25/2016    Procedure: LUMBAR EPIDURAL;  Surgeon: Rojas Bolanos MD;  Location: [facility]    Patient documented not to have experienced any of the following events N/A 3/2/2016    Procedure: LUMBAR EPIDURAL;  Surgeon: Corey Mcduffie MD;  Location: [facility]    Patient withough preoperative order for iv antibiotic surgical site infection prophylaxis. N/A 12/10/2015    Procedure: LUMBAR EPIDURAL;  Surgeon: Rojas Bolanos MD;  Location: [facility]    Patient withough preoperative order for iv antibiotic surgical site infection prophylaxis.  N/A 1/25/2016    Procedure: LUMBAR EPIDURAL;  Surgeon: Rojas Bolanos MD;  Location: [facility]    Patient withough preoperative order for iv antibiotic surgical site infection prophylaxis. N/A 3/2/2016    Procedure: LUMBAR EPIDURAL;  Surgeon: Corey Mcduffie MD;  Location: [facility]    Replace aortic valve open  2012    Valve repair       Family History   Problem Relation Age of Onset    Heart Attack Father     Heart Attack Brother     Diabetes Brother     Diabetes Brother      Social History     Socioeconomic History    Marital status:    Tobacco Use    Smoking status: Never    Smokeless tobacco: Never   Vaping Use    Vaping status: Never Used   Substance and Sexual Activity    Alcohol use: No     Alcohol/week: 0.0 standard drinks of alcohol    Drug use: No     Social Determinants of Health     Food Insecurity: No Food Insecurity (6/25/2024)    Food Insecurity     Food Insecurity: Never true   Transportation Needs: No Transportation Needs (6/25/2024)    Transportation Needs     Lack of Transportation: No   Social Connections: Low Risk  (9/7/2021)    Received from [facility]    Social Connections     Do you have someone you could call for help if needed?: Yes   Housing Stability: Low Risk  (6/25/2024)    Housing Stability     Housing Instability: No       ALLERGIES:  Allergies   Allergen Reactions    Augmentin [Amoxicillin-Pot Clavulanate] RASH    Lisinopril Coughing     Dyspnea         CODE STATUS:  Full Code    CURRENT MEDICATIONS   Current Outpatient Medications   Medication Sig Dispense Refill    HYDROcodone-acetaminophen 5-325 MG Oral Tab Take 1 tablet by mouth every 6 (six) hours as needed for Pain. 30 tablet 0    apixaban 2.5 MG Oral Tab Take 1 tablet (2.5 mg total) by mouth 2 (two) times daily. 60 tablet 0    sulfamethoxazole-trimethoprim -160 MG Oral Tab per tablet Take 1 tablet by mouth daily. Schedule in PM after HD. 34 tablet 0     cefepime HCl (MAXIPIME) 1 GM/50ML SOLN Inject 100 mL (2 g total) into the vein 3 (three) times a week. To be given with hemodialysis 3 times a week.  Weekly CBC with differential and CMP, sed rate and CRP.  Fax results to [facility]. Fax: [phone]. Tel: [phone].  PICC line care as per protocol. 1500 mL 0    metRONIDAZOLE 500 MG Oral Tab Take 1 tablet (500 mg total) by mouth in the morning and 1 tablet (500 mg total) before bedtime. 74 tablet 0    dorzolamide 2 % Ophthalmic Solution Place 1 drop into both eyes in the morning and 1 drop before bedtime.      ketoconazole 2 % External Shampoo Apply 1 Application topically daily as needed for Itching.      Netarsudil-Latanoprost (ROCKLATAN) 0.02-0.005 % Ophthalmic Solution Apply 1 drop to eye daily. Both eyes      atorvastatin 80 MG Oral Tab Take 1 tablet (80 mg total) by mouth nightly. 90 tablet 2    CLOPIDOGREL 75 MG Oral Tab TAKE 1 TABLET(75 MG) BY MOUTH DAILY 90 tablet 0    Continuous Blood Gluc Sensor (DEXCOM G6 SENSOR) Does not apply Misc 1 Device Every 10 days. 3 each 1    Insulin Lispro (HUMALOG) 100 UNIT/ML Subcutaneous Solution 95 units per day via insulin pump. 90 mL 1    carvedilol 12.5 MG Oral Tab Take 1 tablet (12.5 mg total) by mouth 2 (two) times daily with meals. Take one tablet (12.5mg total) by mouth two times a day 180 tablet 3    Ergocalciferol (VITAMIN D2 OR) Take 50,000 Units by mouth every 30 (thirty) days.      Continuous Blood Gluc Transmit (DEXCOM G6 TRANSMITTER) Does not apply Misc Change every 90 days 1 each 3    CALCIUM ACETATE 667 MG Oral Cap TAKE 1 CAPSULE BY MOUTH THREE TIMES DAILY WITH MEALS 270 capsule 0    COMBIGAN 0.2-0.5 % Ophthalmic Solution Place 1 drop into both eyes 2 (two) times daily.      PTA Insulin via Pump Inject into the skin continuous. humalog insulin   [employer]  Basal Rate : 55.6 standard rate 1.90  I:C - 1 units/4 carbs  Correction Factor - unknown         REVIEW OF SYSTEMS:  Review of  Systems:   Constitutional: No fevers, chills, fatigue or night sweats.  ENT: No mouth pain, neck pain, running nose, headaches or swollen glands.  Skin: No rashes, pruritus or skin changes,  Respiratory: Denies cough, wheezing or shortness of breath.  CV: Denies chest pain, palpitations, orthopnea, PND or dizziness.  Musculoskeletal: No joint pain, stiffness or swelling.  GI: No nausea, vomiting or diarrhea. No blood in stools.  Neurologic: No seizures, tremors, weakness or numbness    VITALS: Pulse 62/min, blood pressure 120/60, temperature 98.3 respiration 20/min    PHYSICAL EXAM:  GENERAL: well developed, well nourished, in no apparent distress  SKIN: no rashes, no suspicious lesions  Wound; right and left foot in dressing-not examined  HEENT: atraumatic, normocephalic, ears and throat are clear  EYES: PERRLA, EOMI, conjunctiva are clear  NECK: supple, no adenopathy, no bruits  CHEST: no chest tenderness  LUNGS: clear to auscultation  CARDIO: RRR without murmur  GI: good BS's, no masses, HSM or tenderness  : deferred  RECTAL: deferred  MUSCULOSKELETAL: back is not tender, FROM of the back  EXTREMITIES: no cyanosis, clubbing or edema  NEURO: Oriented times three, cranial nerves are intact, motor and sensory are grossly intact      DIAGNOSTICS REVIEWED AT THIS VISIT:  Lab Results   Component Value Date     (H) 07/12/2024    BUN 60 (H) 07/12/2024    BUNCREA 6.0 (L) 03/01/2022    CREATSERUM 6.78 (H) 07/12/2024    ANIONGAP 9 07/12/2024    GFR 18 (L) 12/28/2017    GFRNAA 6 (L) 06/12/2021    GFRAA 7 (L) 06/12/2021    CA 9.5 07/12/2024    OSMOCALC 298 (H) 07/12/2024    ALKPHO 119 (H) 07/09/2024    AST 14 (L) 07/09/2024    ALT 7 (L) 07/09/2024    BILT 0.4 07/09/2024    TP 5.8 (L) 07/09/2024    ALB 1.9 (L) 07/09/2024    GLOBULIN 3.8 07/04/2024    AGRATIO 1.8 01/09/2014     (L) 07/12/2024    K 4.4 07/12/2024     07/12/2024    CO2 25.0 07/12/2024       Lab Results   Component Value Date    WBC 7.5  07/12/2024    RBC 2.86 (L) 07/12/2024    HGB 8.4 (L) 07/12/2024    HCT 26.9 (L) 07/12/2024    .0 07/12/2024    MCV 94.1 07/12/2024    MCH 29.4 07/12/2024    MCHC 31.2 07/12/2024    RDW 16.2 07/12/2024    NEPRELIM 6.03 07/07/2024    NEPERCENT 76.9 07/07/2024    LYPERCENT 9.6 07/07/2024    MOPERCENT 10.5 07/07/2024    EOPERCENT 1.4 07/07/2024    BAPERCENT 0.5 07/07/2024    NE 6.03 07/07/2024    LYMABS 0.75 (L) 07/07/2024    MOABSO 0.82 07/07/2024    EOABSO 0.11 07/07/2024    BAABSO 0.04 07/07/2024     ASSESSMENT AND PLAN:      Diagnoses and all orders for this visit:    Cellulitis of lower extremity, unspecified laterality  S/p debridement and right foot first and second metatarsal as well as left foot partial resection of the second metatarsal.  S/p right foot wound debridement and application of wound VAC and left transmetatarsal amputation  S/p hyperbaric oxygen therapy  Placed on cefepime with dialysis  Oral metronidazole  Wound care on consult--  End stage renal disease on dialysis ([facility])  Nephrology on consult  In-house dialysis as per nephrology 3-5 times a week  Anemia in ESRD (end-stage renal disease) ([facility])  Monitor H&H  Restarted on Eliquis  Atrial fibrillation, chronic ([facility])  Rate controlled  Will continue Coreg 12.5 twice daily  Anticoagulated on Plavix and Xarelto  Chronic diastolic heart failure ([facility])  Stable  Daily weights  Primary hypertension  Under control  Continue Coreg  PAD (peripheral artery disease) ([facility])  Plavix and Xarelto  - PT to work on ambulation, gait, balance, strength, endurance, transfers, safety  - OT to work on fine motor skills, ADLs, hygiene, toileting, transfers, safety  Type 1 diabetes mellitus, uncontrolled  Check sugars before meals and at bedtime  Continue insulin pump    - 24h nursing for medication administration, bowel/bladder care, pain/sleep assessment  - Physician supervision for multiple medical comorbidities, fall risk, DVT risk, infection risk, pain  management  - Psych for adjustment to disability and cognitive deficits  - Social work/: for discharge planning needs and access to community resources as needed     -Hospital medications reviewed reconciled and restarted   Check the labs in the morning, CBC CMP     Time spent at appointment today is 95 minutes including preparing to see patient, reviewing test results, performing medically appropriate examination and evaluation and coordinating care, counseling and educating patient/family, ordering medications and testing, and documenting clinical information in EMR.       Klaus Baig MD   [facility]  [address] [address]    Electronically signed      This dictation was performed with a verbal recognition program (DRAGON) and it was checked for errors. It is possible that there are still dictated errors within this office note. If so, please bring any errors to my attention for an addendum. All efforts were made to ensure that this office note is accurate

## 2024-07-16 ENCOUNTER — APPOINTMENT (OUTPATIENT)
Dept: WOUND CARE | Facility: HOSPITAL | Age: 79
End: 2024-07-16
Attending: NURSE PRACTITIONER
Payer: MEDICARE

## 2024-07-16 ENCOUNTER — INITIAL APN SNF VISIT (OUTPATIENT)
Dept: INTERNAL MEDICINE CLINIC | Age: 79
End: 2024-07-16

## 2024-07-16 VITALS
DIASTOLIC BLOOD PRESSURE: 60 MMHG | HEART RATE: 78 BPM | RESPIRATION RATE: 20 BRPM | TEMPERATURE: 98 F | OXYGEN SATURATION: 99 % | SYSTOLIC BLOOD PRESSURE: 125 MMHG

## 2024-07-16 DIAGNOSIS — K59.00 CONSTIPATION, UNSPECIFIED CONSTIPATION TYPE: ICD-10-CM

## 2024-07-16 DIAGNOSIS — Z96.41 DIABETES MELLITUS TYPE 1, WITH COMPLICATION, ON LONG TERM INSULIN PUMP (HCC): ICD-10-CM

## 2024-07-16 DIAGNOSIS — N18.6 ANEMIA IN ESRD (END-STAGE RENAL DISEASE) (HCC): ICD-10-CM

## 2024-07-16 DIAGNOSIS — N18.6 ESRD ON HEMODIALYSIS (HCC): ICD-10-CM

## 2024-07-16 DIAGNOSIS — E10.8 DIABETES MELLITUS TYPE 1, WITH COMPLICATION, ON LONG TERM INSULIN PUMP (HCC): ICD-10-CM

## 2024-07-16 DIAGNOSIS — I73.9 PAD (PERIPHERAL ARTERY DISEASE) (HCC): ICD-10-CM

## 2024-07-16 DIAGNOSIS — R53.1 WEAKNESS: ICD-10-CM

## 2024-07-16 DIAGNOSIS — T14.8XXA WOUND INFECTION: ICD-10-CM

## 2024-07-16 DIAGNOSIS — L08.9 WOUND INFECTION: ICD-10-CM

## 2024-07-16 DIAGNOSIS — G47.33 OSA (OBSTRUCTIVE SLEEP APNEA): ICD-10-CM

## 2024-07-16 DIAGNOSIS — D63.1 ANEMIA IN ESRD (END-STAGE RENAL DISEASE) (HCC): ICD-10-CM

## 2024-07-16 DIAGNOSIS — I96 GANGRENE (HCC): Primary | ICD-10-CM

## 2024-07-16 DIAGNOSIS — I48.20 ATRIAL FIBRILLATION, CHRONIC (HCC): ICD-10-CM

## 2024-07-16 DIAGNOSIS — Z99.2 ESRD ON HEMODIALYSIS (HCC): ICD-10-CM

## 2024-07-16 DIAGNOSIS — I50.32 CHRONIC DIASTOLIC HEART FAILURE (HCC): ICD-10-CM

## 2024-07-16 DIAGNOSIS — I10 PRIMARY HYPERTENSION: ICD-10-CM

## 2024-07-16 RX ORDER — ACETAMINOPHEN 325 MG/1
650 TABLET ORAL EVERY 6 HOURS PRN
COMMUNITY

## 2024-07-16 RX ORDER — SENNOSIDES 8.6 MG
8.6 TABLET ORAL 2 TIMES DAILY
COMMUNITY

## 2024-07-16 RX ORDER — INSULIN GLARGINE 100 [IU]/ML
10 INJECTION, SOLUTION SUBCUTANEOUS NIGHTLY
COMMUNITY

## 2024-07-16 RX ORDER — POLYETHYLENE GLYCOL 3350 17 G/17G
17 POWDER, FOR SOLUTION ORAL DAILY PRN
COMMUNITY

## 2024-07-16 NOTE — PROGRESS NOTES
Skilled Nursing Facility, Subacute Rehab  [facility]     Darin Bowens Author: DELMI Malave     1945 MRN [id number]   Last Hospital  Admission 24      Last Hospital Discharge 24 PCP Zachery Hall MD     Hospital Discharge Diagnoses:  Cellulitis of lower extremity   PVD  ESRD on HD   CAD s/p stents   PAD s/p b/l LE stents   Chronic CHF with LVH   S/P AVR   Atrial fibrillation   Diabetes   Anemia   Hypertension   Hyperlipidemia   DESI       HPI:  Darin MCINTYRE Gogo  : 1945, Age 78 year old  male patient is admitted for subacute rehab. Patient has a past medical history of CAD status post stents, PAD status post bilateral lower extremity stents, atrial fibrillation on Xarelto, diabetes with insulin pump, ESRD on HD, CHF with LVH status post AVR, history of stroke, hypertension, hyperlipidemia, DESI and recent TMA left foot second digit amputation on  per Dr Meza presented to hospital with poor wound healing.  He had excisional debridement to level of bone along with partial resection of the Right 1st and 2nd MT on . Surgical cultures showed enterobacter cloacae and strenotrophomonas - discharge cultures form admit with enterobacter and acinetobacter- he is on IV Cefepime, Flagyl orally and Bactrim since  - ID to have patient discharge with IV Cefepime with HD, continue PO bactrim and PO Flagyl through 8/15. He had further debridement of foot on . He was to receive HBO until  then discharge to rehab facility. Podiatry suggested they would reconsider application of wound vac on follow up at [facility] - patient to see Dr Donaldson in wound clinic.   Hx of PVD arterial Ultrasound April likely more distal PAD require further amputation per vascular patient is not a candidate for any further revascularization and should have foot amputation if does not heal.  ESRD malfunctioning AVF- vascular consulted s/p tunneled catheter on .  Ongoing anemia suspected from ABLA surgery had PRBC on 7/3- he is back on blood thinners. Admitting to [facility] for nursing care, medication management, and PT/OT/ST eval and tx.    Chief Complaint at visit:   Chief Complaint   Patient presents with    Follow - Up      Patient seen for initial APN visit   Patient was in hospital with infection to lower extremities. He has pain to lower legs and prn medication works for about 3-4 hrs then wears off. - He is on IV Cefepime 3x weekly with HD, PO bactrim and PO flagyl until 8/15- he is to have weekly labs sent to [facility]   In house wound team/Podiatry following and coordinating with his wound MD/Podiatrist from hospital - in house team feels wound will not heal without HBO- he is non WB for now   Patient went out for HBO today? Per hospital records this was to be completed at discharge on 7/12 - he went out for appt but did not go to appt as it was canceled due to cost   He was to receive dialysis today- but due to above it was not able to be completed rescheduled for tomorrow 7/17   He denies cough, wheeze, shortness of breath,   No chest pain or palpitations  No nausea, vomiting, diarrhea + constipation hard difficult to pass   No further questions or concerns at this time     ALLERGIES    He is allergic to augmentin [amoxicillin-pot clavulanate] and lisinopril.      CURRENT MEDS:    Current Outpatient Medications on File Prior to Visit   Medication Sig    Insulin Lispro 100 UNIT/ML Subcutaneous Solution Cartridge Inject 2-7 Units into the skin TID AC&HS.    insulin glargine 100 UNIT/ML Subcutaneous Solution Inject 10 Units into the skin nightly.    Probiotic Product (PROBIOTIC DAILY OR) Take 1 capsule by mouth daily.    HYDROcodone-acetaminophen 5-325 MG Oral Tab Take 1 tablet by mouth every 6 (six) hours as needed for Pain.    apixaban 2.5 MG Oral Tab Take 1 tablet (2.5 mg total) by mouth 2 (two) times daily.    sulfamethoxazole-trimethoprim -160 MG Oral Tab per  tablet Take 1 tablet by mouth daily. Schedule in PM after HD.    cefepime HCl (MAXIPIME) 1 GM/50ML SOLN Inject 100 mL (2 g total) into the vein 3 (three) times a week. To be given with hemodialysis 3 times a week.  Weekly CBC with differential and CMP, sed rate and CRP.  Fax results to [facility]. Fax: [phone]. Tel: [phone].  PICC line care as per protocol.    metRONIDAZOLE 500 MG Oral Tab Take 1 tablet (500 mg total) by mouth in the morning and 1 tablet (500 mg total) before bedtime.    dorzolamide 2 % Ophthalmic Solution Place 1 drop into both eyes in the morning and 1 drop before bedtime.    ketoconazole 2 % External Shampoo Apply 1 Application topically daily as needed for Itching.    Netarsudil-Latanoprost (ROCKLATAN) 0.02-0.005 % Ophthalmic Solution Apply 1 drop to eye daily. Both eyes    atorvastatin 80 MG Oral Tab Take 1 tablet (80 mg total) by mouth nightly.    CLOPIDOGREL 75 MG Oral Tab TAKE 1 TABLET(75 MG) BY MOUTH DAILY    carvedilol 12.5 MG Oral Tab Take 1 tablet (12.5 mg total) by mouth 2 (two) times daily with meals. Take one tablet (12.5mg total) by mouth two times a day    Ergocalciferol (VITAMIN D2 OR) Take 50,000 Units by mouth every 30 (thirty) days.    CALCIUM ACETATE 667 MG Oral Cap TAKE 1 CAPSULE BY MOUTH THREE TIMES DAILY WITH MEALS    COMBIGAN 0.2-0.5 % Ophthalmic Solution Place 1 drop into both eyes 2 (two) times daily.    Continuous Blood Gluc Sensor (DEXCOM G6 SENSOR) Does not apply Misc 1 Device Every 10 days. (Patient not taking: Reported on 7/16/2024)    Insulin Lispro (HUMALOG) 100 UNIT/ML Subcutaneous Solution 95 units per day via insulin pump. (Patient not taking: Reported on 7/16/2024)    Continuous Blood Gluc Transmit (DEXCOM G6 TRANSMITTER) Does not apply Misc Change every 90 days (Patient not taking: Reported on 7/16/2024)    PTA Insulin via Pump Inject into the skin continuous. humalog insulin   [employer]  Basal Rate : 55.6 standard rate 1.90  I:C - 1  units/4 carbs  Correction Factor - unknown (Patient not taking: Reported on 7/16/2024)     No current facility-administered medications on file prior to visit.         HISTORY:    He  has a past medical history of Aortic stenosis (01/05/2012), Back problem, CAD (coronary artery disease) (08/16/2010), Calculus of kidney, Cataract, CKD (chronic kidney disease) stage 4, GFR 15-29 ml/min ([facility]) (03/30/2016), Congestive heart disease ([facility]), Coronary atherosclerosis, DDD (degenerative disc disease), lumbar (12/31/2015), Diabetes mellitus ([facility]) (08/16/2010), Dialysis patient ([facility]), Dyslipidemia (08/16/2010), Heart valve disease, High cholesterol, Hypertriglyceridemia, Hypoglycemic reaction (03/01/2012), Muscle weakness, Nausea and vomiting, unspecified vomiting type (6/25/2024), Onychomycosis (03/01/2012), DESI (obstructive sleep apnea) C-PAP  (09/05/2012), Other and unspecified hyperlipidemia, Overweight(278.02) (01/27/2012), Renal disorder, S/P Coronary Artery Stents 12/2009 (08/16/2010), Sleep apnea, Type 1 diabetes mellitus ([facility]), Unspecified essential hypertension, and Visual impairment.    He  has a past surgical history that includes other surgical history (10/4/12); injection, w/wo contrast, dx/therapeutic substance, epidural/subarachnoid; lumbar/sacral (N/A, 12/10/2015); m-sedaj by  [employer] [facility] 5+ yr (N/A, 12/10/2015); patient withough preoperative order for iv antibiotic surgical site infection prophylaxis. (N/A, 12/10/2015); patient documented not to have experienced any of the following events (N/A, 12/10/2015); injection, w/wo contrast, dx/therapeutic substance, epidural/subarachnoid; lumbar/sacral (N/A, 1/25/2016); m-sedaj by  phys perfrmg sv 5+ yr (N/A, 1/25/2016); patient withough preoperative order for iv antibiotic surgical site infection prophylaxis. (N/A, 1/25/2016); patient documented not to have experienced any of the following events (N/A, 1/25/2016); injection, w/wo contrast, dx/therapeutic  substance, epidural/subarachnoid; lumbar/sacral (N/A, 3/2/2016); m-sedaj by  phys perfrmg svc 5+ yr (N/A, 3/2/2016); patient withough preoperative order for iv antibiotic surgical site infection prophylaxis. (N/A, 3/2/2016); patient documented not to have experienced any of the following events (N/A, 3/2/2016); replace aortic valve open (2012); back surgery (5/16/16); other surgical history (N/A, 5/16/2016); cath transcatheter aortic valve replacement; valve repair; cath percutaneous  transluminal coronary angioplasty; and cabg.        CODE STATUS:  DNR- discussed with patient he wishes not to have compressions or intubation. He states he does not want to be resuscitated. POLST form completed in facility.     ADVANCED CARE PLANNING TEAM:  Pending further course at subacute rehab     SUBJECTIVE:    REVIEW OF SYSTEMS:  GENERAL HEALTH:feels well otherwise  SKIN: denies any unusual skin lesions or rashes  WOUNDS: see wound assessment  + wound infection   EYES:no visual complaints or deficits  HENT: denies nasal congestion, sinus pain or sore throat;  RESPIRATORY: denies shortness of breath, wheezing or cough +DESI Cpap   CARDIOVASCULAR:denies chest pain, no palpitations + HF + HTN +S/P Aortic valve replacement +PAD + A fib   +PVD  GI: denies nausea, vomiting, , diarrhea; + constipation   : + ESRD on HD   MUSCULOSKELETAL:no joint complaints upper or lower extremities  NEURO:no sensory or motor complaint  PSYCHE: no symptoms of depression or anxiety  HEMATOLOGY + anemia   ENDOCRINE: +diabetes       OBJECTIVE:  VITALS:  /60   Pulse 78   Temp 97.8 °F (36.6 °C)   Resp 20   SpO2 99%     PHYSICAL EXAM:  GENERAL HEALTH: well developed, well nourished, in no apparent distress  LINES, TUBES, DRAINS:  Left upper chest port - CDI  SKIN: no rashes, no suspicious lesions  WOUND: see wound assessment   Bilateral lower legs- dressings in place-cdi   EYES: PERRLA, conjunctiva normal; no drainage from eyes  HENT:  normocephalic; normal nose, no nasal drainage, mucous membranes pink, moist  RESPIRATORY:clear to percussion and auscultation, No wheezing/cough/accessory muscle use; on room air  CARDIOVASCULAR: S1, S2 normal, RRR; no S3, no S4;   ABDOMEN: normal active BS+, soft, non-distended; no apparent masses; observed, non-tender, no guarding during physical exam  : ESRD on HD   MUSCULOSKELETAL: no acute synovitis upper or lower extremity.  Weakness R/T recent hospitalization/diagnoses/sequelae; will undergo therapies to rehab and improve strength, endurance and independence w/ ADLs as able  Dressings to bilateral lower extremities   EXTREMITIES/VASCULAR: no cyanosis, clubbing or edema  Bilateral lower extremities- dressing cdi  NEUROLOGIC: intact; no sensorimotor deficit  PSYCHIATRIC: alert and oriented x 3; affect appropriate    DIAGNOSTICS REVIEWED AT THIS VISIT:  Vital signs reviewed in [facility] EMR.  [facility] medical records, notes, lab and imaging results reviewed. And diagnostics available in rehab records/[facility].  Medication reconciliation completed.      7/15/24  Wbc 6.40 hgb 8.8 hematocrit 26.9 plt 215  Alb 2.4 ca 8.9 co2 16 chl 99 crt 8.41 gfr 6   Glucose 136 alk phos 108 potassium 4.3 t protein 5.0 sodium 133 alt 7 ast 160 bun 74 t bili 0.49 mag 2.2     Lab Results   Component Value Date     (H) 07/12/2024    BUN 60 (H) 07/12/2024    BUNCREA 6.0 (L) 03/01/2022    CREATSERUM 6.78 (H) 07/12/2024    ANIONGAP 9 07/12/2024    GFR 18 (L) 12/28/2017    GFRNAA 6 (L) 06/12/2021    GFRAA 7 (L) 06/12/2021    CA 9.5 07/12/2024    OSMOCALC 298 (H) 07/12/2024    ALKPHO 119 (H) 07/09/2024    AST 14 (L) 07/09/2024    ALT 7 (L) 07/09/2024    BILT 0.4 07/09/2024    TP 5.8 (L) 07/09/2024    ALB 1.9 (L) 07/09/2024    GLOBULIN 3.8 07/04/2024    AGRATIO 1.8 01/09/2014     (L) 07/12/2024    K 4.4 07/12/2024     07/12/2024    CO2 25.0 07/12/2024       Lab Results   Component Value Date    WBC 7.5 07/12/2024     RBC 2.86 (L) 07/12/2024    HGB 8.4 (L) 07/12/2024    HCT 26.9 (L) 07/12/2024    .0 07/12/2024    MCV 94.1 07/12/2024    MCH 29.4 07/12/2024    MCHC 31.2 07/12/2024    RDW 16.2 07/12/2024    NEPRELIM 6.03 07/07/2024    NEPERCENT 76.9 07/07/2024    LYPERCENT 9.6 07/07/2024    MOPERCENT 10.5 07/07/2024    EOPERCENT 1.4 07/07/2024    BAPERCENT 0.5 07/07/2024    NE 6.03 07/07/2024    LYMABS 0.75 (L) 07/07/2024    MOABSO 0.82 07/07/2024    EOABSO 0.11 07/07/2024    BAABSO 0.04 07/07/2024       Last COVID-19 Viral test: Not Detected, done on 6/25/2024.        XR CHEST AP PORTABLE  (CPT=71045)  Narrative: PROCEDURE:  XR CHEST AP PORTABLE  (CPT=71045)     TECHNIQUE:  AP chest radiograph was obtained.     COMPARISON:  NISHANT , [facility], XR CHEST AP PORTABLE  (CPT=71045), 7/05/2024, 1:29 PM.     INDICATIONS:  mild vascular congestion     PATIENT STATED HISTORY: (As transcribed by Technologist)  Patient offered no additional history at this time.           FINDINGS:       Left-sided central line with tip near the right atrium is noted.     Median sternotomy wires are stable.  Sequelae of valve replacement is noted.     Mildly prominent cardiac silhouette.     Apically redistribution of pulmonary vasculature.  Interstitial abnormalities the lungs are noted.       Left infrahilar opacity may represent a developing consolidation.  No pneumothorax.                Impression: CONCLUSION:       1. Increased pulmonary vascular congestion with mild interstitial edema.     2. Increased retrocardiac opacity may represent a developing consolidation.        LOCATION:  [facility]                 Dictated by (CST): Carter Alvarado MD on 7/08/2024 at 9:35 AM       Finalized by (CST): Carter Alvarado MD on 7/08/2024 at 9:36 AM              SEE PLAN BELOW  Physical Deconditioning/Impaired mobility and ADLs/At risk for falling  Fall Precautions  PT/OT/ST to evaluate and treat  [facility] team to establish care plan meeting with patient and  POA/family as appropriate  Anticipate DC on or before TBD; SW to assist patient/family w/ DC planning  DC Plan:  TBD      Post op wound infection R TMA 6/13 due to nonhealing ulcer and PVD   Excisional debridement to level of bone along with partial resection of R 1st and 2nd MT 6/27 /L foot 2nd digit amputation on 6/14   Excisional wound debridement to level of bone along with partial resection of second MT 6/27   Followed by specialist in hospital. Received HBO in hospital last day of completion on 7/12.   Vital signs q shift and prn   Labs weekly and prn cbc/cmp/crp/sed - fax results to [facility] [phone]  Follow up with ID Dr Bethel Kaplan ID   Follow up with Dr Nix vascular surgery   Follow up with Dr Meza Podiatry   In house ID follow  In house wound team follow   Dressing changes -Daily dressing changes to left foot and right foot: Remove dressing To both feet surgical sites, apply 1/4 strength Dakin's solution soaked dressings to bilateral surgical sites daily cover with 4x4 gauze, ABD pad, kerlix. Re-apply B heel offloading boots.  Dressing changes- right foot cleanse with normal saline. Soaked 4x4 gauze with betadine and loosely apply to wound and cover with dry dressing  IV Cefepime 2 gm 3x weekly until 8/15 - with weekly labs    Metronidazole 500 mg bid until 8/15   Bactrim DS 1 tab daily in PM after HD   Plavix 75 mg daily   Pain control- acetaminophen prn, Hydrocodone prn,       ESRD on HD   Outside dialysis MWF   Avoid nephrotoxin   Follow up Nephrology     CAD s/p stents/ PAD s/p b/l LE stents/Chronic CHF w/LVH/ S/p AVR/ Atrial fibrillation /Hypertension / HL   In hospital not followed by cards but vascular. Seen Cards prior to surgery BETH 6/16 Aortic valve a bioprostehsis present mild to mod stenosis.  Heart failure controlled by dialysis.   Vital signs q shift and prn   Labs weekly and prn   Daily weights notify if greater than 2 lb increase in 1 day or 5 lbs in 1 week   In house Cardiology  team follow   Apixaban 2.5 mg bid   Atorvastatin 80 mg nightly   Coreg 12.5 mg bid   Plavix 75 mg daily     Diabetes  Prior insulin pump   Accu check ac/hs   Low carb diet   Insulin Lispro sliding scale   Lantus 10 units nightly     Anemia due to ESRD   Monitor labs     DESI   In house Pulm to follow   Cpap nightly     Advanced care planning   Patient indicated he would not want CPR or intubation if he were to stop breathing or his heart were to stop. He signed the POLST form. Encouraged patient to discuss with family.     DVT Prophylaxis   Encourage early mobilization and participation in PT/OT as able  On Apixaban     Pain Management  Offer to pre-medicate 30-60 min prior to therapy  Physiatry evaluation with management appreciated  Acetaminophen 650 mg every 6 hrs prn   Hydrocodone-acetaminophen 5-325 mg 1 tab every 6 hrs prn     Bowel Management Regimen/Constipation   Monitor BM status   Add senna s 1 tab bid   Add miralax 17 gm daily prn      Vitamins/supplements as r/t deficiencies  [facility] RD to follow while in rehab; supplementation/diet as per [facility] RD  May continue home supplements  Calcium acetate   Ergocalciferol   Probiotic daily     LABS  CBC/CMP/CRP/SED weekly while in [facility]    *Follow-Up with PCP within 1-2 weeks following [facility] discharge.   *Follow-Up with specialists as recommended.  Follow up with Dr Nix in 1 week   Follow up with dr Meza Podiatry   Follow up with Dr Hugo ID   Follow up with Dr Flores Nephrology   No future appointments.        *Greater than 65 minutes spent w/ patient and family, reviewing medical records, labs, completing medication reconciliation and entering orders to establish plan of care in [facility].    Rose Reid, APRN  07/16/24

## 2024-07-17 ENCOUNTER — APPOINTMENT (OUTPATIENT)
Dept: WOUND CARE | Facility: HOSPITAL | Age: 79
End: 2024-07-17
Attending: NURSE PRACTITIONER
Payer: MEDICARE

## 2024-07-17 ENCOUNTER — TELEPHONE (OUTPATIENT)
Dept: WOUND CARE | Facility: HOSPITAL | Age: 79
End: 2024-07-17

## 2024-07-17 NOTE — TELEPHONE ENCOUNTER
Patient wife called stating she wants 's Email information to send over photos. Patients wife states she has an appointment tomorrow here at wound clinic- RN checked this information which patient does have an initial visit scheduled. RN informed wife that due to confidentiality we are unable to receive photos through email. Informed wife that since photos are on her daughters phone, informed her to have daughter send photos to wife so wife can bring them to visit tomorrow to show provider. Wife states understanding at this time.

## 2024-07-17 NOTE — PROGRESS NOTES
CHIEF COMPLAINT:     Chief Complaint   Patient presents with    Wound Care     Pt here for initial visit for bilateral foot wounds. He had amputation on 6/30. Arrives with betadine and gauze to bilateral foot wounds      HPI:   Information obtained from Patient, chart and family  7-18-24 INITIAL:  80 YO male with past medical history of CAD s/p stents, PAD s/p bilateral lower extremity stents, atrial fibrillation on Xarelto, diabetes with insulin pump, ESRD on HD, CHF with LVH status post AVR, history of stroke, hypertension, hyperlipidemia, DESI and recent TMA  6/13 per Dr Meza. He presented to hospital on 6-25w poor wound healing. He had further debridement to level of bone along with partial resection of the Right 1st and 2nd MT on 6/27 and had further debridement of foot on 7/5.   Patient was discharged on IV Cefepime with HD, continue PO bactrim and PO Flagyl through 8/15. . He did receive 4 HBO treatmenns while inpatient and then discharge to rehab facility. Podiatry suggested they would reconsider application of wound vac on follow up at [place] wound clinic.   [facility] notes:  \"Hx of PVD arterial Ultrasound April likely more distal PAD require further amputation per vascular patient is not a candidate for any further revascularization and should have foot amputation if does not heal.\"  Niru is dressing wounds with betadine soaked gauze, he presents with son and spouse (who is a retired nurse).  Bedside clinic exam reveals palpable anterior tibials (at the ankles) and posterior tibia, patient also has pulsatile signals at the dp/pt/peronal/and plantar aspect. We discussed micro vs macro circulation. Will get tcom.  Once patient is safe in transfers and short distance ambulation family would like to care for him at home, at that point patient could then come for hbo again.  At this time will utilize santyl. I did remove the staples from the right as they were no longer supporting the tissue and applied  steristrips.  This incision is concerning for further dehiscence.     MEDICATIONS:     Current Outpatient Medications:     Insulin Lispro 100 UNIT/ML Subcutaneous Solution Cartridge, Inject 2-7 Units into the skin TID AC&HS., Disp: , Rfl:     insulin glargine 100 UNIT/ML Subcutaneous Solution, Inject 10 Units into the skin nightly., Disp: , Rfl:     Probiotic Product (PROBIOTIC DAILY OR), Take 1 capsule by mouth daily., Disp: , Rfl:     sennosides 8.6 MG Oral Tab, Take 1 tablet (8.6 mg total) by mouth 2 (two) times daily., Disp: , Rfl:     polyethylene glycol, PEG 3350, 17 g Oral Powd Pack, Take 17 g by mouth daily as needed (constipation)., Disp: , Rfl:     acetaminophen 325 MG Oral Tab, Take 2 tablets (650 mg total) by mouth every 6 (six) hours as needed for Pain., Disp: , Rfl:     HYDROcodone-acetaminophen 5-325 MG Oral Tab, Take 1 tablet by mouth every 6 (six) hours as needed for Pain., Disp: 30 tablet, Rfl: 0    apixaban 2.5 MG Oral Tab, Take 1 tablet (2.5 mg total) by mouth 2 (two) times daily., Disp: 60 tablet, Rfl: 0    sulfamethoxazole-trimethoprim -160 MG Oral Tab per tablet, Take 1 tablet by mouth daily. Schedule in PM after HD., Disp: 34 tablet, Rfl: 0    cefepime HCl (MAXIPIME) 1 GM/50ML SOLN, Inject 100 mL (2 g total) into the vein 3 (three) times a week. To be given with hemodialysis 3 times a week.  Weekly CBC with differential and CMP, sed rate and CRP.  Fax results to [facility]. Fax: [phone]. Tel: [phone].  PICC line care as per protocol., Disp: 1500 mL, Rfl: 0    metRONIDAZOLE 500 MG Oral Tab, Take 1 tablet (500 mg total) by mouth in the morning and 1 tablet (500 mg total) before bedtime., Disp: 74 tablet, Rfl: 0    dorzolamide 2 % Ophthalmic Solution, Place 1 drop into both eyes in the morning and 1 drop before bedtime., Disp: , Rfl:     Netarsudil-Latanoprost (ROCKLATAN) 0.02-0.005 % Ophthalmic Solution, Apply 1 drop to eye daily. Both eyes, Disp: , Rfl:      atorvastatin 80 MG Oral Tab, Take 1 tablet (80 mg total) by mouth nightly., Disp: 90 tablet, Rfl: 2    CLOPIDOGREL 75 MG Oral Tab, TAKE 1 TABLET(75 MG) BY MOUTH DAILY, Disp: 90 tablet, Rfl: 0    Insulin Lispro (HUMALOG) 100 UNIT/ML Subcutaneous Solution, 95 units per day via insulin pump., Disp: 90 mL, Rfl: 1    carvedilol 12.5 MG Oral Tab, Take 1 tablet (12.5 mg total) by mouth 2 (two) times daily with meals. Take one tablet (12.5mg total) by mouth two times a day, Disp: 180 tablet, Rfl: 3    Ergocalciferol (VITAMIN D2 OR), Take 50,000 Units by mouth every 30 (thirty) days., Disp: , Rfl:     CALCIUM ACETATE 667 MG Oral Cap, TAKE 1 CAPSULE BY MOUTH THREE TIMES DAILY WITH MEALS, Disp: 270 capsule, Rfl: 0    COMBIGAN 0.2-0.5 % Ophthalmic Solution, Place 1 drop into both eyes 2 (two) times daily., Disp: , Rfl:     ketoconazole 2 % External Shampoo, Apply 1 Application topically daily as needed for Itching. (Patient not taking: Reported on 7/18/2024), Disp: , Rfl:     Continuous Blood Gluc Sensor (DEXCOM G6 SENSOR) Does not apply Misc, 1 Device Every 10 days. (Patient not taking: Reported on 7/18/2024), Disp: 3 each, Rfl: 1    Continuous Blood Gluc Transmit (DEXCOM G6 TRANSMITTER) Does not apply Misc, Change every 90 days (Patient not taking: Reported on 7/16/2024), Disp: 1 each, Rfl: 3    PTA Insulin via Pump, Inject into the skin continuous. humalog insulin  Medtronic Basal Rate : 55.6 standard rate 1.90 I:C - 1 units/4 carbs Correction Factor - unknown (Patient not taking: Reported on 7/16/2024), Disp: , Rfl:   ALLERGIES:     Allergies   Allergen Reactions    Augmentin [Amoxicillin-Pot Clavulanate] RASH    Lisinopril Coughing     Dyspnea        REVIEW OF SYSTEMS:   This information was obtained from the patient/family and chart.    See HPI for pertinent positives, otherwise 10 pt ROS negative.    HISTORY:     Past Medical History:    Aortic stenosis    Back problem    CAD (coronary artery disease)    Calculus of  kidney    Cataract    CKD (chronic kidney disease) stage 4, GFR 15-29 ml/min (HCC)    Congestive heart disease (HCC)    Coronary atherosclerosis    DDD (degenerative disc disease), lumbar    Diabetes mellitus (HCC)    Dialysis patient ([facility])    fistula upper right arm/MWF    Dyslipidemia    Heart valve disease    High cholesterol    Hypertriglyceridemia    Hypoglycemic reaction    Muscle weakness    cane    Nausea and vomiting, unspecified vomiting type    Onychomycosis    DESI (obstructive sleep apnea) C-PAP     severe AHI 68, O2 sam 84%, CPAP 7    Other and unspecified hyperlipidemia    Overweight(278.02)    Renal disorder    S/P Coronary Artery Stents 12/2009    Sleep apnea    doesn't use CPAP    Type 1 diabetes mellitus (HCC)    Unspecified essential hypertension    Visual impairment    legally blind     Past Surgical History:   Procedure Laterality Date    Amputation foot,transmetatarsal Right     Amputation transmetacarpal Left     Back surgery  05/16/2016    L2-L5 Decomp poss uninstrumented fusion    Cabg      Cath percutaneous  transluminal coronary angioplasty      Cath transcatheter aortic valve replacement      Injection, w/wo contrast, dx/therapeutic substance, epidural/subarachnoid; lumbar/sacral N/A 12/10/2015    Procedure: LUMBAR EPIDURAL;  Surgeon: Rojas Bolanos MD;  Location: [facility]    Injection, w/wo contrast, dx/therapeutic substance, epidural/subarachnoid; lumbar/sacral N/A 01/25/2016    Procedure: LUMBAR EPIDURAL;  Surgeon: Rojas Bolanos MD;  Location: [facility]    Injection, w/wo contrast, dx/therapeutic substance, epidural/subarachnoid; lumbar/sacral N/A 03/02/2016    Procedure: LUMBAR EPIDURAL;  Surgeon: Corey Mcduffie MD;  Location: [facility]    M-sedaj by  phys perfrmg svc 5+ yr N/A 12/10/2015    Procedure: LUMBAR EPIDURAL;  Surgeon: Rojas Bolanos MD;  Location: [facility]    M-sedaj by  phys  [facility] [place] [facility] N/A 01/25/2016    Procedure: LUMBAR EPIDURAL;  Surgeon: Rojas Bolanos MD;  Location: [facility]    M-sedaj by  familia [facility] 5+ yr N/A 03/02/2016    Procedure: LUMBAR EPIDURAL;  Surgeon: Corey Mcduffie MD;  Location: [facility]    Other surgical history  10/04/2012    raleigho-Dr. Calderón    Other surgical history N/A 05/16/2016    Procedure: LUMBAR LAMINECTOMY FUSION W/ BONE GRAFT 3 LEVEL;  Surgeon: CARLY Garcia MD;  Location: [facility] OR    Patient documented not to have experienced any of the following events N/A 12/10/2015    Procedure: LUMBAR EPIDURAL;  Surgeon: Rojas Bolanos MD;  Location: [facility]    Patient documented not to have experienced any of the following events N/A 01/25/2016    Procedure: LUMBAR EPIDURAL;  Surgeon: Rojas Bolanos MD;  Location: [facility]    Patient documented not to have experienced any of the following events N/A 03/02/2016    Procedure: LUMBAR EPIDURAL;  Surgeon: Corey Mcduffie MD;  Location: [facility]    Patient withough preoperative order for iv antibiotic surgical site infection prophylaxis. N/A 12/10/2015    Procedure: LUMBAR EPIDURAL;  Surgeon: oRjas Bolanos MD;  Location: [facility]    Patient withough preoperative order for iv antibiotic surgical site infection prophylaxis. N/A 01/25/2016    Procedure: LUMBAR EPIDURAL;  Surgeon: Rojas Bolanos MD;  Location: [facility]    Patient withough preoperative order for iv antibiotic surgical site infection prophylaxis. N/A 03/02/2016    Procedure: LUMBAR EPIDURAL;  Surgeon: Corey Mcduffie MD;  Location: [facility]    Replace aortic valve open  2012    Valve repair        Social History     Socioeconomic History    Marital status:    Tobacco Use    Smoking status: Never    Smokeless tobacco: Never   Vaping Use    Vaping status: Never  Used   Substance and Sexual Activity    Alcohol use: No     Alcohol/week: 0.0 standard drinks of alcohol    Drug use: No     Social Determinants of Health     Food Insecurity: No Food Insecurity (6/25/2024)    Food Insecurity     Food Insecurity: Never true   Transportation Needs: No Transportation Needs (6/25/2024)    Transportation Needs     Lack of Transportation: No   Social Connections: Low Risk  (9/7/2021)    Received from [facility]    Social Connections     Do you have someone you could call for help if needed?: Yes   Housing Stability: Low Risk  (6/25/2024)    Housing Stability     Housing Instability: No     PHYSICAL EXAM:     Vitals:    07/18/24 1100   BP: 101/48  Comment: glucose 232   Pulse: 69   Resp: 16   Temp: 97.1 °F (36.2 °C)   Weight: 211 lb (95.7 kg)   Height: 72\"      Estimated body mass index is 28.62 kg/m² as calculated from the following:    Height as of this encounter: 72\".    Weight as of this encounter: 211 lb (95.7 kg).   POC Glucose   Date Value Ref Range Status   07/18/2024 232 (H) 70 - 99 mg/dL Final   07/12/2024 150 (H) 70 - 99 mg/dL Final   07/12/2024 146 (H) 70 - 99 mg/dL Final       Vital signs reviewed.Appears stated age, well groomed.    Constitutional:  Bp wnl for patient. Pulse Regular and wnl for patient. Respirations easy and unlabored. Temperature wnl. Elevated bmi. Appearance neat and clean. Appears in no acute distress. Well nourished and well developed.    Lower extremity:  at/pt palpable bilaterally, pulsatile signals per doppler at the dp/pt/peronal and plantar forefoot. Extremities free of varicosities, scant edema. Capillary refill < 3 seconds. Bilateral open TMA.  Skin is dry. no hairgrowth on legs.    Musculoskeletal:  Patient is in wheelchair propelled by others, able to transfer with assist  Integumentary:  refer to wound characteristics and images   Psychiatric:  Judgment and insight intact. Alert and oriented times 3. Patient is quiet,  calm, cooperative, and communicative.   EDEMA:   Calf  Point of Measurement - Left Calf: 36  Point of Measurement - Right Calf: 36  Left Calf from:: Heel  Calf Left cm:: 34.1  Right Calf from:: Heel  Right Calf cm:: 34.8  Ankle  Point of Measurement - Left Ankle: 10  Point of Measurement - Right Ankle: 10  Left Ankle from:: Heel  Left Ankle cm:: 24.5     Right Ankle from:: Heel  Right Ankle cm:: 24.9     DIAGNOSTICS:     Lab Results   Component Value Date    BUN 60 (H) 07/12/2024    CREATSERUM 6.78 (H) 07/12/2024    GFRCKDEPI 8.51 (L) 03/01/2022    ALB 1.9 (L) 07/09/2024    TP 5.8 (L) 07/09/2024    A1C 5.9 (H) 06/14/2024   Albumin 2.4/tp 5.0  7-15-24 ([place])      Lab Results   Component Value Date    ESRML 75 (H) 06/24/2024   ESR    50    7-15-24 ([place])       Lab Results   Component Value Date    CRP 18.50 (H) 06/24/2024    CRP 0.3 04/22/2014   CRP     11.0   7-15-24 ([place])    6-30-24 pathology left foot  Left foot, transmetatarsal amputation:  -Gangrenous necrosis of skin and soft tissue with underlying acute osteomyelitis.  -Bone margin with no significant acute inflammation.  -Viable soft tissue margin with calcific atherosclerosis.    6-27-24 pathology right foot & left  A.  Right foot tissue and bone, excision:  -Bone with acute osteomyelitis.     B.  Left foot second metatarsal bone, excision:  -Bone with acute osteomyelitis.  -Fibroadipose tissue with necrosis and suppurative inflammation.    5-22-24 operative report-dr salas  Findings: Widely patent bilateral common femoral, SFA, profunda, popliteal.  PT and peroneal patent to the foot however significant atresia of the vasculature into the foot with sparse collaterals.  AT reoccluded despite recent endovascular intervention.  No opacification to the toe wounds.  Deep venous arterialization performed in the right anterior tibial artery to vein however the veins in the foot were friable after 3 mm balloon angioplasty and there was no  significant outflow and thus this was embolized.  The patient maintained outflow via the PT and peroneal bilaterally consistent with preoperative baseline.  Patient should proceed with TMA and should this not heal they understand the need for future major amputation.   WOUND ASSESSMENT:     Wound 07/18/24 #1 Foot Left (Active)   Date First Assessed/Time First Assessed: 07/18/24 1344    Wound Number (Wound Clinic Only): #1  Primary Wound Type: Diabetic Ulcer  Location: Foot  Wound Location Orientation: Left      Assessments 7/18/2024  2:00 PM   Wound Image     Drainage Amount Small   Drainage Description Serosanguineous   Treatments Compression   Wound Length (cm) 5.2 cm   Wound Width (cm) 10.4 cm   Wound Surface Area (cm^2) 54.08 cm^2   Wound Depth (cm) 1 cm   Wound Volume (cm^3) 54.08 cm^3   Margins Well-defined edges   Non-staged Wound Description Full thickness   María-wound Assessment Clean;Dry;Blanchable erythema   Wound Granulation Tissue Red;Spongy   Wound Bed Granulation (%) 15 %   Wound Bed Slough (%) 85 %   Wound Odor None   Exposed Structure Bone;Adipose   Shape 10staples to periwound   Tunneling? No   Undermining? No   Sinus Tracts? No   Balbuena Scale Grade 4       No associated orders.       Wound 07/18/24 #2 right Foot Right (Active)   Date First Assessed/Time First Assessed: 07/18/24 1345    Wound Number (Wound Clinic Only): #2 right  Primary Wound Type: Diabetic Ulcer  Location: Foot  Wound Location Orientation: Right      Assessments 7/18/2024  1:57 PM   Wound Image     Drainage Amount Small   Drainage Description Serosanguineous   Treatments Compression   Wound Length (cm) 4.4 cm   Wound Width (cm) 14 cm   Wound Surface Area (cm^2) 61.6 cm^2   Wound Depth (cm) 2.1 cm   Wound Volume (cm^3) 129.36 cm^3   Margins Well-defined edges   Non-staged Wound Description Full thickness   María-wound Assessment Clean;Dry;Blanchable erythema   Wound Granulation Tissue Red;Pink;Spongy   Wound Bed Granulation (%) 30  %   Wound Bed Epithelium (%) 20 %   Wound Bed Slough (%) 50 %   Wound Odor None   Exposed Structure Bone;Adipose   Shape 9 staples noted to hailey wound   Tunneling? No   Undermining? No   Sinus Tracts? No   Balbuena Scale Grade 4       No associated orders.       Wound 07/18/24 #3 Ankle Posterior;Right (Active)   Date First Assessed/Time First Assessed: 07/18/24 1347    Wound Number (Wound Clinic Only): #3  Primary Wound Type: Pressure Injury  Location: Ankle  Wound Location Orientation: Posterior;Right      Assessments 7/18/2024  2:07 PM   Wound Image     Drainage Amount None   Treatments Compression   Wound Length (cm) 4.5 cm   Wound Width (cm) 2.1 cm   Wound Surface Area (cm^2) 9.45 cm^2   Wound Depth (cm) 0.1 cm   Wound Volume (cm^3) 0.945 cm^3   Hailey-wound Assessment Dry;Blanchable erythema   Wound Granulation Tissue Pink;Firm   Wound Bed Granulation (%) 10 %   Wound Bed Epithelium (%) 10 %   Wound Bed Eschar (%) 80 %   Wound Odor None   Shape blister to hailey   Tunneling? No   Undermining? No   Sinus Tracts? No   Pressure Injury Stage Unstageable       No associated orders.       Wound 07/18/24 #4 Right heel wound Heel Right (Active)   Date First Assessed/Time First Assessed: 07/18/24 1428    Wound Number (Wound Clinic Only): #4 Right heel wound  Primary Wound Type: Pressure Injury  Location: Heel  Wound Location Orientation: Right      Assessments 7/18/2024  2:31 PM   Wound Image     Drainage Amount None   Treatments Compression   Wound Length (cm) 0.5 cm   Wound Width (cm) 1 cm   Wound Surface Area (cm^2) 0.5 cm^2   Wound Depth (cm) 0 cm   Wound Volume (cm^3) 0 cm^3   Margins Well-defined edges   Hailey-wound Assessment Dry;Clean   Wound Bed Epithelium (%) 100 %   Wound Odor None   Pressure Injury Stage Deep Tissue       No associated orders.          ASSESSMENT AND PLAN:    1. 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 Non-pressure chronic ulcer of other part of right  foot with necrosis of bone (HCC)    3. Diabetic foot ulcer with osteomyelitis (HCC)    4. Gangrene of right foot (HCC)    5. PAD (peripheral artery disease) (HCC)    6. ESRD (end stage renal disease) (HCC)      I discussed with the patient and family aspects of wound healing includin) blood flow in/out and the difference between arterial and venous and micro vs macro circulation  2) discussed wound bed optimization with necrosis and callus removal, moist wound healing.  3) discussed pressure and friction and the importance of offloading   4) discussed nutrition and bg control  5) discussed the exposed bone and his continued risk for further limb loss    Risks, benefits, and alternatives of current treatment plan discussed in detail.  Questions and concerns addressed. Red flags to RTC or ED reviewed.  Patient (or parent) agrees to plan.      NOTE TO PATIENT: The  [employer] Cures Act makes clinical notes like these available to patients in the interest of transparency. Clinical notes are medical documents used by physicians and care providers to communicate with each other. These documents include medical language and terminology, abbreviations, and treatment information that may sound technical and at times possibly unfamiliar. In addition, at times, the verbiage may appear blunt or direct. These documents are one tool providers use to communicate relevant information and clinical opinions of the care providers in a way that allows common understanding of the clinical context.   I spent   50   minutes with the patient. This time included:    preparing to see the patient (eg, review notes and recent diagnostics),  seeing the patient, obtaining and/or reviewing separately obtained history, performing a medically appropriate examination and/or evaluation, counseling and educating the patient, documenting in the record   DISCHARGE:      Patient Instructions   Please return:1 PM with Jenny next Thursday  2 pm for  [facility]       Patient discharge and wound care instructions  Darin MCINTYRE Gogo  7/18/2024     Bilateral TMA sites:  Change daily  Changing your dressing:              Wash your hands with soap and water.  Ensure that the old dressing is removed completely. Place it in a plastic bag and throw it in the trash.  Cleanse the wound with hypochlorous wound cleanser (ie. Anasept, vashe, pure and clean) .  It's ok to “scrub” your wound with the gauze, small amount of bleeding with cleansing is normal and ok.  Apply the following dressings:    santyl colleganse (nickel thick to wound bed)>cover with saline moist gauze, abd pad     Right heel and Right achilles: twice daily  Changing your dressing:              Wash your hands with soap and water.  Ensure that the old dressing is removed completely. Place it in a plastic bag and throw it in the trash.  PAINT WITH BETADINE>cover with abd pad and gauze     Weight bearing as tolerated    Offloading  Provide patient with bilateral heel lift boots (ie prevalon), these should be on whenever patient is in bed    Offloading Lower Extremities  Off-loading means no weight-bearing or anything touching the area of the wound. If your doctor suggests that you should \"off-load\", he or she means that you are not to walk or bear weight on the extremity that has a wound, or the extremity where a wound appears likely to develop.  WHY DO I HAVE TO OFF-LOAD?  When you have a non-healing wound, you must do everything possible to give your body a chance to heal the wound. The weight of your body when you walk puts a large amount of pressure on your feet and ankles. This pressure keeps the new tissue from growing and inhibits new blood vessels from forming. If you continue to bear weight on a body part that has a wound, the time it takes to heal the wound increases, or, worse yet, the wound may not heal at all!  HOW DO I OFF-LOAD?  There are a number of ways to off-load your legs and feet.   The  easiest way is to stay in bed, but of course, that isn't always possible.   You may also be prescribed a special shoe/boot/cast to allow you to be up on your feet periodically, but keep pressure off the wound when you are.  Finally if you are in bed, recliner, couch: your foot should not be touching anything except air.  Many times you can position a pillow from your knees to just above your ankles (along your calves) to keep your ankles, heels from resting on anything.     Offloading:  Off-loading is an important part of your wound treatment program. It is a part that only you can assure is accomplished. If you have any concerns about methods to off-load your wound, or feel that you might have trouble following the off-loading plan prescribed for you, talk to your doctor so that alternatives can be discussed that will work in your situation.    Patient should be up in chair with EHOB waffle cushion     Nutrition and blood sugar control:  Focus on the following:  Protein: Meats, beans, eggs, milk and yogurt particularly Greek yogurt), tofu, soy nuts, soy protein products (Follow the protein handout in your welcome folder)  Vitamin C: Citrus fruits and juices, strawberries, tomatoes, tomato juice, peppers, baked potatoes, spinach, broccoli, cauliflower, [place] sprouts, cabbage  Vitamin A: Dark green, leafy vegetables, orange or yellow vegetables, cantaloupe, fortified dairy products, liver, fortified cereals  Zinc: Fortified cereals, red meats, seafood  PLEASE PROVIDE PATIENT 2 PACKETS OF Alex by [employer]. It can be purchased on amazon, [employer] website, or local pharmacy may be able to order it for you.  (These are essential branch chain amino acids that help with tissue building and wound healing).   When your blood sugar is consistently elevated greater than 180 your body can't heal or fight infection.     Concerns:  Signs of infection may include the following:  Increase in redness  Red \"streaks\" from wound   Increase in swelling  Fever  Unusual odor  Change in the amount of wound drainage     Should you experience any significant changes in your wound(s) or have any questions regarding your home care instructions please contact the [facility] @ [phone] If after regular business hours, please call your family doctor or local emergency room. The treatment plan has been discussed at length between you and your provider. Follow all instructions carefully, it is very important. If you do not follow all instructions you are at risk of your wound not healing, infection, possible loss of limb and even loss of life.    Jenny Olmos FNP-C, CWCN-AP, CFCN, CSWS, WCC, DWC  7/18/2024

## 2024-07-18 ENCOUNTER — APPOINTMENT (OUTPATIENT)
Dept: WOUND CARE | Facility: HOSPITAL | Age: 79
End: 2024-07-18
Attending: NURSE PRACTITIONER
Payer: MEDICARE

## 2024-07-18 VITALS
SYSTOLIC BLOOD PRESSURE: 101 MMHG | DIASTOLIC BLOOD PRESSURE: 48 MMHG | BODY MASS INDEX: 28.58 KG/M2 | HEART RATE: 69 BPM | RESPIRATION RATE: 16 BRPM | TEMPERATURE: 97 F | HEIGHT: 72 IN | WEIGHT: 211 LBS

## 2024-07-18 DIAGNOSIS — E11.621 DIABETIC FOOT ULCER WITH OSTEOMYELITIS (HCC): Primary | ICD-10-CM

## 2024-07-18 DIAGNOSIS — L97.509 DIABETIC FOOT ULCER WITH OSTEOMYELITIS (HCC): Primary | ICD-10-CM

## 2024-07-18 DIAGNOSIS — N18.6 ESRD (END STAGE RENAL DISEASE) (HCC): ICD-10-CM

## 2024-07-18 DIAGNOSIS — I96 GANGRENE OF RIGHT FOOT (HCC): ICD-10-CM

## 2024-07-18 DIAGNOSIS — E11.69 DIABETIC FOOT ULCER WITH OSTEOMYELITIS (HCC): Primary | ICD-10-CM

## 2024-07-18 DIAGNOSIS — I73.9 PAD (PERIPHERAL ARTERY DISEASE) (HCC): ICD-10-CM

## 2024-07-18 DIAGNOSIS — L97.514 NON-PRESSURE CHRONIC ULCER OF OTHER PART OF RIGHT FOOT WITH NECROSIS OF BONE (HCC): ICD-10-CM

## 2024-07-18 DIAGNOSIS — M86.9 DIABETIC FOOT ULCER WITH OSTEOMYELITIS (HCC): Primary | ICD-10-CM

## 2024-07-18 DIAGNOSIS — L97.524 NON-PRESSURE CHRONIC ULCER OF OTHER PART OF LEFT FOOT WITH NECROSIS OF BONE (HCC): ICD-10-CM

## 2024-07-18 LAB — GLUCOSE BLD-MCNC: 232 MG/DL (ref 70–99)

## 2024-07-18 PROCEDURE — 99215 OFFICE O/P EST HI 40 MIN: CPT | Performed by: NURSE PRACTITIONER

## 2024-07-18 NOTE — PATIENT INSTRUCTIONS
Please return:1 PM with Jenny next Thursday  2 pm for TCOM       Patient discharge and wound care instructions  Darin MCINTYRE Gogo  7/18/2024     Bilateral TMA sites:  Change daily  Changing your dressing:              Wash your hands with soap and water.  Ensure that the old dressing is removed completely. Place it in a plastic bag and throw it in the trash.  Cleanse the wound with hypochlorous wound cleanser (ie. Anasept, vashe, pure and clean) .  It's ok to “scrub” your wound with the gauze, small amount of bleeding with cleansing is normal and ok.  Apply the following dressings:    santyl colleganse (nickel thick to wound bed)>cover with saline moist gauze, abd pad     Right heel and Right achilles: twice daily  Changing your dressing:              Wash your hands with soap and water.  Ensure that the old dressing is removed completely. Place it in a plastic bag and throw it in the trash.  PAINT WITH BETADINE>cover with abd pad and gauze     Weight bearing as tolerated    Offloading  Provide patient with bilateral heel lift boots (ie prevalon), these should be on whenever patient is in bed    Offloading Lower Extremities  Off-loading means no weight-bearing or anything touching the area of the wound. If your doctor suggests that you should \"off-load\", he or she means that you are not to walk or bear weight on the extremity that has a wound, or the extremity where a wound appears likely to develop.  WHY DO I HAVE TO OFF-LOAD?  When you have a non-healing wound, you must do everything possible to give your body a chance to heal the wound. The weight of your body when you walk puts a large amount of pressure on your feet and ankles. This pressure keeps the new tissue from growing and inhibits new blood vessels from forming. If you continue to bear weight on a body part that has a wound, the time it takes to heal the wound increases, or, worse yet, the wound may not heal at all!  HOW DO I OFF-LOAD?  There are a  number of ways to off-load your legs and feet.   The easiest way is to stay in bed, but of course, that isn't always possible.   You may also be prescribed a special shoe/boot/cast to allow you to be up on your feet periodically, but keep pressure off the wound when you are.  Finally if you are in bed, recliner, couch: your foot should not be touching anything except air.  Many times you can position a pillow from your knees to just above your ankles (along your calves) to keep your ankles, heels from resting on anything.     Offloading:  Off-loading is an important part of your wound treatment program. It is a part that only you can assure is accomplished. If you have any concerns about methods to off-load your wound, or feel that you might have trouble following the off-loading plan prescribed for you, talk to your doctor so that alternatives can be discussed that will work in your situation.    Patient should be up in chair with EHOB waffle cushion     Nutrition and blood sugar control:  Focus on the following:  Protein: Meats, beans, eggs, milk and yogurt particularly Greek yogurt), tofu, soy nuts, soy protein products (Follow the protein handout in your welcome folder)  Vitamin C: Citrus fruits and juices, strawberries, tomatoes, tomato juice, peppers, baked potatoes, spinach, broccoli, cauliflower, [place] sprouts, cabbage  Vitamin A: Dark green, leafy vegetables, orange or yellow vegetables, cantaloupe, fortified dairy products, liver, fortified cereals  Zinc: Fortified cereals, red meats, seafood  PLEASE PROVIDE PATIENT 2 PACKETS OF Alex [employer]. It can be purchased on amazon, [employer] website, or local pharmacy may be able to order it for you.  (These are essential branch chain amino acids that help with tissue building and wound healing).   When your blood sugar is consistently elevated greater than 180 your body can't heal or fight infection.     Concerns:  Signs of infection may include the  following:  Increase in redness  Red \"streaks\" from wound  Increase in swelling  Fever  Unusual odor  Change in the amount of wound drainage     Should you experience any significant changes in your wound(s) or have any questions regarding your home care instructions please contact the [facility] @ [phone] If after regular business hours, please call your family doctor or local emergency room. The treatment plan has been discussed at length between you and your provider. Follow all instructions carefully, it is very important. If you do not follow all instructions you are at risk of your wound not healing, infection, possible loss of limb and even loss of life.

## 2024-07-18 NOTE — PROGRESS NOTES
.Weekly Wound Education Note    Teaching Provided To: Patient;Family  Training Topics: Diabetes;Discharge instructions;Dressing;Off-loading;Test/procedures;Nutrition;Cleasing and general instructions  Training Method: Explain/Verbal;Written  Training Response: Reinforcement needed;Patient responds and understands            Betadine to right posterior ankle and heel daily, cover with gauze or abd pad.  Santyl ointment to right and left foot wounds(honey gel used in clinic), cover with adaptic, abd pad, wrap with kerlix daily.  Spanda  size E to BLE.  Offloading boots while in bed.  EHOB cushion while in wheelchair.  Low air loss mattress.  WBAT with surgical shoes.  Continue drinking protein supplement.

## 2024-07-19 ENCOUNTER — APPOINTMENT (OUTPATIENT)
Dept: WOUND CARE | Facility: HOSPITAL | Age: 79
End: 2024-07-19
Attending: NURSE PRACTITIONER
Payer: MEDICARE

## 2024-07-19 NOTE — CDS QUERY
Darin Bowens  1945  DOS 24- 24   [id number]  [facility] [phone]                                                                          Dear Doctor Derrick,   Please clarify the information below regarding the debridement performed on 2024 and     #1. 2024 Debridement Operative note   Procedure : Excisional wound debridement to level of bone, 24 cm², right foot                      Excisional wound debridement to level of bone, 10 cm², left foot  Depth to which was debridement was performed on right and left foot down to and including:  (  ) Bone  (  ) Other Tissue (please specify): _________________________________      #2. 2024 Debridement Operative note   Procedure : Excisional wound debridement to level of bone, 30 cm², right foot     Depth to which was debridement was performed on right foot down to and including:   (  ) Bone     (  ) Other Tissue (please specify): _________________________________    The type of instrument used :   (  ) Scissors   (  ) Scalpel                (  ) Curette                 (  ) Other        The appearance of the wound after debridement               (  ) Down to fresh bleeding tissue               (  ) Viable tissue              (  ) pink healthy tissue               (  ) Other _______________________________    24 OP note   Attention was directed to the RIGHT foot. A, circumferential incision was around all nonviable necrotic soft tissue and deepened directly down to bone. At this time it was noted that there was some purulence and necrosis coming along the lateral aspect of the transmetatarsal amputation site. Hence, dissection was carried down in this location and all nonviable necrotic soft tissue was sharply excised to level of bone. Deep spaces were explored to make sure there was no further purulence. A total of 30 cm²  of excisional wound debridement to level of bone was done on the right foot.   Next, soft tissues  were freed off the distal aspects of the third and fourth metatarsals that were prominent. A sagittal saw was then utilized and distal third and fourth metatarsals were resected to healthy bleeding bone.        Please add any additional documentation to your progress note and continue to document this through discharge.  If you have any questions, please contact Clinical : Carmina Morales RN /AJN @ [phone] or email [email or web address]     Thank You!                                                         THIS FORM IS A PERMANENT PART OF THE MEDICAL RECORD

## 2024-07-23 ENCOUNTER — SNF VISIT (OUTPATIENT)
Dept: INTERNAL MEDICINE CLINIC | Age: 79
End: 2024-07-23

## 2024-07-23 VITALS
SYSTOLIC BLOOD PRESSURE: 127 MMHG | DIASTOLIC BLOOD PRESSURE: 67 MMHG | RESPIRATION RATE: 18 BRPM | OXYGEN SATURATION: 98 % | HEART RATE: 77 BPM | TEMPERATURE: 98 F

## 2024-07-23 DIAGNOSIS — I48.20 ATRIAL FIBRILLATION, CHRONIC (HCC): ICD-10-CM

## 2024-07-23 DIAGNOSIS — K59.00 CONSTIPATION, UNSPECIFIED CONSTIPATION TYPE: ICD-10-CM

## 2024-07-23 DIAGNOSIS — L08.9 WOUND INFECTION: ICD-10-CM

## 2024-07-23 DIAGNOSIS — I50.32 CHRONIC DIASTOLIC HEART FAILURE (HCC): ICD-10-CM

## 2024-07-23 DIAGNOSIS — Z99.2 ESRD ON HEMODIALYSIS (HCC): ICD-10-CM

## 2024-07-23 DIAGNOSIS — T14.8XXA WOUND INFECTION: ICD-10-CM

## 2024-07-23 DIAGNOSIS — D63.1 ANEMIA IN ESRD (END-STAGE RENAL DISEASE) (HCC): ICD-10-CM

## 2024-07-23 DIAGNOSIS — N18.6 ANEMIA IN ESRD (END-STAGE RENAL DISEASE) (HCC): ICD-10-CM

## 2024-07-23 DIAGNOSIS — I73.9 PAD (PERIPHERAL ARTERY DISEASE) (HCC): ICD-10-CM

## 2024-07-23 DIAGNOSIS — R53.1 WEAKNESS: ICD-10-CM

## 2024-07-23 DIAGNOSIS — I10 PRIMARY HYPERTENSION: ICD-10-CM

## 2024-07-23 DIAGNOSIS — N18.6 ESRD ON HEMODIALYSIS (HCC): ICD-10-CM

## 2024-07-23 DIAGNOSIS — G47.33 OSA (OBSTRUCTIVE SLEEP APNEA): ICD-10-CM

## 2024-07-23 DIAGNOSIS — I96 GANGRENE (HCC): Primary | ICD-10-CM

## 2024-07-23 DIAGNOSIS — E10.8 DIABETES MELLITUS TYPE 1, WITH COMPLICATION, ON LONG TERM INSULIN PUMP (HCC): ICD-10-CM

## 2024-07-23 DIAGNOSIS — Z96.41 DIABETES MELLITUS TYPE 1, WITH COMPLICATION, ON LONG TERM INSULIN PUMP (HCC): ICD-10-CM

## 2024-07-23 NOTE — PROGRESS NOTES
Darin Bowens, 1945, 79 year old, male    [facility] hospital admission -24     Hospital Discharge Diagnoses:  Cellulitis of lower extremity   PVD  ESRD on HD   CAD s/p stents   PAD s/p b/l LE stents   Chronic CHF with LVH   S/P AVR   Atrial fibrillation   Diabetes   Anemia   Hypertension   Hyperlipidemia   DESI         HPI:  Darin Bowens  : 1945, Age 78 year old  male patient is admitted for subacute rehab. Patient has a past medical history of CAD status post stents, PAD status post bilateral lower extremity stents, atrial fibrillation on Xarelto, diabetes with insulin pump, ESRD on HD, CHF with LVH status post AVR, history of stroke, hypertension, hyperlipidemia, DESI and recent TMA left foot second digit amputation on  per Dr Meza presented to hospital with poor wound healing.  He had excisional debridement to level of bone along with partial resection of the Right 1st and 2nd MT on . Surgical cultures showed enterobacter cloacae and strenotrophomonas - discharge cultures form admit with enterobacter and acinetobacter- he is on IV Cefepime, Flagyl orally and Bactrim since  - ID to have patient discharge with IV Cefepime with HD, continue PO bactrim and PO Flagyl through 8/15. He had further debridement of foot on . He was to receive HBO until  then discharge to rehab facility. Podiatry suggested they would reconsider application of wound vac on follow up at [facility] - patient to see Dr Donaldson in wound clinic.   Hx of PVD arterial Ultrasound April likely more distal PAD require further amputation per vascular patient is not a candidate for any further revascularization and should have foot amputation if does not heal.  ESRD malfunctioning AVF- vascular consulted s/p tunneled catheter on . Ongoing anemia suspected from ABLA surgery had PRBC on 7/3- he is back on blood thinners. Admitting to [facility] for nursing care, medication management, and  PT/OT/ST eval and tx.    Chief Complaint:    Chief Complaint   Patient presents with    Follow - Up        Subjective:   TODAY:  Patient was seen today after he just got back from dialysis   States pain to his lower extremities is improving.   He is on IV antibiotics 3x weekly with HD, along with PO flagyl and bactrim until 8/15 - weekly labs to be sent to [facility]   In house wound team/podiatry following- he had follow up with wound clinic last week and increased his weight bearing to as tolerated   No shortness of breath, cough or wheeze  No chest pain or palpitations  No nausea, vomiting, constipation states he is moving bowels good   No other concerns at this time     Objective:  /67   Pulse 77   Temp 97.6 °F (36.4 °C)   Resp 18   SpO2 98%     PHYSICAL EXAM:  GENERAL HEALTH: 79 year old male patient, no acute distress   LINES, TUBES, DRAINS:  Left upper chest port - CDI  SKIN: no rashes, no suspicious lesions  WOUND: see wound assessment   Bilateral lower legs- dressings in place-cdi   EYES: PERRLA, conjunctiva normal; no drainage from eyes  HENT: normocephalic; normal nose, no nasal drainage, mucous membranes pink, moist  RESPIRATORY:clear to percussion and auscultation, No wheezing/cough/accessory muscle use; on room air  CARDIOVASCULAR: S1, S2 normal, RRR; no S3, no S4;   ABDOMEN: normal active BS+, soft, non-distended; no apparent masses; observed, non-tender, no guarding during physical exam  : ESRD on HD   MUSCULOSKELETAL: no acute synovitis upper or lower extremity.  Weakness R/T recent hospitalization/diagnoses/sequelae; will undergo therapies to rehab and improve strength, endurance and independence w/ ADLs as able  Dressings to bilateral lower extremities   EXTREMITIES/VASCULAR: no cyanosis, clubbing or edema  Bilateral lower extremities- dressing cdi  NEUROLOGIC: intact; no sensorimotor deficit  PSYCHIATRIC: alert and oriented x 3; affect appropriate      Medications reviewed:  Yes      Current Outpatient Medications:     Insulin Lispro 100 UNIT/ML Subcutaneous Solution Cartridge, Inject 2-7 Units into the skin TID AC&HS., Disp: , Rfl:     insulin glargine 100 UNIT/ML Subcutaneous Solution, Inject 10 Units into the skin nightly., Disp: , Rfl:     Probiotic Product (PROBIOTIC DAILY OR), Take 1 capsule by mouth daily., Disp: , Rfl:     sennosides 8.6 MG Oral Tab, Take 1 tablet (8.6 mg total) by mouth 2 (two) times daily., Disp: , Rfl:     polyethylene glycol, PEG 3350, 17 g Oral Powd Pack, Take 17 g by mouth daily as needed (constipation)., Disp: , Rfl:     acetaminophen 325 MG Oral Tab, Take 2 tablets (650 mg total) by mouth every 6 (six) hours as needed for Pain., Disp: , Rfl:     HYDROcodone-acetaminophen 5-325 MG Oral Tab, Take 1 tablet by mouth every 6 (six) hours as needed for Pain., Disp: 30 tablet, Rfl: 0    apixaban 2.5 MG Oral Tab, Take 1 tablet (2.5 mg total) by mouth 2 (two) times daily., Disp: 60 tablet, Rfl: 0    sulfamethoxazole-trimethoprim -160 MG Oral Tab per tablet, Take 1 tablet by mouth daily. Schedule in PM after HD., Disp: 34 tablet, Rfl: 0    cefepime HCl (MAXIPIME) 1 GM/50ML SOLN, Inject 100 mL (2 g total) into the vein 3 (three) times a week. To be given with hemodialysis 3 times a week.  Weekly CBC with differential and CMP, sed rate and CRP.  Fax results to [facility]. Fax: [phone]. Tel: [phone].  PICC line care as per protocol., Disp: 1500 mL, Rfl: 0    metRONIDAZOLE 500 MG Oral Tab, Take 1 tablet (500 mg total) by mouth in the morning and 1 tablet (500 mg total) before bedtime., Disp: 74 tablet, Rfl: 0    dorzolamide 2 % Ophthalmic Solution, Place 1 drop into both eyes in the morning and 1 drop before bedtime., Disp: , Rfl:     Netarsudil-Latanoprost (ROCKLATAN) 0.02-0.005 % Ophthalmic Solution, Apply 1 drop to eye daily. Both eyes, Disp: , Rfl:     atorvastatin 80 MG Oral Tab, Take 1 tablet (80 mg total) by mouth nightly., Disp: 90  tablet, Rfl: 2    CLOPIDOGREL 75 MG Oral Tab, TAKE 1 TABLET(75 MG) BY MOUTH DAILY, Disp: 90 tablet, Rfl: 0    carvedilol 12.5 MG Oral Tab, Take 1 tablet (12.5 mg total) by mouth 2 (two) times daily with meals. Take one tablet (12.5mg total) by mouth two times a day, Disp: 180 tablet, Rfl: 3    Ergocalciferol (VITAMIN D2 OR), Take 50,000 Units by mouth every 30 (thirty) days., Disp: , Rfl:     CALCIUM ACETATE 667 MG Oral Cap, TAKE 1 CAPSULE BY MOUTH THREE TIMES DAILY WITH MEALS, Disp: 270 capsule, Rfl: 0    COMBIGAN 0.2-0.5 % Ophthalmic Solution, Place 1 drop into both eyes 2 (two) times daily., Disp: , Rfl:     ketoconazole 2 % External Shampoo, Apply 1 Application topically daily as needed for Itching. (Patient not taking: Reported on 7/18/2024), Disp: , Rfl:     Continuous Blood Gluc Sensor (DEXCOM G6 SENSOR) Does not apply Misc, 1 Device Every 10 days. (Patient not taking: Reported on 7/18/2024), Disp: 3 each, Rfl: 1    Insulin Lispro (HUMALOG) 100 UNIT/ML Subcutaneous Solution, 95 units per day via insulin pump. (Patient not taking: Reported on 7/23/2024), Disp: 90 mL, Rfl: 1    Continuous Blood Gluc Transmit (DEXCOM G6 TRANSMITTER) Does not apply Misc, Change every 90 days (Patient not taking: Reported on 7/16/2024), Disp: 1 each, Rfl: 3    PTA Insulin via Pump, Inject into the skin continuous. humalog insulin  Medtronic Basal Rate : 55.6 standard rate 1.90 I:C - 1 units/4 carbs Correction Factor - unknown (Patient not taking: Reported on 7/16/2024), Disp: , Rfl:       Diagnostics reviewed:    7/22/24   Wbc 6.80 hgb 10.20 hematocrit 31.1 plt 181   Alb 2.8 ca 9.3 co2 23 chl 90 crt 5.49 gfr 10   Glucose 123 alk phos 99 potassium 3.8 t prot 5.9 sodium 130 alt 8 ast 21 bun 52 t bili 0.45 uric acid 3.3 mag 1.93   Sed 44  Crp 23.71     Assessment and plan:  Physical Deconditioning/Impaired mobility and ADLs/At risk for falling  Fall Precautions  PT/OT/ST to evaluate and treat  [facility] team to establish care plan meeting  with patient and POA/family as appropriate  Anticipate DC on or before TBD; SW to assist patient/family w/ DC planning  DC Plan:  TBD      Post op wound infection R TMA 6/13 due to nonhealing ulcer and PVD   Excisional debridement to level of bone along with partial resection of R 1st and 2nd MT 6/27 /L foot 2nd digit amputation on 6/14   Excisional wound debridement to level of bone along with partial resection of second MT 6/27   Followed by specialist in hospital. Received HBO in hospital last day of completion on 7/12.   Vital signs q shift and prn   Labs weekly and prn cbc/cmp/crp/sed - fax results to [facility] [phone]  Follow up with ID Dr Bethel Kaplan ID   Follow up with Dr Nix vascular surgery   Follow up with Dr Meza Podiatry   In house ID follow  In house wound team follow   WB as tolerated   Dressing changes -Daily dressing changes to left foot and right foot: Remove dressing To both feet surgical sites, apply 1/4 strength Dakin's solution soaked dressings to bilateral surgical sites daily cover with 4x4 gauze, ABD pad, kerlix. Re-apply B heel offloading boots.  Dressing changes- right foot cleanse with normal saline. Soaked 4x4 gauze with betadine and loosely apply to wound and cover with dry dressing  IV Cefepime 2 gm 3x weekly until 8/15 - with weekly labs    Metronidazole 500 mg bid until 8/15   Bactrim DS 1 tab daily in PM after HD   Plavix 75 mg daily   Pain control- acetaminophen prn, Hydrocodone prn,         ESRD on HD   Outside dialysis MWF   Avoid nephrotoxin   Follow up Nephrology      CAD s/p stents/ PAD s/p b/l LE stents/Chronic CHF w/LVH/ S/p AVR/ Atrial fibrillation /Hypertension / HL   In hospital not followed by cards but vascular. Seen Cards prior to surgery BETH 6/16 Aortic valve a bioprostehsis present mild to mod stenosis.  Heart failure controlled by dialysis.   Vital signs q shift and prn   Labs weekly and prn   Daily weights notify if greater than 2 lb increase in 1 day or  5 lbs in 1 week - stable   In house Cardiology team follow   Apixaban 2.5 mg bid   Atorvastatin 80 mg nightly   Coreg 12.5 mg bid   Plavix 75 mg daily      Diabetes  Prior insulin pump   Accu check ac/hs - 130-200s   Low carb diet   Insulin Lispro sliding scale   Lantus 10 units nightly      Anemia due to ESRD   Monitor labs      DESI   In house Pulm to follow   Cpap nightly      Advanced care planning   Patient indicated he would not want CPR or intubation if he were to stop breathing or his heart were to stop. He signed the POLST form. Encouraged patient to discuss with family.      DVT Prophylaxis   Encourage early mobilization and participation in PT/OT as able  On Apixaban      Pain Management  Offer to pre-medicate 30-60 min prior to therapy  Physiatry evaluation with management appreciated  Acetaminophen 650 mg every 6 hrs prn   Hydrocodone-acetaminophen 5-325 mg 1 tab every 6 hrs prn      Bowel Management Regimen/Constipation   Monitor BM status   Senna s 1 tab bid   Miralax 17 gm daily prn      Vitamins/supplements as r/t deficiencies  [facility] RD to follow while in rehab; supplementation/diet as per [facility] RD  May continue home supplements  Calcium acetate   Ergocalciferol   Probiotic daily      LABS  CBC/CMP/CRP/SED weekly while in [facility] 7/29      *Follow-Up with PCP within 1-2 weeks following [facility] discharge.   *Follow-Up with specialists as recommended.  Follow up with Dr Nix in 1 week   Follow up with dr Meza Podiatry   Follow up with Dr Hugo ID   Follow up with Dr Flores Nephrology   No future appointments.     Your Appointments      Thursday July 25, 2024 1:00 PM  Wound Care Follow up with Jenny Olmos APRHETAL  [facility] ([address] [address]  [phone]        Thursday July 25, 2024 2:15 PM  WC TCOM with Jennyderick Olmos APRHETAL  [facility] ([address]  [facility] [address]  [phone]                 This is a 45 minute visit and greater than 50% of the time was spent counseling the patient and/or coordinating care.           Note to patient: The 21st Century Cures Act makes medical notes like these available to patients in the interest of transparency. However, this is a medical document intended as peer to peer communication. It is written in medical language and may contain abbreviations or verbiage that are unfamiliar. It may appear blunt or direct. Medical documents are intended to carry relevant information, facts as evident, and the clinical opinion of the practitioner who signs the document.    Rose Reid, APRN  7/23/2024

## 2024-07-24 NOTE — PROGRESS NOTES
CHIEF COMPLAINT:     Chief Complaint   Patient presents with    Wound Care     Patients is here for a follow up. Patients stated no issues. Patients came in with adaptic and abd on the right foot, Left foot adaptic and abd.      HPI:   Information obtained from Patient, chart and family  7-18-24 INITIAL:  78 YO male with past medical history of CAD s/p stents, PAD s/p bilateral lower extremity stents, atrial fibrillation on Xarelto, diabetes with insulin pump, ESRD on HD, CHF with LVH status post AVR, history of stroke, hypertension, hyperlipidemia, DESI and recent TMA  6/13 per Dr Meza. He presented to hospital on 6-25w poor wound healing. He had further debridement to level of bone along with partial resection of the Right 1st and 2nd MT on 6/27 and had further debridement of foot on 7/5.   Patient was discharged on IV Cefepime with HD, continue PO bactrim and PO Flagyl through 8/15. . He did receive 4 HBO treatmenns while inpatient and then discharge to rehab facility. Podiatry suggested they would reconsider application of wound vac on follow up at [place] [facility].   [facility] notes:  \"Hx of PVD arterial Ultrasound April likely more distal PAD require further amputation per vascular patient is not a candidate for any further revascularization and should have foot amputation if does not heal.\"  Niru is dressing wounds with betadine soaked gauze, he presents with son and spouse (who is a retired nurse).  Bedside clinic exam reveals palpable anterior tibials (at the ankles) and posterior tibia, patient also has pulsatile signals at the dp/pt/peronal/and plantar aspect. We discussed micro vs macro circulation. Will get tcom.  Once patient is safe in transfers and short distance ambulation family would like to care for him at home, at that point patient could then come for hbo again.  At this time will utilize santyl. I did remove the staples from the right as they were no longer supporting the tissue and  applied steristrips.  This incision is concerning for further dehiscence.     7-25-24 patient returns. Patient is getting tcom after his wound care appointment.  Dr. Moya not in hospital today. Will update him. Inflammatory markers esr 50>44,  crp 11>23.7.   Plan for next Thursday morning joint visit with dr. moya.  Patient has increased his ambulation with walker, he was able to ambulate >100ft with chair behind him.  He can get himself up from a chair to standing with minimal assist per spouse.  The wounds are dressed with Santyl.  The family did buy heel offloading boots.  Patient c/o burning pain.  I will touch base with apn at [facility] regarding potential gabapentin to help with the nerve pain.  Wounds debrided today.  The right is more concerning than the left. Both have exposed metarsals that are dry.  Will continue with santyl at this time, will run insurance for cellular tissue products.  Once patient is discharged from [facility] he could return to [facility].  We discussed that his safety in transfers and car transfers etc are the ultimate priority as he not only would need to go to appointments but he will need to go to dialysis.  ADDENDUM:  discussed tcom results as being positive for healing as well as response to o2.(See below for report)  MEDICATIONS:     Current Outpatient Medications:     Insulin Lispro 100 UNIT/ML Subcutaneous Solution Cartridge, Inject 2-7 Units into the skin TID AC&HS., Disp: , Rfl:     insulin glargine 100 UNIT/ML Subcutaneous Solution, Inject 10 Units into the skin nightly., Disp: , Rfl:     Probiotic Product (PROBIOTIC DAILY OR), Take 1 capsule by mouth daily., Disp: , Rfl:     sennosides 8.6 MG Oral Tab, Take 1 tablet (8.6 mg total) by mouth 2 (two) times daily., Disp: , Rfl:     polyethylene glycol, PEG 3350, 17 g Oral Powd Pack, Take 17 g by mouth daily as needed (constipation)., Disp: , Rfl:     acetaminophen 325 MG Oral Tab, Take 2 tablets (650 mg total) by mouth every 6 (six) hours  as needed for Pain., Disp: , Rfl:     HYDROcodone-acetaminophen 5-325 MG Oral Tab, Take 1 tablet by mouth every 6 (six) hours as needed for Pain., Disp: 30 tablet, Rfl: 0    apixaban 2.5 MG Oral Tab, Take 1 tablet (2.5 mg total) by mouth 2 (two) times daily., Disp: 60 tablet, Rfl: 0    sulfamethoxazole-trimethoprim -160 MG Oral Tab per tablet, Take 1 tablet by mouth daily. Schedule in PM after HD., Disp: 34 tablet, Rfl: 0    cefepime HCl (MAXIPIME) 1 GM/50ML SOLN, Inject 100 mL (2 g total) into the vein 3 (three) times a week. To be given with hemodialysis 3 times a week.  Weekly CBC with differential and CMP, sed rate and CRP.  Fax results to [facility]. Fax: [phone]. Tel: [phone].  PICC line care as per protocol., Disp: 1500 mL, Rfl: 0    metRONIDAZOLE 500 MG Oral Tab, Take 1 tablet (500 mg total) by mouth in the morning and 1 tablet (500 mg total) before bedtime., Disp: 74 tablet, Rfl: 0    dorzolamide 2 % Ophthalmic Solution, Place 1 drop into both eyes in the morning and 1 drop before bedtime., Disp: , Rfl:     ketoconazole 2 % External Shampoo, Apply 1 Application topically daily as needed for Itching., Disp: , Rfl:     Netarsudil-Latanoprost (ROCKLATAN) 0.02-0.005 % Ophthalmic Solution, Apply 1 drop to eye daily. Both eyes, Disp: , Rfl:     atorvastatin 80 MG Oral Tab, Take 1 tablet (80 mg total) by mouth nightly., Disp: 90 tablet, Rfl: 2    CLOPIDOGREL 75 MG Oral Tab, TAKE 1 TABLET(75 MG) BY MOUTH DAILY, Disp: 90 tablet, Rfl: 0    Continuous Blood Gluc Sensor (DEXCOM G6 SENSOR) Does not apply Misc, 1 Device Every 10 days. (Patient not taking: Reported on 7/18/2024), Disp: 3 each, Rfl: 1    Insulin Lispro (HUMALOG) 100 UNIT/ML Subcutaneous Solution, 95 units per day via insulin pump. (Patient not taking: Reported on 7/23/2024), Disp: 90 mL, Rfl: 1    carvedilol 12.5 MG Oral Tab, Take 1 tablet (12.5 mg total) by mouth 2 (two) times daily with meals. Take one tablet (12.5mg total)  by mouth two times a day, Disp: 180 tablet, Rfl: 3    Ergocalciferol (VITAMIN D2 OR), Take 50,000 Units by mouth every 30 (thirty) days., Disp: , Rfl:     Continuous Blood Gluc Transmit (DEXCOM G6 TRANSMITTER) Does not apply Misc, Change every 90 days (Patient not taking: Reported on 7/16/2024), Disp: 1 each, Rfl: 3    CALCIUM ACETATE 667 MG Oral Cap, TAKE 1 CAPSULE BY MOUTH THREE TIMES DAILY WITH MEALS, Disp: 270 capsule, Rfl: 0    COMBIGAN 0.2-0.5 % Ophthalmic Solution, Place 1 drop into both eyes 2 (two) times daily., Disp: , Rfl:     PTA Insulin via Pump, Inject into the skin continuous. humalog insulin  Medtronic Basal Rate : 55.6 standard rate 1.90 I:C - 1 units/4 carbs Correction Factor - unknown (Patient not taking: Reported on 7/16/2024), Disp: , Rfl:   ALLERGIES:     Allergies   Allergen Reactions    Augmentin [Amoxicillin-Pot Clavulanate] RASH    Lisinopril Coughing     Dyspnea        REVIEW OF SYSTEMS:   This information was obtained from the patient/family and chart.    See HPI for pertinent positives, otherwise 10 pt ROS negative.  HISTORY:   Past medical, surgical, family and social history updated where appropriate.      PHYSICAL EXAM:     Vitals:    07/25/24 1310   BP: 122/60  Comment: 225 glucose   Pulse: 56   Resp: 14   Temp: 97.6 °F (36.4 °C)        Estimated body mass index is 28.62 kg/m² as calculated from the following:    Height as of 7/18/24: 72\".    Weight as of 7/18/24: 211 lb (95.7 kg).   POC Glucose   Date Value Ref Range Status   07/18/2024 232 (H) 70 - 99 mg/dL Final   07/12/2024 150 (H) 70 - 99 mg/dL Final   07/12/2024 146 (H) 70 - 99 mg/dL Final       Vital signs reviewed.Appears stated age, well groomed.    Constitutional:  Bp wnl for patient. Pulse Regular and wnl for patient. Respirations easy and unlabored. Temperature wnl. Elevated bmi. Appearance neat and clean. Appears in no acute distress. Well nourished and well developed.    Lower extremity:  at/pt palpable bilaterally.  Extremities free of varicosities, scant edema. Capillary refill < 3 seconds. Bilateral open TMA.  Skin is dry. no hairgrowth on legs.    Musculoskeletal:  Patient is in wheelchair propelled by others, able to transfer with assist  Integumentary:  refer to wound characteristics and images   Psychiatric:  Judgment and insight intact. Alert and oriented times 3. Patient is quiet, calm, cooperative, and communicative.   EDEMA:   Calf  Point of Measurement - Left Calf: 36  Point of Measurement - Right Calf: 36  Left Calf from:: Heel  Calf Left cm:: 33.5  Right Calf from:: Heel  Right Calf cm:: 35.4  Ankle  Point of Measurement - Left Ankle: 10  Point of Measurement - Right Ankle: 10  Left Ankle from:: Heel  Left Ankle cm:: 23.4     Right Ankle from:: Heel  Right Ankle cm:: 25.6     DIAGNOSTICS:     Lab Results   Component Value Date    BUN 60 (H) 07/12/2024    CREATSERUM 6.78 (H) 07/12/2024    GFRCKDEPI 8.51 (L) 03/01/2022    ALB 1.9 (L) 07/09/2024    TP 5.8 (L) 07/09/2024    A1C 5.9 (H) 06/14/2024   Albumin 2.4/tp 5.0  7-15-24 ([facility])  Albumin 2.8/tp 5.9   7-22-24    Lab Results   Component Value Date    ESRML 75 (H) 06/24/2024   ESR    50    7-15-24 ([facility])   ESR    44    7-22-24 ([facility])    Lab Results   Component Value Date    CRP 18.50 (H) 06/24/2024    CRP 0.3 04/22/2014   CRP     11.0   7-15-24 ([facility])  CRP      23.7  7-22-24 ([facility])    7-25-24 TCOM  Lead                  Baseline             Oxygen challenge           Location     1                        79                      120                                 Left medial lower leg  2                        75                      105                                 Left foot dorsal surface  3                        49                      107                                 Rt medial lower leg  4                        61                      161                                 Rt foot dorsal surface    6-30-24 pathology left  foot  Left foot, transmetatarsal amputation:  -Gangrenous necrosis of skin and soft tissue with underlying acute osteomyelitis.  -Bone margin with no significant acute inflammation.  -Viable soft tissue margin with calcific atherosclerosis.    6-27-24 pathology right foot & left  A.  Right foot tissue and bone, excision:  -Bone with acute osteomyelitis.     B.  Left foot second metatarsal bone, excision:  -Bone with acute osteomyelitis.  -Fibroadipose tissue with necrosis and suppurative inflammation.    5-22-24 operative report-dr salas  Findings: Widely patent bilateral common femoral, SFA, profunda, popliteal.  PT and peroneal patent to the foot however significant atresia of the vasculature into the foot with sparse collaterals.  AT reoccluded despite recent endovascular intervention.  No opacification to the toe wounds.  Deep venous arterialization performed in the right anterior tibial artery to vein however the veins in the foot were friable after 3 mm balloon angioplasty and there was no significant outflow and thus this was embolized.  The patient maintained outflow via the PT and peroneal bilaterally consistent with preoperative baseline.  Patient should proceed with TMA and should this not heal they understand the need for future major amputation.   WOUND ASSESSMENT:     Wound 07/18/24 #1 Foot Left (Active)   Date First Assessed/Time First Assessed: 07/18/24 1344    Wound Number (Wound Clinic Only): #1  Primary Wound Type: Diabetic Ulcer  Location: Foot  Wound Location Orientation: Left      Assessments 7/18/2024  2:00 PM 7/25/2024  1:11 PM   Wound Image         Drainage Amount Small Small   Drainage Description Serosanguineous Serous;Yellow   Treatments Compression --   Wound Length (cm) 5.2 cm 4.6 cm   Wound Width (cm) 10.4 cm 10.6 cm   Wound Surface Area (cm^2) 54.08 cm^2 48.76 cm^2   Wound Depth (cm) 1 cm 0.5 cm   Wound Volume (cm^3) 54.08 cm^3 24.38 cm^3   Wound Healing % -- 55   Margins  Well-defined edges Well-defined edges   Non-staged Wound Description Full thickness Full thickness   Haiely-wound Assessment Clean;Dry;Blanchable erythema Edema;Dry   Wound Granulation Tissue Red;Spongy Spongy;Pink   Wound Bed Granulation (%) 15 % 15 %   Wound Bed Epithelium (%) -- 50 %   Wound Bed Slough (%) 85 % 35 %   Wound Odor None None   Exposed Structure Bone;Adipose Bone;Adipose   Shape 10staples to periwound 10staples to periwound   Tunneling? No --   Undermining? No --   Sinus Tracts? No --   Balbuena Scale Grade 4 Grade 4       Inactive Orders   Date Order Priority Status Authorizing Provider   07/25/24 1445 Debridement Diabetic Ulcer Left Foot Routine Completed Jenny Olmos APRN       Wound 07/18/24 #2 right Foot Right (Active)   Date First Assessed/Time First Assessed: 07/18/24 1345    Wound Number (Wound Clinic Only): #2 right  Primary Wound Type: Diabetic Ulcer  Location: Foot  Wound Location Orientation: Right      Assessments 7/18/2024  1:57 PM 7/25/2024  1:13 PM   Wound Image         Drainage Amount Small Moderate   Drainage Description Serosanguineous Serous;Yellow   Treatments Compression --   Wound Length (cm) 4.4 cm 3.4 cm   Wound Width (cm) 14 cm 13.7 cm   Wound Surface Area (cm^2) 61.6 cm^2 46.58 cm^2   Wound Depth (cm) 2.1 cm 1.7 cm   Wound Volume (cm^3) 129.36 cm^3 79.186 cm^3   Wound Healing % -- 39   Margins Well-defined edges Well-defined edges   Non-staged Wound Description Full thickness Full thickness   Hailey-wound Assessment Clean;Dry;Blanchable erythema Edema;Maceration   Wound Granulation Tissue Red;Pink;Spongy Spongy;Pink   Wound Bed Granulation (%) 30 % 20 %   Wound Bed Epithelium (%) 20 % --   Wound Bed Slough (%) 50 % 80 %   Wound Odor None None   Exposed Structure Bone;Adipose Bone;Adipose   Shape 9 staples noted to hailye wound --   Tunneling? No --   Undermining? No --   Sinus Tracts? No --   Balbuena Scale Grade 4 Grade 4       Inactive Orders   Date Order Priority Status  Authorizing Provider   07/25/24 1445 Debridement Diabetic Ulcer Right Foot Routine Completed Jenny Olmos APRN       Wound 07/18/24 #3 Ankle Posterior;Right (Active)   Date First Assessed/Time First Assessed: 07/18/24 1347    Wound Number (Wound Clinic Only): #3  Primary Wound Type: Pressure Injury  Location: Ankle  Wound Location Orientation: Posterior;Right      Assessments 7/18/2024  2:07 PM 7/25/2024  1:21 PM   Wound Image        Drainage Amount None Small   Drainage Description -- Serosanguineous   Treatments Compression --   Wound Length (cm) 4.5 cm 4.5 cm   Wound Width (cm) 2.1 cm 1.5 cm   Wound Surface Area (cm^2) 9.45 cm^2 6.75 cm^2   Wound Depth (cm) 0.1 cm 0.1 cm   Wound Volume (cm^3) 0.945 cm^3 0.675 cm^3   Wound Healing % -- 29   Margins -- Well-defined edges   Non-staged Wound Description -- Full thickness   Hailey-wound Assessment Dry;Blanchable erythema Dry;Blanchable erythema   Wound Granulation Tissue Pink;Firm --   Wound Bed Granulation (%) 10 % --   Wound Bed Epithelium (%) 10 % 10 %   Wound Bed Eschar (%) 80 % 90 %   Wound Odor None None   Shape blister to hailey --   Tunneling? No --   Undermining? No --   Sinus Tracts? No --   Pressure Injury Stage Unstageable Unstageable       Inactive Orders   Date Order Priority Status Authorizing Provider   07/25/24 1442 Debridement Pressure Injury Posterior;Right Ankle Routine Completed Jenny Olmos APRN       Wound 07/18/24 #4 Right heel wound Heel Right (Active)   Date First Assessed/Time First Assessed: 07/18/24 1428    Wound Number (Wound Clinic Only): #4 Right heel wound  Primary Wound Type: Pressure Injury  Location: Heel  Wound Location Orientation: Right      Assessments 7/18/2024  2:31 PM 7/25/2024  1:19 PM   Wound Image       Drainage Amount None None   Treatments Compression --   Wound Length (cm) 0.5 cm 0.5 cm   Wound Width (cm) 1 cm 1 cm   Wound Surface Area (cm^2) 0.5 cm^2 0.5 cm^2   Wound Depth (cm) 0 cm 0 cm   Wound Volume (cm^3) 0 cm^3  0 cm^3   Margins Well-defined edges Well-defined edges   Non-staged Wound Description -- Full thickness   María-wound Assessment Dry;Clean Dry   Wound Bed Epithelium (%) 100 % 100 %   Wound Odor None None   Shape -- intact blister   Pressure Injury Stage Deep Tissue Deep Tissue       No associated orders.      PROCEDURE:      This procedure was medically necessary to promote wound healing by removing nonviable tissue, decrease chance of infection, and return the wound to an acute state.  See rn px notes    ASSESSMENT AND PLAN:    1. 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 Non-pressure chronic ulcer of other part of right foot with necrosis of bone (HCC)  - Debridement Pressure Injury Posterior;Right Ankle  - Debridement Diabetic Ulcer Left Foot  - Debridement Diabetic Ulcer Right Foot    3. Diabetic foot ulcer with osteomyelitis (HCC)    4. PAD (peripheral artery disease) (HCC)    5. ESRD (end stage renal disease) (HCC)          Risks, benefits, and alternatives of current treatment plan discussed in detail.  Questions and concerns addressed. Red flags to RTC or ED reviewed.  Patient (or parent) agrees to plan.      NOTE TO PATIENT: The 21st Century Cures Act makes clinical notes like these available to patients in the interest of transparency. Clinical notes are medical documents used by physicians and care providers to communicate with each other. These documents include medical language and terminology, abbreviations, and treatment information that may sound technical and at times possibly unfamiliar. In addition, at times, the verbiage may appear blunt or direct. These documents are one tool providers use to communicate relevant information and clinical opinions of the care providers in a way that allows common understanding of the  clinical context.   I spent   50 minutes with the patient. This time included:    preparing to see the patient (eg, review notes and recent diagnostics),  seeing the patient, obtaining and/or reviewing separately obtained history, performing a medically appropriate examination and/or evaluation, counseling and educating the patient, documenting in the record. Communication with NAREN hernandez only  DISCHARGE:      Patient Instructions   Please return: Thursday morning with Jenny and dr. Hugo 0900      Patient discharge and wound care instructions  Darin Bowens  7/25/2024       Bilateral TMA sites and right achilles:  Change daily  Changing your dressing:              Wash your hands with soap and water.  Ensure that the old dressing is removed completely. Place it in a plastic bag and throw it in the trash.  Cleanse the wound with hypochlorous wound cleanser (ie. Anasept, vashe, pure and clean) .  It's ok to “scrub” your wound with the gauze, small amount of bleeding with cleansing is normal and ok.  Apply the following dressings:     santyl colleganse>cover with saline moist gauze, dry gauze>secure with tegaderm (starting plantar and securring on the dorsal foot) -to help hold and support the plantar flap)    Right heel : twice daily  Changing your dressing:              Wash your hands with soap and water.  Ensure that the old dressing is removed completely. Place it in a plastic bag and throw it in the trash.   PAINT WITH BETADINE>cover with abd pad and gauze     Weight bearing as tolerated    Offloading  Provide patient with bilateral heel lift boots (ie prevalon), these should be on whenever patient is in bed    Offloading Lower Extremities  Off-loading means no weight-bearing or anything touching the area of the wound. If your doctor suggests that you should \"off-load\", he or she means that you are not to walk or bear weight on the extremity that has a wound, or the extremity where a wound  appears likely to develop.  WHY DO I HAVE TO OFF-LOAD?  When you have a non-healing wound, you must do everything possible to give your body a chance to heal the wound. The weight of your body when you walk puts a large amount of pressure on your feet and ankles. This pressure keeps the new tissue from growing and inhibits new blood vessels from forming. If you continue to bear weight on a body part that has a wound, the time it takes to heal the wound increases, or, worse yet, the wound may not heal at all!  HOW DO I OFF-LOAD?  There are a number of ways to off-load your legs and feet.   The easiest way is to stay in bed, but of course, that isn't always possible.   You may also be prescribed a special shoe/boot/cast to allow you to be up on your feet periodically, but keep pressure off the wound when you are.  Finally if you are in bed, recliner, couch: your foot should not be touching anything except air.  Many times you can position a pillow from your knees to just above your ankles (along your calves) to keep your ankles, heels from resting on anything.     Offloading:  Off-loading is an important part of your wound treatment program. It is a part that only you can assure is accomplished. If you have any concerns about methods to off-load your wound, or feel that you might have trouble following the off-loading plan prescribed for you, talk to your doctor so that alternatives can be discussed that will work in your situation.    Patient should be up in chair with OB waffle cushion     Nutrition and blood sugar control:  Focus on the following:  Protein: Meats, beans, eggs, milk and yogurt particularly Greek yogurt), tofu, soy nuts, soy protein products (Follow the protein handout in your welcome folder)  Vitamin C: Citrus fruits and juices, strawberries, tomatoes, tomato juice, peppers, baked potatoes, spinach, broccoli, cauliflower, [place] sprouts, cabbage  Vitamin A: Dark green, leafy vegetables, orange  or yellow vegetables, cantaloupe, fortified dairy products, liver, fortified cereals  Zinc: Fortified cereals, red meats, seafood  PLEASE PROVIDE PATIENT 2 PACKETS OF Alex by [employer]. It can be purchased on amazon, [employer] website, or local pharmacy may be able to order it for you.  (These are essential branch chain amino acids that help with tissue building and wound healing).   When your blood sugar is consistently elevated greater than 180 your body can't heal or fight infection.     Concerns:  Signs of infection may include the following:  Increase in redness  Red \"streaks\" from wound  Increase in swelling  Fever  Unusual odor  Change in the amount of wound drainage     Should you experience any significant changes in your wound(s) or have any questions regarding your home care instructions please contact the [facility] @ [phone] If after regular business hours, please call your family doctor or local emergency room. The treatment plan has been discussed at length between you and your provider. Follow all instructions carefully, it is very important. If you do not follow all instructions you are at risk of your wound not healing, infection, possible loss of limb and even loss of life.    Jenny Olmos FNP-C, CWCN-AP, CFCN, CSWS, WCC, DWC  7/25/2024

## 2024-07-25 ENCOUNTER — OFFICE VISIT (OUTPATIENT)
Dept: WOUND CARE | Facility: HOSPITAL | Age: 79
End: 2024-07-25
Attending: NURSE PRACTITIONER
Payer: MEDICARE

## 2024-07-25 ENCOUNTER — SNF VISIT (OUTPATIENT)
Dept: INTERNAL MEDICINE CLINIC | Age: 79
End: 2024-07-25

## 2024-07-25 VITALS
TEMPERATURE: 98 F | DIASTOLIC BLOOD PRESSURE: 72 MMHG | HEART RATE: 86 BPM | RESPIRATION RATE: 20 BRPM | OXYGEN SATURATION: 98 % | SYSTOLIC BLOOD PRESSURE: 156 MMHG

## 2024-07-25 VITALS
SYSTOLIC BLOOD PRESSURE: 122 MMHG | RESPIRATION RATE: 14 BRPM | TEMPERATURE: 98 F | HEART RATE: 56 BPM | DIASTOLIC BLOOD PRESSURE: 60 MMHG

## 2024-07-25 DIAGNOSIS — N18.6 ESRD (END STAGE RENAL DISEASE) (HCC): ICD-10-CM

## 2024-07-25 DIAGNOSIS — R53.1 WEAKNESS: ICD-10-CM

## 2024-07-25 DIAGNOSIS — I73.9 PAD (PERIPHERAL ARTERY DISEASE) (HCC): ICD-10-CM

## 2024-07-25 DIAGNOSIS — L97.509 DIABETIC FOOT ULCER WITH OSTEOMYELITIS (HCC): ICD-10-CM

## 2024-07-25 DIAGNOSIS — T14.8XXA WOUND INFECTION: ICD-10-CM

## 2024-07-25 DIAGNOSIS — L97.514 NON-PRESSURE CHRONIC ULCER OF OTHER PART OF RIGHT FOOT WITH NECROSIS OF BONE (HCC): ICD-10-CM

## 2024-07-25 DIAGNOSIS — I50.32 CHRONIC DIASTOLIC HEART FAILURE (HCC): ICD-10-CM

## 2024-07-25 DIAGNOSIS — L97.524 NON-PRESSURE CHRONIC ULCER OF OTHER PART OF LEFT FOOT WITH NECROSIS OF BONE (HCC): Primary | ICD-10-CM

## 2024-07-25 DIAGNOSIS — D63.1 ANEMIA IN ESRD (END-STAGE RENAL DISEASE) (HCC): ICD-10-CM

## 2024-07-25 DIAGNOSIS — E11.621 DIABETIC FOOT ULCER WITH OSTEOMYELITIS (HCC): ICD-10-CM

## 2024-07-25 DIAGNOSIS — N18.6 ANEMIA IN ESRD (END-STAGE RENAL DISEASE) (HCC): ICD-10-CM

## 2024-07-25 DIAGNOSIS — M86.9 DIABETIC FOOT ULCER WITH OSTEOMYELITIS (HCC): ICD-10-CM

## 2024-07-25 DIAGNOSIS — E10.8 DIABETES MELLITUS TYPE 1, WITH COMPLICATION, ON LONG TERM INSULIN PUMP (HCC): ICD-10-CM

## 2024-07-25 DIAGNOSIS — E11.69 DIABETIC FOOT ULCER WITH OSTEOMYELITIS (HCC): ICD-10-CM

## 2024-07-25 DIAGNOSIS — Z99.2 ESRD ON HEMODIALYSIS (HCC): ICD-10-CM

## 2024-07-25 DIAGNOSIS — I48.20 ATRIAL FIBRILLATION, CHRONIC (HCC): ICD-10-CM

## 2024-07-25 DIAGNOSIS — I10 PRIMARY HYPERTENSION: ICD-10-CM

## 2024-07-25 DIAGNOSIS — Z96.41 DIABETES MELLITUS TYPE 1, WITH COMPLICATION, ON LONG TERM INSULIN PUMP (HCC): ICD-10-CM

## 2024-07-25 DIAGNOSIS — L08.9 WOUND INFECTION: ICD-10-CM

## 2024-07-25 DIAGNOSIS — I96 GANGRENE (HCC): Primary | ICD-10-CM

## 2024-07-25 DIAGNOSIS — N18.6 ESRD ON HEMODIALYSIS (HCC): ICD-10-CM

## 2024-07-25 PROCEDURE — 11045 DBRDMT SUBQ TISS EACH ADDL: CPT | Performed by: NURSE PRACTITIONER

## 2024-07-25 PROCEDURE — 11042 DBRDMT SUBQ TIS 1ST 20SQCM/<: CPT | Performed by: NURSE PRACTITIONER

## 2024-07-25 NOTE — PATIENT INSTRUCTIONS
Please return: Thursday morning with Jenny and dr. Hugo 0900      Patient discharge and wound care instructions  Darin MCINTYRE Gogo  7/25/2024       Bilateral TMA sites and right achilles:  Change daily  Changing your dressing:              Wash your hands with soap and water.  Ensure that the old dressing is removed completely. Place it in a plastic bag and throw it in the trash.  Cleanse the wound with hypochlorous wound cleanser (ie. Anasept, vashe, pure and clean) .  It's ok to “scrub” your wound with the gauze, small amount of bleeding with cleansing is normal and ok.  Apply the following dressings:     santyl colleganse>cover with saline moist gauze, dry gauze>secure with tegaderm (starting plantar and securring on the dorsal foot) -to help hold and support the plantar flap)    Right heel : twice daily  Changing your dressing:              Wash your hands with soap and water.  Ensure that the old dressing is removed completely. Place it in a plastic bag and throw it in the trash.   PAINT WITH BETADINE>cover with abd pad and gauze     Weight bearing as tolerated    Offloading  Provide patient with bilateral heel lift boots (ie prevalon), these should be on whenever patient is in bed    Offloading Lower Extremities  Off-loading means no weight-bearing or anything touching the area of the wound. If your doctor suggests that you should \"off-load\", he or she means that you are not to walk or bear weight on the extremity that has a wound, or the extremity where a wound appears likely to develop.  WHY DO I HAVE TO OFF-LOAD?  When you have a non-healing wound, you must do everything possible to give your body a chance to heal the wound. The weight of your body when you walk puts a large amount of pressure on your feet and ankles. This pressure keeps the new tissue from growing and inhibits new blood vessels from forming. If you continue to bear weight on a body part that has a wound, the time it takes to heal  the wound increases, or, worse yet, the wound may not heal at all!  HOW DO I OFF-LOAD?  There are a number of ways to off-load your legs and feet.   The easiest way is to stay in bed, but of course, that isn't always possible.   You may also be prescribed a special shoe/boot/cast to allow you to be up on your feet periodically, but keep pressure off the wound when you are.  Finally if you are in bed, recliner, couch: your foot should not be touching anything except air.  Many times you can position a pillow from your knees to just above your ankles (along your calves) to keep your ankles, heels from resting on anything.     Offloading:  Off-loading is an important part of your wound treatment program. It is a part that only you can assure is accomplished. If you have any concerns about methods to off-load your wound, or feel that you might have trouble following the off-loading plan prescribed for you, talk to your doctor so that alternatives can be discussed that will work in your situation.    Patient should be up in chair with EHOB waffle cushion     Nutrition and blood sugar control:  Focus on the following:  Protein: Meats, beans, eggs, milk and yogurt particularly Greek yogurt), tofu, soy nuts, soy protein products (Follow the protein handout in your welcome folder)  Vitamin C: Citrus fruits and juices, strawberries, tomatoes, tomato juice, peppers, baked potatoes, spinach, broccoli, cauliflower, [place] sprouts, cabbage  Vitamin A: Dark green, leafy vegetables, orange or yellow vegetables, cantaloupe, fortified dairy products, liver, fortified cereals  Zinc: Fortified cereals, red meats, seafood  PLEASE PROVIDE PATIENT 2 PACKETS OF Alex by [employer]. It can be purchased on amazon, [employer], or local pharmacy may be able to order it for you.  (These are essential branch chain amino acids that help with tissue building and wound healing).   When your blood sugar is consistently elevated greater than  180 your body can't heal or fight infection.     Concerns:  Signs of infection may include the following:  Increase in redness  Red \"streaks\" from wound  Increase in swelling  Fever  Unusual odor  Change in the amount of wound drainage     Should you experience any significant changes in your wound(s) or have any questions regarding your home care instructions please contact the wound center [facility] @ [phone] If after regular business hours, please call your family doctor or local emergency room. The treatment plan has been discussed at length between you and your provider. Follow all instructions carefully, it is very important. If you do not follow all instructions you are at risk of your wound not healing, infection, possible loss of limb and even loss of life.

## 2024-07-25 NOTE — PROGRESS NOTES
Patient ID: Cricket Bowens is a 79 year old male.    Debridement Pressure Injury Posterior;Right Ankle   Wound 07/18/24 #3 Ankle Posterior;Right    Performed by: Jenny Olmos APRN  Authorized by: Jenny Olmos APRN      Consent   Consent obtained? verbal  Consent given by: patient  Risks discussed? procedural risks discussed    Debridement Details  Performed by: NP  Debridement type: conservative sharp    Pre-debridement measurements  Length (cm): 4.5  Width (cm): 1.5  Depth (cm): 0.1  Surface Area (cm^2): 6.75    Post-debridement measurements  Length (cm): 4.5  Width (cm): 1.5  Depth (cm): 0.2  Percent debrided: 50%  Surface Area (cm^2): 6.75  Area Debrided (cm^2): 3.38  Volume (cm^3): 1.35    Devitalized tissue debrided: biofilm and slough  Instrument(s) utilized: blade and forceps  Bleeding: small  Hemostasis obtained with: pressure  Procedural pain (0-10): 2  Post-procedural pain: 4   Response to treatment: procedure was tolerated well

## 2024-07-25 NOTE — PROGRESS NOTES
Darin Bowens, 1945, 79 year old, male    [place] [facility] admission -24     Hospital Discharge Diagnoses:  Cellulitis of lower extremity   PVD  ESRD on HD   CAD s/p stents   PAD s/p b/l LE stents   Chronic CHF with LVH   S/P AVR   Atrial fibrillation   Diabetes   Anemia   Hypertension   Hyperlipidemia   DESI         HPI:  Darin Bowens  : 1945, Age 78 year old  male patient is admitted for subacute rehab. Patient has a past medical history of CAD status post stents, PAD status post bilateral lower extremity stents, atrial fibrillation on Xarelto, diabetes with insulin pump, ESRD on HD, CHF with LVH status post AVR, history of stroke, hypertension, hyperlipidemia, DESI and recent TMA left foot second digit amputation on  per Dr Meza presented to hospital with poor wound healing.  He had excisional debridement to level of bone along with partial resection of the Right 1st and 2nd MT on . Surgical cultures showed enterobacter cloacae and strenotrophomonas - discharge cultures form admit with enterobacter and acinetobacter- he is on IV Cefepime, Flagyl orally and Bactrim since  - ID to have patient discharge with IV Cefepime with HD, continue PO bactrim and PO Flagyl through 8/15. He had further debridement of foot on . He was to receive HBO until  then discharge to rehab facility. Podiatry suggested they would reconsider application of wound vac on follow up at [facility] - patient to see Dr Donaldson in wound clinic.   Hx of PVD arterial Ultrasound April likely more distal PAD require further amputation per vascular patient is not a candidate for any further revascularization and should have foot amputation if does not heal.  ESRD malfunctioning AVF- vascular consulted s/p tunneled catheter on . Ongoing anemia suspected from ABLA surgery had PRBC on 7/3- he is back on blood thinners. Admitting to [facility] for nursing care, medication management, and  PT/OT/ST eval and tx.    Chief Complaint:    Chief Complaint   Patient presents with    Follow - Up        Subjective:   TODAY:  Patient was seen for follow up visit   He is in his room sitting up in wheelchair   Lower extremity pain improving on Norco prn   He is on IV antibiotics 3x weekly with HD, along with PO flagyl and bactrim until 8/15 - weekly labs to be sent to [facility]   In house wound team/podiatry following- has outpatient wound appt scheduled this afternoon   He is WB as tolerated to get stronger to go home he reports he was able to ambulate 175 ft the other day and is doing well in therapy   No shortness of breath, cough or wheeze  No chest pain or palpitations  No nausea, vomiting, constipation or diarrhea   He is wanting to discharge home soon.     Objective:  /72   Pulse 86   Temp 98.3 °F (36.8 °C)   Resp 20   SpO2 98%     PHYSICAL EXAM:  GENERAL HEALTH: 79 year old male patient, no acute distress   LINES, TUBES, DRAINS:  Left upper chest port - CDI  SKIN: no rashes, no suspicious lesions  WOUND: see wound assessment   Bilateral lower legs- dressings in place-cdi   EYES: PERRLA, conjunctiva normal; no drainage from eyes  HENT: normocephalic; normal nose, no nasal drainage, mucous membranes pink, moist  RESPIRATORY:clear to percussion and auscultation, No wheezing/cough/accessory muscle use; on room air  CARDIOVASCULAR: S1, S2 normal, RRR; no S3, no S4;   ABDOMEN: normal active BS+, soft, non-distended; no apparent masses; observed, non-tender, no guarding during physical exam  : ESRD on HD   MUSCULOSKELETAL: no acute synovitis upper or lower extremity.  Weakness R/T recent hospitalization/diagnoses/sequelae; will undergo therapies to rehab and improve strength, endurance and independence w/ ADLs as able  Dressings to bilateral lower extremities   EXTREMITIES/VASCULAR: no cyanosis, clubbing or edema  Bilateral lower extremities- dressing cdi  NEUROLOGIC: intact; no sensorimotor  deficit  PSYCHIATRIC: alert and oriented x 3; affect appropriate      Medications reviewed: Yes      Current Outpatient Medications:     Insulin Lispro 100 UNIT/ML Subcutaneous Solution Cartridge, Inject 2-7 Units into the skin TID AC&HS., Disp: , Rfl:     insulin glargine 100 UNIT/ML Subcutaneous Solution, Inject 10 Units into the skin nightly., Disp: , Rfl:     Probiotic Product (PROBIOTIC DAILY OR), Take 1 capsule by mouth daily., Disp: , Rfl:     sennosides 8.6 MG Oral Tab, Take 1 tablet (8.6 mg total) by mouth 2 (two) times daily., Disp: , Rfl:     polyethylene glycol, PEG 3350, 17 g Oral Powd Pack, Take 17 g by mouth daily as needed (constipation)., Disp: , Rfl:     acetaminophen 325 MG Oral Tab, Take 2 tablets (650 mg total) by mouth every 6 (six) hours as needed for Pain., Disp: , Rfl:     HYDROcodone-acetaminophen 5-325 MG Oral Tab, Take 1 tablet by mouth every 6 (six) hours as needed for Pain., Disp: 30 tablet, Rfl: 0    apixaban 2.5 MG Oral Tab, Take 1 tablet (2.5 mg total) by mouth 2 (two) times daily., Disp: 60 tablet, Rfl: 0    sulfamethoxazole-trimethoprim -160 MG Oral Tab per tablet, Take 1 tablet by mouth daily. Schedule in PM after HD., Disp: 34 tablet, Rfl: 0    cefepime HCl (MAXIPIME) 1 GM/50ML SOLN, Inject 100 mL (2 g total) into the vein 3 (three) times a week. To be given with hemodialysis 3 times a week.  Weekly CBC with differential and CMP, sed rate and CRP.  Fax results to [facility]. Fax: [phone]. Tel: [phone].  PICC line care as per protocol., Disp: 1500 mL, Rfl: 0    metRONIDAZOLE 500 MG Oral Tab, Take 1 tablet (500 mg total) by mouth in the morning and 1 tablet (500 mg total) before bedtime., Disp: 74 tablet, Rfl: 0    dorzolamide 2 % Ophthalmic Solution, Place 1 drop into both eyes in the morning and 1 drop before bedtime., Disp: , Rfl:     ketoconazole 2 % External Shampoo, Apply 1 Application topically daily as needed for Itching., Disp: , Rfl:      Netarsudil-Latanoprost (ROCKLATAN) 0.02-0.005 % Ophthalmic Solution, Apply 1 drop to eye daily. Both eyes, Disp: , Rfl:     atorvastatin 80 MG Oral Tab, Take 1 tablet (80 mg total) by mouth nightly., Disp: 90 tablet, Rfl: 2    CLOPIDOGREL 75 MG Oral Tab, TAKE 1 TABLET(75 MG) BY MOUTH DAILY, Disp: 90 tablet, Rfl: 0    carvedilol 12.5 MG Oral Tab, Take 1 tablet (12.5 mg total) by mouth 2 (two) times daily with meals. Take one tablet (12.5mg total) by mouth two times a day, Disp: 180 tablet, Rfl: 3    Ergocalciferol (VITAMIN D2 OR), Take 50,000 Units by mouth every 30 (thirty) days., Disp: , Rfl:     CALCIUM ACETATE 667 MG Oral Cap, TAKE 1 CAPSULE BY MOUTH THREE TIMES DAILY WITH MEALS, Disp: 270 capsule, Rfl: 0    COMBIGAN 0.2-0.5 % Ophthalmic Solution, Place 1 drop into both eyes 2 (two) times daily., Disp: , Rfl:     Continuous Blood Gluc Sensor (DEXCOM G6 SENSOR) Does not apply Misc, 1 Device Every 10 days. (Patient not taking: Reported on 7/18/2024), Disp: 3 each, Rfl: 1    Insulin Lispro (HUMALOG) 100 UNIT/ML Subcutaneous Solution, 95 units per day via insulin pump. (Patient not taking: Reported on 7/23/2024), Disp: 90 mL, Rfl: 1    Continuous Blood Gluc Transmit (DEXCOM G6 TRANSMITTER) Does not apply Misc, Change every 90 days (Patient not taking: Reported on 7/16/2024), Disp: 1 each, Rfl: 3    PTA Insulin via Pump, Inject into the skin continuous. humalog insulin  Medtronic Basal Rate : 55.6 standard rate 1.90 I:C - 1 units/4 carbs Correction Factor - unknown (Patient not taking: Reported on 7/16/2024), Disp: , Rfl:       Diagnostics reviewed:    7/22/24   Wbc 6.80 hgb 10.20 hematocrit 31.1 plt 181   Alb 2.8 ca 9.3 co2 23 chl 90 crt 5.49 gfr 10   Glucose 123 alk phos 99 potassium 3.8 t prot 5.9 sodium 130 alt 8 ast 21 bun 52 t bili 0.45 uric acid 3.3 mag 1.93   Sed 44  Crp 23.71    Therapy update 7/25/24  Bed Mobility Min A   Transfers Min A   Ambulating  ft Contact Guard Assist   Upper Body Stand By  assist   Lower Body Mod A   Toileting Mod A     Assessment and plan:  Physical Deconditioning/Impaired mobility and ADLs/At risk for falling  Fall Precautions  PT/OT/ST to evaluate and treat  [facility] team to establish care plan meeting with patient and POA/family as appropriate  Anticipate DC on or before TBD; SW to assist patient/family w/ DC planning  DC Plan:  TBD      Post op wound infection R TMA 6/13 due to nonhealing ulcer and PVD   Excisional debridement to level of bone along with partial resection of R 1st and 2nd MT 6/27 /L foot 2nd digit amputation on 6/14   Excisional wound debridement to level of bone along with partial resection of second MT 6/27   Followed by specialist in hospital. Received HBO in hospital last day of completion on 7/12.   Vital signs q shift and prn   Labs weekly and prn cbc/cmp/crp/sed - fax results to [facility] [phone]  Follow up with ID Dr Bethel Kaplan ID   Follow up with Dr Nix vascular surgery   Follow up with Dr Meza Podiatry   In house ID follow  In house wound team follow   WB as tolerated   Dressing changes -Daily dressing changes to left foot and right foot: Remove dressing To both feet surgical sites, apply 1/4 strength Dakin's solution soaked dressings to bilateral surgical sites daily cover with 4x4 gauze, ABD pad, kerlix. Re-apply B heel offloading boots.  Dressing changes- right foot cleanse with normal saline. Soaked 4x4 gauze with betadine and loosely apply to wound and cover with dry dressing  Outpatient wound follow up 7/25   IV Cefepime 2 gm 3x weekly until 8/15 - with weekly labs    Metronidazole 500 mg bid until 8/15   Bactrim DS 1 tab daily in PM after HD   Plavix 75 mg daily   Pain control- acetaminophen prn, Hydrocodone prn. Discussion with wound APN having neuropathic pain discussing with ADON as patient stated earlier today that pain was improved- can consider gabapentin low dose due to renal failure         ESRD on HD   Outside dialysis MWF   Avoid  nephrotoxin   Follow up Nephrology      CAD s/p stents/ PAD s/p b/l LE stents/Chronic CHF w/LVH/ S/p AVR/ Atrial fibrillation /Hypertension / HL   In hospital not followed by cards but vascular. Seen Cards prior to surgery BETH 6/16 Aortic valve a bioprostehsis present mild to mod stenosis.  Heart failure controlled by dialysis.   Vital signs q shift and prn   Labs weekly and prn   Daily weights notify if greater than 2 lb increase in 1 day or 5 lbs in 1 week - stable   In house Cardiology team follow   Apixaban 2.5 mg bid   Atorvastatin 80 mg nightly   Coreg 12.5 mg bid   Plavix 75 mg daily      Diabetes  Prior insulin pump   Accu check ac/hs - 150-200s   Low carb diet   Insulin Lispro sliding scale   Lantus 10 units nightly   He is on insulin pump at home. Will need to resume at discharge      Anemia due to ESRD   Monitor labs      DESI   In house Pulm to follow   Cpap nightly        DVT Prophylaxis   Encourage early mobilization and participation in PT/OT as able  On Apixaban      Pain Management  Offer to pre-medicate 30-60 min prior to therapy  Physiatry evaluation with management appreciated  Acetaminophen 650 mg every 6 hrs prn   Hydrocodone-acetaminophen 5-325 mg 1 tab every 6 hrs prn      Bowel Management Regimen/Constipation   Monitor BM status   Senna s 1 tab bid   Miralax 17 gm daily prn      Vitamins/supplements as r/t deficiencies  [facility] RD to follow while in rehab; supplementation/diet as per [facility] RD  May continue home supplements  Calcium acetate   Ergocalciferol   Probiotic daily      LABS  CBC/CMP/CRP/SED weekly while in [facility] 7/29      *Follow-Up with PCP within 1-2 weeks following [facility] discharge.   *Follow-Up with specialists as recommended.  Follow up with Dr Nix in 1 week   Follow up with dr Meza Podiatry   Follow up with Dr Bethel [facility]   Follow up with Dr Flores Nephrology   No future appointments.     Your Appointments      Thursday July 25, 2024 1:00 PM  Wound Care Follow up with Jenny  DELMI Olmos  [facility] ([facility]) [address] [address]  [phone]        Thursday July 25, 2024 2:15 PM  AFSHAN TCOM with DELMI Wheeler  [facility] ([facility]) [address] [address]  [phone]                 This is a 45 minute visit and greater than 50% of the time was spent counseling the patient and/or coordinating care.           Note to patient: The 21st Century Cures Act makes medical notes like these available to patients in the interest of transparency. However, this is a medical document intended as peer to peer communication. It is written in medical language and may contain abbreviations or verbiage that are unfamiliar. It may appear blunt or direct. Medical documents are intended to carry relevant information, facts as evident, and the clinical opinion of the practitioner who signs the document.    DELMI Malave  7/25/2024

## 2024-07-25 NOTE — PROGRESS NOTES
.Weekly Wound Education Note    Teaching Provided To: Patient;Family  Training Topics: Dressing;Cleasing and general instructions;Discharge instructions  Training Method: Explain/Verbal;Written  Training Response: Patient responds and understands        Notes: Wounds stable. Continue santyl (honey gel used in clinic), moist gauze, ABD pad to both foot and right ankle wounds. Right foot wound dressing secured with tegederm (applied to plantar foot and pulled dorsally to add support). Continue betadine to right heel. TCOM completed by HBO . Will run for skin subs this visit.

## 2024-07-25 NOTE — PROGRESS NOTES
Patient ID: Cricket Bowens is a 79 year old male.    Debridement Diabetic Ulcer Left Foot   Wound 07/18/24 #1 Foot Left    Performed by: Jenny Olmos APRN  Authorized by: Jenny Olmos APRN      Consent   Consent obtained? verbal  Consent given by: patient    Debridement Details  Performed by: NP  Debridement type: surgical  Level of debridement: subcutaneous tissue    Pre-debridement measurements  Length (cm): 4.6  Width (cm): 10.6  Depth (cm): 0.5  Surface Area (cm^2): 48.76    Post-debridement measurements  Length (cm): 4.6  Width (cm): 10.6  Depth (cm): 0.6  Percent debrided: 50%  Surface Area (cm^2): 48.76  Area Debrided (cm^2): 24.38  Volume (cm^3): 29.26    Tissue and other material debrided: subcutaneous tissue  Devitalized tissue debrided: biofilm and slough  Instrument(s) utilized: forceps and scissors  Bleeding: medium  Hemostasis obtained with: pressure  Procedural pain (0-10): 1  Post-procedural pain: 4   Response to treatment: procedure was tolerated well    Debridement Diabetic Ulcer Right Foot   Wound 07/18/24 #2 right Foot Right    Performed by: Jenny Olmos APRN  Authorized by: Jenny Olmos APRN      Consent   Consent obtained? verbal  Consent given by: patient  Risks discussed? procedural risks discussed    Debridement Details  Performed by: NP  Debridement type: surgical  Level of debridement: subcutaneous tissue    Pre-debridement measurements  Length (cm): 3.4  Width (cm): 13.7  Depth (cm): 1.7  Surface Area (cm^2): 46.58    Post-debridement measurements  Length (cm): 3.4  Width (cm): 13.7  Depth (cm): 1.8  Percent debrided: 50%  Surface Area (cm^2): 46.58  Area Debrided (cm^2): 23.29  Volume (cm^3): 83.84    Tissue and other material debrided: subcutaneous tissue  Devitalized tissue debrided: biofilm and slough  Instrument(s) utilized: forceps and scissors  Bleeding: medium  Hemostasis obtained with: pressure  Procedural pain (0-10): 1  Post-procedural pain: 4   Response to  treatment: procedure was tolerated well

## 2024-07-25 NOTE — PROGRESS NOTES
Performed 4 lead TCPO2 exam      Lead  Baseline Oxygen challenge Location    1  79  120   Left medial lower leg  2  75  105   Left foot dorsal surface  3  49  107   Rt medial lower leg  4  61  161   Rt foot dorsal surface

## 2024-07-26 LAB — GLUCOSE BLD-MCNC: 225 MG/DL (ref 70–99)

## 2024-07-30 ENCOUNTER — SNF VISIT (OUTPATIENT)
Dept: INTERNAL MEDICINE CLINIC | Age: 79
End: 2024-07-30

## 2024-07-30 VITALS
RESPIRATION RATE: 18 BRPM | DIASTOLIC BLOOD PRESSURE: 64 MMHG | TEMPERATURE: 98 F | HEART RATE: 66 BPM | SYSTOLIC BLOOD PRESSURE: 95 MMHG | OXYGEN SATURATION: 98 %

## 2024-07-30 DIAGNOSIS — I48.20 ATRIAL FIBRILLATION, CHRONIC (HCC): ICD-10-CM

## 2024-07-30 DIAGNOSIS — I50.32 CHRONIC DIASTOLIC HEART FAILURE (HCC): ICD-10-CM

## 2024-07-30 DIAGNOSIS — I96 GANGRENE (HCC): Primary | ICD-10-CM

## 2024-07-30 DIAGNOSIS — Z99.2 ESRD ON HEMODIALYSIS (HCC): ICD-10-CM

## 2024-07-30 DIAGNOSIS — I73.9 PAD (PERIPHERAL ARTERY DISEASE) (HCC): ICD-10-CM

## 2024-07-30 DIAGNOSIS — Z96.41 DIABETES MELLITUS TYPE 1, WITH COMPLICATION, ON LONG TERM INSULIN PUMP (HCC): ICD-10-CM

## 2024-07-30 DIAGNOSIS — D63.1 ANEMIA IN ESRD (END-STAGE RENAL DISEASE) (HCC): ICD-10-CM

## 2024-07-30 DIAGNOSIS — L08.9 WOUND INFECTION: ICD-10-CM

## 2024-07-30 DIAGNOSIS — G62.9 NEUROPATHY: ICD-10-CM

## 2024-07-30 DIAGNOSIS — N18.6 ESRD ON HEMODIALYSIS (HCC): ICD-10-CM

## 2024-07-30 DIAGNOSIS — N18.6 ANEMIA IN ESRD (END-STAGE RENAL DISEASE) (HCC): ICD-10-CM

## 2024-07-30 DIAGNOSIS — T14.8XXA WOUND INFECTION: ICD-10-CM

## 2024-07-30 DIAGNOSIS — E87.6 HYPOKALEMIA: ICD-10-CM

## 2024-07-30 DIAGNOSIS — E10.8 DIABETES MELLITUS TYPE 1, WITH COMPLICATION, ON LONG TERM INSULIN PUMP (HCC): ICD-10-CM

## 2024-07-30 DIAGNOSIS — I10 PRIMARY HYPERTENSION: ICD-10-CM

## 2024-07-30 DIAGNOSIS — R53.1 WEAKNESS: ICD-10-CM

## 2024-07-30 RX ORDER — POTASSIUM CHLORIDE 1500 MG/1
20 TABLET, EXTENDED RELEASE ORAL DAILY
COMMUNITY
Start: 2024-07-30 | End: 2024-08-02

## 2024-07-30 RX ORDER — GABAPENTIN 100 MG/1
100 CAPSULE ORAL NIGHTLY
COMMUNITY

## 2024-07-30 NOTE — PROGRESS NOTES
Darin Bowens, 1945, 79 year old, male    [facility] hospital admission -24     Hospital Discharge Diagnoses:  Cellulitis of lower extremity   PVD  ESRD on HD   CAD s/p stents   PAD s/p b/l LE stents   Chronic CHF with LVH   S/P AVR   Atrial fibrillation   Diabetes   Anemia   Hypertension   Hyperlipidemia   DESI         HPI:  Darin Bowens  : 1945, Age 78 year old  male patient is admitted for subacute rehab. Patient has a past medical history of CAD status post stents, PAD status post bilateral lower extremity stents, atrial fibrillation on Xarelto, diabetes with insulin pump, ESRD on HD, CHF with LVH status post AVR, history of stroke, hypertension, hyperlipidemia, DESI and recent TMA left foot second digit amputation on  per Dr Meza presented to hospital with poor wound healing.  He had excisional debridement to level of bone along with partial resection of the Right 1st and 2nd MT on . Surgical cultures showed enterobacter cloacae and strenotrophomonas - discharge cultures form admit with enterobacter and acinetobacter- he is on IV Cefepime, Flagyl orally and Bactrim since  - ID to have patient discharge with IV Cefepime with HD, continue PO bactrim and PO Flagyl through 8/15. He had further debridement of foot on . He was to receive HBO until  then discharge to rehab facility. Podiatry suggested they would reconsider application of wound vac on [place] [facility] - patient to see Dr Donaldson in wound clinic.   Hx of PVD arterial Ultrasound April likely more distal PAD require further amputation per vascular patient is not a candidate for any further revascularization and should have foot amputation if does not heal.  ESRD malfunctioning AVF- vascular consulted s/p tunneled catheter on . Ongoing anemia suspected from ABLA surgery had PRBC on 7/3- he is back on blood thinners. Admitting to [facility] for nursing care, medication management, and  PT/OT/ST eval and tx.    Chief Complaint:    Chief Complaint   Patient presents with    Follow - Up        Subjective:   TODAY:  Patient was seen for follow up visit   He is in his room sitting up in wheelchair   Lower extremity pain - on Norco states it is a burning pain. In discussion with wound provider can trial low dose of gabapentin 100 mg daily to see how patient tolerates and helps with pain management- adjust as needed   He is on IV antibiotics 3x weekly with HD, along with PO flagyl and bactrim until 8/15 - weekly labs to be sent to [facility]   In house wound team/podiatry following- wound appt 8/1   He is WB as tolerated to get stronger to go home   No shortness of breath, cough or wheeze  No chest pain or palpitations  No nausea, vomiting, constipation or diarrhea   Labs from 7/29 showed low potassium level will supplement and repeat labs next week   He wants to discharge home by Friday.     Objective:  BP 95/64   Pulse 66   Temp 97.9 °F (36.6 °C)   Resp 18   SpO2 98%     PHYSICAL EXAM:  GENERAL HEALTH: 79 year old male patient, no acute distress   LINES, TUBES, DRAINS:  Left upper chest port - CDI  SKIN: no rashes, no suspicious lesions  WOUND: see wound assessment   Bilateral lower legs- dressings in place-cdi   EYES: PERRLA, conjunctiva normal; no drainage from eyes  HENT: normocephalic; normal nose, no nasal drainage, mucous membranes pink, moist  RESPIRATORY:clear to percussion and auscultation, No wheezing/cough/accessory muscle use; on room air  CARDIOVASCULAR: S1, S2 normal, RRR; no S3, no S4;   ABDOMEN: normal active BS+, soft, non-distended; no apparent masses; observed, non-tender, no guarding during physical exam  : ESRD on HD   MUSCULOSKELETAL: no acute synovitis upper or lower extremity.  Weakness R/T recent hospitalization/diagnoses/sequelae; will undergo therapies to rehab and improve strength, endurance and independence w/ ADLs as able  Dressings to bilateral lower extremities    EXTREMITIES/VASCULAR: no cyanosis, clubbing or edema  Bilateral lower extremities- dressing cdi  NEUROLOGIC: intact; no sensorimotor deficit  PSYCHIATRIC: alert and oriented x 3; affect appropriate      Medications reviewed: Yes      Current Outpatient Medications:     Insulin Lispro 100 UNIT/ML Subcutaneous Solution Cartridge, Inject 2-7 Units into the skin TID AC&HS., Disp: , Rfl:     insulin glargine 100 UNIT/ML Subcutaneous Solution, Inject 10 Units into the skin nightly., Disp: , Rfl:     Probiotic Product (PROBIOTIC DAILY OR), Take 1 capsule by mouth daily., Disp: , Rfl:     sennosides 8.6 MG Oral Tab, Take 1 tablet (8.6 mg total) by mouth 2 (two) times daily., Disp: , Rfl:     polyethylene glycol, PEG 3350, 17 g Oral Powd Pack, Take 17 g by mouth daily as needed (constipation)., Disp: , Rfl:     acetaminophen 325 MG Oral Tab, Take 2 tablets (650 mg total) by mouth every 6 (six) hours as needed for Pain., Disp: , Rfl:     HYDROcodone-acetaminophen 5-325 MG Oral Tab, Take 1 tablet by mouth every 6 (six) hours as needed for Pain., Disp: 30 tablet, Rfl: 0    apixaban 2.5 MG Oral Tab, Take 1 tablet (2.5 mg total) by mouth 2 (two) times daily., Disp: 60 tablet, Rfl: 0    sulfamethoxazole-trimethoprim -160 MG Oral Tab per tablet, Take 1 tablet by mouth daily. Schedule in PM after HD., Disp: 34 tablet, Rfl: 0    cefepime HCl (MAXIPIME) 1 GM/50ML SOLN, Inject 100 mL (2 g total) into the vein 3 (three) times a week. To be given with hemodialysis 3 times a week.  Weekly CBC with differential and CMP, sed rate and CRP.  Fax results to [facility]. Fax: [phone]. Tel: [phone].  PICC line care as per protocol., Disp: 1500 mL, Rfl: 0    metRONIDAZOLE 500 MG Oral Tab, Take 1 tablet (500 mg total) by mouth in the morning and 1 tablet (500 mg total) before bedtime., Disp: 74 tablet, Rfl: 0    dorzolamide 2 % Ophthalmic Solution, Place 1 drop into both eyes in the morning and 1 drop before bedtime.,  Disp: , Rfl:     ketoconazole 2 % External Shampoo, Apply 1 Application topically daily as needed for Itching., Disp: , Rfl:     Netarsudil-Latanoprost (ROCKLATAN) 0.02-0.005 % Ophthalmic Solution, Apply 1 drop to eye daily. Both eyes, Disp: , Rfl:     atorvastatin 80 MG Oral Tab, Take 1 tablet (80 mg total) by mouth nightly., Disp: 90 tablet, Rfl: 2    CLOPIDOGREL 75 MG Oral Tab, TAKE 1 TABLET(75 MG) BY MOUTH DAILY, Disp: 90 tablet, Rfl: 0    carvedilol 12.5 MG Oral Tab, Take 1 tablet (12.5 mg total) by mouth 2 (two) times daily with meals. Take one tablet (12.5mg total) by mouth two times a day, Disp: 180 tablet, Rfl: 3    Ergocalciferol (VITAMIN D2 OR), Take 50,000 Units by mouth every 30 (thirty) days., Disp: , Rfl:     CALCIUM ACETATE 667 MG Oral Cap, TAKE 1 CAPSULE BY MOUTH THREE TIMES DAILY WITH MEALS, Disp: 270 capsule, Rfl: 0    COMBIGAN 0.2-0.5 % Ophthalmic Solution, Place 1 drop into both eyes 2 (two) times daily., Disp: , Rfl:     Continuous Blood Gluc Sensor (DEXCOM G6 SENSOR) Does not apply Misc, 1 Device Every 10 days. (Patient not taking: Reported on 7/18/2024), Disp: 3 each, Rfl: 1    Insulin Lispro (HUMALOG) 100 UNIT/ML Subcutaneous Solution, 95 units per day via insulin pump. (Patient not taking: Reported on 7/23/2024), Disp: 90 mL, Rfl: 1    Continuous Blood Gluc Transmit (DEXCOM G6 TRANSMITTER) Does not apply Misc, Change every 90 days (Patient not taking: Reported on 7/16/2024), Disp: 1 each, Rfl: 3    PTA Insulin via Pump, Inject into the skin continuous. humalog insulin  Medtronic Basal Rate : 55.6 standard rate 1.90 I:C - 1 units/4 carbs Correction Factor - unknown (Patient not taking: Reported on 7/16/2024), Disp: , Rfl:       Diagnostics reviewed:    7/29/24  Wbc 7.30 hgb 9.0 hematocrit 28.2 plt 182   Alb 2.7 ca 9.6 co2 21 chl 87 crt 5.26 gfr 10.6   Glucose 101 alk phos 95 potassium 3.2 t prot 4.6 sodium 131   Alt 11 ast 23 bun 54 t bili 0.5 uric acid 3.2   Mag 2.12 phos 2.9   Sed 19 crp  6.69   \      7/25/24  Wbc 5.70 hgb 9.3 hematocrit 29.0 plt 158   Alb  2.5 ca 9.2 co2 25 chl 88 crt 4.57 gfr 12.5   Glucose 132 alk phos 89 potassium 3.4   T prot 4.5 sodium 128 alt 12 ast 22 bun 46 t bili 0.42 uric acid 3.2   Mag 1.93       7/22/24   Wbc 6.80 hgb 10.20 hematocrit 31.1 plt 181   Alb 2.8 ca 9.3 co2 23 chl 90 crt 5.49 gfr 10   Glucose 123 alk phos 99 potassium 3.8 t prot 5.9 sodium 130 alt 8 ast 21 bun 52 t bili 0.45 uric acid 3.3 mag 1.93   Sed 44  Crp 23.71        Assessment and plan:  Physical Deconditioning/Impaired mobility and ADLs/At risk for falling  Fall Precautions  PT/OT/ST to evaluate and treat  [facility] team to establish care plan meeting with patient and POA/family as appropriate  Anticipate DC on or before TBD; SW to assist patient/family w/ DC planning. Patient wants to discharge Friday 8/2.   DC Plan:  TBD      Post op wound infection R TMA 6/13 due to nonhealing ulcer and PVD   Excisional debridement to level of bone along with partial resection of R 1st and 2nd MT 6/27 /L foot 2nd digit amputation on 6/14   Excisional wound debridement to level of bone along with partial resection of second MT 6/27   Followed by specialist in hospital. Received HBO in hospital last day of completion on 7/12.   Vital signs q shift and prn   Labs weekly and prn cbc/cmp/crp/sed - fax results to [facility] [phone]  Follow up with ID Dr Bethel Kaplan ID   Follow up with Dr Nix vascular surgery   Follow up with Dr Meza Podiatry   In house ID follow  In house wound team follow   WB as tolerated   Dressing changes per wound recommendations   Outpatient wound follow up 8/1   IV Cefepime 2 gm 3x weekly until 8/15 - with weekly labs    Metronidazole 500 mg bid until 8/15   Bactrim DS 1 tab daily in PM after HD   Plavix 75 mg daily   Pain control- acetaminophen prn, Hydrocodone prn.  Start low dose Gabapentin 100 mg nightly to see how patient tolerates and if helps with pain.      Hypokalemia   7/29 potassium  at 3.2   Start Potassium chloride 20 meq daily x 3 days   Monitor labs     ESRD on HD   Outside dialysis MWF- dialysis at facility M-Fri   Avoid nephrotoxin   Follow up Nephrology      CAD s/p stents/ PAD s/p b/l LE stents/Chronic CHF w/LVH/ S/p AVR/ Atrial fibrillation /Hypertension / HL   In hospital not followed by cards but vascular. Seen Cards prior to surgery BETH 6/16 Aortic valve a bioprostehsis present mild to mod stenosis.  Heart failure controlled by dialysis.   Vital signs q shift and prn   Labs weekly and prn   Daily weights notify if greater than 2 lb increase in 1 day or 5 lbs in 1 week - stable   In house Cardiology team follow   Apixaban 2.5 mg bid   Atorvastatin 80 mg nightly   Coreg 12.5 mg bid   Plavix 75 mg daily      Diabetes  Prior insulin pump   Accu check ac/hs - 130-200s   Low carb diet   Insulin Lispro sliding scale   Lantus 10 units nightly   He is on insulin pump at home. Will need to resume at discharge      Anemia due to ESRD   Monitor labs      DESI   In house Pulm to follow   Cpap nightly        DVT Prophylaxis   Encourage early mobilization and participation in PT/OT as able  On Apixaban      Pain Management  Offer to pre-medicate 30-60 min prior to therapy  Physiatry evaluation with management appreciated  Acetaminophen 650 mg every 6 hrs prn   Hydrocodone-acetaminophen 5-325 mg 1 tab every 6 hrs prn      Bowel Management Regimen/Constipation   Monitor BM status   Senna s 1 tab bid   Miralax 17 gm daily prn      Vitamins/supplements as r/t deficiencies  [facility] RD to follow while in rehab; supplementation/diet as per [facility] RD  May continue home supplements  Calcium acetate   Ergocalciferol   Probiotic daily      LABS  CBC/CMP/CRP/SED weekly while in [facility] 8/5      *Follow-Up with PCP within 1-2 weeks following [facility] discharge.   *Follow-Up with specialists as recommended.  Follow up with Dr Nix in 1 week   Follow up with dr Meza Podiatry   Follow up with Dr Bethel [facility]   Follow up with   [facility]   No future appointments.     Your Appointments      Thursday August 01, 2024 9:00 AM  Wound Care Follow up with DELMI Wheeler  [facility] ([facility]) [address] [address]  [phone]                     This is a 45 minute visit and greater than 50% of the time was spent counseling the patient and/or coordinating care.           Note to patient: The 21st Century Cures Act makes medical notes like these available to patients in the interest of transparency. However, this is a medical document intended as peer to peer communication. It is written in medical language and may contain abbreviations or verbiage that are unfamiliar. It may appear blunt or direct. Medical documents are intended to carry relevant information, facts as evident, and the clinical opinion of the practitioner who signs the document.    DELMI Malave  7/30/2024

## 2024-07-31 NOTE — PROGRESS NOTES
CHIEF COMPLAINT:     Chief Complaint   Patient presents with    Wound Care     Patient is here for a wound care follow up. He denies any pain or new wound concerns. He states that his feet are \"heating up\" and is having difficulty walking when his feet feel \"hot\". He presents with adaptic, ABD, and gauze with ace wraps to the wounds.      HPI:   Information obtained from Patient, chart and family  7-18-24 INITIAL:  78 YO male with past medical history of CAD s/p stents, PAD s/p bilateral lower extremity stents, atrial fibrillation on Xarelto, diabetes with insulin pump, ESRD on HD, CHF with LVH status post AVR, history of stroke, hypertension, hyperlipidemia, DESI and recent TMA  6/13 per Dr Meza. He presented to hospital on 6-25w poor wound healing. He had further debridement to level of bone along with partial resection of the Right 1st and 2nd MT on 6/27 and had further debridement of foot on 7/5.   Patient was discharged on IV Cefepime with HD, continue PO bactrim and PO Flagyl through 8/15. . He did receive 4 HBO treatmenns while inpatient and then discharge to rehab facility. Podiatry suggested they would reconsider application of wound vac on follow up at [place] [facility].   Epic notes:  \"Hx of PVD arterial Ultrasound April likely more distal PAD require further amputation per vascular patient is not a candidate for any further revascularization and should have foot amputation if does not heal.\"  [place] is dressing wounds with betadine soaked gauze, he presents with son and spouse (who is a retired nurse).  Bedside clinic exam reveals palpable anterior tibials (at the ankles) and posterior tibia, patient also has pulsatile signals at the dp/pt/peronal/and plantar aspect. We discussed micro vs macro circulation. Will get tcom.  Once patient is safe in transfers and short distance ambulation family would like to care for him at home, at that point patient could then come for hbo again.  At this time  will utilize santyl. I did remove the staples from the right as they were no longer supporting the tissue and applied steristrips.  This incision is concerning for further dehiscence.     7-25-24 patient returns. Patient is getting tcom after his wound care appointment.  Dr. Moya not in hospital today. Will update him. Inflammatory markers esr 50>44,  crp 11>23.7.   Plan for next Thursday morning joint visit with dr. moya.  Patient has increased his ambulation with walker, he was able to ambulate >100ft with chair behind him.  He can get himself up from a chair to standing with minimal assist per spouse.  The wounds are dressed with Santyl.  The family did buy heel offloading boots.  Patient c/o burning pain.  I will touch base with apn at [facility] regarding potential gabapentin to help with the nerve pain.  Wounds debrided today.  The right is more concerning than the left. Both have exposed metarsals that are dry.  Will continue with santyl at this time, will run insurance for cellular tissue products.  Once patient is discharged from [facility] he could return to [facility].  We discussed that his safety in transfers and car transfers etc are the ultimate priority as he not only would need to go to appointments but he will need to go to dialysis.  ADDENDUM:  discussed tcom results as being positive for healing as well as response to o2.(See below for report)    8-1-24 patient returns.  dr moya is here in clinic as well. I did touch base with apn at [facility] and low dose gabapentin 100mg nightly to see if it helps with patient c/o pain. Patient states that it works until about 5 am and then his feet start burning again.  Keracis is covered at 100%.   Will debride and place keracis today.   MEDICATIONS:     Current Outpatient Medications:     gabapentin 100 MG Oral Cap, Take 1 capsule (100 mg total) by mouth at bedtime., Disp: , Rfl:     Potassium Chloride ER 20 MEQ Oral Tab CR, Take 20 mEq by mouth daily., Disp: , Rfl:      Insulin Lispro 100 UNIT/ML Subcutaneous Solution Cartridge, Inject 2-7 Units into the skin TID AC&HS., Disp: , Rfl:     insulin glargine 100 UNIT/ML Subcutaneous Solution, Inject 10 Units into the skin nightly., Disp: , Rfl:     Probiotic Product (PROBIOTIC DAILY OR), Take 1 capsule by mouth daily., Disp: , Rfl:     sennosides 8.6 MG Oral Tab, Take 1 tablet (8.6 mg total) by mouth 2 (two) times daily., Disp: , Rfl:     polyethylene glycol, PEG 3350, 17 g Oral Powd Pack, Take 17 g by mouth daily as needed (constipation)., Disp: , Rfl:     acetaminophen 325 MG Oral Tab, Take 2 tablets (650 mg total) by mouth every 6 (six) hours as needed for Pain., Disp: , Rfl:     HYDROcodone-acetaminophen 5-325 MG Oral Tab, Take 1 tablet by mouth every 6 (six) hours as needed for Pain., Disp: 30 tablet, Rfl: 0    apixaban 2.5 MG Oral Tab, Take 1 tablet (2.5 mg total) by mouth 2 (two) times daily., Disp: 60 tablet, Rfl: 0    sulfamethoxazole-trimethoprim -160 MG Oral Tab per tablet, Take 1 tablet by mouth daily. Schedule in PM after HD., Disp: 34 tablet, Rfl: 0    cefepime HCl (MAXIPIME) 1 GM/50ML SOLN, Inject 100 mL (2 g total) into the vein 3 (three) times a week. To be given with hemodialysis 3 times a week.  Weekly CBC with differential and CMP, sed rate and CRP.  Fax results to [facility]. Fax: [phone]. Tel: [phone].  PICC line care as per protocol., Disp: 1500 mL, Rfl: 0    metRONIDAZOLE 500 MG Oral Tab, Take 1 tablet (500 mg total) by mouth in the morning and 1 tablet (500 mg total) before bedtime., Disp: 74 tablet, Rfl: 0    dorzolamide 2 % Ophthalmic Solution, Place 1 drop into both eyes in the morning and 1 drop before bedtime., Disp: , Rfl:     ketoconazole 2 % External Shampoo, Apply 1 Application topically daily as needed for Itching., Disp: , Rfl:     Netarsudil-Latanoprost (ROCKLATAN) 0.02-0.005 % Ophthalmic Solution, Apply 1 drop to eye daily. Both eyes, Disp: , Rfl:     atorvastatin 80 MG  Oral Tab, Take 1 tablet (80 mg total) by mouth nightly., Disp: 90 tablet, Rfl: 2    CLOPIDOGREL 75 MG Oral Tab, TAKE 1 TABLET(75 MG) BY MOUTH DAILY, Disp: 90 tablet, Rfl: 0    Continuous Blood Gluc Sensor (DEXCOM G6 SENSOR) Does not apply Misc, 1 Device Every 10 days. (Patient not taking: Reported on 7/18/2024), Disp: 3 each, Rfl: 1    Insulin Lispro (HUMALOG) 100 UNIT/ML Subcutaneous Solution, 95 units per day via insulin pump. (Patient not taking: Reported on 7/23/2024), Disp: 90 mL, Rfl: 1    carvedilol 12.5 MG Oral Tab, Take 1 tablet (12.5 mg total) by mouth 2 (two) times daily with meals. Take one tablet (12.5mg total) by mouth two times a day, Disp: 180 tablet, Rfl: 3    Ergocalciferol (VITAMIN D2 OR), Take 50,000 Units by mouth every 30 (thirty) days., Disp: , Rfl:     Continuous Blood Gluc Transmit (DEXCOM G6 TRANSMITTER) Does not apply Misc, Change every 90 days (Patient not taking: Reported on 7/16/2024), Disp: 1 each, Rfl: 3    CALCIUM ACETATE 667 MG Oral Cap, TAKE 1 CAPSULE BY MOUTH THREE TIMES DAILY WITH MEALS, Disp: 270 capsule, Rfl: 0    COMBIGAN 0.2-0.5 % Ophthalmic Solution, Place 1 drop into both eyes 2 (two) times daily., Disp: , Rfl:     PTA Insulin via Pump, Inject into the skin continuous. humalog insulin  Medtronic Basal Rate : 55.6 standard rate 1.90 I:C - 1 units/4 carbs Correction Factor - unknown (Patient not taking: Reported on 7/16/2024), Disp: , Rfl:   ALLERGIES:     Allergies   Allergen Reactions    Augmentin [Amoxicillin-Pot Clavulanate] RASH    Lisinopril Coughing     Dyspnea        REVIEW OF SYSTEMS:   This information was obtained from the patient/family and chart.    See HPI for pertinent positives, otherwise 10 pt ROS negative.  HISTORY:   Past medical, surgical, family and social history updated where appropriate.      PHYSICAL EXAM:     Vitals:    08/01/24 0859   BP: 121/62  Comment: Pt refused glucose   Pulse: 61   Resp: 16   Temp: 98 °F (36.7 °C)          Estimated body mass  index is 28.62 kg/m² as calculated from the following:    Height as of 7/18/24: 72\".    Weight as of 7/18/24: 211 lb (95.7 kg).   POC Glucose   Date Value Ref Range Status   07/25/2024 225 (H) 70 - 99 mg/dL Final   07/18/2024 232 (H) 70 - 99 mg/dL Final   07/12/2024 150 (H) 70 - 99 mg/dL Final       Vital signs reviewed.Appears stated age, well groomed.    Constitutional:  Bp wnl for patient. Pulse Regular and wnl for patient. Respirations easy and unlabored. Temperature wnl. Elevated bmi. Appearance neat and clean. Appears in no acute distress. Well nourished and well developed.    Lower extremity:  at/pt palpable bilaterally. Extremities free of varicosities, scant edema. Capillary refill < 3 seconds. Bilateral open TMA.  Skin is dry. no hairgrowth on legs.    Musculoskeletal:  Patient is in wheelchair propelled by others, able to transfer with assist  Integumentary:  refer to wound characteristics and images   Psychiatric:  Judgment and insight intact. Alert and oriented times 3. Patient is quiet, calm, cooperative, and communicative.   EDEMA:   Calf  Point of Measurement - Left Calf: 36  Point of Measurement - Right Calf: 36  Left Calf from:: Heel  Calf Left cm:: 35  Right Calf from:: Heel  Right Calf cm:: 34.4  Ankle  Point of Measurement - Left Ankle: 10  Point of Measurement - Right Ankle: 10  Left Ankle from:: Heel  Left Ankle cm:: 23.8     Right Ankle from:: Heel  Right Ankle cm:: 23.8     DIAGNOSTICS:     Lab Results   Component Value Date    BUN 60 (H) 07/12/2024    CREATSERUM 6.78 (H) 07/12/2024    GFRCKDEPI 8.51 (L) 03/01/2022    ALB 1.9 (L) 07/09/2024    TP 5.8 (L) 07/09/2024    A1C 5.9 (H) 06/14/2024   Albumin 2.4/tp 5.0  7-15-24 ([place])  Albumin 2.8/tp 5.9   7-22-24    Lab Results   Component Value Date    ESRML 75 (H) 06/24/2024   ESR    50    [facility]-24 [facility]   ESR    44    7-22-24 ([place])    Lab Results   Component Value Date    CRP 18.50 (H) 06/24/2024    CRP 0.3 04/22/2014   CRP      11.0   7-15-24 ([place])  CRP      23.7  7-22-24 ([place])    7-25-24 TCOM  Lead                  Baseline             Oxygen challenge           Location     1                        79                      120                                 Left medial lower leg  2                        75                      105                                 Left foot dorsal surface  3                        49                      107                                 Rt medial lower leg  4                        61                      161                                 Rt foot dorsal surface    6-30-24 pathology left foot  Left foot, transmetatarsal amputation:  -Gangrenous necrosis of skin and soft tissue with underlying acute osteomyelitis.  -Bone margin with no significant acute inflammation.  -Viable soft tissue margin with calcific atherosclerosis.    6-27-24 pathology right foot & left  A.  Right foot tissue and bone, excision:  -Bone with acute osteomyelitis.     B.  Left foot second metatarsal bone, excision:  -Bone with acute osteomyelitis.  -Fibroadipose tissue with necrosis and suppurative inflammation.    5-22-24 operative report-dr salas  Findings: Widely patent bilateral common femoral, SFA, profunda, popliteal.  PT and peroneal patent to the foot however significant atresia of the vasculature into the foot with sparse collaterals.  AT reoccluded despite recent endovascular intervention.  No opacification to the toe wounds.  Deep venous arterialization performed in the right anterior tibial artery to vein however the veins in the foot were friable after 3 mm balloon angioplasty and there was no significant outflow and thus this was embolized.  The patient maintained outflow via the PT and peroneal bilaterally consistent with preoperative baseline.  Patient should proceed with TMA and should this not heal they understand the need for future major amputation.   WOUND ASSESSMENT:     Wound 07/18/24  #1 Foot Left (Active)   Date First Assessed/Time First Assessed: 07/18/24 1344    Wound Number (Wound Clinic Only): #1  Primary Wound Type: Diabetic Ulcer  Location: Foot  Wound Location Orientation: Left      Assessments 7/18/2024  2:00 PM 8/1/2024  9:03 AM   Wound Image        Drainage Amount Small Moderate   Drainage Description Serosanguineous Serosanguineous   Treatments Compression Compression   Wound Length (cm) 5.2 cm 4.6 cm   Wound Width (cm) 10.4 cm 11.5 cm   Wound Surface Area (cm^2) 54.08 cm^2 52.9 cm^2   Wound Depth (cm) 1 cm 0.9 cm   Wound Volume (cm^3) 54.08 cm^3 47.61 cm^3   Wound Healing % -- 12   Margins Well-defined edges Well-defined edges   Non-staged Wound Description Full thickness Full thickness   María-wound Assessment Clean;Dry;Blanchable erythema Dry;Moist;Edema   Wound Granulation Tissue Red;Spongy Spongy;Pink   Wound Bed Granulation (%) 15 % 40 %   Wound Bed Slough (%) 85 % --   Wound Odor None None   Exposed Structure Bone;Adipose Bone;Adipose   Shape 10staples to periwound 10% bone, 50% adipose   Tunneling? No No   Undermining? No No   Sinus Tracts? No No   Balbuena Scale Grade 4 Grade 4       Active Orders   Date Order Priority Status Authorizing Provider   08/01/24 1142 Cellular tissue product application Diabetic Ulcer Left Foot Routine Active Jenny Olmos APRN   08/01/24 1116 Debridement Diabetic Ulcer Left Foot Routine Active Jenny Olmos APRN       Inactive Orders   Date Order Priority Status Authorizing Provider   07/25/24 1445 Debridement Diabetic Ulcer Left Foot Routine Completed Jenny Olmos APRN       Wound 07/18/24 #2 right Foot Right (Active)   Date First Assessed/Time First Assessed: 07/18/24 1345    Wound Number (Wound Clinic Only): #2 right  Primary Wound Type: Diabetic Ulcer  Location: Foot  Wound Location Orientation: Right      Assessments 7/18/2024  1:57 PM 8/1/2024  9:05 AM   Wound Image        Drainage Amount Small Scant   Drainage Description  Serosanguineous Serosanguineous   Treatments Compression Compression   Wound Length (cm) 4.4 cm 2.8 cm   Wound Width (cm) 14 cm 13.7 cm   Wound Surface Area (cm^2) 61.6 cm^2 38.36 cm^2   Wound Depth (cm) 2.1 cm 1.5 cm   Wound Volume (cm^3) 129.36 cm^3 57.54 cm^3   Wound Healing % -- 56   Margins Well-defined edges Well-defined edges   Non-staged Wound Description Full thickness Full thickness   Hailey-wound Assessment Clean;Dry;Blanchable erythema Edema;Maceration;Moist   Wound Granulation Tissue Red;Pink;Spongy Spongy;Red   Wound Bed Granulation (%) 30 % 40 %   Wound Bed Epithelium (%) 20 % --   Wound Bed Slough (%) 50 % 60 %   Wound Odor None None   Exposed Structure Bone;Adipose Bone;Adipose   Shape 9 staples noted to hailey wound --   Tunneling? No No   Undermining? No No   Sinus Tracts? No No   Balbuena Scale Grade 4 Grade 4       Active Orders   Date Order Priority Status Authorizing Provider   08/01/24 1140 Cellular tissue product application Diabetic Ulcer Right Foot Routine Active Jenny Olmos APRN   08/01/24 1058 Debridement Diabetic Ulcer Right Foot Routine Active Jenny Olmos APRN       Inactive Orders   Date Order Priority Status Authorizing Provider   07/25/24 1445 Debridement Diabetic Ulcer Right Foot Routine Completed Jenny Olmos APRN       Wound 07/18/24 #3 Ankle Posterior;Right (Active)   Date First Assessed/Time First Assessed: 07/18/24 1347    Wound Number (Wound Clinic Only): #3  Primary Wound Type: Pressure Injury  Location: Ankle  Wound Location Orientation: Posterior;Right      Assessments 7/18/2024  2:07 PM 8/1/2024  9:08 AM   Wound Image       Drainage Amount None Small   Drainage Description -- Serosanguineous   Treatments Compression Compression   Wound Length (cm) 4.5 cm 4.7 cm   Wound Width (cm) 2.1 cm 1.5 cm   Wound Surface Area (cm^2) 9.45 cm^2 7.05 cm^2   Wound Depth (cm) 0.1 cm 0.1 cm   Wound Volume (cm^3) 0.945 cm^3 0.705 cm^3   Wound Healing % -- 25   Margins -- Well-defined  edges   Non-staged Wound Description -- Full thickness   Hailey-wound Assessment Dry;Blanchable erythema Moist;Blanchable erythema   Wound Granulation Tissue Pink;Firm Firm;Red   Wound Bed Granulation (%) 10 % 30 %   Wound Bed Epithelium (%) 10 % --   Wound Bed Slough (%) -- 70 %   Wound Bed Eschar (%) 80 % --   Wound Odor None None   Shape blister to hailey --   Tunneling? No No   Undermining? No No   Sinus Tracts? No No   Pressure Injury Stage Unstageable Unstageable       Inactive Orders   Date Order Priority Status Authorizing Provider   07/25/24 1442 Debridement Pressure Injury Posterior;Right Ankle Routine Completed Jenny Olmos APRN       Wound 07/18/24 #4 Right heel wound Heel Right (Active)   Date First Assessed/Time First Assessed: 07/18/24 1428    Wound Number (Wound Clinic Only): #4 Right heel wound  Primary Wound Type: Pressure Injury  Location: Heel  Wound Location Orientation: Right      Assessments 7/18/2024  2:31 PM 8/1/2024  9:10 AM   Wound Image       Drainage Amount None None   Treatments Compression Compression   Wound Length (cm) 0.5 cm 0.9 cm   Wound Width (cm) 1 cm 2.2 cm   Wound Surface Area (cm^2) 0.5 cm^2 1.98 cm^2   Wound Depth (cm) 0 cm 0 cm   Wound Volume (cm^3) 0 cm^3 0 cm^3   Margins Well-defined edges Well-defined edges   Non-staged Wound Description -- Not applicable   Hailey-wound Assessment Dry;Clean Dry;Clean   Wound Bed Epithelium (%) 100 % 100 %   Wound Odor None None   Tunneling? -- No   Undermining? -- No   Sinus Tracts? -- No   Pressure Injury Stage Deep Tissue Deep Tissue       No associated orders.       Compression Wrap 08/01/24 Foot Dorsal;Right (Active)   Placement Date/Time: 08/01/24 1147   Location: Foot  Wound Location Orientation: Dorsal;Right      Assessments 8/1/2024 11:47 AM   Response to Treatment Well tolerated   Compression Layers Multilayer   Compression Product Type Unna Boot   Dressing Applied Yes   Compression Wrap Location Toes to Knee   Compression Wrap  Status Clean;Dry       No associated orders.       Compression Wrap 08/01/24 Foot Dorsal;Left (Active)   Placement Date/Time: 08/01/24 1148   Location: Foot  Wound Location Orientation: Dorsal;Left      Assessments 8/1/2024 11:48 AM   Response to Treatment Well tolerated   Compression Layers Multilayer   Compression Product Type Unna Boot   Dressing Applied Yes   Compression Wrap Location Toes to Knee   Compression Wrap Status Clean;Dry       No associated orders.      PROCEDURE:      This procedure was medically necessary to promote wound healing by removing nonviable tissue, decrease chance of infection, and return the wound to an acute state.  See rn px notes  This procedure is medically necessary to nourish the wound bed with a skin substitute containing fat, protein, elastin, glycans and other natural skin elements.  The graft recruits the body's own cells and is ultimately converted into living tissue to assist in granulation production and more rapid healing.  See rn px notes    ASSESSMENT AND PLAN:    1. 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 Non-pressure chronic ulcer of other part of right foot with necrosis of bone (HCC)    3. Diabetic foot ulcer with osteomyelitis (HCC)    4. PAD (peripheral artery disease) (HCC)    5. ESRD (end stage renal disease) (HCC)            Risks, benefits, and alternatives of current treatment plan discussed in detail.  Questions and concerns addressed. Red flags to RTC or ED reviewed.  Patient (or parent) agrees to plan.      NOTE TO PATIENT: The 21st Century Cures Act makes clinical notes like these available to patients in the interest of transparency. Clinical notes are medical documents used by physicians and care providers to communicate with each other. These documents include medical language and terminology, abbreviations, and treatment information that may sound technical and at times possibly unfamiliar. In addition,  at times, the verbiage may appear blunt or direct. These documents are one tool providers use to communicate relevant information and clinical opinions of the care providers in a way that allows common understanding of the clinical context.   I spent  60 minutes with the patient. This time included:    preparing to see the patient (eg, review notes and recent diagnostics),  seeing the patient, obtaining and/or reviewing separately obtained history, performing a medically appropriate examination and/or evaluation, counseling and educating the patient, documenting in the record. Bill keracis placement only  DISCHARGE:      Patient Instructions   Please return:   Monday for RN visit for wound assessment, edema management, and if applicable reapplication of multilayer compression bandage system.    Thursday morning with Jenny  (next 4 weeks)    Patient discharge and wound care instructions  Darin Bowens  8/1/2024         DO NOT CHANGE DRESSINGS-KEEP DRY AND INTACT  Cleansing/dressing:     In clinic/ with each dressing change:    please cleanse the limb, foot, and between toes with soap, water and washcloth.   Dry thoroughly.    Cleanse/soak the wound with VASHE (or other hypochlorous wound cleanser), dab dry.    Apply the following dressings:     Left tma:  Keracis>sorbact>transfer>kerramax (RN visit: try to leave sorbact in place)  Right TMA:  Keracis>strip of transfer>steristrips (pull from plantar to dorsal)>full piece transfer over>kerramax (x3)   (RN visit if needed redo transfer strip and steristrips-if they are not soiled/macerated leave in place-change outer dressings only)  Right Achilles:  Honey gel>kerramax  Right heel :    PAINT WITH BETADINE>cover with abd pad     Weight bearing as tolerated    Offloading  Provide patient with bilateral heel lift boots (ie prevalon), these should be on whenever patient is in bed    Offloading Lower Extremities  Off-loading means no weight-bearing or anything  touching the area of the wound. If your doctor suggests that you should \"off-load\", he or she means that you are not to walk or bear weight on the extremity that has a wound, or the extremity where a wound appears likely to develop.  WHY DO I HAVE TO OFF-LOAD?  When you have a non-healing wound, you must do everything possible to give your body a chance to heal the wound. The weight of your body when you walk puts a large amount of pressure on your feet and ankles. This pressure keeps the new tissue from growing and inhibits new blood vessels from forming. If you continue to bear weight on a body part that has a wound, the time it takes to heal the wound increases, or, worse yet, the wound may not heal at all!  HOW DO I OFF-LOAD?  There are a number of ways to off-load your legs and feet.   The easiest way is to stay in bed, but of course, that isn't always possible.   You may also be prescribed a special shoe/boot/cast to allow you to be up on your feet periodically, but keep pressure off the wound when you are.  Finally if you are in bed, recliner, couch: your foot should not be touching anything except air.  Many times you can position a pillow from your knees to just above your ankles (along your calves) to keep your ankles, heels from resting on anything.     Offloading:  Off-loading is an important part of your wound treatment program. It is a part that only you can assure is accomplished. If you have any concerns about methods to off-load your wound, or feel that you might have trouble following the off-loading plan prescribed for you, talk to your doctor so that alternatives can be discussed that will work in your situation.    Patient should be up in chair with [facility] waffle cushion     Nutrition and blood sugar control:  Focus on the following:  Protein: Meats, beans, eggs, milk and yogurt particularly Greek yogurt), tofu, soy nuts, soy protein products (Follow the protein handout in your welcome  folder)  Vitamin C: Citrus fruits and juices, strawberries, tomatoes, tomato juice, peppers, baked potatoes, spinach, broccoli, cauliflower, [place] sprouts, cabbage  Vitamin A: Dark green, leafy vegetables, orange or yellow vegetables, cantaloupe, fortified dairy products, liver, fortified cereals  Zinc: Fortified cereals, red meats, seafood  PLEASE PROVIDE PATIENT 2 PACKETS OF Alex by [employer]. It can be purchased on amazon, [employer] website, or local pharmacy may be able to order it for you.  (These are essential branch chain amino acids that help with tissue building and wound healing).   When your blood sugar is consistently elevated greater than 180 your body can't heal or fight infection.     Concerns:  Signs of infection may include the following:  Increase in redness  Red \"streaks\" from wound  Increase in swelling  Fever  Unusual odor  Change in the amount of wound drainage     Should you experience any significant changes in your wound(s) or have any questions regarding your home care instructions please contact the [facility] @ [phone] If after regular business hours, please call your family doctor or local emergency room. The treatment plan has been discussed at length between you and your provider. Follow all instructions carefully, it is very important. If you do not follow all instructions you are at risk of your wound not healing, infection, possible loss of limb and even loss of life.    Jenny Olmos FNP-C, CWCN-AP, CFCN, CSWS, WCC, DWC  8/1/2024

## 2024-08-01 ENCOUNTER — SNF VISIT (OUTPATIENT)
Dept: INTERNAL MEDICINE CLINIC | Age: 79
End: 2024-08-01

## 2024-08-01 ENCOUNTER — OFFICE VISIT (OUTPATIENT)
Dept: WOUND CARE | Facility: HOSPITAL | Age: 79
End: 2024-08-01
Attending: NURSE PRACTITIONER
Payer: MEDICARE

## 2024-08-01 VITALS
SYSTOLIC BLOOD PRESSURE: 152 MMHG | DIASTOLIC BLOOD PRESSURE: 86 MMHG | OXYGEN SATURATION: 98 % | TEMPERATURE: 98 F | HEART RATE: 89 BPM | RESPIRATION RATE: 20 BRPM

## 2024-08-01 VITALS
HEART RATE: 61 BPM | RESPIRATION RATE: 16 BRPM | DIASTOLIC BLOOD PRESSURE: 62 MMHG | SYSTOLIC BLOOD PRESSURE: 121 MMHG | TEMPERATURE: 98 F

## 2024-08-01 DIAGNOSIS — L08.9 WOUND INFECTION: ICD-10-CM

## 2024-08-01 DIAGNOSIS — I50.32 CHRONIC DIASTOLIC HEART FAILURE (HCC): ICD-10-CM

## 2024-08-01 DIAGNOSIS — R53.1 WEAKNESS: ICD-10-CM

## 2024-08-01 DIAGNOSIS — E10.8 DIABETES MELLITUS TYPE 1, WITH COMPLICATION, ON LONG TERM INSULIN PUMP (HCC): ICD-10-CM

## 2024-08-01 DIAGNOSIS — M86.9 DIABETIC FOOT ULCER WITH OSTEOMYELITIS (HCC): ICD-10-CM

## 2024-08-01 DIAGNOSIS — E11.621 DIABETIC FOOT ULCER WITH OSTEOMYELITIS (HCC): ICD-10-CM

## 2024-08-01 DIAGNOSIS — T14.8XXA WOUND INFECTION: ICD-10-CM

## 2024-08-01 DIAGNOSIS — N18.6 ESRD (END STAGE RENAL DISEASE) (HCC): ICD-10-CM

## 2024-08-01 DIAGNOSIS — Z96.41 DIABETES MELLITUS TYPE 1, WITH COMPLICATION, ON LONG TERM INSULIN PUMP (HCC): ICD-10-CM

## 2024-08-01 DIAGNOSIS — I48.20 ATRIAL FIBRILLATION, CHRONIC (HCC): ICD-10-CM

## 2024-08-01 DIAGNOSIS — G62.9 NEUROPATHY: ICD-10-CM

## 2024-08-01 DIAGNOSIS — L97.524 NON-PRESSURE CHRONIC ULCER OF OTHER PART OF LEFT FOOT WITH NECROSIS OF BONE (HCC): Primary | ICD-10-CM

## 2024-08-01 DIAGNOSIS — L97.514 NON-PRESSURE CHRONIC ULCER OF OTHER PART OF RIGHT FOOT WITH NECROSIS OF BONE (HCC): ICD-10-CM

## 2024-08-01 DIAGNOSIS — E11.69 DIABETIC FOOT ULCER WITH OSTEOMYELITIS (HCC): ICD-10-CM

## 2024-08-01 DIAGNOSIS — L97.509 DIABETIC FOOT ULCER WITH OSTEOMYELITIS (HCC): ICD-10-CM

## 2024-08-01 DIAGNOSIS — N18.6 ESRD ON HEMODIALYSIS (HCC): ICD-10-CM

## 2024-08-01 DIAGNOSIS — I10 PRIMARY HYPERTENSION: ICD-10-CM

## 2024-08-01 DIAGNOSIS — Z99.2 ESRD ON HEMODIALYSIS (HCC): ICD-10-CM

## 2024-08-01 DIAGNOSIS — I73.9 PAD (PERIPHERAL ARTERY DISEASE) (HCC): ICD-10-CM

## 2024-08-01 DIAGNOSIS — I96 GANGRENE (HCC): Primary | ICD-10-CM

## 2024-08-01 PROCEDURE — 99309 SBSQ NF CARE MODERATE MDM 30: CPT | Performed by: NURSE PRACTITIONER

## 2024-08-01 PROCEDURE — 15275 SKIN SUB GRAFT FACE/NK/HF/G: CPT | Performed by: NURSE PRACTITIONER

## 2024-08-01 PROCEDURE — 15276 SKIN SUB GRAFT F/N/HF/G ADDL: CPT | Performed by: NURSE PRACTITIONER

## 2024-08-01 NOTE — PROGRESS NOTES
Darin Bowens, 1945, 79 year old, male    [place] hospital admission -24     Hospital Discharge Diagnoses:  Cellulitis of lower extremity   PVD  ESRD on HD   CAD s/p stents   PAD s/p b/l LE stents   Chronic CHF with LVH   S/P AVR   Atrial fibrillation   Diabetes   Anemia   Hypertension   Hyperlipidemia   DESI         HPI:  Darin Bowens  : 1945, Age 78 year old  male patient is admitted for subacute rehab. Patient has a past medical history of CAD status post stents, PAD status post bilateral lower extremity stents, atrial fibrillation on Xarelto, diabetes with insulin pump, ESRD on HD, CHF with LVH status post AVR, history of stroke, hypertension, hyperlipidemia, DESI and recent TMA left foot second digit amputation on  per Dr Meza presented to hospital with poor wound healing.  He had excisional debridement to level of bone along with partial resection of the Right 1st and 2nd MT on . Surgical cultures showed enterobacter cloacae and strenotrophomonas - discharge cultures form admit with enterobacter and acinetobacter- he is on IV Cefepime, Flagyl orally and Bactrim since  - ID to have patient discharge with IV Cefepime with HD, continue PO bactrim and PO Flagyl through 8/15. He had further debridement of foot on . He was to receive HBO until  then discharge to rehab facility. Podiatry suggested they would reconsider application of wound vac on follow up at [facility] - patient to see Dr Donaldson in wound clinic.   Hx of PVD arterial Ultrasound April likely more distal PAD require further amputation per vascular patient is not a candidate for any further revascularization and should have foot amputation if does not heal.  ESRD malfunctioning AVF- vascular consulted s/p tunneled catheter on . Ongoing anemia suspected from ABLA surgery had PRBC on 7/3- he is back on blood thinners. Admitting to [facility] for nursing care, medication management, and  PT/OT/ST eval and tx.    Chief Complaint:    Chief Complaint   Patient presents with    Follow - Up        Subjective:   TODAY:  Patient was seen for follow up visit   He is seen up in dialysis   He is not happy about staying at facility would like to go home as soon as possible   Therapy states wife was coming in today to work on car transfers  Lower extremity pain- burning sensation - Gabapentin started recently and states it is helping but wears off- will consider increasing   Seen by wound team today 8/15 - debridement keracis covered at 100%   In house wound team/podiatry following- wound appt 8/1   He is WB as tolerated to get stronger to go home   No shortness of breath, cough or wheeze  No chest pain or palpitations  No nausea, vomiting, constipation or diarrhea     Objective:  /86   Pulse 89   Temp 98.2 °F (36.8 °C)   Resp 20   SpO2 98%     PHYSICAL EXAM:  GENERAL HEALTH: 79 year old male patient, no acute distress   LINES, TUBES, DRAINS:  Left upper chest port - CDI- dialysis running currently   SKIN: no rashes, no suspicious lesions  WOUND: see wound assessment   Bilateral lower legs- dressings in place-cdi   EYES: PERRLA, conjunctiva normal; no drainage from eyes  HENT: normocephalic; normal nose, no nasal drainage, mucous membranes pink, moist  RESPIRATORY:clear to percussion and auscultation, No wheezing/cough/accessory muscle use; on room air  CARDIOVASCULAR: S1, S2 normal, RRR; no S3, no S4;   ABDOMEN: normal active BS+, soft, non-distended; no apparent masses; observed, non-tender, no guarding during physical exam  : ESRD on HD   MUSCULOSKELETAL: no acute synovitis upper or lower extremity.  Weakness R/T recent hospitalization/diagnoses/sequelae; will undergo therapies to rehab and improve strength, endurance and independence w/ ADLs as able  Dressings to bilateral lower extremities   EXTREMITIES/VASCULAR: no cyanosis, clubbing or edema  Bilateral lower extremities- dressing  cdi  NEUROLOGIC: intact; no sensorimotor deficit  PSYCHIATRIC: alert and oriented x 3; affect appropriate      Medications reviewed: Yes      Current Outpatient Medications:     gabapentin 100 MG Oral Cap, Take 1 capsule (100 mg total) by mouth at bedtime., Disp: , Rfl:     Potassium Chloride ER 20 MEQ Oral Tab CR, Take 20 mEq by mouth daily., Disp: , Rfl:     Insulin Lispro 100 UNIT/ML Subcutaneous Solution Cartridge, Inject 2-7 Units into the skin TID AC&HS., Disp: , Rfl:     insulin glargine 100 UNIT/ML Subcutaneous Solution, Inject 10 Units into the skin nightly., Disp: , Rfl:     Probiotic Product (PROBIOTIC DAILY OR), Take 1 capsule by mouth daily., Disp: , Rfl:     polyethylene glycol, PEG 3350, 17 g Oral Powd Pack, Take 17 g by mouth daily as needed (constipation)., Disp: , Rfl:     acetaminophen 325 MG Oral Tab, Take 2 tablets (650 mg total) by mouth every 6 (six) hours as needed for Pain., Disp: , Rfl:     HYDROcodone-acetaminophen 5-325 MG Oral Tab, Take 1 tablet by mouth every 6 (six) hours as needed for Pain., Disp: 30 tablet, Rfl: 0    apixaban 2.5 MG Oral Tab, Take 1 tablet (2.5 mg total) by mouth 2 (two) times daily., Disp: 60 tablet, Rfl: 0    sulfamethoxazole-trimethoprim -160 MG Oral Tab per tablet, Take 1 tablet by mouth daily. Schedule in PM after HD., Disp: 34 tablet, Rfl: 0    cefepime HCl (MAXIPIME) 1 GM/50ML SOLN, Inject 100 mL (2 g total) into the vein 3 (three) times a week. To be given with hemodialysis 3 times a week.  Weekly CBC with differential and CMP, sed rate and CRP.  Fax results to [facility]. Fax: [phone]. Tel: [phone].  PICC line care as per protocol., Disp: 1500 mL, Rfl: 0    metRONIDAZOLE 500 MG Oral Tab, Take 1 tablet (500 mg total) by mouth in the morning and 1 tablet (500 mg total) before bedtime., Disp: 74 tablet, Rfl: 0    dorzolamide 2 % Ophthalmic Solution, Place 1 drop into both eyes in the morning and 1 drop before bedtime., Disp: , Rfl:      ketoconazole 2 % External Shampoo, Apply 1 Application topically daily as needed for Itching., Disp: , Rfl:     Netarsudil-Latanoprost (ROCKLATAN) 0.02-0.005 % Ophthalmic Solution, Apply 1 drop to eye daily. Both eyes, Disp: , Rfl:     atorvastatin 80 MG Oral Tab, Take 1 tablet (80 mg total) by mouth nightly., Disp: 90 tablet, Rfl: 2    CLOPIDOGREL 75 MG Oral Tab, TAKE 1 TABLET(75 MG) BY MOUTH DAILY, Disp: 90 tablet, Rfl: 0    carvedilol 12.5 MG Oral Tab, Take 1 tablet (12.5 mg total) by mouth 2 (two) times daily with meals. Take one tablet (12.5mg total) by mouth two times a day, Disp: 180 tablet, Rfl: 3    Ergocalciferol (VITAMIN D2 OR), Take 50,000 Units by mouth every 30 (thirty) days., Disp: , Rfl:     CALCIUM ACETATE 667 MG Oral Cap, TAKE 1 CAPSULE BY MOUTH THREE TIMES DAILY WITH MEALS, Disp: 270 capsule, Rfl: 0    COMBIGAN 0.2-0.5 % Ophthalmic Solution, Place 1 drop into both eyes 2 (two) times daily., Disp: , Rfl:     sennosides 8.6 MG Oral Tab, Take 1 tablet (8.6 mg total) by mouth 2 (two) times daily., Disp: , Rfl:     Continuous Blood Gluc Sensor (DEXCOM G6 SENSOR) Does not apply Misc, 1 Device Every 10 days. (Patient not taking: Reported on 7/18/2024), Disp: 3 each, Rfl: 1    Insulin Lispro (HUMALOG) 100 UNIT/ML Subcutaneous Solution, 95 units per day via insulin pump. (Patient not taking: Reported on 7/23/2024), Disp: 90 mL, Rfl: 1    Continuous Blood Gluc Transmit (DEXCOM G6 TRANSMITTER) Does not apply Misc, Change every 90 days (Patient not taking: Reported on 7/16/2024), Disp: 1 each, Rfl: 3    PTA Insulin via Pump, Inject into the skin continuous. humalog insulin  Medtronic Basal Rate : 55.6 standard rate 1.90 I:C - 1 units/4 carbs Correction Factor - unknown (Patient not taking: Reported on 7/16/2024), Disp: , Rfl:       Diagnostics reviewed:    7/30/24  A1c 8.6   Wbc 5.6 hgb 9 plt 321         7/29/24  Wbc 7.30 hgb 9.0 hematocrit 28.2 plt 182   Alb 2.7 ca 9.6 co2 21 chl 87 crt 5.26 gfr 10.6    Glucose 101 alk phos 95 potassium 3.2 t prot 4.6 sodium 131   Alt 11 ast 23 bun 54 t bili 0.5 uric acid 3.2   Mag 2.12 phos 2.9   Sed 19 crp 6.69   \      7/25/24  Wbc 5.70 hgb 9.3 hematocrit 29.0 plt 158   Alb  2.5 ca 9.2 co2 25 chl 88 crt 4.57 gfr 12.5   Glucose 132 alk phos 89 potassium 3.4   T prot 4.5 sodium 128 alt 12 ast 22 bun 46 t bili 0.42 uric acid 3.2   Mag 1.93       7/22/24   Wbc 6.80 hgb 10.20 hematocrit 31.1 plt 181   Alb 2.8 ca 9.3 co2 23 chl 90 crt 5.49 gfr 10   Glucose 123 alk phos 99 potassium 3.8 t prot 5.9 sodium 130 alt 8 ast 21 bun 52 t bili 0.45 uric acid 3.3 mag 1.93   Sed 44  Crp 23.71    Therapy update 8/1/24   Bed Mobility Min A -CGA   Amb  ft RW Min A-CGA  Trans in Car Min A   Upper Body- Stand by assist   Lower body- Min A         Assessment and plan:  Physical Deconditioning/Impaired mobility and ADLs/At risk for falling  Fall Precautions  PT/OT/ST to evaluate and treat  [facility] team to establish care plan meeting with patient and POA/family as appropriate  Anticipate DC on or before TBD; SW to assist patient/family w/ DC planning. Plans to discharge home with wife.     DC Plan:  TBD      Post op wound infection R TMA 6/13 due to nonhealing ulcer and PVD   Excisional debridement to level of bone along with partial resection of R 1st and 2nd MT 6/27 /L foot 2nd digit amputation on 6/14   Excisional wound debridement to level of bone along with partial resection of second MT 6/27   Followed by specialist in hospital. Received HBO in hospital last day of completion on 7/12.   Vital signs q shift and prn   Labs weekly and prn cbc/cmp/crp/sed - fax results to [facility] [phone]  Follow up with ID Dr Bethel Kaplan ID   Follow up with Dr Nix vascular surgery   Follow up with Dr Meza Podiatry   In house ID follow  In house wound team follow   WB as tolerated   Dressing changes per wound recommendations   Outpatient wound follow up as scheduled   IV Cefepime 2 gm 3x weekly until  8/15 - with weekly labs    Metronidazole 500 mg bid until 8/15   Bactrim DS 1 tab daily in PM after HD   Plavix 75 mg daily   Pain control- acetaminophen prn, Hydrocodone prn.  Start low dose Gabapentin 100 mg nightly to see how patient tolerates and if helps with pain.- consider increasing next week if continues to tolerate and helps      Hypokalemia   7/29 potassium at 3.2   Start Potassium chloride 20 meq daily x 3 days   Monitor labs     ESRD on HD   Outside dialysis MWF- dialysis at facility M-Fri   Avoid nephrotoxin   Follow up Nephrology      CAD s/p stents/ PAD s/p b/l LE stents/Chronic CHF w/LVH/ S/p AVR/ Atrial fibrillation /Hypertension / HL   In hospital not followed by cards but vascular. Seen Cards prior to surgery BETH 6/16 Aortic valve a bioprostehsis present mild to mod stenosis.  Heart failure controlled by dialysis.   Vital signs q shift and prn   Labs weekly and prn   Daily weights notify if greater than 2 lb increase in 1 day or 5 lbs in 1 week - stable   In house Cardiology team follow   Apixaban 2.5 mg bid   Atorvastatin 80 mg nightly   Coreg 12.5 mg bid   Plavix 75 mg daily      Diabetes  Prior insulin pump   Accu check ac/hs   Low carb diet   Insulin Lispro sliding scale   Lantus 10 units nightly   He is on insulin pump at home. Will need to resume at discharge      Anemia due to ESRD   Monitor labs      DESI   In house Pulm to follow   Cpap nightly        DVT Prophylaxis   Encourage early mobilization and participation in PT/OT as able  On Apixaban      Pain Management  Offer to pre-medicate 30-60 min prior to therapy  Physiatry evaluation with management appreciated  Acetaminophen 650 mg every 6 hrs prn   Hydrocodone-acetaminophen 5-325 mg 1 tab every 6 hrs prn      Bowel Management Regimen/Constipation   Monitor BM status   Senna s 1 tab bid   Miralax 17 gm daily prn      Vitamins/supplements as r/t deficiencies  RORO RD to follow while in rehab; supplementation/diet as per RORO RD  May  continue home supplements  Calcium acetate   Ergocalciferol   Probiotic daily      LABS  CBC/CMP/CRP/SED weekly while in [facility] 8/5      *Follow-Up with PCP within 1-2 weeks following [facility] discharge.   *Follow-Up with specialists as recommended.  Follow up with Dr Nix in 1 week   Follow up with dr Meza Podiatry   Follow up with Dr Bethel SNEED   Follow up with Dr Flores Nephrology   No future appointments.     Your Appointments      Monday August 05, 2024 10:00 AM  Wound Care Nurse Visit with WOUND CARE NURSE  [facility] ([facility]) [address] [place] [id number]  [phone]        Thursday August 08, 2024 8:00 AM  Wound Care Follow up with DELMI Wheeler  [facility] ([facility]) [address] [place] [address]  [phone]        Thursday August 15, 2024 9:30 AM  Wound Care Follow up with Jenny Olmos APRHETAL  [facility] ([facility]) [address] [place] [address]  [phone]        Thursday August 22, 2024 10:00 AM  Wound Care Follow up with Jenny Olmos APRHETAL  [facility] ([facility]) [address] [place] [address]  [phone]        Thursday August 29, 2024 10:00 AM  Wound Care Follow up with Jenny Olmos APRHETAL  [facility] ([facility]) [address] [place] [address]  [phone]                   This is a 45 minute visit and greater than 50% of the time was spent counseling the patient and/or coordinating care.           Note to patient: The 21st Century Cures Act makes medical notes like these available to patients in the interest of transparency. However, this is a medical document intended as peer to peer communication. It is written in medical language and may contain abbreviations or verbiage that are  unfamiliar. It may appear blunt or direct. Medical documents are intended to carry relevant information, facts as evident, and the clinical opinion of the practitioner who signs the document.    Rose Reid, APRN  8/1/2024

## 2024-08-01 NOTE — PROGRESS NOTES
.Weekly Wound Education Note    Teaching Provided To: Patient;Family;Caregiver  Training Topics: Discharge instructions;Dressing;Skin substitute;Edema control;Compression;Off-loading  Training Method: Explain/Verbal;Written  Training Response: Patient responds and understands;Reinforcement needed            First application of Kerecis to right and left foot wounds.  Right foot hydrofera transfer strip and steri strips placed.  Covered with kerramax and medipore tape.  Left foot covered with sorbact, hydrofera transfer, kerramax.  Calamine unna boot 20-30mmHg to BLE.  Honey gel to ankle wound, covered with abd pad.  Betadine to heel wound.  Dressings and wraps to stay in place until next visit.

## 2024-08-01 NOTE — PROGRESS NOTES
Patient ID: Cricket Bowens is a 79 year old male.    Debridement Diabetic Ulcer Right Foot   Wound 07/18/24 #2 right Foot Right    Performed by: Jenny Olmos APRN  Authorized by: Jenny Olmos APRN      Consent   Consent obtained? verbal  Consent given by: patient    Debridement Details  Performed by: NP  Debridement type: surgical  Level of debridement: bone  Pain control: lidocaine 4%  Pain control administration type: topical    Pre-debridement measurements  Length (cm): 2.8  Width (cm): 13.7  Depth (cm): 1.5  Surface Area (cm^2): 38.36    Post-debridement measurements  Length (cm): 2.8  Width (cm): 13.7  Depth (cm): 1.7  Percent debrided: 100%  Surface Area (cm^2): 38.36  Area Debrided (cm^2): 38.36  Volume (cm^3): 65.21    Tissue and other material debrided: adipose, bone and tendon  Devitalized tissue debrided: biofilm, clots and slough  Instrument(s) utilized: blade, curette, scissors and forceps  Bleeding: medium  Hemostasis obtained with: pressure  Procedural pain (0-10): 0  Post-procedural pain: 0   Response to treatment: procedure was tolerated well    Debridement Diabetic Ulcer Left Foot   Wound 07/18/24 #1 Foot Left    Performed by: Jenny Olmos APRN  Authorized by: Jenny Olmos APRN      Consent   Consent obtained? verbal  Consent given by: patient    Debridement Details  Performed by: NP  Debridement type: surgical  Level of debridement: bone  Pain control: lidocaine 4%  Pain control administration type: topical    Pre-debridement measurements  Length (cm): 4.6  Width (cm): 11.5  Depth (cm): 0.9  Surface Area (cm^2): 52.9    Post-debridement measurements  Length (cm): 4.6  Width (cm): 11.5  Depth (cm): 1.1  Percent debrided: 100%  Surface Area (cm^2): 52.9  Area Debrided (cm^2): 52.9  Volume (cm^3): 58.19    Tissue and other material debrided: adipose, bone and cartilage  Devitalized tissue debrided: biofilm and slough  Instrument(s) utilized: curette, blade, forceps and  scissors  Bleeding: small  Hemostasis obtained with: pressure  Procedural pain (0-10): 0  Post-procedural pain: 0   Response to treatment: procedure was tolerated well

## 2024-08-01 NOTE — PROGRESS NOTES
Patient ID: Cricket Bowens is a 79 year old male.    Cellular tissue product application Diabetic Ulcer Right Foot    Date/Time: 8/1/2024 11:40 AM    Performed by: Jenny Olmos APRN  Authorized by: Jenny Olmos APRN  Associated wounds:   Wound 07/18/24 #2 right Foot Right  Consent:     Consent obtained:  Verbal    Consent given by:  Patient  Anesthesia (see MAR for exact dosages):     Anesthesia method:  Topical application  Procedure details:     Product applied:  Kerecis omega3    Product lot #:  [id number]    Product expiration:  3/1/2026    Amount used (cm^2):  49    Amount wasted (cm^2):  0    Secured/Fixated: Yes      Secured/Fixated with:  Hydrofera transfer, steri strips, kerramax  Post-procedure details:     Patient tolerance of procedure:  Tolerated well, no immediate complications  Cellular tissue product application Diabetic Ulcer Left Foot    Date/Time: 8/1/2024 11:42 AM    Performed by: Jenny Olmos APRN  Authorized by: Jenny Olmos APRN  Associated wounds:   Wound 07/18/24 #1 Foot Left  Consent:     Consent obtained:  Verbal    Consent given by:  Patient  Anesthesia (see MAR for exact dosages):     Anesthesia method:  Topical application  Procedure details:     Location:  head/hands/feet/digits/genitalia    Product applied:  Kerecis omega3    Product lot #:  [id number]    Product expiration:  3/1/2026    Amount used (cm^2):  8    Amount wasted (cm^2):  0    Secured/Fixated: Yes      Secured/Fixated with:  Sorbact, hydrofera transfer, kerramax  Post-procedure details:     Patient tolerance of procedure:  Tolerated well, no immediate complications

## 2024-08-01 NOTE — PATIENT INSTRUCTIONS
Please return:   Monday for RN visit for wound assessment, edema management, and if applicable reapplication of multilayer compression bandage system.    Thursday morning with Jenny  (next 4 weeks)    Patient discharge and wound care instructions  Darin MCINTYRE Gogo  8/1/2024         DO NOT CHANGE DRESSINGS-KEEP DRY AND INTACT  Cleansing/dressing:     In clinic/ with each dressing change:    please cleanse the limb, foot, and between toes with soap, water and washcloth.   Dry thoroughly.    Cleanse/soak the wound with VASHE (or other hypochlorous wound cleanser), dab dry.    Apply the following dressings:     Left tma:  Keracis>sorbact>transfer>kerramax (RN visit: try to leave sorbact in place)  Right TMA:  Keracis>strip of transfer>steristrips (pull from plantar to dorsal)>full piece transfer over>kerramax (x3)   (RN visit if needed redo transfer strip and steristrips-if they are not soiled/macerated leave in place-change outer dressings only)  Right Achilles:  Honey gel>kerramax  Right heel :    PAINT WITH BETADINE>cover with abd pad     Weight bearing as tolerated    Offloading  Provide patient with bilateral heel lift boots (ie prevalon), these should be on whenever patient is in bed    Offloading Lower Extremities  Off-loading means no weight-bearing or anything touching the area of the wound. If your doctor suggests that you should \"off-load\", he or she means that you are not to walk or bear weight on the extremity that has a wound, or the extremity where a wound appears likely to develop.  WHY DO I HAVE TO OFF-LOAD?  When you have a non-healing wound, you must do everything possible to give your body a chance to heal the wound. The weight of your body when you walk puts a large amount of pressure on your feet and ankles. This pressure keeps the new tissue from growing and inhibits new blood vessels from forming. If you continue to bear weight on a body part that has a wound, the time it takes to heal the  wound increases, or, worse yet, the wound may not heal at all!  HOW DO I OFF-LOAD?  There are a number of ways to off-load your legs and feet.   The easiest way is to stay in bed, but of course, that isn't always possible.   You may also be prescribed a special shoe/boot/cast to allow you to be up on your feet periodically, but keep pressure off the wound when you are.  Finally if you are in bed, recliner, couch: your foot should not be touching anything except air.  Many times you can position a pillow from your knees to just above your ankles (along your calves) to keep your ankles, heels from resting on anything.     Offloading:  Off-loading is an important part of your wound treatment program. It is a part that only you can assure is accomplished. If you have any concerns about methods to off-load your wound, or feel that you might have trouble following the off-loading plan prescribed for you, talk to your doctor so that alternatives can be discussed that will work in your situation.    Patient should be up in chair with EHOB waffle cushion     Nutrition and blood sugar control:  Focus on the following:  Protein: Meats, beans, eggs, milk and yogurt particularly Greek yogurt), tofu, soy nuts, soy protein products (Follow the protein handout in your welcome folder)  Vitamin C: Citrus fruits and juices, strawberries, tomatoes, tomato juice, peppers, baked potatoes, spinach, broccoli, cauliflower, [place] sprouts, cabbage  Vitamin A: Dark green, leafy vegetables, orange or yellow vegetables, cantaloupe, fortified dairy products, liver, fortified cereals  Zinc: Fortified cereals, red meats, seafood  PLEASE PROVIDE PATIENT 2 PACKETS OF Alex by [employer]. It can be purchased on amazon, [employer], or local pharmacy may be able to order it for you.  (These are essential branch chain amino acids that help with tissue building and wound healing).   When your blood sugar is consistently elevated greater than 180  your body can't heal or fight infection.     Concerns:  Signs of infection may include the following:  Increase in redness  Red \"streaks\" from wound  Increase in swelling  Fever  Unusual odor  Change in the amount of wound drainage     Should you experience any significant changes in your wound(s) or have any questions regarding your home care instructions please contact the wound center [facility] @ [phone] If after regular business hours, please call your family doctor or local emergency room. The treatment plan has been discussed at length between you and your provider. Follow all instructions carefully, it is very important. If you do not follow all instructions you are at risk of your wound not healing, infection, possible loss of limb and even loss of life.

## 2024-08-05 ENCOUNTER — OFFICE VISIT (OUTPATIENT)
Dept: WOUND CARE | Facility: HOSPITAL | Age: 79
End: 2024-08-05
Attending: NURSE PRACTITIONER
Payer: MEDICARE

## 2024-08-05 VITALS
HEART RATE: 69 BPM | TEMPERATURE: 98 F | SYSTOLIC BLOOD PRESSURE: 130 MMHG | RESPIRATION RATE: 14 BRPM | DIASTOLIC BLOOD PRESSURE: 63 MMHG

## 2024-08-05 DIAGNOSIS — E11.621 DIABETIC FOOT ULCER WITH OSTEOMYELITIS (HCC): ICD-10-CM

## 2024-08-05 DIAGNOSIS — L97.524 NON-PRESSURE CHRONIC ULCER OF OTHER PART OF LEFT FOOT WITH NECROSIS OF BONE (HCC): Primary | ICD-10-CM

## 2024-08-05 DIAGNOSIS — L97.514 NON-PRESSURE CHRONIC ULCER OF OTHER PART OF RIGHT FOOT WITH NECROSIS OF BONE (HCC): ICD-10-CM

## 2024-08-05 DIAGNOSIS — M86.9 DIABETIC FOOT ULCER WITH OSTEOMYELITIS (HCC): ICD-10-CM

## 2024-08-05 DIAGNOSIS — E11.69 DIABETIC FOOT ULCER WITH OSTEOMYELITIS (HCC): ICD-10-CM

## 2024-08-05 DIAGNOSIS — L97.509 DIABETIC FOOT ULCER WITH OSTEOMYELITIS (HCC): ICD-10-CM

## 2024-08-05 LAB — GLUCOSE BLD-MCNC: 235 MG/DL (ref 70–99)

## 2024-08-05 PROCEDURE — 82962 GLUCOSE BLOOD TEST: CPT

## 2024-08-05 PROCEDURE — 29581 APPL MULTLAYER CMPRN SYS LEG: CPT

## 2024-08-05 NOTE — PROGRESS NOTES
Chief Complaint   Patient presents with    Wound Care     Patient is here for a nurse visit for wounds to both feet.  Old blood noted on outside of left foot wrap.           Current Outpatient Medications:     gabapentin 100 MG Oral Cap, Take 1 capsule (100 mg total) by mouth at bedtime., Disp: , Rfl:     Insulin Lispro 100 UNIT/ML Subcutaneous Solution Cartridge, Inject 2-7 Units into the skin TID AC&HS., Disp: , Rfl:     insulin glargine 100 UNIT/ML Subcutaneous Solution, Inject 10 Units into the skin nightly., Disp: , Rfl:     Probiotic Product (PROBIOTIC DAILY OR), Take 1 capsule by mouth daily., Disp: , Rfl:     sennosides 8.6 MG Oral Tab, Take 1 tablet (8.6 mg total) by mouth 2 (two) times daily., Disp: , Rfl:     polyethylene glycol, PEG 3350, 17 g Oral Powd Pack, Take 17 g by mouth daily as needed (constipation)., Disp: , Rfl:     acetaminophen 325 MG Oral Tab, Take 2 tablets (650 mg total) by mouth every 6 (six) hours as needed for Pain., Disp: , Rfl:     HYDROcodone-acetaminophen 5-325 MG Oral Tab, Take 1 tablet by mouth every 6 (six) hours as needed for Pain., Disp: 30 tablet, Rfl: 0    apixaban 2.5 MG Oral Tab, Take 1 tablet (2.5 mg total) by mouth 2 (two) times daily., Disp: 60 tablet, Rfl: 0    sulfamethoxazole-trimethoprim -160 MG Oral Tab per tablet, Take 1 tablet by mouth daily. Schedule in PM after HD., Disp: 34 tablet, Rfl: 0    cefepime HCl (MAXIPIME) 1 GM/50ML SOLN, Inject 100 mL (2 g total) into the vein 3 (three) times a week. To be given with hemodialysis 3 times a week.  Weekly CBC with differential and CMP, sed rate and CRP.  Fax results to [facility]. Fax: [phone]. Tel: [phone].  PICC line care as per protocol., Disp: 1500 mL, Rfl: 0    metRONIDAZOLE 500 MG Oral Tab, Take 1 tablet (500 mg total) by mouth in the morning and 1 tablet (500 mg total) before bedtime., Disp: 74 tablet, Rfl: 0    dorzolamide 2 % Ophthalmic Solution, Place 1 drop into both eyes in the  morning and 1 drop before bedtime., Disp: , Rfl:     ketoconazole 2 % External Shampoo, Apply 1 Application topically daily as needed for Itching., Disp: , Rfl:     Netarsudil-Latanoprost (ROCKLATAN) 0.02-0.005 % Ophthalmic Solution, Apply 1 drop to eye daily. Both eyes, Disp: , Rfl:     atorvastatin 80 MG Oral Tab, Take 1 tablet (80 mg total) by mouth nightly., Disp: 90 tablet, Rfl: 2    CLOPIDOGREL 75 MG Oral Tab, TAKE 1 TABLET(75 MG) BY MOUTH DAILY, Disp: 90 tablet, Rfl: 0    Continuous Blood Gluc Sensor (DEXCOM G6 SENSOR) Does not apply Misc, 1 Device Every 10 days. (Patient not taking: Reported on 7/18/2024), Disp: 3 each, Rfl: 1    Insulin Lispro (HUMALOG) 100 UNIT/ML Subcutaneous Solution, 95 units per day via insulin pump. (Patient not taking: Reported on 7/23/2024), Disp: 90 mL, Rfl: 1    carvedilol 12.5 MG Oral Tab, Take 1 tablet (12.5 mg total) by mouth 2 (two) times daily with meals. Take one tablet (12.5mg total) by mouth two times a day, Disp: 180 tablet, Rfl: 3    Ergocalciferol (VITAMIN D2 OR), Take 50,000 Units by mouth every 30 (thirty) days., Disp: , Rfl:     Continuous Blood Gluc Transmit (DEXCOM G6 TRANSMITTER) Does not apply Misc, Change every 90 days (Patient not taking: Reported on 7/16/2024), Disp: 1 each, Rfl: 3    CALCIUM ACETATE 667 MG Oral Cap, TAKE 1 CAPSULE BY MOUTH THREE TIMES DAILY WITH MEALS, Disp: 270 capsule, Rfl: 0    COMBIGAN 0.2-0.5 % Ophthalmic Solution, Place 1 drop into both eyes 2 (two) times daily., Disp: , Rfl:     PTA Insulin via Pump, Inject into the skin continuous. humalog insulin  Medtronic Basal Rate : 55.6 standard rate 1.90 I:C - 1 units/4 carbs Correction Factor - unknown (Patient not taking: Reported on 7/16/2024), Disp: , Rfl:     Allergies   Allergen Reactions    Augmentin [Amoxicillin-Pot Clavulanate] RASH    Lisinopril Coughing     Dyspnea            HISTORY:     Past medical, surgical, family and social history updated where appropriate.    PHYSICAL EXAM:    /63   Pulse 69   Temp 98.2 °F (36.8 °C)   Resp 14        Vital signs reviewed.      Calf  Point of Measurement - Left Calf: 36  Point of Measurement - Right Calf: 36     Calf Left cm:: 35.2     Right Calf cm:: 33.6    Ankle  Point of Measurement - Left Ankle: 10  Point of Measurement - Right Ankle: 10     Left Ankle cm:: 26        Right Ankle cm:: 23.5       Wound 07/18/24 #1 Foot Left (Active)   Date First Assessed/Time First Assessed: 07/18/24 1344    Wound Number (Wound Clinic Only): #1  Primary Wound Type: Diabetic Ulcer  Location: Foot  Wound Location Orientation: Left      Assessments 7/18/2024  2:00 PM 8/5/2024 10:13 AM   Wound Image        Drainage Amount Small Copious   Drainage Description Serosanguineous Sanguineous   Treatments Compression Compression   Wound Length (cm) 5.2 cm 3.9 cm   Wound Width (cm) 10.4 cm 11.5 cm   Wound Surface Area (cm^2) 54.08 cm^2 44.85 cm^2   Wound Depth (cm) 1 cm 1.5 cm   Wound Volume (cm^3) 54.08 cm^3 67.275 cm^3   Wound Healing % -- -24   Margins Well-defined edges Well-defined edges   Non-staged Wound Description Full thickness Full thickness   María-wound Assessment Clean;Dry;Blanchable erythema Moist;Maceration;Clean   Wound Granulation Tissue Red;Spongy Pink;Spongy   Wound Bed Granulation (%) 15 % 20 %   Wound Bed Epithelium (%) -- 10 %   Wound Bed Slough (%) 85 % --   Wound Odor None None   Exposed Structure Bone;Adipose --   Shape 10staples to periwound 40% Kercis, 20% adipose, 10% bone   Tunneling? No --   Undermining? No --   Sinus Tracts? No --   Balbuena Scale Grade 4 Grade 4       Inactive Orders   Date Order Priority Status Authorizing Provider   08/01/24 1142 Cellular tissue product application Diabetic Ulcer Left Foot Routine Completed Jenny Olmos APRN   08/01/24 1116 Debridement Diabetic Ulcer Left Foot Routine Completed Jenny Olmos APRN   07/25/24 1445 Debridement Diabetic Ulcer Left Foot Routine Completed Jenny Olmos, DELMI       Wound  07/18/24 #2 right Foot Right (Active)   Date First Assessed/Time First Assessed: 07/18/24 1345    Wound Number (Wound Clinic Only): #2 right  Primary Wound Type: Diabetic Ulcer  Location: Foot  Wound Location Orientation: Right      Assessments 7/18/2024  1:57 PM 8/5/2024 10:11 AM   Wound Image        Drainage Amount Small Copious   Drainage Description Serosanguineous Sanguineous   Treatments Compression Compression   Wound Length (cm) 4.4 cm 2.2 cm   Wound Width (cm) 14 cm 13 cm   Wound Surface Area (cm^2) 61.6 cm^2 28.6 cm^2   Wound Depth (cm) 2.1 cm 2.6 cm   Wound Volume (cm^3) 129.36 cm^3 74.36 cm^3   Wound Healing % -- 43   Margins Well-defined edges Well-defined edges   Non-staged Wound Description Full thickness Full thickness   Hailey-wound Assessment Clean;Dry;Blanchable erythema Edema;Maceration;Moist   Wound Granulation Tissue Red;Pink;Spongy Spongy;Pink   Wound Bed Granulation (%) 30 % 20 %   Wound Bed Epithelium (%) 20 % --   Wound Bed Slough (%) 50 % 10 %   Wound Odor None None   Exposed Structure Bone;Adipose --   Shape 9 staples noted to hailey wound 50% Kercis, 10% adipose, 10% bone   Tunneling? No --   Undermining? No --   Sinus Tracts? No --   Balbuena Scale Grade 4 Grade 4       Inactive Orders   Date Order Priority Status Authorizing Provider   08/01/24 1140 Cellular tissue product application Diabetic Ulcer Right Foot Routine Completed Jenny Olmos APRN   08/01/24 1058 Debridement Diabetic Ulcer Right Foot Routine Completed Jenny Olmos APRN   07/25/24 1445 Debridement Diabetic Ulcer Right Foot Routine Completed Jenny Olmos APRN       Wound 07/18/24 #3 Ankle Posterior;Right (Active)   Date First Assessed/Time First Assessed: 07/18/24 1347    Wound Number (Wound Clinic Only): #3  Primary Wound Type: Pressure Injury  Location: Ankle  Wound Location Orientation: Posterior;Right      Assessments 7/18/2024  2:07 PM 8/5/2024 10:09 AM   Wound Image       Drainage Amount None Small   Drainage  Description -- Yellow;Serous   Treatments Compression Compression;Sling   Wound Length (cm) 4.5 cm 4.9 cm   Wound Width (cm) 2.1 cm 1.8 cm   Wound Surface Area (cm^2) 9.45 cm^2 8.82 cm^2   Wound Depth (cm) 0.1 cm 0.1 cm   Wound Volume (cm^3) 0.945 cm^3 0.882 cm^3   Wound Healing % -- 7   Margins -- Well-defined edges   Non-staged Wound Description -- Full thickness   Hailey-wound Assessment Dry;Blanchable erythema Moist;Blanchable erythema   Wound Granulation Tissue Pink;Firm Pink;Firm   Wound Bed Granulation (%) 10 % 30 %   Wound Bed Epithelium (%) 10 % --   Wound Bed Slough (%) -- 40 %   Wound Bed Eschar (%) 80 % 30 %   Wound Odor None None   Shape blister to hailey --   Tunneling? No --   Undermining? No --   Sinus Tracts? No --   Pressure Injury Stage Unstageable Stage 3       Inactive Orders   Date Order Priority Status Authorizing Provider   07/25/24 1442 Debridement Pressure Injury Posterior;Right Ankle Routine Completed Jenny Olmos APRN       Wound 07/18/24 #4 Right heel wound Heel Right (Active)   Date First Assessed/Time First Assessed: 07/18/24 1428    Wound Number (Wound Clinic Only): #4 Right heel wound  Primary Wound Type: Pressure Injury  Location: Heel  Wound Location Orientation: Right      Assessments 7/18/2024  2:31 PM 8/5/2024 10:07 AM   Wound Image       Drainage Amount None None   Treatments Compression Compression   Wound Length (cm) 0.5 cm 0.4 cm   Wound Width (cm) 1 cm 0.5 cm   Wound Surface Area (cm^2) 0.5 cm^2 0.2 cm^2   Wound Depth (cm) 0 cm 0 cm   Wound Volume (cm^3) 0 cm^3 0 cm^3   Margins Well-defined edges Well-defined edges   Non-staged Wound Description -- Not applicable   Hailey-wound Assessment Dry;Clean Dry;Clean   Wound Bed Epithelium (%) 100 % 100 %   Wound Odor None None   Pressure Injury Stage Deep Tissue Deep Tissue       No associated orders.       Compression Wrap 08/01/24 Foot Dorsal;Right (Active)   Placement Date/Time: 08/01/24 1147   Location: Foot  Wound Location  Orientation: Dorsal;Right      Assessments 8/1/2024 11:47 AM 8/5/2024  3:23 PM   Response to Treatment Well tolerated Well tolerated   Compression Layers Multilayer Multilayer   Compression Product Type Unna Boot Unna Boot   Dressing Applied Yes Yes   Compression Wrap Location Toes to Knee Toes to Knee   Compression Wrap Status Clean;Dry Clean;Dry;Intact       No associated orders.       Compression Wrap 08/01/24 Foot Dorsal;Left (Active)   Placement Date/Time: 08/01/24 1148   Location: Foot  Wound Location Orientation: Dorsal;Left      Assessments 8/1/2024 11:48 AM 8/5/2024  3:25 PM   Response to Treatment Well tolerated Well tolerated   Compression Layers Multilayer Multilayer   Compression Product Type Unna Boot Unna Boot   Dressing Applied Yes Yes   Compression Wrap Location Toes to Knee Toes to Knee   Compression Wrap Status Clean;Dry Clean;Dry;Intact       No associated orders.          ASSESSMENT AND PLAN:        Risks, benefits, and alternatives of current treatment plan discussed in detail.  Questions and concerns addressed. Red flags to RTC or ED reviewed.  Patient (or parent) agrees to plan.      No follow-ups on file.  Weekly Wound Education Note    Teaching Provided To: Patient;Family  Training Topics: Compression;Discharge instructions;Dressing;Edema control;Fall risks;Life style changes;Nutrition  Training Method: Explain/Verbal  Training Response: Patient responds and understands         Patient arrives for RN visit. Describes having the worst pain around 0500 each morning to bilateral feet, but pain gets better throughout day. Compression wraps in place bilaterally. Edema is stable and measuring slightly smaller. Dressings on bilateral feet were saturated with copious sanguinous drainage. Per provider's instructions, steri-strips and Hydrofera transfer were removed on right foot. Left foot wound is stable and staples are intact to lateral side of foot. Kerecis noted in bilateral foot wound. Right  heel is intact with no open wound or blister and DTI is measuring smaller. L ankle wound is stable. Continued calamine unna-boot 20-30 bilaterally (including covering front of foot over surgical site, but loosely wrapped). Continue sorbact, hydrofera transfer, kerramax, ABD pad, and medipore tape to left foot. Continue strip of hydrofera transfer tucked into wound with steri strips over that, a whole piece of Hydrofera Transfer over that, Kerramax x3, and medipore tape to right foot. Continue betadine to right heel. Continue honey gel, 4x4 gauze, and ABD pad to right ankle. Instructed patient and family to inform staff at rehab to again leave wraps/dressings alone. Encouraged more protein intake and to keep working with PT.               Tyrel DELGADO RN   8/5/2024  3:45 PM

## 2024-08-06 ENCOUNTER — SNF VISIT (OUTPATIENT)
Dept: INTERNAL MEDICINE CLINIC | Age: 79
End: 2024-08-06

## 2024-08-06 VITALS
DIASTOLIC BLOOD PRESSURE: 74 MMHG | RESPIRATION RATE: 20 BRPM | OXYGEN SATURATION: 98 % | TEMPERATURE: 97 F | HEART RATE: 75 BPM | SYSTOLIC BLOOD PRESSURE: 131 MMHG

## 2024-08-06 DIAGNOSIS — N18.6 ESRD ON HEMODIALYSIS (HCC): ICD-10-CM

## 2024-08-06 DIAGNOSIS — R53.1 WEAKNESS: ICD-10-CM

## 2024-08-06 DIAGNOSIS — T14.8XXA WOUND INFECTION: Primary | ICD-10-CM

## 2024-08-06 DIAGNOSIS — Z96.41 DIABETES MELLITUS TYPE 1, WITH COMPLICATION, ON LONG TERM INSULIN PUMP (HCC): ICD-10-CM

## 2024-08-06 DIAGNOSIS — I48.20 ATRIAL FIBRILLATION, CHRONIC (HCC): ICD-10-CM

## 2024-08-06 DIAGNOSIS — L08.9 WOUND INFECTION: Primary | ICD-10-CM

## 2024-08-06 DIAGNOSIS — G62.9 NEUROPATHY: ICD-10-CM

## 2024-08-06 DIAGNOSIS — I73.9 PAD (PERIPHERAL ARTERY DISEASE) (HCC): ICD-10-CM

## 2024-08-06 DIAGNOSIS — E10.8 DIABETES MELLITUS TYPE 1, WITH COMPLICATION, ON LONG TERM INSULIN PUMP (HCC): ICD-10-CM

## 2024-08-06 DIAGNOSIS — I50.32 CHRONIC DIASTOLIC HEART FAILURE (HCC): ICD-10-CM

## 2024-08-06 DIAGNOSIS — I10 PRIMARY HYPERTENSION: ICD-10-CM

## 2024-08-06 DIAGNOSIS — Z99.2 ESRD ON HEMODIALYSIS (HCC): ICD-10-CM

## 2024-08-06 PROCEDURE — 99309 SBSQ NF CARE MODERATE MDM 30: CPT | Performed by: NURSE PRACTITIONER

## 2024-08-06 NOTE — PROGRESS NOTES
Darin Bowens, 1945, 79 year old, male    [facility] admission -24     Hospital Discharge Diagnoses:  Cellulitis of lower extremity   PVD  ESRD on HD   CAD s/p stents   PAD s/p b/l LE stents   Chronic CHF with LVH   S/P AVR   Atrial fibrillation   Diabetes   Anemia   Hypertension   Hyperlipidemia   DESI         HPI:  Darin Bowens  : 1945, Age 78 year old  male patient is admitted for subacute rehab. Patient has a past medical history of CAD status post stents, PAD status post bilateral lower extremity stents, atrial fibrillation on Xarelto, diabetes with insulin pump, ESRD on HD, CHF with LVH status post AVR, history of stroke, hypertension, hyperlipidemia, DESI and recent TMA left foot second digit amputation on  per Dr Meza presented to hospital with poor wound healing.  He had excisional debridement to level of bone along with partial resection of the Right 1st and 2nd MT on . Surgical cultures showed enterobacter cloacae and strenotrophomonas - discharge cultures form admit with enterobacter and acinetobacter- he is on IV Cefepime, Flagyl orally and Bactrim since  - ID to have patient discharge with IV Cefepime with HD, continue PO bactrim and PO Flagyl through 8/15. He had further debridement of foot on . He was to receive HBO until  then discharge to rehab facility. Podiatry suggested they would reconsider application of wound vac [place] [facility] - patient to see Dr Donaldson in wound clinic.   Hx of PVD arterial Ultrasound April likely more distal PAD require further amputation per vascular patient is not a candidate for any further revascularization and should have foot amputation if does not heal.  ESRD malfunctioning AVF- vascular consulted s/p tunneled catheter on . Ongoing anemia suspected from ABLA surgery had PRBC on 7/3- he is back on blood thinners. Admitting to [facility] for nursing care, medication management, and  PT/OT/ST eval and tx.    Chief Complaint:    Chief Complaint   Patient presents with    Follow - Up        Subjective:   TODAY:  Patient was seen for follow up visit   He is seen up in dialysis   Pain to lower legs improved- he is now on gabapentin low dose nightly and uses Norco prn   He goes for wound treatments seen 8/5- reiterated again in notes to leave dressings alone - wound provider continue sorbact, hydrofera transfer, kerramax, abd pad medipore tape to left foot, continue strip of hydrofera transfer tucked into wound with steri strips over, whole piece of hydrofera transfer over kerramax x 3 , and medipore tape to right foot. Continue betadine to right heel, continue honey gel 4x4 guaze and abd pad to right ankle. Next wound appt on Thurs 8/8    He is WB as tolerated   No shortness of breath, cough or wheeze  No chest pain or palpitations  No nausea, vomiting, constipation or diarrhea   He is hoping to go home soon.     Objective:  /74   Pulse 75   Temp 97.4 °F (36.3 °C)   Resp 20   SpO2 98%     PHYSICAL EXAM:  GENERAL HEALTH: 79 year old male patient, no acute distress   LINES, TUBES, DRAINS:  Left upper chest port - CDI- dialysis running currently   SKIN: no rashes, no suspicious lesions  WOUND: see wound assessment   Bilateral lower legs- dressings in place-cdi   EYES: PERRLA, conjunctiva normal slight erythema ; no drainage from eyes  HENT: normocephalic; normal nose, no nasal drainage, mucous membranes pink, moist  RESPIRATORY:clear to percussion and auscultation, No wheezing/cough/accessory muscle use; on room air  CARDIOVASCULAR: S1, S2 normal, RRR; no S3, no S4;   ABDOMEN: normal active BS+, soft, non-distended; no apparent masses; observed, non-tender, no guarding during physical exam  : ESRD on HD   MUSCULOSKELETAL: no acute synovitis upper or lower extremity.  Weakness R/T recent hospitalization/diagnoses/sequelae; will undergo therapies to rehab and improve strength, endurance and  independence w/ ADLs as able  Dressings to bilateral lower extremities   EXTREMITIES/VASCULAR: no cyanosis, clubbing or edema  Bilateral lower extremities- dressing cdi  NEUROLOGIC: intact; no sensorimotor deficit  PSYCHIATRIC: alert and oriented x 3; affect appropriate      Medications reviewed: Yes      Current Outpatient Medications:     gabapentin 100 MG Oral Cap, Take 1 capsule (100 mg total) by mouth at bedtime., Disp: , Rfl:     Insulin Lispro 100 UNIT/ML Subcutaneous Solution Cartridge, Inject 2-7 Units into the skin TID AC&HS., Disp: , Rfl:     insulin glargine 100 UNIT/ML Subcutaneous Solution, Inject 10 Units into the skin nightly., Disp: , Rfl:     sennosides 8.6 MG Oral Tab, Take 1 tablet (8.6 mg total) by mouth 2 (two) times daily., Disp: , Rfl:     polyethylene glycol, PEG 3350, 17 g Oral Powd Pack, Take 17 g by mouth daily as needed (constipation)., Disp: , Rfl:     acetaminophen 325 MG Oral Tab, Take 2 tablets (650 mg total) by mouth every 6 (six) hours as needed for Pain., Disp: , Rfl:     HYDROcodone-acetaminophen 5-325 MG Oral Tab, Take 1 tablet by mouth every 6 (six) hours as needed for Pain., Disp: 30 tablet, Rfl: 0    apixaban 2.5 MG Oral Tab, Take 1 tablet (2.5 mg total) by mouth 2 (two) times daily., Disp: 60 tablet, Rfl: 0    sulfamethoxazole-trimethoprim -160 MG Oral Tab per tablet, Take 1 tablet by mouth daily. Schedule in PM after HD., Disp: 34 tablet, Rfl: 0    cefepime HCl (MAXIPIME) 1 GM/50ML SOLN, Inject 100 mL (2 g total) into the vein 3 (three) times a week. To be given with hemodialysis 3 times a week.  Weekly CBC with differential and CMP, sed rate and CRP.  Fax results to [facility]. Fax: [phone]. Tel: [phone].  PICC line care as per protocol., Disp: 1500 mL, Rfl: 0    metRONIDAZOLE 500 MG Oral Tab, Take 1 tablet (500 mg total) by mouth in the morning and 1 tablet (500 mg total) before bedtime., Disp: 74 tablet, Rfl: 0    dorzolamide 2 % Ophthalmic  Solution, Place 1 drop into both eyes in the morning and 1 drop before bedtime., Disp: , Rfl:     Netarsudil-Latanoprost (ROCKLATAN) 0.02-0.005 % Ophthalmic Solution, Apply 1 drop to eye daily. Both eyes, Disp: , Rfl:     atorvastatin 80 MG Oral Tab, Take 1 tablet (80 mg total) by mouth nightly., Disp: 90 tablet, Rfl: 2    CLOPIDOGREL 75 MG Oral Tab, TAKE 1 TABLET(75 MG) BY MOUTH DAILY, Disp: 90 tablet, Rfl: 0    carvedilol 12.5 MG Oral Tab, Take 1 tablet (12.5 mg total) by mouth 2 (two) times daily with meals. Take one tablet (12.5mg total) by mouth two times a day, Disp: 180 tablet, Rfl: 3    Ergocalciferol (VITAMIN D2 OR), Take 50,000 Units by mouth every 30 (thirty) days., Disp: , Rfl:     CALCIUM ACETATE 667 MG Oral Cap, TAKE 1 CAPSULE BY MOUTH THREE TIMES DAILY WITH MEALS, Disp: 270 capsule, Rfl: 0    COMBIGAN 0.2-0.5 % Ophthalmic Solution, Place 1 drop into both eyes 2 (two) times daily., Disp: , Rfl:     Probiotic Product (PROBIOTIC DAILY OR), Take 1 capsule by mouth daily. (Patient not taking: Reported on 8/6/2024), Disp: , Rfl:     ketoconazole 2 % External Shampoo, Apply 1 Application topically daily as needed for Itching. (Patient not taking: Reported on 8/6/2024), Disp: , Rfl:     Continuous Blood Gluc Sensor (DEXCOM G6 SENSOR) Does not apply Misc, 1 Device Every 10 days. (Patient not taking: Reported on 7/18/2024), Disp: 3 each, Rfl: 1    Insulin Lispro (HUMALOG) 100 UNIT/ML Subcutaneous Solution, 95 units per day via insulin pump. (Patient not taking: Reported on 7/23/2024), Disp: 90 mL, Rfl: 1    Continuous Blood Gluc Transmit (DEXCOM G6 TRANSMITTER) Does not apply Misc, Change every 90 days (Patient not taking: Reported on 7/16/2024), Disp: 1 each, Rfl: 3    PTA Insulin via Pump, Inject into the skin continuous. humalog insulin  Medtronic Basal Rate : 55.6 standard rate 1.90 I:C - 1 units/4 carbs Correction Factor - unknown (Patient not taking: Reported on 7/16/2024), Disp: , Rfl:       Diagnostics  reviewed:    8/5/24  Wbc 6.30 hgb 8.4 hematocrit 25.7 plt 123  Alb 2.6 ca 10.1 co2 26 chl 95 crt 5.61 gfr 9.8   Glucose 82 alk phos 87 potassium 4.0 t prot 4.70 sodium 137   Alt 12 ast 18 bun 66 t bili 0.38 uric acid 4.8   Mag 2.04 phos 3.40   Sed 28   Crp 4.3     7/30/24  A1c 8.6   Wbc 5.6 hgb 9 plt 321         7/29/24  Wbc 7.30 hgb 9.0 hematocrit 28.2 plt 182   Alb 2.7 ca 9.6 co2 21 chl 87 crt 5.26 gfr 10.6   Glucose 101 alk phos 95 potassium 3.2 t prot 4.6 sodium 131   Alt 11 ast 23 bun 54 t bili 0.5 uric acid 3.2   Mag 2.12 phos 2.9   Sed 19 crp 6.69   \      7/25/24  Wbc 5.70 hgb 9.3 hematocrit 29.0 plt 158   Alb  2.5 ca 9.2 co2 25 chl 88 crt 4.57 gfr 12.5   Glucose 132 alk phos 89 potassium 3.4   T prot 4.5 sodium 128 alt 12 ast 22 bun 46 t bili 0.42 uric acid 3.2   Mag 1.93       7/22/24   Wbc 6.80 hgb 10.20 hematocrit 31.1 plt 181   Alb 2.8 ca 9.3 co2 23 chl 90 crt 5.49 gfr 10   Glucose 123 alk phos 99 potassium 3.8 t prot 5.9 sodium 130 alt 8 ast 21 bun 52 t bili 0.45 uric acid 3.3 mag 1.93   Sed 44  Crp 23.71      Assessment and plan:  Physical Deconditioning/Impaired mobility and ADLs/At risk for falling  Fall Precautions  PT/OT/ST to evaluate and treat  [facility] team to establish care plan meeting with patient and POA/family as appropriate  Anticipate DC on or before TBD; SW to assist patient/family w/ DC planning. Plans to discharge home with wife.     DC Plan:  TBD      Post op wound infection R TMA 6/13 due to nonhealing ulcer and PVD   Excisional debridement to level of bone along with partial resection of R 1st and 2nd MT 6/27 /L foot 2nd digit amputation on 6/14   Excisional wound debridement to level of bone along with partial resection of second MT 6/27   Followed by specialist in hospital. Received HBO in hospital last day of completion on 7/12.   Vital signs q shift and prn   Labs weekly and prn cbc/cmp/crp/sed - fax results to [facility] [phone]  Follow up with ID Dr Bethel SNEED   Follow up  with Dr Nix vascular surgery   Follow up with Dr Meza Podiatry   In house ID follow  Encourage more protein   In house wound team follow - 8/5 reports to leave dressing and wraps alone- follow up appt scheduled for 8/8   WB as tolerated   Dressing changes per wound recommendations   Outpatient wound follow up as scheduled   IV Cefepime 2 gm 3x weekly until 8/15 - with weekly labs    Metronidazole 500 mg bid until 8/15   Bactrim DS 1 tab daily in PM after HD   Plavix 75 mg daily   Pain control- acetaminophen prn, Hydrocodone prn.  Gabapentin 100 mg nightly started 7/30 - helping per patient     Hypokalemia   7/29 potassium at 3.2 -->Start Potassium chloride 20 meq daily x 3 days   8/5 potassium 4.0   Monitor labs     ESRD on HD   Outside dialysis MWF- dialysis at facility M-Fri   Avoid nephrotoxin   Follow up Nephrology      CAD s/p stents/ PAD s/p b/l LE stents/Chronic CHF w/LVH/ S/p AVR/ Atrial fibrillation /Hypertension / HL   In hospital not followed by cards but vascular. Seen Cards prior to surgery BETH 6/16 Aortic valve a bioprostehsis present mild to mod stenosis.  Heart failure controlled by dialysis.   Vital signs q shift and prn   Labs weekly and prn   Daily weights notify if greater than 2 lb increase in 1 day or 5 lbs in 1 week - increase but receiving dialysis   In house Cardiology team follow   Apixaban 2.5 mg bid   Atorvastatin 80 mg nightly   Coreg 12.5 mg bid   Plavix 75 mg daily      Diabetes  Prior insulin pump   Accu check ac/hs   Low carb diet   Insulin Lispro sliding scale   Lantus 10 units nightly   He is on insulin pump at home. Will need to resume at discharge      Anemia due to ESRD   Monitor labs      DESI   In house Pulm to follow   Cpap nightly        DVT Prophylaxis   Encourage early mobilization and participation in PT/OT as able  On Apixaban      Pain Management  Offer to pre-medicate 30-60 min prior to therapy  Physiatry evaluation with management appreciated  Acetaminophen 650  mg every 6 hrs prn   Hydrocodone-acetaminophen 5-325 mg 1 tab every 6 hrs prn      Bowel Management Regimen/Constipation   Monitor BM status   Senna s 1 tab bid   Miralax 17 gm daily prn      Vitamins/supplements as r/t deficiencies  [facility] RD to follow while in rehab; supplementation/diet as per [facility] RD  May continue home supplements  Calcium acetate   Ergocalciferol   Probiotic daily      LABS  CBC/CMP/CRP/SED weekly while in [facility] 8/12     *Follow-Up with PCP within 1-2 weeks following [facility] discharge.   *Follow-Up with specialists as recommended.  Follow up with Dr Nix in 1 week   Follow up with dr Meza Podiatry   Follow up with Dr Bethel SNEED   Follow up with Dr Flores Nephrology   No future appointments.  Your Appointments      Thursday August 08, 2024 8:00 AM  Wound Care Follow up with Jenny Olmos APRHETAL  [facility] ([facility]) [address] [facility] [address]  [phone]        Thursday August 15, 2024 9:30 AM  Wound Care Follow up with Jennyderick Olmos APRHETAL  [facility] ([facility]) [address] [facility] [address]  [phone]        Thursday August 22, 2024 10:00 AM  Wound Care Follow up with Jennyderick Olmos APRHETAL  [facility] ([facility]) [address] [facility] [address]  [phone]        Thursday August 29, 2024 10:00 AM  Wound Care Follow up with Jenny Olmos APRHETAL  [facility] ([facility]) [address] [facility] [address]  [phone]                 This is a 45 minute visit and greater than 50% of the time was spent counseling the patient and/or coordinating care.           Note to patient: The 21st Century Cures Act makes medical notes like these available to patients in the interest of transparency. However, this is a medical document intended as peer to peer communication.  It is written in medical language and may contain abbreviations or verbiage that are unfamiliar. It may appear blunt or direct. Medical documents are intended to carry relevant information, facts as evident, and the clinical opinion of the practitioner who signs the document.    Rose Reid, APRN  8/6/2024

## 2024-08-07 NOTE — PROGRESS NOTES
CHIEF COMPLAINT:     Chief Complaint   Patient presents with    Wound Care     Patients is here for a follow up. Patients stated no issue with the wrap and no issue at this moment      HPI:   Information obtained from Patient, chart and family  7-18-24 INITIAL:  78 YO male with past medical history of CAD s/p stents, PAD s/p bilateral lower extremity stents, atrial fibrillation on Xarelto, diabetes with insulin pump, ESRD on HD, CHF with LVH status post AVR, history of stroke, hypertension, hyperlipidemia, DESI and recent TMA  6/13 per Dr Meza. He presented to hospital on 6-25w poor wound healing. He had further debridement to level of bone along with partial resection of the Right 1st and 2nd MT on 6/27 and had further debridement of foot on 7/5.   Patient was discharged on IV Cefepime with HD, continue PO bactrim and PO Flagyl through 8/15. . He did receive 4 HBO treatmenns while inpatient and then discharge to rehab facility. Podiatry suggested they would reconsider application of wound vac on follow up at [place] [facility].   [facility] notes:  \"Hx of PVD arterial Ultrasound April likely more distal PAD require further amputation per vascular patient is not a candidate for any further revascularization and should have foot amputation if does not heal.\"  Niru is dressing wounds with betadine soaked gauze, he presents with son and spouse (who is a retired nurse).  Bedside clinic exam reveals palpable anterior tibials (at the ankles) and posterior tibia, patient also has pulsatile signals at the dp/pt/peronal/and plantar aspect. We discussed micro vs macro circulation. Will get tcom.  Once patient is safe in transfers and short distance ambulation family would like to care for him at home, at that point patient could then come for hbo again.  At this time will utilize santyl. I did remove the staples from the right as they were no longer supporting the tissue and applied steristrips.  This incision is concerning  for further dehiscence.     7-25-24 patient returns. Patient is getting tcom after his wound care appointment.  Dr. Moya not in hospital today. Will update him. Inflammatory markers esr 50>44,  crp 11>23.7.   Plan for next Thursday morning joint visit with dr. moya.  Patient has increased his ambulation with walker, he was able to ambulate >100ft with chair behind him.  He can get himself up from a chair to standing with minimal assist per spouse.  The wounds are dressed with Santyl.  The family did buy heel offloading boots.  Patient c/o burning pain.  I will touch base with apn at [facility] regarding potential gabapentin to help with the nerve pain.  Wounds debrided today.  The right is more concerning than the left. Both have exposed metarsals that are dry.  Will continue with santyl at this time, will run insurance for cellular tissue products.  Once patient is discharged from [facility] he could return to [facility].  We discussed that his safety in transfers and car transfers etc are the ultimate priority as he not only would need to go to appointments but he will need to go to dialysis.  ADDENDUM:  discussed tcom results as being positive for healing as well as response to o2.(See below for report)    8-1-24 patient returns.  dr moya is here in clinic as well. I did touch base with apn at [facility] and low dose gabapentin 100mg nightly to see if it helps with patient c/o pain. Patient states that it works until about 5 am and then his feet start burning again.  Keracis is covered at 100%.   Will debride and place keracis today.     8-8-24 Patient returns.  He was seen for rn visit on Monday, keracis placed last week. He has tolerated the compression well.  Drainage has slowed on the right.  Wife and daughter report that he is doing really well with transfers with physicial therapy and stamina. He has even worked on getting in/out of the car.  Dr Meza is here in clinic to collaborate on next steps-[facility] and do a clean up, potential closure, and incisional vac placement.  Once scheduled will have our inpatient team place to place in light compression wrap.  Team and family feel patient is ok to go home, he will be dc'd in next 2 days.  Patient will come Tuesday for hbo re-consult with dr martinez and then we can get him started back in hbo after surgical intervention as well.  I will update dr. Hugo and will await scheduling from dr matson's office. No acute s/s of infection or c/o pain.  MEDICATIONS:     Current Outpatient Medications:     gabapentin 100 MG Oral Cap, Take 1 capsule (100 mg total) by mouth at bedtime., Disp: , Rfl:     Insulin Lispro 100 UNIT/ML Subcutaneous Solution Cartridge, Inject 2-7 Units into the skin TID AC&HS., Disp: , Rfl:     insulin glargine 100 UNIT/ML Subcutaneous Solution, Inject 10 Units into the skin nightly., Disp: , Rfl:     Probiotic Product (PROBIOTIC DAILY OR), Take 1 capsule by mouth daily. (Patient not taking: Reported on 8/6/2024), Disp: , Rfl:     sennosides 8.6 MG Oral Tab, Take 1 tablet (8.6 mg total) by mouth 2 (two) times daily., Disp: , Rfl:     polyethylene glycol, PEG 3350, 17 g Oral Powd Pack, Take 17 g by mouth daily as needed (constipation)., Disp: , Rfl:     acetaminophen 325 MG Oral Tab, Take 2 tablets (650 mg total) by mouth every 6 (six) hours as needed for Pain., Disp: , Rfl:     HYDROcodone-acetaminophen 5-325 MG Oral Tab, Take 1 tablet by mouth every 6 (six) hours as needed for Pain., Disp: 30 tablet, Rfl: 0    apixaban 2.5 MG Oral Tab, Take 1 tablet (2.5 mg total) by mouth 2 (two) times daily., Disp: 60 tablet, Rfl: 0    sulfamethoxazole-trimethoprim -160 MG Oral Tab per tablet, Take 1 tablet by mouth daily. Schedule in PM after HD., Disp: 34 tablet, Rfl: 0    cefepime HCl (MAXIPIME) 1 GM/50ML SOLN, Inject 100 mL (2 g total) into the vein 3 (three) times a week. To be given with hemodialysis 3 times a  week.  Weekly CBC with differential and CMP, sed rate and CRP.  Fax results to [facility]. Fax: [phone]. Tel: [phone].  PICC line care as per protocol., Disp: 1500 mL, Rfl: 0    metRONIDAZOLE 500 MG Oral Tab, Take 1 tablet (500 mg total) by mouth in the morning and 1 tablet (500 mg total) before bedtime., Disp: 74 tablet, Rfl: 0    dorzolamide 2 % Ophthalmic Solution, Place 1 drop into both eyes in the morning and 1 drop before bedtime., Disp: , Rfl:     ketoconazole 2 % External Shampoo, Apply 1 Application topically daily as needed for Itching. (Patient not taking: Reported on 8/6/2024), Disp: , Rfl:     Netarsudil-Latanoprost (ROCKLATAN) 0.02-0.005 % Ophthalmic Solution, Apply 1 drop to eye daily. Both eyes, Disp: , Rfl:     atorvastatin 80 MG Oral Tab, Take 1 tablet (80 mg total) by mouth nightly., Disp: 90 tablet, Rfl: 2    CLOPIDOGREL 75 MG Oral Tab, TAKE 1 TABLET(75 MG) BY MOUTH DAILY, Disp: 90 tablet, Rfl: 0    Continuous Blood Gluc Sensor (DEXCOM G6 SENSOR) Does not apply Misc, 1 Device Every 10 days. (Patient not taking: Reported on 7/18/2024), Disp: 3 each, Rfl: 1    Insulin Lispro (HUMALOG) 100 UNIT/ML Subcutaneous Solution, 95 units per day via insulin pump. (Patient not taking: Reported on 7/23/2024), Disp: 90 mL, Rfl: 1    carvedilol 12.5 MG Oral Tab, Take 1 tablet (12.5 mg total) by mouth 2 (two) times daily with meals. Take one tablet (12.5mg total) by mouth two times a day, Disp: 180 tablet, Rfl: 3    Ergocalciferol (VITAMIN D2 OR), Take 50,000 Units by mouth every 30 (thirty) days., Disp: , Rfl:     Continuous Blood Gluc Transmit (DEXCOM G6 TRANSMITTER) Does not apply Misc, Change every 90 days (Patient not taking: Reported on 7/16/2024), Disp: 1 each, Rfl: 3    CALCIUM ACETATE 667 MG Oral Cap, TAKE 1 CAPSULE BY MOUTH THREE TIMES DAILY WITH MEALS, Disp: 270 capsule, Rfl: 0    COMBIGAN 0.2-0.5 % Ophthalmic Solution, Place 1 drop into both eyes 2 (two) times daily., Disp: ,  Rfl:     PTA Insulin via Pump, Inject into the skin continuous. humalog insulin  Medtronic Basal Rate : 55.6 standard rate 1.90 I:C - 1 units/4 carbs Correction Factor - unknown (Patient not taking: Reported on 7/16/2024), Disp: , Rfl:   ALLERGIES:     Allergies   Allergen Reactions    Augmentin [Amoxicillin-Pot Clavulanate] RASH    Lisinopril Coughing     Dyspnea        REVIEW OF SYSTEMS:   This information was obtained from the patient/family and chart.    See HPI for pertinent positives, otherwise 10 pt ROS negative.  HISTORY:   Past medical, surgical, family and social history updated where appropriate.      PHYSICAL EXAM:     Vitals:    08/08/24 0835   BP: 119/60  Comment: 226 glucose   Pulse: 67   Resp: 14   Temp: 97.8 °F (36.6 °C)            Estimated body mass index is 28.62 kg/m² as calculated from the following:    Height as of 7/18/24: 72\".    Weight as of 7/18/24: 211 lb (95.7 kg).   POC Glucose   Date Value Ref Range Status   08/08/2024 226 (H) 70 - 99 mg/dL Final   08/05/2024 235 (H) 70 - 99 mg/dL Final   07/25/2024 225 (H) 70 - 99 mg/dL Final       Vital signs reviewed.Appears stated age, well groomed.    Constitutional:  Bp wnl for patient. Pulse Regular and wnl for patient. Respirations easy and unlabored. Temperature wnl. Elevated bmi. Appearance neat and clean. Appears in no acute distress. Well nourished and well developed.    Lower extremity:  at/pt palpable bilaterally. Extremities free of varicosities, scant edema. Capillary refill < 3 seconds. Bilateral open TMA.  Skin is dry. no hairgrowth on legs.    Musculoskeletal:  Patient is in wheelchair propelled by others, able to transfer with assist  Integumentary:  refer to wound characteristics and images   Psychiatric:  Judgment and insight intact. Alert and oriented times 3. Patient is quiet, calm, cooperative, and communicative.   EDEMA:   Calf  Point of Measurement - Left Calf: 36  Point of Measurement - Right Calf: 36  Left Calf from::  Heel  Calf Left cm:: 32.5  Right Calf from:: Heel  Right Calf cm:: 34.3  Ankle  Point of Measurement - Left Ankle: 10  Point of Measurement - Right Ankle: 10  Left Ankle from:: Heel  Left Ankle cm:: 24.8     Right Ankle from:: Heel  Right Ankle cm:: 23.9     DIAGNOSTICS:     Lab Results   Component Value Date    BUN 60 (H) 07/12/2024    CREATSERUM 6.78 (H) 07/12/2024    GFRCKDEPI 8.51 (L) 03/01/2022    ALB 1.9 (L) 07/09/2024    TP 5.8 (L) 07/09/2024    A1C 5.9 (H) 06/14/2024   Albumin 2.4/tp 5.0  7-15-24 ([facility])  Albumin 2.8/tp 5.9   7-22-24    Lab Results   Component Value Date    ESRML 75 (H) 06/24/2024   ESR    50    7-15-24 ([facility])   ESR    44    7-22-24 ([facility])    Lab Results   Component Value Date    CRP 18.50 (H) 06/24/2024    CRP 0.3 04/22/2014   CRP     11.0   7-15-24 ([facility])  CRP      23.7  7-22-24 ([facility])    7-25-24 TCOM  Lead                  Baseline             Oxygen challenge           Location     1                        79                      120                                 Left medial lower leg  2                        75                      105                                 Left foot dorsal surface  3                        49                      107                                 Rt medial lower leg  4                        61                      161                                 Rt foot dorsal surface    6-30-24 pathology left foot  Left foot, transmetatarsal amputation:  -Gangrenous necrosis of skin and soft tissue with underlying acute osteomyelitis.  -Bone margin with no significant acute inflammation.  -Viable soft tissue margin with calcific atherosclerosis.    6-27-24 pathology right foot & left  A.  Right foot tissue and bone, excision:  -Bone with acute osteomyelitis.     B.  Left foot second metatarsal bone, excision:  -Bone with acute osteomyelitis.  -Fibroadipose tissue with necrosis and suppurative inflammation.    5-22-24 operative  report-dr salas  Findings: Widely patent bilateral common femoral, SFA, profunda, popliteal.  PT and peroneal patent to the foot however significant atresia of the vasculature into the foot with sparse collaterals.  AT reoccluded despite recent endovascular intervention.  No opacification to the toe wounds.  Deep venous arterialization performed in the right anterior tibial artery to vein however the veins in the foot were friable after 3 mm balloon angioplasty and there was no significant outflow and thus this was embolized.  The patient maintained outflow via the PT and peroneal bilaterally consistent with preoperative baseline.  Patient should proceed with TMA and should this not heal they understand the need for future major amputation.   WOUND ASSESSMENT:     Wound 07/18/24 #1 Foot Left (Active)   Date First Assessed/Time First Assessed: 07/18/24 1344    Wound Number (Wound Clinic Only): #1  Primary Wound Type: Diabetic Ulcer  Location: Foot  Wound Location Orientation: Left      Assessments 7/18/2024  2:00 PM 8/8/2024  8:02 AM   Wound Image        Drainage Amount Small Moderate   Drainage Description Serosanguineous Serosanguineous   Treatments Compression --   Wound Length (cm) 5.2 cm 4.5 cm   Wound Width (cm) 10.4 cm 11.5 cm   Wound Surface Area (cm^2) 54.08 cm^2 51.75 cm^2   Wound Depth (cm) 1 cm 1 cm   Wound Volume (cm^3) 54.08 cm^3 51.75 cm^3   Wound Healing % -- 4   Margins Well-defined edges Well-defined edges   Non-staged Wound Description Full thickness Full thickness   María-wound Assessment Clean;Dry;Blanchable erythema Edema;Blanchable erythema   Wound Granulation Tissue Red;Spongy Firm;Red;Spongy   Wound Bed Granulation (%) 15 % 40 %   Wound Bed Epithelium (%) -- 10 %   Wound Bed Slough (%) 85 % 30 %   Wound Odor None --   Exposed Structure Bone;Adipose Bone   Shape 10staples to periwound 20% adipose and staples 10   Tunneling? No --   Undermining? No --   Sinus Tracts? No --   Balbuena Scale  Grade 4 Grade 4       Inactive Orders   Date Order Priority Status Authorizing Provider   08/01/24 1142 Cellular tissue product application Diabetic Ulcer Left Foot Routine Completed Jenny Olmos APRN   08/01/24 1116 Debridement Diabetic Ulcer Left Foot Routine Completed Jenny Olmos, DELMI   07/25/24 1445 Debridement Diabetic Ulcer Left Foot Routine Completed Jenny Olmos APRN       Wound 07/18/24 #2 right Foot Right (Active)   Date First Assessed/Time First Assessed: 07/18/24 1345    Wound Number (Wound Clinic Only): #2 right  Primary Wound Type: Diabetic Ulcer  Location: Foot  Wound Location Orientation: Right      Assessments 7/18/2024  1:57 PM 8/8/2024  8:05 AM   Wound Image       Drainage Amount Small Large   Drainage Description Serosanguineous Serosanguineous   Treatments Compression --   Wound Length (cm) 4.4 cm 2.4 cm   Wound Width (cm) 14 cm 12.6 cm   Wound Surface Area (cm^2) 61.6 cm^2 30.24 cm^2   Wound Depth (cm) 2.1 cm 2.6 cm   Wound Volume (cm^3) 129.36 cm^3 78.624 cm^3   Wound Healing % -- 39   Margins Well-defined edges Well-defined edges   Non-staged Wound Description Full thickness Full thickness   Hailey-wound Assessment Clean;Dry;Blanchable erythema Edema;Maceration;Moist   Wound Granulation Tissue Red;Pink;Spongy Firm;Pink   Wound Bed Granulation (%) 30 % 50 %   Wound Bed Epithelium (%) 20 % --   Wound Bed Slough (%) 50 % 10 %   Wound Odor None None   Exposed Structure Bone;Adipose Bone;Adipose   Shape 9 staples noted to hailey wound 30% adipose, 10% bone   Tunneling? No --   Undermining? No --   Sinus Tracts? No --   Balbuena Scale Grade 4 Grade 4       Inactive Orders   Date Order Priority Status Authorizing Provider   08/01/24 1140 Cellular tissue product application Diabetic Ulcer Right Foot Routine Completed Jenny Olmos APRN   08/01/24 1058 Debridement Diabetic Ulcer Right Foot Routine Completed Jenny Olmos APRN   07/25/24 1445 Debridement Diabetic Ulcer Right Foot Routine  Completed Jenny Olmos APRN       Wound 07/18/24 #3 Ankle Posterior;Right (Active)   Date First Assessed/Time First Assessed: 07/18/24 1347    Wound Number (Wound Clinic Only): #3  Primary Wound Type: Pressure Injury  Location: Ankle  Wound Location Orientation: Posterior;Right      Assessments 7/18/2024  2:07 PM 8/8/2024  8:08 AM   Wound Image       Drainage Amount None Moderate   Drainage Description -- Serosanguineous   Treatments Compression --   Wound Length (cm) 4.5 cm 4.3 cm   Wound Width (cm) 2.1 cm 1.9 cm   Wound Surface Area (cm^2) 9.45 cm^2 8.17 cm^2   Wound Depth (cm) 0.1 cm 0.1 cm   Wound Volume (cm^3) 0.945 cm^3 0.817 cm^3   Wound Healing % -- 14   Margins -- Well-defined edges   Non-staged Wound Description -- Full thickness   Hailey-wound Assessment Dry;Blanchable erythema Edema;Dry   Wound Granulation Tissue Pink;Firm Pale Grey;Pink;Firm   Wound Bed Granulation (%) 10 % 40 %   Wound Bed Epithelium (%) 10 % --   Wound Bed Slough (%) -- 30 %   Wound Bed Eschar (%) 80 % 30 %   Wound Odor None None   Shape blister to hailey --   Tunneling? No --   Undermining? No --   Sinus Tracts? No --   Pressure Injury Stage Unstageable Stage 3       Inactive Orders   Date Order Priority Status Authorizing Provider   07/25/24 1442 Debridement Pressure Injury Posterior;Right Ankle Routine Completed Jenny Olmos APRN       Wound 07/18/24 #4 Right heel wound Heel Right (Active)   Date First Assessed/Time First Assessed: 07/18/24 1428    Wound Number (Wound Clinic Only): #4 Right heel wound  Primary Wound Type: Pressure Injury  Location: Heel  Wound Location Orientation: Right      Assessments 7/18/2024  2:31 PM 8/8/2024  8:11 AM   Wound Image       Drainage Amount None None   Treatments Compression --   Wound Length (cm) 0.5 cm 0.3 cm   Wound Width (cm) 1 cm 0.3 cm   Wound Surface Area (cm^2) 0.5 cm^2 0.09 cm^2   Wound Depth (cm) 0 cm 0 cm   Wound Volume (cm^3) 0 cm^3 0 cm^3   Margins Well-defined edges Well-defined  edges   Non-staged Wound Description -- Full thickness   María-wound Assessment Dry;Clean Dry;Clean   Wound Bed Epithelium (%) 100 % 100 %   Wound Odor None None   Pressure Injury Stage Deep Tissue Deep Tissue       No associated orders.       Compression Wrap 08/01/24 Foot Dorsal;Right (Active)   Placement Date/Time: 08/01/24 1147   Location: Foot  Wound Location Orientation: Dorsal;Right      Assessments 8/1/2024 11:47 AM 8/5/2024  3:23 PM   Response to Treatment Well tolerated Well tolerated   Compression Layers Multilayer Multilayer   Compression Product Type Unna Boot Unna Boot   Dressing Applied Yes Yes   Compression Wrap Location Toes to Knee Toes to Knee   Compression Wrap Status Clean;Dry Clean;Dry;Intact       No associated orders.       Compression Wrap 08/01/24 Foot Dorsal;Left (Active)   Placement Date/Time: 08/01/24 1148   Location: Foot  Wound Location Orientation: Dorsal;Left      Assessments 8/1/2024 11:48 AM 8/5/2024  3:25 PM   Response to Treatment Well tolerated Well tolerated   Compression Layers Multilayer Multilayer   Compression Product Type Unna Boot Unna Boot   Dressing Applied Yes Yes   Compression Wrap Location Toes to Knee Toes to Knee   Compression Wrap Status Clean;Dry Clean;Dry;Intact       No associated orders.          ASSESSMENT AND PLAN:    1. 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 Non-pressure chronic ulcer of other part of right foot with necrosis of bone (HCC)    3. Diabetic foot ulcer with osteomyelitis (HCC)    4. PAD (peripheral artery disease) (HCC)    5. ESRD (end stage renal disease) (HCC)              Risks, benefits, and alternatives of current treatment plan discussed in detail.  Questions and concerns addressed. Red flags to RTC or ED reviewed.  Patient (or parent) agrees to plan.      NOTE TO PATIENT: The 21st Century Cures Act makes clinical notes like these available to patients in the interest of transparency.  Clinical notes are medical documents used by physicians and care providers to communicate with each other. These documents include medical language and terminology, abbreviations, and treatment information that may sound technical and at times possibly unfamiliar. In addition, at times, the verbiage may appear blunt or direct. These documents are one tool providers use to communicate relevant information and clinical opinions of the care providers in a way that allows common understanding of the clinical context.   I spent  60 minutes with the patient. This time included:    preparing to see the patient (eg, review notes and recent diagnostics),  seeing the patient, obtaining and/or reviewing separately obtained history, performing a medically appropriate examination and/or evaluation, counseling and educating the patient, documenting in the record. Collaboration with id and dpm and hbo  DISCHARGE:      Patient Instructions   Please return:   TUESDAY at 4pm with Dr. Smith for HBO consult  Thursday morning with Jenny  (next 4 weeks)    Patient discharge and wound care instructions  Darin Bowens  8/8/2024          DO NOT CHANGE DRESSINGS-KEEP DRY AND INTACT  Cleansing/dressing:     In clinic/ with each dressing change:    please cleanse the limb, foot, and between toes with soap, water and washcloth.   Dry thoroughly.    Cleanse/soak the wound with VASHE (or other hypochlorous wound cleanser), dab dry.    Apply the following dressings:     Left tma:  double transfer>kerramax>20-30mmhg  Right TMA:  double transfer>kerramax>20-30mmhg  Right Achilles:  silver gel>petroleum gauze>abd pad  Right heel :    PAINT WITH BETADINE>cover with abd pad     Weight bearing as tolerated    Offloading  Provide patient with bilateral heel lift boots (ie prevalon), these should be on whenever patient is in bed    Offloading Lower Extremities  Off-loading means no weight-bearing or anything touching the area of the wound. If  your doctor suggests that you should \"off-load\", he or she means that you are not to walk or bear weight on the extremity that has a wound, or the extremity where a wound appears likely to develop.  WHY DO I HAVE TO OFF-LOAD?  When you have a non-healing wound, you must do everything possible to give your body a chance to heal the wound. The weight of your body when you walk puts a large amount of pressure on your feet and ankles. This pressure keeps the new tissue from growing and inhibits new blood vessels from forming. If you continue to bear weight on a body part that has a wound, the time it takes to heal the wound increases, or, worse yet, the wound may not heal at all!  HOW DO I OFF-LOAD?  There are a number of ways to off-load your legs and feet.   The easiest way is to stay in bed, but of course, that isn't always possible.   You may also be prescribed a special shoe/boot/cast to allow you to be up on your feet periodically, but keep pressure off the wound when you are.  Finally if you are in bed, recliner, couch: your foot should not be touching anything except air.  Many times you can position a pillow from your knees to just above your ankles (along your calves) to keep your ankles, heels from resting on anything.     Offloading:  Off-loading is an important part of your wound treatment program. It is a part that only you can assure is accomplished. If you have any concerns about methods to off-load your wound, or feel that you might have trouble following the off-loading plan prescribed for you, talk to your doctor so that alternatives can be discussed that will work in your situation.    Patient should be up in chair with EHOB waffle cushion     Nutrition and blood sugar control:  Focus on the following:  Protein: Meats, beans, eggs, milk and yogurt particularly Greek yogurt), tofu, soy nuts, soy protein products (Follow the protein handout in your welcome folder)  Vitamin C: Citrus fruits and juices,  strawberries, tomatoes, tomato juice, peppers, baked potatoes, spinach, broccoli, cauliflower, Anita sprouts, cabbage  Vitamin A: Dark green, leafy vegetables, orange or yellow vegetables, cantaloupe, fortified dairy products, liver, fortified cereals  Zinc: Fortified cereals, red meats, seafood  PLEASE PROVIDE PATIENT 2 PACKETS OF Alex by [employer]. It can be purchased on amazon, [employer], or local pharmacy may be able to order it for you.  (These are essential branch chain amino acids that help with tissue building and wound healing).   When your blood sugar is consistently elevated greater than 180 your body can't heal or fight infection.     Concerns:  Signs of infection may include the following:  Increase in redness  Red \"streaks\" from wound  Increase in swelling  Fever  Unusual odor  Change in the amount of wound drainage     Should you experience any significant changes in your wound(s) or have any questions regarding your home care instructions please contact the [facility] @ [phone] If after regular business hours, please call your family doctor or local emergency room. The treatment plan has been discussed at length between you and your provider. Follow all instructions carefully, it is very important. If you do not follow all instructions you are at risk of your wound not healing, infection, possible loss of limb and even loss of life.    Jenny Olmos FNP-C, CWCN-AP, CFCN, CSWS, WCC, DWC  8/8/2024

## 2024-08-08 ENCOUNTER — OFFICE VISIT (OUTPATIENT)
Dept: WOUND CARE | Facility: HOSPITAL | Age: 79
End: 2024-08-08
Attending: NURSE PRACTITIONER
Payer: MEDICARE

## 2024-08-08 ENCOUNTER — SNF DISCHARGE (OUTPATIENT)
Dept: INTERNAL MEDICINE CLINIC | Age: 79
End: 2024-08-08

## 2024-08-08 VITALS
DIASTOLIC BLOOD PRESSURE: 70 MMHG | SYSTOLIC BLOOD PRESSURE: 123 MMHG | OXYGEN SATURATION: 98 % | TEMPERATURE: 98 F | RESPIRATION RATE: 20 BRPM | HEART RATE: 80 BPM

## 2024-08-08 VITALS
HEART RATE: 67 BPM | RESPIRATION RATE: 14 BRPM | SYSTOLIC BLOOD PRESSURE: 119 MMHG | TEMPERATURE: 98 F | DIASTOLIC BLOOD PRESSURE: 60 MMHG

## 2024-08-08 DIAGNOSIS — T14.8XXA WOUND INFECTION: Primary | ICD-10-CM

## 2024-08-08 DIAGNOSIS — I96 GANGRENE OF RIGHT FOOT (HCC): ICD-10-CM

## 2024-08-08 DIAGNOSIS — Z96.41 DIABETES MELLITUS TYPE 1, WITH COMPLICATION, ON LONG TERM INSULIN PUMP (HCC): ICD-10-CM

## 2024-08-08 DIAGNOSIS — Z99.2 ESRD ON HEMODIALYSIS (HCC): ICD-10-CM

## 2024-08-08 DIAGNOSIS — G62.9 NEUROPATHY: ICD-10-CM

## 2024-08-08 DIAGNOSIS — E11.621 DIABETIC FOOT ULCER WITH OSTEOMYELITIS (HCC): ICD-10-CM

## 2024-08-08 DIAGNOSIS — I48.20 ATRIAL FIBRILLATION, CHRONIC (HCC): ICD-10-CM

## 2024-08-08 DIAGNOSIS — I10 PRIMARY HYPERTENSION: ICD-10-CM

## 2024-08-08 DIAGNOSIS — L08.9 WOUND INFECTION: Primary | ICD-10-CM

## 2024-08-08 DIAGNOSIS — I73.9 PAD (PERIPHERAL ARTERY DISEASE) (HCC): ICD-10-CM

## 2024-08-08 DIAGNOSIS — N18.6 ESRD (END STAGE RENAL DISEASE) (HCC): ICD-10-CM

## 2024-08-08 DIAGNOSIS — M86.9 DIABETIC FOOT ULCER WITH OSTEOMYELITIS (HCC): ICD-10-CM

## 2024-08-08 DIAGNOSIS — L97.514 NON-PRESSURE CHRONIC ULCER OF OTHER PART OF RIGHT FOOT WITH NECROSIS OF BONE (HCC): ICD-10-CM

## 2024-08-08 DIAGNOSIS — N18.6 ESRD ON HEMODIALYSIS (HCC): ICD-10-CM

## 2024-08-08 DIAGNOSIS — L97.509 DIABETIC FOOT ULCER WITH OSTEOMYELITIS (HCC): ICD-10-CM

## 2024-08-08 DIAGNOSIS — L97.524 NON-PRESSURE CHRONIC ULCER OF OTHER PART OF LEFT FOOT WITH NECROSIS OF BONE (HCC): Primary | ICD-10-CM

## 2024-08-08 DIAGNOSIS — E10.8 DIABETES MELLITUS TYPE 1, WITH COMPLICATION, ON LONG TERM INSULIN PUMP (HCC): ICD-10-CM

## 2024-08-08 DIAGNOSIS — I50.32 CHRONIC DIASTOLIC HEART FAILURE (HCC): ICD-10-CM

## 2024-08-08 DIAGNOSIS — E11.69 DIABETIC FOOT ULCER WITH OSTEOMYELITIS (HCC): ICD-10-CM

## 2024-08-08 LAB — GLUCOSE BLD-MCNC: 226 MG/DL (ref 70–99)

## 2024-08-08 PROCEDURE — 99215 OFFICE O/P EST HI 40 MIN: CPT | Performed by: NURSE PRACTITIONER

## 2024-08-08 PROCEDURE — 99316 NF DSCHRG MGMT 30 MIN+: CPT | Performed by: NURSE PRACTITIONER

## 2024-08-08 RX ORDER — GABAPENTIN 100 MG/1
100 CAPSULE ORAL NIGHTLY
Qty: 30 CAPSULE | Refills: 0 | Status: SHIPPED | OUTPATIENT
Start: 2024-08-08

## 2024-08-08 RX ORDER — HYDROCODONE BITARTRATE AND ACETAMINOPHEN 5; 325 MG/1; MG/1
1 TABLET ORAL EVERY 6 HOURS PRN
Qty: 10 TABLET | Refills: 0 | Status: SHIPPED | OUTPATIENT
Start: 2024-08-08

## 2024-08-08 RX ORDER — DORZOLAMIDE HCL 20 MG/ML
1 SOLUTION/ DROPS OPHTHALMIC 2 TIMES DAILY
Qty: 1 EACH | Refills: 0 | Status: SHIPPED | OUTPATIENT
Start: 2024-08-08

## 2024-08-08 RX ORDER — BRIMONIDINE TARTRATE, TIMOLOL MALEATE 2; 5 MG/ML; MG/ML
1 SOLUTION/ DROPS OPHTHALMIC 2 TIMES DAILY
Qty: 1 EACH | Refills: 0 | Status: SHIPPED | OUTPATIENT
Start: 2024-08-08

## 2024-08-08 NOTE — PATIENT INSTRUCTIONS
Please return:   TUESDAY at 4pm with Dr. Smith for HBO consult  Thursday morning with Jenny  (next 4 weeks)    Patient discharge and wound care instructions  Darin MCINTYRE Gogo  8/8/2024          DO NOT CHANGE DRESSINGS-KEEP DRY AND INTACT  Cleansing/dressing:     In clinic/ with each dressing change:    please cleanse the limb, foot, and between toes with soap, water and washcloth.   Dry thoroughly.    Cleanse/soak the wound with VASHE (or other hypochlorous wound cleanser), dab dry.    Apply the following dressings:     Left tma:  double transfer>kerramax>20-30mmhg  Right TMA:  double transfer>kerramax>20-30mmhg  Right Achilles:  silver gel>petroleum gauze>abd pad  Right heel :    PAINT WITH BETADINE>cover with abd pad     Weight bearing as tolerated    Offloading  Provide patient with bilateral heel lift boots (ie prevalon), these should be on whenever patient is in bed    Offloading Lower Extremities  Off-loading means no weight-bearing or anything touching the area of the wound. If your doctor suggests that you should \"off-load\", he or she means that you are not to walk or bear weight on the extremity that has a wound, or the extremity where a wound appears likely to develop.  WHY DO I HAVE TO OFF-LOAD?  When you have a non-healing wound, you must do everything possible to give your body a chance to heal the wound. The weight of your body when you walk puts a large amount of pressure on your feet and ankles. This pressure keeps the new tissue from growing and inhibits new blood vessels from forming. If you continue to bear weight on a body part that has a wound, the time it takes to heal the wound increases, or, worse yet, the wound may not heal at all!  HOW DO I OFF-LOAD?  There are a number of ways to off-load your legs and feet.   The easiest way is to stay in bed, but of course, that isn't always possible.   You may also be prescribed a special shoe/boot/cast to allow you to be up on your feet  periodically, but keep pressure off the wound when you are.  Finally if you are in bed, recliner, couch: your foot should not be touching anything except air.  Many times you can position a pillow from your knees to just above your ankles (along your calves) to keep your ankles, heels from resting on anything.     Offloading:  Off-loading is an important part of your wound treatment program. It is a part that only you can assure is accomplished. If you have any concerns about methods to off-load your wound, or feel that you might have trouble following the off-loading plan prescribed for you, talk to your doctor so that alternatives can be discussed that will work in your situation.    Patient should be up in chair with EHOB waffle cushion     Nutrition and blood sugar control:  Focus on the following:  Protein: Meats, beans, eggs, milk and yogurt particularly Greek yogurt), tofu, soy nuts, soy protein products (Follow the protein handout in your welcome folder)  Vitamin C: Citrus fruits and juices, strawberries, tomatoes, tomato juice, peppers, baked potatoes, spinach, broccoli, cauliflower, Novice sprouts, cabbage  Vitamin A: Dark green, leafy vegetables, orange or yellow vegetables, cantaloupe, fortified dairy products, liver, fortified cereals  Zinc: Fortified cereals, red meats, seafood  PLEASE PROVIDE PATIENT 2 PACKETS OF Alex by [employer]. It can be purchased on amazon, [employer] website, or local pharmacy may be able to order it for you.  (These are essential branch chain amino acids that help with tissue building and wound healing).   When your blood sugar is consistently elevated greater than 180 your body can't heal or fight infection.     Concerns:  Signs of infection may include the following:  Increase in redness  Red \"streaks\" from wound  Increase in swelling  Fever  Unusual odor  Change in the amount of wound drainage     Should you experience any significant changes in your wound(s) or have any  questions regarding your home care instructions please contact the wound center [facility] @ [phone] If after regular business hours, please call your family doctor or local emergency room. The treatment plan has been discussed at length between you and your provider. Follow all instructions carefully, it is very important. If you do not follow all instructions you are at risk of your wound not healing, infection, possible loss of limb and even loss of life.

## 2024-08-08 NOTE — PROGRESS NOTES
.Weekly Wound Education Note    Teaching Provided To: Patient;Family  Training Topics: Dressing;Edema control;Cleasing and general instructions;Compression;Discharge instructions  Training Method: Explain/Verbal;Written  Training Response: Patient responds and understands        Notes: Wounds stable. Plan for surgical debridement in OR with Dr. Meza on monday. HBO consult next tuesday. Pt is to discharge from SNF today or tomorrow, hard script written by Dr. Meza, copy sent to scanning. Dressings as follows.....    Left tma:  double transfer>kerramax>20-30mmhg  Right TMA:  double transfer>kerramax>20-30mmhg  Right Achilles:  silver gel>petroleum gauze>abd pad  Right heel :    PAINT WITH BETADINE>cover with abd pad     Calamine unna boot 20-30mmHg wrapped over distal foot, starting plantar and pulling dorsally to both legs.

## 2024-08-08 NOTE — PROGRESS NOTES
Darin Bowens, 1945, 79 year old, male is being discharged from Facility: [facility]       DISCHARGE SUMMARY    Date of Admission:24     Date of Discharge: 24                                  [facility] admission -24     Hospital Discharge Diagnoses:  Cellulitis of lower extremity   PVD  ESRD on HD   CAD s/p stents   PAD s/p b/l LE stents   Chronic CHF with LVH   S/P AVR   Atrial fibrillation   Diabetes   Anemia   Hypertension   Hyperlipidemia   DESI         HPI:  Darin Bowens  : 1945, Age 78 year old  male patient is admitted for subacute rehab. Patient has a past medical history of CAD status post stents, PAD status post bilateral lower extremity stents, atrial fibrillation on Xarelto, diabetes with insulin pump, ESRD on HD, CHF with LVH status post AVR, history of stroke, hypertension, hyperlipidemia, DESI and recent TMA left foot second digit amputation on  per Dr Meza presented to hospital with poor wound healing.  He had excisional debridement to level of bone along with partial resection of the Right 1st and 2nd MT on . Surgical cultures showed enterobacter cloacae and strenotrophomonas - discharge cultures form admit with enterobacter and acinetobacter- he is on IV Cefepime, Flagyl orally and Bactrim since  - ID to have patient discharge with IV Cefepime with HD, continue PO bactrim and PO Flagyl through 8/15. He had further debridement of foot on . He was to receive HBO until  then discharge to rehab facility. Podiatry suggested they would reconsider application of wound vac on follow up at [facility] - patient to see Dr Donaldson in wound clinic.   Hx of PVD arterial Ultrasound April likely more distal PAD require further amputation per vascular patient is not a candidate for any further revascularization and should have foot amputation if does not heal.  ESRD malfunctioning AVF- vascular consulted s/p tunneled  catheter on 6/28. Ongoing anemia suspected from ABLA surgery had PRBC on 7/3- he is back on blood thinners. Admitting to [facility] for nursing care, medication management, and PT/OT/ST eval and tx.       Reason for Admission:  Subacute Rehab and Medical Management    Subjective:  Patient seen today for discharge summary   He is discharging today per patient/family after dialysis   He states the pain to his lower legs is improving- \"It doesn't burn like it used to.\" He was started on low dose of gabapentin while in facility and has tolerated well. He also receives Hydrocodone prn for severe pain   He was followed by wound clinic today- dressings in place not to be removed. He will have weekly follow ups. Plan is to resume Hyperbaric treatment per patient   No shortness of breath, cough or wheeze  No chest pain or palpitations   No nausea, vomiting, constipation or diarrhea   Wife asked for refill of eye drops, new script for gabapentin, Eliquis,  small amount of Norco as needed.   He is scheduled Monday for a debridement.       Vital signs:  /70   Pulse 80   Temp 98.2 °F (36.8 °C)   Resp 20   SpO2 98%     ROS and Physical Exam:         REVIEW OF SYSTEMS:  GENERAL HEALTH:feels well otherwise  SKIN: denies any unusual skin lesions or rashes  WOUNDS: see wound assessment  + wound infection   EYES:no visual complaints or deficits  HENT: denies nasal congestion, sinus pain or sore throat;  RESPIRATORY: denies shortness of breath, wheezing or cough +DESI Cpap   CARDIOVASCULAR:denies chest pain, no palpitations + HF + HTN +S/P Aortic valve replacement +PAD + A fib   +PVD  GI: denies nausea, vomiting, , diarrhea; constipation   : + ESRD on HD   MUSCULOSKELETAL:no joint complaints upper or lower extremities  NEURO:no sensory or motor complaint  PSYCHE: no symptoms of depression or anxiety  HEMATOLOGY + anemia   ENDOCRINE: +diabetes     PHYSICAL EXAM:  GENERAL HEALTH: 79 year old male patient, no acute distress   LINES,  TUBES, DRAINS:  Left upper chest port - CDI  SKIN: no rashes, no suspicious lesions  WOUND: see wound assessment   Bilateral lower legs- dressings in place-cdi   EYES: PERRLA, conjunctiva normal ; no drainage from eyes  HENT: normocephalic; normal nose, no nasal drainage, mucous membranes pink, moist  RESPIRATORY:clear to percussion and auscultation, No wheezing/cough/accessory muscle use; on room air  CARDIOVASCULAR: S1, S2 normal, RRR; no S3, no S4;   ABDOMEN: normal active BS+, soft, non-distended; no apparent masses; observed, non-tender, no guarding during physical exam  : ESRD on HD   MUSCULOSKELETAL: no acute synovitis upper or lower extremity.  Weakness R/T recent hospitalization/diagnoses/sequelae; will undergo therapies to rehab and improve strength, endurance and independence w/ ADLs as able  Dressings to bilateral lower extremities   EXTREMITIES/VASCULAR: no cyanosis, clubbing or edema  Bilateral lower extremities- dressing cdi  NEUROLOGIC: intact; no sensorimotor deficit  PSYCHIATRIC: alert and oriented x 3; affect appropriate    Skilled Nursing Facility Course:    Darin MIGUELINA Bowens has done well and is progressing well with PT/OT. Transferring okay. Ambulating 60 ft with walker.   Medically cleared for anticipated discharge- sent scripts for eye drops, eliquis, gabapentin, [place] to [facility] per wife/daughter direction   He is to stop sliding scale and long acting insulin he received at rehab facility and transition back to his pump settings- to be managed by PCP/Endo   He has been treated for wound infection while in facility IV Cefepime last dose with dialysis today 8/8, Metronidazole until 8/8 and then Bactrim DS after dialysis until 8/15 per discharge instructions from hospital. Patient to follow up with Dr Bethel SN[facility] outpatient   He is to follow up with outpatient [facility] regularly   Continued to have pain/burning neuropathy- started lose dose Gabapentin 100 mg nightly on 7/30/24  reports improvement in pain   Outpatient dialysis to continue   [place]  checked and OK for narcotics prescribed.  Home with home care services RN/PT/OT- HOME HEALTH NOT TO DRESS HIS LEGS HE WILL GO TO OUTPATIENT WOUND APPTS  Equipment per PT recommendations-      Most recent lab results:  8/5/24  Wbc 6.30 hgb 8.4 hematocrit 25.7 plt 123  Alb 2.6 ca 10.1 co2 26 chl 95 crt 5.61 gfr 9.8   Glucose 82 alk phos 87 potassium 4.0 t prot 4.70 sodium 137   Alt 12 ast 18 bun 66 t bili 0.38 uric acid 4.8   Mag 2.04 phos 3.40   Sed 28   Crp 4.3      7/30/24  A1c 8.6   Wbc 5.6 hgb 9 plt 321        7/29/24  Wbc 7.30 hgb 9.0 hematocrit 28.2 plt 182   Alb 2.7 ca 9.6 co2 21 chl 87 crt 5.26 gfr 10.6   Glucose 101 alk phos 95 potassium 3.2 t prot 4.6 sodium 131   Alt 11 ast 23 bun 54 t bili 0.5 uric acid 3.2   Mag 2.12 phos 2.9   Sed 19 crp 6.69         7/25/24  Wbc 5.70 hgb 9.3 hematocrit 29.0 plt 158   Alb  2.5 ca 9.2 co2 25 chl 88 crt 4.57 gfr 12.5   Glucose 132 alk phos 89 potassium 3.4   T prot 4.5 sodium 128 alt 12 ast 22 bun 46 t bili 0.42 uric acid 3.2   Mag 1.93         7/22/24   Wbc 6.80 hgb 10.20 hematocrit 31.1 plt 181   Alb 2.8 ca 9.3 co2 23 chl 90 crt 5.49 gfr 10   Glucose 123 alk phos 99 potassium 3.8 t prot 5.9 sodium 130 alt 8 ast 21 bun 52 t bili 0.45 uric acid 3.3 mag 1.93   Sed 44  Crp 23.71    Discharge Diagnoses w/ current management:     Post op wound infection R TMA 6/13 due to nonhealing ulcer and PVD   Excisional debridement to level of bone along with partial resection of R 1st and 2nd MT 6/27 /L foot 2nd digit amputation on 6/14   Excisional wound debridement to level of bone along with partial resection of second MT 6/27   Followed by specialist in hospital. Received HBO in hospital last day of completion on 7/12.   Vital signs q shift and prn   Labs weekly and prn cbc/cmp/crp/sed - fax results to [facility] [phone]  Follow up with ID Dr Bethel SNEED   Follow up with Dr Nix vascular surgery    Follow up with Dr Meza Podiatry   In house ID follow  Encourage more protein   Wound debridement appt 8/12   WB as tolerated   Dressing changes per wound recommendations   Outpatient wound follow up as scheduled   IV Cefepime 2 gm 3x weekly until 8/15 - with weekly labs    Metronidazole 500 mg bid until 8/15  Bactrim DS 1 tab daily in PM after HD   Plavix 75 mg daily   Pain control- acetaminophen prn, Hydrocodone prn.  Gabapentin 100 mg nightly started 7/30 - helping per patient     Hypokalemia   7/29 potassium at 3.2 -->Start Potassium chloride 20 meq daily x 3 days   8/5 potassium 4.0   Monitor labs      ESRD on HD   Outside dialysis MWF  Avoid nephrotoxin   Follow up Nephrology      CAD s/p stents/ PAD s/p b/l LE stents/Chronic CHF w/LVH/ S/p AVR/ Atrial fibrillation /Hypertension / HL   In hospital not followed by cards but vascular. Seen Cards prior to surgery BETH 6/16 Aortic valve a bioprostehsis present mild to mod stenosis.  Heart failure controlled by dialysis.   Vital signs q shift and prn   Labs weekly and prn   Daily weights notify if greater than 2 lb increase in 1 day or 5 lbs in 1 week - 208.9 lbs   In house Cardiology team follow   Apixaban 2.5 mg bid - script sent to pharmacy per wife request   Atorvastatin 80 mg nightly   Coreg 12.5 mg bid   Plavix 75 mg daily      Diabetes  Low carb diet   Resume diabetic medication/insulin pump as at home-follow up with PCP and Endo regarding any changes      Anemia due to ESRD   Monitor labs      DESI   In house Pulm to follow   Cpap nightly          Medication Reconciliation Completed:  Yes    Follow Up Visits:  PCP within 7 days of Discharge  *Follow-Up with PCP within 1-2 weeks following [facility] discharge.   *Follow-Up with specialists as recommended.  Follow up with Dr Nix in 1 week   Follow up with dr Meza Podiatry   Follow up with Dr Bethel SNEED   Follow up with Dr Flores Nephrology   No future appointments.  Future Appointments   Date Time Provider  Department Center   8/13/2024  3:00 PM Dejuan Smith MD  Wound [facility]   8/15/2024  9:30 AM Jenny Olmos APRN  Wound [facility]   8/22/2024 10:00 AM Jenny Olmos APRN  W[facility]   8/29/2024 10:00 AM Jenny Olmos APRN  Wound [facility]         Greater than 30 min spent in coordination of care in preparation for discharge.    Note to patient: The 21st Century Cures Act makes medical notes like these available to patients in the interest of transparency. However, this is a medical document intended as peer to peer communication. It is written in medical language and may contain abbreviations or verbiage that are unfamiliar. It may appear blunt or direct. Medical documents are intended to carry relevant information, facts as evident, and the clinical opinion of the practitioner who signs the document.      DELMI Malave  8/8/2024

## 2024-08-09 RX ORDER — SODIUM CHLORIDE, SODIUM LACTATE, POTASSIUM CHLORIDE, CALCIUM CHLORIDE 600; 310; 30; 20 MG/100ML; MG/100ML; MG/100ML; MG/100ML
INJECTION, SOLUTION INTRAVENOUS CONTINUOUS
Status: CANCELLED | OUTPATIENT
Start: 2024-08-09

## 2024-08-09 RX ORDER — ACETAMINOPHEN 500 MG
1000 TABLET ORAL ONCE
Status: CANCELLED | OUTPATIENT
Start: 2024-08-09 | End: 2024-08-09

## 2024-08-11 ENCOUNTER — ANESTHESIA EVENT (OUTPATIENT)
Dept: SURGERY | Facility: HOSPITAL | Age: 79
End: 2024-08-11
Payer: MEDICARE

## 2024-08-12 ENCOUNTER — ANESTHESIA (OUTPATIENT)
Dept: SURGERY | Facility: HOSPITAL | Age: 79
End: 2024-08-12
Payer: MEDICARE

## 2024-08-12 ENCOUNTER — HOSPITAL ENCOUNTER (OUTPATIENT)
Facility: HOSPITAL | Age: 79
Setting detail: HOSPITAL OUTPATIENT SURGERY
Discharge: HOME | End: 2024-08-12
Attending: PODIATRIST | Admitting: PODIATRIST
Payer: MEDICARE

## 2024-08-12 VITALS
DIASTOLIC BLOOD PRESSURE: 60 MMHG | SYSTOLIC BLOOD PRESSURE: 123 MMHG | HEART RATE: 60 BPM | HEIGHT: 72 IN | RESPIRATION RATE: 16 BRPM | TEMPERATURE: 97 F | WEIGHT: 209.81 LBS | BODY MASS INDEX: 28.42 KG/M2 | OXYGEN SATURATION: 100 %

## 2024-08-12 LAB
ANION GAP SERPL CALC-SCNC: 6 MMOL/L (ref 0–18)
BUN BLD-MCNC: 38 MG/DL (ref 9–23)
CALCIUM BLD-MCNC: 10 MG/DL (ref 8.7–10.4)
CHLORIDE SERPL-SCNC: 97 MMOL/L (ref 98–112)
CO2 SERPL-SCNC: 32 MMOL/L (ref 21–32)
CREAT BLD-MCNC: 3.75 MG/DL
EGFRCR SERPLBLD CKD-EPI 2021: 16 ML/MIN/1.73M2 (ref 60–?)
GLUCOSE BLD-MCNC: 129 MG/DL (ref 70–99)
GLUCOSE BLD-MCNC: 144 MG/DL (ref 70–99)
GLUCOSE BLD-MCNC: 145 MG/DL (ref 70–99)
OSMOLALITY SERPL CALC.SUM OF ELEC: 291 MOSM/KG (ref 275–295)
POTASSIUM SERPL-SCNC: 3.3 MMOL/L (ref 3.5–5.1)
SODIUM SERPL-SCNC: 135 MMOL/L (ref 136–145)

## 2024-08-12 PROCEDURE — 82962 GLUCOSE BLOOD TEST: CPT

## 2024-08-12 PROCEDURE — 0QBP0ZZ EXCISION OF LEFT METATARSAL, OPEN APPROACH: ICD-10-PCS | Performed by: PODIATRIST

## 2024-08-12 PROCEDURE — 80048 BASIC METABOLIC PNL TOTAL CA: CPT | Performed by: PODIATRIST

## 2024-08-12 PROCEDURE — 0JUQ0KZ SUPPLEMENT OF RIGHT FOOT SUBCUTANEOUS TISSUE AND FASCIA WITH NONAUTOLOGOUS TISSUE SUBSTITUTE, OPEN APPROACH: ICD-10-PCS | Performed by: PODIATRIST

## 2024-08-12 PROCEDURE — 0JUR0KZ SUPPLEMENT OF LEFT FOOT SUBCUTANEOUS TISSUE AND FASCIA WITH NONAUTOLOGOUS TISSUE SUBSTITUTE, OPEN APPROACH: ICD-10-PCS | Performed by: PODIATRIST

## 2024-08-12 PROCEDURE — 0QBN0ZZ EXCISION OF RIGHT METATARSAL, OPEN APPROACH: ICD-10-PCS | Performed by: PODIATRIST

## 2024-08-12 DEVICE — FISH-SKIN GRAFT FOR SURGICAL USE. KERECIS® SURGICLOSE® IS INDICATED FOR THE MANAGEMENT OF WOUNDS INCLUDING: PARTIAL AND FULL THICKNESS WOUNDS, PRESSURE ULCERS, CHRONIC VASCULAR ULCERS, DIABETIC ULCERS, TRAUMA WOUNDS (ABRASIONS, LACERATIONS, SECOND-DEGREE BURNS, SKIN TEARS), SURGICAL WOUNDS (DONOR SITE/GRAFTS, POST-MOHS SURGERY, POST-LASER SURGERY, PODIATRIC, WOUND DEHISCENCE) AND DRAINING WOUNDS. IFU: HTTPS://WWW.KERECIS.COM/IFUS/IFU-KERECIS-SURGICLOSE/
Type: IMPLANTABLE DEVICE | Site: FOOT | Status: FUNCTIONAL
Brand: KERECIS® SURGICLOSE®

## 2024-08-12 RX ORDER — ONDANSETRON 2 MG/ML
INJECTION INTRAMUSCULAR; INTRAVENOUS AS NEEDED
Status: DISCONTINUED | OUTPATIENT
Start: 2024-08-12 | End: 2024-08-12 | Stop reason: SURG

## 2024-08-12 RX ORDER — HYDROMORPHONE HYDROCHLORIDE 1 MG/ML
0.2 INJECTION, SOLUTION INTRAMUSCULAR; INTRAVENOUS; SUBCUTANEOUS EVERY 5 MIN PRN
Status: DISCONTINUED | OUTPATIENT
Start: 2024-08-12 | End: 2024-08-12

## 2024-08-12 RX ORDER — NICOTINE POLACRILEX 4 MG
15 LOZENGE BUCCAL
Status: DISCONTINUED | OUTPATIENT
Start: 2024-08-12 | End: 2024-08-12

## 2024-08-12 RX ORDER — BUPIVACAINE HYDROCHLORIDE 5 MG/ML
INJECTION, SOLUTION EPIDURAL; INTRACAUDAL AS NEEDED
Status: DISCONTINUED | OUTPATIENT
Start: 2024-08-12 | End: 2024-08-12 | Stop reason: HOSPADM

## 2024-08-12 RX ORDER — ACETAMINOPHEN 500 MG
1000 TABLET ORAL ONCE
Status: DISCONTINUED | OUTPATIENT
Start: 2024-08-12 | End: 2024-08-12

## 2024-08-12 RX ORDER — SODIUM CHLORIDE, SODIUM LACTATE, POTASSIUM CHLORIDE, CALCIUM CHLORIDE 600; 310; 30; 20 MG/100ML; MG/100ML; MG/100ML; MG/100ML
INJECTION, SOLUTION INTRAVENOUS CONTINUOUS
Status: DISCONTINUED | OUTPATIENT
Start: 2024-08-12 | End: 2024-08-12

## 2024-08-12 RX ORDER — HYDROCODONE BITARTRATE AND ACETAMINOPHEN 5; 325 MG/1; MG/1
2 TABLET ORAL ONCE AS NEEDED
Status: COMPLETED | OUTPATIENT
Start: 2024-08-12 | End: 2024-08-12

## 2024-08-12 RX ORDER — INSULIN ASPART 100 [IU]/ML
INJECTION, SOLUTION INTRAVENOUS; SUBCUTANEOUS ONCE
Status: DISCONTINUED | OUTPATIENT
Start: 2024-08-12 | End: 2024-08-12

## 2024-08-12 RX ORDER — VANCOMYCIN HYDROCHLORIDE 1 G/20ML
INJECTION, POWDER, LYOPHILIZED, FOR SOLUTION INTRAVENOUS AS NEEDED
Status: DISCONTINUED | OUTPATIENT
Start: 2024-08-12 | End: 2024-08-12 | Stop reason: HOSPADM

## 2024-08-12 RX ORDER — HYDROMORPHONE HYDROCHLORIDE 1 MG/ML
0.6 INJECTION, SOLUTION INTRAMUSCULAR; INTRAVENOUS; SUBCUTANEOUS EVERY 5 MIN PRN
Status: DISCONTINUED | OUTPATIENT
Start: 2024-08-12 | End: 2024-08-12

## 2024-08-12 RX ORDER — ACETAMINOPHEN 500 MG
1000 TABLET ORAL ONCE AS NEEDED
Status: COMPLETED | OUTPATIENT
Start: 2024-08-12 | End: 2024-08-12

## 2024-08-12 RX ORDER — HYDROCODONE BITARTRATE AND ACETAMINOPHEN 5; 325 MG/1; MG/1
1 TABLET ORAL ONCE AS NEEDED
Status: COMPLETED | OUTPATIENT
Start: 2024-08-12 | End: 2024-08-12

## 2024-08-12 RX ORDER — PHENYLEPHRINE HCL 10 MG/ML
VIAL (ML) INJECTION AS NEEDED
Status: DISCONTINUED | OUTPATIENT
Start: 2024-08-12 | End: 2024-08-12 | Stop reason: SURG

## 2024-08-12 RX ORDER — DEXTROSE MONOHYDRATE 25 G/50ML
50 INJECTION, SOLUTION INTRAVENOUS
Status: DISCONTINUED | OUTPATIENT
Start: 2024-08-12 | End: 2024-08-12

## 2024-08-12 RX ORDER — METOCLOPRAMIDE HYDROCHLORIDE 5 MG/ML
5 INJECTION INTRAMUSCULAR; INTRAVENOUS EVERY 8 HOURS PRN
Status: DISCONTINUED | OUTPATIENT
Start: 2024-08-12 | End: 2024-08-12

## 2024-08-12 RX ORDER — ONDANSETRON 2 MG/ML
4 INJECTION INTRAMUSCULAR; INTRAVENOUS EVERY 6 HOURS PRN
Status: DISCONTINUED | OUTPATIENT
Start: 2024-08-12 | End: 2024-08-12

## 2024-08-12 RX ORDER — SODIUM CHLORIDE 9 MG/ML
INJECTION, SOLUTION INTRAVENOUS CONTINUOUS
Status: DISCONTINUED | OUTPATIENT
Start: 2024-08-12 | End: 2024-08-12

## 2024-08-12 RX ORDER — NALOXONE HYDROCHLORIDE 0.4 MG/ML
0.08 INJECTION, SOLUTION INTRAMUSCULAR; INTRAVENOUS; SUBCUTANEOUS AS NEEDED
Status: DISCONTINUED | OUTPATIENT
Start: 2024-08-12 | End: 2024-08-12

## 2024-08-12 RX ORDER — NICOTINE POLACRILEX 4 MG
30 LOZENGE BUCCAL
Status: DISCONTINUED | OUTPATIENT
Start: 2024-08-12 | End: 2024-08-12

## 2024-08-12 RX ORDER — HYDROMORPHONE HYDROCHLORIDE 1 MG/ML
0.4 INJECTION, SOLUTION INTRAMUSCULAR; INTRAVENOUS; SUBCUTANEOUS EVERY 5 MIN PRN
Status: DISCONTINUED | OUTPATIENT
Start: 2024-08-12 | End: 2024-08-12

## 2024-08-12 RX ADMIN — PHENYLEPHRINE HCL 100 MCG: 10 MG/ML VIAL (ML) INJECTION at 14:47:00

## 2024-08-12 RX ADMIN — SODIUM CHLORIDE: 9 INJECTION, SOLUTION INTRAVENOUS at 14:08:00

## 2024-08-12 RX ADMIN — ONDANSETRON 4 MG: 2 INJECTION INTRAMUSCULAR; INTRAVENOUS at 14:18:00

## 2024-08-12 RX ADMIN — PHENYLEPHRINE HCL 100 MCG: 10 MG/ML VIAL (ML) INJECTION at 14:39:00

## 2024-08-12 NOTE — ANESTHESIA PREPROCEDURE EVALUATION
PRE-OP EVALUATION    Patient Name: Darin Bowens    Admit Diagnosis: .    Pre-op Diagnosis: .    RIGHT FOOT WOUND DEBRIDEMENT, APPLICATION OF KERECIS AND COMPLEX WOUND CLOSURE. LEFT FOOT WOUND DEBRIDMENT AND APPLICATION OF KERECIS, PARTIAL FIRST AND SECOND METATARSAL RESECTION    Anesthesia Procedure: RIGHT FOOT WOUND DEBRIDEMENT, APPLICATION OF KERECIS AND COMPLEX WOUND CLOSURE. LEFT FOOT WOUND DEBRIDMENT AND APPLICATION OF KERECIS, PARTIAL FIRST AND SECOND METATARSAL RESECTION (Bilateral)    Surgeons and Role:     * Jose R Meza DPDEBBIE - Primary    Pre-op vitals reviewed.  Temp: 97.7 °F (36.5 °C)  Pulse: 59  Resp: 16  BP: 101/64  SpO2: 98 %  Body mass index is 28.45 kg/m².    Current medications reviewed.  Hospital Medications:   [Transfer Hold] glucose (Dex4) 15 GM/59ML oral liquid 15 g  15 g Oral Q15 Min PRN    Or    [Transfer Hold] glucose (Glutose) 40% oral gel 15 g  15 g Oral Q15 Min PRN    Or    [Transfer Hold] glucose-vitamin C (Dex-4) chewable tab 4 tablet  4 tablet Oral Q15 Min PRN    Or    [Transfer Hold] dextrose 50% injection 50 mL  50 mL Intravenous Q15 Min PRN    Or    [Transfer Hold] glucose (Dex4) 15 GM/59ML oral liquid 30 g  30 g Oral Q15 Min PRN    Or    [Transfer Hold] glucose (Glutose) 40% oral gel 30 g  30 g Oral Q15 Min PRN    Or    [Transfer Hold] glucose-vitamin C (Dex-4) chewable tab 8 tablet  8 tablet Oral Q15 Min PRN    ceFAZolin (Ancef) 2g in 10mL IV syringe premix  2 g Intravenous Once    sodium chloride 0.9% infusion   Intravenous Continuous    [Transfer Hold] acetaminophen (Tylenol Extra Strength) tab 1,000 mg  1,000 mg Oral Once       Outpatient Medications:     Medications Prior to Admission   Medication Sig Dispense Refill Last Dose    apixaban 2.5 MG Oral Tab Take 1 tablet (2.5 mg total) by mouth 2 (two) times daily. 60 tablet 0 8/11/2024 at 0800    COMBIGAN 0.2-0.5 % Ophthalmic Solution Place 1 drop into both eyes 2 (two) times daily. 1 each 0 8/11/2024 at 2100     dorzolamide 2 % Ophthalmic Solution Place 1 drop into both eyes in the morning and 1 drop before bedtime. 1 each 0 2024 at 2100    gabapentin 100 MG Oral Cap Take 1 capsule (100 mg total) by mouth at bedtime. 30 capsule 0 8/10/2024    HYDROcodone-acetaminophen 5-325 MG Oral Tab Take 1 tablet by mouth every 6 (six) hours as needed for Pain. 10 tablet 0 2024    [] Potassium Chloride ER 20 MEQ Oral Tab CR Take 20 mEq by mouth daily.       Probiotic Product (PROBIOTIC DAILY OR) Take 1 capsule by mouth daily.   2024 at 2100    sennosides 8.6 MG Oral Tab Take 1 tablet (8.6 mg total) by mouth 2 (two) times daily.   2024    acetaminophen 325 MG Oral Tab Take 2 tablets (650 mg total) by mouth every 6 (six) hours as needed for Pain.   2024 at 1000    metRONIDAZOLE 500 MG Oral Tab Take 1 tablet (500 mg total) by mouth in the morning and 1 tablet (500 mg total) before bedtime. 74 tablet 0 2024 at 2100    Netarsudil-Latanoprost (ROCKLATAN) 0.02-0.005 % Ophthalmic Solution Apply 1 drop to eye daily. Both eyes   2024 at 2100    atorvastatin 80 MG Oral Tab Take 1 tablet (80 mg total) by mouth nightly. 90 tablet 2 2024 at 2100    CLOPIDOGREL 75 MG Oral Tab TAKE 1 TABLET(75 MG) BY MOUTH DAILY 90 tablet 0 2024 at 0800    Continuous Blood Gluc Sensor (DEXCOM G6 SENSOR) Does not apply Misc 1 Device Every 10 days. (Patient taking differently: 1 Device Every 10 days. Not using currently) 3 each 1 2024    Insulin Lispro (HUMALOG) 100 UNIT/ML Subcutaneous Solution 95 units per day via insulin pump. (Patient taking differently: Pump not currently being used per spouse. 24) 90 mL 1 2024 at 1226    carvedilol 12.5 MG Oral Tab Take 1 tablet (12.5 mg total) by mouth 2 (two) times daily with meals. Take one tablet (12.5mg total) by mouth two times a day 180 tablet 3 2024 at 0800    Continuous Blood Gluc Transmit (DEXCOM G6 TRANSMITTER) Does not apply Misc Change every 90 days 1  each 3 2024    CALCIUM ACETATE 667 MG Oral Cap TAKE 1 CAPSULE BY MOUTH THREE TIMES DAILY WITH MEALS 270 capsule 0 2024 at 1800    PTA Insulin via Pump Inject into the skin continuous. humalog insulin   [employer]  Basal Rate : 55.6 standard rate 1.90  I:C - 1 units/4 carbs  Correction Factor - unknown   2024    polyethylene glycol, PEG 3350, 17 g Oral Powd Pack Take 17 g by mouth daily as needed (constipation). (Patient not taking: Reported on 2024)       sulfamethoxazole-trimethoprim -160 MG Oral Tab per tablet Take 1 tablet by mouth daily. Schedule in PM after HD. 34 tablet 0  at 2100    [] cefepime HCl (MAXIPIME) 1 GM/50ML SOLN Inject 100 mL (2 g total) into the vein 3 (three) times a week. To be given with hemodialysis 3 times a week.  Weekly CBC with differential and CMP, sed rate and CRP.  Fax results to [facility]. Fax: [phone]. Tel: [phone].  PICC line care as per protocol. 1500 mL 0     ketoconazole 2 % External Shampoo Apply 1 Application topically daily as needed for Itching. (Patient not taking: Reported on 2024)       Ergocalciferol (VITAMIN D2 OR) Take 50,000 Units by mouth every 30 (thirty) days.   More than a month       Allergies: Augmentin [amoxicillin-pot clavulanate] and Lisinopril      Anesthesia Evaluation    Patient summary reviewed.    Anesthetic Complications  (-) history of anesthetic complications         GI/Hepatic/Renal  Comment: Dialysis today           (+) chronic renal disease and ESRD and hemodialysis                   Cardiovascular  Comment: H/o AVR  ECHO 2024  Preserved ventricle function, biatrial enlargement, mild  mitral regurgitation    ECG reviewed.            (+) hypertension     (+) CAD             (+) dysrhythmias and atrial fibrillation  (+) CHF                Endo/Other  Comment: Glucose 145 today on arrival. Insulin pump off and taken by wife    (+) diabetes  type 1, using insulin                          Pulmonary                    (+) sleep apnea and CPAP      Neuro/Psych  Comment: Proliferative retinopathy                                    Past Surgical History:   Procedure Laterality Date    Amputation foot,transmetatarsal Right     Amputation transmetacarpal Left     Back surgery  05/16/2016    L2-L5 Decomp poss uninstrumented fusion    Cabg      Cath percutaneous  transluminal coronary angioplasty      Cath transcatheter aortic valve replacement      Injection, w/wo contrast, dx/therapeutic substance, epidural/subarachnoid; lumbar/sacral N/A 12/10/2015    Procedure: LUMBAR EPIDURAL;  Surgeon: Rojas Bolanos MD;  Location: [facility]    Injection, w/wo contrast, dx/therapeutic substance, epidural/subarachnoid; lumbar/sacral N/A 01/25/2016    Procedure: LUMBAR EPIDURAL;  Surgeon: Rojas Bolanos MD;  Location: [facility]    Injection, w/wo contrast, dx/therapeutic substance, epidural/subarachnoid; lumbar/sacral N/A 03/02/2016    Procedure: LUMBAR EPIDURAL;  Surgeon: Corey Mcduffie MD;  Location: [facility]    M-seda by  phys perfrmg svc 5+ yr N/A 12/10/2015    Procedure: LUMBAR EPIDURAL;  Surgeon: Rojas Bolanos MD;  Location: [facility]    M-seda by  phys perfrmg svc 5+ yr N/A 01/25/2016    Procedure: LUMBAR EPIDURAL;  Surgeon: Rojas Bolanos MD;  Location: [facility]    M-sedaj by  phys perfrmg svc 5+ yr N/A 03/02/2016    Procedure: LUMBAR EPIDURAL;  Surgeon: Corey Mcduffie MD;  Location: [facility]    Other surgical history  10/04/2012    cysto-Dr. [email or web address]    Other surgical history N/A 05/16/2016    Procedure: LUMBAR LAMINECTOMY FUSION W/ BONE GRAFT 3 LEVEL;  Surgeon: CARLY Garcia MD;  Location:  MAIN OR    Patient documented not to have experienced any of the following events N/A 12/10/2015    Procedure: LUMBAR EPIDURAL;  Surgeon: Rojas Bolanos MD;  Location: [facility]  [facility]    Patient documented not to have experienced any of the following events N/A 01/25/2016    Procedure: LUMBAR EPIDURAL;  Surgeon: Rojas Bolanos MD;  Location: [facility]    Patient documented not to have experienced any of the following events N/A 03/02/2016    Procedure: LUMBAR EPIDURAL;  Surgeon: Corey Mcduffie MD;  Location: [facility]    Patient withough preoperative order for iv antibiotic surgical site infection prophylaxis. N/A 12/10/2015    Procedure: LUMBAR EPIDURAL;  Surgeon: Rojas Bolanos MD;  Location: [facility]    Patient withough preoperative order for iv antibiotic surgical site infection prophylaxis. N/A 01/25/2016    Procedure: LUMBAR EPIDURAL;  Surgeon: Rojas Bolanos MD;  Location: [facility]    Patient withough preoperative order for iv antibiotic surgical site infection prophylaxis. N/A 03/02/2016    Procedure: LUMBAR EPIDURAL;  Surgeon: Corey Mcduffie MD;  Location: [facility]    Replace aortic valve open  2012    Valve repair       Social History     Socioeconomic History    Marital status:    Tobacco Use    Smoking status: Never    Smokeless tobacco: Never   Vaping Use    Vaping status: Never Used   Substance and Sexual Activity    Alcohol use: No     Alcohol/week: 0.0 standard drinks of alcohol    Drug use: No     History   Drug Use No     Available pre-op labs reviewed.  Lab Results   Component Value Date    WBC 7.5 07/12/2024    RBC 2.86 (L) 07/12/2024    HGB 8.4 (L) 07/12/2024    HCT 26.9 (L) 07/12/2024    MCV 94.1 07/12/2024    MCH 29.4 07/12/2024    MCHC 31.2 07/12/2024    RDW 16.2 07/12/2024    .0 07/12/2024     Lab Results   Component Value Date     (L) 08/12/2024    K 3.3 (L) 08/12/2024    CL 97 (L) 08/12/2024    CO2 32.0 08/12/2024    BUN 38 (H) 08/12/2024    CREATSERUM 3.75 (H) 08/12/2024     (H) 08/12/2024    CA 10.0 08/12/2024             Airway      Mallampati: III  Mouth opening: >3 FB  TM distance: 4 - 6 cm  Neck ROM: full Cardiovascular    Cardiovascular exam normal.  Rhythm: regular  Rate: normal  (-) murmur   Dental      Dental appliance(s): lower dentures and upper dentures       Pulmonary    Pulmonary exam normal.  Breath sounds clear to auscultation bilaterally.               Other findings              ASA: 4   Plan: MAC  NPO status verified and patient meets guidelines.        Comment: Plan for MAC. A detailed discussion of the risks and benefits of the proposed anesthetic was had in the preoperative area, including risk of conversion to a general anesthetic, nausea/vomiting, dental damage, sore throat and allergic/ adverse reactions to medications administered. The possibility of needing to convert to general anesthesia if necessary was discussed. Questions answered and patient wishes to proceed. Consent signed.     Plan/risks discussed with: patient, spouse and child/children                Present on Admission:  **None**

## 2024-08-12 NOTE — DISCHARGE INSTRUCTIONS
Resume oral anticoagulants tomorrow  Resume all other home medications today  Weightbearing in surgical shoes bilaterally  Elevate bilateral lower extremities  Tylenol for pain as needed    Follow-up with wound clinic tomorrow    [facility]CO PAIN MEDICINE WAS TAKEN AT 3:51PM NEXT DOSE CAN BE TAKEN AT 9:51PM

## 2024-08-12 NOTE — OPERATIVE REPORT
Date of surgery: 08/12/2024     Preoperative Diagnosis:   Complex right foot surgical wound  Complex left foot surgical wound  History of bilateral open transmetatarsal amputations  Diabetic neuropathy  Peripheral arterial disease    Postoperative Diagnosis: Same     Procedure:   1.  Partial resection of third, fourth and fifth metatarsals, right foot  2.  Secondary closure of complex surgical wound, right foot  2.  Partial resection of the first and second metatarsals, left foot  3.  Excisional wound debridement to level of bone, 25 cm², left foot  4.  Application of Kerecis allograft, 25 cm² bilaterally    Surgeon: Jose R Meza D.P.M.  Anesthesia: MAC  EBL: 50 ml     Specimens: None    Complications: None     Technique:  Patient was brought into the operating room and placed on the operating table in a supine position.  Patient was then placed under anesthesia.  A tourniquet was not utilized. Both lower extremities were prepped and draped in the usual aseptic manner.  Total 20 cc of 0.5% Marcaine was injected to both feet.    Attention was directed to the RIGHT foot open TMA site with exposed bone.  Using a key elevator soft tissues were freed off the distal aspects of the third, fourth and fifth metatarsals.  Next, a sagittal saw was utilized and the distal aspects of the third, fourth and fifth metatarsals were resected to healthy bleeding bone.    Next, attention was directed to the complex open surgical wound of the RIGHT foot.  Using a 10 blade all nonviable skin, soft tissue, muscle, tendon was debrided to healthy bleeding granular tissues.  Surgical site was copiously irrigated with Irrisept solution.  Next, wound margins were debrided by 1 mm to allow for healthy bleeding.  1 g vancomycin powder was placed into the surgical site.  Next, granulated Kerecis was placed into the surgical site as well.  Next, the dorsal and plantar full-thickness flaps were undermined and mobilized to allow for closure.  The  complex surgical wound was then closed in layers using 2-0 Vicryl for deep and subcutaneous tissue.  Next, 2-0 nylon was utilized to close the skin.    Next, attention was directed to the LEFT foot complex surgical wound.  Tissues were freed off the distal aspects of the first and second metatarsals which were exposed.  Next, a sagittal saw was utilized and the distal portions of the first and second metatarsals were resected and passed off the field.  The bone was resected to healthy bleeding tissue.    Next, attention was directed to the necrosis of the plantar full-thickness flap of the LEFT foot.  Using a 10 blade and rongeur all nonviable skin, soft tissue, muscle and bone was sharply excised to healthy bleeding granular tissues.  Wound margins were debrided by 1 mm to allow for healthy bleeding.  Surgical site was copiously irrigated with Irrisept solution.  A total of 25 cm² of excisional wound debridement to level of bone was done.  Next, granulated Kerecis was placed at the surgical site.     Adaptic and well-padded wet-to-dry sterile dressings were applied bilaterally.  Patient tolerated the procedure well and was transferred to PACU in stable condition.  Patient remains at high risk for chronic nonhealing wounds, further soft tissue loss and limb loss.  Patient has significant peripheral arterial disease and will require staged procedures for limb salvage.

## 2024-08-12 NOTE — ANESTHESIA POSTPROCEDURE EVALUATION
[facility] MIGUELINA [facility] Patient Status:  Hospital Outpatient Surgery   Age/Gender 79 year old male MRN [id number]   Location [facility] SURGERY Attending Jose R Meza DPM   Hosp Day # 0 PCP Zachery Hall MD       Anesthesia Post-op Note    RIGHT FOOT WOUND DEBRIDEMENT, APPLICATION OF KERECIS AND COMPLEX WOUND CLOSURE. LEFT FOOT WOUND DEBRIDMENT AND APPLICATION OF KERECIS, PARTIAL FIRST AND SECOND METATARSAL RESECTION    Procedure Summary       Date: 08/12/24 Room / Location:  MAIN OR [address] [place] [address] MAIN OR    Anesthesia Start: 1408 Anesthesia Stop: 1531    Procedure: RIGHT FOOT WOUND DEBRIDEMENT, APPLICATION OF KERECIS AND COMPLEX WOUND CLOSURE. LEFT FOOT WOUND DEBRIDMENT AND APPLICATION OF KERECIS, PARTIAL FIRST AND SECOND METATARSAL RESECTION (Bilateral) Diagnosis: (diabetic ulcers bilateral feet)    Surgeons: Jose R Meza DPM Anesthesiologist: Rich Vance MD    Anesthesia Type: MAC ASA Status: 4            Anesthesia Type: MAC    Vitals Value Taken Time   /73 08/12/24 1531   Temp 97.1 08/12/24 1531   Pulse 66 08/12/24 1531   Resp 14 08/12/24 1531   SpO2 99 08/12/24 1531       Patient Location: Same Day Surgery    Anesthesia Type: MAC    Airway Patency: patent    Postop Pain Control: adequate    Mental Status: preanesthetic baseline    Nausea/Vomiting: none    Cardiopulmonary/Hydration status: stable euvolemic    Complications: no apparent anesthesia related complications    Postop vital signs: stable    Dental Exam: Unchanged from Preop    Patient to be discharged home when criteria met.

## 2024-08-12 NOTE — WOUND PROGRESS NOTE
Pt seen in OR after surgical debridement of bilateral foot wounds for application of compression wraps. Wounds dressed by surgeon. New wound noted to Achilles area on right ankle. ABD pad applied. ABD pads applied to anterior and posterior ankle on both legs for padding. Both legs wrapped in calamine unna boot 20-30mmHg, Foot portion of wrap was started on plantar surface and pulled dorsally to support the distal area of wound. No immediate complications noted. Plan for possible application of NPWT to left foot wound in outpatient wound clinic. Pt's next wound clinic appointment in 8/13/24 at 3pm for an HBO re-consult.

## 2024-08-13 ENCOUNTER — APPOINTMENT (OUTPATIENT)
Dept: CT IMAGING | Facility: HOSPITAL | Age: 79
End: 2024-08-13
Attending: EMERGENCY MEDICINE
Payer: MEDICARE

## 2024-08-13 ENCOUNTER — HBO CONSULT (OUTPATIENT)
Dept: WOUND CARE | Facility: HOSPITAL | Age: 79
End: 2024-08-13
Attending: NURSE PRACTITIONER
Payer: MEDICARE

## 2024-08-13 ENCOUNTER — HOSPITAL ENCOUNTER (OUTPATIENT)
Facility: HOSPITAL | Age: 79
Setting detail: OBSERVATION
Discharge: NURSING, REHAB OR LONG-TERM CARE | End: 2024-08-15
Attending: EMERGENCY MEDICINE | Admitting: INTERNAL MEDICINE
Payer: MEDICARE

## 2024-08-13 ENCOUNTER — TELEPHONE (OUTPATIENT)
Dept: WOUND CARE | Facility: HOSPITAL | Age: 79
End: 2024-08-13

## 2024-08-13 DIAGNOSIS — D64.9 ACUTE ON CHRONIC ANEMIA: Primary | ICD-10-CM

## 2024-08-13 DIAGNOSIS — T14.8XXA BLEEDING FROM WOUND: ICD-10-CM

## 2024-08-13 DIAGNOSIS — N18.6 ESRD (END STAGE RENAL DISEASE) (HCC): ICD-10-CM

## 2024-08-13 LAB
ALBUMIN SERPL-MCNC: 3.8 G/DL (ref 3.2–4.8)
ALBUMIN/GLOB SERPL: 1.6 {RATIO} (ref 1–2)
ALP LIVER SERPL-CCNC: 114 U/L
ALT SERPL-CCNC: <7 U/L
ANION GAP SERPL CALC-SCNC: 7 MMOL/L (ref 0–18)
ANTIBODY SCREEN: NEGATIVE
AST SERPL-CCNC: 14 U/L (ref ?–34)
BASOPHILS # BLD AUTO: 0.03 X10(3) UL (ref 0–0.2)
BASOPHILS NFR BLD AUTO: 0.4 %
BILIRUB SERPL-MCNC: 0.2 MG/DL (ref 0.2–1.1)
BUN BLD-MCNC: 54 MG/DL (ref 9–23)
CALCIUM BLD-MCNC: 10.9 MG/DL (ref 8.7–10.4)
CHLORIDE SERPL-SCNC: 96 MMOL/L (ref 98–112)
CO2 SERPL-SCNC: 32 MMOL/L (ref 21–32)
CREAT BLD-MCNC: 5.62 MG/DL
EGFRCR SERPLBLD CKD-EPI 2021: 10 ML/MIN/1.73M2 (ref 60–?)
EOSINOPHIL # BLD AUTO: 0.14 X10(3) UL (ref 0–0.7)
EOSINOPHIL NFR BLD AUTO: 1.7 %
ERYTHROCYTE [DISTWIDTH] IN BLOOD BY AUTOMATED COUNT: 15.1 %
GLOBULIN PLAS-MCNC: 2.4 G/DL (ref 2–3.5)
GLUCOSE BLD-MCNC: 192 MG/DL (ref 70–99)
GLUCOSE BLD-MCNC: 242 MG/DL (ref 70–99)
HCT VFR BLD AUTO: 20.2 %
HGB BLD-MCNC: 6.7 G/DL
IMM GRANULOCYTES # BLD AUTO: 0.04 X10(3) UL (ref 0–1)
IMM GRANULOCYTES NFR BLD: 0.5 %
LYMPHOCYTES # BLD AUTO: 0.61 X10(3) UL (ref 1–4)
LYMPHOCYTES NFR BLD AUTO: 7.5 %
MCH RBC QN AUTO: 30.7 PG (ref 26–34)
MCHC RBC AUTO-ENTMCNC: 33.2 G/DL (ref 31–37)
MCV RBC AUTO: 92.7 FL
MONOCYTES # BLD AUTO: 0.75 X10(3) UL (ref 0.1–1)
MONOCYTES NFR BLD AUTO: 9.2 %
NEUTROPHILS # BLD AUTO: 6.56 X10 (3) UL (ref 1.5–7.7)
NEUTROPHILS # BLD AUTO: 6.56 X10(3) UL (ref 1.5–7.7)
NEUTROPHILS NFR BLD AUTO: 80.7 %
OSMOLALITY SERPL CALC.SUM OF ELEC: 300 MOSM/KG (ref 275–295)
PLATELET # BLD AUTO: 143 10(3)UL (ref 150–450)
POTASSIUM SERPL-SCNC: 3.9 MMOL/L (ref 3.5–5.1)
PROT SERPL-MCNC: 6.2 G/DL (ref 5.7–8.2)
RBC # BLD AUTO: 2.18 X10(6)UL
RH BLOOD TYPE: NEGATIVE
SODIUM SERPL-SCNC: 135 MMOL/L (ref 136–145)
WBC # BLD AUTO: 8.1 X10(3) UL (ref 4–11)

## 2024-08-13 PROCEDURE — 70450 CT HEAD/BRAIN W/O DYE: CPT | Performed by: EMERGENCY MEDICINE

## 2024-08-13 RX ORDER — ACETAMINOPHEN 325 MG/1
650 TABLET ORAL EVERY 4 HOURS PRN
Status: DISCONTINUED | OUTPATIENT
Start: 2024-08-13 | End: 2024-08-15

## 2024-08-13 RX ORDER — ATORVASTATIN CALCIUM 80 MG/1
80 TABLET, FILM COATED ORAL NIGHTLY
Status: DISCONTINUED | OUTPATIENT
Start: 2024-08-13 | End: 2024-08-15

## 2024-08-13 RX ORDER — HYDROCODONE BITARTRATE AND ACETAMINOPHEN 5; 325 MG/1; MG/1
2 TABLET ORAL EVERY 4 HOURS PRN
Status: DISCONTINUED | OUTPATIENT
Start: 2024-08-13 | End: 2024-08-15

## 2024-08-13 RX ORDER — INSULIN DEGLUDEC 100 U/ML
45 INJECTION, SOLUTION SUBCUTANEOUS NIGHTLY
Status: DISCONTINUED | OUTPATIENT
Start: 2024-08-13 | End: 2024-08-15

## 2024-08-13 RX ORDER — GABAPENTIN 100 MG/1
100 CAPSULE ORAL NIGHTLY
Status: DISCONTINUED | OUTPATIENT
Start: 2024-08-13 | End: 2024-08-15

## 2024-08-13 RX ORDER — CARVEDILOL 12.5 MG/1
12.5 TABLET ORAL 2 TIMES DAILY WITH MEALS
Status: DISCONTINUED | OUTPATIENT
Start: 2024-08-13 | End: 2024-08-15

## 2024-08-13 RX ORDER — HYDROCODONE BITARTRATE AND ACETAMINOPHEN 5; 325 MG/1; MG/1
1 TABLET ORAL EVERY 4 HOURS PRN
Status: DISCONTINUED | OUTPATIENT
Start: 2024-08-13 | End: 2024-08-15

## 2024-08-13 NOTE — TELEPHONE ENCOUNTER
Prior to HBO consult patient's family expressed concern that pt has been declining over the previous hours, noting that he is significantly different than even this morning. Vitals taken in clinic, hypotensive 90's over 40's, confirmed manually. Discussed with both providers in clinic who recommend that pt be seen in the ED at this time. Pt and family agreeable, escorted to ED by this RN. Recommended ED consult Dr. Meza (podiatric surgeon 8/12).

## 2024-08-13 NOTE — ED QUICK NOTES
Orders for admission, patient is aware of plan and ready to go upstairs. Any questions, please call ED RN Charisse at extension [phone].     Patient Covid vaccination status: Fully vaccinated     COVID Test Ordered in ED: None    COVID Suspicion at Admission: N/A    Running Infusions:  None    Mental Status/LOC at time of transport: x3/4    Other pertinent information: non ambulatory; right arm fistula, HD M/W/F  CIWA score: N/A   NIH score:  N/A

## 2024-08-13 NOTE — H&P
Counts include 234 beds at the [facility] and [facility] Hospitalist History and Physical      Chief Complaint   Patient presents with    Postop/Procedure Problem        PCP: Zachery Hall MD      History of Present Illness: Patient is a 79 year old male with PMH sig for CAD status post stents, PAD status post bilateral lower extremity stents, atrial fibrillation on Xarelto, diabetes mellitus type 2 on insulin pump, end-stage renal disease on dialysis, chronic CHF with LVH, status post AVR, history of stroke, hypertension, hyperlipidemia, who presents for bleeding from his foot.  Patient underwent debridement of the complex wounds on his feet with podiatry yesterday.  This morning he was somewhat confused per family and became progressively lethargic throughout the day.  He had an appointment at the wound clinic and was advised to come to the ED for further evaluation.  The wound on his left foot was profusely oozing blood from the ED.  It was cauterized and dressed to control the bleeding in the ED.    ED workup significant for hemoglobin 6.7, platelets 43, creatinine 5.62, blood glucose 192.  Chest x-ray center for vascular congestion and retrocardiac opacity.  Patient being transfused 1 unit of blood in the ED.  Nephrology consulted.  Podiatry notified.    Past Medical History:    Aortic stenosis    Back problem    CAD (coronary artery disease)    Calculus of kidney    Cataract    CKD (chronic kidney disease) stage 4, GFR 15-29 ml/min ([facility])    Congestive heart disease (HCC)    Coronary atherosclerosis    DDD (degenerative disc disease), lumbar    Diabetes mellitus (HCC)    Dialysis patient ([facility])    fistula upper right arm/MWF    Dyslipidemia    Heart valve disease    High cholesterol    Hypertriglyceridemia    Hypoglycemic reaction    Muscle weakness    cane    Nausea and vomiting, unspecified vomiting type    Onychomycosis    DESI (obstructive sleep apnea) C-PAP     severe AHI 68, O2 sam 84%, CPAP 7    Other and unspecified hyperlipidemia     Overweight(278.02)    Renal disorder    S/P Coronary Artery Stents 12/2009    Sleep apnea    doesn't use CPAP    Type 1 diabetes mellitus (HCC)    Unspecified essential hypertension    Visual impairment    legally blind      Past Surgical History:   Procedure Laterality Date    Amputation foot,transmetatarsal Right     Amputation transmetacarpal Left     Back surgery  05/16/2016    L2-L5 Decomp poss uninstrumented fusion    Cabg      Cath percutaneous  transluminal coronary angioplasty      Cath transcatheter aortic valve replacement      Injection, w/wo contrast, dx/therapeutic substance, epidural/subarachnoid; lumbar/sacral N/A 12/10/2015    Procedure: LUMBAR EPIDURAL;  Surgeon: Rojas Bolanos MD;  Location: [facility] FOR PAIN MANAGEMENT    Injection, w/wo contrast, dx/therapeutic substance, epidural/subarachnoid; lumbar/sacral N/A 01/25/2016    Procedure: LUMBAR EPIDURAL;  Surgeon: Rojas Bolanos MD;  Location: [facility]IN [facility]    Injection, w/wo contrast, dx/therapeutic substance, epidural/subarachnoid; lumbar/sacral N/A 03/02/2016    Procedure: LUMBAR EPIDURAL;  Surgeon: Corey Mcduffie MD;  Location: [facility] PAIN [facility]    M-sedaj by  phys perfrmg svc 5+ yr N/A 12/10/2015    Procedure: LUMBAR EPIDURAL;  Surgeon: Rojas Bolanos MD;  Location: [facility]IN [facility]    M-sedaj by  phys perfrmg svc 5+ yr N/A 01/25/2016    Procedure: LUMBAR EPIDURAL;  Surgeon: Rojas Bolanos MD;  Location: [facility]IN [facility]    M-sedaj by  phys perfrmg svc 5+ yr N/A 03/02/2016    Procedure: LUMBAR EPIDURAL;  Surgeon: Corey Mcduffie MD;  Location: [facility]IN [facility]    Other surgical history  10/04/2012    raleigho-Dr. Calderón    Other surgical history N/A 05/16/2016    Procedure: LUMBAR LAMINECTOMY FUSION W/ BONE GRAFT 3 LEVEL;  Surgeon: CARLY Garcia MD;  Location:  MAIN OR    Patient documented not to have experienced any of the following events N/A 12/10/2015     Procedure: LUMBAR EPIDURAL;  Surgeon: Rojas Bolanos MD;  Location: [facility] FOR PAIN MANA[facility]EMENT    Patient documented not to have experienced any of the following events N/A 01/25/2016    Procedure: LUMBAR EPIDURAL;  Surgeon: Rojas Bolanos MD;  Location: [facility] FOR PAIN MANAGEMENT    Patient documented not to have experienced any of the following events N/A 03/02/2016    Procedure: LUMBAR EPIDURAL;  Surgeon: Corey Mcduffie MD;  Location: [facility] FOR PAIN MANA[facility]EMENT    Patient withough preoperative order for iv antibiotic surgical site infection prophylaxis. N/A 12/10/2015    Procedure: LUMBAR EPIDURAL;  Surgeon: Rojas Bolanos MD;  Location: [facility] PAIN MANA[facility]EMENT    Patient withough preoperative order for iv antibiotic surgical site infection prophylaxis. N/A 01/25/2016    Procedure: LUMBAR EPIDURAL;  Surgeon: Rojas Bolanos MD;  Location: [facility] FOR PAIN MANA[facility]EMENT    Patient withough preoperative order for iv antibiotic surgical site infection prophylaxis. N/A 03/02/2016    Procedure: LUMBAR EPIDURAL;  Surgeon: Corey Mcduffie MD;  Location: [facility] FOR PAIN MANAGEMENT    Replace aortic valve open  2012    Valve repair          ALL:  Allergies   Allergen Reactions    Augmentin [Amoxicillin-Pot Clavulanate] RASH    Lisinopril Coughing     Dyspnea          Prior to Admission Medications   Medication Sig    apixaban 2.5 MG Oral Tab Take 1 tablet (2.5 mg total) by mouth 2 (two) times daily.    COMBIGAN 0.2-0.5 % Ophthalmic Solution Place 1 drop into both eyes 2 (two) times daily.    dorzolamide 2 % Ophthalmic Solution Place 1 drop into both eyes in the morning and 1 drop before bedtime.    gabapentin 100 MG Oral Cap Take 1 capsule (100 mg total) by mouth at bedtime.    HYDROcodone-acetaminophen 5-325 MG Oral Tab Take 1 tablet by mouth every 6 (six) hours as needed for Pain.    Probiotic Product (PROBIOTIC DAILY OR) Take 1 capsule by mouth daily.    sennosides 8.6 MG Oral Tab Take 1  tablet (8.6 mg total) by mouth 2 (two) times daily.    polyethylene glycol, PEG 3350, 17 g Oral Powd Pack Take 17 g by mouth daily as needed (constipation). (Patient not taking: Reported on 8/8/2024)    acetaminophen 325 MG Oral Tab Take 2 tablets (650 mg total) by mouth every 6 (six) hours as needed for Pain.    sulfamethoxazole-trimethoprim -160 MG Oral Tab per tablet Take 1 tablet by mouth daily. Schedule in PM after HD.    ketoconazole 2 % External Shampoo Apply 1 Application topically daily as needed for Itching. (Patient not taking: Reported on 8/6/2024)    Netarsudil-Latanoprost (ROCKLATAN) 0.02-0.005 % Ophthalmic Solution Apply 1 drop to eye daily. Both eyes    atorvastatin 80 MG Oral Tab Take 1 tablet (80 mg total) by mouth nightly.    CLOPIDOGREL 75 MG Oral Tab TAKE 1 TABLET(75 MG) BY MOUTH DAILY    Continuous Blood Gluc Sensor (DEXCOM G6 SENSOR) Does not apply Misc 1 Device Every 10 days. (Patient taking differently: 1 Device Every 10 days. Not using currently)    Insulin Lispro (HUMALOG) 100 UNIT/ML Subcutaneous Solution 95 units per day via insulin pump. (Patient taking differently: Pump not currently being used per spouse. 8/9/24)    carvedilol 12.5 MG Oral Tab Take 1 tablet (12.5 mg total) by mouth 2 (two) times daily with meals. Take one tablet (12.5mg total) by mouth two times a day    Ergocalciferol (VITAMIN D2 OR) Take 50,000 Units by mouth every 30 (thirty) days.    Continuous Blood Gluc Transmit (DEXCOM G6 TRANSMITTER) Does not apply Misc Change every 90 days    CALCIUM ACETATE 667 MG Oral Cap TAKE 1 CAPSULE BY MOUTH THREE TIMES DAILY WITH MEALS    PTA Insulin via Pump Inject into the skin continuous. humalog insulin   [employer]tr[employer]  Basal Rate : 55.6 standard rate 1.90  I:C - 1 units/4 carbs  Correction Factor - unknown       Social History     Tobacco Use    Smoking status: Never    Smokeless tobacco: Never   Substance Use Topics    Alcohol use: No     Alcohol/week: 0.0 standard drinks of  alcohol        Fam Hx  Family History   Problem Relation Age of Onset    Heart Attack Father     Heart Attack Brother     Diabetes Brother     Diabetes Brother        Review of Systems  Comprehensive ROS reviewed and negative except for what is stated in HPI.      OBJECTIVE:  /69   Pulse 66   Temp 98.4 °F (36.9 °C) (Oral)   Resp 11   Ht 6' (1.829 m)   Wt 210 lb (95.3 kg)   SpO2 100%   BMI 28.48 kg/m²   Gen: Alert, awake, cooperative.  HEENT:  EOMI, PERRLA, OP clear, MMM  Pulm: Lungs clear bilaterally, normal respiratory effort  CV: Irregular rhythm, nontachycardic, no murmur.  Abd: Abdomen soft, nontender, nondistended, no organomegaly, bowel sounds present  MSK: Full range of motion in extremities, no clubbing, no cyanosis. B/LTMA.  Both feet wrapped in dressing.  Skin: Warm and dry  Neuro: AOx4.  Moving all extremities.    Data Review:    LABS:   Lab Results   Component Value Date    WBC 8.1 08/13/2024    HGB 6.7 08/13/2024    HCT 20.2 08/13/2024    .0 08/13/2024    CREATSERUM 5.62 08/13/2024    BUN 54 08/13/2024     08/13/2024    K 3.9 08/13/2024    CL 96 08/13/2024    CO2 32.0 08/13/2024     08/13/2024    CA 10.9 08/13/2024    ALB 3.8 08/13/2024    ALKPHO 114 08/13/2024    BILT 0.2 08/13/2024    TP 6.2 08/13/2024    AST 14 08/13/2024    ALT <7 08/13/2024       CXR: image personally reviewed.      Radiology: No results found.     Assessment/Plan:   79 year old male with PMH sig for CAD status post stents, PAD status post bilateral lower extremity stents, atrial fibrillation on Eliquis, diabetes mellitus type 2 on insulin pump, end-stage renal disease on dialysis, HFpEF, status post AVR, history of stroke, hypertension, hyperlipidemia, who presents for bleeding from his foot.    #Acute blood loss anemia superimposed on chronic anemia   #Left foot wound s/p debridement  -1 unit PRBC ordered in the ED.  Trend H&H, transfuse as needed.   -Foot wound cauterized and dressed in the ED.  Podiatry consulted for further management.  Keep n.p.o. after midnight in case procedure needed tomorrow.    #ESRD on HD (MWF)  -Nephrology consulted    #HFpEF, chronic  -Monitor volume status in setting of blood transfusion    #CAD s/p PCI  #PAD  #A-fib on Eliquis  #CVA h/o  #Hypertension  #Hyperlipidemia  -Hold plavix and Eliquis  -Continue Coreg and statin    #T2DM w/ neuropathy  -POCT blood glucose ACHS. Start 45U degludec + SSI in place of insulin pump. Adjust as needed.   -Continue gabapentin      DVT ppx: SCDs  Code Status: DNR    Layton Corey DO  [facility] Hospitalist

## 2024-08-13 NOTE — ED INITIAL ASSESSMENT (HPI)
Per family, pt had bilateral toe amputation 2 months ago, yesterday went in for cleaning to the incision and a \"flap\" was placed on the left foot. Family states today pt has become slightly confused, mumbling speech throughout the day, and noticed bleeding to the left foot. +thinners. Pt speaking in clear, full sentences, answering questions appropriately.

## 2024-08-14 LAB
ANION GAP SERPL CALC-SCNC: 6 MMOL/L (ref 0–18)
BASOPHILS # BLD AUTO: 0.03 X10(3) UL (ref 0–0.2)
BASOPHILS NFR BLD AUTO: 0.5 %
BUN BLD-MCNC: 59 MG/DL (ref 9–23)
CALCIUM BLD-MCNC: 10.4 MG/DL (ref 8.7–10.4)
CHLORIDE SERPL-SCNC: 99 MMOL/L (ref 98–112)
CO2 SERPL-SCNC: 32 MMOL/L (ref 21–32)
CREAT BLD-MCNC: 6.27 MG/DL
EGFRCR SERPLBLD CKD-EPI 2021: 9 ML/MIN/1.73M2 (ref 60–?)
EOSINOPHIL # BLD AUTO: 0.26 X10(3) UL (ref 0–0.7)
EOSINOPHIL NFR BLD AUTO: 4.3 %
ERYTHROCYTE [DISTWIDTH] IN BLOOD BY AUTOMATED COUNT: 15.9 %
GLUCOSE BLD-MCNC: 103 MG/DL (ref 70–99)
GLUCOSE BLD-MCNC: 140 MG/DL (ref 70–99)
GLUCOSE BLD-MCNC: 196 MG/DL (ref 70–99)
GLUCOSE BLD-MCNC: 204 MG/DL (ref 70–99)
GLUCOSE BLD-MCNC: 236 MG/DL (ref 70–99)
GLUCOSE BLD-MCNC: 239 MG/DL (ref 70–99)
GLUCOSE BLD-MCNC: 47 MG/DL (ref 70–99)
GLUCOSE BLD-MCNC: 53 MG/DL (ref 70–99)
GLUCOSE BLD-MCNC: 56 MG/DL (ref 70–99)
GLUCOSE BLD-MCNC: 73 MG/DL (ref 70–99)
GLUCOSE BLD-MCNC: 91 MG/DL (ref 70–99)
HCT VFR BLD AUTO: 19.6 %
HGB BLD-MCNC: 6.5 G/DL
HGB BLD-MCNC: 7.8 G/DL
IMM GRANULOCYTES # BLD AUTO: 0.04 X10(3) UL (ref 0–1)
IMM GRANULOCYTES NFR BLD: 0.7 %
LYMPHOCYTES # BLD AUTO: 0.84 X10(3) UL (ref 1–4)
LYMPHOCYTES NFR BLD AUTO: 13.7 %
MAGNESIUM SERPL-MCNC: 2 MG/DL (ref 1.6–2.6)
MCH RBC QN AUTO: 30.1 PG (ref 26–34)
MCHC RBC AUTO-ENTMCNC: 33.2 G/DL (ref 31–37)
MCV RBC AUTO: 90.7 FL
MONOCYTES # BLD AUTO: 0.65 X10(3) UL (ref 0.1–1)
MONOCYTES NFR BLD AUTO: 10.6 %
NEUTROPHILS # BLD AUTO: 4.29 X10 (3) UL (ref 1.5–7.7)
NEUTROPHILS # BLD AUTO: 4.29 X10(3) UL (ref 1.5–7.7)
NEUTROPHILS NFR BLD AUTO: 70.2 %
OSMOLALITY SERPL CALC.SUM OF ELEC: 301 MOSM/KG (ref 275–295)
PLATELET # BLD AUTO: 115 10(3)UL (ref 150–450)
POTASSIUM SERPL-SCNC: 4 MMOL/L (ref 3.5–5.1)
RBC # BLD AUTO: 2.16 X10(6)UL
SODIUM SERPL-SCNC: 137 MMOL/L (ref 136–145)
WBC # BLD AUTO: 6.1 X10(3) UL (ref 4–11)

## 2024-08-14 PROCEDURE — 99222 1ST HOSP IP/OBS MODERATE 55: CPT | Performed by: INTERNAL MEDICINE

## 2024-08-14 RX ORDER — DEXTROSE MONOHYDRATE 25 G/50ML
50 INJECTION, SOLUTION INTRAVENOUS
Status: DISCONTINUED | OUTPATIENT
Start: 2024-08-14 | End: 2024-08-15

## 2024-08-14 RX ORDER — NICOTINE POLACRILEX 4 MG
30 LOZENGE BUCCAL
Status: DISCONTINUED | OUTPATIENT
Start: 2024-08-14 | End: 2024-08-15

## 2024-08-14 RX ORDER — TIMOLOL MALEATE 2.5 MG/ML
1 SOLUTION/ DROPS OPHTHALMIC 2 TIMES DAILY
Status: DISCONTINUED | OUTPATIENT
Start: 2024-08-14 | End: 2024-08-15

## 2024-08-14 RX ORDER — NICOTINE POLACRILEX 4 MG
15 LOZENGE BUCCAL
Status: DISCONTINUED | OUTPATIENT
Start: 2024-08-14 | End: 2024-08-15

## 2024-08-14 RX ORDER — SODIUM CHLORIDE 9 MG/ML
INJECTION, SOLUTION INTRAVENOUS ONCE
Status: COMPLETED | OUTPATIENT
Start: 2024-08-14 | End: 2024-08-14

## 2024-08-14 NOTE — CM/SW NOTE
08/14/24 1600   CM/SW Referral Data   Referral Source Social Work (self-referral)   Reason for Referral Discharge planning   Informant Spouse/Significant Other;Daughter;Clinical Staff Member;EMR   Patient Info   Patient's Current Mental Status at Time of Assessment Alert;Oriented   Patient lives with Spouse/Significant other   Patient Status Prior to Admission   Services in place prior to admission Home Health Care;DME/Supplies at home;Dialysis   Home Health Provider Info [facility] [phone]   Type of DME/Supplies Wheeled Walker;Wheelchair;Commode;Shower Chair;Ramp   Dialysis Clinic [facility]  ([facility])   Scheduled Dialysis days M-W-F   Discharge Needs   Anticipated D/C needs To be determined       Patient is a 78 y/o man familiar to me from recent hospital admission.  Pt was in the hospital for bilateral foot wounds and discharged to [facility] in [place] on 7/12.  He is now readmitted with bleeding from foot wounds and anemia.  PT eval pending.      Met with pt, pt's wife, dtr and grddtr at bedside.  Pt sleeping and did not participate.  Pt's dtr provided history.  Pt was discharged from [facility] just this past Friday with plan to resume his outpt HD at [facility].  Pt's dtr stated they are working with [facility] to receive training for home HD.  Pt was referred to [facility] for [facility] services.  Pt initially did well, but had HD as well as surgical intervention on Monday and was very weak afterwards.  Plan for therapy assessments and pt's family concerned he may need to return to [facility] for additional [facility] therapy.    Discussed possible DC options pending pt's hospital progress as well as MD and therapy recommendations.  Discussed return to home with [facility] services.  Referral to be sent to agency via [employer].  List of private hire caregiver agencies also given.  Discussed possible need to return to [facility].  New inpatient hospital stay would not be needed.  Pt able to return to NH if he  has skilled need for [facility] services.  Pt's family confirmed they would anticipate return to [facility] and not to another NH facility.  Referral to be sent in order to determine availability for [facility] admission as well as on site HD.      All questions/concerns addressed.  Await MD and therapy recommendations for further DC planning.  / to remain available for support and/or discharge planning.     Lili Kennedy, [facility]  Discharge Planner  [phone]

## 2024-08-14 NOTE — PLAN OF CARE
Pt A&Ox4. VSS on RA, hx DESI with no CPAP. Telemetry monitoring, NSR. Hgb 6.5 this AM, 1 unit PRBC infused per order. Treated for hypoglycemia per protocol, MD aware. HD to be done today, dialysis notified. Wound care to see. PT/OT evals, WBAT with surgical shoes. Ace wrap and kerlix dsgs to BLE intact and dry. Tolerating a renal diet. POC updated with pt and dtr. Call light within reach. Will continue to monitor.     Addendum: Hemodialysis to be done tomorrow, Dr. Flores aware.

## 2024-08-14 NOTE — CONSULTS
Patient seen at bedside in NAD  Receiving second unit of PRBC    No active bleeding from left foot surgical site  Wet to dry dressings to left foot  Elevate b/l LE    Wound consult placed, needs close follow up at wound clinic    Ok to discharge home after HD    Discussed with RN    Dr. Meza

## 2024-08-14 NOTE — OCCUPATIONAL THERAPY NOTE
Attempted to see pt for skilled OT services this date. Pt is currently receiving blood, has low blood sugar and will be getting HD. RN requesting to hold until after HD. Will re-attempt as schedule allows. Brisa Gloria, 08/14/24, 11:36 AM

## 2024-08-14 NOTE — CONSULTS
[facility]  Report of Consultation    Darin Bowens Patient Status:  Observation    1945 MRN [id number]   Location [facility] 3SW-A Attending Layton Corey, DO   Hosp Day # 0 PCP Zachery Hall MD     Reason for Consultation:  ESRD on HD    History of Present Illness:  Darin Bowens is a a(n) 79 year old man well known to our service with mult med probs incl ESRD due to DM/HTN on HD MWF at [facility] with recent clinical course complicated by severe PAD s/p B foot TMA who presented with bleeding from L foot surgical site.  Noted to be quite anemic and received blood overnight with hgb still at 6.5 gms this AM    History:  Past Medical History:    Aortic stenosis    Back problem    CAD (coronary artery disease)    Calculus of kidney    Cataract    CKD (chronic kidney disease) stage 4, GFR 15-29 ml/min (HCC)    Congestive heart disease (HCC)    Coronary atherosclerosis    DDD (degenerative disc disease), lumbar    Diabetes mellitus (HCC)    Dialysis patient (HCC)    fistula upper right arm/MWF    Dyslipidemia    Heart valve disease    High cholesterol    Hypertriglyceridemia    Hypoglycemic reaction    Muscle weakness    cane    Nausea and vomiting, unspecified vomiting type    Onychomycosis    DESI (obstructive sleep apnea) C-PAP     severe AHI 68, O2 sam 84%, CPAP 7    Other and unspecified hyperlipidemia    Overweight(278.02)    Renal disorder    S/P Coronary Artery Stents 2009    Sleep apnea    doesn't use CPAP    Type 1 diabetes mellitus (HCC)    Unspecified essential hypertension    Visual impairment    legally blind     Past Surgical History:   Procedure Laterality Date    Amputation foot,transmetatarsal Right     Amputation transmetacarpal Left     Back surgery  2016    L2-L5 Decomp poss uninstrumented fusion    Cabg      Cath percutaneous  transluminal coronary angioplasty      Cath transcatheter aortic valve replacement      Injection, w/wo contrast,  dx/therapeutic substance, epidural/subarachnoid; lumbar/sacral N/A 12/10/2015    Procedure: LUMBAR EPIDURAL;  Surgeon: Rojas Bolanos MD;  Location: [facility]IN MANAG[facility]    Injection, w/wo contrast, dx/therapeutic substance, epidural/subarachnoid; lumbar/sacral N/A 01/25/2016    Procedure: LUMBAR EPIDURAL;  Surgeon: Rojas Bolanos MD;  Location: [facility]    Injection, w/wo contrast, dx/therapeutic substance, epidural/subarachnoid; lumbar/sacral N/A 03/02/2016    Procedure: LUMBAR EPIDURAL;  Surgeon: Corey Mcduffie MD;  Location: [facility]    M-sedaj by  phys perfrmg svc 5+ yr N/A 12/10/2015    Procedure: LUMBAR EPIDURAL;  Surgeon: Rojas Bolanos MD;  Location: [facility]IN [facility]    M-sedaj by  phys perfrmg svc 5+ yr N/A 01/25/2016    Procedure: LUMBAR EPIDURAL;  Surgeon: Rojas Bolanos MD;  Location: [facility]    M-sedaj by  phys perfrmg svc 5+ yr N/A 03/02/2016    Procedure: LUMBAR EPIDURAL;  Surgeon: Corey Mcduffie MD;  Location: [facility]    Other surgical history  10/04/2012    cysto-Dr. Calderón    Other surgical history N/A 05/16/2016    Procedure: LUMBAR LAMINECTOMY FUSION W/ BONE GRAFT 3 LEVEL;  Surgeon: CARLY Garcia MD;  Location:  MAIN OR    Patient documented not to have experienced any of the following events N/A 12/10/2015    Procedure: LUMBAR EPIDURAL;  Surgeon: Rojas Bolanos MD;  Location: [facility]    Patient documented not to have experienced any of the following events N/A 01/25/2016    Procedure: LUMBAR EPIDURAL;  Surgeon: Rojas Bolanos MD;  Location: [facility]    Patient documented not to have experienced any of the following events N/A 03/02/2016    Procedure: LUMBAR EPIDURAL;  Surgeon: Corey Mcduffie MD;  Location: [facility]    Patient withough preoperative order for iv antibiotic surgical site infection  prophylaxis. N/A 12/10/2015    Procedure: LUMBAR EPIDURAL;  Surgeon: Rojas Bolanos MD;  Location: [facility] PAIN [facility]    Patient withough preoperative order for iv antibiotic surgical site infection prophylaxis. N/A 01/25/2016    Procedure: LUMBAR EPIDURAL;  Surgeon: Rojas Bolanos MD;  Location: [facility] PAIN MANAG[facility]    Patient withough preoperative order for iv antibiotic surgical site infection prophylaxis. N/A 03/02/2016    Procedure: LUMBAR EPIDURAL;  Surgeon: Corey Mcduffie MD;  Location: [facility]IN [facility]    Replace aortic valve open  2012    Valve repair       Family History   Problem Relation Age of Onset    Heart Attack Father     Heart Attack Brother     Diabetes Brother     Diabetes Brother       reports that he has never smoked. He has never used smokeless tobacco. He reports that he does not drink alcohol and does not use drugs.    Allergies:  Allergies   Allergen Reactions    Augmentin [Amoxicillin-Pot Clavulanate] RASH    Lisinopril Coughing     Dyspnea         Medications:    Current Facility-Administered Medications:     sodium chloride 0.9% infusion, , Intravenous, Once    acetaminophen (Tylenol) tab 650 mg, 650 mg, Oral, Q4H PRN **OR** HYDROcodone-acetaminophen (Norco) 5-325 MG per tab 1 tablet, 1 tablet, Oral, Q4H PRN **OR** HYDROcodone-acetaminophen (Norco) 5-325 MG per tab 2 tablet, 2 tablet, Oral, Q4H PRN    atorvastatin (Lipitor) tab 80 mg, 80 mg, Oral, Nightly    carvedilol (Coreg) tab 12.5 mg, 12.5 mg, Oral, BID with meals    gabapentin (Neurontin) cap 100 mg, 100 mg, Oral, Nightly    [Held by provider] Netarsudil-Latanoprost 0.02-0.005 % SOLN 1 drop, 1 drop, Ophthalmic, Daily    insulin aspart (NovoLOG) 100 Units/mL FlexPen 2-10 Units, 2-10 Units, Subcutaneous, TID AC and HS    insulin degludec (Tresiba) 100 units/mL flextouch 45 Units, 45 Units, Subcutaneous, Nightly  No current outpatient medications on file.       Review of Systems:  Denies  fever/chills  Denies wt loss/gain  Denies HA or visual changes  Denies CP or palpitations  Denies SOB/cough/hemoptysis  Denies abd or flank pain  Denies N/V/D  Denies change in urinary habits or gross hematuria  Denies LE edema  Denies skin rashes/myalgias/arthralgias      Physical Exam:   /58 (BP Location: Left arm)   Pulse 58   Temp 98.3 °F (36.8 °C) (Oral)   Resp 16   Ht 6' (1.829 m)   Wt 210 lb (95.3 kg)   SpO2 96%   BMI 28.48 kg/m²   Temp (24hrs), Av.6 °F (37 °C), Min:98.3 °F (36.8 °C), Max:98.9 °F (37.2 °C)       Intake/Output Summary (Last 24 hours) at 2024 0855  Last data filed at 2024 0350  Gross per 24 hour   Intake 438.33 ml   Output --   Net 438.33 ml     Last 3 Weights   24 2218 210 lb (95.3 kg)   24 1456 210 lb (95.3 kg)   24 1239 209 lb 12.8 oz (95.2 kg)   24 1137 210 lb (95.3 kg)   24 1030 210 lb (95.3 kg)   24 1100 211 lb (95.7 kg)   24 0548 205 lb 8 oz (93.2 kg)   24 0530 212 lb 4.9 oz (96.3 kg)   24 0400 208 lb 5.4 oz (94.5 kg)   24 0600 198 lb 8 oz (90 kg)   24 0648 205 lb (93 kg)   24 0543 211 lb (95.7 kg)   24 2000 211 lb (95.7 kg)   06/15/24 0008 211 lb (95.7 kg)   24 0545 211 lb 6.4 oz (95.9 kg)   24 1324 209 lb 7 oz (95 kg)     General: Alert and oriented in no apparent distress.  HEENT: No scleral icterus, MMM  Neck: Supple, no SHERI or thyromegaly  Cardiac: Regular rate and rhythm, S1, S2 normal, no murmur or rub  Lungs: Clear without wheezes, rales, rhonchi.    Abdomen: Soft, non-tender. + bowel sounds, no palpable organomegaly  Extremities: Without clubbing, cyanosis or edema. B foot dressings in place  Neurologic: moving all extremities  Skin: Warm and dry, no rashes      Laboratory Data:  Lab Results   Component Value Date    WBC 6.1 2024    HGB 6.5 2024    HCT 19.6 2024    .0 2024    CREATSERUM 6.27 2024    BUN 59 2024      08/14/2024    K 4.0 08/14/2024    CL 99 08/14/2024    CO2 32.0 08/14/2024     08/14/2024    CA 10.4 08/14/2024    ALB 3.8 08/13/2024    ALKPHO 114 08/13/2024    BILT 0.2 08/13/2024    TP 6.2 08/13/2024    AST 14 08/13/2024    ALT <7 08/13/2024    MG 2.0 08/14/2024    PGLU 140 08/14/2024       Glucose (mg/dL)   Date Value   01/09/2014 136 (H)   08/27/2012 270 (H)   01/12/2012 202 (H)     BUN (mg/dL)   Date Value   08/14/2024 59 (H)   08/13/2024 54 (H)   08/12/2024 38 (H)     Blood Urea Nitrogen (mg/dL)   Date Value   03/01/2022 38.0 (H)   01/03/2022 23.0 (H)   11/22/2021 58.0 (H)   01/09/2014 24   08/27/2012 21   01/12/2012 25     Creatinine, Serum (mg/dL)   Date Value   01/09/2014 1.65 (H)   08/27/2012 1.19   01/12/2012 1.25     Creatinine (mg/dL)   Date Value   08/14/2024 6.27 (H)   08/13/2024 5.62 (H)   08/12/2024 3.75 (H)   03/01/2022 5.97 (H)   01/03/2022 3.82 (H)   11/22/2021 8.97 (H)       No results found for: \"MICROALBCREA\"    Recent Labs   Lab 08/13/24  1626 08/14/24  0652   WBC 8.1 6.1   HGB 6.7* 6.5*   MCV 92.7 90.7   .0* 115.0*       Recent Labs   Lab 08/12/24  1233 08/13/24  1626 08/14/24  0652   * 135* 137   K 3.3* 3.9 4.0   CL 97* 96* 99   CO2 32.0 32.0 32.0   BUN 38* 54* 59*   CREATSERUM 3.75* 5.62* 6.27*   CA 10.0 10.9* 10.4   MG  --   --  2.0   * 192* 103*       Recent Labs   Lab 08/13/24  1626   ALT <7*   AST 14   ALB 3.8       Recent Labs   Lab 08/08/24  0838 08/12/24  1206 08/12/24  1531 08/13/24  2259 08/14/24  0512   PGLU 226* 145* 144* 242* 140*           Imaging:  CT brain noted    Impression/Plan:    #1.  ESRD- due to DM/HTN.  HD today per usual MWF routine    #2.  Anemia- due to ESRD in setting of acute blood loss.  Agree with transfusion.  Cont DALE    #3.  PAD- severe and s/p recent B TMA.  Mgmt per podiatry    Thank you for allowing me to participate in the care of your patient. Please do not hesitate to call with any questions or concerns.       Sean  MD Mark  8/14/2024  8:55 AM

## 2024-08-14 NOTE — ED PROVIDER NOTES
Patient Seen in: [facility] Emergency Department      History     Chief Complaint   Patient presents with    Postop/Procedure Problem     Stated Complaint: amputation yesterday to toes, pt states not normal self, more confused.  92/48.    Subjective:   HPI    The patient had a surgical debridement to his feet yesterday, he has been bleeding through the dressing on the left.  Family also concerned that he is more confused.  No fevers no chills.      There are some concern about him at home he will not were confused and mumbling.  No focal neurological deficits were noted.    Objective:   Past Medical History:    Aortic stenosis    Back problem    CAD (coronary artery disease)    Calculus of kidney    Cataract    CKD (chronic kidney disease) stage 4, GFR 15-29 ml/min ([facility])    Congestive heart disease ([facility])    Coronary atherosclerosis    DDD (degenerative disc disease), lumbar    Diabetes mellitus ([facility])    Dialysis patient ([facility])    fistula upper right arm/MWF    Dyslipidemia    Heart valve disease    High cholesterol    Hypertriglyceridemia    Hypoglycemic reaction    Muscle weakness    cane    Nausea and vomiting, unspecified vomiting type    Onychomycosis    DESI (obstructive sleep apnea) C-PAP     severe AHI 68, O2 sam 84%, CPAP 7    Other and unspecified hyperlipidemia    Overweight(278.02)    Renal disorder    S/P Coronary Artery Stents 12/2009    Sleep apnea    doesn't use CPAP    Type 1 diabetes mellitus ([facility])    Unspecified essential hypertension    Visual impairment    legally blind              Past Surgical History:   Procedure Laterality Date    Amputation foot,transmetatarsal Right     Amputation transmetacarpal Left     Back surgery  05/16/2016    L2-L5 Decomp poss uninstrumented fusion    Cabg      Cath percutaneous  transluminal coronary angioplasty      Cath transcatheter aortic valve replacement      Injection, w/wo contrast, dx/therapeutic substance, epidural/subarachnoid; lumbar/sacral N/A  12/10/2015    Procedure: LUMBAR EPIDURAL;  Surgeon: Rojas Bolanos MD;  Location: [facility]IN [facility]    Injection, w/wo contrast, dx/therapeutic substance, epidural/subarachnoid; lumbar/sacral N/A 01/25/2016    Procedure: LUMBAR EPIDURAL;  Surgeon: Rojas Bolanos MD;  Location: [facility]    Injection, w/wo contrast, dx/therapeutic substance, epidural/subarachnoid; lumbar/sacral N/A 03/02/2016    Procedure: LUMBAR EPIDURAL;  Surgeon: Corey Mcduffie MD;  Location: [facility]    M-sedaj by  phys perfrmg svc 5+ yr N/A 12/10/2015    Procedure: LUMBAR EPIDURAL;  Surgeon: Rojas Bolanos MD;  Location: [facility] PAIN [facility]    M-sedaj by  phys perfrmg svc 5+ yr N/A 01/25/2016    Procedure: LUMBAR EPIDURAL;  Surgeon: Rojas Bolanos MD;  Location: [facility]    M-sedaj by  phys perfrmg svc 5+ yr N/A 03/02/2016    Procedure: LUMBAR EPIDURAL;  Surgeon: Corey Mcduffie MD;  Location: [facility]    Other surgical history  10/04/2012    cysto-Dr. Calderón    Other surgical history N/A 05/16/2016    Procedure: LUMBAR LAMINECTOMY FUSION W/ BONE GRAFT 3 LEVEL;  Surgeon: CARLY Garcia MD;  Location: [facility] OR    Patient documented not to have experienced any of the following events N/A 12/10/2015    Procedure: LUMBAR EPIDURAL;  Surgeon: Rojas Bolanos MD;  Location: [facility]    Patient documented not to have experienced any of the following events N/A 01/25/2016    Procedure: LUMBAR EPIDURAL;  Surgeon: Rojas Bolanos MD;  Location: [facility]    Patient documented not to have experienced any of the following events N/A 03/02/2016    Procedure: LUMBAR EPIDURAL;  Surgeon: Corey Mcduffie MD;  Location: [facility]    Patient withough preoperative order for iv antibiotic surgical site infection prophylaxis. N/A 12/10/2015    Procedure: LUMBAR EPIDURAL;  Surgeon:  Rjoas Bolanos MD;  Location: [facility]    Patient withough preoperative order for iv antibiotic surgical site infection prophylaxis. N/A 01/25/2016    Procedure: LUMBAR EPIDURAL;  Surgeon: Rojas Bolanos MD;  Location: [facility] PAIN MANA[facility]EMENT    Patient withough preoperative order for iv antibiotic surgical site infection prophylaxis. N/A 03/02/2016    Procedure: LUMBAR EPIDURAL;  Surgeon: Corey Mcduffie MD;  Location: [facility]    Replace aortic valve open  2012    Valve repair                  Social History     Socioeconomic History    Marital status:    Tobacco Use    Smoking status: Never    Smokeless tobacco: Never   Vaping Use    Vaping status: Never Used   Substance and Sexual Activity    Alcohol use: No     Alcohol/week: 0.0 standard drinks of alcohol    Drug use: No     Social Determinants of Health     Food Insecurity: No Food Insecurity (6/25/2024)    Food Insecurity     Food Insecurity: Never true   Transportation Needs: No Transportation Needs (6/25/2024)    Transportation Needs     Lack of Transportation: No   Social Connections: Low Risk  (9/7/2021)    Received from [facility]    Social Connections     Do you have someone you could call for help if needed?: Yes   Housing Stability: Low Risk  (6/25/2024)    Housing Stability     Housing Instability: No              Review of Systems    Positive for stated Chief Complaint: Postop/Procedure Problem    Other systems are as noted in HPI.  Constitutional and vital signs reviewed.      All other systems reviewed and negative except as noted above.    Physical Exam     ED Triage Vitals [08/13/24 1456]   BP 95/55   Pulse 69   Resp 17   Temp 98.4 °F (36.9 °C)   Temp src Oral   SpO2 97 %   O2 Device None (Room air)       Current Vitals:   Vital Signs  BP: 147/66  Pulse: 64  Resp: 17  Temp: 98.4 °F (36.9 °C)  Temp src: Oral  MAP (mmHg): 90    Oxygen Therapy  SpO2: 100 %  O2 Device:  None (Room air)            Physical Exam    Physical Exam   Constitutional: Awake, alert, well appearing  Head: Normocephalic and atraumatic.   Eyes: Conjunctivae are normal. Pupils are equal, round, and reactive to light.   Neck: Normal range of motion. No JVD  Cardiovascular: Normal rate, regular rhythm  Pulmonary/Chest: Normal effort.  No accessory muscle use.  No cyanosis.  Abdominal: Soft. Not distended.  Neurological: Pt is alert and oriented to person, place, and time. no cranial nerve deficits. Speech fluent  Moves all 4 extremities to command and with appropriate coordination.  follow commands appropriately.  Answers questions appropriately.        Right foot: Dressing in place no strikethrough bleeding    Left foot: Dressing saturated in blood.    The dressing was taken down entirely.  There is slow oozing through the graft at the surgical site.    Lidocaine was with epinephrine was injected through this to help minimize the bleeding.    A small portion of the periphery the room was cauterized.  Bleeding significant improved but did not stop.  Dressed with pressure dressing.      ED Course     Labs Reviewed   COMP METABOLIC PANEL (14) - Abnormal; Notable for the following components:       Result Value    Glucose 192 (*)     Sodium 135 (*)     Chloride 96 (*)     BUN 54 (*)     Creatinine 5.62 (*)     Calcium, Total 10.9 (*)     Calculated Osmolality 300 (*)     eGFR-Cr 10 (*)     ALT <7 (*)     All other components within normal limits   CBC WITH DIFFERENTIAL WITH PLATELET - Abnormal; Notable for the following components:    RBC 2.18 (*)     HGB 6.7 (*)     HCT 20.2 (*)     .0 (*)     Lymphocyte Absolute 0.61 (*)     All other components within normal limits   TYPE AND SCREEN    Narrative:     The following orders were created for panel order Type and screen.  Procedure                               Abnormality         Status                     ---------                               -----------          ------                     ABORH (Blood Type)[[id number]]                               Final result               Antibody Screen[[id number]]                                  Final result                 Please view results for these tests on the individual orders.   ABORH (BLOOD TYPE)   ANTIBODY SCREEN   PREPARE RBC   RAINBOW DRAW LAVENDER   RAINBOW DRAW LIGHT GREEN   RAINBOW DRAW BLUE             Blood reviewed, acute on chronic anemia..  BCs ordered.  Electrolytes acceptable.      When the patient was about to go up, family reported that he became a little more confused so I did order a CT scan to rule out intracranial hemorrhage.         MDM          Differential diagnoses considered: Acute blood loss anemia, acute on chronic anemia, life-threatening intracranial hemorrhage, bleeding surgical wound      -Bleeding addressed as noted above, will need podiatry to take a look at it tomorrow.  Discussed with Dr. Mccann.    -PRBCs.    -Discussed with Dr. Flores, he will see the patient tomorrow and arrange for her dialysis as needed.    -Regarding confusion, there is no obvious infectious etiology, suspect this may be related to his anemia.  However given the fact he is on Eliquis I will do a CT scan to rule out life-threatening intracranial hemorrhage.      I visualized the radiology studies, my independent interpretation: No large  hemorrhage noted on CT scan of brain    *Discussion of ongoing management of this patient's care included: Podiatry, nephrology, admitting physician  *Comorbidities contributing to the complexity of decision making:  anticoagulant status, recent surgery      Shared decision making was done by: patient, myself.    Admission disposition: 8/13/2024  5:57 PM                                        Medical Decision Making      Disposition and Plan     Clinical Impression:  1. Acute on chronic anemia    2. Bleeding from wound    3. ESRD (end stage renal disease) (HCC)          Disposition:  Admit  8/13/2024  5:57 pm    Follow-up:  No follow-up provider specified.        Medications Prescribed:  Current Discharge Medication List                            Hospital Problems       Present on Admission  Date Reviewed: 8/9/2024            ICD-10-CM Noted POA    * (Principal) Acute on chronic anemia D64.9 8/13/2024 Unknown

## 2024-08-14 NOTE — PHYSICAL THERAPY NOTE
Order received for PT eval and chart reviewed. Pt currently receiving blood and has low blood sugar, per RN. Plan for HD later today. PT will continue to follow.     OT messaged Dr. Meza to clarify WB status- ok for B WBAT with surgical shoes.

## 2024-08-14 NOTE — CONSULTS
[facility]  Report of Inpatient Wound Care Consultation    Darin Bowens Patient Status:  Observation    1945 MRN [id number]   Location [facility] 3SW-A Attending Layton Corey, DO   Hosp Day # 0 PCP Zachery Hall MD     Reason for Consultation:  B TMA surgical incisions    History of Present Illness:  Darin Bowens is a a(n) 79 year old male. Patient with multiple comorbidities.    SUBJECTIVE:  \"I want to go home. I am tired.\"      History:  Past Medical History:    Aortic stenosis    Back problem    CAD (coronary artery disease)    Calculus of kidney    Cataract    CKD (chronic kidney disease) stage 4, GFR 15-29 ml/min (HCC)    Congestive heart disease (HCC)    Coronary atherosclerosis    DDD (degenerative disc disease), lumbar    Diabetes mellitus (HCC)    Dialysis patient (HCC)    fistula upper right arm/MWF    Dyslipidemia    Heart valve disease    High cholesterol    Hypertriglyceridemia    Hypoglycemic reaction    Muscle weakness    cane    Nausea and vomiting, unspecified vomiting type    Onychomycosis    DESI (obstructive sleep apnea) C-PAP     severe AHI 68, O2 sam 84%, CPAP 7    Other and unspecified hyperlipidemia    Overweight(278.02)    Renal disorder    S/P Coronary Artery Stents 2009    Sleep apnea    doesn't use CPAP    Type 1 diabetes mellitus (HCC)    Unspecified essential hypertension    Visual impairment    legally blind     Past Surgical History:   Procedure Laterality Date    Amputation foot,transmetatarsal Right     Amputation transmetacarpal Left     Back surgery  2016    L2-L5 Decomp poss uninstrumented fusion    Cabg      Cath percutaneous  transluminal coronary angioplasty      Cath transcatheter aortic valve replacement      Injection, w/wo contrast, dx/therapeutic substance, epidural/subarachnoid; lumbar/sacral N/A 12/10/2015    Procedure: LUMBAR EPIDURAL;  Surgeon: Rojas Bolanos MD;  Location: [facility]    Injection,  w/wo contrast, dx/therapeutic substance, epidural/subarachnoid; lumbar/sacral N/A 01/25/2016    Procedure: LUMBAR EPIDURAL;  Surgeon: Rojas Bolanos MD;  Location: [facility]    Injection, w/wo contrast, dx/therapeutic substance, epidural/subarachnoid; lumbar/sacral N/A 03/02/2016    Procedure: LUMBAR EPIDURAL;  Surgeon: Corey Mcduffie MD;  Location: [facility]    M-sedaj by  phys perfrmg svc 5+ yr N/A 12/10/2015    Procedure: LUMBAR EPIDURAL;  Surgeon: Rojas Bolanos MD;  Location: [facility]    M-sedaj by  phys perfrmg svc 5+ yr N/A 01/25/2016    Procedure: LUMBAR EPIDURAL;  Surgeon: Rojas Bolanos MD;  Location: [facility]    M-sedaj by  phys perfrmg svc 5+ yr N/A 03/02/2016    Procedure: LUMBAR EPIDURAL;  Surgeon: Corey Mcduffie MD;  Location: [facility]    Other surgical history  10/04/2012    cysto-Dr. Calderón    Other surgical history N/A 05/16/2016    Procedure: LUMBAR LAMINECTOMY FUSION W/ BONE GRAFT 3 LEVEL;  Surgeon: CARLY Garcia MD;  Location: [facility] OR    Patient documented not to have experienced any of the following events N/A 12/10/2015    Procedure: LUMBAR EPIDURAL;  Surgeon: Rojas Bolanos MD;  Location: [facility]    Patient documented not to have experienced any of the following events N/A 01/25/2016    Procedure: LUMBAR EPIDURAL;  Surgeon: Rojas Bolanos MD;  Location: [facility]    Patient documented not to have experienced any of the following events N/A 03/02/2016    Procedure: LUMBAR EPIDURAL;  Surgeon: Corey Mcduffie MD;  Location: [facility]    Patient withough preoperative order for iv antibiotic surgical site infection prophylaxis. N/A 12/10/2015    Procedure: LUMBAR EPIDURAL;  Surgeon: Rojas Bolanos MD;  Location: [facility]    Patient withough preoperative order for iv antibiotic surgical  site infection prophylaxis. N/A 01/25/2016    Procedure: LUMBAR EPIDURAL;  Surgeon: Rojas Bolanos MD;  Location: [facility]    Patient withough preoperative order for iv antibiotic surgical site infection prophylaxis. N/A 03/02/2016    Procedure: LUMBAR EPIDURAL;  Surgeon: Corey Mcduffie MD;  Location: [facility]    Replace aortic valve open  2012    Valve repair        reports that he has never smoked. He has never used smokeless tobacco. He reports that he does not drink alcohol and does not use drugs.      Allergies:  @ALLERGY    Laboratory Data:    Recent Labs   Lab 08/12/24  1233 08/12/24  1531 08/13/24  1626 08/13/24  2259 08/14/24  0652 08/14/24  1115 08/14/24  1152 08/14/24  1221 08/14/24  1355 08/14/24  1429   WBC  --   --  8.1  --  6.1  --   --   --   --   --    HGB  --   --  6.7*  --  6.5*  --   --   --  7.8*  --    HCT  --   --  20.2*  --  19.6*  --   --   --   --   --    PLT  --   --  143.0*  --  115.0*  --   --   --   --   --    CREATSERUM 3.75*  --  5.62*  --  6.27*  --   --   --   --   --    BUN 38*  --  54*  --  59*  --   --   --   --   --    *  --  192*  --  103*  --   --   --   --   --    CA 10.0  --  10.9*  --  10.4  --   --   --   --   --    ALB  --   --  3.8  --   --   --   --   --   --   --    TP  --   --  6.2  --   --   --   --   --   --   --    PGLU  --    < >  --    < >  --    < > 73 91  --  236*    < > = values in this interval not displayed.         ASSESSMENT:  Wound 07/18/24 #1 Foot Left (Active)   Date First Assessed/Time First Assessed: 07/18/24 1344    Wound Number (Wound Clinic Only): #1  Primary Wound Type: Diabetic Ulcer  Location: Foot  Wound Location Orientation: Left      Assessments 8/14/2024  2:05 PM   Wound Image     Dressing Changed Changed   Wound Length (cm) 5.1 cm   Wound Width (cm) 10.2 cm   Wound Surface Area (cm^2) 52.02 cm^2   Wound Depth (cm) 0.2 cm   Wound Volume (cm^3) 10.404 cm^3   Wound Healing % 81   María-wound  Assessment Clean;Dry;Edema   Wound Granulation Tissue Red   Wound Bed Granulation (%) 95 %   Wound Bed Epithelium (%) 0 %   Wound Bed Slough (%) 0 %   Wound Bed Eschar (%) 0 %   Wound Bed Fibrin (%) 0 %   Wound Odor None   Tunneling? No   Undermining? No       Wound 07/18/24 #2 right Foot Right (Active)   Date First Assessed/Time First Assessed: 07/18/24 1345    Wound Number (Wound Clinic Only): #2 right  Primary Wound Type: Diabetic Ulcer  Location: Foot  Wound Location Orientation: Right      Assessments 8/14/2024  2:22 PM   Wound Image      Drainage Amount Moderate   Drainage Description Serosanguineous   Wound Length (cm) 1.5 cm   Wound Width (cm) 9.5 cm   Wound Surface Area (cm^2) 14.25 cm^2   Wound Depth (cm) 0 cm   Wound Volume (cm^3) 0 cm^3   Wound Healing % 100   Margins Attached edges   María-wound Assessment Edema;Clean;Dry   Wound Odor None   Tunneling? No   Undermining? No      B feet warm, capillary refill at 4 seconds.    Patient seen with wife, daughter and grandaughter in the room.  Patient was able to answer questions, eyes closed throughout the session. When asked questions, patient did answer appropriately.  Per daughter, patient has been telling them \"he wants to go home and die in his house\" and daughter relayed the patient said he is tired of all doing everything.      Removed dressings without incident.  Wounds as noted above. R TMA incision, L TMA wound with cauterized tissue in the wound bed.    Cleasned both, applied vasolene gauze to both, gauze, ABD pad, kerlix, Ace wrap x 2.  Infectious Disease consulted, awaiting them to view the wound.      Dr. Meza would like ID to see the wound and try Negative Pressure Wound Therapy to both sites with a Y-connector.      Spent time with family and patient in the room, explained we will coordinate with Infectious Disease, Podiatry and  to see patient on 8/15/24 to discuss status and next steps.  I mentioned Outpatient Wound Care team  recommending rehab, daughter relayed patient said he is tired and wants to go home.     Daughter stated that Negative Pressure Wound Therapy machine will be delivered to their house in next few days.     Explained to RN the above, she also has stated that patient is saying he wants to go home and be at home with noted discussions with Nephrology.     Wound Cleaning and Dressings:  Showering directions: May shower and/or cleanse wound with mild soap and water  Wound cleansing:  Cleanse with normal saline or wound cleanser  Wound cleaning frequency: Every 48 hours  Wound product: Non-adherent/adaptic, Dry gauze, ABD pad, Kerlix, and Paper tape  Dressing change frequency:  Change dressing every other day and/or as needed  Enzymatic agent:  Not applicable    Compression Therapy:   Ace wrap    Miscellaneous/Additional Orders:  Offloading: Air mattress, Turn Q 2 hours, and Heel off-loading boot(s)    Care Summary:  Care Summary: Discussed Plan of Care at beside with patient. Patient verbally acknowledges understanding of all instructions and all questions were answered.      Additional Notes:  RN aware of the above.           Thank you for this consultation and for allowing me to participate in the care of your patient.      Time Spent 1 Hour.    Thank you,  Lindy Brown, PT, MPT  Wound Care Clinician  [facility]  8/14/2024

## 2024-08-14 NOTE — ED QUICK NOTES
Patient's wife states she will take of patient's insulin pump once they are in their inpatient room. Hospitalist aware.

## 2024-08-14 NOTE — PLAN OF CARE
NURSING ADMISSION NOTE      Patient admitted via  bed from ED.  Oriented to room.  Safety precautions initiated.  Bed in low position.  Call light in reach.

## 2024-08-15 ENCOUNTER — APPOINTMENT (OUTPATIENT)
Dept: WOUND CARE | Facility: HOSPITAL | Age: 79
End: 2024-08-15
Attending: NURSE PRACTITIONER
Payer: MEDICARE

## 2024-08-15 VITALS
WEIGHT: 210 LBS | RESPIRATION RATE: 20 BRPM | HEART RATE: 52 BPM | BODY MASS INDEX: 28.44 KG/M2 | SYSTOLIC BLOOD PRESSURE: 157 MMHG | TEMPERATURE: 99 F | DIASTOLIC BLOOD PRESSURE: 59 MMHG | OXYGEN SATURATION: 99 % | HEIGHT: 72 IN

## 2024-08-15 PROBLEM — Z51.5 PALLIATIVE CARE BY SPECIALIST: Status: ACTIVE | Noted: 2024-08-15

## 2024-08-15 PROBLEM — R53.83 OTHER FATIGUE: Status: ACTIVE | Noted: 2024-08-15

## 2024-08-15 PROBLEM — Z71.89 GOALS OF CARE, COUNSELING/DISCUSSION: Status: ACTIVE | Noted: 2024-08-15

## 2024-08-15 LAB
BASOPHILS # BLD AUTO: 0.03 X10(3) UL (ref 0–0.2)
BASOPHILS NFR BLD AUTO: 0.5 %
BLOOD TYPE BARCODE: 600
BLOOD TYPE BARCODE: 9500
EOSINOPHIL # BLD AUTO: 0.24 X10(3) UL (ref 0–0.7)
EOSINOPHIL NFR BLD AUTO: 3.7 %
ERYTHROCYTE [DISTWIDTH] IN BLOOD BY AUTOMATED COUNT: 15.3 %
GLUCOSE BLD-MCNC: 201 MG/DL (ref 70–99)
GLUCOSE BLD-MCNC: 249 MG/DL (ref 70–99)
GLUCOSE BLD-MCNC: 44 MG/DL (ref 70–99)
GLUCOSE BLD-MCNC: 57 MG/DL (ref 70–99)
GLUCOSE BLD-MCNC: 71 MG/DL (ref 70–99)
GLUCOSE BLD-MCNC: 97 MG/DL (ref 70–99)
HCT VFR BLD AUTO: 25.5 %
HGB BLD-MCNC: 8.4 G/DL
IMM GRANULOCYTES # BLD AUTO: 0.04 X10(3) UL (ref 0–1)
IMM GRANULOCYTES NFR BLD: 0.6 %
LYMPHOCYTES # BLD AUTO: 0.44 X10(3) UL (ref 1–4)
LYMPHOCYTES NFR BLD AUTO: 6.8 %
MCH RBC QN AUTO: 30 PG (ref 26–34)
MCHC RBC AUTO-ENTMCNC: 32.9 G/DL (ref 31–37)
MCV RBC AUTO: 91.1 FL
MONOCYTES # BLD AUTO: 0.56 X10(3) UL (ref 0.1–1)
MONOCYTES NFR BLD AUTO: 8.7 %
NEUTROPHILS # BLD AUTO: 5.15 X10 (3) UL (ref 1.5–7.7)
NEUTROPHILS # BLD AUTO: 5.15 X10(3) UL (ref 1.5–7.7)
NEUTROPHILS NFR BLD AUTO: 79.7 %
PLATELET # BLD AUTO: 137 10(3)UL (ref 150–450)
RBC # BLD AUTO: 2.8 X10(6)UL
UNIT VOLUME: 350 ML
UNIT VOLUME: 350 ML
WBC # BLD AUTO: 6.5 X10(3) UL (ref 4–11)

## 2024-08-15 PROCEDURE — 99223 1ST HOSP IP/OBS HIGH 75: CPT | Performed by: STUDENT IN AN ORGANIZED HEALTH CARE EDUCATION/TRAINING PROGRAM

## 2024-08-15 PROCEDURE — 99497 ADVNCD CARE PLAN 30 MIN: CPT | Performed by: STUDENT IN AN ORGANIZED HEALTH CARE EDUCATION/TRAINING PROGRAM

## 2024-08-15 PROCEDURE — 99232 SBSQ HOSP IP/OBS MODERATE 35: CPT | Performed by: INTERNAL MEDICINE

## 2024-08-15 RX ORDER — HEPARIN SODIUM 1000 [USP'U]/ML
INJECTION, SOLUTION INTRAVENOUS; SUBCUTANEOUS
Status: COMPLETED
Start: 2024-08-15 | End: 2024-08-15

## 2024-08-15 RX ORDER — APIXABAN 2.5 MG/1
2.5 TABLET, FILM COATED ORAL 2 TIMES DAILY
Qty: 60 TABLET | Refills: 0 | Status: SHIPPED | OUTPATIENT
Start: 2024-08-15

## 2024-08-15 RX ORDER — ALBUMIN (HUMAN) 12.5 G/50ML
25 SOLUTION INTRAVENOUS
Status: DISCONTINUED | OUTPATIENT
Start: 2024-08-15 | End: 2024-08-15

## 2024-08-15 RX ORDER — HEPARIN SODIUM 1000 [USP'U]/ML
1.5 INJECTION, SOLUTION INTRAVENOUS; SUBCUTANEOUS
Status: DISCONTINUED | OUTPATIENT
Start: 2024-08-15 | End: 2024-08-15

## 2024-08-15 RX ORDER — DORZOLAMIDE HCL 20 MG/ML
1 SOLUTION/ DROPS OPHTHALMIC 2 TIMES DAILY
Qty: 10 ML | Refills: 0 | Status: SHIPPED | OUTPATIENT
Start: 2024-08-15

## 2024-08-15 NOTE — PROGRESS NOTES
AVS reviewed, Midline IV in place ,  will discharge to [facility] via ambulance, will have Dialysis tomorrow ( also had dialysis today ) per Dr. Flores.

## 2024-08-15 NOTE — OCCUPATIONAL THERAPY NOTE
OT orders received, chart reviewed. Attempted to see patient for OT eval this am, however patient currently occupied with HD. Will rettempt as able and as patient appropriate. RN aware.

## 2024-08-15 NOTE — PLAN OF CARE
NURSING DISCHARGE NOTE    Discharged Rehab facility via Ambulance.  Accompanied by Support staff  Belongings Taken by patient/family. This RN attempted to given report to RN at [facility]-RN unavailable.

## 2024-08-15 NOTE — DISCHARGE INSTRUCTIONS
Wound Cleaning and Dressings:  Showering directions: May shower and/or cleanse wound with mild soap and water  Wound cleansing:  Cleanse with normal saline or wound cleanser  Wound cleaning frequency: Every 48 hours  Wound product: Non-adherent/adaptic, Dry gauze, ABD pad, Kerlix, and Paper tape  Dressing change frequency:  Change dressing every other day and/or as needed  Enzymatic agent:  Not applicable     Compression Therapy:   Ace wrap       Miscellaneous/Additional Orders:  Offloading: Air mattress, Turn Q 2 hours, and Heel off-loading boot(s)      [facility] for palliative care services.    Per Dr. Flores - Dialysis done today (8/15),will be due tomorrow to resume MWF schedule

## 2024-08-15 NOTE — PROGRESS NOTES
[facility] Hospitalist Progress Note     PCP: Zachery Hall MD    SUBJECTIVE:  Patient seen and examined.  Patient states he is feeling better today.  Denies chest pain or shortness of breath.    OBJECTIVE:  Temp:  [97.6 °F (36.4 °C)-98.8 °F (37.1 °C)] 98.8 °F (37.1 °C)  Pulse:  [50-69] 57  Resp:  [16-18] 16  BP: (116-160)/(50-85) 132/60  SpO2:  [90 %-100 %] 99 %    Intake/Output:    Intake/Output Summary (Last 24 hours) at 8/15/2024 0151  Last data filed at 8/14/2024 1750  Gross per 24 hour   Intake 1726.66 ml   Output --   Net 1726.66 ml       Last 3 Weights   08/13/24 2218 210 lb (95.3 kg)   08/13/24 1456 210 lb (95.3 kg)   08/12/24 1239 209 lb 12.8 oz (95.2 kg)   08/12/24 1137 210 lb (95.3 kg)   08/09/24 1030 210 lb (95.3 kg)   07/18/24 1100 211 lb (95.7 kg)       Exam  Gen: Alert, awake, cooperative.  HEENT:  EOMI, PERRLA, OP clear, MMM  Pulm: Lungs clear bilaterally, normal respiratory effort  CV: Irregular rhythm, nontachycardic, no murmur.  Abd: Abdomen soft, nontender, nondistended, no organomegaly, bowel sounds present  MSK: Full range of motion in extremities, no clubbing, no cyanosis. B/LTMA.  Both feet wrapped in dressing.  Skin: Warm and dry  Neuro: AOx4.  Moving all extremities.    Data Review:       Labs:     Recent Labs   Lab 08/13/24  1626 08/14/24  0652 08/14/24  1355   WBC 8.1 6.1  --    HGB 6.7* 6.5* 7.8*   MCV 92.7 90.7  --    .0* 115.0*  --        Recent Labs   Lab 08/12/24  1233 08/13/24  1626 08/14/24  0652   * 135* 137   K 3.3* 3.9 4.0   CL 97* 96* 99   CO2 32.0 32.0 32.0   BUN 38* 54* 59*   CREATSERUM 3.75* 5.62* 6.27*   CA 10.0 10.9* 10.4   MG  --   --  2.0   * 192* 103*       Recent Labs   Lab 08/13/24  1626   ALT <7*   AST 14   ALB 3.8       Recent Labs   Lab 08/14/24  1221 08/14/24  1429 08/14/24  1635 08/14/24  2123 08/14/24  2237   PGLU 91 236* 204* 196* 239*       No results for input(s): \"TROP\" in the last 168 hours.        Meds:      epoetin matt  10,000 Units  Intravenous Once in dialysis    timolol  1 drop Both Eyes BID    atorvastatin  80 mg Oral Nightly    carvedilol  12.5 mg Oral BID with meals    gabapentin  100 mg Oral Nightly    [Held by provider] Netarsudil-Latanoprost  1 drop Ophthalmic Daily    insulin aspart  2-10 Units Subcutaneous TID AC and HS    insulin degludec  45 Units Subcutaneous Nightly         glucose **OR** glucose **OR** glucose-vitamin C **OR** dextrose **OR** glucose **OR** glucose **OR** glucose-vitamin C    acetaminophen **OR** HYDROcodone-acetaminophen **OR** HYDROcodone-acetaminophen       Assessment/Plan:   79 year old male with PMH sig for CAD status post stents, PAD status post bilateral lower extremity stents, atrial fibrillation on Eliquis, diabetes mellitus type 2 on insulin pump, end-stage renal disease on dialysis, HFpEF, status post AVR, history of stroke, hypertension, hyperlipidemia, who presents for bleeding from his foot.     #Acute blood loss anemia superimposed on chronic anemia   #Left foot wound s/p debridement  -1 unit PRBC ordered in the ED. Addition unit ordered this morning. Trend H&H, transfuse as needed.   -Foot wound cauterized and dressed in the ED. Podiatry consulted for further management.  Wound care consulted.  -Follow up in wound clinic     #ESRD on HD (MWF)  -Nephrology consulted     #HFpEF, chronic  -Monitor volume status in setting of blood transfusion.  Optimize volume status with hemodialysis.     #CAD s/p PCI  #PAD  #A-fib on Eliquis  #CVA h/o  #Hypertension  #Hyperlipidemia  -Hold plavix and Eliquis  -Continue Coreg and statin     #T2DM w/ neuropathy  -POCT blood glucose ACHS. Start 45U degludec + SSI in place of insulin pump. Adjust as needed.   -Continue gabapentin        DVT ppx: SCDs  Code Status: DNR     Layton Corey DO  [facility]

## 2024-08-15 NOTE — PROGRESS NOTES
[facility]   part of [facility]     Nephrology Progress Note    Darin Bowens Patient Status:  Observation    1945 MRN [id number]   Location [facility] 3SW-A Attending Rowan Deras MD   Hosp Day # 0 PCP Zachery Hall MD       SUBJECTIVE:  Pt seen on HD, stable this AM        Physical Exam:   /57 (BP Location: Left arm)   Pulse (!) 48   Temp 98.9 °F (37.2 °C) (Oral)   Resp 16   Ht 6' (1.829 m)   Wt 210 lb (95.3 kg)   SpO2 94%   BMI 28.48 kg/m²   Temp (24hrs), Av.3 °F (36.8 °C), Min:97.6 °F (36.4 °C), Max:98.9 °F (37.2 °C)       Intake/Output Summary (Last 24 hours) at 8/15/2024 0742  Last data filed at 2024 1750  Gross per 24 hour   Intake 1288.33 ml   Output --   Net 1288.33 ml     Last 3 Weights   24 2218 210 lb (95.3 kg)   24 1456 210 lb (95.3 kg)   24 1239 209 lb 12.8 oz (95.2 kg)   24 1137 210 lb (95.3 kg)   24 1030 210 lb (95.3 kg)   24 1100 211 lb (95.7 kg)   24 0548 205 lb 8 oz (93.2 kg)   24 0530 212 lb 4.9 oz (96.3 kg)   24 0400 208 lb 5.4 oz (94.5 kg)   24 0600 198 lb 8 oz (90 kg)   24 0648 205 lb (93 kg)   24 0543 211 lb (95.7 kg)   24 2000 211 lb (95.7 kg)   06/15/24 0008 211 lb (95.7 kg)   24 0545 211 lb 6.4 oz (95.9 kg)   24 1324 209 lb 7 oz (95 kg)     General: Asleep but arouses in no apparent distress.  HEENT: No scleral icterus, MMM  Neck: Supple, no SHERI or thyromegaly  Cardiac: Regular rate and rhythm, S1, S2 normal, no murmur or rub  Lungs: Clear without wheezes, rales, rhonchi.    Abdomen: Soft, non-tender. + bowel sounds, no palpable organomegaly  Extremities: Without clubbing, cyanosis or edema.  B foot dressings in place  Neurologic: moving all extremities  Skin: Warm and dry, no rash        Labs:     Recent Labs   Lab 24  1626 24  0652 24  1355   WBC 8.1 6.1  --    HGB 6.7* 6.5* 7.8*   MCV 92.7 90.7  --    .0* 115.0*  --         Recent Labs   Lab 08/12/24  1233 08/13/24  1626 08/14/24  0652   * 135* 137   K 3.3* 3.9 4.0   CL 97* 96* 99   CO2 32.0 32.0 32.0   BUN 38* 54* 59*   CREATSERUM 3.75* 5.62* 6.27*   CA 10.0 10.9* 10.4   MG  --   --  2.0   * 192* 103*       Recent Labs   Lab 08/13/24  1626   ALT <7*   AST 14   ALB 3.8       Recent Labs   Lab 08/14/24  1429 08/14/24  1635 08/14/24  2123 08/14/24  2237 08/15/24  0525   PGLU 236* 204* 196* 239* 71       Meds:   Current Facility-Administered Medications   Medication Dose Route Frequency    epoetin matt (Epogen, Procrit) 64888 UNIT/ML injection 10,000 Units  10,000 Units Intravenous Once in dialysis    glucose (Dex4) 15 GM/59ML oral liquid 15 g  15 g Oral Q15 Min PRN    Or    glucose (Glutose) 40% oral gel 15 g  15 g Oral Q15 Min PRN    Or    glucose-vitamin C (Dex-4) chewable tab 4 tablet  4 tablet Oral Q15 Min PRN    Or    dextrose 50% injection 50 mL  50 mL Intravenous Q15 Min PRN    Or    glucose (Dex4) 15 GM/59ML oral liquid 30 g  30 g Oral Q15 Min PRN    Or    glucose (Glutose) 40% oral gel 30 g  30 g Oral Q15 Min PRN    Or    glucose-vitamin C (Dex-4) chewable tab 8 tablet  8 tablet Oral Q15 Min PRN    timolol (Timoptic) 0.25 % ophthalmic solution 1 drop  1 drop Both Eyes BID    acetaminophen (Tylenol) tab 650 mg  650 mg Oral Q4H PRN    Or    HYDROcodone-acetaminophen (Norco) 5-325 MG per tab 1 tablet  1 tablet Oral Q4H PRN    Or    HYDROcodone-acetaminophen (Norco) 5-325 MG per tab 2 tablet  2 tablet Oral Q4H PRN    atorvastatin (Lipitor) tab 80 mg  80 mg Oral Nightly    carvedilol (Coreg) tab 12.5 mg  12.5 mg Oral BID with meals    gabapentin (Neurontin) cap 100 mg  100 mg Oral Nightly    [Held by provider] Netarsudil-Latanoprost 0.02-0.005 % SOLN 1 drop  1 drop Ophthalmic Daily    insulin aspart (NovoLOG) 100 Units/mL FlexPen 2-10 Units  2-10 Units Subcutaneous TID AC and HS    insulin degludec (Tresiba) 100 units/mL flextouch 45 Units  45 Units Subcutaneous  Nightly         Impression/Plan:      #1.  ESRD- due to DM/HTN.  HD today off schedule and will dialyze again tomorrow to resume usual MWF routine     #2.  Anemia- due to ESRD in setting of acute blood loss.  S/p transfusion.  Cont DALE     #3.  PAD- severe and s/p recent B TMA.  Mgmt per podiatry/wound care        Sean Flores MD  8/15/2024  7:42 AM

## 2024-08-15 NOTE — PROGRESS NOTES
[facility] Lia[facility] received palliative referral for community PC services. Waiting to obtain insurance (verification/authorization) prior to pursuing.     Insurance accepted.    Patt Gamboa  [facility] Liaison   [phone]

## 2024-08-15 NOTE — CM/SW NOTE
Order received for POLST form.  Contacted [facility] who confirmed pt completed POLST at their facility on 7/15.  Received POLST for DNR/comfort.  Placed on hard chart and also scanned into Epic chart.  Noted new consult to palliative care.  PT/OT planning to work with pt today after HD.  Will follow for PC and therapy evals for further DC planning.  / to remain available for support and/or discharge planning.     Lili Kennedy, [facility]  Discharge Planner  [phone]

## 2024-08-15 NOTE — PHYSICAL THERAPY NOTE
PHYSICAL THERAPY EVALUATION - INPATIENT     Room Number: 357/357-A  Evaluation Date: 8/15/2024  Type of Evaluation: Initial  Physician Order: PT Eval and Treat    Presenting Problem: anemia, AMS  Co-Morbidities : DM, ESRD on HD, PAD, afib, HTN  Reason for Therapy: Mobility Dysfunction and Discharge Planning    PHYSICAL THERAPY ASSESSMENT   Patient is currently functioning below baseline with bed mobility, transfers, and gait.  Prior to admission, patient's baseline is supervision.  Patient is requiring maximum assist as a result of the following impairments: decreased functional strength, pain, impaired standing balance, and decreased muscular endurance. Physical Therapy will continue to follow for duration of hospitalization.      Patient will benefit from continued skilled PT Services to promote return to prior level of function and safety with continuous assistance and gradual rehabilitative therapy .    PLAN  PT Treatment Plan: Bed mobility;Endurance;Energy conservation;Patient education;Gait training;Strengthening;Balance training;Transfer training  Rehab Potential : Fair  Frequency (Obs): 3-5x/week  Number of Visits to Meet Established Goals: 5      CURRENT GOALS    Goal #1 Patient is able to demonstrate supine - sit EOB @ level: supervision     Goal #2 Patient is able to demonstrate transfers Sit to/from Stand at assistance level: moderate assistance     Goal #3 Patient is able to ambulate 10 feet with assist device: walker - rolling at assistance level: moderate assistance     Goal #4    Goal #5    Goal #6    Goal Comments: Goals established on 8/15/2024      PHYSICAL THERAPY MEDICAL/SOCIAL HISTORY  History related to current admission: Patient is a 79 year old male admitted on 8/13/2024 from home for AMS.  Pt diagnosed with anemia s/p L foot wound debridement as OP on 8/12/24. Pt s/p PRBCs. Foot wound cauterized and dressed in the ED.     Recent admissions:  6/13-6/15/24: s/p R TMA and achilles tendon  lengthening, L 2nd toe amputation 24 (from home and DC home)  -24: s/p L and R foot debridement, L 2nd MT partial resection, and R 1st/2nd MT resection (from home and [facility])      HOME SITUATION  Type of Home: [facility]   Home Layout: Ramped entrance                Lives With: Spouse  Drives: No  Patient Owned Equipment: Rolling walker;Wheelchair       Prior Level of [place]: Pt lives with spouse in [facility] with ramped entrance. Pt ambulatory with cane prn at baseline. Pt independent with ADL and mobility. Pt uses W/C in the community for longer distances.   Pt with recent [facility] stay where he was ambulating ~ 60ft with RW and supervision.   DC from [facility] on 24. Pt is a questionable historian but reports using W/C within home.     SUBJECTIVE  \"I don't even know where I'm at! I need my eye drops so I can see.\"       OBJECTIVE  Precautions: Bed/chair alarm;Low vision;Limb alert - right  Fall Risk: High fall risk    WEIGHT BEARING RESTRICTION  Weight Bearing Restriction: R lower extremity;L lower extremity        R Lower Extremity: Weight Bearing as Tolerated (post op shoe)  L Lower Extremity: Weight Bearing as Tolerated (post op shoe)    PAIN ASSESSMENT  Ratin          COGNITION  Orientation Level:  oriented to person, disoriented to place, disoriented to time, and disoriented to situation  Following Commands:  follows one step commands with increased time and follows one step commands with repetition  Safety Judgement:  decreased awareness of need for assistance    RANGE OF MOTION AND STRENGTH ASSESSMENT  Upper extremity ROM and strength are within functional limits     Lower extremity ROM is within functional limits     Lower extremity strength is within functional limits except for the following:    Right Knee extension  3+/5  Left Knee extension  3+/5      BALANCE  Static Sitting: Good  Dynamic Sitting: Good  Static Standing: Poor  Dynamic Standing: Poor -    ADDITIONAL TESTS                                     ACTIVITY TOLERANCE                         O2 WALK       NEUROLOGICAL FINDINGS                        AM-PAC '6-Clicks' INPATIENT SHORT FORM - BASIC MOBILITY  How much difficulty does the patient currently have...  Patient Difficulty: Turning over in bed (including adjusting bedclothes, sheets and blankets)?: A Little   Patient Difficulty: Sitting down on and standing up from a chair with arms (e.g., wheelchair, bedside commode, etc.): A Lot   Patient Difficulty: Moving from lying on back to sitting on the side of the bed?: A Lot   How much help from another person does the patient currently need...   Help from Another: Moving to and from a bed to a chair (including a wheelchair)?: Total   Help from Another: Need to walk in hospital room?: Total   Help from Another: Climbing 3-5 steps with a railing?: Total       AM-PAC Score:  Raw Score: 10   Approx Degree of Impairment: 76.75%   Standardized Score (AM-PAC Scale): 32.29   CMS Modifier (G-Code): CL    FUNCTIONAL ABILITY STATUS  Gait Assessment   Functional Mobility/Gait Assessment  Gait Assistance: Maximum assistance  Distance (ft): 1  Assistive Device: Rolling walker    Skilled Therapy Provided   Pt assisted with donning post op shoes.   Supine-sit with MIN assist for trunk support.   Cuing for sequencing and UE placement.   Max cuing for midline positioning and scooting to EOB.   VC for transfer set up.   Sit-stand x 2 reps with MOD assist x 2P for force generation and balance.   Excessive B knee flexion and retropulsion in standing.   VC for posture.   VC to take side steps along bedside.   MOD assist x 2P for balance.   Difficulty with limb advancement and foot clearance L>R.   Excessive knee and hip flexion when attempting ambulation.   Returned to bedside.   Assisted patient to bedside chair using Nehal Betancur.   VC for UE placement as well as hip/knee ext in standing.   Tolerated well.   Returned to bedside chair.   Assisted with set up of  breakfast.     Bed Mobility:  Rolling: supervision  Supine to sit: MIN   Sit to supine: NT     Transfer Mobility:  Sit to stand: MOD x 2P   Stand to sit: MOD x 2P  Gait = MOD x 2P    Therapist's Comments:  Recommend Nehal Betancur for transfers to chair/commode.     Exercise/Education Provided:  Bed mobility  Energy conservation  Functional activity tolerated  Gait training  Posture  Strengthening  Transfer training    Patient End of Session: Up in chair;Needs met;Call light within reach;RN aware of session/findings;All patient questions and concerns addressed;Alarm set;Discussed recommendations with /      Patient Evaluation Complexity Level:  History High - 3 or more personal factors and/or co-morbidities   Examination of body systems Moderate - addressing a total of 3 or more elements   Clinical Presentation  Moderate - Evolving   Clinical Decision Making Moderate Complexity       PT Session Time: 30 minutes  Gait Training:  minutes  Therapeutic Activity: 15 minutes  Neuromuscular Re-education:  minutes  Therapeutic Exercise:  minutes

## 2024-08-15 NOTE — CM/SW NOTE
Participated in team meeting with Dr Meza, Dr Cardozo, pt's wife, pt's dtr and patient.  Reviewed therapy assessment this AM where pt was requiring max assist x 2 for transfers.  Pt's family concerned they cannot mange pt's physical needs at home currently and anticipate challenges in getting to/from outpatient HD.  Pt's goal is to return to home, but he stated willingness to return to [facility] for additional RORO prior to returning to home.  Dr Meza indicating pt appropriate for DC today from his perspective.  He will clarify if wound vac needed.  Dr Cardozo to meet separately with pt/family to further palliative care conversation.    Updates sent to [facility] via [facility].  Spoke with Jennifer from [facility] who confirmed bed available for DC today.  She will work on HD approval with their on site provider.      Await confirmation of acceptance at [facility] for admission today.  / to remain available for support and/or discharge planning.     [facility]  P:[phone]  F:[phone]    Lili Kennedy [facility]  Discharge Planner  [phone]    Addendum:  [facility] confirmed pt can be accepted for admission today to semi private room.  Updated pt/wife at bedside.  Ambulance transport scheduled for 530pm.  PCS form completed and available for RN to print.  Updated pt's RN.      Addendum:  Spoke with Patt from [facility] regarding new referral for community palliative care.  She will reach out to pt/family.

## 2024-08-15 NOTE — CONSULTS
[facility]   part of formerly [facility]  Palliative Care Initial Consult Note    Darin Bowens Patient Status:  Observation    1945 MRN [id number]   Location [facility] 3SW-A Attending Rowan Deras MD   Hosp Day # 0 PCP Zachery Hall MD     Date of Consult: 8/15/2024  Patient seen at: [facility] Inpatient    Reason for Consultation: Consult ordered by:: Dr. Rowan Deras for evaluation of Palliative Care needs and Goals of care discussion.    Subjective     History of Present Illness: Darin Bowens is a 79 year old male with ESRD on HD, Afib, HFpEF, anemia, severe PAD, CAD, DM, HTN, hx CVA who was admitted on 2024 for Bleeding foot wounds. History was obtained from [facility] and patient, daughter, and wife. Pt discharged from [facility] last Friday.     Today is day #0 of hospitalization.     When I entered the room, the patient was awake and in chair. Spouse present at bedside and daughter Gracia on phone.   Cricket states that he is tired all the time. He denies SOB and pain at the time of my visit.     See summary of discussion below.      Review of Systems:   Symptoms(s): Fatigue  Bowel Movement         2024  2207             Stool Count Calculated for I/O: 1          Wt Readings from Last 6 Encounters:   24 210 lb (95.3 kg)   24 209 lb 12.8 oz (95.2 kg)   24 211 lb (95.7 kg)   24 205 lb 8 oz (93.2 kg)   06/15/24 211 lb (95.7 kg)   24 210 lb (95.3 kg)        Palliative Care Social History:   Marital Status:   Children: Yes; 3 children   Living Situation Prior to Admit: Home with family (wife)   Is Patient Confused: Intermittently forgetful/confused   Occupational History: Retired basketball official   Hobbies: TV, fishing, outdoors.     Substance History:   reports that he has never smoked. He has never used smokeless tobacco.  reports no history of alcohol use.  reports no history of drug use.  Patient denies substance abuse history      Spiritual Assessment:   [place] Not Listed    Past Medical History/Past Surgical History:   This is the 3rd hospitalization in the past 3 months.    Medical History: obtained from [facility]  Past Medical History:    Aortic stenosis    Back problem    CAD (coronary artery disease)    Calculus of kidney    Cataract    CKD (chronic kidney disease) stage 4, GFR 15-29 ml/min ([facility])    Congestive heart disease (HCC)    Coronary atherosclerosis    DDD (degenerative disc disease), lumbar    Diabetes mellitus (HCC)    Dialysis patient ([facility])    fistula upper right arm/MWF    Dyslipidemia    Heart valve disease    High cholesterol    Hypertriglyceridemia    Hypoglycemic reaction    Muscle weakness    cane    Nausea and vomiting, unspecified vomiting type    Onychomycosis    DESI (obstructive sleep apnea) C-PAP     severe AHI 68, O2 sam 84%, CPAP 7    Other and unspecified hyperlipidemia    Overweight(278.02)    Renal disorder    S/P Coronary Artery Stents 12/2009    Sleep apnea    doesn't use CPAP    Type 1 diabetes mellitus ([facility])    Unspecified essential hypertension    Visual impairment    legally blind     Past Surgical History:   Procedure Laterality Date    Amputation foot,transmetatarsal Right     Amputation transmetacarpal Left     Back surgery  05/16/2016    L2-L5 Decomp poss uninstrumented fusion    Cabg      Cath percutaneous  transluminal coronary angioplasty      Cath transcatheter aortic valve replacement      Injection, w/wo contrast, dx/therapeutic substance, epidural/subarachnoid; lumbar/sacral N/A 12/10/2015    Procedure: LUMBAR EPIDURAL;  Surgeon: Rojas Bolanos MD;  Location: [facility]    Injection, w/wo contrast, dx/therapeutic substance, epidural/subarachnoid; lumbar/sacral N/A 01/25/2016    Procedure: LUMBAR EPIDURAL;  Surgeon: Rojas Bolanos MD;  Location: [facility]    Injection, w/wo contrast, dx/therapeutic substance, epidural/subarachnoid;  lumbar/sacral N/A 03/02/2016    Procedure: LUMBAR EPIDURAL;  Surgeon: Corey Mcduffie MD;  Location: [facility]    M-sedaj by  phys perfrmg svc 5+ yr N/A 12/10/2015    Procedure: LUMBAR EPIDURAL;  Surgeon: Rojas Bolanos MD;  Location: [facility]    M-sedaj by  phys perfrmg svc 5+ yr N/A 01/25/2016    Procedure: LUMBAR EPIDURAL;  Surgeon: Rojas Bolanos MD;  Location: [facility]    M-sedaj by  phys perfrmg svc 5+ yr N/A 03/02/2016    Procedure: LUMBAR EPIDURAL;  Surgeon: Corey Mcduffie MD;  Location: [facility]    Other surgical history  10/04/2012    cysto-Dr. Calderón    Other surgical history N/A 05/16/2016    Procedure: LUMBAR LAMINECTOMY FUSION W/ BONE GRAFT 3 LEVEL;  Surgeon: CARLY Garcia MD;  Location: [facility] OR    Patient documented not to have experienced any of the following events N/A 12/10/2015    Procedure: LUMBAR EPIDURAL;  Surgeon: Rojas Bolanos MD;  Location: [facility]    Patient documented not to have experienced any of the following events N/A 01/25/2016    Procedure: LUMBAR EPIDURAL;  Surgeon: Rojas Bolanos MD;  Location: [facility]    Patient documented not to have experienced any of the following events N/A 03/02/2016    Procedure: LUMBAR EPIDURAL;  Surgeon: Corey Mcduffie MD;  Location: [facility]    Patient withough preoperative order for iv antibiotic surgical site infection prophylaxis. N/A 12/10/2015    Procedure: LUMBAR EPIDURAL;  Surgeon: Rojas Bolanos MD;  Location: [facility]    Patient withough preoperative order for iv antibiotic surgical site infection prophylaxis. N/A 01/25/2016    Procedure: LUMBAR EPIDURAL;  Surgeon: Rojas Bolanos MD;  Location: [facility]    Patient withough preoperative order for iv antibiotic surgical site infection prophylaxis. N/A 03/02/2016    Procedure: LUMBAR  EPIDURAL;  Surgeon: Corey Mcduffie MD;  [facility]    Replace aortic valve open  2012    Valve repair         Family History: obtained from [facility]  Family History   Problem Relation Age of Onset    Heart Attack Father     Heart Attack Brother     Diabetes Brother     Diabetes Brother        Allergies:  Allergies   Allergen Reactions    Augmentin [Amoxicillin-Pot Clavulanate] RASH    Lisinopril Coughing     Dyspnea         Medications:     Current Facility-Administered Medications:     [START ON 8/16/2024] epoetin matt (Epogen, Procrit) 81048 UNIT/ML injection 10,000 Units, 10,000 Units, Intravenous, Once in dialysis    sodium chloride 0.9 % IV bolus 100 mL, 100 mL, Intravenous, Q30 Min PRN **AND** albumin human (Albumin) 25% injection 25 g, 25 g, Intravenous, PRN Dialysis    heparin (Porcine) 1000 UNIT/ML injection 1,500 Units, 1.5 mL, Intracatheter, Once    glucose (Dex4) 15 GM/59ML oral liquid 15 g, 15 g, Oral, Q15 Min PRN **OR** glucose (Glutose) 40% oral gel 15 g, 15 g, Oral, Q15 Min PRN **OR** glucose-vitamin C (Dex-4) chewable tab 4 tablet, 4 tablet, Oral, Q15 Min PRN **OR** dextrose 50% injection 50 mL, 50 mL, Intravenous, Q15 Min PRN **OR** glucose (Dex4) 15 GM/59ML oral liquid 30 g, 30 g, Oral, Q15 Min PRN **OR** glucose (Glutose) 40% oral gel 30 g, 30 g, Oral, Q15 Min PRN **OR** glucose-vitamin C (Dex-4) chewable tab 8 tablet, 8 tablet, Oral, Q15 Min PRN    timolol (Timoptic) 0.25 % ophthalmic solution 1 drop, 1 drop, Both Eyes, BID    acetaminophen (Tylenol) tab 650 mg, 650 mg, Oral, Q4H PRN **OR** HYDROcodone-acetaminophen (Norco) 5-325 MG per tab 1 tablet, 1 tablet, Oral, Q4H PRN **OR** HYDROcodone-acetaminophen (Norco) 5-325 MG per tab 2 tablet, 2 tablet, Oral, Q4H PRN    atorvastatin (Lipitor) tab 80 mg, 80 mg, Oral, Nightly    carvedilol (Coreg) tab 12.5 mg, 12.5 mg, Oral, BID with meals    gabapentin (Neurontin) cap 100 mg, 100 mg, Oral, Nightly    [Held by provider]  Netarsudil-Latanoprost 0.02-0.005 % SOLN 1 drop, 1 drop, Ophthalmic, Daily    insulin aspart (NovoLOG) 100 Units/mL FlexPen 2-10 Units, 2-10 Units, Subcutaneous, TID AC and HS    insulin degludec (Tresiba) 100 units/mL flextouch 45 Units, 45 Units, Subcutaneous, Nightly    Functional Status History:  ADLs: bathing or showering, dressing, getting in and out of bed or a chair, walking, using the toilet, and eating  - MODERATE  3 - 5 performance deficits  IADLs: use the phone, shop for groceries, meal preparation, manage medicines, clean living area, use transportation by self, manage money  - HIGH  5+ performance deficits   DME: [facility] [employer]    Palliative Performance Scale:   Prior to admission Palliative performance scale PPSv2 (%): 55 (pt/family reported)   Current Palliative performance scale PPSv2 (%): 35   % Ambulation Activity Level Self-Care Intake Consciousness   100 Full  Normal  No Disease Full Normal Full   90 Full  Normal  Some Disease Full Normal Full   80 Full  Normal w/effort  Some Disease Full Normal or reduced Full   70 Reduced  Can't Perform Job  Some Disease Full Normal or reduced Full   60 Reduced  Can't Perform Hobby   Significant Disease Occ Assist Normal or reduced Full or confused   50 Mainly sit/lie Can't do any work  Extensive Disease Partial Assist Normal or reduced Full or confused   40 Mainly in bed Can't do any work  Extensive Disease Mainly Assist Normal or reduced Full or confused   30 Bed Bound Can't do any work  Extensive Disease Max Assist  Total Care Reduced  Drowsy/confused   20 Bed Bound Can't do any work  Extensive Disease Max Assist  Total Care Minimal  Drowsy/confused   10 Bed Bound Can't do any work  Extensive Disease Max Assist  Total Care Mouth Care  Drowsy/confused   0 Death        Objective      Vital Signs:  Blood pressure 157/59, pulse 52, temperature 98.6 °F (37 °C), temperature source Oral, resp. rate 20, height 6' (1.829 m), weight 210 lb (95.3 kg), SpO2 99%.  Body  mass index is 28.48 kg/m².  Non-verbal signs of pain present: No    Physical Exam:  General:  Awake but sleepy . In no apparent respiratory distress. Body habitus  overweight     HEENT: No gross focal deficits. MMM   Lungs: Normal effort on RA  Extremities: b/l toe amputations; ace bandages wrapping feet   Neurologic: Aox2-3 but forgetful   Psychiatric: Depressed at times. Cooperative   Skin: No visible rashes on exposed skin     Hematology:  Lab Results   Component Value Date    WBC 6.5 08/15/2024    HGB 8.4 (L) 08/15/2024    HCT 25.5 (L) 08/15/2024    .0 (L) 08/15/2024       Coags:  Lab Results   Component Value Date    INR 1.48 (H) 06/28/2024    PTT 30.3 06/26/2024       Chemistry:  Lab Results   Component Value Date    CREATSERUM 6.27 (H) 08/14/2024    BUN 59 (H) 08/14/2024     08/14/2024    K 4.0 08/14/2024    CL 99 08/14/2024    CO2 32.0 08/14/2024     (H) 08/14/2024    CA 10.4 08/14/2024    ALB 3.8 08/13/2024    ALKPHO 114 08/13/2024    BILT 0.2 08/13/2024    TP 6.2 08/13/2024    AST 14 08/13/2024    ALT <7 (L) 08/13/2024    PSA 0.7 01/09/2014    MG 2.0 08/14/2024    PHOS 3.2 02/08/2019    TROP <0.046 02/06/2019       Imaging:  CT BRAIN OR HEAD (CPT=70450)    Result Date: 8/13/2024  CONCLUSION:  No acute intracranial hemorrhage or mass effect.  Diffuse cerebral and cerebellar atrophy with chronic microvascular ischemic changes of aging.  Old left frontal lobe infarction.    LOCATION:  [facility]   Dictated by (CST): Netta Solorio MD on 8/13/2024 at 8:03 PM     Finalized by (CST): Netta Solorio MD on 8/13/2024 at 8:05 PM        Summary of Discussion      Initially, we took place in a mini care conference w/ family, SW, and Dr. Meza. We stayed after to discuss GOC with family.     I discussed reason for palliative care consultation with patient, daughter, and wife.    I differentiated the palliative treatment-focus model versus the hospice comfort-focused philosophy of care. I informed the  patient/family that having palliative care support does not limit medical treatment options or decisions to those who wish to continue curative or restorative medical therapies. I discussed the benefits of palliative care to include assistance with arising symptom management needs, an extra layer of support, to ensure GOC are respected throughout healthcare continuum, and assist with transition to hospice care when appropriate.      Outpatient/Community Palliative Care Services:  Usually visit once per 4 weeks  Focus on GOC and symptom management   Palliative Care criteria:  Not altered by prognosis   Does not limit curative or restorative therapies      We did discuss what stopping dialysis would entail. At this time, Cricket wishes to continue on the treatment focused pathway and continue dialysis.     Prognostic awareness/understanding: Good  Gracia and Jocelyne have a good understanding of Cricket's overall health. Cricket understands as well, ut has a difficult time retaining information at times.   We discussed the disease trajectory of  ESRD and CHF with associated symptoms and decline over time.     Hopes/goals:   Tm expressed wanting to continue living for his family. Gracia and Jocelyne shared their feelings and although they do not want to see Cricket pass away, they know how difficult life has been for him.   Cricket would like to try and regain his ability to walk again. At [facility], Cricket was apparently walking up to 50 feet with assistance. He no longer can stand without 2 person assist d/t fatigue.     Fears/concerns:   Jocelyne fears that going home will not be an option unless she has additional care giver support.     Provided emotional support.     Advance Care Planning counseling and discussion:    POA Documentation Document received and will be scanned. Healthcare Agent's Name: Jocelyne (spouse)   We voluntarily discussed the risks vs benefits of life sustaining treatments in the setting of comorbid medical conditions  with patient, daughter, and wife. Lengthy discussion regarding goals and pt preferences. Initially, pt wanted to do everything to stay alive for his family. After discussion with his family and permission, he decided that he would prefer to pass peacefully and naturally without needing the aide of machines (ventilator). He verbalized consent to update his code status and to have his wife complete POLST.   POLST FORM Completed--Document signed and will be scanned  DNAR/Full Treatment --> DNAR/Select     Assessment and Recommendations        Principal Problem:    Acute on chronic anemia  Active Problems:    ESRD (end stage renal disease) (HCC)    Bleeding from wound   Palliative Care encounter    Goals of care: established and treatment focused   They are open to [facility] for therapy, ongoing HD, and wound care.   Open to [facility] at discharge; order placed.   Code Status: DNAR/Selective Treatment - New POLST completed   Healthcare Agent's Name: Jocelyne (spouse)    Discussed today's visit with Family, Spouse, RN, SW/CM, Care Team , and Specialists.    Palliative Care Follow Up: Palliative care team will Continue to follow while inpatient.  Palliative care follow up outpatient: is indicated and community palliative care ordered.    Thank you for allowing Palliative Care services to participate in the care of Darin Bowens.    A total of  120  minutes were spent on this consult, which included all of the following: chart review, direct face to face contact, history taking, physical examination, counseling and coordinating care, and documentation. >16 minutes but <46 minutes spent on ACP with completion of POLST form.     Mercy Cardozo MD  8/15/2024  4:34 PM  Palliative Care Services    The 21st Century Cures Act makes medical notes like these available to patients in the interest of transparency. Please be advised this is a medical document. Medical documents are intended to carry relevant information, facts as evident,  and the clinical opinion of the practitioner. The medical note is intended as peer to peer communication and may appear blunt or direct. It is written in medical language and may contain abbreviations or verbiage that are unfamiliar.

## 2024-08-15 NOTE — OCCUPATIONAL THERAPY NOTE
OCCUPATIONAL THERAPY EVALUATION - INPATIENT     Room Number: 357/357-A  Evaluation Date: 8/15/2024  Type of Evaluation: Initial  Presenting Problem: bleeding from L foot wound, acute on chronic anemia    Physician Order: IP Consult to Occupational Therapy  Reason for Therapy: ADL/IADL Dysfunction and Discharge Planning    OCCUPATIONAL THERAPY ASSESSMENT   Patient is currently functioning below baseline with toileting, bathing, lower body dressing, bed mobility, transfers, dynamic sitting balance, static standing balance, dynamic standing balance, functional standing tolerance, and energy conservation strategies. Prior to admission, patient's baseline was independent for most ADLs, functional mobility via cane PRN until admission in June '24; has been needing increased assist for ADLs and transfers since then, mobility via wheelchair at home, although walked with RW recently at [facility]. Patient is requiring grossly max-total assist for LB/standing ADLs and functional transfers  as a result of the following impairments: decreased functional strength, decreased functional reach, decreased endurance, impaired sitting/standing balance, decreased muscular endurance, and visual deficits. Occupational Therapy will continue to follow for duration of hospitalization.    Patient will benefit from continued skilled OT Services to promote return to prior level of function and safety with continuous assistance and gradual rehabilitative therapy      History Related to Current Admission: Patient is a 79 year old male admitted on 8/13/2024 with Presenting Problem: bleeding from L foot wound, acute on chronic anemia. Co-Morbidities : DM, ESRD on HD, PAD, afib, HTN    Recent admissions:  6/25-7/12 for B foot wounds, s/p L foot debridement and 2nd metatarsal partial resection 6/27, s/p R foot debridement and 1st/2nd metatarsal resection 6/27, discharged to [facility] until 8/9 6/13-6/15 s/p R TMA and L 2nd toe amputation 6/13, discharged to [facility]  for 2 days    WEIGHT BEARING RESTRICTION  Weight Bearing Restriction: R lower extremity;L lower extremity        R Lower Extremity: Weight Bearing as Tolerated (post op shoe)  L Lower Extremity: Weight Bearing as Tolerated (post op shoe)    Recommendations for nursing staff:   Transfers: sera-steady  Toileting location: commode or toilet with sera-steady    EVALUATION SESSION:  Patient Start of Session: supine    FUNCTIONAL TRANSFER ASSESSMENT  Sit to Stand: Edge of Bed; Chair  Edge of Bed: Dependent (mod x2 with RW; CGA pulling up from sera-steady)  Chair: Contact Guard Assist (pulling up from sera-steady)    BED MOBILITY  Supine to Sit : Contact Guard Assist (CGA, HOB elevated, greatly increased time, max verbal/tactile cues due to low vision)    BALANCE ASSESSMENT     FUNCTIONAL ADL ASSESSMENT  LB Dressing Seated: Dependent (total to don B surgical shoes)      ACTIVITY TOLERANCE:                          O2 SATURATIONS  Oxygen Therapy  SPO2% on Room Air at Rest: 100    COGNITION  Attention Span:  attends with cues to redirect  Orientation Level:  oriented to situation, oriented to person, and disoriented to place  Following Commands:  follows one step commands with increased time and follows one step commands with repetition    Upper Extremity   ROM: within functional limits (B shoulder flexion limited at end range, reports baseline but functional)  Strength: within functional limits     EDUCATION PROVIDED  Patient: Role of Occupational Therapy; Functional Transfer Techniques; Fall Prevention; Weight Bear Status; Compensatory ADL Techniques  Patient's Response to Education: Verbalized Understanding; Requires Further Education    Equipment used: RW  Demonstrates functional use, Would benefit from additional trial      Therapist comments: Patient reporting fatigued, stating had dialysis this am and hasn't slept much, also had not yet eaten breakfast, but agreeable to therapy; tolerated bed mobility supine to sit  with CGA, heavy use of bedrails, increased time and verbal/tactile cues for hand placement; sitting EOB several minutes in preparation for seated ADLs, total required for donning B surgical shoes, reports family has been helping with LB ADLs recently; attempted standing from EOB x2 with RW demos in preparation for standing ADLs, requiring mod-max assist x2 to achieve full upright stand, continued to require mod-max assist for static standing balance due to posterior lean, attempted side-steps but unable to advance LLE; attempted standing with sera-steady, patient able to achieve full stand with CGA when pulling with B hands from sera-steady, demonstrated again after transfer to bedside chair and seated rest break; max assist required to control descent back to chair both demos. Will continue to follow.     Patient End of Session: Up in chair;Needs met;Call light within reach;RN aware of session/findings;All patient questions and concerns addressed;Alarm set    OCCUPATIONAL PROFILE    HOME SITUATION  Type of Home: Condo  Home Layout: Ramped entrance  Lives With: Spouse    Toilet and Equipment: Comfort height toilet;3-in-1 commode  Shower/Tub and Equipment: Walk-in shower       Occupation/Status: retired     Drives: No       Prior Level of Function: Per chart review, OT niaal dated 6/29/24 states:  \"Prior to recent admission pt had assist from spouse for showers, was otherwise mostly independent with ADL. Used cane in the home and wheelchair in the community. Since recent admission pt has been doing stand pivot transfers bed <> wheelchair <> commode with assist and RW. He has had assist from spouse or daughter for all LB ADL.\" Patient reports now, since June-July admission and subsequent rehab stay, he has been performing transfers only with family assist (unable to state how much), using wheelchair for mobility. He states family has been providing total assist for all ADLs except setup assist (due to low vision) for  feeding and grooming. Has been sponge bathing only, not getting in the shower.    SUBJECTIVE   \"I don't even know where I am right now\"    PAIN ASSESSMENT  Ratin  Location: denies       OBJECTIVE  Precautions: Bed/chair alarm;Low vision;Limb alert - right  Fall Risk: High fall risk      ASSESSMENTS    AM-PAC ‘6-Clicks’ Inpatient Daily Activity Short Form  -   Putting on and taking off regular lower body clothing?: Total  -   Bathing (including washing, rinsing, drying)?: A Lot  -   Toileting, which includes using toilet, bedpan or urinal? : Total  -   Putting on and taking off regular upper body clothing?: A Lot  -   Taking care of personal grooming such as brushing teeth?: A Little  -   Eating meals?: A Little    AM-PAC Score:  Score: 12  Approx Degree of Impairment: 66.57%  Standardized Score (AM-PAC Scale): 30.6    ADDITIONAL TESTS     NEUROLOGICAL FINDINGS      COGNITION ASSESSMENTS       PLAN  OT Treatment Plan: Balance activities;ADL training;Functional transfer training;Endurance training;Patient/Family education;Patient/Family training;Compensatory technique education  Rehab Potential : Fair  Frequency: 3x/week  Number of Visits to Meet Established Goals: 5    ADL GOALS   Patient will tolerate standing 2 mins CGA in preparation for seated ADLs  Patient will perform toileting with mod assist    FUNCTIONAL TRANSFER GOALS   Patient will perform supine to sit with supervision  Patient will perform sit to supine with supervision  Patient will transfer to commode with mod assist    Patient Evaluation Complexity Level:   Occupational Profile/Medical History LOW - Brief history including review of medical or therapy records    Specific performance deficits impacting engagement in ADL/IADL LOW  1 - 3 performance deficits    Client Assessment/Performance Deficits LOW - No comorbidities nor modifications of tasks    Clinical Decision Making LOW - Analysis of occupational profile, problem-focused assessments,  limited treatment options    Overall Complexity LOW     OT Session Time: 30 minutes  Therapeutic Activity: 10 minutes

## 2024-08-15 NOTE — PROGRESS NOTES
[facility] Hospitalist Progress Note     PCP: Zachery Hall MD    SUBJECTIVE:  Patient seen and examined.  Patient states he is feeling better today.  Denies chest pain or shortness of breath.    OBJECTIVE:  Temp:  [97.6 °F (36.4 °C)-98.9 °F (37.2 °C)] 98.6 °F (37 °C)  Pulse:  [48-72] 56  Resp:  [16-20] 20  BP: (116-152)/(55-85) 136/68  SpO2:  [90 %-100 %] 100 %    Intake/Output:    Intake/Output Summary (Last 24 hours) at 8/15/2024 0834  Last data filed at 8/14/2024 1750  Gross per 24 hour   Intake 1288.33 ml   Output --   Net 1288.33 ml       Last 3 Weights   08/13/24 2218 210 lb (95.3 kg)   08/13/24 1456 210 lb (95.3 kg)   08/12/24 1239 209 lb 12.8 oz (95.2 kg)   08/12/24 1137 210 lb (95.3 kg)   08/09/24 1030 210 lb (95.3 kg)   07/18/24 1100 211 lb (95.7 kg)       Exam  Gen: Alert, awake, cooperative.  HEENT:  EOMI, PERRLA, OP clear, MMM  Pulm: Lungs clear bilaterally, normal respiratory effort  CV: Irregular rhythm, nontachycardic, no murmur.  Abd: Abdomen soft, nontender, nondistended, no organomegaly, bowel sounds present  MSK: Full range of motion in extremities, no clubbing, no cyanosis. B/LTMA.  Both feet wrapped in dressing.  Skin: Warm and dry  Neuro: AOx4.  Moving all extremities.    Data Review:       Labs:     Recent Labs   Lab 08/13/24  1626 08/14/24  0652 08/14/24  1355   WBC 8.1 6.1  --    HGB 6.7* 6.5* 7.8*   MCV 92.7 90.7  --    .0* 115.0*  --        Recent Labs   Lab 08/12/24  1233 08/13/24  1626 08/14/24  0652   * 135* 137   K 3.3* 3.9 4.0   CL 97* 96* 99   CO2 32.0 32.0 32.0   BUN 38* 54* 59*   CREATSERUM 3.75* 5.62* 6.27*   CA 10.0 10.9* 10.4   MG  --   --  2.0   * 192* 103*       Recent Labs   Lab 08/13/24  1626   ALT <7*   AST 14   ALB 3.8       Recent Labs   Lab 08/14/24  1429 08/14/24  1635 08/14/24  2123 08/14/24  2237 08/15/24  0525   PGLU 236* 204* 196* 239* 71       No results for input(s): \"TROP\" in the last 168 hours.        Meds:      [START ON 8/16/2024] epoetin  matt  10,000 Units Intravenous Once in dialysis    heparin  1.5 mL Intracatheter Once    epoetin matt  10,000 Units Intravenous Once in dialysis    timolol  1 drop Both Eyes BID    atorvastatin  80 mg Oral Nightly    carvedilol  12.5 mg Oral BID with meals    gabapentin  100 mg Oral Nightly    [Held by provider] Netarsudil-Latanoprost  1 drop Ophthalmic Daily    insulin aspart  2-10 Units Subcutaneous TID AC and HS    insulin degludec  45 Units Subcutaneous Nightly         sodium chloride **AND** albumin human    glucose **OR** glucose **OR** glucose-vitamin C **OR** dextrose **OR** glucose **OR** glucose **OR** glucose-vitamin C    acetaminophen **OR** HYDROcodone-acetaminophen **OR** HYDROcodone-acetaminophen       Assessment/Plan:   79 year old male with PMH sig for CAD status post stents, PAD status post bilateral lower extremity stents, atrial fibrillation on Eliquis, diabetes mellitus type 2 on insulin pump, end-stage renal disease on dialysis, HFpEF, status post AVR, history of stroke, hypertension, hyperlipidemia, who presents for bleeding from his foot.     #Acute blood loss anemia superimposed on chronic anemia   #Left foot wound s/p debridement  -1 unit PRBC ordered in the ED. Addition unit ordered this morning. Trend H&H, transfuse as needed.   -Foot wound cauterized and dressed in the ED. Podiatry consulted for further management.  Wound care consulted.  -Follow up in wound clinic  -hgb 8.4 this am  -recheck labs tomorrow    #ESRD on HD (MWF)  -Nephrology consulted     #HFpEF, chronic  -Monitor volume status in setting of blood transfusion.  Optimize volume status with hemodialysis.     #CAD s/p PCI  #PAD  #A-fib on Eliquis  #CVA h/o  #Hypertension  #Hyperlipidemia  -Hold plavix and Eliquis  -Continue Coreg and statin     #DM2 w/ neuropathy  -POCT blood glucose ACHS. Start 45U degludec + SSI in place of insulin pump. Adjust as needed.   -Continue gabapentin        DVT ppx: SCDs  Code Status:  DNR    DISPO:  Dc planning process      Rowan Deras MD  [facility]  Pager [phone]  Answering Service number: [phone]

## 2024-08-18 ENCOUNTER — EXTERNAL FACILITY (OUTPATIENT)
Dept: FAMILY MEDICINE CLINIC | Facility: CLINIC | Age: 79
End: 2024-08-18

## 2024-08-18 DIAGNOSIS — Z74.09 IMPAIRED MOBILITY: ICD-10-CM

## 2024-08-18 DIAGNOSIS — N18.6 END STAGE RENAL DISEASE ON DIALYSIS (HCC): ICD-10-CM

## 2024-08-18 DIAGNOSIS — Z96.41 DIABETES MELLITUS TYPE 1, WITH COMPLICATION, ON LONG TERM INSULIN PUMP (HCC): ICD-10-CM

## 2024-08-18 DIAGNOSIS — I50.32 CHRONIC DIASTOLIC HEART FAILURE (HCC): ICD-10-CM

## 2024-08-18 DIAGNOSIS — E10.8 DIABETES MELLITUS TYPE 1, WITH COMPLICATION, ON LONG TERM INSULIN PUMP (HCC): ICD-10-CM

## 2024-08-18 DIAGNOSIS — Z99.2 END STAGE RENAL DISEASE ON DIALYSIS (HCC): ICD-10-CM

## 2024-08-18 DIAGNOSIS — D62 ACUTE ON CHRONIC BLOOD LOSS ANEMIA: Primary | ICD-10-CM

## 2024-08-18 DIAGNOSIS — T14.8XXA BLEEDING FROM WOUND: ICD-10-CM

## 2024-08-18 DIAGNOSIS — R53.81 PHYSICAL DECONDITIONING: ICD-10-CM

## 2024-08-18 PROCEDURE — 99306 1ST NF CARE HIGH MDM 50: CPT | Performed by: FAMILY MEDICINE

## 2024-08-18 PROCEDURE — G0317 PROLNG NSG FAC E/M EA ADDL 15 MIN: HCPCS | Performed by: FAMILY MEDICINE

## 2024-08-20 ENCOUNTER — INITIAL APN SNF VISIT (OUTPATIENT)
Dept: INTERNAL MEDICINE CLINIC | Age: 79
End: 2024-08-20

## 2024-08-20 VITALS
TEMPERATURE: 98 F | DIASTOLIC BLOOD PRESSURE: 77 MMHG | RESPIRATION RATE: 18 BRPM | SYSTOLIC BLOOD PRESSURE: 143 MMHG | OXYGEN SATURATION: 97 % | HEART RATE: 71 BPM

## 2024-08-20 DIAGNOSIS — E10.8 DIABETES MELLITUS TYPE 1, WITH COMPLICATION, ON LONG TERM INSULIN PUMP (HCC): ICD-10-CM

## 2024-08-20 DIAGNOSIS — T14.8XXA WOUND INFECTION: Primary | ICD-10-CM

## 2024-08-20 DIAGNOSIS — G62.9 NEUROPATHY: ICD-10-CM

## 2024-08-20 DIAGNOSIS — R53.1 WEAKNESS: ICD-10-CM

## 2024-08-20 DIAGNOSIS — N18.6 ESRD ON HEMODIALYSIS (HCC): ICD-10-CM

## 2024-08-20 DIAGNOSIS — Z99.2 ESRD ON HEMODIALYSIS (HCC): ICD-10-CM

## 2024-08-20 DIAGNOSIS — I10 PRIMARY HYPERTENSION: ICD-10-CM

## 2024-08-20 DIAGNOSIS — I50.32 CHRONIC DIASTOLIC HEART FAILURE (HCC): ICD-10-CM

## 2024-08-20 DIAGNOSIS — I48.20 ATRIAL FIBRILLATION, CHRONIC (HCC): ICD-10-CM

## 2024-08-20 DIAGNOSIS — I73.9 PAD (PERIPHERAL ARTERY DISEASE) (HCC): ICD-10-CM

## 2024-08-20 DIAGNOSIS — Z96.41 DIABETES MELLITUS TYPE 1, WITH COMPLICATION, ON LONG TERM INSULIN PUMP (HCC): ICD-10-CM

## 2024-08-20 DIAGNOSIS — L08.9 WOUND INFECTION: Primary | ICD-10-CM

## 2024-08-20 PROCEDURE — 99310 SBSQ NF CARE HIGH MDM 45: CPT | Performed by: NURSE PRACTITIONER

## 2024-08-20 PROCEDURE — 1123F ACP DISCUSS/DSCN MKR DOCD: CPT | Performed by: NURSE PRACTITIONER

## 2024-08-20 RX ORDER — POLYETHYLENE GLYCOL 3350 17 G/17G
17 POWDER, FOR SOLUTION ORAL DAILY PRN
COMMUNITY

## 2024-08-20 NOTE — PROGRESS NOTES
Skilled Nursing Facility, Subacute Rehab  [facility]     Darin MCINTYRE Gogo Author: Rose Reid DELMI     1945 MRN [id number]   Last Hospital  Admission 24      Last Hospital Discharge 8/15/24 PCP Zachery Hall MD     Hospital Discharge Diagnoses:  ESRD   Acute on chronic anemia   Bleeding from wound   Fatigue       HPI:  Darin Bowens  : 1945, Age 79 year old  male patient is admitted for subacute rehab. Patient has a past medical history of CAD status post stents, PAD post bilateral lower extremity stents, atrial fibrillation, diabetes, ESRD on HD, chronic CHF with LVH, status post AVR, hx of stroke, hypertension, hyperlipidemia who presented with foot bleeding. Patient underwent debridement of the complex wounds on the feet with podiatry. In the ER Hgb 6.7. CXR showed vascular congestion. He was transfused with 1 unit PRBC in ER.   Admitting to [facility] for nursing care, medication management, and PT/OT/ST eval and tx.    Chief Complaint at visit:   Chief Complaint   Patient presents with    Follow - Up        Patient seen today for initial visit following re admission   He was seen up in dialysis   He has dressings to bilateral feet- wound appt scheduled on  spoke to wound provider- wound vac ordered at facility for patient to take with to his appt on    Burning sensation of lower legs improved- he uses acetaminophen prn, norco prn and gabapentin daily   No cough, shortness of breath or wheeze  No chest pain or palpitations  No nausea, vomiting, constipation or diarrhea.   ALLERGIES    He is allergic to augmentin [amoxicillin-pot clavulanate] and lisinopril.      CURRENT MEDS:    Current Outpatient Medications on File Prior to Visit   Medication Sig    DORZOLAMIDE 2 % Ophthalmic Solution INSTILL 1 DROP INTO BOTH EYES IN THE MORNING AND 1 DROP BEFORE BEDTIME    ELIQUIS 2.5 MG Oral Tab TAKE 1 TABLET(2.5 MG) BY MOUTH TWICE DAILY    COMBIGAN 0.2-0.5  % Ophthalmic Solution Place 1 drop into both eyes 2 (two) times daily.    gabapentin 100 MG Oral Cap Take 1 capsule (100 mg total) by mouth at bedtime.    HYDROcodone-acetaminophen 5-325 MG Oral Tab Take 1 tablet by mouth every 6 (six) hours as needed for Pain.    Probiotic Product (PROBIOTIC DAILY OR) Take 1 capsule by mouth daily.    sennosides 8.6 MG Oral Tab Take 1 tablet (8.6 mg total) by mouth 2 (two) times daily.    acetaminophen 325 MG Oral Tab Take 2 tablets (650 mg total) by mouth every 6 (six) hours as needed for Pain.    Netarsudil-Latanoprost (ROCKLATAN) 0.02-0.005 % Ophthalmic Solution Apply 1 drop to eye daily. Both eyes    atorvastatin 80 MG Oral Tab Take 1 tablet (80 mg total) by mouth nightly.    CLOPIDOGREL 75 MG Oral Tab TAKE 1 TABLET(75 MG) BY MOUTH DAILY    carvedilol 12.5 MG Oral Tab Take 1 tablet (12.5 mg total) by mouth 2 (two) times daily with meals. Take one tablet (12.5mg total) by mouth two times a day    Ergocalciferol (VITAMIN D2 OR) Take 50,000 Units by mouth every 30 (thirty) days.    CALCIUM ACETATE 667 MG Oral Cap TAKE 1 CAPSULE BY MOUTH THREE TIMES DAILY WITH MEALS    Continuous Blood Gluc Sensor (DEXCOM G6 SENSOR) Does not apply Misc 1 Device Every 10 days. (Patient not taking: Reported on 8/20/2024)    Insulin Lispro (HUMALOG) 100 UNIT/ML Subcutaneous Solution 95 units per day via insulin pump. (Patient not taking: Reported on 8/20/2024)    Continuous Blood Gluc Transmit (DEXCOM G6 TRANSMITTER) Does not apply Misc Change every 90 days (Patient not taking: Reported on 8/20/2024)    PTA Insulin via Pump Inject into the skin continuous. humalog insulin   [employer]tr[employer]  Basal Rate : 55.6 standard rate 1.90  I:C - 1 units/4 carbs  Correction Factor - unknown (Patient not taking: Reported on 8/20/2024)     No current facility-administered medications on file prior to visit.         HISTORY:    He  has a past medical history of Aortic stenosis (01/05/2012), Back problem, CAD (coronary  artery disease) (08/16/2010), Calculus of kidney, Cataract, CKD (chronic kidney disease) stage 4, GFR 15-29 ml/min ([facility]) (03/30/2016), Congestive heart disease ([facility]), Coronary atherosclerosis, DDD (degenerative disc disease), lumbar (12/31/2015), Diabetes mellitus ([facility]) (08/16/2010), Dialysis patient ([facility]), Dyslipidemia (08/16/2010), Heart valve disease, High cholesterol, Hypertriglyceridemia, Hypoglycemic reaction (03/01/2012), Muscle weakness, Nausea and vomiting, unspecified vomiting type (06/25/2024), Onychomycosis (03/01/2012), DESI (obstructive sleep apnea) C-PAP  (09/05/2012), Other and unspecified hyperlipidemia, Overweight(278.02) (01/27/2012), Renal disorder, S/P Coronary Artery Stents 12/2009 (08/16/2010), Sleep apnea, Type 1 diabetes mellitus ([facility]), Unspecified essential hypertension, and Visual impairment.    He  has a past surgical history that includes other surgical history (10/04/2012); injection, w/wo contrast, dx/therapeutic substance, epidural/subarachnoid; lumbar/sacral (N/A, 12/10/2015); m-sedaj by  phys perfrmg svc 5+ yr (N/A, 12/10/2015); patient withough preoperative order for iv antibiotic surgical site infection prophylaxis. (N/A, 12/10/2015); patient documented not to have experienced any of the following events (N/A, 12/10/2015); injection, w/wo contrast, dx/therapeutic substance, epidural/subarachnoid; lumbar/sacral (N/A, 01/25/2016); m-sedaj by  phys perfrmg svc 5+ yr (N/A, 01/25/2016); patient withough preoperative order for iv antibiotic surgical site infection prophylaxis. (N/A, 01/25/2016); patient documented not to have experienced any of the following events (N/A, 01/25/2016); injection, w/wo contrast, dx/therapeutic substance, epidural/subarachnoid; lumbar/sacral (N/A, 03/02/2016); m-sedaj by  phys perfrmg svc 5+ yr (N/A, 03/02/2016); patient withough preoperative order for iv antibiotic surgical site infection prophylaxis. (N/A, 03/02/2016); patient documented not to have  experienced any of the following events (N/A, 03/02/2016); replace aortic valve open (2012); back surgery (05/16/2016); other surgical history (N/A, 05/16/2016); cath transcatheter aortic valve replacement; valve repair; cath percutaneous  transluminal coronary angioplasty; cabg; amputation foot,transmetatarsal (Right); and amputation transmetacarpal (Left).        CODE STATUS:  DNR select     ADVANCED CARE PLANNING TEAM:  Palliative care consulted in hospital. - goals of care discussed. Further conversations pending rehab course     SUBJECTIVE:    REVIEW OF SYSTEMS:  GENERAL HEALTH:feels well otherwise  SKIN: denies any unusual skin lesions or rashes  WOUNDS: see wound assessment  + wound infection/bleeding   EYES:no visual complaints or deficits  HENT: denies nasal congestion, sinus pain or sore throat;  RESPIRATORY: denies shortness of breath, wheezing or cough +DESI Cpap   CARDIOVASCULAR:denies chest pain, no palpitations + HF + HTN +S/P Aortic valve replacement +PAD + A fib   +PVD  GI: denies nausea, vomiting, , diarrhea; constipation   : + ESRD on HD   MUSCULOSKELETAL:no joint complaints upper or lower extremities  NEURO:no sensory or motor complaint  PSYCHE: no symptoms of depression or anxiety  HEMATOLOGY + anemia   ENDOCRINE: +diabetes  OBJECTIVE:  VITALS:  /77   Pulse 71   Temp 98.4 °F (36.9 °C)   Resp 18   SpO2 97%     PHYSICAL EXAM:  GENERAL HEALTH:79 year old male patient, no acute distress   LINES, TUBES, DRAINS:  left upper chest port- CDI - receiving dialysis currently   SKIN: no rashes, no suspicious lesions  WOUND: see wound assessment   Bilateral feet- dressing CDI  EYES: PERRLA, conjunctiva normal; no drainage from eyes  HENT: normocephalic; normal nose, no nasal drainage, mucous membranes pink, moist  NECK: full range of motion observed  RESPIRATORY:clear to percussion and auscultation, No wheezing/cough/accessory muscle use; on room air  CARDIOVASCULAR: S1, S2 normal, RRR; no S3, no  S4;   ABDOMEN: normal active BS+, soft, non-distended; no apparent masses; observed, non-tender, no guarding during physical exam  : Deferred  LYMPHATIC: no lymphedema  MUSCULOSKELETAL: no acute synovitis upper or lower extremity.  Weakness R/T recent hospitalization/diagnoses/sequelae; will undergo therapies to rehab and improve strength, endurance and independence w/ ADLs as able  EXTREMITIES/VASCULAR: no cyanosis, clubbing or edema  NEUROLOGIC: intact; no sensorimotor deficit  PSYCHIATRIC: alert and oriented x 3; affect appropriate    DIAGNOSTICS REVIEWED AT THIS VISIT:  Vital signs reviewed in [facility] EMR.  [facility] medical records, notes, lab and imaging results reviewed. And diagnostics available in rehab records/[facility].  Medication reconciliation completed.        Lab Results   Component Value Date     (H) 08/14/2024    BUN 59 (H) 08/14/2024    BUNCREA 6.0 (L) 03/01/2022    CREATSERUM 6.27 (H) 08/14/2024    ANIONGAP 6 08/14/2024    GFR 18 (L) 12/28/2017    GFRNAA 6 (L) 06/12/2021    GFRAA 7 (L) 06/12/2021    CA 10.4 08/14/2024    OSMOCALC 301 (H) 08/14/2024    ALKPHO 114 08/13/2024    AST 14 08/13/2024    ALT <7 (L) 08/13/2024    BILT 0.2 08/13/2024    TP 6.2 08/13/2024    ALB 3.8 08/13/2024    GLOBULIN 2.4 08/13/2024    AGRATIO 1.8 01/09/2014     08/14/2024    K 4.0 08/14/2024    CL 99 08/14/2024    CO2 32.0 08/14/2024       Lab Results   Component Value Date    WBC 6.5 08/15/2024    RBC 2.80 (L) 08/15/2024    HGB 8.4 (L) 08/15/2024    HCT 25.5 (L) 08/15/2024    .0 (L) 08/15/2024    MCV 91.1 08/15/2024    MCH 30.0 08/15/2024    MCHC 32.9 08/15/2024    RDW 15.3 08/15/2024    NEPRELIM 5.15 08/15/2024    NEPERCENT 79.7 08/15/2024    LYPERCENT 6.8 08/15/2024    MOPERCENT 8.7 08/15/2024    EOPERCENT 3.7 08/15/2024    BAPERCENT 0.5 08/15/2024    NE 5.15 08/15/2024    LYMABS 0.44 (L) 08/15/2024    MOABSO 0.56 08/15/2024    EOABSO 0.24 08/15/2024    BAABSO 0.03 08/15/2024       Last  COVID-19 Viral test: Not Detected, done on 6/25/2024.        CT BRAIN OR HEAD (CPT=70450)  Narrative: PROCEDURE:  CT BRAIN OR HEAD (82352)     COMPARISON:  [facility] , CT, CT BRAIN OR HEAD (39021), 6/10/2021, 7:40 PM.     INDICATIONS:  amputation yesterday to toes, pt states not normal self, more confused.  92/48.     TECHNIQUE:  Noncontrast CT scanning is performed through the brain. Dose reduction techniques were used. Dose information is transmitted to the ACR (American College of Radiology) NRDR (National Radiology Data Registry) which includes the Dose Index   Registry.     PATIENT STATED HISTORY: (As transcribed by Technologist)  Patient had amputation yesterday to toes.  Patient states not normal self, more confused.          FINDINGS:    VENTRICLES/SULCI:  Diffuse cerebral and cerebellar atrophy.  INTRACRANIAL:  No acute intracranial hemorrhage.  No midline shift.  There are patchy areas of decreased attenuation in the periventricular white matter both cerebral hemispheres which have progressed since 6/10/2021.  Old left frontal lobe infarction.    No midline shift.    SINUSES:           Mucosal thickening ethmoid air cells.    MASTOIDS:          Trace fluid in the inferior mastoid air cells bilaterally.    SKULL:             No depressed calvarial fracture.                   Impression: CONCLUSION:  No acute intracranial hemorrhage or mass effect.     Diffuse cerebral and cerebellar atrophy with chronic microvascular ischemic changes of aging.     Old left frontal lobe infarction.           LOCATION:  [facility]        Dictated by (CST): Netta Solorio MD on 8/13/2024 at 8:03 PM       Finalized by (CST): Netta Solorio MD on 8/13/2024 at 8:05 PM          Therapy update 8/22/24   Bed Mobility- Contact Guard assist- Min A   Sit to stand- Contact Guard assist   Transfers- Contact Guard assist   Amb 8 ft on bars CGA   Upper Body Mod A   Lower body Max A     SEE PLAN BELOW  Physical Deconditioning/Impaired mobility and  ADLs/At risk for falling  Fall Precautions  PT/OT/ST to evaluate and treat  [facility] team to establish care plan meeting with patient and POA/family as appropriate  Anticipate DC on or before TBD; SW to assist patient/family w/ DC planning  DC Plan:  TBD     Post op wound infection R TMA 6/13 due to nonhealing ulcer and PVD   Excisional debridement to level of bone along with partial resection of R 1st and 2nd MT 6/27 /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 HBP received prior. Readmission 8/13-8/15 for bleeding wound   Vital signs q shift and prn   Labs weekly and prn   Follow up with Dr Nix vascular   Follow up with Dr Meza podiatry   Follow up with Jenny PRINCE appt on Thurs 8/22- wound vac ordered/kit patient to bring with to appt    ID to follow   Wound to follow   Encourage protein intake   Plavix 75 mg daily   Pain control- acetaminophen prn, Norco prn, gabapentin 100 mg nightly       ESRD on HD   Outside dialysis MWF- dialysis currently in facility M-F  Avoid nephrotoxin   Follow up with Nephrology     CAD s/p stents/ PAD s/p b/l LE stents/Chronic CHF w/LVH/ S/p AVR/ Atrial fibrillation /Hypertension / HL   BETH 6/16 aortic valve bioprosthesis present mild to mod stenosis. Heart failure   Vital signs q shift and prn   Labs weekly and prn   Daily weights notify if greater than 2 lb increase in 1 day or 5 lbs in 1 week   Cardiology to follow   Apixaban 2.5 mg bid   Atorvastatin 80 mg nightly   Coreg 12.5 mg bid   Plavix 75 mg daily     Diabetes  Typically uses insulin pump.   Accu check ac/hs   Low carb diet   Insulin lispro   Lantus 10 u nightly      Anemia due to ESRD   Monitor labs     DESI   In house pulm to follow   Cpap at night     DVT Prophylaxis   Encourage early mobilization and participation in PT/OT as able  Apixaban 2.5 mg bid     GI Prophylaxis  Start Omeprazole 20 mg daily      Pain Management  Offer to pre-medicate 30-60 min prior to therapy  Physiatry evaluation with management appreciated  Acetaminophen 650 mg every 6 hrs prn   Hydrocodone-acetaminophen 5-325 mg 1 tab every 6 hrs prn   Gabapentin 100 mg nightly     Bowel Management Regimen/Constipation  Monitor BM status   Senna s 1 tab bid   Miralax 17 gm daily prn       Vitamins/supplements as r/t deficiencies  [facility] RD to follow while in rehab; supplementation/diet as per [facility] RD  May continue home supplements  Calcium acetate   Ergocalciferol   Probiotic     LABS  CBC/CMP/Mag weekly while in [facility]- 8/21     *Follow-Up with PCP within 1-2 weeks following [facility] discharge.   *Follow-Up with specialists as recommended.  Future Appointments   Date Time Provider Department Center   8/22/2024 10:00 AM Jenny Olmos APRN  W[facility]   8/29/2024 10:00 AM Jenny Olmos APRN  Wound [facility]       *Greater than 65 minutes spent w/ patient and family, reviewing medical records, labs, completing medication reconciliation and entering orders to establish plan of care in [facility].    DELMI Malave  08/20/24

## 2024-08-20 NOTE — PROGRESS NOTES
Darin Bowens Author: Klaus Baig MD     1945 MRN [id number]   Last Hospital  Admission 24      Last Hospital Discharge 8/15/24 PCP Zachery Hall MD   [facility] --admitted to [facility] for subacute rehab, anemia, research not sure  H.P.I Darin Bowens is a 79 year old male with past medical history significant for coronary artery disease peripheral vascular disease status post bilateral lower extremity stents atrial fibrillation and anticoagulated on Xarelto, diabetes mellitus on insulin pump end-stage renal disease on dialysis chronic congestive heart failure, history of stroke hypertension hyperlipidemia,  Patient was recently discharged from this facility after rehab,  He presented to the emergency room with bleeding from his foot  He underwent debridement of the complex wound on his feet by podiatry, he was found to be somewhat confused and lethargic, the wound on the foot was profusely oozing blood  Wound was cauterized in the emergency room and bleeding controlled  And workup in the emergency room revealed a hemoglobin of 6.7 and platelet count of 43  Chest x-ray showed vascular congestion and a retrocardiac opacity  He was transfused 1 unit of packed blood cells in the emergency room and patient was admitted to the hospital.  Nephrology was consulted his dialysis was restarted the hospital  Podiatry was consulted, wound care was resumed  Patient received an additional unit of packed blood cells  After 2 units of blood his hemoglobin improved to 7.8  Evaluated by PT/OT, patient is requiring maximum assistance for transfers, per family he cannot be managed at home  Subacute rehab was recommended  Patient was transferred to [facility] for continued rehab as well as wound care and dialysis  Discharge diagnosis  Acute on chronic blood loss anemia  Left foot wound s/p debridement  End-stage renal disease on  dialysis  Chronic congestive heart failure  Coronary artery disease  Peripheral vascular disease  Atrial fibrillation  History of stroke  Hypertension  Hyperlipidemia  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    Patient seen in his room  Is doing well denies any complaints  No bleeding from the left foot wound  Patient denies any chest pain or shortness of breath      Past Medical History:    Aortic stenosis    Back problem    CAD (coronary artery disease)    Calculus of kidney    Cataract    CKD (chronic kidney disease) stage 4, GFR 15-29 ml/min ([facility])    Congestive heart disease ([facility])    Coronary atherosclerosis    DDD (degenerative disc disease), lumbar    Diabetes mellitus ([facility])    Dialysis patient ([facility])    fistula upper right arm/MWF    Dyslipidemia    Heart valve disease    High cholesterol    Hypertriglyceridemia    Hypoglycemic reaction    Muscle weakness    cane    Nausea and vomiting, unspecified vomiting type    Onychomycosis    DESI (obstructive sleep apnea) C-PAP     severe AHI 68, O2 sam 84%, CPAP 7    Other and unspecified hyperlipidemia    Overweight(278.02)    Renal disorder    S/P Coronary Artery Stents 12/2009    Sleep apnea    doesn't use CPAP    Type 1 diabetes mellitus ([facility])    Unspecified essential hypertension    Visual impairment    legally blind     Past Surgical History:   Procedure Laterality Date    Amputation foot,transmetatarsal Right     Amputation transmetacarpal Left     Back surgery  05/16/2016    L2-L5 Decomp poss uninstrumented fusion    Cabg      Cath percutaneous  transluminal coronary angioplasty      Cath transcatheter aortic valve replacement      Injection, w/wo contrast, dx/therapeutic substance, epidural/subarachnoid; lumbar/sacral N/A 12/10/2015    Procedure: LUMBAR EPIDURAL;  Surgeon: Rojas Bolanos MD;  Location: [facility]    Injection, w/wo contrast, dx/therapeutic substance, epidural/subarachnoid; lumbar/sacral N/A 01/25/2016     Procedure: LUMBAR EPIDURAL;  Surgeon: Rojas Bolanos MD;  Location: [facility]    Injection, w/wo contrast, dx/therapeutic substance, epidural/subarachnoid; lumbar/sacral N/A 03/02/2016    Procedure: LUMBAR EPIDURAL;  Surgeon: Corey Mcduffie MD;  Location: [facility]    M-sedaj by  phys perfrmg sv 5+ yr N/A 12/10/2015    Procedure: LUMBAR EPIDURAL;  Surgeon: Rojas Bolanos MD;  Location: [facility]    M-sedaj by  phys perfrmg svc 5+ yr N/A 01/25/2016    Procedure: LUMBAR EPIDURAL;  Surgeon: Rojas Bolanos MD;  Location: [facility]    M-sedaj by  phys perfrmg sv 5+ yr N/A 03/02/2016    Procedure: LUMBAR EPIDURAL;  Surgeon: Corey Mcduffie MD;  Location: [facility]    Other surgical history  10/04/2012    [email or web address]-Dr. [email or web address]    Other surgical history N/A 05/16/2016    Procedure: LUMBAR LAMINECTOMY FUSION W/ BONE GRAFT 3 LEVEL;  Surgeon: CARLY Garcia MD;  Location: [facility]    Patient documented not to have experienced any of the following events N/A 12/10/2015    Procedure: LUMBAR EPIDURAL;  Surgeon: Rojas Bolanos MD;  Location: [facility]    Patient documented not to have experienced any of the following events N/A 01/25/2016    Procedure: LUMBAR EPIDURAL;  Surgeon: Rojas Bolanos MD;  Location: [facility]    Patient documented not to have experienced any of the following events N/A 03/02/2016    Procedure: LUMBAR EPIDURAL;  Surgeon: Corey Mcduffie MD;  Location: [facility]    Patient withough preoperative order for iv antibiotic surgical site infection prophylaxis. N/A 12/10/2015    Procedure: LUMBAR EPIDURAL;  Surgeon: Rojas Bolanos MD;  Location: [facility]    Patient withough preoperative order for iv antibiotic surgical site infection prophylaxis. N/A 01/25/2016    Procedure: LUMBAR EPIDURAL;  Surgeon: Luis Miguel  MD Rojas;  Location: [facility]    Patient withough preoperative order for iv antibiotic surgical site infection prophylaxis. N/A 03/02/2016    Procedure: LUMBAR EPIDURAL;  Surgeon: Corey Mcduffie MD;  Location: [facility]    Replace aortic valve open  2012    Valve repair       Family History   Problem Relation Age of Onset    Heart Attack Father     Heart Attack Brother     Diabetes Brother     Diabetes Brother      Social History     Socioeconomic History    Marital status:    Tobacco Use    Smoking status: Never    Smokeless tobacco: Never   Vaping Use    Vaping status: Never Used   Substance and Sexual Activity    Alcohol use: No     Alcohol/week: 0.0 standard drinks of alcohol    Drug use: No     Social Determinants of Health     Food Insecurity: No Food Insecurity (8/13/2024)    Food Insecurity     Food Insecurity: Never true   Transportation Needs: No Transportation Needs (8/13/2024)    Transportation Needs     Lack of Transportation: No   Social Connections: Low Risk  (9/7/2021)    Received from [facility]    Social Connections     Do you have someone you could call for help if needed?: Yes   Housing Stability: Low Risk  (8/13/2024)    Housing Stability     Housing Instability: No       ALLERGIES:  Allergies   Allergen Reactions    Augmentin [Amoxicillin-Pot Clavulanate] RASH    Lisinopril Coughing     Dyspnea         CODE STATUS:  DNR    CURRENT MEDICATIONS   Current Outpatient Medications   Medication Sig Dispense Refill    DORZOLAMIDE 2 % Ophthalmic Solution INSTILL 1 DROP INTO BOTH EYES IN THE MORNING AND 1 DROP BEFORE BEDTIME 10 mL 0    ELIQUIS 2.5 MG Oral Tab TAKE 1 TABLET(2.5 MG) BY MOUTH TWICE DAILY 60 tablet 0    COMBIGAN 0.2-0.5 % Ophthalmic Solution Place 1 drop into both eyes 2 (two) times daily. 1 each 0    gabapentin 100 MG Oral Cap Take 1 capsule (100 mg total) by mouth at bedtime. 30 capsule 0     HYDROcodone-acetaminophen 5-325 MG Oral Tab Take 1 tablet by mouth every 6 (six) hours as needed for Pain. 10 tablet 0    Probiotic Product (PROBIOTIC DAILY OR) Take 1 capsule by mouth daily.      sennosides 8.6 MG Oral Tab Take 1 tablet (8.6 mg total) by mouth 2 (two) times daily.      acetaminophen 325 MG Oral Tab Take 2 tablets (650 mg total) by mouth every 6 (six) hours as needed for Pain.      Netarsudil-Latanoprost (ROCKLATAN) 0.02-0.005 % Ophthalmic Solution Apply 1 drop to eye daily. Both eyes      atorvastatin 80 MG Oral Tab Take 1 tablet (80 mg total) by mouth nightly. 90 tablet 2    CLOPIDOGREL 75 MG Oral Tab TAKE 1 TABLET(75 MG) BY MOUTH DAILY 90 tablet 0    Continuous Blood Gluc Sensor (DEXCOM G6 SENSOR) Does not apply Misc 1 Device Every 10 days. (Patient taking differently: 1 Device Every 10 days. Not using currently) 3 each 1    Insulin Lispro (HUMALOG) 100 UNIT/ML Subcutaneous Solution 95 units per day via insulin pump. (Patient taking differently: Pump not currently being used per spouse. 8/9/24) 90 mL 1    carvedilol 12.5 MG Oral Tab Take 1 tablet (12.5 mg total) by mouth 2 (two) times daily with meals. Take one tablet (12.5mg total) by mouth two times a day 180 tablet 3    Ergocalciferol (VITAMIN D2 OR) Take 50,000 Units by mouth every 30 (thirty) days.      Continuous Blood Gluc Transmit (DEXCOM G6 TRANSMITTER) Does not apply Misc Change every 90 days 1 each 3    CALCIUM ACETATE 667 MG Oral Cap TAKE 1 CAPSULE BY MOUTH THREE TIMES DAILY WITH MEALS 270 capsule 0    PTA Insulin via Pump Inject into the skin continuous. humalog insulin   [employer]  Basal Rate : 55.6 standard rate 1.90  I:C - 1 units/4 carbs  Correction Factor - unknown         REVIEW OF SYSTEMS:  GENERAL HEALTH:feels well otherwise  SKIN: denies any unusual skin lesions or rashes  WOUNDS:   EYES:no visual complaints or deficits  HENT: denies nasal congestion, sinus pain or sore throat;  RESPIRATORY: denies shortness of breath,  wheezing or cough +DESI Cpap   CARDIOVASCULAR:denies chest pain, no palpitations + HF + HTN +S/P Aortic valve replacement +PAD + A fib   +PVD  GI: denies nausea, vomiting, , diarrhea; constipation   : + ESRD on HD   MUSCULOSKELETAL:no joint complaints upper or lower extremities  NEURO:no sensory or motor complaint  PSYCHE: no symptoms of depression or anxiety  HEMATOLOGY + anemia   ENDOCRINE: +diabetes    VITALS: Temperature 97.6, pulse 62/min respiration 18/min blood pressure 130/70    PHYSICAL EXAM:  GENERAL: well developed, well nourished, in no apparent distress  SKIN: no rashes, no suspicious lesions  Wound; bilateral feet wrapped in dressing  HEENT: atraumatic, normocephalic,   EYES: PERRLA, EOMI, conjunctiva are clear  NECK: supple, no adenopathy, no bruits  CHEST: no chest tenderness  LUNGS: clear to auscultation  CARDIO: RRR without murmur  GI: good BS's, no masses, HSM or tenderness  : deferred  RECTAL: deferred  MUSCULOSKELETAL: back is not tender, FROM of the back  EXTREMITIES: Bilateral transmetatarsal amputation-dressing in place, no active bleeding  NEURO: Oriented times three, cranial nerves are intact, motor and sensory are grossly intact      DIAGNOSTICS REVIEWED AT THIS VISIT:  Lab Results   Component Value Date     (H) 08/14/2024    BUN 59 (H) 08/14/2024    BUNCREA 6.0 (L) 03/01/2022    CREATSERUM 6.27 (H) 08/14/2024    ANIONGAP 6 08/14/2024    GFR 18 (L) 12/28/2017    GFRNAA 6 (L) 06/12/2021    GFRAA 7 (L) 06/12/2021    CA 10.4 08/14/2024    OSMOCALC 301 (H) 08/14/2024    ALKPHO 114 08/13/2024    AST 14 08/13/2024    ALT <7 (L) 08/13/2024    BILT 0.2 08/13/2024    TP 6.2 08/13/2024    ALB 3.8 08/13/2024    GLOBULIN 2.4 08/13/2024    AGRATIO 1.8 01/09/2014     08/14/2024    K 4.0 08/14/2024    CL 99 08/14/2024    CO2 32.0 08/14/2024       Lab Results   Component Value Date    WBC 6.5 08/15/2024    RBC 2.80 (L) 08/15/2024    HGB 8.4 (L) 08/15/2024    HCT 25.5 (L) 08/15/2024    PLT  137.0 (L) 08/15/2024    MCV 91.1 08/15/2024    MCH 30.0 08/15/2024    MCHC 32.9 08/15/2024    RDW 15.3 08/15/2024    NEPRELIM 5.15 08/15/2024    NEPERCENT 79.7 08/15/2024    LYPERCENT 6.8 08/15/2024    MOPERCENT 8.7 08/15/2024    EOPERCENT 3.7 08/15/2024    BAPERCENT 0.5 08/15/2024    NE 5.15 08/15/2024    LYMABS 0.44 (L) 08/15/2024    MOABSO 0.56 08/15/2024    EOABSO 0.24 08/15/2024    BAABSO 0.03 08/15/2024     ASSESSMENT AND PLAN:      Diagnoses and all orders for this visit:    Acute on chronic blood loss anemia  S/p blood transfusion, 2 units with improvement in hemoglobin  Will continue to monitor for any signs of bleeding  Check CBC tomorrow and weekly  Bleeding from wound  Wound cauterized and the emergency room  Will get wound care services consult  Podiatry on consult  End stage renal disease on dialysis (HCC)  Resume dialysis in house  3 times a week Monday Wednesday Friday  Chronic diastolic heart failure (HCC)  Volume optimized with dialysis 3 times a week  Diabetes mellitus type 1, with complication, on long term insulin pump (HCC)  Check sugars before meals and at bedtime  Insulin sliding scale with Humalog  Insulin pump  Physical deconditioning/impaired mobility  - PT to work on ambulation, gait, balance, strength, endurance, transfers, safety  - OT to work on fine motor skills, ADLs, hygiene, toileting, transfers, safety      - 24h nursing for medication administration, bowel/bladder care, pain/sleep assessment  - Physician supervision for multiple medical comorbidities, fall risk, DVT risk, infection risk, pain management  - Psych for adjustment to disability and cognitive deficits  - Social work/: for discharge planning needs and access to community resources as needed     -Hospital medications reviewed reconciled and restarted   Check the labs in the morning, CBC CMP TSH, vitamin D, UA    Time spent at appointment today is 95 minutes including preparing to see patient, reviewing test  results, performing medically appropriate examination and evaluation and coordinating care, counseling and educating patient/family, ordering medications and testing, and documenting clinical information in EMR.       Klaus Baig MD   [facility]  [address] [address]    Electronically signed      This dictation was performed with a verbal recognition program (DRAGON) and it was checked for errors. It is possible that there are still dictated errors within this office note. If so, please bring any errors to my attention for an addendum. All efforts were made to ensure that this office note is accurate

## 2024-08-21 NOTE — PROGRESS NOTES
CHIEF COMPLAINT:     Chief Complaint   Patient presents with    Wound Care     Patient is here for a wound care follow up. His pain is currently 1/10. He describes it as a burning pain. He presents with Ace wraps on his feet. Dressed with adaptic, gauze, and kerlix on his left foot. Right foot was dressed with only ABD and kerlix.      HPI:   Information obtained from Patient, chart and family  7-18-24 INITIAL:  78 YO male with past medical history of CAD s/p stents, PAD s/p bilateral lower extremity stents, atrial fibrillation on Xarelto, diabetes with insulin pump, ESRD on HD, CHF with LVH status post AVR, history of stroke, hypertension, hyperlipidemia, DESI and recent TMA  6/13 per Dr Meza. He presented to hospital on 6-25w poor wound healing. He had further debridement to level of bone along with partial resection of the Right 1st and 2nd MT on 6/27 and had further debridement of foot on 7/5.   Patient was discharged on IV Cefepime with HD, continue PO bactrim and PO Flagyl through 8/15. . He did receive 4 HBO treatmenns while inpatient and then discharge to rehab facility. Podiatry suggested they would reconsider application of wound vac on follow up at [place] wound clinic.   [facility] notes:  \"Hx of PVD arterial Ultrasound April likely more distal PAD require further amputation per vascular patient is not a candidate for any further revascularization and should have foot amputation if does not heal.\"  [place] is dressing wounds with betadine soaked gauze, he presents with son and spouse (who is a retired nurse).  Bedside clinic exam reveals palpable anterior tibials (at the ankles) and posterior tibia, patient also has pulsatile signals at the dp/pt/peronal/and plantar aspect. We discussed micro vs macro circulation. Will get tcom.  Once patient is safe in transfers and short distance ambulation family would like to care for him at home, at that point patient could then come for hbo again.  At this time will  utilize santyl. I did remove the staples from the right as they were no longer supporting the tissue and applied steristrips.  This incision is concerning for further dehiscence.     7-25-24 patient returns. Patient is getting tcom after his wound care appointment.  Dr. Moya not in hospital today. Will update him. Inflammatory markers esr 50>44,  crp 11>23.7.   Plan for next Thursday morning joint visit with dr. moya.  Patient has increased his ambulation with walker, he was able to ambulate >100ft with chair behind him.  He can get himself up from a chair to standing with minimal assist per spouse.  The wounds are dressed with Santyl.  The family did buy heel offloading boots.  Patient c/o burning pain.  I will touch base with apn at [facility] regarding potential gabapentin to help with the nerve pain.  Wounds debrided today.  The right is more concerning than the left. Both have exposed metarsals that are dry.  Will continue with santyl at this time, will run insurance for cellular tissue products.  Once patient is discharged from [facility] he could return to [facility].  We discussed that his safety in transfers and car transfers etc are the ultimate priority as he not only would need to go to appointments but he will need to go to dialysis.  ADDENDUM:  discussed tcom results as being positive for healing as well as response to o2.(See below for report)    8-1-24 patient returns.  dr moya is here in clinic as well. I did touch base with apn at [facility] and low dose gabapentin 100mg nightly to see if it helps with patient c/o pain. Patient states that it works until about 5 am and then his feet start burning again.  Keracis is covered at 100%.   Will debride and place keracis today.     8-8-24 Patient returns.  He was seen for rn visit on Monday, keracis placed last week. He has tolerated the compression well.  Drainage has slowed on the right.  Wife and daughter report that he is doing really well with transfers with physicial  therapy and stamina. He has even worked on getting in/out of the car.  Dr Meza is here in clinic to collaborate on next steps-plan will be for patient to go to [facility] next Monday or Wednesday and do a clean up, potential closure, and incisional vac placement.  Once scheduled will have our inpatient team place to place in light compression wrap.  Team and family feel patient is ok to go home, he will be dc'd in next 2 days.  Patient will come Tuesday for hbo re-consult with dr martinez and then we can get him started back in hbo after surgical intervention as well.  I will update dr. Hugo and will await scheduling from dr meza's office. No acute s/s of infection or c/o pain.    8-22-24 since patient was last seen in clinic, he was dc'd from the [facility] on 8-8 (Thursday). Family reported he was doing very well.  he was taken to the OR on 8-12 (Monday) debrided and keracis placed.  When patient presented on Tuesday for an hbo consult he was slurring words, slouching/sliding out of chair and family reported that they could not take care of him like this. Patient was sent to ed and admitted from8-13 to 8-15 for acute blood loss anemia on chronic anemia.  During this admission there was discussion of palliative or hospice at home however patient chose to return to [facility].  The apn at [facility] touched base with me on Monday regarding wound vac.  Patient did bring wound vac today. They have been dressing all wounds with adaptic and abd pad and ace wrap.  Patient has completed abx. He has new pressure wounds to his left posterior heel and left anterior ankle (from ace wrap wrinkle? Shoe?). The right achilles wound is extremely dessicated.  The right tma site remains approximated with sutures, concern for dehiscence on the lateral aspect however I suspect it is related to his edema as it is significantly increased from last visit. The left tma is stable, this was debrided and keracis placed along with vac.  We need to take  extreme caution with this patient and bony prominences. Patient will come to wound clinic for dressing and vac changes.  MEDICATIONS:     Current Outpatient Medications:     omeprazole 20 MG Oral Capsule Delayed Release, Take 1 capsule (20 mg total) by mouth every morning before breakfast., Disp: , Rfl:     polyethylene glycol, PEG 3350, 17 g Oral Powd Pack, Take 17 g by mouth daily as needed (constipation)., Disp: , Rfl:     DORZOLAMIDE 2 % Ophthalmic Solution, INSTILL 1 DROP INTO BOTH EYES IN THE MORNING AND 1 DROP BEFORE BEDTIME, Disp: 10 mL, Rfl: 0    ELIQUIS 2.5 MG Oral Tab, TAKE 1 TABLET(2.5 MG) BY MOUTH TWICE DAILY, Disp: 60 tablet, Rfl: 0    COMBIGAN 0.2-0.5 % Ophthalmic Solution, Place 1 drop into both eyes 2 (two) times daily., Disp: 1 each, Rfl: 0    gabapentin 100 MG Oral Cap, Take 1 capsule (100 mg total) by mouth at bedtime., Disp: 30 capsule, Rfl: 0    HYDROcodone-acetaminophen 5-325 MG Oral Tab, Take 1 tablet by mouth every 6 (six) hours as needed for Pain., Disp: 10 tablet, Rfl: 0    Probiotic Product (PROBIOTIC DAILY OR), Take 1 capsule by mouth daily., Disp: , Rfl:     sennosides 8.6 MG Oral Tab, Take 1 tablet (8.6 mg total) by mouth 2 (two) times daily., Disp: , Rfl:     acetaminophen 325 MG Oral Tab, Take 2 tablets (650 mg total) by mouth every 6 (six) hours as needed for Pain., Disp: , Rfl:     Netarsudil-Latanoprost (ROCKLATAN) 0.02-0.005 % Ophthalmic Solution, Apply 1 drop to eye daily. Both eyes, Disp: , Rfl:     atorvastatin 80 MG Oral Tab, Take 1 tablet (80 mg total) by mouth nightly., Disp: 90 tablet, Rfl: 2    CLOPIDOGREL 75 MG Oral Tab, TAKE 1 TABLET(75 MG) BY MOUTH DAILY, Disp: 90 tablet, Rfl: 0    Continuous Blood Gluc Sensor (DEXCOM G6 SENSOR) Does not apply Misc, 1 Device Every 10 days. (Patient not taking: Reported on 8/20/2024), Disp: 3 each, Rfl: 1    Insulin Lispro (HUMALOG) 100 UNIT/ML Subcutaneous Solution, 95 units per day via insulin pump. (Patient not taking: Reported on  8/20/2024), Disp: 90 mL, Rfl: 1    carvedilol 12.5 MG Oral Tab, Take 1 tablet (12.5 mg total) by mouth 2 (two) times daily with meals. Take one tablet (12.5mg total) by mouth two times a day, Disp: 180 tablet, Rfl: 3    Ergocalciferol (VITAMIN D2 OR), Take 50,000 Units by mouth every 30 (thirty) days., Disp: , Rfl:     Continuous Blood Gluc Transmit (DEXCOM G6 TRANSMITTER) Does not apply Misc, Change every 90 days (Patient not taking: Reported on 8/20/2024), Disp: 1 each, Rfl: 3    CALCIUM ACETATE 667 MG Oral Cap, TAKE 1 CAPSULE BY MOUTH THREE TIMES DAILY WITH MEALS, Disp: 270 capsule, Rfl: 0    PTA Insulin via Pump, Inject into the skin continuous. humalog insulin  Medtronic Basal Rate : 55.6 standard rate 1.90 I:C - 1 units/4 carbs Correction Factor - unknown (Patient not taking: Reported on 8/20/2024), Disp: , Rfl:   ALLERGIES:     Allergies   Allergen Reactions    Augmentin [Amoxicillin-Pot Clavulanate] RASH    Lisinopril Coughing     Dyspnea        REVIEW OF SYSTEMS:   This information was obtained from the patient/family and chart.    See HPI for pertinent positives, otherwise 10 pt ROS negative.  HISTORY:   Past medical, surgical, family and social history updated where appropriate.      PHYSICAL EXAM:     Vitals:    08/22/24 0956   BP: 128/65  Comment: 226 glucose   Pulse: 63   Resp: 16   Temp: 98.1 °F (36.7 °C)     Estimated body mass index is 28.48 kg/m² as calculated from the following:    Height as of 8/13/24: 72\".    Weight as of 8/13/24: 210 lb (95.3 kg).   POC Glucose   Date Value Ref Range Status   08/22/2024 226 (H) 70 - 99 mg/dL Final   08/15/2024 201 (H) 70 - 99 mg/dL Final   08/15/2024 249 (H) 70 - 99 mg/dL Final       Vital signs reviewed.Appears stated age, well groomed.    Constitutional:  Bp wnl for patient. Pulse Regular and wnl for patient. Respirations easy and unlabored. Temperature wnl. Elevated bmi. Appearance neat and clean. Appears in no acute distress. Well nourished and well  developed.    Lower extremity:  at/pt palpable bilaterally. Extremities free of varicosities, edema with significant increase bilaterally from last visit. Capillary refill < 3 seconds. Left open TMA, right with sutures intact.  Skin is dry. no hairgrowth on legs.    Musculoskeletal:  Patient is in wheelchair propelled by others, able to transfer with assist  Integumentary:  refer to wound characteristics and images   Psychiatric:  Judgment and insight intact. Alert and oriented times 3. Patient is quiet, calm, cooperative, and communicative.   EDEMA:   Calf  Point of Measurement - Left Calf: 36  Point of Measurement - Right Calf: 36  Left Calf from:: Heel  Calf Left cm:: 36.5  Right Calf from:: Heel  Right Calf cm:: 37.1  Ankle  Point of Measurement - Left Ankle: 10  Point of Measurement - Right Ankle: 10  Left Ankle from:: Heel  Left Ankle cm:: 25.3     Right Ankle from:: Heel  Right Ankle cm:: 23.5     DIAGNOSTICS:     Lab Results   Component Value Date    BUN 59 (H) 08/14/2024    CREATSERUM 6.27 (H) 08/14/2024    GFRCKDEPI 8.51 (L) 03/01/2022    ALB 3.8 08/13/2024    TP 6.2 08/13/2024    A1C 5.9 (H) 06/14/2024   Albumin 2.4/tp 5.0  7-15-24 ([place])  Albumin 2.8/tp 5.9   7-22-24    Lab Results   Component Value Date    ESRML 75 (H) 06/24/2024   ESR    50    7-15-24 ([place])   ESR    44    7-22-24 ([place])    Lab Results   Component Value Date    CRP 18.50 (H) 06/24/2024    CRP 0.3 04/22/2014   CRP     11.0   7-15-24 ([place])  CRP      23.7  7-22-24 ([place])    7-25-24 TCOM  Lead                  Baseline             Oxygen challenge           Location     1                        79                      120                                 Left medial lower leg  2                        75                      105                                 Left foot dorsal surface  3                        49                      107                                 Rt medial lower leg  4                         61                      161                                 Rt foot dorsal surface    6-30-24 pathology left foot  Left foot, transmetatarsal amputation:  -Gangrenous necrosis of skin and soft tissue with underlying acute osteomyelitis.  -Bone margin with no significant acute inflammation.  -Viable soft tissue margin with calcific atherosclerosis.    6-27-24 pathology right foot & left  A.  Right foot tissue and bone, excision:  -Bone with acute osteomyelitis.     B.  Left foot second metatarsal bone, excision:  -Bone with acute osteomyelitis.  -Fibroadipose tissue with necrosis and suppurative inflammation.    5-22-24 operative report-dr salas  Findings: Widely patent bilateral common femoral, SFA, profunda, popliteal.  PT and peroneal patent to the foot however significant atresia of the vasculature into the foot with sparse collaterals.  AT reoccluded despite recent endovascular intervention.  No opacification to the toe wounds.  Deep venous arterialization performed in the right anterior tibial artery to vein however the veins in the foot were friable after 3 mm balloon angioplasty and there was no significant outflow and thus this was embolized.  The patient maintained outflow via the PT and peroneal bilaterally consistent with preoperative baseline.  Patient should proceed with TMA and should this not heal they understand the need for future major amputation.   WOUND ASSESSMENT:     Wound 07/18/24 #1 Foot Left (Active)   Date First Assessed/Time First Assessed: 07/18/24 1344    Wound Number (Wound Clinic Only): #1  Primary Wound Type: Diabetic Ulcer  Location: Foot  Wound Location Orientation: Left      Assessments 7/18/2024  2:00 PM 8/22/2024 10:00 AM   Wound Image       Drainage Amount Small Large   Drainage Description Serosanguineous Serosanguineous   Treatments Compression --   Wound Length (cm) 5.2 cm 3.5 cm   Wound Width (cm) 10.4 cm 9.3 cm   Wound Surface Area (cm^2) 54.08 cm^2 32.55 cm^2    Wound Depth (cm) 1 cm 1.2 cm   Wound Volume (cm^3) 54.08 cm^3 39.06 cm^3   Wound Healing % -- 28   Margins Well-defined edges Well-defined edges   Non-staged Wound Description Full thickness Full thickness   María-wound Assessment Clean;Dry;Blanchable erythema Dry;Edema   Wound Granulation Tissue Red;Spongy --   Wound Bed Granulation (%) 15 % 40 %   Wound Bed Slough (%) 85 % 30 %   Wound Bed Eschar (%) -- 20 %   Wound Odor None None   Exposed Structure Bone;Adipose --   Shape 10staples to periwound 10% adipose   Tunneling? No No   Undermining? No No   Sinus Tracts? No No   Balbuena Scale Grade 4 Grade 4       Active Orders   Date Order Priority Status Authorizing Provider   08/22/24 1152 Cellular tissue product application Diabetic Ulcer Left Foot Routine Active Jiongco, Jenny, APRN       Inactive Orders   Date Order Priority Status Authorizing Provider   08/14/24 1452 Wound care Routine Discontinued Jose R Meza [email or web address]   08/01/24 1142 Cellular tissue product application Diabetic Ulcer Left Foot Routine Completed Jenny Olmos, APRN   08/01/24 1116 Debridement Diabetic Ulcer Left Foot Routine Completed RejiongItzel diggsi, APRN   07/25/24 1445 Debridement Diabetic Ulcer Left Foot Routine Completed Jenny Olmos, APRN       Wound 07/18/24 #2 right Foot Right (Active)   Date First Assessed/Time First Assessed: 07/18/24 1345    Wound Number (Wound Clinic Only): #2 right  Primary Wound Type: Diabetic Ulcer  Location: Foot  Wound Location Orientation: Right      Assessments 7/18/2024  1:57 PM 8/22/2024 10:03 AM   Wound Image        Drainage Amount Small Scant   Drainage Description Serosanguineous Serosanguineous   Treatments Compression --   Wound Length (cm) 4.4 cm 0.7 cm   Wound Width (cm) 14 cm 13.4 cm   Wound Surface Area (cm^2) 61.6 cm^2 9.38 cm^2   Wound Depth (cm) 2.1 cm 0.3 cm   Wound Volume (cm^3) 129.36 cm^3 2.814 cm^3   Wound Healing % -- 98   Margins Well-defined edges Well-defined edges   Non-staged Wound  Description Full thickness Full thickness   María-wound Assessment Clean;Dry;Blanchable erythema Dry;Edema;Blanchable erythema   Wound Granulation Tissue Red;Pink;Spongy Firm;Red;Pink   Wound Bed Granulation (%) 30 % 10 %   Wound Bed Epithelium (%) 20 % 80 %   Wound Bed Slough (%) 50 % 10 %   Wound Odor None None   Exposed Structure Bone;Adipose --   Shape 9 staples noted to maría wound 14 sutures noted   Tunneling? No No   Undermining? No No   Sinus Tracts? No No   Balbuena Scale Grade 4 Grade 4       Inactive Orders   Date Order Priority Status Authorizing Provider   08/14/24 1452 Wound care Routine Discontinued Jose R Meza DPM   08/01/24 1140 Cellular tissue product application Diabetic Ulcer Right Foot Routine Completed Jenny Olmos, DELMI   08/01/24 1058 Debridement Diabetic Ulcer Right Foot Routine Completed Jenny Olmos, APRN   07/25/24 1445 Debridement Diabetic Ulcer Right Foot Routine Completed Jenny Olmos, DELMI       Wound 07/18/24 #3 Ankle Posterior;Right (Active)   Date First Assessed/Time First Assessed: 07/18/24 1347    Wound Number (Wound Clinic Only): #3  Primary Wound Type: Diabetic Ulcer  Location: Ankle  Wound Location Orientation: Posterior;Right      Assessments 7/18/2024  2:07 PM 8/22/2024 10:06 AM   Wound Image       Drainage Amount None Scant   Drainage Description -- Serosanguineous   Treatments Compression --   Wound Length (cm) 4.5 cm 4.2 cm   Wound Width (cm) 2.1 cm 1.5 cm   Wound Surface Area (cm^2) 9.45 cm^2 6.3 cm^2   Wound Depth (cm) 0.1 cm 0.1 cm   Wound Volume (cm^3) 0.945 cm^3 0.63 cm^3   Wound Healing % -- 33   Margins -- Well-defined edges   Non-staged Wound Description -- Full thickness   María-wound Assessment Dry;Blanchable erythema Edema;Dry   Wound Granulation Tissue Pink;Firm Firm;Pink   Wound Bed Granulation (%) 10 % 25 %   Wound Bed Epithelium (%) 10 % --   Wound Bed Slough (%) -- 25 %   Wound Bed Eschar (%) 80 % 50 %   Wound Odor None None   Shape blister to maría --    Tunneling? No No   Undermining? No No   Sinus Tracts? No No   Pressure Injury Stage Unstageable --   Balbuena Scale -- Grade 4       Inactive Orders   Date Order Priority Status Authorizing Provider   07/25/24 1442 Debridement Pressure Injury Posterior;Right Ankle Routine Completed Jenny Olmos APRN       Wound 07/18/24 #4 Right heel wound Heel Right (Active)   Date First Assessed/Time First Assessed: 07/18/24 1428    Wound Number (Wound Clinic Only): #4 Right heel wound  Primary Wound Type: Pressure Injury  Location: Heel  Wound Location Orientation: Right      Assessments 7/18/2024  2:31 PM 8/22/2024 10:08 AM   Wound Image       Drainage Amount None Scant   Drainage Description -- Serosanguineous   Treatments Compression --   Wound Length (cm) 0.5 cm 0.4 cm   Wound Width (cm) 1 cm 1.3 cm   Wound Surface Area (cm^2) 0.5 cm^2 0.52 cm^2   Wound Depth (cm) 0 cm 0.1 cm   Wound Volume (cm^3) 0 cm^3 0.052 cm^3   Margins Well-defined edges Well-defined edges   Non-staged Wound Description -- Full thickness   María-wound Assessment Dry;Clean Dry   Wound Bed Granulation (%) -- 50 %   Wound Bed Epithelium (%) 100 % --   Wound Odor None None   Shape -- 50% scabbed   Tunneling? -- No   Undermining? -- No   Sinus Tracts? -- No   Pressure Injury Stage Deep Tissue Unstageable       No associated orders.       Compression Wrap 08/01/24 Foot Dorsal;Right (Active)   Placement Date/Time: 08/01/24 1147   Location: Foot  Wound Location Orientation: Dorsal;Right      Assessments 8/1/2024 11:47 AM 8/8/2024  8:08 AM   Response to Treatment Well tolerated Well tolerated   Compression Layers Multilayer Multilayer   Compression Product Type Unna Boot Unna Boot   Dressing Applied Yes Yes   Compression Wrap Location Toes to Knee Toes to Knee   Compression Wrap Status Clean;Dry Clean;Dry;Intact       No associated orders.       Compression Wrap 08/01/24 Foot Dorsal;Left (Active)   Placement Date/Time: 08/01/24 1148   Location: Foot  Wound  Location Orientation: Dorsal;Left      Assessments 8/1/2024 11:48 AM 8/8/2024  8:08 AM   Response to Treatment Well tolerated Well tolerated   Compression Layers Multilayer Multilayer   Compression Product Type Unna Boot Unna Boot   Dressing Applied Yes Yes   Compression Wrap Location Toes to Knee Toes to Knee   Compression Wrap Status Clean;Dry Clean;Dry;Intact       No associated orders.       Wound 08/22/24 #5 Left Heel Heel Left (Active)   Date First Assessed/Time First Assessed: 08/22/24 1021    Wound Number (Wound Clinic Only): #5 Left Heel  Primary Wound Type: Pressure Injury  Location: Heel  Wound Location Orientation: Left      Assessments 8/22/2024 10:49 AM   Wound Image     Drainage Amount None   Wound Length (cm) 1.6 cm   Wound Width (cm) 0.5 cm   Wound Surface Area (cm^2) 0.8 cm^2   Wound Depth (cm) 0 cm   Wound Volume (cm^3) 0 cm^3   María-wound Assessment Clean   Wound Bed Epithelium (%) 100 %   Wound Odor None   Pressure Injury Stage Deep Tissue       No associated orders.       Wound 08/22/24 #6 Left Anterior Ankle Ankle Anterior;Left (Active)   Date First Assessed/Time First Assessed: 08/22/24 1022    Wound Number (Wound Clinic Only): #6 Left Anterior Ankle  Primary Wound Type: Pressure Injury  Location: Ankle  Wound Location Orientation: Anterior;Left      Assessments 8/22/2024 10:50 AM   Wound Image     Drainage Amount None   Wound Length (cm) 1.2 cm   Wound Width (cm) 0.6 cm   Wound Surface Area (cm^2) 0.72 cm^2   Wound Depth (cm) 0 cm   Wound Volume (cm^3) 0 cm^3   María-wound Assessment Clean   Wound Bed Epithelium (%) 100 %   Wound Odor None   Pressure Injury Stage Stage 1       No associated orders.      PROCEDURE:      Left tma site  This procedure is medically necessary to nourish the wound bed with a skin substitute containing fat, protein, elastin, glycans and other natural skin elements.  The graft recruits the body's own cells and is ultimately converted into living tissue to assist in  granulation production and more rapid healing.  See rn px note    ASSESSMENT AND PLAN:    1. 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 Non-pressure chronic ulcer of other part of right foot with necrosis of bone (HCC)    3. PAD (peripheral artery disease) (HCC)    4. ESRD (end stage renal disease) (HCC)      Risks, benefits, and alternatives of current treatment plan discussed in detail.  Questions and concerns addressed. Red flags to RTC or ED reviewed.  Patient (or parent) agrees to plan.      NOTE TO PATIENT: The 21st Century Cures Act makes clinical notes like these available to patients in the interest of transparency. Clinical notes are medical documents used by physicians and care providers to communicate with each other. These documents include medical language and terminology, abbreviations, and treatment information that may sound technical and at times possibly unfamiliar. In addition, at times, the verbiage may appear blunt or direct. These documents are one tool providers use to communicate relevant information and clinical opinions of the care providers in a way that allows common understanding of the clinical context.   I spent 40 minutes with the patient. This time included:    preparing to see the patient (eg, review notes and recent diagnostics),  seeing the patient, obtaining and/or reviewing separately obtained history, performing a medically appropriate examination and/or evaluation, counseling and educating the patient, documenting in the record. Feroz E&M (multiple wounds, new wounds) & janet application to one wound  DISCHARGE:      Patient Instructions   Please return:   Monday for RN visit for wound assessment, edema management, and if applicable reapplication of multilayer compression bandage system and wound vac to the left    !ALERT CLINIC RN/STAFF:  this patient is at high risk for limb loss, please use extreme caution when applying wound  vac and compression to make sure there is not pressure points or wrinkles under compression.    Thursday with Jenny      Patient discharge and wound care instructions  Darin MCINTYRE Gogo  8/22/2024     [place]-leave in place-wound care clinic to change  Cleansing/dressing:     In clinic/ with each dressing change:    please cleanse the limb, foot, and between toes with soap, water and washcloth.   Dry thoroughly.    Cleanse/soak the wound with VASHE (or other hypochlorous wound cleanser), dab dry.    Apply the following dressings:     Left anterior ankle: paint with betadine>abd pad  Left heel: paint with betadine>abd pad  Left tma:  micronized keracis 8 x 3>sorbact (stapled in place-leave inplace at RN visit)>wound vac at 125mmhg>20-30mmhg calamine over the TMA-wrapped very light    Right TMA: honey gauze>kerramax>20-30mmhg over the TMA-wrapped very light  Right Achilles: honey>honey gauze>abd pad  Right heel :  honey>honey gauze>abd pad      Weight bearing as tolerated    Offloading  Provide patient with bilateral heel lift boots (ie prevalon), these should be on whenever patient is in bed    Offloading Lower Extremities  Off-loading means no weight-bearing or anything touching the area of the wound. If your doctor suggests that you should \"off-load\", he or she means that you are not to walk or bear weight on the extremity that has a wound, or the extremity where a wound appears likely to develop.  WHY DO I HAVE TO OFF-LOAD?  When you have a non-healing wound, you must do everything possible to give your body a chance to heal the wound. The weight of your body when you walk puts a large amount of pressure on your feet and ankles. This pressure keeps the new tissue from growing and inhibits new blood vessels from forming. If you continue to bear weight on a body part that has a wound, the time it takes to heal the wound increases, or, worse yet, the wound may not heal at all!  HOW DO I OFF-LOAD?  There are a  number of ways to off-load your legs and feet.   The easiest way is to stay in bed, but of course, that isn't always possible.   You may also be prescribed a special shoe/boot/cast to allow you to be up on your feet periodically, but keep pressure off the wound when you are.  Finally if you are in bed, recliner, couch: your foot should not be touching anything except air.  Many times you can position a pillow from your knees to just above your ankles (along your calves) to keep your ankles, heels from resting on anything.     Offloading:  Off-loading is an important part of your wound treatment program. It is a part that only you can assure is accomplished. If you have any concerns about methods to off-load your wound, or feel that you might have trouble following the off-loading plan prescribed for you, talk to your doctor so that alternatives can be discussed that will work in your situation.    Patient should be up in chair with EHOB waffle cushion     Nutrition and blood sugar control:  Focus on the following:  Protein: Meats, beans, eggs, milk and yogurt particularly Greek yogurt), tofu, soy nuts, soy protein products (Follow the protein handout in your welcome folder)  Vitamin C: Citrus fruits and juices, strawberries, tomatoes, tomato juice, peppers, baked potatoes, spinach, broccoli, cauliflower, Slater sprouts, cabbage  Vitamin A: Dark green, leafy vegetables, orange or yellow vegetables, cantaloupe, fortified dairy products, liver, fortified cereals  Zinc: Fortified cereals, red meats, seafood  PLEASE PROVIDE PATIENT 2 PACKETS OF Alex by [employer]. It can be purchased on amazon, [employer] website, or local pharmacy may be able to order it for you.  (These are essential branch chain amino acids that help with tissue building and wound healing).   When your blood sugar is consistently elevated greater than 180 your body can't heal or fight infection.     Concerns:  Signs of infection may include the  following:  Increase in redness  Red \"streaks\" from wound  Increase in swelling  Fever  Unusual odor  Change in the amount of wound drainage     Should you experience any significant changes in your wound(s) or have any questions regarding your home care instructions please contact the [facility] @ [phone] If after regular business hours, please call your family doctor or local emergency room. The treatment plan has been discussed at length between you and your provider. Follow all instructions carefully, it is very important. If you do not follow all instructions you are at risk of your wound not healing, infection, possible loss of limb and even loss of life.    Jenny Olmos FNP-C, CWCN-AP, CFCN, CSWS, WCC, DWC  8/22/2024

## 2024-08-21 NOTE — DISCHARGE SUMMARY
[facility]  HOSPITALIST  DISCHARGE SUMMARY     Darin Bowens Patient Status:  Observation    1945 MRN [id number]   Location [facility] 3SW-A Attending No att. providers found   Hosp Day # 0 PCP Zachery Hall MD     Date of Admission: 2024  Date of Discharge: 8/15/2024  Discharge Disposition: SNF Subacute Rehab    Discharge Diagnosis:   #Acute blood loss anemia superimposed on chronic anemia   #Left foot wound s/p debridement  #ESRD on HD   #HFpEF, chronic  #CAD s/p PCI  #PAD  #A-fib on Eliquis  #CVA h/o  #Hypertension  #Hyperlipidemia  #DM2 w/ neuropathy    History of Present Illness:    Patient is a 79 year old male with PMH sig for CAD status post stents, PAD status post bilateral lower extremity stents, atrial fibrillation on Xarelto, diabetes mellitus type 2 on insulin pump, end-stage renal disease on dialysis, chronic CHF with LVH, status post AVR, history of stroke, hypertension, hyperlipidemia, who presents for bleeding from his foot.  Patient underwent debridement of the complex wounds on his feet with podiatry yesterday.  This morning he was somewhat confused per family and became progressively lethargic throughout the day.  He had an appointment at the wound clinic and was advised to come to the ED for further evaluation.  The wound on his left foot was profusely oozing blood from the ED.  It was cauterized and dressed to control the bleeding in the ED.       Brief Synopsis:     #Acute blood loss anemia superimposed on chronic anemia   #Left foot wound s/p debridement  -1 unit PRBC ordered in the ED. Addition unit ordered this morning. Trend H&H, transfuse as needed.   -Foot wound cauterized and dressed in the ED. Podiatry consulted for further management.  Wound care consulted.  -Follow up in wound clinic  -hgb 8.4 this am  -recheck labs tomorrow     #ESRD on HD (MWF)  -Nephrology consulted     #HFpEF, chronic  -Monitor volume status in setting of blood transfusion.  Optimize volume  status with hemodialysis.     #CAD s/p PCI  #PAD  #A-fib on Eliquis  #CVA h/o  #Hypertension  #Hyperlipidemia  -Hold plavix and Eliquis  -Continue Coreg and statin     #DM2 w/ neuropathy  -POCT blood glucose ACHS. Start 45U degludec + SSI in place of insulin pump. Adjust as needed.   -Continue gabapentin     DC INSTRUCTIONS  Cleared for dc          Lace+ Score: 78  59-90 High Risk  29-58 Medium Risk  0-28   Low Risk       TCM Follow-Up Recommendation:  LACE > 58: High Risk of readmission after discharge from the hospital.    Procedures during hospitalization:   none    Incidental or significant findings and recommendations (brief descriptions):  none    Lab/Test results pending at Discharge:   none    Consultants:  Nephro  Podiatry  Wound care      Discharge Medication List:     Discharge Medications        CHANGE how you take these medications        Instructions Prescription details   dorzolamide 2 % Soln  Commonly known as: Trusopt  What changed: when to take this      INSTILL 1 DROP INTO BOTH EYES IN THE MORNING AND 1 DROP BEFORE BEDTIME   Quantity: 10 mL  Refills: 0     insulin lispro 100 UNIT/ML Soln  Commonly known as: HumaLOG  What changed: additional instructions      95 units per day via insulin pump.   Quantity: 90 mL  Refills: 1            CONTINUE taking these medications        Instructions Prescription details   acetaminophen 325 MG Tabs  Commonly known as: Tylenol      Take 2 tablets (650 mg total) by mouth every 6 (six) hours as needed for Pain.   Refills: 0     atorvastatin 80 MG Tabs  Commonly known as: Lipitor      Take 1 tablet (80 mg total) by mouth nightly.   Quantity: 90 tablet  Refills: 2     calcium acetate 667 MG Caps  Commonly known as: Phoslo      TAKE 1 CAPSULE BY MOUTH THREE TIMES DAILY WITH MEALS   Quantity: 270 capsule  Refills: 0     carvedilol 12.5 MG Tabs  Commonly known as: Coreg      Take 1 tablet (12.5 mg total) by mouth 2 (two) times daily with meals. Take one tablet (12.5mg  total) by mouth two times a day   Quantity: 180 tablet  Refills: 3     clopidogrel 75 MG Tabs  Commonly known as: Plavix      TAKE 1 TABLET(75 MG) BY MOUTH DAILY   Quantity: 90 tablet  Refills: 0     Combigan 0.2-0.5 % Soln  Generic drug: Brimonidine Tartrate-Timolol      Place 1 drop into both eyes 2 (two) times daily.   Quantity: 1 each  Refills: 0     Dexcom G6 Sensor Misc      1 Device Every 10 days.   Quantity: 3 each  Refills: 1     Dexcom G6 Transmitter Misc      Change every 90 days   Quantity: 1 each  Refills: 3     Eliquis 2.5 MG Tabs  Generic drug: apixaban      TAKE 1 TABLET(2.5 MG) BY MOUTH TWICE DAILY   Quantity: 60 tablet  Refills: 0     gabapentin 100 MG Caps  Commonly known as: Neurontin      Take 1 capsule (100 mg total) by mouth at bedtime.   Quantity: 30 capsule  Refills: 0     HYDROcodone-acetaminophen 5-325 MG Tabs  Commonly known as: Norco      Take 1 tablet by mouth every 6 (six) hours as needed for Pain.   Quantity: 10 tablet  Refills: 0     PROBIOTIC DAILY OR      Take 1 capsule by mouth daily.   Refills: 0     PTA Insulin via Pump      Inject into the skin continuous. humalog insulin   Medtronic  Basal Rate : 55.6 standard rate 1.90  I:C - 1 units/4 carbs  Correction Factor - unknown   Refills: 0     Rocklatan 0.02-0.005 % Soln  Generic drug: Netarsudil-Latanoprost      Apply 1 drop to eye daily. Both eyes   Refills: 0     sennosides 8.6 MG Tabs  Commonly known as: Senokot      Take 1 tablet (8.6 mg total) by mouth 2 (two) times daily.   Refills: 0     VITAMIN D2 OR      Take 50,000 Units by mouth every 30 (thirty) days.   Refills: 0            STOP taking these medications      cefepime HCl 1 GM/50ML Soln  Commonly known as: MAXIPIME        ketoconazole 2 % Sham  Commonly known as: Nizoral        metRONIDAZOLE 500 MG Tabs  Commonly known as: Flagyl        polyethylene glycol (PEG 3350) 17 g Pack  Commonly known as: Miralax        Potassium Chloride ER 20 MEQ Tbcr               ASK your  doctor about these medications        Instructions Prescription details   sulfamethoxazole-trimethoprim -160 MG Tabs per tablet  Commonly known as: Bactrim DS  Ask about: Should I take this medication?      Take 1 tablet by mouth daily. Schedule in PM after HD.   Stop taking on: August 15, 2024  Quantity: 34 tablet  Refills: 0               Where to Get Your Medications        These medications were sent to [id number] - [place], [place] - [address] [place] [address], [phone], [phone]  [address] [place] [id number]      Phone: [phone]   dorzolamide 2 % Soln  Eliquis 2.5 MG Tabs         [facility]   yes    Follow-up appointment:   Jose R Meza, TITO  [address] [place] [id number] [phone]    Follow up in 1 week(s)      Sean Flores MD  [facility] [place] [address] [address]  [phone]    Follow up in 2 week(s)      Jenny Olmos APRN  [address] [place] [id number] [phone]    Follow up      Appointments for Next 30 Days 8/21/2024 - 9/20/2024        Date Arrival Time Visit Type Length Department Provider     8/22/2024 10:00 AM   FOLLOW UP [4752] 30 min [facility] Jenny Olmos APRN    Patient Instructions:         Location Instructions:     Your appointment is scheduled at [facility]. The address is&nbsp; [address] [place] [address]. To reach Registration, park in the [facility]. Go through the entrance doors located on the ground floor. Veer left past the Information Desk and proceed to the Wound Care Center.  Masks are optional for all patients and visitors, unless otherwise indicated.               8/29/2024 10:00 AM   FOLLOW UP [4730] 30 min [facility] Jenny Olmos APRN    Patient Instructions:         Location Instructions:     Your appointment is scheduled at [facility]. The address is&nbsp; [address] [place] [address]. To reach  Registration, park in the [place]. Go through the entrance doors located on the ground floor. Veer left past the Information Desk and proceed to the Wound Care Center.  Masks are optional for all patients and visitors, unless otherwise indicated.                      OBJECTIVE:  Temp:  [97.6 °F (36.4 °C)-98.9 °F (37.2 °C)] 98.6 °F (37 °C)  Pulse:  [48-72] 56  Resp:  [16-20] 20  BP: (116-152)/(55-85) 136/68  SpO2:  [90 %-100 %] 100 %     Intake/Output:     Intake/Output Summary (Last 24 hours) at 8/15/2024 0834  Last data filed at 8/14/2024 1750      Gross per 24 hour   Intake 1288.33 ml   Output --   Net 1288.33 ml              Last 3 Weights   08/13/24 2218 210 lb (95.3 kg)   08/13/24 1456 210 lb (95.3 kg)   08/12/24 1239 209 lb 12.8 oz (95.2 kg)   08/12/24 1137 210 lb (95.3 kg)   08/09/24 1030 210 lb (95.3 kg)   07/18/24 1100 211 lb (95.7 kg)         Exam  Gen: Alert, awake, cooperative.  HEENT:  EOMI, PERRLA, OP clear, MMM  Pulm: Lungs clear bilaterally, normal respiratory effort  CV: Irregular rhythm, nontachycardic, no murmur.  Abd: Abdomen soft, nontender, nondistended, no organomegaly, bowel sounds present  MSK: Full range of motion in extremities, no clubbing, no cyanosis. B/LTMA.  Both feet wrapped in dressing.  Skin: Warm and dry  Neuro: AOx4.  Moving all extremities.  -----------------------------------------------------------------------------------------------  PATIENT DISCHARGE INSTRUCTIONS: See electronic chart    Rowan Deras MD    Time spent:  > 30 minutes

## 2024-08-22 ENCOUNTER — APPOINTMENT (OUTPATIENT)
Dept: WOUND CARE | Facility: HOSPITAL | Age: 79
End: 2024-08-22
Attending: NURSE PRACTITIONER
Payer: MEDICARE

## 2024-08-22 VITALS
DIASTOLIC BLOOD PRESSURE: 65 MMHG | HEART RATE: 63 BPM | RESPIRATION RATE: 16 BRPM | TEMPERATURE: 98 F | SYSTOLIC BLOOD PRESSURE: 128 MMHG

## 2024-08-22 DIAGNOSIS — L97.514 NON-PRESSURE CHRONIC ULCER OF OTHER PART OF RIGHT FOOT WITH NECROSIS OF BONE (HCC): ICD-10-CM

## 2024-08-22 DIAGNOSIS — M86.9 DIABETIC FOOT ULCER WITH OSTEOMYELITIS (HCC): ICD-10-CM

## 2024-08-22 DIAGNOSIS — N18.6 ESRD (END STAGE RENAL DISEASE) (HCC): ICD-10-CM

## 2024-08-22 DIAGNOSIS — L97.524 NON-PRESSURE CHRONIC ULCER OF OTHER PART OF LEFT FOOT WITH NECROSIS OF BONE (HCC): Primary | ICD-10-CM

## 2024-08-22 DIAGNOSIS — E11.69 DIABETIC FOOT ULCER WITH OSTEOMYELITIS (HCC): ICD-10-CM

## 2024-08-22 DIAGNOSIS — I73.9 PAD (PERIPHERAL ARTERY DISEASE) (HCC): ICD-10-CM

## 2024-08-22 DIAGNOSIS — L97.509 DIABETIC FOOT ULCER WITH OSTEOMYELITIS (HCC): ICD-10-CM

## 2024-08-22 DIAGNOSIS — E11.621 DIABETIC FOOT ULCER WITH OSTEOMYELITIS (HCC): ICD-10-CM

## 2024-08-22 LAB — GLUCOSE BLD-MCNC: 226 MG/DL (ref 70–99)

## 2024-08-22 PROCEDURE — 99215 OFFICE O/P EST HI 40 MIN: CPT | Performed by: NURSE PRACTITIONER

## 2024-08-22 PROCEDURE — 15275 SKIN SUB GRAFT FACE/NK/HF/G: CPT | Performed by: NURSE PRACTITIONER

## 2024-08-22 NOTE — PROGRESS NOTES
.Weekly Wound Education Note    Teaching Provided To: Patient;Family  Training Topics: Dressing;Edema control;Cleasing and general instructions;Compression;Discharge instructions  Training Method: Explain/Verbal;Written  Training Response: Patient responds and understands        Notes: Wounds stable. New wounds to left heel and left anterior ankle. Start betadine and ABD pad to both. Dressing changed to honey gel, honey gauze, and ABD pad to right heel and ankle. Honey gauze and ABD pad to right foot. Kerecis applied to left foot, fixated with sorbact stapled in place, dressed with NPWT set to 125mmHg, bridged to lateral leg. Both legs wrapped in calamine unna boot 20-30mmhg, wrapped loose and over distal foot area.

## 2024-08-22 NOTE — PATIENT INSTRUCTIONS
Please return:   Monday for RN visit for wound assessment, edema management, and if applicable reapplication of multilayer compression bandage system and wound vac to the left    !ALERT CLINIC RN/STAFF:  this patient is at high risk for limb loss, please use extreme caution when applying wound vac and compression to make sure there is not pressure points or wrinkles under compression.    Thursday with Jenny      Patient discharge and wound care instructions  Darin Bowens  8/22/2024     Spruce-leave in place-wound care clinic to change  Cleansing/dressing:     In clinic/ with each dressing change:    please cleanse the limb, foot, and between toes with soap, water and washcloth.   Dry thoroughly.    Cleanse/soak the wound with VASHE (or other hypochlorous wound cleanser), dab dry.    Apply the following dressings:     Left anterior ankle: paint with betadine>abd pad  Left heel: paint with betadine>abd pad  Left tma:  micronized keracis 8 x 3>sorbact (stapled in place-leave inplace at RN visit)>wound vac at 125mmhg>20-30mmhg calamine over the TMA-wrapped very light    Right TMA: honey gauze>kerramax>20-30mmhg over the TMA-wrapped very light  Right Achilles: honey>honey gauze>abd pad  Right heel :  honey>honey gauze>abd pad      Weight bearing as tolerated    Offloading  Provide patient with bilateral heel lift boots (ie prevalon), these should be on whenever patient is in bed    Offloading Lower Extremities  Off-loading means no weight-bearing or anything touching the area of the wound. If your doctor suggests that you should \"off-load\", he or she means that you are not to walk or bear weight on the extremity that has a wound, or the extremity where a wound appears likely to develop.  WHY DO I HAVE TO OFF-LOAD?  When you have a non-healing wound, you must do everything possible to give your body a chance to heal the wound. The weight of your body when you walk puts a large amount of pressure on your feet  and ankles. This pressure keeps the new tissue from growing and inhibits new blood vessels from forming. If you continue to bear weight on a body part that has a wound, the time it takes to heal the wound increases, or, worse yet, the wound may not heal at all!  HOW DO I OFF-LOAD?  There are a number of ways to off-load your legs and feet.   The easiest way is to stay in bed, but of course, that isn't always possible.   You may also be prescribed a special shoe/boot/cast to allow you to be up on your feet periodically, but keep pressure off the wound when you are.  Finally if you are in bed, recliner, couch: your foot should not be touching anything except air.  Many times you can position a pillow from your knees to just above your ankles (along your calves) to keep your ankles, heels from resting on anything.     Offloading:  Off-loading is an important part of your wound treatment program. It is a part that only you can assure is accomplished. If you have any concerns about methods to off-load your wound, or feel that you might have trouble following the off-loading plan prescribed for you, talk to your doctor so that alternatives can be discussed that will work in your situation.    Patient should be up in chair with EHOB waffle cushion     Nutrition and blood sugar control:  Focus on the following:  Protein: Meats, beans, eggs, milk and yogurt particularly Greek yogurt), tofu, soy nuts, soy protein products (Follow the protein handout in your welcome folder)  Vitamin C: Citrus fruits and juices, strawberries, tomatoes, tomato juice, peppers, baked potatoes, spinach, broccoli, cauliflower, [place] sprouts, cabbage  Vitamin A: Dark green, leafy vegetables, orange or yellow vegetables, cantaloupe, fortified dairy products, liver, fortified cereals  Zinc: Fortified cereals, red meats, seafood  PLEASE PROVIDE PATIENT 2 PACKETS OF Alex by [employer]. It can be purchased on amazon, [employer], or local pharmacy  may be able to order it for you.  (These are essential branch chain amino acids that help with tissue building and wound healing).   When your blood sugar is consistently elevated greater than 180 your body can't heal or fight infection.     Concerns:  Signs of infection may include the following:  Increase in redness  Red \"streaks\" from wound  Increase in swelling  Fever  Unusual odor  Change in the amount of wound drainage     Should you experience any significant changes in your wound(s) or have any questions regarding your home care instructions please contact the [facility] @ [phone] If after regular business hours, please call your family doctor or local emergency room. The treatment plan has been discussed at length between you and your provider. Follow all instructions carefully, it is very important. If you do not follow all instructions you are at risk of your wound not healing, infection, possible loss of limb and even loss of life.

## 2024-08-22 NOTE — PROGRESS NOTES
Patient ID: Cricket Bowens is a 79 year old male.    Cellular tissue product application Diabetic Ulcer Left Foot    Date/Time: 8/22/2024 11:52 AM    Performed by: Jenny Olmos APRN  Authorized by: Jenny Olmos APRN  Associated wounds:   Wound 07/18/24 #1 Foot Left  Consent:     Consent obtained:  Verbal    Consent given by:  Patient    Alternatives discussed:  Alternative treatment  Procedure details:     Location:  head/hands/feet/digits/genitalia    Product applied:  Kerecis omega3    Product lot #:  [id number]    Product expiration:  3/1/2026    Amount used (cm^2):  24    Amount wasted (cm^2):  0    Secured/Fixated: Yes      Secured/Fixated with:  Sorbact and staples  Post-procedure details:     Patient tolerance of procedure:  Tolerated well, no immediate complications  Comments:      Kerecis applied to left foot wound. Three grafts used total.   Graft #2 lot [id number], exp date-03/01/2026  Graft #3 lot number-[id number], exp date- 09/01/2025  Kerecis fixated with sorbact and staples, dressed with NPWT set to 125mmHg continuous, bridged to lateral leg, and calamine unna boot 20-30mmHg.

## 2024-08-26 ENCOUNTER — OFFICE VISIT (OUTPATIENT)
Dept: WOUND CARE | Facility: HOSPITAL | Age: 79
End: 2024-08-26
Attending: NURSE PRACTITIONER
Payer: MEDICARE

## 2024-08-26 VITALS
RESPIRATION RATE: 16 BRPM | DIASTOLIC BLOOD PRESSURE: 66 MMHG | HEART RATE: 58 BPM | TEMPERATURE: 99 F | SYSTOLIC BLOOD PRESSURE: 148 MMHG

## 2024-08-26 DIAGNOSIS — L97.514 NON-PRESSURE CHRONIC ULCER OF OTHER PART OF RIGHT FOOT WITH NECROSIS OF BONE (HCC): ICD-10-CM

## 2024-08-26 DIAGNOSIS — T14.8XXA INFECTED WOUND: ICD-10-CM

## 2024-08-26 DIAGNOSIS — L08.9 INFECTED WOUND: ICD-10-CM

## 2024-08-26 DIAGNOSIS — L97.524 NON-PRESSURE CHRONIC ULCER OF OTHER PART OF LEFT FOOT WITH NECROSIS OF BONE (HCC): Primary | ICD-10-CM

## 2024-08-26 LAB — GLUCOSE BLD-MCNC: 191 MG/DL (ref 70–99)

## 2024-08-26 PROCEDURE — 97605 NEG PRS WND THER DME<=50SQCM: CPT

## 2024-08-26 PROCEDURE — 82962 GLUCOSE BLOOD TEST: CPT

## 2024-08-26 PROCEDURE — 29581 APPL MULTLAYER CMPRN SYS LEG: CPT

## 2024-08-26 NOTE — PROGRESS NOTES
Chief Complaint   Patient presents with    Wound Care     Patient is here for a RN visit. He denies any pain to his wounds.            Current Outpatient Medications:     omeprazole 20 MG Oral Capsule Delayed Release, Take 1 capsule (20 mg total) by mouth every morning before breakfast., Disp: , Rfl:     polyethylene glycol, PEG 3350, 17 g Oral Powd Pack, Take 17 g by mouth daily as needed (constipation)., Disp: , Rfl:     DORZOLAMIDE 2 % Ophthalmic Solution, INSTILL 1 DROP INTO BOTH EYES IN THE MORNING AND 1 DROP BEFORE BEDTIME, Disp: 10 mL, Rfl: 0    ELIQUIS 2.5 MG Oral Tab, TAKE 1 TABLET(2.5 MG) BY MOUTH TWICE DAILY, Disp: 60 tablet, Rfl: 0    COMBIGAN 0.2-0.5 % Ophthalmic Solution, Place 1 drop into both eyes 2 (two) times daily., Disp: 1 each, Rfl: 0    gabapentin 100 MG Oral Cap, Take 1 capsule (100 mg total) by mouth at bedtime., Disp: 30 capsule, Rfl: 0    HYDROcodone-acetaminophen 5-325 MG Oral Tab, Take 1 tablet by mouth every 6 (six) hours as needed for Pain., Disp: 10 tablet, Rfl: 0    Probiotic Product (PROBIOTIC DAILY OR), Take 1 capsule by mouth daily., Disp: , Rfl:     sennosides 8.6 MG Oral Tab, Take 1 tablet (8.6 mg total) by mouth 2 (two) times daily., Disp: , Rfl:     acetaminophen 325 MG Oral Tab, Take 2 tablets (650 mg total) by mouth every 6 (six) hours as needed for Pain., Disp: , Rfl:     Netarsudil-Latanoprost (ROCKLATAN) 0.02-0.005 % Ophthalmic Solution, Apply 1 drop to eye daily. Both eyes, Disp: , Rfl:     atorvastatin 80 MG Oral Tab, Take 1 tablet (80 mg total) by mouth nightly., Disp: 90 tablet, Rfl: 2    CLOPIDOGREL 75 MG Oral Tab, TAKE 1 TABLET(75 MG) BY MOUTH DAILY, Disp: 90 tablet, Rfl: 0    Continuous Blood Gluc Sensor (DEXCOM G6 SENSOR) Does not apply Misc, 1 Device Every 10 days. (Patient not taking: Reported on 8/20/2024), Disp: 3 each, Rfl: 1    Insulin Lispro (HUMALOG) 100 UNIT/ML Subcutaneous Solution, 95 units per day via insulin pump. (Patient not taking: Reported on  8/20/2024), Disp: 90 mL, Rfl: 1    carvedilol 12.5 MG Oral Tab, Take 1 tablet (12.5 mg total) by mouth 2 (two) times daily with meals. Take one tablet (12.5mg total) by mouth two times a day, Disp: 180 tablet, Rfl: 3    Ergocalciferol (VITAMIN D2 OR), Take 50,000 Units by mouth every 30 (thirty) days., Disp: , Rfl:     Continuous Blood Gluc Transmit (DEXCOM G6 TRANSMITTER) Does not apply Misc, Change every 90 days (Patient not taking: Reported on 8/20/2024), Disp: 1 each, Rfl: 3    CALCIUM ACETATE 667 MG Oral Cap, TAKE 1 CAPSULE BY MOUTH THREE TIMES DAILY WITH MEALS, Disp: 270 capsule, Rfl: 0    PTA Insulin via Pump, Inject into the skin continuous. humalog insulin  Medtronic Basal Rate : 55.6 standard rate 1.90 I:C - 1 units/4 carbs Correction Factor - unknown (Patient not taking: Reported on 8/20/2024), Disp: , Rfl:     Allergies   Allergen Reactions    Augmentin [Amoxicillin-Pot Clavulanate] RASH    Lisinopril Coughing     Dyspnea            HISTORY:     Past medical, surgical, family and social history updated where appropriate.    PHYSICAL EXAM:   /66   Pulse 58   Temp 98.8 °F (37.1 °C)   Resp 16        Vital signs reviewed.      Calf  Point of Measurement - Left Calf: 36  Point of Measurement - Right Calf: 36  Left Calf from:: Heel  Calf Left cm:: 34.2  Right Calf from:: Heel  Right Calf cm:: 34.2    Ankle  Point of Measurement - Left Ankle: 10  Point of Measurement - Right Ankle: 10  Left Ankle from:: Heel  Left Ankle cm:: 25.2     Right Ankle from:: Heel  Right Ankle cm:: 24.2       Wound 07/18/24 #1 Foot Left (Active)   Date First Assessed/Time First Assessed: 07/18/24 1344    Wound Number (Wound Clinic Only): #1  Primary Wound Type: Diabetic Ulcer  Location: Foot  Wound Location Orientation: Left      Assessments 7/18/2024  2:00 PM 8/26/2024  4:25 PM   Wound Image       Drainage Amount Small Moderate   Drainage Description Serosanguineous Sanguineous   Treatments Compression Compression;Wound Vac  - Neg Pressure   Wound Vac Brand -- Medela   Wound Length (cm) 5.2 cm 3.5 cm   Wound Width (cm) 10.4 cm 9.3 cm   Wound Surface Area (cm^2) 54.08 cm^2 32.55 cm^2   Wound Depth (cm) 1 cm 1.2 cm   Wound Volume (cm^3) 54.08 cm^3 39.06 cm^3   Wound Healing % -- 28   Margins Well-defined edges Well-defined edges   Non-staged Wound Description Full thickness Full thickness   María-wound Assessment Clean;Dry;Blanchable erythema Edema   Wound Granulation Tissue Red;Spongy --   Wound Bed Granulation (%) 15 % --   Wound Bed Slough (%) 85 % --   Wound Odor None None   Exposed Structure Bone;Adipose --   Shape 10staples to periwound --   Tunneling? No No   Undermining? No No   Sinus Tracts? No No   Balbuena Scale Grade 4 Grade 4       Inactive Orders   Date Order Priority Status Authorizing Provider   08/22/24 1152 Cellular tissue product application Diabetic Ulcer Left Foot Routine Completed Jenny Olmos APRN   08/14/24 1452 Wound care Routine Discontinued Jose R Meza DPM   08/01/24 1142 Cellular tissue product application Diabetic Ulcer Left Foot Routine Completed Jenny Olmos APRN   08/01/24 1116 Debridement Diabetic Ulcer Left Foot Routine Completed Jenny Olmos APRN   07/25/24 1445 Debridement Diabetic Ulcer Left Foot Routine Completed Jenny Olmos, DELMI       Wound 07/18/24 #2 right Foot Right (Active)   Date First Assessed/Time First Assessed: 07/18/24 1345    Wound Number (Wound Clinic Only): #2 right  Primary Wound Type: Diabetic Ulcer  Location: Foot  Wound Location Orientation: Right      Assessments 7/18/2024  1:57 PM 8/26/2024  4:27 PM   Wound Image        Drainage Amount Small Small   Drainage Description Serosanguineous Serosanguineous   Treatments Compression Compression   Wound Length (cm) 4.4 cm 2 cm   Wound Width (cm) 14 cm 13 cm   Wound Surface Area (cm^2) 61.6 cm^2 26 cm^2   Wound Depth (cm) 2.1 cm 0.1 cm   Wound Volume (cm^3) 129.36 cm^3 2.6 cm^3   Wound Healing % -- 98   Margins Well-defined  edges Well-defined edges   Non-staged Wound Description Full thickness Full thickness   María-wound Assessment Clean;Dry;Blanchable erythema Dry;Edema   Wound Granulation Tissue Red;Pink;Spongy Spongy;Pink   Wound Bed Granulation (%) 30 % 25 %   Wound Bed Epithelium (%) 20 % 50 %   Wound Bed Slough (%) 50 % --   Wound Bed Eschar (%) -- 25 %   Wound Odor None None   Exposed Structure Bone;Adipose --   Shape 9 staples noted to maría wound --   Tunneling? No No   Undermining? No No   Sinus Tracts? No No   Balbuena Scale Grade 4 Grade 4       Inactive Orders   Date Order Priority Status Authorizing Provider   08/14/24 1452 Wound care Routine Discontinued Jose R Meza [email or web address]   08/01/24 1140 Cellular tissue product application Diabetic Ulcer Right Foot Routine Completed Jenny Olmos, DELMI   08/01/24 1058 Debridement Diabetic Ulcer Right Foot Routine Completed Jenny Olmos, DELMI   07/25/24 1445 Debridement Diabetic Ulcer Right Foot Routine Completed Jenny Olmos, DELMI       Wound 07/18/24 #3 Ankle Posterior;Right (Active)   Date First Assessed/Time First Assessed: 07/18/24 1347    Wound Number (Wound Clinic Only): #3  Primary Wound Type: Diabetic Ulcer  Location: Ankle  Wound Location Orientation: Posterior;Right      Assessments 7/18/2024  2:07 PM 8/26/2024  4:28 PM   Wound Image       Drainage Amount None Small   Drainage Description -- Serosanguineous   Treatments Compression Compression;Sling   Wound Length (cm) 4.5 cm 4.2 cm   Wound Width (cm) 2.1 cm 1.6 cm   Wound Surface Area (cm^2) 9.45 cm^2 6.72 cm^2   Wound Depth (cm) 0.1 cm 0.1 cm   Wound Volume (cm^3) 0.945 cm^3 0.672 cm^3   Wound Healing % -- 29   Margins -- Well-defined edges   Non-staged Wound Description -- Full thickness   María-wound Assessment Dry;Blanchable erythema Edema;Dry   Wound Granulation Tissue Pink;Firm Spongy;Pink   Wound Bed Granulation (%) 10 % 40 %   Wound Bed Epithelium (%) 10 % --   Wound Bed Eschar (%) 80 % 30 %   Wound Odor None None    Shape blister to hailey 30% tendon   Tunneling? No No   Undermining? No No   Sinus Tracts? No No   Pressure Injury Stage Unstageable --   Balbuena Scale -- Grade 4       Inactive Orders   Date Order Priority Status Authorizing Provider   07/25/24 1442 Debridement Pressure Injury Posterior;Right Ankle Routine Completed Jenny Olmos APRN       Wound 07/18/24 #4 Right heel wound Heel Right (Active)   Date First Assessed/Time First Assessed: 07/18/24 1428    Wound Number (Wound Clinic Only): #4 Right heel wound  Primary Wound Type: Pressure Injury  Location: Heel  Wound Location Orientation: Right      Assessments 7/18/2024  2:31 PM 8/26/2024  4:29 PM   Wound Image       Drainage Amount None Small   Drainage Description -- Serosanguineous   Treatments Compression Compression   Wound Length (cm) 0.5 cm 1 cm   Wound Width (cm) 1 cm 1.5 cm   Wound Surface Area (cm^2) 0.5 cm^2 1.5 cm^2   Wound Depth (cm) 0 cm 0.1 cm   Wound Volume (cm^3) 0 cm^3 0.15 cm^3   Margins Well-defined edges Well-defined edges   Non-staged Wound Description -- Full thickness   Hailey-wound Assessment Dry;Clean Dry   Wound Granulation Tissue -- Firm;Pink   Wound Bed Granulation (%) -- 50 %   Wound Bed Epithelium (%) 100 % --   Wound Bed Slough (%) -- 50 %   Wound Odor None None   Tunneling? -- No   Undermining? -- No   Sinus Tracts? -- No   Pressure Injury Stage Deep Tissue Unstageable       No associated orders.       Compression Wrap 08/01/24 Foot Dorsal;Right (Active)   Placement Date/Time: 08/01/24 1147   Location: Foot  Wound Location Orientation: Dorsal;Right      Assessments 8/1/2024 11:47 AM 8/26/2024  4:28 PM   Response to Treatment Well tolerated Well tolerated   Compression Layers Multilayer Multilayer   Compression Product Type Unna Boot Unna Boot   Dressing Applied Yes Yes   Compression Wrap Location Toes to Knee Toes to Knee   Compression Wrap Status Clean;Dry Clean;Dry;Intact       No associated orders.       Compression Wrap 08/01/24  Foot Dorsal;Left (Active)   Placement Date/Time: 08/01/24 1148   Location: Foot  Wound Location Orientation: Dorsal;Left      Assessments 8/1/2024 11:48 AM 8/26/2024  4:28 PM   Response to Treatment Well tolerated Well tolerated   Compression Layers Multilayer Multilayer   Compression Product Type Unna Boot Unna Boot   Dressing Applied Yes Yes   Compression Wrap Location Toes to Knee Toes to Knee   Compression Wrap Status Clean;Dry Clean;Dry;Intact       No associated orders.       Wound 08/22/24 #5 Left Heel Heel Left (Active)   Date First Assessed/Time First Assessed: 08/22/24 1021    Wound Number (Wound Clinic Only): #5 Left Heel  Primary Wound Type: Pressure Injury  Location: Heel  Wound Location Orientation: Left      Assessments 8/22/2024 10:49 AM 8/26/2024  4:30 PM   Wound Image       Drainage Amount None None   Treatments Betadine Betadine   Wound Length (cm) 1.6 cm 1.2 cm   Wound Width (cm) 0.5 cm 1 cm   Wound Surface Area (cm^2) 0.8 cm^2 1.2 cm^2   Wound Depth (cm) 0 cm 0 cm   Wound Volume (cm^3) 0 cm^3 0 cm^3   María-wound Assessment Clean Clean   Wound Bed Epithelium (%) 100 % 100 %   Wound Odor None --   Shape -- DTI   Tunneling? -- No   Undermining? -- No   Sinus Tracts? -- No   Pressure Injury Stage Deep Tissue Deep Tissue       No associated orders.       Wound 08/22/24 #6 Left Anterior Ankle Ankle Anterior;Left (Active)   Date First Assessed/Time First Assessed: 08/22/24 1022    Wound Number (Wound Clinic Only): #6 Left Anterior Ankle  Primary Wound Type: Pressure Injury  Location: Ankle  Wound Location Orientation: Anterior;Left      Assessments 8/22/2024 10:50 AM 8/26/2024  4:31 PM   Wound Image       Drainage Amount None None   Treatments Betadine Betadine   Wound Length (cm) 1.2 cm 0.8 cm   Wound Width (cm) 0.6 cm 0.5 cm   Wound Surface Area (cm^2) 0.72 cm^2 0.4 cm^2   Wound Depth (cm) 0 cm 0 cm   Wound Volume (cm^3) 0 cm^3 0 cm^3   María-wound Assessment Clean Clean   Wound Bed Epithelium (%) 100  % 100 %   Wound Odor None None   Pressure Injury Stage Stage 1 Stage 1       No associated orders.       Negative Pressure Wound Therapy Dorsal;Left (Active)   Placement Date/Time: 08/22/24 1258   Wound Location Orientation: Dorsal;Left      Assessments 8/22/2024 10:49 AM 8/26/2024  4:30 PM   Wound photographed/measured Yes Yes   Machine Status (On) Yes Yes   Unit Type Medella Medella   Dressing Type Other (Comment) Other (Comment)   Number of Foam Pieces Used 2 2   Cycle Continuous Continuous   Target Pressure (mmHg) 125 125   Canister Changed Yes No       No associated orders.          ASSESSMENT AND PLAN:        Risks, benefits, and alternatives of current treatment plan discussed in detail.  Questions and concerns addressed. Red flags to RTC or ED reviewed.  Patient (or parent) agrees to plan.      No follow-ups on file.  Weekly Wound Education Note    Teaching Provided To: Patient  Training Topics: Dressing;Edema control;Cleasing and general instructions;Compression;Discharge instructions  Training Method: Explain/Verbal;Written  Training Response: Patient responds and understands        Notes: Wounds stable. Continue plan of care. Educated pt on how to check to make sure NPWT clamps are in the open position because a clamp was closed on arrival to clinic. Pt stated understanding.               Consuelo CUNNINGHAM RN   8/26/2024  5:24 PM

## 2024-08-28 NOTE — PROGRESS NOTES
CHIEF COMPLAINT:     Chief Complaint   Patient presents with    Wound Care     Patients is here for a follow up. Patients stated no pain and no issues at this moment. Wife worried that the boot might come off at night while he is sleeping.     HPI:   Information obtained from Patient, chart and family  7-18-24 INITIAL:  80 YO male with past medical history of CAD s/p stents, PAD s/p bilateral lower extremity stents, atrial fibrillation on Xarelto, diabetes with insulin pump, ESRD on HD, CHF with LVH status post AVR, history of stroke, hypertension, hyperlipidemia, DESI and recent TMA  6/13 per Dr Meza. He presented to hospital on 6-25w poor wound healing. He had further debridement to level of bone along with partial resection of the Right 1st and 2nd MT on 6/27 and had further debridement of foot on 7/5.   Patient was discharged on IV Cefepime with HD, continue PO bactrim and PO Flagyl through 8/15. . He did receive 4 HBO treatmenns while inpatient and then discharge to rehab facility. Podiatry suggested they would reconsider application of wound vac on follow up at [facility].   Epic notes:  \"Hx of PVD arterial Ultrasound April likely more distal PAD require further amputation per vascular patient is not a candidate for any further revascularization and should have foot amputation if does not heal.\"  Niru is dressing wounds with betadine soaked gauze, he presents with son and spouse (who is a retired nurse).  Bedside clinic exam reveals palpable anterior tibials (at the ankles) and posterior tibia, patient also has pulsatile signals at the dp/pt/peronal/and plantar aspect. We discussed micro vs macro circulation. Will get tcom.  Once patient is safe in transfers and short distance ambulation family would like to care for him at home, at that point patient could then come for hbo again.  At this time will utilize santyl. I did remove the staples from the right as they were no longer supporting the  tissue and applied steristrips.  This incision is concerning for further dehiscence.     7-25-24 patient returns. Patient is getting tcom after his wound care appointment.  Dr. Moya not in hospital today. Will update him. Inflammatory markers esr 50>44,  crp 11>23.7.   Plan for next Thursday morning joint visit with dr. moya.  Patient has increased his ambulation with walker, he was able to ambulate >100ft with chair behind him.  He can get himself up from a chair to standing with minimal assist per spouse.  The wounds are dressed with Santyl.  The family did buy heel offloading boots.  Patient c/o burning pain.  I will touch base with apn at [facility] regarding potential gabapentin to help with the nerve pain.  Wounds debrided today.  The right is more concerning than the left. Both have exposed metarsals that are dry.  Will continue with santyl at this time, will run insurance for cellular tissue products.  Once patient is discharged from [facility] he could return to [facility].  We discussed that his safety in transfers and car transfers etc are the ultimate priority as he not only would need to go to appointments but he will need to go to dialysis.  ADDENDUM:  discussed tcom results as being positive for healing as well as response to o2.(See below for report)    8-1-24 patient returns.  dr moya is here in clinic as well. I did touch base with apn at [facility] and low dose gabapentin 100mg nightly to see if it helps with patient c/o pain. Patient states that it works until about 5 am and then his feet start burning again.  Keracis is covered at 100%.   Will debride and place keracis today.     8-8-24 Patient returns.  He was seen for rn visit on Monday, keracis placed last week. He has tolerated the compression well.  Drainage has slowed on the right.  Wife and daughter report that he is doing really well with transfers with physicial therapy and stamina. He has even worked on getting in/out of the car.  Dr Meza is here in  clinic to collaborate on next steps-plan will be for patient to go to [facility] OR next Monday or Wednesday and do a clean up, potential closure, and incisional vac placement.  Once scheduled will have our inpatient team place to place in light compression wrap.  Team and family feel patient is ok to go home, he will be dc'd in next 2 days.  Patient will come Tuesday for hbo re-consult with dr martinez and then we can get him started back in hbo after surgical intervention as well.  I will update dr. Hugo and will await scheduling from dr matson's office. No acute s/s of infection or c/o pain.    8-22-24 since patient was last seen in clinic, he was dc'd from the [facility] on 8-8 (Thursday). Family reported he was doing very well.  he was taken to the OR on 8-12 (Monday) debrided and keracis placed.  When patient presented on Tuesday for an hbo consult he was slurring words, slouching/sliding out of chair and family reported that they could not take care of him like this. Patient was sent to ed and admitted from8-13 to 8-15 for acute blood loss anemia on chronic anemia.  During this admission there was discussion of palliative or hospice at home however patient chose to return to [facility].  The apn at [facility] touched base with me on Monday regarding wound vac.  Patient did bring wound vac today. They have been dressing all wounds with adaptic and abd pad and ace wrap.  Patient has completed abx. He has new pressure wounds to his left posterior heel and left anterior ankle (from ace wrap wrinkle? Shoe?). The right achilles wound is extremely dessicated.  The right tma site remains approximated with sutures, concern for dehiscence on the lateral aspect however I suspect it is related to his edema as it is significantly increased from last visit. The left tma is stable, this was debrided and keracis placed along with vac.  We need to take extreme caution with this patient and bony prominences. Patient will come to wound clinic for  dressing and vac changes.    8-29-24 patient returns.  Patient came for rn visit, it was noted that the vac was clamped.  His edema was reducing. Overall there is an improvement in the wounds the left tma still has first metatarsal exposed-keracis placed in this area, remainder of the wound bed has remnants of keracis from last week-will allow to continue to integrate. The right incision on the lateral aspect is drainining, there does appear to be some necrosis along the lateral edge. D/w patient and spouse to make sure the right foot is not laterally rotating when he is relaxed. The right achilles remains extremely dessicated.  Will utilize silver hydrogel.  Since we do not have a y connector for the genadyne vac, will utilize the vac on the right today in an effort to keep the incision approximated and fill in the lateral aspect.  No acute s/s of infection.   MEDICATIONS:     Current Outpatient Medications:     omeprazole 20 MG Oral Capsule Delayed Release, Take 1 capsule (20 mg total) by mouth every morning before breakfast., Disp: , Rfl:     polyethylene glycol, PEG 3350, 17 g Oral Powd Pack, Take 17 g by mouth daily as needed (constipation)., Disp: , Rfl:     DORZOLAMIDE 2 % Ophthalmic Solution, INSTILL 1 DROP INTO BOTH EYES IN THE MORNING AND 1 DROP BEFORE BEDTIME, Disp: 10 mL, Rfl: 0    ELIQUIS 2.5 MG Oral Tab, TAKE 1 TABLET(2.5 MG) BY MOUTH TWICE DAILY, Disp: 60 tablet, Rfl: 0    COMBIGAN 0.2-0.5 % Ophthalmic Solution, Place 1 drop into both eyes 2 (two) times daily., Disp: 1 each, Rfl: 0    gabapentin 100 MG Oral Cap, Take 1 capsule (100 mg total) by mouth at bedtime., Disp: 30 capsule, Rfl: 0    HYDROcodone-acetaminophen 5-325 MG Oral Tab, Take 1 tablet by mouth every 6 (six) hours as needed for Pain., Disp: 10 tablet, Rfl: 0    Probiotic Product (PROBIOTIC DAILY OR), Take 1 capsule by mouth daily., Disp: , Rfl:     sennosides 8.6 MG Oral Tab, Take 1 tablet (8.6 mg total) by mouth 2 (two) times daily.,  Disp: , Rfl:     acetaminophen 325 MG Oral Tab, Take 2 tablets (650 mg total) by mouth every 6 (six) hours as needed for Pain., Disp: , Rfl:     Netarsudil-Latanoprost (ROCKLATAN) 0.02-0.005 % Ophthalmic Solution, Apply 1 drop to eye daily. Both eyes, Disp: , Rfl:     atorvastatin 80 MG Oral Tab, Take 1 tablet (80 mg total) by mouth nightly., Disp: 90 tablet, Rfl: 2    CLOPIDOGREL 75 MG Oral Tab, TAKE 1 TABLET(75 MG) BY MOUTH DAILY, Disp: 90 tablet, Rfl: 0    Continuous Blood Gluc Sensor (DEXCOM G6 SENSOR) Does not apply Misc, 1 Device Every 10 days. (Patient not taking: Reported on 8/20/2024), Disp: 3 each, Rfl: 1    Insulin Lispro (HUMALOG) 100 UNIT/ML Subcutaneous Solution, 95 units per day via insulin pump. (Patient not taking: Reported on 8/20/2024), Disp: 90 mL, Rfl: 1    carvedilol 12.5 MG Oral Tab, Take 1 tablet (12.5 mg total) by mouth 2 (two) times daily with meals. Take one tablet (12.5mg total) by mouth two times a day, Disp: 180 tablet, Rfl: 3    Ergocalciferol (VITAMIN D2 OR), Take 50,000 Units by mouth every 30 (thirty) days., Disp: , Rfl:     Continuous Blood Gluc Transmit (DEXCOM G6 TRANSMITTER) Does not apply Misc, Change every 90 days (Patient not taking: Reported on 8/20/2024), Disp: 1 each, Rfl: 3    CALCIUM ACETATE 667 MG Oral Cap, TAKE 1 CAPSULE BY MOUTH THREE TIMES DAILY WITH MEALS, Disp: 270 capsule, Rfl: 0    PTA Insulin via Pump, Inject into the skin continuous. humalog insulin  Medtronic Basal Rate : 55.6 standard rate 1.90 I:C - 1 units/4 carbs Correction Factor - unknown (Patient not taking: Reported on 8/20/2024), Disp: , Rfl:   ALLERGIES:     Allergies   Allergen Reactions    Augmentin [Amoxicillin-Pot Clavulanate] RASH    Lisinopril Coughing     Dyspnea        REVIEW OF SYSTEMS:   This information was obtained from the patient/family and chart.    See HPI for pertinent positives, otherwise 10 pt ROS negative.  HISTORY:   Past medical, surgical, family and social history updated where  appropriate.      PHYSICAL EXAM:     Vitals:    08/29/24 1011   BP: 159/69  Comment: 186 glucose   Pulse: 53   Resp: 16   Temp: 97.6 °F (36.4 °C)       Estimated body mass index is 28.48 kg/m² as calculated from the following:    Height as of 8/13/24: 72\".    Weight as of 8/13/24: 210 lb (95.3 kg).   POC Glucose   Date Value Ref Range Status   08/29/2024 189 (H) 70 - 99 mg/dL Final   08/26/2024 191 (H) 70 - 99 mg/dL Final   08/22/2024 226 (H) 70 - 99 mg/dL Final       Vital signs reviewed.Appears stated age, well groomed.    Constitutional:  Bp wnl for patient. Pulse Regular and wnl for patient. Respirations easy and unlabored. Temperature wnl. Elevated bmi. Appearance neat and clean. Appears in no acute distress. Well nourished and well developed.    Lower extremity:  at/pt palpable bilaterally. Extremities free of varicosities, edema with significant increase bilaterally from last visit. Capillary refill < 3 seconds. Left open TMA, right with sutures intact but some dehiscence on lateral aspect.  Skin is dry. no hairgrowth on legs.    Musculoskeletal:  Patient is in wheelchair propelled by others, able to transfer with assist  Integumentary:  refer to wound characteristics and images   Psychiatric:  Judgment and insight intact. Alert and oriented times 3. Patient is quiet, calm, cooperative, and communicative.   EDEMA:   Calf  Point of Measurement - Left Calf: 36  Point of Measurement - Right Calf: 36  Left Calf from:: Heel  Calf Left cm:: 33.6  Right Calf from:: Heel  Right Calf cm:: 34  Ankle  Point of Measurement - Left Ankle: 10  Point of Measurement - Right Ankle: 10  Left Ankle from:: Heel  Left Ankle cm:: 25.7     Right Ankle from:: Heel  Right Ankle cm:: 24.6     DIAGNOSTICS:     Lab Results   Component Value Date    BUN 59 (H) 08/14/2024    CREATSERUM 6.27 (H) 08/14/2024    GFRCKDEPI 8.51 (L) 03/01/2022    ALB 3.8 08/13/2024    TP 6.2 08/13/2024    A1C 5.9 (H) 06/14/2024   Albumin 2.4/tp 5.0  7-15-24  ([facility])  Albumin 2.8/tp 5.9   7-22-24    Lab Results   Component Value Date    ESRML 75 (H) 06/24/2024   ESR    50    7-15-24 ([facility])   ESR    44    7-22-24 ([facility])    Lab Results   Component Value Date    CRP 18.50 (H) 06/24/2024    CRP 0.3 04/22/2014   CRP     11.0   7-15-24 ([facility])  CRP      23.7  7-22-24 ([facility])    7-25-24 TCOM  Lead                  Baseline             Oxygen challenge           Location     1                        79                      120                                 Left medial lower leg  2                        75                      105                                 Left foot dorsal surface  3                        49                      107                                 Rt medial lower leg  4                        61                      161                                 Rt foot dorsal surface    6-30-24 pathology left foot  Left foot, transmetatarsal amputation:  -Gangrenous necrosis of skin and soft tissue with underlying acute osteomyelitis.  -Bone margin with no significant acute inflammation.  -Viable soft tissue margin with calcific atherosclerosis.    6-27-24 pathology right foot & left  A.  Right foot tissue and bone, excision:  -Bone with acute osteomyelitis.     B.  Left foot second metatarsal bone, excision:  -Bone with acute osteomyelitis.  -Fibroadipose tissue with necrosis and suppurative inflammation.    5-22-24 operative report-dr salas  Findings: Widely patent bilateral common femoral, SFA, profunda, popliteal.  PT and peroneal patent to the foot however significant atresia of the vasculature into the foot with sparse collaterals.  AT reoccluded despite recent endovascular intervention.  No opacification to the toe wounds.  Deep venous arterialization performed in the right anterior tibial artery to vein however the veins in the foot were friable after 3 mm balloon angioplasty and there was no significant outflow and thus  this was embolized.  The patient maintained outflow via the PT and peroneal bilaterally consistent with preoperative baseline.  Patient should proceed with TMA and should this not heal they understand the need for future major amputation.   WOUND ASSESSMENT:     Wound 07/18/24 #1 Foot Left (Active)   Date First Assessed/Time First Assessed: 07/18/24 1344    Wound Number (Wound Clinic Only): #1  Primary Wound Type: Diabetic Ulcer  Location: Foot  Wound Location Orientation: Left      Assessments 7/18/2024  2:00 PM 8/29/2024 10:23 AM   Wound Image        Drainage Amount Small Moderate   Drainage Description Serosanguineous Sanguineous   Treatments Compression --   Wound Length (cm) 5.2 cm 3.4 cm   Wound Width (cm) 10.4 cm 8.6 cm   Wound Surface Area (cm^2) 54.08 cm^2 29.24 cm^2   Wound Depth (cm) 1 cm 1.2 cm   Wound Volume (cm^3) 54.08 cm^3 35.088 cm^3   Wound Healing % -- 35   Margins Well-defined edges Well-defined edges   Non-staged Wound Description Full thickness Full thickness   María-wound Assessment Clean;Dry;Blanchable erythema Edema   Wound Granulation Tissue Red;Spongy --   Wound Bed Granulation (%) 15 % --   Wound Bed Slough (%) 85 % --   Wound Odor None None   Exposed Structure Bone;Adipose --   Shape 10staples to periwound unable to view wound bed   Tunneling? No --   Undermining? No --   Sinus Tracts? No --   Balbuena Scale Grade 4 Grade 4       Active Orders   Date Order Priority Status Authorizing Provider   08/29/24 1204 Cellular tissue product application Diabetic Ulcer Left Foot Routine Active Jenny Olmos APRN       Inactive Orders   Date Order Priority Status Authorizing Provider   08/22/24 1152 Cellular tissue product application Diabetic Ulcer Left Foot Routine Completed Jenny Olmos APRN   08/14/24 1452 Wound care Routine Discontinued Jose R Meza, DPDEBBIE   08/01/24 1142 Cellular tissue product application Diabetic Ulcer Left Foot Routine Completed Jenny Olmos APRN   08/01/24 1116  Debridement Diabetic Ulcer Left Foot Routine Completed Jenny Olmos, DELMI   07/25/24 1445 Debridement Diabetic Ulcer Left Foot Routine Completed Jenny Olmos APRN       Wound 07/18/24 #2 right Foot Right (Active)   Date First Assessed/Time First Assessed: 07/18/24 1345    Wound Number (Wound Clinic Only): #2 right  Primary Wound Type: Diabetic Ulcer  Location: Foot  Wound Location Orientation: Right      Assessments 7/18/2024  1:57 PM 8/29/2024 10:16 AM   Wound Image         Drainage Amount Small Small   Drainage Description Serosanguineous Sanguineous   Treatments Compression --   Wound Length (cm) 4.4 cm 1.4 cm   Wound Width (cm) 14 cm 12.8 cm   Wound Surface Area (cm^2) 61.6 cm^2 17.92 cm^2   Wound Depth (cm) 2.1 cm 0.1 cm   Wound Volume (cm^3) 129.36 cm^3 1.792 cm^3   Wound Healing % -- 99   Margins Well-defined edges Well-defined edges   Non-staged Wound Description Full thickness Full thickness   María-wound Assessment Clean;Dry;Blanchable erythema Edema;Dry   Wound Granulation Tissue Red;Pink;Spongy Pink   Wound Bed Granulation (%) 30 % 20 %   Wound Bed Epithelium (%) 20 % 70 %   Wound Bed Slough (%) 50 % 0 %   Wound Bed Eschar (%) -- 30 %   Wound Odor None None   Exposed Structure Bone;Adipose --   Shape 9 staples noted to maría wound 13 sutures noted   Tunneling? No --   Undermining? No --   Sinus Tracts? No --   Balbuena Scale Grade 4 Grade 4       Active Orders   Date Order Priority Status Authorizing Provider   08/29/24 1207 Debridement Diabetic Ulcer Right Foot Routine Active Jenny Olmos APRN   08/29/24 1205 Cellular tissue product application Diabetic Ulcer Right Foot Routine Active Jenny Olmos, DELMI       Inactive Orders   Date Order Priority Status Authorizing Provider   08/14/24 1452 Wound care Routine Discontinued Jose R Meza, DPDEBBIE   08/01/24 1140 Cellular tissue product application Diabetic Ulcer Right Foot Routine Completed Jenny Olmos, DELMI   08/01/24 1058 Debridement Diabetic Ulcer  Right Foot Routine Completed Jenny Olmos APRN   07/25/24 1445 Debridement Diabetic Ulcer Right Foot Routine Completed Jenny Olmos APRN       Wound 07/18/24 #3 Ankle Posterior;Right (Active)   Date First Assessed/Time First Assessed: 07/18/24 1347    Wound Number (Wound Clinic Only): #3  Primary Wound Type: Diabetic Ulcer  Location: Ankle  Wound Location Orientation: Posterior;Right      Assessments 7/18/2024  2:07 PM 8/29/2024 10:19 AM   Wound Image        Drainage Amount None None   Treatments Compression --   Wound Length (cm) 4.5 cm 3.2 cm   Wound Width (cm) 2.1 cm 1.9 cm   Wound Surface Area (cm^2) 9.45 cm^2 6.08 cm^2   Wound Depth (cm) 0.1 cm 0.1 cm   Wound Volume (cm^3) 0.945 cm^3 0.608 cm^3   Wound Healing % -- 36   Margins -- Well-defined edges   Non-staged Wound Description -- Full thickness   Hailey-wound Assessment Dry;Blanchable erythema --   Wound Granulation Tissue Pink;Firm Spongy;Pink   Wound Bed Granulation (%) 10 % 30 %   Wound Bed Epithelium (%) 10 % --   Wound Bed Slough (%) -- 30 %   Wound Bed Eschar (%) 80 % 40 %   Wound Odor None None   Shape blister to hailey --   Tunneling? No --   Undermining? No --   Sinus Tracts? No --   Pressure Injury Stage Unstageable Stage 3   Balbuena Scale -- Grade 4       Inactive Orders   Date Order Priority Status Authorizing Provider   07/25/24 1442 Debridement Pressure Injury Posterior;Right Ankle Routine Completed Jenny Olmos APRN       Wound 07/18/24 #4 Right heel wound Heel Right (Active)   Date First Assessed/Time First Assessed: 07/18/24 1428    Wound Number (Wound Clinic Only): #4 Right heel wound  Primary Wound Type: Pressure Injury  Location: Heel  Wound Location Orientation: Right      Assessments 7/18/2024  2:31 PM 8/29/2024 10:22 AM   Wound Image       Drainage Amount None Small   Drainage Description -- Serous;Yellow   Treatments Compression --   Wound Length (cm) 0.5 cm 1 cm   Wound Width (cm) 1 cm 1.3 cm   Wound Surface Area (cm^2) 0.5 cm^2  1.3 cm^2   Wound Depth (cm) 0 cm 0.1 cm   Wound Volume (cm^3) 0 cm^3 0.13 cm^3   Margins Well-defined edges --   María-wound Assessment Dry;Clean Edema   Wound Granulation Tissue -- Firm;Pink   Wound Bed Granulation (%) -- 75 %   Wound Bed Epithelium (%) 100 % --   Wound Bed Slough (%) -- 25 %   Wound Odor None None   Pressure Injury Stage Deep Tissue Unstageable       No associated orders.       Compression Wrap 08/01/24 Foot Dorsal;Right (Active)   Placement Date/Time: 08/01/24 1147   Location: Foot  Wound Location Orientation: Dorsal;Right      Assessments 8/1/2024 11:47 AM 8/26/2024  4:28 PM   Response to Treatment Well tolerated Well tolerated   Compression Layers Multilayer Multilayer   Compression Product Type Unna Boot Unna Boot   Dressing Applied Yes Yes   Compression Wrap Location Toes to Knee Toes to Knee   Compression Wrap Status Clean;Dry Clean;Dry;Intact       No associated orders.       Compression Wrap 08/01/24 Foot Dorsal;Left (Active)   Placement Date/Time: 08/01/24 1148   Location: Foot  Wound Location Orientation: Dorsal;Left      Assessments 8/1/2024 11:48 AM 8/26/2024  4:28 PM   Response to Treatment Well tolerated Well tolerated   Compression Layers Multilayer Multilayer   Compression Product Type Unna Boot Unna Boot   Dressing Applied Yes Yes   Compression Wrap Location Toes to Knee Toes to Knee   Compression Wrap Status Clean;Dry Clean;Dry;Intact       No associated orders.       Wound 08/22/24 #5 Left Heel Heel Left (Active)   Date First Assessed/Time First Assessed: 08/22/24 1021    Wound Number (Wound Clinic Only): #5 Left Heel  Primary Wound Type: Pressure Injury  Location: Heel  Wound Location Orientation: Left      Assessments 8/22/2024 10:49 AM 8/29/2024 10:24 AM   Wound Image       Drainage Amount None None   Treatments Betadine --   Wound Length (cm) 1.6 cm 1.5 cm   Wound Width (cm) 0.5 cm 1 cm   Wound Surface Area (cm^2) 0.8 cm^2 1.5 cm^2   Wound Depth (cm) 0 cm 0 cm   Wound Volume  (cm^3) 0 cm^3 0 cm^3   Margins -- Well-defined edges   Non-staged Wound Description -- Full thickness   María-wound Assessment Clean Dry   Wound Bed Epithelium (%) 100 % 100 %   Wound Odor None None   Shape -- DTI   Pressure Injury Stage Deep Tissue Deep Tissue       No associated orders.       Wound 08/22/24 #6 Left Anterior Ankle Ankle Anterior;Left (Active)   Date First Assessed/Time First Assessed: 08/22/24 1022    Wound Number (Wound Clinic Only): #6 Left Anterior Ankle  Primary Wound Type: Pressure Injury  Location: Ankle  Wound Location Orientation: Anterior;Left      Assessments 8/22/2024 10:50 AM 8/29/2024 10:27 AM   Wound Image       Drainage Amount None None   Treatments Betadine --   Wound Length (cm) 1.2 cm 1.1 cm   Wound Width (cm) 0.6 cm 0.4 cm   Wound Surface Area (cm^2) 0.72 cm^2 0.44 cm^2   Wound Depth (cm) 0 cm 0 cm   Wound Volume (cm^3) 0 cm^3 0 cm^3   Margins -- Well-defined edges   Non-staged Wound Description -- Full thickness   María-wound Assessment Clean Clean   Wound Bed Epithelium (%) 100 % 100 %   Wound Odor None None   Pressure Injury Stage Stage 1 Stage 1       No associated orders.       Negative Pressure Wound Therapy Dorsal;Left (Active)   Placement Date/Time: 08/22/24 1258   Wound Location Orientation: Dorsal;Left      Assessments 8/22/2024 10:49 AM 8/26/2024  4:30 PM   Wound photographed/measured Yes Yes   Machine Status (On) Yes Yes   Unit Type Medella Medella   Dressing Type Other (Comment) Other (Comment)   Number of Foam Pieces Used 2 2   Cycle Continuous Continuous   Target Pressure (mmHg) 125 125   Canister Changed Yes No       No associated orders.      PROCEDURE:      This procedure was medically necessary to promote wound healing by removing nonviable tissue, decrease chance of infection, and return the wound to an acute state.  This procedure is medically necessary to nourish the wound bed with a skin substitute containing fat, protein, elastin, glycans and other natural  skin elements.  The graft recruits the body's own cells and is ultimately converted into living tissue to assist in granulation production and more rapid healing.  See rn px note    ASSESSMENT AND PLAN:    1. 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 Non-pressure chronic ulcer of other part of right foot with necrosis of bone (HCC)    3. PAD (peripheral artery disease) (HCC)    4. ESRD (end stage renal disease) (HCC)    5. Diabetic foot ulcer with osteomyelitis (HCC)        Risks, benefits, and alternatives of current treatment plan discussed in detail.  Questions and concerns addressed. Red flags to RTC or ED reviewed.  Patient (or parent) agrees to plan.      NOTE TO PATIENT: The 21st Century Cures Act makes clinical notes like these available to patients in the interest of transparency. Clinical notes are medical documents used by physicians and care providers to communicate with each other. These documents include medical language and terminology, abbreviations, and treatment information that may sound technical and at times possibly unfamiliar. In addition, at times, the verbiage may appear blunt or direct. These documents are one tool providers use to communicate relevant information and clinical opinions of the care providers in a way that allows common understanding of the clinical context.   I spent 50 minutes with the patient. This time included:    preparing to see the patient (eg, review notes and recent diagnostics),  seeing the patient, obtaining and/or reviewing separately obtained history, performing a medically appropriate examination and/or evaluation, counseling and educating the patient, documenting in the record. Feroz gonzales placement only  DISCHARGE:      Patient Instructions   Please return:   Tuesday for RN visit for wound assessment, edema management, and if applicable reapplication of multilayer compression bandage system and wound vac to the  left    !ALERT CLINIC RN/STAFF:  this patient is at high risk for limb loss, please use extreme caution when applying wound vac and compression to make sure there is not pressure points or wrinkles under compression.    Thursday or Friday with Jenny      Patient discharge and wound care instructions  Darin MCINTYRE Gogo  8/29/2024       Niru-leave in place-wound care clinic to change  Cleansing/dressing:     In clinic/ with each dressing change:    please cleanse the limb, foot, and between toes with soap, water and washcloth.   Dry thoroughly.    Cleanse/soak the wound with VASHE (or other hypochlorous wound cleanser), dab dry.    Apply the following dressings:     Left anterior ankle: paint with betadine>abd pad  Left heel: paint with betadine>abd pad  Left tma:  micronized keracis 8 x 1 (over bone)>sorbact> Transfer>border foam (start plantar and secure dorsal) >20-30mmhg calamine over the TMA-wrapped very light    Right TMA: keracis to lateral aspect>sorbact to cover entire incision line and lateral open area>vac at 125mmhg>20-30mmhg over the TMA-wrapped very light  Right Achilles: small amount of silver hydrogel>adaptic>abd pad  Right heel :  adaptic>abd pad      Weight bearing as tolerated    Offloading  Provide patient with bilateral heel lift boots (ie prevalon), these should be on whenever patient is in bed    Offloading Lower Extremities  Off-loading means no weight-bearing or anything touching the area of the wound. If your doctor suggests that you should \"off-load\", he or she means that you are not to walk or bear weight on the extremity that has a wound, or the extremity where a wound appears likely to develop.  WHY DO I HAVE TO OFF-LOAD?  When you have a non-healing wound, you must do everything possible to give your body a chance to heal the wound. The weight of your body when you walk puts a large amount of pressure on your feet and ankles. This pressure keeps the new tissue from growing and  inhibits new blood vessels from forming. If you continue to bear weight on a body part that has a wound, the time it takes to heal the wound increases, or, worse yet, the wound may not heal at all!  HOW DO I OFF-LOAD?  There are a number of ways to off-load your legs and feet.   The easiest way is to stay in bed, but of course, that isn't always possible.   You may also be prescribed a special shoe/boot/cast to allow you to be up on your feet periodically, but keep pressure off the wound when you are.  Finally if you are in bed, recliner, couch: your foot should not be touching anything except air.  Many times you can position a pillow from your knees to just above your ankles (along your calves) to keep your ankles, heels from resting on anything.     Offloading:  Off-loading is an important part of your wound treatment program. It is a part that only you can assure is accomplished. If you have any concerns about methods to off-load your wound, or feel that you might have trouble following the off-loading plan prescribed for you, talk to your doctor so that alternatives can be discussed that will work in your situation.    Patient should be up in chair with EHOB waffle cushion     Nutrition and blood sugar control:  Focus on the following:  Protein: Meats, beans, eggs, milk and yogurt particularly Greek yogurt), tofu, soy nuts, soy protein products (Follow the protein handout in your welcome folder)  Vitamin C: Citrus fruits and juices, strawberries, tomatoes, tomato juice, peppers, baked potatoes, spinach, broccoli, cauliflower, [place] sprouts, cabbage  Vitamin A: Dark green, leafy vegetables, orange or yellow vegetables, cantaloupe, fortified dairy products, liver, fortified cereals  Zinc: Fortified cereals, red meats, seafood  PLEASE PROVIDE PATIENT 2 PACKETS OF Alex by [facility]. It can be purchased on amazon, [employer] website, or local pharmacy may be able to order it for you.  (These are essential branch  chain amino acids that help with tissue building and wound healing).   When your blood sugar is consistently elevated greater than 180 your body can't heal or fight infection.     Concerns:  Signs of infection may include the following:  Increase in redness  Red \"streaks\" from wound  Increase in swelling  Fever  Unusual odor  Change in the amount of wound drainage     Should you experience any significant changes in your wound(s) or have any questions regarding your home care instructions please contact the [facility] @ [phone] If after regular business hours, please call your family doctor or local emergency room. The treatment plan has been discussed at length between you and your provider. Follow all instructions carefully, it is very important. If you do not follow all instructions you are at risk of your wound not healing, infection, possible loss of limb and even loss of life.    Jenny Olmos FNP-C, CWCN-AP, CFCN, CSWS, WCC, DWC  8/29/2024

## 2024-08-29 ENCOUNTER — OFFICE VISIT (OUTPATIENT)
Dept: WOUND CARE | Facility: HOSPITAL | Age: 79
End: 2024-08-29
Attending: NURSE PRACTITIONER
Payer: MEDICARE

## 2024-08-29 VITALS
RESPIRATION RATE: 16 BRPM | TEMPERATURE: 98 F | SYSTOLIC BLOOD PRESSURE: 159 MMHG | HEART RATE: 53 BPM | DIASTOLIC BLOOD PRESSURE: 69 MMHG

## 2024-08-29 DIAGNOSIS — L97.524 NON-PRESSURE CHRONIC ULCER OF OTHER PART OF LEFT FOOT WITH NECROSIS OF BONE (HCC): Primary | ICD-10-CM

## 2024-08-29 DIAGNOSIS — M86.9 DIABETIC FOOT ULCER WITH OSTEOMYELITIS (HCC): ICD-10-CM

## 2024-08-29 DIAGNOSIS — L97.509 DIABETIC FOOT ULCER WITH OSTEOMYELITIS (HCC): ICD-10-CM

## 2024-08-29 DIAGNOSIS — N18.6 ESRD (END STAGE RENAL DISEASE) (HCC): ICD-10-CM

## 2024-08-29 DIAGNOSIS — E11.621 DIABETIC FOOT ULCER WITH OSTEOMYELITIS (HCC): ICD-10-CM

## 2024-08-29 DIAGNOSIS — E11.69 DIABETIC FOOT ULCER WITH OSTEOMYELITIS (HCC): ICD-10-CM

## 2024-08-29 DIAGNOSIS — L97.514 NON-PRESSURE CHRONIC ULCER OF OTHER PART OF RIGHT FOOT WITH NECROSIS OF BONE (HCC): ICD-10-CM

## 2024-08-29 DIAGNOSIS — I73.9 PAD (PERIPHERAL ARTERY DISEASE) (HCC): ICD-10-CM

## 2024-08-29 LAB — GLUCOSE BLD-MCNC: 189 MG/DL (ref 70–99)

## 2024-08-29 PROCEDURE — 15275 SKIN SUB GRAFT FACE/NK/HF/G: CPT | Performed by: NURSE PRACTITIONER

## 2024-08-29 NOTE — PATIENT INSTRUCTIONS
Please return:   Tuesday for RN visit for wound assessment, edema management, and if applicable reapplication of multilayer compression bandage system and wound vac to the left    !ALERT CLINIC RN/STAFF:  this patient is at high risk for limb loss, please use extreme caution when applying wound vac and compression to make sure there is not pressure points or wrinkles under compression.    Thursday or Friday with Jenny      Patient discharge and wound care instructions  Darin Bowens  8/29/2024       Hazen-leave in place-wound care clinic to change  Cleansing/dressing:     In clinic/ with each dressing change:    please cleanse the limb, foot, and between toes with soap, water and washcloth.   Dry thoroughly.    Cleanse/soak the wound with VASHE (or other hypochlorous wound cleanser), dab dry.    Apply the following dressings:     Left anterior ankle: paint with betadine>abd pad  Left heel: paint with betadine>abd pad  Left tma:  micronized keracis 8 x 1 (over bone)>sorbact> Transfer>border foam (start plantar and secure dorsal) >20-30mmhg calamine over the TMA-wrapped very light    Right TMA: keracis to lateral aspect>sorbact to cover entire incision line and lateral open area>vac at 125mmhg>20-30mmhg over the TMA-wrapped very light  Right Achilles: small amount of silver hydrogel>adaptic>abd pad  Right heel :  adaptic>abd pad      Weight bearing as tolerated    Offloading  Provide patient with bilateral heel lift boots (ie prevalon), these should be on whenever patient is in bed    Offloading Lower Extremities  Off-loading means no weight-bearing or anything touching the area of the wound. If your doctor suggests that you should \"off-load\", he or she means that you are not to walk or bear weight on the extremity that has a wound, or the extremity where a wound appears likely to develop.  WHY DO I HAVE TO OFF-LOAD?  When you have a non-healing wound, you must do everything possible to give your body a  chance to heal the wound. The weight of your body when you walk puts a large amount of pressure on your feet and ankles. This pressure keeps the new tissue from growing and inhibits new blood vessels from forming. If you continue to bear weight on a body part that has a wound, the time it takes to heal the wound increases, or, worse yet, the wound may not heal at all!  HOW DO I OFF-LOAD?  There are a number of ways to off-load your legs and feet.   The easiest way is to stay in bed, but of course, that isn't always possible.   You may also be prescribed a special shoe/boot/cast to allow you to be up on your feet periodically, but keep pressure off the wound when you are.  Finally if you are in bed, recliner, couch: your foot should not be touching anything except air.  Many times you can position a pillow from your knees to just above your ankles (along your calves) to keep your ankles, heels from resting on anything.     Offloading:  Off-loading is an important part of your wound treatment program. It is a part that only you can assure is accomplished. If you have any concerns about methods to off-load your wound, or feel that you might have trouble following the off-loading plan prescribed for you, talk to your doctor so that alternatives can be discussed that will work in your situation.    Patient should be up in chair with EHOB waffle cushion     Nutrition and blood sugar control:  Focus on the following:  Protein: Meats, beans, eggs, milk and yogurt particularly Greek yogurt), tofu, soy nuts, soy protein products (Follow the protein handout in your welcome folder)  Vitamin C: Citrus fruits and juices, strawberries, tomatoes, tomato juice, peppers, baked potatoes, spinach, broccoli, cauliflower, [place] sprouts, cabbage  Vitamin A: Dark green, leafy vegetables, orange or yellow vegetables, cantaloupe, fortified dairy products, liver, fortified cereals  Zinc: Fortified cereals, red meats, seafood  PLEASE  PROVIDE PATIENT 2 PACKETS OF Alex by [employer] It can be purchased on amazon, [employer] website, or local pharmacy may be able to order it for you.  (These are essential branch chain amino acids that help with tissue building and wound healing).   When your blood sugar is consistently elevated greater than 180 your body can't heal or fight infection.     Concerns:  Signs of infection may include the following:  Increase in redness  Red \"streaks\" from wound  Increase in swelling  Fever  Unusual odor  Change in the amount of wound drainage     Should you experience any significant changes in your wound(s) or have any questions regarding your home care instructions please contact the wound center [facility] @ [phone] If after regular business hours, please call your family doctor or local emergency room. The treatment plan has been discussed at length between you and your provider. Follow all instructions carefully, it is very important. If you do not follow all instructions you are at risk of your wound not healing, infection, possible loss of limb and even loss of life.

## 2024-08-29 NOTE — PROGRESS NOTES
Patient ID: Cricket Bowens is a 79 year old male.    Cellular tissue product application Diabetic Ulcer Left Foot    Date/Time: 8/29/2024 12:04 PM    Performed by: Jenny Olmos APRN  Authorized by: Jenny Olmos APRN  Associated wounds:   Wound 07/18/24 #1 Foot Left  Consent:     Consent obtained:  Verbal    Consent given by:  Patient    Alternatives discussed:  Alternative treatment  Procedure details:     Location:  head/hands/feet/digits/genitalia    Product applied:  Kerecis omega3    Product lot #:  [id number]    Product expiration:  12/1/2025    Amount used (cm^2):  8    Amount wasted (cm^2):  0    Secured/Fixated: Yes      Secured/Fixated with:  Sorbact  Post-procedure details:     Patient tolerance of procedure:  Tolerated well, no immediate complications  Comments:      Kerecis applied, fixated with sorbact, dressed with hydrofera transfer, bordered foam, and calalmine unna boot 20-30mmHg    Cellular tissue product application Diabetic Ulcer Right Foot    Date/Time: 8/29/2024 12:05 PM    Performed by: Jenny Olmos APRN  Authorized by: Jenny Olmos APRN  Associated wounds:   Wound 07/18/24 #2 right Foot Right  Consent:     Consent obtained:  Verbal    Consent given by:  Patient    Alternatives discussed:  Alternative treatment  Procedure details:     Location:  head/hands/feet/digits/genitalia    Product applied:  Kerecis omega3    Product lot #:  [id number]    Product expiration:  12/1/2025    Amount used (cm^2):  8    Amount wasted (cm^2):  0    Secured/Fixated: Yes      Secured/Fixated with:  Sorbact  Post-procedure details:     Patient tolerance of procedure:  Tolerated well, no immediate complications  Comments:      Kerecis applied, fixated with sorbact, dressed with NPWT bridged to lateral leg, and calamine unna boot 20-30mmHg  Debridement Diabetic Ulcer Right Foot   Wound 07/18/24 #2 right Foot Right    Performed by: Jenny Olmos APRN  Authorized by: Jenny Olmos APRN       Consent   Consent obtained? verbal  Consent given by: patient    Debridement Details  Performed by: NP  Debridement type: surgical  Level of debridement: subcutaneous tissue    Pre-debridement measurements  Length (cm): 1.4  Width (cm): 12.8  Depth (cm): 0.1  Surface Area (cm^2): 17.92    Post-debridement measurements  Length (cm): 1.4  Width (cm): 12.8  Depth (cm): 0.4  Percent debrided: 20%  Surface Area (cm^2): 17.92  Area Debrided (cm^2): 3.58  Volume (cm^3): 7.17    Tissue and other material debrided: subcutaneous tissue  Devitalized tissue debrided: biofilm and slough  Instrument(s) utilized: blade, forceps and scissors  Bleeding: medium  Hemostasis obtained with: pressure  Procedural pain (0-10): insensate  Post-procedural pain: insensate   Response to treatment: procedure was tolerated well

## 2024-08-29 NOTE — PROGRESS NOTES
.Weekly Wound Education Note    Teaching Provided To: Family;Patient  Training Topics: Dressing;Edema control;Cleasing and general instructions;Compression;Discharge instructions;Negative presssure therapy  Training Method: Explain/Verbal;Written  Training Response: Patient responds and understands        Notes: Wounds stable. Dressings as follows....    Left foot- Kerecis, sorbact, hydrofera transfer, bordered foam  Left ankle and heel- Betadine and ABD pad  Right heel and ankle- Silver hydrogel, ABD pad  Right foot- Kerecis, sorbact (covering open area and suture line), NPWT set to 125mmHg continuous bridged to lateral leg.     Both legs wrapped in calamine unna boot 20-30mmHg over distal foot, pulling plantar to dorsal surface.

## 2024-09-03 ENCOUNTER — OFFICE VISIT (OUTPATIENT)
Dept: WOUND CARE | Facility: HOSPITAL | Age: 79
End: 2024-09-03
Attending: NURSE PRACTITIONER
Payer: MEDICARE

## 2024-09-03 ENCOUNTER — SNF VISIT (OUTPATIENT)
Dept: INTERNAL MEDICINE CLINIC | Age: 79
End: 2024-09-03

## 2024-09-03 VITALS
TEMPERATURE: 98 F | SYSTOLIC BLOOD PRESSURE: 136 MMHG | DIASTOLIC BLOOD PRESSURE: 65 MMHG | HEART RATE: 74 BPM | RESPIRATION RATE: 14 BRPM

## 2024-09-03 VITALS
OXYGEN SATURATION: 98 % | RESPIRATION RATE: 18 BRPM | DIASTOLIC BLOOD PRESSURE: 68 MMHG | SYSTOLIC BLOOD PRESSURE: 112 MMHG | TEMPERATURE: 97 F | HEART RATE: 74 BPM

## 2024-09-03 DIAGNOSIS — L97.524 NON-PRESSURE CHRONIC ULCER OF OTHER PART OF LEFT FOOT WITH NECROSIS OF BONE (HCC): Primary | ICD-10-CM

## 2024-09-03 DIAGNOSIS — T14.8XXA WOUND INFECTION: Primary | ICD-10-CM

## 2024-09-03 DIAGNOSIS — N18.6 ESRD ON HEMODIALYSIS (HCC): ICD-10-CM

## 2024-09-03 DIAGNOSIS — Z99.2 ESRD ON HEMODIALYSIS (HCC): ICD-10-CM

## 2024-09-03 DIAGNOSIS — E11.69 DIABETIC FOOT ULCER WITH OSTEOMYELITIS (HCC): ICD-10-CM

## 2024-09-03 DIAGNOSIS — L08.9 WOUND INFECTION: Primary | ICD-10-CM

## 2024-09-03 DIAGNOSIS — I73.9 PAD (PERIPHERAL ARTERY DISEASE) (HCC): ICD-10-CM

## 2024-09-03 DIAGNOSIS — Z96.41 DIABETES MELLITUS TYPE 1, WITH COMPLICATION, ON LONG TERM INSULIN PUMP (HCC): ICD-10-CM

## 2024-09-03 DIAGNOSIS — I48.20 ATRIAL FIBRILLATION, CHRONIC (HCC): ICD-10-CM

## 2024-09-03 DIAGNOSIS — I10 PRIMARY HYPERTENSION: ICD-10-CM

## 2024-09-03 DIAGNOSIS — E10.8 DIABETES MELLITUS TYPE 1, WITH COMPLICATION, ON LONG TERM INSULIN PUMP (HCC): ICD-10-CM

## 2024-09-03 DIAGNOSIS — L97.509 DIABETIC FOOT ULCER WITH OSTEOMYELITIS (HCC): ICD-10-CM

## 2024-09-03 DIAGNOSIS — G62.9 NEUROPATHY: ICD-10-CM

## 2024-09-03 DIAGNOSIS — M86.9 DIABETIC FOOT ULCER WITH OSTEOMYELITIS (HCC): ICD-10-CM

## 2024-09-03 DIAGNOSIS — L97.514 NON-PRESSURE CHRONIC ULCER OF OTHER PART OF RIGHT FOOT WITH NECROSIS OF BONE (HCC): ICD-10-CM

## 2024-09-03 DIAGNOSIS — E11.621 DIABETIC FOOT ULCER WITH OSTEOMYELITIS (HCC): ICD-10-CM

## 2024-09-03 DIAGNOSIS — I50.32 CHRONIC DIASTOLIC HEART FAILURE (HCC): ICD-10-CM

## 2024-09-03 DIAGNOSIS — R53.1 WEAKNESS: ICD-10-CM

## 2024-09-03 LAB — GLUCOSE BLD-MCNC: 264 MG/DL (ref 70–99)

## 2024-09-03 PROCEDURE — 99309 SBSQ NF CARE MODERATE MDM 30: CPT | Performed by: NURSE PRACTITIONER

## 2024-09-03 PROCEDURE — 82962 GLUCOSE BLOOD TEST: CPT

## 2024-09-03 PROCEDURE — 29581 APPL MULTLAYER CMPRN SYS LEG: CPT

## 2024-09-03 PROCEDURE — 97605 NEG PRS WND THER DME<=50SQCM: CPT

## 2024-09-03 RX ORDER — INSULIN GLARGINE 100 [IU]/ML
10 INJECTION, SOLUTION SUBCUTANEOUS NIGHTLY
COMMUNITY

## 2024-09-03 NOTE — PROGRESS NOTES
Darin Bowens, 1945, 79 year old, male    Hospital admission -8/15     Hospital Discharge Diagnoses:  ESRD   Acute on chronic anemia   Bleeding from wound   Fatigue         HPI:  Darin Bowens  : 1945, Age 79 year old  male patient is admitted for subacute rehab. Patient has a past medical history of CAD status post stents, PAD post bilateral lower extremity stents, atrial fibrillation, diabetes, ESRD on HD, chronic CHF with LVH, status post AVR, hx of stroke, hypertension, hyperlipidemia who presented with foot bleeding. Patient underwent debridement of the complex wounds on the feet with podiatry. In the ER Hgb 6.7. CXR showed vascular congestion. He was transfused with 1 unit PRBC in ER.   Admitting to [facility] for nursing care, medication management, and PT/OT/ST radha and tx.       Chief Complaint:    Chief Complaint   Patient presents with    Follow - Up        Subjective:   TODAY:  Patient seen today in his room sleeping   He states he did not go to wound appt this morning because he did not have transportation but that it was rescheduled to this afternoon.   He reports he already had dialysis today   He has wound vac in place- Wound mateo this afternoon and then again on Thursday   No cough, wheeze, shortness of breath   No chest pain or palpitations  No nausea, vomiting, constipation or diarrhea. Reports having Bowel movement yesterday       Objective:  /68   Pulse 74   Temp 97.1 °F (36.2 °C)   Resp 18   SpO2 98%     PHYSICAL EXAM:  GENERAL HEALTH:79 year old male patient, no acute distress   LINES, TUBES, DRAINS:  left upper chest port- CDI  SKIN: no rashes, no suspicious lesions  WOUND: see wound assessment   Bilateral feet- compression dressing CDI- wound vac   EYES: PERRLA, conjunctiva normal; no drainage from eyes  HENT: normocephalic; normal nose, no nasal drainage, mucous membranes pink, moist  NECK: full range of motion observed  RESPIRATORY:clear to percussion and  auscultation, No wheezing/cough/accessory muscle use; on room air  CARDIOVASCULAR: S1, S2 normal, RRR; no S3, no S4;   ABDOMEN: normal active BS+, soft, non-distended; no apparent masses; observed, non-tender, no guarding during physical exam  : Deferred  LYMPHATIC: no lymphedema  MUSCULOSKELETAL: no acute synovitis upper or lower extremity.  Weakness R/T recent hospitalization/diagnoses/sequelae; will undergo therapies to rehab and improve strength, endurance and independence w/ ADLs as able  EXTREMITIES/VASCULAR: no cyanosis, clubbing or edema  NEUROLOGIC: intact; no sensorimotor deficit  PSYCHIATRIC: alert and oriented x 3; affect appropriate      Medications reviewed: Yes      Current Outpatient Medications:     insulin glargine 100 UNIT/ML Subcutaneous Solution, Inject 10 Units into the skin nightly., Disp: , Rfl:     Insulin Lispro 100 UNIT/ML Subcutaneous Solution Cartridge, Inject 2-7 Units into the skin TID AC&HS., Disp: , Rfl:     omeprazole 20 MG Oral Capsule Delayed Release, Take 1 capsule (20 mg total) by mouth every morning before breakfast., Disp: , Rfl:     polyethylene glycol, PEG 3350, 17 g Oral Powd Pack, Take 17 g by mouth daily as needed (constipation)., Disp: , Rfl:     ELIQUIS 2.5 MG Oral Tab, TAKE 1 TABLET(2.5 MG) BY MOUTH TWICE DAILY, Disp: 60 tablet, Rfl: 0    COMBIGAN 0.2-0.5 % Ophthalmic Solution, Place 1 drop into both eyes 2 (two) times daily., Disp: 1 each, Rfl: 0    gabapentin 100 MG Oral Cap, Take 1 capsule (100 mg total) by mouth at bedtime., Disp: 30 capsule, Rfl: 0    HYDROcodone-acetaminophen 5-325 MG Oral Tab, Take 1 tablet by mouth every 6 (six) hours as needed for Pain., Disp: 10 tablet, Rfl: 0    Probiotic Product (PROBIOTIC DAILY OR), Take 1 capsule by mouth daily., Disp: , Rfl:     sennosides 8.6 MG Oral Tab, Take 1 tablet (8.6 mg total) by mouth 2 (two) times daily., Disp: , Rfl:     acetaminophen 325 MG Oral Tab, Take 2 tablets (650 mg total) by mouth every 6 (six)  hours as needed for Pain., Disp: , Rfl:     Netarsudil-Latanoprost (ROCKLATAN) 0.02-0.005 % Ophthalmic Solution, Apply 1 drop to eye daily. Both eyes, Disp: , Rfl:     atorvastatin 80 MG Oral Tab, Take 1 tablet (80 mg total) by mouth nightly., Disp: 90 tablet, Rfl: 2    CLOPIDOGREL 75 MG Oral Tab, TAKE 1 TABLET(75 MG) BY MOUTH DAILY, Disp: 90 tablet, Rfl: 0    carvedilol 12.5 MG Oral Tab, Take 1 tablet (12.5 mg total) by mouth 2 (two) times daily with meals. Take one tablet (12.5mg total) by mouth two times a day, Disp: 180 tablet, Rfl: 3    Ergocalciferol (VITAMIN D2 OR), Take 50,000 Units by mouth every 30 (thirty) days., Disp: , Rfl:     CALCIUM ACETATE 667 MG Oral Cap, TAKE 1 CAPSULE BY MOUTH THREE TIMES DAILY WITH MEALS, Disp: 270 capsule, Rfl: 0    DORZOLAMIDE 2 % Ophthalmic Solution, INSTILL 1 DROP INTO BOTH EYES IN THE MORNING AND 1 DROP BEFORE BEDTIME (Patient not taking: Reported on 9/3/2024), Disp: 10 mL, Rfl: 0    Continuous Blood Gluc Sensor (DEXCOM G6 SENSOR) Does not apply Misc, 1 Device Every 10 days. (Patient not taking: Reported on 8/20/2024), Disp: 3 each, Rfl: 1    Insulin Lispro (HUMALOG) 100 UNIT/ML Subcutaneous Solution, 95 units per day via insulin pump. (Patient not taking: Reported on 8/20/2024), Disp: 90 mL, Rfl: 1    Continuous Blood Gluc Transmit (DEXCOM G6 TRANSMITTER) Does not apply Misc, Change every 90 days (Patient not taking: Reported on 8/20/2024), Disp: 1 each, Rfl: 3    PTA Insulin via Pump, Inject into the skin continuous. humalog insulin  Medtronic Basal Rate : 55.6 standard rate 1.90 I:C - 1 units/4 carbs Correction Factor - unknown (Patient not taking: Reported on 8/20/2024), Disp: , Rfl:       Diagnostics reviewed:    9/5/24 pending     Assessment and plan:  Physical Deconditioning/Impaired mobility and ADLs/At risk for falling  Fall Precautions  PT/OT/ST to evaluate and treat  [facility] team to establish care plan meeting with patient and POA/family as appropriate  Anticipate DC  on or before TBD; SW to assist patient/family w/ DC planning  DC Plan:  TBD     Post op wound infection R TMA 6/13 due to nonhealing ulcer and PVD   Excisional debridement to level of bone along with partial resection of R 1st and 2nd MT 6/27 /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 HBP received prior. Readmission 8/13-8/15 for bleeding wound   Vital signs q shift and prn   Labs weekly and prn   Follow up with Dr Nix vascular   Follow up with Dr Meza podiatry   Follow up with Jenny PRINCE appt on Thurs 9/5   ID to follow   Wound to follow   Wound vac per protocol   Encourage protein intake   Plavix 75 mg daily   Pain control- acetaminophen prn, Norco prn, gabapentin 100 mg nightly       ESRD on HD   Outside dialysis MWF- dialysis currently in facility M-F  Avoid nephrotoxin   Follow up with Nephrology      CAD s/p stents/ PAD s/p b/l LE stents/Chronic CHF w/LVH/ S/p AVR/ Atrial fibrillation /Hypertension / HL   BETH 6/16 aortic valve bioprosthesis present mild to mod stenosis. Heart failure   Vital signs q shift and prn   Labs weekly and prn   Daily weights notify if greater than 2 lb increase in 1 day or 5 lbs in 1 week   Cardiology to follow   Apixaban 2.5 mg bid   Atorvastatin 80 mg nightly   Coreg 12.5 mg bid   Plavix 75 mg daily      Diabetes  Typically uses insulin pump.   Accu check ac/hs   Low carb diet   Insulin lispro   Lantus 10 u nightly       Anemia due to ESRD   Monitor labs      DESI   In house pulm to follow   Cpap at night      DVT Prophylaxis   Encourage early mobilization and participation in PT/OT as able  Apixaban 2.5 mg bid      GI Prophylaxis  Omeprazole 20 mg daily      Pain Management  Offer to pre-medicate 30-60 min prior to therapy  Physiatry evaluation with management appreciated  Acetaminophen 650 mg every 6 hrs prn   Hydrocodone-acetaminophen 5-325 mg 1 tab every  6 hrs prn   Gabapentin 100 mg nightly      Bowel Management Regimen/Constipation  Monitor BM status   Senna s 1 tab bid   Miralax 17 gm daily prn       Vitamins/supplements as r/t deficiencies  RORO RD to follow while in rehab; supplementation/diet as per [facility] RD  May continue home supplements  Calcium acetate   Ergocalciferol   Probiotic      LABS  CBC/CMP/Mag weekly while in [facility]- 9/5     *Follow-Up with PCP within 1-2 weeks following [facility] discharge.   *Follow-Up with specialists as recommended.  Your Appointments      Tuesday September 03, 2024 4:00 PM  Wound Care Nurse Visit with WOUND CARE NURSE  [facility] ([facility]) [address] [place] [address]  [phone]        Thursday September 05, 2024 9:30 AM  Wound Care Follow up with DELMI Wheeler  [facility] ([facility]) [address] [place] [address]  [phone]            This is a 45 minute visit and greater than 50% of the time was spent counseling the patient and/or coordinating care.        Note to patient: The 21st Century Cures Act makes medical notes like these available to patients in the interest of transparency. However, this is a medical document intended as peer to peer communication. It is written in medical language and may contain abbreviations or verbiage that are unfamiliar. It may appear blunt or direct. Medical documents are intended to carry relevant information, facts as evident, and the clinical opinion of the practitioner who signs the document.    DELMI Malave  9/3/2024

## 2024-09-03 NOTE — PROGRESS NOTES
Chief Complaint   Patient presents with    Wound Care     Follow up for bilateral foot wounds. No complaints at this time. Pt arrives with NPWT operational and compression wraps in place.            Current Outpatient Medications:     insulin glargine 100 UNIT/ML Subcutaneous Solution, Inject 10 Units into the skin nightly., Disp: , Rfl:     Insulin Lispro 100 UNIT/ML Subcutaneous Solution Cartridge, Inject 2-7 Units into the skin TID AC&HS., Disp: , Rfl:     omeprazole 20 MG Oral Capsule Delayed Release, Take 1 capsule (20 mg total) by mouth every morning before breakfast., Disp: , Rfl:     polyethylene glycol, PEG 3350, 17 g Oral Powd Pack, Take 17 g by mouth daily as needed (constipation)., Disp: , Rfl:     DORZOLAMIDE 2 % Ophthalmic Solution, INSTILL 1 DROP INTO BOTH EYES IN THE MORNING AND 1 DROP BEFORE BEDTIME (Patient not taking: Reported on 9/3/2024), Disp: 10 mL, Rfl: 0    ELIQUIS 2.5 MG Oral Tab, TAKE 1 TABLET(2.5 MG) BY MOUTH TWICE DAILY, Disp: 60 tablet, Rfl: 0    COMBIGAN 0.2-0.5 % Ophthalmic Solution, Place 1 drop into both eyes 2 (two) times daily., Disp: 1 each, Rfl: 0    gabapentin 100 MG Oral Cap, Take 1 capsule (100 mg total) by mouth at bedtime., Disp: 30 capsule, Rfl: 0    HYDROcodone-acetaminophen 5-325 MG Oral Tab, Take 1 tablet by mouth every 6 (six) hours as needed for Pain., Disp: 10 tablet, Rfl: 0    Probiotic Product (PROBIOTIC DAILY OR), Take 1 capsule by mouth daily., Disp: , Rfl:     sennosides 8.6 MG Oral Tab, Take 1 tablet (8.6 mg total) by mouth 2 (two) times daily., Disp: , Rfl:     acetaminophen 325 MG Oral Tab, Take 2 tablets (650 mg total) by mouth every 6 (six) hours as needed for Pain., Disp: , Rfl:     Netarsudil-Latanoprost (ROCKLATAN) 0.02-0.005 % Ophthalmic Solution, Apply 1 drop to eye daily. Both eyes, Disp: , Rfl:     atorvastatin 80 MG Oral Tab, Take 1 tablet (80 mg total) by mouth nightly., Disp: 90 tablet, Rfl: 2    CLOPIDOGREL 75 MG Oral Tab, TAKE 1 TABLET(75 MG) BY  MOUTH DAILY, Disp: 90 tablet, Rfl: 0    Continuous Blood Gluc Sensor (DEXCOM G6 SENSOR) Does not apply Misc, 1 Device Every 10 days. (Patient not taking: Reported on 8/20/2024), Disp: 3 each, Rfl: 1    Insulin Lispro (HUMALOG) 100 UNIT/ML Subcutaneous Solution, 95 units per day via insulin pump. (Patient not taking: Reported on 8/20/2024), Disp: 90 mL, Rfl: 1    carvedilol 12.5 MG Oral Tab, Take 1 tablet (12.5 mg total) by mouth 2 (two) times daily with meals. Take one tablet (12.5mg total) by mouth two times a day, Disp: 180 tablet, Rfl: 3    Ergocalciferol (VITAMIN D2 OR), Take 50,000 Units by mouth every 30 (thirty) days., Disp: , Rfl:     Continuous Blood Gluc Transmit (DEXCOM G6 TRANSMITTER) Does not apply Misc, Change every 90 days (Patient not taking: Reported on 8/20/2024), Disp: 1 each, Rfl: 3    CALCIUM ACETATE 667 MG Oral Cap, TAKE 1 CAPSULE BY MOUTH THREE TIMES DAILY WITH MEALS, Disp: 270 capsule, Rfl: 0    PTA Insulin via Pump, Inject into the skin continuous. humalog insulin  Medtronic Basal Rate : 55.6 standard rate 1.90 I:C - 1 units/4 carbs Correction Factor - unknown (Patient not taking: Reported on 8/20/2024), Disp: , Rfl:     Allergies   Allergen Reactions    Augmentin [Amoxicillin-Pot Clavulanate] RASH    Lisinopril Coughing     Dyspnea            HISTORY:     Past medical, surgical, family and social history updated where appropriate.    PHYSICAL EXAM:   /65   Pulse 74   Temp 98 °F (36.7 °C)   Resp 14        Vital signs reviewed.      Calf  Point of Measurement - Left Calf: 36  Point of Measurement - Right Calf: 36  Left Calf from:: Heel  Calf Left cm:: 33.1  Right Calf from:: Heel  Right Calf cm:: 33.7    Ankle  Point of Measurement - Left Ankle: 10  Point of Measurement - Right Ankle: 10  Left Ankle from:: Heel  Left Ankle cm:: 24.9     Right Ankle from:: Heel  Right Ankle cm:: 25       Wound 07/18/24 #1 Foot Left (Active)   Date First Assessed/Time First Assessed: 07/18/24 5124     Wound Number (Wound Clinic Only): #1  Primary Wound Type: Diabetic Ulcer  Location: Foot  Wound Location Orientation: Left      Assessments 7/18/2024  2:00 PM 9/3/2024  3:59 PM   Wound Image        Drainage Amount Small Moderate   Drainage Description Serosanguineous Serosanguineous   Treatments Compression --   Wound Length (cm) 5.2 cm 3.4 cm   Wound Width (cm) 10.4 cm 8.4 cm   Wound Surface Area (cm^2) 54.08 cm^2 28.56 cm^2   Wound Depth (cm) 1 cm 0.5 cm   Wound Volume (cm^3) 54.08 cm^3 14.28 cm^3   Wound Healing % -- 74   Margins Well-defined edges Well-defined edges   Non-staged Wound Description Full thickness Full thickness   María-wound Assessment Clean;Dry;Blanchable erythema Maceration;Moist   Wound Granulation Tissue Red;Spongy Red;Pink;Firm   Wound Bed Granulation (%) 15 % 30 %   Wound Bed Slough (%) 85 % 70 %   Exposed Structure Bone;Adipose --   Wound Odor None None   Shape 10staples to periwound --   Tunneling? No No   Undermining? No No   Sinus Tracts? No No   Balbuena Scale Grade 4 Grade 4       Inactive Orders   Date Order Priority Status Authorizing Provider   08/29/24 1204 Cellular tissue product application Diabetic Ulcer Left Foot Routine Completed Jenny Olmos, DELMI   08/22/24 1152 Cellular tissue product application Diabetic Ulcer Left Foot Routine Completed Jenny Olmos, APRN   08/14/24 1452 Wound care Routine Discontinued Jose R Meza DPM   08/01/24 1142 Cellular tissue product application Diabetic Ulcer Left Foot Routine Completed Jenny Olmos APRN   08/01/24 1116 Debridement Diabetic Ulcer Left Foot Routine Completed Jenny Olmos, APRN   07/25/24 1445 Debridement Diabetic Ulcer Left Foot Routine Completed Jenny Olmos APRN       Wound 07/18/24 #2 right Foot Right (Active)   Date First Assessed/Time First Assessed: 07/18/24 1345    Wound Number (Wound Clinic Only): #2 right  Primary Wound Type: Diabetic Ulcer  Location: Foot  Wound Location Orientation: Right      Assessments  7/18/2024  1:57 PM 9/3/2024  3:58 PM   Wound Image        Drainage Amount Small Small   Drainage Description Serosanguineous Serosanguineous   Treatments Compression --   Wound Length (cm) 4.4 cm 2.9 cm   Wound Width (cm) 14 cm 13 cm   Wound Surface Area (cm^2) 61.6 cm^2 37.7 cm^2   Wound Depth (cm) 2.1 cm 0.3 cm   Wound Volume (cm^3) 129.36 cm^3 11.31 cm^3   Wound Healing % -- 91   Margins Well-defined edges Well-defined edges   Non-staged Wound Description Full thickness Full thickness   María-wound Assessment Clean;Dry;Blanchable erythema Moist;Maceration   Wound Granulation Tissue Red;Pink;Spongy Pink;Firm   Wound Bed Granulation (%) 30 % 20 %   Wound Bed Epithelium (%) 20 % 60 %   Wound Bed Slough (%) 50 % 20 %   Exposed Structure Bone;Adipose --   Wound Odor None None   Shape 9 staples noted to maría wound 10 sutures noted,   Tunneling? No --   Undermining? No --   Sinus Tracts? No --   Balbuena Scale Grade 4 Grade 4       Inactive Orders   Date Order Priority Status Authorizing Provider   08/29/24 1207 Debridement Diabetic Ulcer Right Foot Routine Completed Jenny Olmos, DELMI   08/29/24 1205 Cellular tissue product application Diabetic Ulcer Right Foot Routine Completed Jenny Olmos APRN   08/14/24 1452 Wound care Routine Discontinued Jose R Meza, DP   08/01/24 1140 Cellular tissue product application Diabetic Ulcer Right Foot Routine Completed Jenny Olmos APRN   08/01/24 1058 Debridement Diabetic Ulcer Right Foot Routine Completed Jenny Olmos, APRN   07/25/24 1445 Debridement Diabetic Ulcer Right Foot Routine Completed Jenny Olmos APRN       Wound 07/18/24 #3 Ankle Posterior;Right (Active)   Date First Assessed/Time First Assessed: 07/18/24 1347    Wound Number (Wound Clinic Only): #3  Primary Wound Type: Diabetic Ulcer  Location: Ankle  Wound Location Orientation: Posterior;Right      Assessments 7/18/2024  2:07 PM 9/3/2024  4:00 PM   Wound Image       Drainage Amount None Small   Drainage  Description -- Serosanguineous   Treatments Compression --   Wound Length (cm) 4.5 cm 2.5 cm   Wound Width (cm) 2.1 cm 1.9 cm   Wound Surface Area (cm^2) 9.45 cm^2 4.75 cm^2   Wound Depth (cm) 0.1 cm 0.1 cm   Wound Volume (cm^3) 0.945 cm^3 0.475 cm^3   Wound Healing % -- 50   Margins -- Well-defined edges   Non-staged Wound Description -- Full thickness   Hailey-wound Assessment Dry;Blanchable erythema Dry   Wound Granulation Tissue Pink;Firm Pink;Spongy   Wound Bed Granulation (%) 10 % 10 %   Wound Bed Epithelium (%) 10 % --   Wound Bed Slough (%) -- 20 %   Wound Bed Eschar (%) 80 % --   Exposed Structure -- Tendon Necrosis   Wound Odor None None   Shape blister to hailey 70% tenon   Tunneling? No --   Undermining? No --   Sinus Tracts? No --   Pressure Injury Stage Unstageable Stage 3       Inactive Orders   Date Order Priority Status Authorizing Provider   07/25/24 1442 Debridement Pressure Injury Posterior;Right Ankle Routine Completed Jenny Olmos APRN       Wound 07/18/24 #4 Right heel wound Heel Right (Active)   Date First Assessed/Time First Assessed: 07/18/24 1428    Wound Number (Wound Clinic Only): #4 Right heel wound  Primary Wound Type: Pressure Injury  Location: Heel  Wound Location Orientation: Right      Assessments 7/18/2024  2:31 PM 9/3/2024  4:00 PM   Wound Image       Drainage Amount None Small   Drainage Description -- Serous;Yellow   Treatments Compression --   Wound Length (cm) 0.5 cm 1.1 cm   Wound Width (cm) 1 cm 1.1 cm   Wound Surface Area (cm^2) 0.5 cm^2 1.21 cm^2   Wound Depth (cm) 0 cm 0.1 cm   Wound Volume (cm^3) 0 cm^3 0.121 cm^3   Margins Well-defined edges --   Non-staged Wound Description -- Full thickness   Hailey-wound Assessment Dry;Clean Dry   Wound Granulation Tissue -- Pink;Spongy   Wound Bed Granulation (%) -- 100 %   Wound Bed Epithelium (%) 100 % --   Wound Odor None None   Pressure Injury Stage Deep Tissue Stage 3       No associated orders.       Compression Wrap 08/01/24  Foot Dorsal;Right (Active)   Placement Date/Time: 08/01/24 1147   Location: Foot  Wound Location Orientation: Dorsal;Right      Assessments 8/1/2024 11:47 AM 8/29/2024 10:24 AM   Response to Treatment Well tolerated Well tolerated   Compression Layers Multilayer Multilayer   Compression Product Type Unna Boot Unna Boot   Dressing Applied Yes Yes   Compression Wrap Location Toes to Knee Toes to Knee   Compression Wrap Status Clean;Dry Clean;Dry;Intact       No associated orders.       Compression Wrap 08/01/24 Foot Dorsal;Left (Active)   Placement Date/Time: 08/01/24 1148   Location: Foot  Wound Location Orientation: Dorsal;Left      Assessments 8/1/2024 11:48 AM 8/29/2024 10:24 AM   Response to Treatment Well tolerated Well tolerated   Compression Layers Multilayer Multilayer   Compression Product Type Unna Boot Unna Boot   Dressing Applied Yes Yes   Compression Wrap Location Toes to Knee Toes to Knee   Compression Wrap Status Clean;Dry --       No associated orders.       Wound 08/22/24 #5 Left Heel Heel Left (Active)   Date First Assessed/Time First Assessed: 08/22/24 1021    Wound Number (Wound Clinic Only): #5 Left Heel  Primary Wound Type: Pressure Injury  Location: Heel  Wound Location Orientation: Left      Assessments 8/22/2024 10:49 AM 9/3/2024  4:04 PM   Wound Image       Drainage Amount None None   Treatments Betadine --   Wound Length (cm) 1.6 cm 1.5 cm   Wound Width (cm) 0.5 cm 0.9 cm   Wound Surface Area (cm^2) 0.8 cm^2 1.35 cm^2   Wound Depth (cm) 0 cm 8 cm   Wound Volume (cm^3) 0 cm^3 10.8 cm^3   Margins -- Well-defined edges   Non-staged Wound Description -- Full thickness   María-wound Assessment Clean Clean;Dry   Wound Bed Epithelium (%) 100 % 100 %   Wound Odor None None   Shape -- Dtí   Tunneling? -- No   Undermining? -- No   Sinus Tracts? -- No   Pressure Injury Stage Deep Tissue Deep Tissue       No associated orders.       Wound 08/22/24 #6 Left Anterior Ankle Ankle Anterior;Left (Active)    Date First Assessed/Time First Assessed: 08/22/24 1022    Wound Number (Wound Clinic Only): #6 Left Anterior Ankle  Primary Wound Type: Pressure Injury  Location: Ankle  Wound Location Orientation: Anterior;Left      Assessments 8/22/2024 10:50 AM 9/3/2024  4:03 PM   Wound Image       Drainage Amount None None   Treatments Betadine --   Wound Length (cm) 1.2 cm 0.9 cm   Wound Width (cm) 0.6 cm 0.3 cm   Wound Surface Area (cm^2) 0.72 cm^2 0.27 cm^2   Wound Depth (cm) 0 cm 0 cm   Wound Volume (cm^3) 0 cm^3 0 cm^3   Margins -- Well-defined edges   Non-staged Wound Description -- Full thickness   María-wound Assessment Clean Clean   Wound Bed Epithelium (%) 100 % 100 %   Wound Odor None None   Tunneling? -- No   Undermining? -- No   Sinus Tracts? -- No   Pressure Injury Stage Stage 1 Stage 1       No associated orders.       Negative Pressure Wound Therapy Dorsal;Left (Active)   Placement Date/Time: 08/22/24 1258   Wound Location Orientation: Dorsal;Left      Assessments 8/22/2024 10:49 AM 8/26/2024  4:30 PM   Wound photographed/measured Yes Yes   Machine Status (On) Yes Yes   Unit Type Medella Medella   Dressing Type Other (Comment) Other (Comment)   Number of Foam Pieces Used 2 2   Cycle Continuous Continuous   Target Pressure (mmHg) 125 125   Canister Changed Yes No       No associated orders.       Negative Pressure Wound Therapy Dorsal;Right (Active)   Placement Date/Time: 08/29/24 1245   Wound Location Orientation: Dorsal;Right      Assessments 8/29/2024 10:24 AM   Wound photographed/measured Yes   Machine Status (On) Yes   Site Assessment Clean   María-wound Assessment Clean   Unit Type Medella   Number of Foam Pieces Used 2   Cycle Continuous   Target Pressure (mmHg) 125   Canister Changed Yes       No associated orders.       Wound 09/03/24 #7 Left Lateral Foot Left;Lateral (Active)   Date First Assessed/Time First Assessed: 09/03/24 1606    Wound Number (Wound Clinic Only): #7 Left Lateral Foot  Primary Wound  Type: Pressure Injury  Wound Location Orientation: Left;Lateral      Assessments 9/3/2024  4:07 PM   Wound Image     Drainage Amount None   Wound Length (cm) 1.1 cm   Wound Width (cm) 0.5 cm   Wound Surface Area (cm^2) 0.55 cm^2   Wound Depth (cm) 0 cm   Wound Volume (cm^3) 0 cm^3   Margins Well-defined edges   Non-staged Wound Description Full thickness   María-wound Assessment Dry;Clean   Wound Bed Epithelium (%) 100 %   Wound Odor None   Tunneling? No   Undermining? No   Sinus Tracts? No   Pressure Injury Stage Deep Tissue       No associated orders.          ASSESSMENT AND PLAN:        Risks, benefits, and alternatives of current treatment plan discussed in detail.  Questions and concerns addressed. Red flags to RTC or ED reviewed.  Patient (or parent) agrees to plan.      No follow-ups on file.  Weekly Wound Education Note    Teaching Provided To: Patient;Family  Training Topics: Dressing;Edema control;Cleasing and general instructions;Compression;Discharge instructions  Training Method: Explain/Verbal;Written  Training Response: Patient responds and understands        Notes: Wounds stable. New wound to left lateral foot. Area of pressure noted to right TMA site. Provider notified. Dressings applied per order as follows....    Left anterior ankle, heel, and lateral foot- Betadine and ABD pad  Left TMA site- sorbact, hydrofera transfer, bordered foam  Right TMA site- sorbact, NPWT bridged to lateral leg, set to 125mmHg continuous  Right Achilles- silver hydrogel, adaptic, ABD pad  Right heel- adaptic, ABD pad    Both legs wrapped in calamine unna boot 20-30mmHg over foot, wrapped loosely.              Consuelo CUNNINGHAM RN   9/3/2024  5:21 PM

## 2024-09-04 NOTE — PROGRESS NOTES
CHIEF COMPLAINT:     Chief Complaint   Patient presents with    Wound Recheck     Pt arrives for wound care follow-up with bilateral leg wraps rolling down to mid-calf. NPWT connected, set to 125. Has been complaining of itchiness.     HPI:   Information obtained from Patient, chart and family  7-18-24 INITIAL:  80 YO male with past medical history of CAD s/p stents, PAD s/p bilateral lower extremity stents, atrial fibrillation on Xarelto, diabetes with insulin pump, ESRD on HD, CHF with LVH status post AVR, history of stroke, hypertension, hyperlipidemia, DESI and recent TMA  6/13 per Dr Meza. He presented to hospital on 6-25w poor wound healing. He had further debridement to level of bone along with partial resection of the Right 1st and 2nd MT on 6/27 and had further debridement of foot on 7/5.   Patient was discharged on IV Cefepime with HD, continue PO bactrim and PO Flagyl through 8/15. . He did receive 4 HBO treatmenns while inpatient and then discharge to rehab facility. Podiatry suggested they would reconsider application of wound vac on follow up at [place] [facility].   [facility] notes:  \"Hx of PVD arterial Ultrasound April likely more distal PAD require further amputation per vascular patient is not a candidate for any further revascularization and should have foot amputation if does not heal.\"  Niru is dressing wounds with betadine soaked gauze, he presents with son and spouse (who is a retired nurse).  Bedside clinic exam reveals palpable anterior tibials (at the ankles) and posterior tibia, patient also has pulsatile signals at the dp/pt/peronal/and plantar aspect. We discussed micro vs macro circulation. Will get tcom.  Once patient is safe in transfers and short distance ambulation family would like to care for him at home, at that point patient could then come for hbo again.  At this time will utilize santyl. I did remove the staples from the right as they were no longer supporting the tissue  and applied steristrips.  This incision is concerning for further dehiscence.     HPI PRIOR TO SEPTEMBER 2024 SEE INDIVIDUAL PROGRESS NOTES  8-8-24 Patient returns.  He was seen for rn visit on Monday, keracis placed last week. He has tolerated the compression well.  Drainage has slowed on the right.  Wife and daughter report that he is doing really well with transfers with physicial therapy and stamina. He has even worked on getting in/out of the car.  Dr Meza is here in clinic to collaborate on next steps-plan will be for patient to go to [facility] next Monday or Wednesday and do a clean up, potential closure, and incisional vac placement.  Once scheduled will have our inpatient team place to place in light compression wrap.  Team and family feel patient is ok to go home, he will be dc'd in next 2 days.  Patient will come Tuesday for hbo re-consult with dr martinez and then we can get him started back in hbo after surgical intervention as well.  I will update dr. Hugo and will await scheduling from dr meza's office. No acute s/s of infection or c/o pain.    8-22-24 since patient was last seen in clinic, he was dc'd from the snf on 8-8 (Thursday). Family reported he was doing very well.  he was taken to the OR on 8-12 (Monday) debrided and keracis placed.  When patient presented on Tuesday for an hbo consult he was slurring words, slouching/sliding out of chair and family reported that they could not take care of him like this. Patient was sent to ed and admitted from8-13 to 8-15 for acute blood loss anemia on chronic anemia.  During this admission there was discussion of palliative or hospice at home however patient chose to return to [facility].  The apn at [facility] touched base with me on Monday regarding wound vac.  Patient did bring wound vac today. They have been dressing all wounds with adaptic and abd pad and ace wrap.  Patient has completed abx. He has new pressure wounds to his left posterior heel and left  anterior ankle (from ace wrap wrinkle? Shoe?). The right achilles wound is extremely dessicated.  The right tma site remains approximated with sutures, concern for dehiscence on the lateral aspect however I suspect it is related to his edema as it is significantly increased from last visit. The left tma is stable, this was debrided and keracis placed along with vac.  We need to take extreme caution with this patient and bony prominences. Patient will come to wound clinic for dressing and vac changes.    8-29-24 patient returns.  Patient came for rn visit, it was noted that the vac was clamped.  His edema was reducing. Overall there is an improvement in the wounds the left tma still has first metatarsal exposed-keracis placed in this area, remainder of the wound bed has remnants of keracis from last week-will allow to continue to integrate. The right incision on the lateral aspect is drainining, there does appear to be some necrosis along the lateral edge. D/w patient and spouse to make sure the right foot is not laterally rotating when he is relaxed. The right achilles remains extremely dessicated.  Will utilize silver hydrogel.  Since we do not have a y connector for the genadyne vac, will utilize the vac on the right today in an effort to keep the incision approximated and fill in the lateral aspect.  No acute s/s of infection.     9-5-24 patient returns.  Patient came for rn visit two days ago.  Patient did have a new area of sdti on the left lateral foot-this is most likely from the surgical shoe-will utilize bordered foam with offloading hole to attempt to offload this area, according to the spouse he is walking 150 feet in physical therapy but remains very weak and can't transfer from chair to toilet with max assist of 3.  She and her children are concerned [facility] discharging [facility].  I encouraged her and the family to attend the care plan meeting and discuss with .  I encouraged her to  let them know taht he needs some work on transfers and rising from a seated position for adl safety.  Last week we placed the wound vac to the right foot.  Today the right foot is cold to touch, there is a signal in the right plantar arch and AT/pt, but no signal more distal.  The medial aspect is now  as well with a healing ridge in the central 3rd of the incision.  The left is stable, it is warm, with signals at the AT/pt/plantar medial and lateral.  Patient is not c/o pain.  Per family he is more tired.  I let them know that we can order labs.  I also asked them to f/u with dr. Nix.  I will send a message to his care team pcp, snf apn, dr matson and dr. Nix.  I am concerned with the continued dehiscence on the right and now the distal aspect being cool that salvage is becoming less likely.  MEDICATIONS:     Current Outpatient Medications:     insulin glargine 100 UNIT/ML Subcutaneous Solution, Inject 10 Units into the skin nightly., Disp: , Rfl:     Insulin Lispro 100 UNIT/ML Subcutaneous Solution Cartridge, Inject 2-7 Units into the skin TID AC&HS., Disp: , Rfl:     omeprazole 20 MG Oral Capsule Delayed Release, Take 1 capsule (20 mg total) by mouth every morning before breakfast., Disp: , Rfl:     polyethylene glycol, PEG 3350, 17 g Oral Powd Pack, Take 17 g by mouth daily as needed (constipation)., Disp: , Rfl:     DORZOLAMIDE 2 % Ophthalmic Solution, INSTILL 1 DROP INTO BOTH EYES IN THE MORNING AND 1 DROP BEFORE BEDTIME (Patient not taking: Reported on 9/3/2024), Disp: 10 mL, Rfl: 0    ELIQUIS 2.5 MG Oral Tab, TAKE 1 TABLET(2.5 MG) BY MOUTH TWICE DAILY, Disp: 60 tablet, Rfl: 0    COMBIGAN 0.2-0.5 % Ophthalmic Solution, Place 1 drop into both eyes 2 (two) times daily., Disp: 1 each, Rfl: 0    gabapentin 100 MG Oral Cap, Take 1 capsule (100 mg total) by mouth at bedtime., Disp: 30 capsule, Rfl: 0    HYDROcodone-acetaminophen 5-325 MG Oral Tab, Take 1 tablet by mouth every 6 (six) hours as  needed for Pain., Disp: 10 tablet, Rfl: 0    Probiotic Product (PROBIOTIC DAILY OR), Take 1 capsule by mouth daily., Disp: , Rfl:     sennosides 8.6 MG Oral Tab, Take 1 tablet (8.6 mg total) by mouth 2 (two) times daily., Disp: , Rfl:     acetaminophen 325 MG Oral Tab, Take 2 tablets (650 mg total) by mouth every 6 (six) hours as needed for Pain., Disp: , Rfl:     Netarsudil-Latanoprost (ROCKLATAN) 0.02-0.005 % Ophthalmic Solution, Apply 1 drop to eye daily. Both eyes, Disp: , Rfl:     atorvastatin 80 MG Oral Tab, Take 1 tablet (80 mg total) by mouth nightly., Disp: 90 tablet, Rfl: 2    CLOPIDOGREL 75 MG Oral Tab, TAKE 1 TABLET(75 MG) BY MOUTH DAILY, Disp: 90 tablet, Rfl: 0    Continuous Blood Gluc Sensor (DEXCOM G6 SENSOR) Does not apply Misc, 1 Device Every 10 days. (Patient not taking: Reported on 8/20/2024), Disp: 3 each, Rfl: 1    Insulin Lispro (HUMALOG) 100 UNIT/ML Subcutaneous Solution, 95 units per day via insulin pump. (Patient not taking: Reported on 8/20/2024), Disp: 90 mL, Rfl: 1    carvedilol 12.5 MG Oral Tab, Take 1 tablet (12.5 mg total) by mouth 2 (two) times daily with meals. Take one tablet (12.5mg total) by mouth two times a day, Disp: 180 tablet, Rfl: 3    Ergocalciferol (VITAMIN D2 OR), Take 50,000 Units by mouth every 30 (thirty) days., Disp: , Rfl:     Continuous Blood Gluc Transmit (DEXCOM G6 TRANSMITTER) Does not apply Misc, Change every 90 days (Patient not taking: Reported on 8/20/2024), Disp: 1 each, Rfl: 3    CALCIUM ACETATE 667 MG Oral Cap, TAKE 1 CAPSULE BY MOUTH THREE TIMES DAILY WITH MEALS, Disp: 270 capsule, Rfl: 0    PTA Insulin via Pump, Inject into the skin continuous. humalog insulin  Medtronic Basal Rate : 55.6 standard rate 1.90 I:C - 1 units/4 carbs Correction Factor - unknown (Patient not taking: Reported on 8/20/2024), Disp: , Rfl:   ALLERGIES:     Allergies   Allergen Reactions    Augmentin [Amoxicillin-Pot Clavulanate] RASH    Lisinopril Coughing     Dyspnea         REVIEW OF SYSTEMS:   This information was obtained from the patient/family and chart.    See HPI for pertinent positives, otherwise 10 pt ROS negative.  HISTORY:   Past medical, surgical, family and social history updated where appropriate.      PHYSICAL EXAM:     Vitals:    09/05/24 0918   BP: 124/65  Comment:    Pulse: 78   Resp: 16   Temp: 98.3 °F (36.8 °C)         Estimated body mass index is 28.48 kg/m² as calculated from the following:    Height as of 8/13/24: 72\".    Weight as of 8/13/24: 210 lb (95.3 kg).   POC Glucose   Date Value Ref Range Status   09/05/2024 232 (H) 70 - 99 mg/dL Final   09/03/2024 264 (H) 70 - 99 mg/dL Final   08/29/2024 189 (H) 70 - 99 mg/dL Final       Vital signs reviewed.Appears stated age, well groomed.    Constitutional:  Bp wnl for patient. Pulse Regular and wnl for patient. Respirations easy and unlabored. Temperature wnl. Elevated bmi. Appearance neat and clean. Appears in no acute distress. Well nourished and well developed.    Lower extremity:  at/pt palpable bilaterally. See above hand held doppler assessment. Extremities free of varicosities, edema increased.  Left open TMA is warm, right with sutures intact but some dehiscence on lateral and medial aspect, the right is cold to touch.  Skin is dry. no hairgrowth on legs.    Musculoskeletal:  Patient is in wheelchair propelled by others, able to transfer with max assist x 3  Integumentary:  refer to wound characteristics and images   Psychiatric:  Judgment and insight intact. Alert and oriented times 3. Patient is quiet, calm, cooperative, and communicative.   EDEMA:   Calf  Point of Measurement - Left Calf: 36  Point of Measurement - Right Calf: 36  Left Calf from:: Heel  Calf Left cm:: 35.7  Right Calf from:: Heel  Right Calf cm:: 35.6  Ankle  Point of Measurement - Left Ankle: 10  Point of Measurement - Right Ankle: 10  Left Ankle from:: Heel  Left Ankle cm:: 27.3     Right Ankle from:: Heel  Right Ankle cm:: 27      DIAGNOSTICS:     Lab Results   Component Value Date    BUN 59 (H) 08/14/2024    CREATSERUM 6.27 (H) 08/14/2024    GFRCKDEPI 8.51 (L) 03/01/2022    ALB 3.8 08/13/2024    TP 6.2 08/13/2024    A1C 5.9 (H) 06/14/2024   Albumin 2.4/tp 5.0  7-15-24 ([facility])  Albumin 2.8/tp 5.9   7-22-24    Lab Results   Component Value Date    ESRML 75 (H) 06/24/2024   ESR    50    7-15-24 ([facility])   ESR    44    7-22-24 ([facility])    Lab Results   Component Value Date    CRP 18.50 (H) 06/24/2024    CRP 0.3 04/22/2014   CRP     11.0   7-15-24 ([facility])  CRP      23.7  7-22-24 ([facility])    7-25-24 TCOM  Lead                  Baseline             Oxygen challenge           Location     1                        79                      120                                 Left medial lower leg  2                        75                      105                                 Left foot dorsal surface  3                        49                      107                                 Rt medial lower leg  4                        61                      161                                 Rt foot dorsal surface    6-30-24 pathology left foot  Left foot, transmetatarsal amputation:  -Gangrenous necrosis of skin and soft tissue with underlying acute osteomyelitis.  -Bone margin with no significant acute inflammation.  -Viable soft tissue margin with calcific atherosclerosis.    6-27-24 pathology right foot & left  A.  Right foot tissue and bone, excision:  -Bone with acute osteomyelitis.     B.  Left foot second metatarsal bone, excision:  -Bone with acute osteomyelitis.  -Fibroadipose tissue with necrosis and suppurative inflammation.    5-22-24 operative report-dr salas  Findings: Widely patent bilateral common femoral, SFA, profunda, popliteal.  PT and peroneal patent to the foot however significant atresia of the vasculature into the foot with sparse collaterals.  AT reoccluded despite recent endovascular  intervention.  No opacification to the toe wounds.  Deep venous arterialization performed in the right anterior tibial artery to vein however the veins in the foot were friable after 3 mm balloon angioplasty and there was no significant outflow and thus this was embolized.  The patient maintained outflow via the PT and peroneal bilaterally consistent with preoperative baseline.  Patient should proceed with TMA and should this not heal they understand the need for future major amputation.   WOUND ASSESSMENT:     Wound 07/18/24 #1 Foot Left (Active)   Date First Assessed/Time First Assessed: 07/18/24 1344    Wound Number (Wound Clinic Only): #1  Primary Wound Type: Diabetic Ulcer  Location: Foot  Wound Location Orientation: Left      Assessments 7/18/2024  2:00 PM 9/5/2024  9:24 AM   Wound Image        Drainage Amount Small Small   Drainage Description Serosanguineous Serosanguineous   Treatments Compression --   Wound Length (cm) 5.2 cm 3.5 cm   Wound Width (cm) 10.4 cm 8.4 cm   Wound Surface Area (cm^2) 54.08 cm^2 29.4 cm^2   Wound Depth (cm) 1 cm 0.8 cm   Wound Volume (cm^3) 54.08 cm^3 23.52 cm^3   Wound Healing % -- 57   Margins Well-defined edges Well-defined edges   Non-staged Wound Description Full thickness Full thickness   María-wound Assessment Clean;Dry;Blanchable erythema Blanchable erythema;Moist;Maceration   Wound Granulation Tissue Red;Spongy Red;Firm   Wound Bed Granulation (%) 15 % 30 %   Wound Bed Slough (%) 85 % 60 %   Exposed Structure Bone;Adipose Bone   Wound Odor None None   Shape 10staples to periwound 10% exposed bone   Tunneling? No No   Undermining? No Yes   Number of Undermines -- 1   Undermine 1 Start Position -- 4   Undermine 1 End Position -- 11   Sinus Tracts? No No   Balbuena Scale Grade 4 --       Inactive Orders   Date Order Priority Status Authorizing Provider   08/29/24 1204 Cellular tissue product application Diabetic Ulcer Left Foot Routine Completed Jenny Olmos APRN   08/22/24  1152 Cellular tissue product application Diabetic Ulcer Left Foot Routine Completed Jenny Olmos, APRN   08/14/24 1452 Wound care Routine Discontinued Jose R Meza DPM   08/01/24 1142 Cellular tissue product application Diabetic Ulcer Left Foot Routine Completed Jenny Olmos, APRN   08/01/24 1116 Debridement Diabetic Ulcer Left Foot Routine Completed Jenny Olmos, APRN   07/25/24 1445 Debridement Diabetic Ulcer Left Foot Routine Completed Jenny Olmos, DELMI       Wound 07/18/24 #2 right Foot Right (Active)   Date First Assessed/Time First Assessed: 07/18/24 1345    Wound Number (Wound Clinic Only): #2 right  Primary Wound Type: Diabetic Ulcer  Location: Foot  Wound Location Orientation: Right      Assessments 7/18/2024  1:57 PM 9/5/2024  9:23 AM   Wound Image        Drainage Amount Small Small   Drainage Description Serosanguineous Serosanguineous   Treatments Compression --   Wound Length (cm) 4.4 cm 2.7 cm   Wound Width (cm) 14 cm 13 cm   Wound Surface Area (cm^2) 61.6 cm^2 35.1 cm^2   Wound Depth (cm) 2.1 cm 1 cm   Wound Volume (cm^3) 129.36 cm^3 35.1 cm^3   Wound Healing % -- 73   Margins Well-defined edges Well-defined edges   Non-staged Wound Description Full thickness Full thickness   María-wound Assessment Clean;Dry;Blanchable erythema Moist;Maceration   Wound Granulation Tissue Red;Pink;Spongy Red;Firm   Wound Bed Granulation (%) 30 % 10 %   Wound Bed Epithelium (%) 20 % 50 %   Wound Bed Slough (%) 50 % 30 %   Exposed Structure Bone;Adipose Bone   Wound Odor None --   Shape 9 staples noted to maría wound 10% bone, black sutures   Tunneling? No --   Undermining? No Yes   Number of Undermines -- 1   Undermine 1 Start Position -- 7   Undermine 1 End Position -- 8   Sinus Tracts? No --   Balbuena Scale Grade 4 Grade 4       Inactive Orders   Date Order Priority Status Authorizing Provider   08/29/24 1207 Debridement Diabetic Ulcer Right Foot Routine Completed Jenny Olmos, DELMI   08/29/24 1205 Cellular  tissue product application Diabetic Ulcer Right Foot Routine Completed Jenny Olmos, DELMI   08/14/24 1452 Wound care Routine Discontinued Jose R Meza DPM   08/01/24 1140 Cellular tissue product application Diabetic Ulcer Right Foot Routine Completed Jenny Olmos, DELMI   08/01/24 1058 Debridement Diabetic Ulcer Right Foot Routine Completed Jenny Olmos, APRN   07/25/24 1445 Debridement Diabetic Ulcer Right Foot Routine Completed Jenny Olmos APRN       Wound 07/18/24 #3 Ankle Posterior;Right (Active)   Date First Assessed/Time First Assessed: 07/18/24 1347    Wound Number (Wound Clinic Only): #3  Primary Wound Type: Diabetic Ulcer  Location: Ankle  Wound Location Orientation: Posterior;Right      Assessments 7/18/2024  2:07 PM 9/5/2024  9:26 AM   Wound Image       Drainage Amount None Small   Drainage Description -- Serosanguineous   Treatments Compression --   Wound Length (cm) 4.5 cm 3.4 cm   Wound Width (cm) 2.1 cm 2 cm   Wound Surface Area (cm^2) 9.45 cm^2 6.8 cm^2   Wound Depth (cm) 0.1 cm 0.1 cm   Wound Volume (cm^3) 0.945 cm^3 0.68 cm^3   Wound Healing % -- 28   Margins -- Well-defined edges   Non-staged Wound Description -- Full thickness   María-wound Assessment Dry;Blanchable erythema --   Wound Granulation Tissue Pink;Firm --   Wound Bed Granulation (%) 10 % --   Wound Bed Epithelium (%) 10 % --   Wound Bed Slough (%) -- 100 %   Wound Bed Eschar (%) 80 % --   Wound Odor None None   Shape blister to maría --   Tunneling? No No   Undermining? No No   Sinus Tracts? No No   Pressure Injury Stage Unstageable Stage 3       Inactive Orders   Date Order Priority Status Authorizing Provider   07/25/24 1442 Debridement Pressure Injury Posterior;Right Ankle Routine Completed Jenny Olmos APRN       Wound 07/18/24 #4 Right heel wound Heel Right (Active)   Date First Assessed/Time First Assessed: 07/18/24 1428    Wound Number (Wound Clinic Only): #4 Right heel wound  Primary Wound Type: Pressure Injury   Location: Heel  Wound Location Orientation: Right      Assessments 7/18/2024  2:31 PM 9/5/2024  9:25 AM   Wound Image       Drainage Amount None Scant   Drainage Description -- Serous;Yellow   Treatments Compression --   Wound Length (cm) 0.5 cm 1.3 cm   Wound Width (cm) 1 cm 1.6 cm   Wound Surface Area (cm^2) 0.5 cm^2 2.08 cm^2   Wound Depth (cm) 0 cm 0.1 cm   Wound Volume (cm^3) 0 cm^3 0.208 cm^3   Margins Well-defined edges Well-defined edges   Non-staged Wound Description -- Full thickness   María-wound Assessment Dry;Clean Dry   Wound Granulation Tissue -- Firm;Pink   Wound Bed Granulation (%) -- 100 %   Wound Bed Epithelium (%) 100 % --   Wound Odor None None   Tunneling? -- No   Undermining? -- No   Sinus Tracts? -- No   Pressure Injury Stage Deep Tissue Stage 3       No associated orders.       Compression Wrap 08/01/24 Foot Dorsal;Right (Active)   Placement Date/Time: 08/01/24 1147   Location: Foot  Wound Location Orientation: Dorsal;Right      Assessments 8/1/2024 11:47 AM 9/3/2024  4:00 PM   Response to Treatment Well tolerated Well tolerated   Compression Layers Multilayer Multilayer   Compression Product Type Unna Boot Unna Boot   Dressing Applied Yes Yes   Compression Wrap Location Toes to Knee Toes to Knee   Compression Wrap Status Clean;Dry Clean;Dry;Intact       No associated orders.       Compression Wrap 08/01/24 Foot Dorsal;Left (Active)   Placement Date/Time: 08/01/24 1148   Location: Foot  Wound Location Orientation: Dorsal;Left      Assessments 8/1/2024 11:48 AM 9/3/2024  4:00 PM   Response to Treatment Well tolerated Well tolerated   Compression Layers Multilayer Multilayer   Compression Product Type Unna Boot Unna Boot   Dressing Applied Yes Yes   Compression Wrap Location Toes to Knee Toes to Knee   Compression Wrap Status Clean;Dry Clean;Dry;Intact       No associated orders.       Wound 08/22/24 #5 Left Heel Heel Left (Active)   Date First Assessed/Time First Assessed: 08/22/24 1021     Wound Number (Wound Clinic Only): #5 Left Heel  Primary Wound Type: Pressure Injury  Location: Heel  Wound Location Orientation: Left      Assessments 8/22/2024 10:49 AM 9/5/2024  9:25 AM   Wound Image       Drainage Amount None None   Treatments Betadine --   Wound Length (cm) 1.6 cm 1.5 cm   Wound Width (cm) 0.5 cm 0.6 cm   Wound Surface Area (cm^2) 0.8 cm^2 0.9 cm^2   Wound Depth (cm) 0 cm 0 cm   Wound Volume (cm^3) 0 cm^3 0 cm^3   Margins -- Well-defined edges   María-wound Assessment Clean --   Wound Bed Epithelium (%) 100 % 100 %   Wound Odor None None   Tunneling? -- No   Undermining? -- No   Sinus Tracts? -- No   Pressure Injury Stage Deep Tissue Deep Tissue       No associated orders.       Wound 08/22/24 #6 Left Anterior Ankle Ankle Anterior;Left (Active)   Date First Assessed/Time First Assessed: 08/22/24 1022    Wound Number (Wound Clinic Only): #6 Left Anterior Ankle  Primary Wound Type: Pressure Injury  Location: Ankle  Wound Location Orientation: Anterior;Left      Assessments 8/22/2024 10:50 AM 9/5/2024  9:23 AM   Wound Image       Drainage Amount None None   Treatments Betadine --   Wound Length (cm) 1.2 cm 0.7 cm   Wound Width (cm) 0.6 cm 0.5 cm   Wound Surface Area (cm^2) 0.72 cm^2 0.35 cm^2   Wound Depth (cm) 0 cm 0 cm   Wound Volume (cm^3) 0 cm^3 0 cm^3   Margins -- Well-defined edges   Non-staged Wound Description -- Full thickness   María-wound Assessment Clean Clean   Wound Bed Epithelium (%) 100 % 100 %   Wound Odor None None   Tunneling? -- No   Undermining? -- No   Sinus Tracts? -- No   Pressure Injury Stage Stage 1 Stage 1       No associated orders.       Negative Pressure Wound Therapy Dorsal;Left (Active)   Placement Date/Time: 08/22/24 1258   Wound Location Orientation: Dorsal;Left      Assessments 8/22/2024 10:49 AM 8/26/2024  4:30 PM   Wound photographed/measured Yes Yes   Machine Status (On) Yes Yes   Unit Type Medella Medella   Dressing Type Other (Comment) Other (Comment)   Number  of Foam Pieces Used 2 2   Cycle Continuous Continuous   Target Pressure (mmHg) 125 125   Canister Changed Yes No       No associated orders.       Negative Pressure Wound Therapy Dorsal;Right (Active)   Placement Date/Time: 08/29/24 1245   Wound Location Orientation: Dorsal;Right      Assessments 8/29/2024 10:24 AM 9/3/2024  4:04 PM   Wound photographed/measured Yes Yes   Machine Status (On) Yes Yes   Site Assessment Clean Clean   María-wound Assessment Clean Clean   Unit Type Medella Medella   Number of Foam Pieces Used 2 2   Cycle Continuous Continuous   Target Pressure (mmHg) 125 125   Canister Changed Yes Yes       No associated orders.       Wound 09/03/24 #7 Left Lateral Foot Left;Lateral (Active)   Date First Assessed/Time First Assessed: 09/03/24 1606    Wound Number (Wound Clinic Only): #7 Left Lateral Foot  Primary Wound Type: Pressure Injury  Wound Location Orientation: Left;Lateral      Assessments 9/3/2024  4:07 PM 9/5/2024  9:24 AM   Wound Image       Drainage Amount None None   Treatments Betadine --   Dressing ABD Pad --   Wound Length (cm) 1.1 cm 1 cm   Wound Width (cm) 0.5 cm 1.3 cm   Wound Surface Area (cm^2) 0.55 cm^2 1.3 cm^2   Wound Depth (cm) 0 cm 0 cm   Wound Volume (cm^3) 0 cm^3 0 cm^3   Margins Well-defined edges Well-defined edges   Non-staged Wound Description Full thickness --   [place]-wound Assessment Dry;Clean --   Wound Bed Epithelium (%) 100 % 100 %   Wound Odor None None   Tunneling? No No   Undermining? No No   Sinus Tracts? No No   Pressure Injury Stage Deep Tissue Deep Tissue       No associated orders.          ASSESSMENT AND PLAN:    1. 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 Non-pressure chronic ulcer of other part of right foot with necrosis of bone (HCC)  - CBC W Differential W  Platelet; Future  - Comp Metabolic Panel (14); Future  - Sed Rate, Westergren (Automated); Future  - C-Reactive Protein; Future    3. Diabetic foot ulcer with osteomyelitis (HCC)    4. PAD (peripheral artery disease) (HCC)    5. ESRD (end stage renal disease) (HCC)          Risks, benefits, and alternatives of current treatment plan discussed in detail.  Questions and concerns addressed. Red flags to RTC or ED reviewed.  Patient (or parent) agrees to plan.      NOTE TO PATIENT: The 21st Century Cures Act makes clinical notes like these available to patients in the interest of transparency. Clinical notes are medical documents used by physicians and care providers to communicate with each other. These documents include medical language and terminology, abbreviations, and treatment information that may sound technical and at times possibly unfamiliar. In addition, at times, the verbiage may appear blunt or direct. These documents are one tool providers use to communicate relevant information and clinical opinions of the care providers in a way that allows common understanding of the clinical context.   I spent 50 minutes with the patient. This time included:    preparing to see the patient (eg, review notes and recent diagnostics),  seeing the patient, obtaining and/or reviewing separately obtained history, performing a medically appropriate examination and/or evaluation, counseling and educating the patient, documenting in the record.labs, communication with care team.  DISCHARGE:      Patient Instructions   Please return:   Friday    Please schedule follow-up appointment with Dr. Nix    Please draw the following labs:  Laboratory (blood draw) orders:  Orders Placed This Encounter   Procedures    CBC W Differential W Platelet    Comp Metabolic Panel (14)    Sed Rate, Westergren (Automated)    C-Reactive Protein       Patient discharge and wound care instructions  Darin Bowens  9/5/2024         [place]-change dressings below every 3 days  Cleansing/dressing:     In clinic/ with each dressing change:    please cleanse the limb, foot, and between toes with soap, water and washcloth.   Dry thoroughly.    Cleanse/soak the wound with VASHE (or other hypochlorous wound cleanser), dab dry.    Apply the following dressings:     Left anterior ankle: paint with betadine>abd pad  Left heel: paint with betadine>abd pad  Left tma:  hydrofera blue Transfer>border foam (start plantar and secure dorsal) or border superabsorbant (ie kerramax) >spandagrip from over the tma to knee  Left lateral foot:  border foam with offloading [place] (this may stay in place for 1 week) paint the dti with betadine daily and leave NIGEL    Right TMA: hydrofera blue Transfer>border foam (start plantar and secure dorsal) or border superabsorbant (ie kerramax) >spandagrip from over the tma to knee  Right Achilles: small amount of silver hydrogel>adaptic>abd pad  Right heel :  adaptic>abd pad      Weight bearing as tolerated    Offloading  Provide patient with bilateral heel lift boots (ie prevalon), these should be on whenever patient is in bed    Offloading Lower Extremities  Off-loading means no weight-bearing or anything touching the area of the wound. If your doctor suggests that you should \"off-load\", he or she means that you are not to walk or bear weight on the extremity that has a wound, or the extremity where a wound appears likely to develop.  WHY DO I HAVE TO OFF-LOAD?  When you have a non-healing wound, you must do everything possible to give your body a chance to heal the wound. The weight of your body when you walk puts a large amount of pressure on your feet and ankles. This pressure keeps the new tissue from growing and inhibits new blood vessels from forming. If you continue to bear weight on a body part that has a wound, the time it takes to heal the wound increases, or, worse yet, the wound may not heal at all!  HOW DO I  OFF-LOAD?  There are a number of ways to off-load your legs and feet.   The easiest way is to stay in bed, but of course, that isn't always possible.   You may also be prescribed a special shoe/boot/cast to allow you to be up on your feet periodically, but keep pressure off the wound when you are.  Finally if you are in bed, recliner, couch: your foot should not be touching anything except air.  Many times you can position a pillow from your knees to just above your ankles (along your calves) to keep your ankles, heels from resting on anything.     Offloading:  Off-loading is an important part of your wound treatment program. It is a part that only you can assure is accomplished. If you have any concerns about methods to off-load your wound, or feel that you might have trouble following the off-loading plan prescribed for you, talk to your doctor so that alternatives can be discussed that will work in your situation.    Patient should be up in chair with EHOB waffle cushion     Nutrition and blood sugar control:  Focus on the following:  Protein: Meats, beans, eggs, milk and yogurt particularly Greek yogurt), tofu, soy nuts, soy protein products (Follow the protein handout in your welcome folder)  Vitamin C: Citrus fruits and juices, strawberries, tomatoes, tomato juice, peppers, baked potatoes, spinach, broccoli, cauliflower, [place] sprouts, cabbage  Vitamin A: Dark green, leafy vegetables, orange or yellow vegetables, cantaloupe, fortified dairy products, liver, fortified cereals  Zinc: Fortified cereals, red meats, seafood  PLEASE PROVIDE PATIENT 2 PACKETS OF Alex by [employer]. It can be purchased on amazon, [employer] website, or local pharmacy may be able to order it for you.  (These are essential branch chain amino acids that help with tissue building and wound healing).   When your blood sugar is consistently elevated greater than 180 your body can't heal or fight infection.     Concerns:  Signs of infection  may include the following:  Increase in redness  Red \"streaks\" from wound  Increase in swelling  Fever  Unusual odor  Change in the amount of wound drainage     Should you experience any significant changes in your wound(s) or have any questions regarding your home care instructions please contact the [facility] @ [phone] If after regular business hours, please call your family doctor or local emergency room. The treatment plan has been discussed at length between you and your provider. Follow all instructions carefully, it is very important. If you do not follow all instructions you are at risk of your wound not healing, infection, possible loss of limb and even loss of life.    Jenny Olmos FNP-C, CWCN-AP, CFCN, CSWS, WCC, DWC  9/5/2024

## 2024-09-05 ENCOUNTER — OFFICE VISIT (OUTPATIENT)
Dept: WOUND CARE | Facility: HOSPITAL | Age: 79
End: 2024-09-05
Attending: NURSE PRACTITIONER
Payer: MEDICARE

## 2024-09-05 VITALS
HEART RATE: 78 BPM | SYSTOLIC BLOOD PRESSURE: 124 MMHG | DIASTOLIC BLOOD PRESSURE: 65 MMHG | RESPIRATION RATE: 16 BRPM | TEMPERATURE: 98 F

## 2024-09-05 DIAGNOSIS — E11.621 DIABETIC FOOT ULCER WITH OSTEOMYELITIS (HCC): ICD-10-CM

## 2024-09-05 DIAGNOSIS — L97.514 NON-PRESSURE CHRONIC ULCER OF OTHER PART OF RIGHT FOOT WITH NECROSIS OF BONE (HCC): ICD-10-CM

## 2024-09-05 DIAGNOSIS — L97.524 NON-PRESSURE CHRONIC ULCER OF OTHER PART OF LEFT FOOT WITH NECROSIS OF BONE (HCC): Primary | ICD-10-CM

## 2024-09-05 DIAGNOSIS — E11.69 DIABETIC FOOT ULCER WITH OSTEOMYELITIS (HCC): ICD-10-CM

## 2024-09-05 DIAGNOSIS — N18.6 ESRD (END STAGE RENAL DISEASE) (HCC): ICD-10-CM

## 2024-09-05 DIAGNOSIS — I73.9 PAD (PERIPHERAL ARTERY DISEASE) (HCC): ICD-10-CM

## 2024-09-05 DIAGNOSIS — M86.9 DIABETIC FOOT ULCER WITH OSTEOMYELITIS (HCC): ICD-10-CM

## 2024-09-05 DIAGNOSIS — L97.509 DIABETIC FOOT ULCER WITH OSTEOMYELITIS (HCC): ICD-10-CM

## 2024-09-05 LAB — GLUCOSE BLD-MCNC: 232 MG/DL (ref 70–99)

## 2024-09-05 PROCEDURE — 99215 OFFICE O/P EST HI 40 MIN: CPT | Performed by: NURSE PRACTITIONER

## 2024-09-05 NOTE — PROGRESS NOTES
.Weekly Wound Education Note    Teaching Provided To: Patient;Family  Training Topics: Dressing;Edema control;Cleasing and general instructions;Compression;Discharge instructions  Training Method: Explain/Verbal;Written  Training Response: Patient responds and understands        Notes: Wounds not improving. NPWT on hold this week. [place] is to do dressings three times per week. Dressings as follows....    Left and right foot- Hydrofera transfer, kerramax/ABD pad, kerlix  Left anterior, lateral, and posterior foot- Betadine and ABD pad  Right posterior ankle and heel- silver hydrogel, adapic, ABD pad.     Bordered foam off loading to left lateral foot. Spandagrip E to both legs.

## 2024-09-05 NOTE — PATIENT INSTRUCTIONS
Please return:   Friday    Please schedule follow-up appointment with Dr. Nix    Please draw the following labs:  Laboratory (blood draw) orders:  Orders Placed This Encounter   Procedures    CBC W Differential W Platelet    Comp Metabolic Panel (14)    Sed Rate, [place]achel[place]ren (Automated)    C-Reactive Protein       Patient discharge and wound care instructions  Darin Bowens  9/5/2024        [place]-change dressings below every 3 days  Cleansing/dressing:     In clinic/ with each dressing change:    please cleanse the limb, foot, and between toes with soap, water and washcloth.   Dry thoroughly.    Cleanse/soak the wound with VASHE (or other hypochlorous wound cleanser), dab dry.    Apply the following dressings:     Left anterior ankle: paint with betadine>abd pad  Left heel: paint with betadine>abd pad  Left tma:  hydrofera blue Transfer>border foam (start plantar and secure dorsal) or border superabsorbant (ie kerramax) >spandagrip from over the tma to knee  Left lateral foot:  border foam with offloading [place] (this may stay in place for 1 week) paint the dti with betadine daily and leave NIGEL    Right TMA: hydrofera blue Transfer>border foam (start plantar and secure dorsal) or border superabsorbant (ie kerramax) >spandagrip from over the tma to knee  Right Achilles: small amount of silver hydrogel>adaptic>abd pad  Right heel :  adaptic>abd pad      Weight bearing as tolerated    Offloading  Provide patient with bilateral heel lift boots (ie prevalon), these should be on whenever patient is in bed    Offloading Lower Extremities  Off-loading means no weight-bearing or anything touching the area of the wound. If your doctor suggests that you should \"off-load\", he or she means that you are not to walk or bear weight on the extremity that has a wound, or the extremity where a wound appears likely to develop.  WHY DO I HAVE TO OFF-LOAD?  When you have a non-healing wound, you must do everything  possible to give your body a chance to heal the wound. The weight of your body when you walk puts a large amount of pressure on your feet and ankles. This pressure keeps the new tissue from growing and inhibits new blood vessels from forming. If you continue to bear weight on a body part that has a wound, the time it takes to heal the wound increases, or, worse yet, the wound may not heal at all!  HOW DO I OFF-LOAD?  There are a number of ways to off-load your legs and feet.   The easiest way is to stay in bed, but of course, that isn't always possible.   You may also be prescribed a special shoe/boot/cast to allow you to be up on your feet periodically, but keep pressure off the wound when you are.  Finally if you are in bed, recliner, couch: your foot should not be touching anything except air.  Many times you can position a pillow from your knees to just above your ankles (along your calves) to keep your ankles, heels from resting on anything.     Offloading:  Off-loading is an important part of your wound treatment program. It is a part that only you can assure is accomplished. If you have any concerns about methods to off-load your wound, or feel that you might have trouble following the off-loading plan prescribed for you, talk to your doctor so that alternatives can be discussed that will work in your situation.    Patient should be up in chair with YULYOB waffle cushion     Nutrition and blood sugar control:  Focus on the following:  Protein: Meats, beans, eggs, milk and yogurt particularly Greek yogurt), tofu, soy nuts, soy protein products (Follow the protein handout in your welcome folder)  Vitamin C: Citrus fruits and juices, strawberries, tomatoes, tomato juice, peppers, baked potatoes, spinach, broccoli, cauliflower, [place] sprouts, cabbage  Vitamin A: Dark green, leafy vegetables, orange or yellow vegetables, cantaloupe, fortified dairy products, liver, fortified cereals  Zinc: Fortified cereals, red  meats, seafood  PLEASE PROVIDE PATIENT 2 PACKETS OF Alex by [employer]. It can be purchased on amazon, [employer] website, or local pharmacy may be able to order it for you.  (These are essential branch chain amino acids that help with tissue building and wound healing).   When your blood sugar is consistently elevated greater than 180 your body can't heal or fight infection.     Concerns:  Signs of infection may include the following:  Increase in redness  Red \"streaks\" from wound  Increase in swelling  Fever  Unusual odor  Change in the amount of wound drainage     Should you experience any significant changes in your wound(s) or have any questions regarding your home care instructions please contact the wound center [facility] @ [phone] If after regular business hours, please call your family doctor or local emergency room. The treatment plan has been discussed at length between you and your provider. Follow all instructions carefully, it is very important. If you do not follow all instructions you are at risk of your wound not healing, infection, possible loss of limb and even loss of life.

## 2024-09-06 ENCOUNTER — EXTERNAL FACILITY (OUTPATIENT)
Dept: FAMILY MEDICINE CLINIC | Facility: CLINIC | Age: 79
End: 2024-09-06

## 2024-09-06 DIAGNOSIS — D62 ACUTE ON CHRONIC BLOOD LOSS ANEMIA: Primary | ICD-10-CM

## 2024-09-10 ENCOUNTER — SNF VISIT (OUTPATIENT)
Dept: INTERNAL MEDICINE CLINIC | Age: 79
End: 2024-09-10

## 2024-09-10 VITALS
TEMPERATURE: 97 F | SYSTOLIC BLOOD PRESSURE: 127 MMHG | RESPIRATION RATE: 20 BRPM | OXYGEN SATURATION: 96 % | DIASTOLIC BLOOD PRESSURE: 68 MMHG | HEART RATE: 79 BPM

## 2024-09-10 DIAGNOSIS — I73.9 PAD (PERIPHERAL ARTERY DISEASE) (HCC): ICD-10-CM

## 2024-09-10 DIAGNOSIS — R53.1 WEAKNESS: ICD-10-CM

## 2024-09-10 DIAGNOSIS — Z96.41 DIABETES MELLITUS TYPE 1, WITH COMPLICATION, ON LONG TERM INSULIN PUMP (HCC): ICD-10-CM

## 2024-09-10 DIAGNOSIS — I48.20 ATRIAL FIBRILLATION, CHRONIC (HCC): ICD-10-CM

## 2024-09-10 DIAGNOSIS — G62.9 NEUROPATHY: ICD-10-CM

## 2024-09-10 DIAGNOSIS — L97.528 NON-PRESSURE CHRONIC ULCER OF OTHER PART OF LEFT FOOT WITH OTHER SPECIFIED SEVERITY (HCC): ICD-10-CM

## 2024-09-10 DIAGNOSIS — D63.1 ANEMIA IN ESRD (END-STAGE RENAL DISEASE) (HCC): ICD-10-CM

## 2024-09-10 DIAGNOSIS — I50.32 CHRONIC DIASTOLIC HEART FAILURE (HCC): ICD-10-CM

## 2024-09-10 DIAGNOSIS — N18.6 ANEMIA IN ESRD (END-STAGE RENAL DISEASE) (HCC): ICD-10-CM

## 2024-09-10 DIAGNOSIS — L50.9 HIVES: Primary | Chronic | ICD-10-CM

## 2024-09-10 DIAGNOSIS — N18.6 ESRD ON HEMODIALYSIS (HCC): ICD-10-CM

## 2024-09-10 DIAGNOSIS — E10.8 DIABETES MELLITUS TYPE 1, WITH COMPLICATION, ON LONG TERM INSULIN PUMP (HCC): ICD-10-CM

## 2024-09-10 DIAGNOSIS — I10 PRIMARY HYPERTENSION: ICD-10-CM

## 2024-09-10 DIAGNOSIS — Z99.2 ESRD ON HEMODIALYSIS (HCC): ICD-10-CM

## 2024-09-10 PROCEDURE — 99499 UNLISTED E&M SERVICE: CPT | Performed by: NURSE PRACTITIONER

## 2024-09-10 PROCEDURE — 99309 SBSQ NF CARE MODERATE MDM 30: CPT | Performed by: NURSE PRACTITIONER

## 2024-09-10 RX ORDER — METHYLPREDNISOLONE 4 MG
4 TABLET, DOSE PACK ORAL AS DIRECTED
COMMUNITY
End: 2024-09-11

## 2024-09-10 RX ORDER — MIDODRINE HYDROCHLORIDE 5 MG/1
5 TABLET ORAL 3 TIMES DAILY PRN
COMMUNITY

## 2024-09-10 RX ORDER — BRINZOLAMIDE 10 MG/ML
1 SUSPENSION/ DROPS OPHTHALMIC 2 TIMES DAILY
COMMUNITY

## 2024-09-10 NOTE — PROGRESS NOTES
Darin Bowens, 1945, 79 year old, male    Hospital admission -8/15     Hospital Discharge Diagnoses:  ESRD   Acute on chronic anemia   Bleeding from wound   Fatigue         HPI:  Darin Bowens  : 1945, Age 79 year old  male patient is admitted for subacute rehab. Patient has a past medical history of CAD status post stents, PAD post bilateral lower extremity stents, atrial fibrillation, diabetes, ESRD on HD, chronic CHF with LVH, status post AVR, hx of stroke, hypertension, hyperlipidemia who presented with foot bleeding. Patient underwent debridement of the complex wounds on the feet with podiatry. In the ER Hgb 6.7. CXR showed vascular congestion. He was transfused with 1 unit PRBC in ER.   Admitting to [facility] for nursing care, medication management, and PT/OT/ST eval and tx.       Chief Complaint:    Chief Complaint   Patient presents with    Follow - Up        Subjective:   TODAY:  Patient seen today in therapy   He states he is doing really well - he is ambulating over 100 ft.   He denies any current pain - on gabapentin for nerve pain at night.   Ulcers to bilateral feet- outpatient wound provider following next appt on .   He denies any shortness of breath, cough or wheeze  No chest pain or palpitations  No current nausea, vomiting, constipation or diarrhea   Has been following with wound clinic outpatient. No longer on wound vac.   Had reaction on  appeared to be hives. Had COVID and pneumonia vaccine on 9/3. Medrol dose raymond ordered rash to torso and body no longer present. Will complete medrol dose raymond on    Due to medrol dose raymond glucose had increased- Lantus was increased from 10-22 units. Will plan to bring back down to 15 units nightly on  once Medrol dose raymond completed. Will monitor glucose levels carefully.         Objective:  /68   Pulse 79   Temp 97.4 °F (36.3 °C)   Resp 20   SpO2 96%     PHYSICAL EXAM:  GENERAL HEALTH:79 year old male patient,  no acute distress   LINES, TUBES, DRAINS:  left upper chest port- CDI  SKIN: no rashes, no suspicious lesions  Rash/hives to lower left torso no longer present- resolved   WOUND: see wound assessment   Bilateral feet- compression dressing CDI  EYES: PERRLA, conjunctiva normal; no drainage from eyes  HENT: normocephalic; normal nose, no nasal drainage, mucous membranes pink, moist  RESPIRATORY:clear to percussion and auscultation, No wheezing/cough/accessory muscle use; on room air  CARDIOVASCULAR: S1, S2 normal, RRR; no S3, no S4;   ABDOMEN: normal active BS+, soft, non-distended; no apparent masses; observed, non-tender, no guarding during physical exam    MUSCULOSKELETAL: no acute synovitis upper or lower extremity.  Weakness R/T recent hospitalization/diagnoses/sequelae; will undergo therapies to rehab and improve strength, endurance and independence w/ ADLs as able  EXTREMITIES/VASCULAR: no cyanosis, clubbing or edema  Followed by wound clinic/management   NEUROLOGIC: intact; no sensorimotor deficit  PSYCHIATRIC: alert and oriented x 3; affect appropriate      Medications reviewed: Yes      Current Outpatient Medications:     brinzolamide 1 % Ophthalmic Suspension, Place 1 drop into both eyes in the morning and 1 drop before bedtime., Disp: , Rfl:     midodrine 5 MG Oral Tab, Take 1 tablet (5 mg total) by mouth 3 (three) times daily as needed (SBP less than 90)., Disp: , Rfl:     methylPREDNISolone 4 MG Oral Tablet Therapy Pack, Take 1 tablet (4 mg total) by mouth As Directed., Disp: , Rfl:     insulin glargine 100 UNIT/ML Subcutaneous Solution, Inject 22 Units into the skin nightly., Disp: , Rfl:     Insulin Lispro 100 UNIT/ML Subcutaneous Solution Cartridge, Inject 5-9 Units into the skin TID AC&HS., Disp: , Rfl:     omeprazole 20 MG Oral Capsule Delayed Release, Take 1 capsule (20 mg total) by mouth every morning before breakfast., Disp: , Rfl:     polyethylene glycol, PEG 3350, 17 g Oral Powd Pack, Take 17 g  by mouth daily as needed (constipation)., Disp: , Rfl:     ELIQUIS 2.5 MG Oral Tab, TAKE 1 TABLET(2.5 MG) BY MOUTH TWICE DAILY, Disp: 60 tablet, Rfl: 0    COMBIGAN 0.2-0.5 % Ophthalmic Solution, Place 1 drop into both eyes 2 (two) times daily., Disp: 1 each, Rfl: 0    gabapentin 100 MG Oral Cap, Take 1 capsule (100 mg total) by mouth at bedtime., Disp: 30 capsule, Rfl: 0    HYDROcodone-acetaminophen 5-325 MG Oral Tab, Take 1 tablet by mouth every 6 (six) hours as needed for Pain., Disp: 10 tablet, Rfl: 0    Probiotic Product (PROBIOTIC DAILY OR), Take 1 capsule by mouth daily., Disp: , Rfl:     sennosides 8.6 MG Oral Tab, Take 1 tablet (8.6 mg total) by mouth 2 (two) times daily., Disp: , Rfl:     acetaminophen 325 MG Oral Tab, Take 2 tablets (650 mg total) by mouth every 6 (six) hours as needed for Pain., Disp: , Rfl:     Netarsudil-Latanoprost (ROCKLATAN) 0.02-0.005 % Ophthalmic Solution, Apply 1 drop to eye daily. Both eyes, Disp: , Rfl:     atorvastatin 80 MG Oral Tab, Take 1 tablet (80 mg total) by mouth nightly., Disp: 90 tablet, Rfl: 2    CLOPIDOGREL 75 MG Oral Tab, TAKE 1 TABLET(75 MG) BY MOUTH DAILY, Disp: 90 tablet, Rfl: 0    carvedilol 12.5 MG Oral Tab, Take 1 tablet (12.5 mg total) by mouth 2 (two) times daily with meals. Take one tablet (12.5mg total) by mouth two times a day, Disp: 180 tablet, Rfl: 3    Ergocalciferol (VITAMIN D2 OR), Take 50,000 Units by mouth every 30 (thirty) days., Disp: , Rfl:     DORZOLAMIDE 2 % Ophthalmic Solution, INSTILL 1 DROP INTO BOTH EYES IN THE MORNING AND 1 DROP BEFORE BEDTIME (Patient not taking: Reported on 9/3/2024), Disp: 10 mL, Rfl: 0    Continuous Blood Gluc Sensor (DEXCOM G6 SENSOR) Does not apply Misc, 1 Device Every 10 days. (Patient not taking: Reported on 8/20/2024), Disp: 3 each, Rfl: 1    Insulin Lispro (HUMALOG) 100 UNIT/ML Subcutaneous Solution, 95 units per day via insulin pump. (Patient not taking: Reported on 8/20/2024), Disp: 90 mL, Rfl: 1     Continuous Blood Gluc Transmit (DEXCOM G6 TRANSMITTER) Does not apply Misc, Change every 90 days (Patient not taking: Reported on 8/20/2024), Disp: 1 each, Rfl: 3    CALCIUM ACETATE 667 MG Oral Cap, TAKE 1 CAPSULE BY MOUTH THREE TIMES DAILY WITH MEALS (Patient not taking: Reported on 9/10/2024), Disp: 270 capsule, Rfl: 0    PTA Insulin via Pump, Inject into the skin continuous. humalog insulin  Medtronic Basal Rate : 55.6 standard rate 1.90 I:C - 1 units/4 carbs Correction Factor - unknown (Patient not taking: Reported on 8/20/2024), Disp: , Rfl:       Diagnostics reviewed:    9/6/24 wbc 5.41 hgb 8.4 hematocrit 25.3 plt 151  Sed 6 crp 2.8   Sodium 134 potassium 3.7 chl 95 co2 30 glucose 344 bun 52 crt 4.69 ca 10.1 prot 5.6 alb 3.4 alt < 7 ast 10 alk phos 106 bili 0.26 gfr 12     Assessment and plan:  Physical Deconditioning/Impaired mobility and ADLs/At risk for falling  Fall Precautions  PT/OT/ST to evaluate and treat  [facility] team to establish care plan meeting with patient and POA/family as appropriate  Anticipate DC on or before TBD; SW to assist patient/family w/ DC planning  DC Plan:  TBD- tentative discharge 9/14      Rash/Hives 9/4   Unknown reaction. Patient had COVID and Pneumonia vaccine on 9/3.   Started Medrol dose raymond- improvement- ending 9/11 9/6 glucose levels very elevated Lantus increased to 22 units. Sliding scale increased. Reduce back down once medrol dose raymond complete.     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 HBP received prior. Readmission 8/13-8/15 for bleeding wound   Vital signs q shift and prn   Labs weekly and prn   Follow up with Dr Nix vascular   Follow up with Dr Meaz podiatry   Follow up with Jenny  Amarilis APN appt on 9/13  ID to follow   Wound to follow   Encourage protein intake   Plavix 75 mg daily   Pain control- acetaminophen prn, Norco prn, gabapentin 100 mg nightly       ESRD on HD   Outside dialysis MWF- dialysis currently in facility M-W-F  Avoid nephrotoxin   Follow up with Nephrology      CAD s/p stents/ PAD s/p b/l LE stents/Chronic CHF w/LVH/ S/p AVR/ Atrial fibrillation /Hypertension / HL   BETH 6/16 aortic valve bioprosthesis present mild to mod stenosis. Heart failure   Vital signs q shift and prn   Labs weekly and prn   Daily weights notify if greater than 2 lb increase in 1 day or 5 lbs in 1 week - stable   Cardiology to follow   Apixaban 2.5 mg bid   Atorvastatin 80 mg nightly   Coreg 12.5 mg bid   Plavix 75 mg daily   Midodrine 5 mg tid PRN if SBP less than 90      Diabetes  Typically uses insulin pump.   Accu check ac/hs - 131 this am   Low carb diet   Insulin lispro   Lantus 10 u nightly--> increased to 22 units due to Medrol dose raymond--> 9/11 once Medrol dose raymond complete decrease down to 15 units        Anemia due to ESRD   Monitor labs      DESI   In house pulm to follow   Cpap at night      DVT Prophylaxis   Encourage early mobilization and participation in PT/OT as able  Apixaban 2.5 mg bid      GI Prophylaxis  Omeprazole 20 mg daily      Pain Management  Offer to pre-medicate 30-60 min prior to therapy  Physiatry evaluation with management appreciated  Acetaminophen 650 mg every 6 hrs prn   Hydrocodone-acetaminophen 5-325 mg 1 tab every 6 hrs prn   Gabapentin 100 mg nightly      Bowel Management Regimen/Constipation  Monitor BM status   Senna s 1 tab bid   Miralax 17 gm daily prn       Vitamins/supplements as r/t deficiencies  [facility] RD to follow while in rehab; supplementation/diet as per [facility]  May continue home supplements  Calcium acetate   Ergocalciferol   Probiotic      LABS  CBC/CMP/Mag weekly while in [facility]- 9/13     *Follow-Up with PCP within 1-2 weeks following [facility] discharge.    *Follow-Up with specialists as recommended.  Your Appointments      Friday September 13, 2024 9:00 AM  Wound Care Follow up with DELMI Wheeler  [facility] ([facility]) [address] [address]  [phone]                     This is a 45 minute visit and greater than 50% of the time was spent counseling the patient and/or coordinating care.        Note to patient: The 21st Century Cures Act makes medical notes like these available to patients in the interest of transparency. However, this is a medical document intended as peer to peer communication. It is written in medical language and may contain abbreviations or verbiage that are unfamiliar. It may appear blunt or direct. Medical documents are intended to carry relevant information, facts as evident, and the clinical opinion of the practitioner who signs the document.    DELMI Malave  9/10/2024

## 2024-09-12 ENCOUNTER — SNF DISCHARGE (OUTPATIENT)
Dept: INTERNAL MEDICINE CLINIC | Age: 79
End: 2024-09-12

## 2024-09-12 VITALS
RESPIRATION RATE: 20 BRPM | OXYGEN SATURATION: 96 % | DIASTOLIC BLOOD PRESSURE: 65 MMHG | SYSTOLIC BLOOD PRESSURE: 132 MMHG | HEART RATE: 76 BPM | TEMPERATURE: 97 F

## 2024-09-12 DIAGNOSIS — L50.9 HIVES: ICD-10-CM

## 2024-09-12 DIAGNOSIS — I50.32 CHRONIC DIASTOLIC HEART FAILURE (HCC): ICD-10-CM

## 2024-09-12 DIAGNOSIS — Z99.2 ESRD ON HEMODIALYSIS (HCC): ICD-10-CM

## 2024-09-12 DIAGNOSIS — L97.528 NON-PRESSURE CHRONIC ULCER OF OTHER PART OF LEFT FOOT WITH OTHER SPECIFIED SEVERITY (HCC): Primary | ICD-10-CM

## 2024-09-12 DIAGNOSIS — E10.8 DIABETES MELLITUS TYPE 1, WITH COMPLICATION, ON LONG TERM INSULIN PUMP (HCC): ICD-10-CM

## 2024-09-12 DIAGNOSIS — I48.20 ATRIAL FIBRILLATION, CHRONIC (HCC): ICD-10-CM

## 2024-09-12 DIAGNOSIS — R53.1 WEAKNESS: ICD-10-CM

## 2024-09-12 DIAGNOSIS — I10 PRIMARY HYPERTENSION: ICD-10-CM

## 2024-09-12 DIAGNOSIS — G62.9 NEUROPATHY: ICD-10-CM

## 2024-09-12 DIAGNOSIS — Z96.41 DIABETES MELLITUS TYPE 1, WITH COMPLICATION, ON LONG TERM INSULIN PUMP (HCC): ICD-10-CM

## 2024-09-12 DIAGNOSIS — I73.9 PAD (PERIPHERAL ARTERY DISEASE) (HCC): ICD-10-CM

## 2024-09-12 DIAGNOSIS — N18.6 ESRD ON HEMODIALYSIS (HCC): ICD-10-CM

## 2024-09-12 PROCEDURE — 99316 NF DSCHRG MGMT 30 MIN+: CPT | Performed by: NURSE PRACTITIONER

## 2024-09-12 NOTE — PROGRESS NOTES
Darin Bowens, 1945, 79 year old, male is being discharged from Facility: [facility]      DISCHARGE SUMMARY    Date of Admission:8/15/24     Date of Discharge: 24                                  Hospital Discharge Diagnoses:  ESRD   Acute on chronic anemia   Bleeding from wound   Fatigue         HPI:  Darin Bowens  : 1945, Age 79 year old  male patient is admitted for subacute rehab. Patient has a past medical history of CAD status post stents, PAD post bilateral lower extremity stents, atrial fibrillation, diabetes, ESRD on HD, chronic CHF with LVH, status post AVR, hx of stroke, hypertension, hyperlipidemia who presented with foot bleeding. Patient underwent debridement of the complex wounds on the feet with podiatry. In the ER Hgb 6.7. CXR showed vascular congestion. He was transfused with 1 unit PRBC in ER.   Admitting to [facility] for nursing care, medication management, and PT/OT/ST eval and tx.       Reason for Admission:  Subacute Rehab and Medical Management    Subjective:  Patient seen today with wife at bedside   He states he is not really having any pain   He states he is ready to discharge   No needed medications at discharge at this time. They are requesting to have eye drops from rehab at discharge- discussed with nursing.   Patient to resume insulin pump with prior settings at discharge     REVIEW OF SYSTEMS:  GENERAL HEALTH:feels well otherwise  SKIN: denies any unusual skin lesions or rashes  WOUNDS: see wound assessment  + bilateral ulcer of lower extremities   EYES:no visual complaints or deficits  HENT: denies nasal congestion, sinus pain or sore throat;  RESPIRATORY: denies shortness of breath, wheezing or cough +DESI Cpap at night   CARDIOVASCULAR:denies chest pain, no palpitations + HF + HTN +S/P Aortic valve replacement +PAD + A fib   +PVD  GI: denies nausea, vomiting, , diarrhea; constipation   : + ESRD on HD   MUSCULOSKELETAL:no joint  complaints upper or lower extremities  NEURO:no sensory or motor complaint  PSYCHE: no symptoms of depression or anxiety  HEMATOLOGY + anemia   ENDOCRINE: +diabetes    PHYSICAL EXAM:  /65   Pulse 76   Temp 97.3 °F (36.3 °C)   Resp 20   SpO2 96%     GENERAL HEALTH:79 year old male patient, no acute distress   LINES, TUBES, DRAINS:  left upper chest port- CDI  SKIN: no rashes, no suspicious lesions  Rash/hives to lower left torso no longer present- resolved   WOUND: see wound assessment   Bilateral feet- compression dressing CDI  EYES: PERRLA, conjunctiva normal; no drainage from eyes  HENT: normocephalic; normal nose, no nasal drainage, mucous membranes pink, moist  RESPIRATORY:clear to percussion and auscultation, No wheezing/cough/accessory muscle use; on room air  CARDIOVASCULAR: S1, S2 normal, RRR; no S3, no S4;   ABDOMEN: normal active BS+, soft, non-distended; no apparent masses; observed, non-tender, no guarding during physical exam  MUSCULOSKELETAL: no acute synovitis upper or lower extremity.  Weakness R/T recent hospitalization/diagnoses/sequelae; will undergo therapies to rehab and improve strength, endurance and independence w/ ADLs as able  EXTREMITIES/VASCULAR: no cyanosis, clubbing or edema  Followed by wound clinic/management   NEUROLOGIC: intact; no sensorimotor deficit  PSYCHIATRIC: alert and oriented x 3; affect appropriate    Skilled Nursing Facility Course:    Darin MIGUELINA Ibrahimt has done well and is progressing well with PT/OT.  Medically cleared for anticipated discharge  Chronic ulcers/wound followed by wound clinic while at rehab. Patient had wound vac on at one point but was removed. Continues to have good pain management through Norco prn and gabapentin nightly.   Patient is diabetic in facility was on lantus and sliding scale but will resume insulin pump and settings at discharge- to follow up with his Endocrinologist.   9/4 patient developed hives to torso. Unknown cause but  did have COVID and pneumonia vaccine day prior. In house ID followed. Hives resolved with Medrol dose raymond- insulin adjusted at that time due to hyperglycemia.   IL  checked and OK for narcotics prescribed.  Home with home care services RN/PT/OT  Equipment per PT recommendations- hospital bed.     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 HBP received prior. Readmission 8/13-8/15 for bleeding wound   Vital signs q shift and prn   Labs weekly and prn   Follow up with Dr Nix vascular   Follow up with Dr Meza podiatry   Follow up with Jenny PRINCE appt on 9/13  ID to follow   Wound to follow   Encourage protein intake   Plavix 75 mg daily   Pain control- acetaminophen prn, Norco prn, gabapentin 100 mg nightly       ESRD on HD   Outside dialysis MWF- dialysis currently in facility M-W-F  Avoid nephrotoxin   Follow up with Nephrology      CAD s/p stents/ PAD s/p b/l LE stents/Chronic CHF w/LVH/ S/p AVR/ Atrial fibrillation /Hypertension / HL   BETH 6/16 aortic valve bioprosthesis present mild to mod stenosis. Heart failure   Cardiology to follow   Apixaban 2.5 mg bid   Atorvastatin 80 mg nightly   Coreg 12.5 mg bid   Plavix 75 mg daily   Midodrine 5 mg tid PRN if SBP less than 90      Diabetes  Typically uses insulin pump.   Accu check ac/hs   Low carb diet   Insulin lispro   Lantus 10 u nightly--> increased to 22 units due to Medrol dose raymond--> 9/11 once  Medrol dose raymond complete decrease down to 15 units     Stop Lantus and resume Insulin pump at discharge- instructions per Endocrinology      Anemia due to ESRD   Monitor labs      DESI   In house pulm to follow   Cpap at night     Rash/Hives 9/4 - RESOLVED   Unknown reaction. Patient had COVID and Pneumonia vaccine on 9/3.   Started Medrol dose raymond- improvement- ended 9/11 9/6 glucose levels very elevated Lantus increased to 22 units. Sliding scale increased. Reduce back down once medrol dose raymond complete.   9/12 Reduce lantus down to 15 units     Medication Reconciliation Completed:  Yes    Follow Up Visits:  PCP within 7 days of Discharge  Follow up with Wound clinic as directed  Follow up with Cardiology   Follow up with Vascular   Follow up with Endocrinology     Your Appointments      Friday September 13, 2024 9:00 AM  Wound Care Follow up with DELMI Wheeler  [facility] ([facility]) [facility] [address] [facility] [address]  [phone]                 Greater than 30 min spent in coordination of care in preparation for discharge.    Note to patient: The 21st Century Cures Act makes medical notes like these available to patients in the interest of transparency. However, this is a medical document intended as peer to peer communication. It is written in medical language and may contain abbreviations or verbiage that are unfamiliar. It may appear blunt or direct. Medical documents are intended to carry relevant information, facts as evident, and the clinical opinion of the practitioner who signs the document.      DELMI Malave  9/12/2024

## 2024-09-12 NOTE — PROGRESS NOTES
CHIEF COMPLAINT:     Chief Complaint   Patient presents with    Wound Care     Follow up for bilateral foot wounds. No complaints at this time. Pt it being discharged from [place] tomorrow. Pt saw vascular doctor yesterday. Pt arrives with Adaptic, hydrofera transfer, ABD pad, kerlix and spandagrip compression.      HPI:   Information obtained from Patient, chart and family  7-18-24 INITIAL:  78 YO male with past medical history of CAD s/p stents, PAD s/p bilateral lower extremity stents, atrial fibrillation on Xarelto, diabetes with insulin pump, ESRD on HD, CHF with LVH status post AVR, history of stroke, hypertension, hyperlipidemia, DESI and recent TMA  6/13 per Dr Meza. He presented to hospital on 6-25w poor wound healing. He had further debridement to level of bone along with partial resection of the Right 1st and 2nd MT on 6/27 and had further debridement of foot on 7/5.   Patient was discharged on IV Cefepime with HD, continue PO bactrim and PO Flagyl through 8/15. . He did receive 4 HBO treatmenns while inpatient and then discharge to rehab facility. Podiatry suggested they would reconsider application of wound vac on follow up at [place] [facility].   [facility] notes:  \"Hx of PVD arterial Ultrasound April likely more distal PAD require further amputation per vascular patient is not a candidate for any further revascularization and should have foot amputation if does not heal.\"  [place] is dressing wounds with betadine soaked gauze, he presents with son and spouse (who is a retired nurse).  Bedside clinic exam reveals palpable anterior tibials (at the ankles) and posterior tibia, patient also has pulsatile signals at the dp/pt/peronal/and plantar aspect. We discussed micro vs macro circulation. Will get tcom.  Once patient is safe in transfers and short distance ambulation family would like to care for him at home, at that point patient could then come for hbo again.  At this time will utilize santyl. I  did remove the staples from the right as they were no longer supporting the tissue and applied steristrips.  This incision is concerning for further dehiscence.     HPI PRIOR TO SEPTEMBER 2024 SEE INDIVIDUAL PROGRESS NOTES  8-8-24 Patient returns.  He was seen for rn visit on Monday, keracis placed last week. He has tolerated the compression well.  Drainage has slowed on the right.  Wife and daughter report that he is doing really well with transfers with physicial therapy and stamina. He has even worked on getting in/out of the car.  Dr Meza is here in clinic to collaborate on next steps-plan will be for patient to go to [facility] next Monday or Wednesday and do a clean up, potential closure, and incisional vac placement.  Once scheduled will have our inpatient team place to place in light compression wrap.  Team and family feel patient is ok to go home, he will be dc'd in next 2 days.  Patient will come Tuesday for hbo re-consult with dr martinez and then we can get him started back in hbo after surgical intervention as well.  I will update dr. Hugo and will await scheduling from dr meza's office. No acute s/s of infection or c/o pain.    8-22-24 since patient was last seen in clinic, he was dc'd from the [facility] on 8-8 (Thursday). Family reported he was doing very well.  he was taken to the OR on 8-12 (Monday) debrided and keracis placed.  When patient presented on Tuesday for an hbo consult he was slurring words, slouching/sliding out of chair and family reported that they could not take care of him like this. Patient was sent to ed and admitted from8-13 to 8-15 for acute blood loss anemia on chronic anemia.  During this admission there was discussion of palliative or hospice at home however patient chose to return to [facility].  The apn at [facility] touched base with me on Monday regarding wound vac.  Patient did bring wound vac today. They have been dressing all wounds with adaptic and abd pad and ace wrap.  Patient  has completed abx. He has new pressure wounds to his left posterior heel and left anterior ankle (from ace wrap wrinkle? Shoe?). The right achilles wound is extremely dessicated.  The right tma site remains approximated with sutures, concern for dehiscence on the lateral aspect however I suspect it is related to his edema as it is significantly increased from last visit. The left tma is stable, this was debrided and keracis placed along with vac.  We need to take extreme caution with this patient and bony prominences. Patient will come to wound clinic for dressing and vac changes.    8-29-24 patient returns.  Patient came for rn visit, it was noted that the vac was clamped.  His edema was reducing. Overall there is an improvement in the wounds the left tma still has first metatarsal exposed-keracis placed in this area, remainder of the wound bed has remnants of keracis from last week-will allow to continue to integrate. The right incision on the lateral aspect is drainining, there does appear to be some necrosis along the lateral edge. D/w patient and spouse to make sure the right foot is not laterally rotating when he is relaxed. The right achilles remains extremely dessicated.  Will utilize silver hydrogel.  Since we do not have a y connector for the genadyne vac, will utilize the vac on the right today in an effort to keep the incision approximated and fill in the lateral aspect.  No acute s/s of infection.     9-5-24 patient returns.  Patient came for rn visit two days ago.  Patient did have a new area of sdti on the left lateral foot-this is most likely from the surgical shoe-will utilize bordered foam with offloading hole to attempt to offload this area, according to the spouse he is walking 150 feet in physical therapy but remains very weak and can't transfer from chair to toilet with max assist of 3.  She and her children are concerned [facility] may be discharging him.  I encouraged her and the family to  attend the care plan meeting and discuss with .  I encouraged her to let them know taht he needs some work on transfers and rising from a seated position for adl safety.  Last week we placed the wound vac to the right foot.  Today the right foot is cold to touch, there is a signal in the right plantar arch and AT/pt, but no signal more distal.  The medial aspect is now  as well with a healing ridge in the central 3rd of the incision.  The left is stable, it is warm, with signals at the AT/pt/plantar medial and lateral.  Patient is not c/o pain.  Per family he is more tired.  I let them know that we can order labs.  I also asked them to f/u with dr. Nix.  I will send a message to his care team pcp, [facility] adria, dr matson and dr. Nix.  I am concerned with the continued dehiscence on the right and now the distal aspect being cool that salvage is becoming less likely.    9-13-24 patient returns.  Last week the right foot was cold and distal signals could not be auscultated with doppler and family was stating he was more tired. I touched base with apn as well as duly providers regarding my concerns. See below for labs taking from apn note, esr and crp trending down from July, h/h stable.  Patient is being dcd from [facility] tomorrow. Patient had an appointment with dr. Nix/adria yesterday and per the documentation: both feet were warm and patient's dp/pt pulses were found by doppler.  They felt that patient's blood flow should allow for continued healing. They are to f/u as needed such in the case of new development of wound, or worsening.  Today patient presents with transfer and spandagrip compression. The right is warm today and color appears better, although the lateral aspect probes deep to bone. Patient debrided today and keracis placed.  MEDICATIONS:     Current Outpatient Medications:     brinzolamide 1 % Ophthalmic Suspension, Place 1 drop into both eyes in the morning and 1 drop before  bedtime., Disp: , Rfl:     midodrine 5 MG Oral Tab, Take 1 tablet (5 mg total) by mouth 3 (three) times daily as needed (SBP less than 90)., Disp: , Rfl:     insulin glargine 100 UNIT/ML Subcutaneous Solution, Inject 15 Units into the skin nightly., Disp: , Rfl:     Insulin Lispro 100 UNIT/ML Subcutaneous Solution Cartridge, Inject 5-9 Units into the skin TID AC&HS., Disp: , Rfl:     omeprazole 20 MG Oral Capsule Delayed Release, Take 1 capsule (20 mg total) by mouth every morning before breakfast., Disp: , Rfl:     polyethylene glycol, PEG 3350, 17 g Oral Powd Pack, Take 17 g by mouth daily as needed (constipation)., Disp: , Rfl:     DORZOLAMIDE 2 % Ophthalmic Solution, INSTILL 1 DROP INTO BOTH EYES IN THE MORNING AND 1 DROP BEFORE BEDTIME (Patient not taking: Reported on 9/3/2024), Disp: 10 mL, Rfl: 0    ELIQUIS 2.5 MG Oral Tab, TAKE 1 TABLET(2.5 MG) BY MOUTH TWICE DAILY, Disp: 60 tablet, Rfl: 0    COMBIGAN 0.2-0.5 % Ophthalmic Solution, Place 1 drop into both eyes 2 (two) times daily., Disp: 1 each, Rfl: 0    gabapentin 100 MG Oral Cap, Take 1 capsule (100 mg total) by mouth at bedtime., Disp: 30 capsule, Rfl: 0    HYDROcodone-acetaminophen 5-325 MG Oral Tab, Take 1 tablet by mouth every 6 (six) hours as needed for Pain., Disp: 10 tablet, Rfl: 0    Probiotic Product (PROBIOTIC DAILY OR), Take 1 capsule by mouth daily., Disp: , Rfl:     sennosides 8.6 MG Oral Tab, Take 1 tablet (8.6 mg total) by mouth 2 (two) times daily., Disp: , Rfl:     acetaminophen 325 MG Oral Tab, Take 2 tablets (650 mg total) by mouth every 6 (six) hours as needed for Pain., Disp: , Rfl:     Netarsudil-Latanoprost (ROCKLATAN) 0.02-0.005 % Ophthalmic Solution, Apply 1 drop to eye daily. Both eyes, Disp: , Rfl:     atorvastatin 80 MG Oral Tab, Take 1 tablet (80 mg total) by mouth nightly., Disp: 90 tablet, Rfl: 2    CLOPIDOGREL 75 MG Oral Tab, TAKE 1 TABLET(75 MG) BY MOUTH DAILY, Disp: 90 tablet, Rfl: 0    Continuous Blood Gluc Sensor (DEXCOM  G6 SENSOR) Does not apply Misc, 1 Device Every 10 days. (Patient not taking: Reported on 8/20/2024), Disp: 3 each, Rfl: 1    Insulin Lispro (HUMALOG) 100 UNIT/ML Subcutaneous Solution, 95 units per day via insulin pump. (Patient not taking: Reported on 8/20/2024), Disp: 90 mL, Rfl: 1    carvedilol 12.5 MG Oral Tab, Take 1 tablet (12.5 mg total) by mouth 2 (two) times daily with meals. Take one tablet (12.5mg total) by mouth two times a day, Disp: 180 tablet, Rfl: 3    Ergocalciferol (VITAMIN D2 OR), Take 50,000 Units by mouth every 30 (thirty) days., Disp: , Rfl:     Continuous Blood Gluc Transmit (DEXCOM G6 TRANSMITTER) Does not apply Misc, Change every 90 days (Patient not taking: Reported on 8/20/2024), Disp: 1 each, Rfl: 3    CALCIUM ACETATE 667 MG Oral Cap, TAKE 1 CAPSULE BY MOUTH THREE TIMES DAILY WITH MEALS (Patient not taking: Reported on 9/10/2024), Disp: 270 capsule, Rfl: 0    PTA Insulin via Pump, Inject into the skin continuous. humalog insulin  Medtronic Basal Rate : 55.6 standard rate 1.90 I:C - 1 units/4 carbs Correction Factor - unknown (Patient not taking: Reported on 8/20/2024), Disp: , Rfl:   ALLERGIES:     Allergies   Allergen Reactions    Augmentin [Amoxicillin-Pot Clavulanate] RASH    Lisinopril Coughing     Dyspnea        REVIEW OF SYSTEMS:   This information was obtained from the patient/family and chart.    See HPI for pertinent positives, otherwise 10 pt ROS negative.  HISTORY:   Past medical, surgical, family and social history updated where appropriate.      PHYSICAL EXAM:     Vitals:    09/13/24 0700   BP: 158/64  Comment: 124 glucose   Pulse: 54   Resp: 16   Temp: 98.4 °F (36.9 °C)       Estimated body mass index is 28.48 kg/m² as calculated from the following:    Height as of 8/13/24: 72\".    Weight as of 8/13/24: 210 lb (95.3 kg).   POC Glucose   Date Value Ref Range Status   09/13/2024 124 (H) 70 - 99 mg/dL Final   09/05/2024 232 (H) 70 - 99 mg/dL Final   09/03/2024 264 (H) 70 - 99  mg/dL Final       Vital signs reviewed.Appears stated age, well groomed.    Constitutional:  Bp wnl for patient. Pulse Regular and wnl for patient. Respirations easy and unlabored. Temperature wnl. Elevated bmi. Appearance neat and clean. Appears in no acute distress. Well nourished and well developed.    Lower extremity:  at/pt palpable bilaterally.  Extremities free of varicosities, edema decreased.  Left open TMA is warm, right with sutures intact but some dehiscence on lateral and medial aspect, the right is warm.  Skin is dry. no hairgrowth on legs.    Musculoskeletal:  Patient is in wheelchair propelled by others, able to transfer with max assist x 3  Integumentary:  refer to wound characteristics and images   Psychiatric:  Judgment and insight intact. Alert and oriented times 3. Patient is quiet, calm, cooperative, and communicative.   EDEMA:   Calf  Point of Measurement - Left Calf: 36  Point of Measurement - Right Calf: 36  Left Calf from:: Heel  Calf Left cm:: 35.5  Right Calf from:: Heel  Right Calf cm:: 35.3  Ankle  Point of Measurement - Left Ankle: 10  Point of Measurement - Right Ankle: 10  Left Ankle from:: Heel  Left Ankle cm:: 26     Right Ankle from:: Heel  Right Ankle cm:: 24.3     DIAGNOSTICS:     9-6-24 [facility] labs (per apn note)  9/6/24 wbc 5.41 hgb 8.4 hematocrit 25.3 plt 151  Sed 6 crp 2.8   Sodium 134 potassium 3.7 chl 95 co2 30 glucose 344 bun 52 crt 4.69 ca 10.1 prot 5.6 alb 3.4 alt < 7 ast 10 alk phos 106 bili 0.26 gfr 12     Lab Results   Component Value Date    BUN 59 (H) 08/14/2024    CREATSERUM 6.27 (H) 08/14/2024    GFRCKDEPI 8.51 (L) 03/01/2022    ALB 3.8 08/13/2024    TP 6.2 08/13/2024    A1C 5.9 (H) 06/14/2024   Albumin 2.4/tp 5.0  [facility]  Albumin 2.8/tp 5.9   7-22-24    Lab Results   Component Value Date    ESRML 75 (H) 06/24/2024   ESR    50    7-15-24 ([facility])   ESR    44    [facility]    Lab Results   Component Value Date    CRP 18.50 (H)  06/24/2024    CRP 0.3 04/22/2014   CRP     11.0   7-15-24 ([place])  CRP      23.7  7-22-24 ([place])    7-25-24 TCOM  Lead                  Baseline             Oxygen challenge           Location     1                        79                      120                                 Left medial lower leg  2                        75                      105                                 Left foot dorsal surface  3                        49                      107                                 Rt medial lower leg  4                        61                      161                                 Rt foot dorsal surface    6-30-24 pathology left foot  Left foot, transmetatarsal amputation:  -Gangrenous necrosis of skin and soft tissue with underlying acute osteomyelitis.  -Bone margin with no significant acute inflammation.  -Viable soft tissue margin with calcific atherosclerosis.    6-27-24 pathology right foot & left  A.  Right foot tissue and bone, excision:  -Bone with acute osteomyelitis.     B.  Left foot second metatarsal bone, excision:  -Bone with acute osteomyelitis.  -Fibroadipose tissue with necrosis and suppurative inflammation.    5-22-24 operative report-dr salas  Findings: Widely patent bilateral common femoral, SFA, profunda, popliteal.  PT and peroneal patent to the foot however significant atresia of the vasculature into the foot with sparse collaterals.  AT reoccluded despite recent endovascular intervention.  No opacification to the toe wounds.  Deep venous arterialization performed in the right anterior tibial artery to vein however the veins in the foot were friable after 3 mm balloon angioplasty and there was no significant outflow and thus this was embolized.  The patient maintained outflow via the PT and peroneal bilaterally consistent with preoperative baseline.  Patient should proceed with TMA and should this not heal they understand the need for future major  amputation.   WOUND ASSESSMENT:     Wound 07/18/24 #1 Foot Left (Active)   Date First Assessed/Time First Assessed: 07/18/24 1344    Wound Number (Wound Clinic Only): #1  Primary Wound Type: Diabetic Ulcer  Location: Foot  Wound Location Orientation: Left      Assessments 7/18/2024  2:00 PM 9/13/2024  9:00 AM   Wound Image         Drainage Amount Small Moderate   Drainage Description Serosanguineous Serosanguineous   Treatments Compression Compression   Wound Length (cm) 5.2 cm 4.5 cm   Wound Width (cm) 10.4 cm 9.4 cm   Wound Surface Area (cm^2) 54.08 cm^2 42.3 cm^2   Wound Depth (cm) 1 cm 0.6 cm   Wound Volume (cm^3) 54.08 cm^3 25.38 cm^3   Wound Healing % -- 53   Margins Well-defined edges Well-defined edges   Non-staged Wound Description Full thickness Full thickness   María-wound Assessment Clean;Dry;Blanchable erythema Moist;Maceration;Pink   Wound Granulation Tissue Red;Spongy Red;Spongy   Wound Bed Granulation (%) 15 % 60 %   Wound Bed Slough (%) 85 % 40 %   Exposed Structure Bone;Adipose --   Wound Odor None None   Shape 10staples to periwound --   Tunneling? No --   Undermining? No --   Sinus Tracts? No --   Balbuena Scale Grade 4 Grade 4       Active Orders   Date Order Priority Status Authorizing Provider   09/13/24 1106 Cellular tissue product application Diabetic Ulcer Left Foot Routine Active Jenny Olmos APRN   09/13/24 1040 Debridement Diabetic Ulcer Left Foot Routine Active Jenny Olmos APRN       Inactive Orders   Date Order Priority Status Authorizing Provider   08/29/24 1204 Cellular tissue product application Diabetic Ulcer Left Foot Routine Completed Jenny Olmos APRN   08/22/24 1152 Cellular tissue product application Diabetic Ulcer Left Foot Routine Completed Jenny Olmos APRN   08/14/24 1452 Wound care Routine Discontinued Jose R Meza DPM   08/01/24 1142 Cellular tissue product application Diabetic Ulcer Left Foot Routine Completed Jenny Olmos, DELMI   08/01/24 1116 Debridement  Diabetic Ulcer Left Foot Routine Completed Jenny Olmos, APRN   07/25/24 1445 Debridement Diabetic Ulcer Left Foot Routine Completed Jenny Olmos, DELMI       Wound 07/18/24 #2 right Foot Right (Active)   Date First Assessed/Time First Assessed: 07/18/24 1345    Wound Number (Wound Clinic Only): #2 right  Primary Wound Type: Diabetic Ulcer  Location: Foot  Wound Location Orientation: Right      Assessments 7/18/2024  1:57 PM 9/13/2024  9:02 AM   Wound Image          Drainage Amount Small Small   Drainage Description Serosanguineous Serous;Yellow   Treatments Compression Compression   Wound Length (cm) 4.4 cm 2.6 cm   Wound Width (cm) 14 cm 13.9 cm   Wound Surface Area (cm^2) 61.6 cm^2 36.14 cm^2   Wound Depth (cm) 2.1 cm 1 cm   Wound Volume (cm^3) 129.36 cm^3 36.14 cm^3   Wound Healing % -- 72   Margins Well-defined edges Well-defined edges   Non-staged Wound Description Full thickness Full thickness   Hailey-wound Assessment Clean;Dry;Blanchable erythema Dry   Wound Granulation Tissue Red;Pink;Spongy Pink;Firm   Wound Bed Granulation (%) 30 % 5 %   Wound Bed Epithelium (%) 20 % 60 %   Wound Bed Slough (%) 50 % 5 %   Wound Bed Eschar (%) -- 30 %   Exposed Structure Bone;Adipose --   Wound Odor None None   Shape 9 staples noted to hailey wound 12 Sutures   Tunneling? No --   Undermining? No --   Sinus Tracts? No --   Balbuena Scale Grade 4 Grade 4       Active Orders   Date Order Priority Status Authorizing Provider   09/13/24 1109 Cellular tissue product application Diabetic Ulcer Right Foot Routine Active Jenny Olmos APRN   09/13/24 1102 Debridement Diabetic Ulcer Right Foot Routine Active Jenny Olmos, DELMI       Inactive Orders   Date Order Priority Status Authorizing Provider   08/29/24 1207 Debridement Diabetic Ulcer Right Foot Routine Completed Jenny Olmos, DELMI   08/29/24 1205 Cellular tissue product application Diabetic Ulcer Right Foot Routine Completed Jenny Olmos, APRN   08/14/24 1452 Wound care  Routine Discontinued Jose R Meza DPM   08/01/24 1140 Cellular tissue product application Diabetic Ulcer Right Foot Routine Completed Jenny Olmos APRN   08/01/24 1058 Debridement Diabetic Ulcer Right Foot Routine Completed Jenny Olmos APRN   07/25/24 1445 Debridement Diabetic Ulcer Right Foot Routine Completed Jenny Olmos APRN       Wound 07/18/24 #3 Ankle Posterior;Right (Active)   Date First Assessed/Time First Assessed: 07/18/24 1347    Wound Number (Wound Clinic Only): #3  Primary Wound Type: Diabetic Ulcer  Location: Ankle  Wound Location Orientation: Posterior;Right      Assessments 7/18/2024  2:07 PM 9/13/2024  9:05 AM   Wound Image       Drainage Amount None Scant   Drainage Description -- Serosanguineous   Treatments Compression Compression   Wound Length (cm) 4.5 cm 3.1 cm   Wound Width (cm) 2.1 cm 2.2 cm   Wound Surface Area (cm^2) 9.45 cm^2 6.82 cm^2   Wound Depth (cm) 0.1 cm 0.1 cm   Wound Volume (cm^3) 0.945 cm^3 0.682 cm^3   Wound Healing % -- 28   Margins -- Well-defined edges   Non-staged Wound Description -- Full thickness   Hailey-wound Assessment Dry;Blanchable erythema Dry;Pink   Wound Granulation Tissue Pink;Firm Pink;Firm   Wound Bed Granulation (%) 10 % 30 %   Wound Bed Epithelium (%) 10 % --   Wound Bed Slough (%) -- 40 %   Wound Bed Eschar (%) 80 % 30 %   Wound Odor None None   Shape blister to hailey --   Tunneling? No --   Undermining? No --   Sinus Tracts? No --   Pressure Injury Stage Unstageable Stage 3       Inactive Orders   Date Order Priority Status Authorizing Provider   07/25/24 1442 Debridement Pressure Injury Posterior;Right Ankle Routine Completed Jenny Olmos APRN       Wound 07/18/24 #4 Right heel wound Heel Right (Active)   Date First Assessed/Time First Assessed: 07/18/24 1428    Wound Number (Wound Clinic Only): #4 Right heel wound  Primary Wound Type: Pressure Injury  Location: Heel  Wound Location Orientation: Right      Assessments 7/18/2024  2:31 PM  9/13/2024  9:06 AM   Wound Image       Drainage Amount None Scant   Drainage Description -- Serous;Yellow   Treatments Compression Compression   Wound Length (cm) 0.5 cm 1.1 cm   Wound Width (cm) 1 cm 1.5 cm   Wound Surface Area (cm^2) 0.5 cm^2 1.65 cm^2   Wound Depth (cm) 0 cm 0.1 cm   Wound Volume (cm^3) 0 cm^3 0.165 cm^3   Margins Well-defined edges Well-defined edges   Non-staged Wound Description -- Full thickness   María-wound Assessment Dry;Clean Dry   Wound Granulation Tissue -- Pink;Firm   Wound Bed Granulation (%) -- 40 %   Wound Bed Epithelium (%) 100 % --   Wound Bed Slough (%) -- 60 %   Wound Odor None None   Pressure Injury Stage Deep Tissue Stage 3       No associated orders.       Compression Wrap 08/01/24 Foot Dorsal;Right (Active)   Placement Date/Time: 08/01/24 1147   Location: Foot  Wound Location Orientation: Dorsal;Right      Assessments 8/1/2024 11:47 AM 9/3/2024  4:00 PM   Response to Treatment Well tolerated Well tolerated   Compression Layers Multilayer Multilayer   Compression Product Type Unna Boot Unna Boot   Dressing Applied Yes Yes   Compression Wrap Location Toes to Knee Toes to Knee   Compression Wrap Status Clean;Dry Clean;Dry;Intact       No associated orders.       Compression Wrap 08/01/24 Foot Dorsal;Left (Active)   Placement Date/Time: 08/01/24 1148   Location: Foot  Wound Location Orientation: Dorsal;Left      Assessments 8/1/2024 11:48 AM 9/3/2024  4:00 PM   Response to Treatment Well tolerated Well tolerated   Compression Layers Multilayer Multilayer   Compression Product Type Unna Boot Unna Boot   Dressing Applied Yes Yes   Compression Wrap Location Toes to Knee Toes to Knee   Compression Wrap Status Clean;Dry Clean;Dry;Intact       No associated orders.       Wound 08/22/24 #5 Left Heel Heel Left (Active)   Date First Assessed/Time First Assessed: 08/22/24 1021    Wound Number (Wound Clinic Only): #5 Left Heel  Primary Wound Type: Pressure Injury  Location: Heel  Wound  Location Orientation: Left      Assessments 8/22/2024 10:49 AM 9/13/2024  9:06 AM   Wound Image       Drainage Amount None None   Treatments Betadine Compression   Wound Length (cm) 1.6 cm 1.6 cm   Wound Width (cm) 0.5 cm 1.4 cm   Wound Surface Area (cm^2) 0.8 cm^2 2.24 cm^2   Wound Depth (cm) 0 cm 0 cm   Wound Volume (cm^3) 0 cm^3 0 cm^3   Margins -- Well-defined edges   Non-staged Wound Description -- Full thickness   María-wound Assessment Clean Clean;Dry   Wound Bed Epithelium (%) 100 % 100 %   Wound Odor None None   Shape -- Dtí   Pressure Injury Stage Deep Tissue Deep Tissue       No associated orders.       Wound 08/22/24 #6 Left Anterior Ankle Ankle Anterior;Left (Active)   Date First Assessed/Time First Assessed: 08/22/24 1022    Wound Number (Wound Clinic Only): #6 Left Anterior Ankle  Primary Wound Type: Pressure Injury  Location: Ankle  Wound Location Orientation: Anterior;Left      Assessments 8/22/2024 10:50 AM 9/13/2024  9:07 AM   Wound Image       Drainage Amount None None   Treatments Betadine --   Wound Length (cm) 1.2 cm 1.1 cm   Wound Width (cm) 0.6 cm 0.7 cm   Wound Surface Area (cm^2) 0.72 cm^2 0.77 cm^2   Wound Depth (cm) 0 cm 0 cm   Wound Volume (cm^3) 0 cm^3 0 cm^3   Margins -- Well-defined edges   Non-staged Wound Description -- Full thickness   María-wound Assessment Clean Dry   Wound Bed Epithelium (%) 100 % 100 %   Wound Odor None None   Pressure Injury Stage Stage 1 Stage 1       No associated orders.       Negative Pressure Wound Therapy Dorsal;Left (Active)   Placement Date/Time: 08/22/24 1258   Wound Location Orientation: Dorsal;Left      Assessments 8/22/2024 10:49 AM 8/26/2024  4:30 PM   Wound photographed/measured Yes Yes   Machine Status (On) Yes Yes   Unit Type Medella Medella   Dressing Type Other (Comment) Other (Comment)   Number of Foam Pieces Used 2 2   Cycle Continuous Continuous   Target Pressure (mmHg) 125 125   Canister Changed Yes No       No associated orders.        Negative Pressure Wound Therapy Dorsal;Right (Active)   Placement Date/Time: 08/29/24 1245   Wound Location Orientation: Dorsal;Right      Assessments 8/29/2024 10:24 AM 9/3/2024  4:04 PM   Wound photographed/measured Yes Yes   Machine Status (On) Yes Yes   Site Assessment Clean Clean   María-wound Assessment Clean Clean   Unit Type Medella Medella   Number of Foam Pieces Used 2 2   Cycle Continuous Continuous   Target Pressure (mmHg) 125 125   Canister Changed Yes Yes       No associated orders.       Wound 09/03/24 #7 Left Lateral Foot Left;Lateral (Active)   Date First Assessed/Time First Assessed: 09/03/24 1606    Wound Number (Wound Clinic Only): #7 Left Lateral Foot  Primary Wound Type: Pressure Injury  Wound Location Orientation: Left;Lateral      Assessments 9/3/2024  4:07 PM 9/13/2024  9:07 AM   Wound Image       Drainage Amount None None   Treatments Betadine Compression   Dressing ABD Pad --   Wound Length (cm) 1.1 cm 1.4 cm   Wound Width (cm) 0.5 cm 0.8 cm   Wound Surface Area (cm^2) 0.55 cm^2 1.12 cm^2   Wound Depth (cm) 0 cm 0 cm   Wound Volume (cm^3) 0 cm^3 0 cm^3   Margins Well-defined edges Well-defined edges   Non-staged Wound Description Full thickness Full thickness   María-wound Assessment Dry;Clean Dry   Wound Bed Epithelium (%) 100 % 100 %   Wound Odor None None   Tunneling? No --   Undermining? No --   Sinus Tracts? No --   Pressure Injury Stage Deep Tissue Deep Tissue       No associated orders.          ASSESSMENT AND PLAN:    1. 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 Non-pressure chronic ulcer of other part of right foot with necrosis of bone (HCC)    3. Diabetic foot ulcer with osteomyelitis (HCC)    4. PAD (peripheral artery disease) (HCC)    5. ESRD (end stage renal disease) (HCC)            Risks, benefits, and alternatives of current treatment plan discussed in detail.  Questions and concerns addressed. Red flags to RTC or ED reviewed.   Patient (or parent) agrees to plan.      NOTE TO PATIENT: The 21st Century Cures Act makes clinical notes like these available to patients in the interest of transparency. Clinical notes are medical documents used by physicians and care providers to communicate with each other. These documents include medical language and terminology, abbreviations, and treatment information that may sound technical and at times possibly unfamiliar. In addition, at times, the verbiage may appear blunt or direct. These documents are one tool providers use to communicate relevant information and clinical opinions of the care providers in a way that allows common understanding of the clinical context.   I [id number] with the patient. This time included:    preparing to see the patient (eg, review notes and recent diagnostics),  seeing the patient, obtaining and/or reviewing separately obtained history, performing a medically appropriate examination and/or evaluation, counseling and educating the patient, documenting in the record.bill keracis placement only  DISCHARGE:      Patient Instructions   Please return:   Tuesday or Wednesday with Jenny for 1 hour    Patient discharge and wound care instructions  Darin Bowens  9/13/2024          Leave in place x 1 week  Cleansing/dressing:     In clinic/ with each dressing change:    please cleanse the limb, foot, and between toes with soap, water and washcloth.   Dry thoroughly.    Cleanse/soak the wound with VASHE (or other hypochlorous wound cleanser), dab dry.    Apply the following dressings:       Left tma:  micronized keracis>silicone contact>hydrofera blue Transfer>kerramax>abd >spandagrip from over the tma to knee  Left lateral foot: betadine daily and leave NIGEL    Right TMA: micronized keracis>silicone contact>hydrofera blue Transfer>kerramax>abd pad >spandagrip from over the tma to knee  Right Achilles: honey>abd pad  Right heel : honey>abd pad      Weight bearing as  tolerated    Offloading  Provide patient with bilateral heel lift boots (ie prevalon), these should be on whenever patient is in bed    Offloading Lower Extremities  Off-loading means no weight-bearing or anything touching the area of the wound. If your doctor suggests that you should \"off-load\", he or she means that you are not to walk or bear weight on the extremity that has a wound, or the extremity where a wound appears likely to develop.  WHY DO I HAVE TO OFF-LOAD?  When you have a non-healing wound, you must do everything possible to give your body a chance to heal the wound. The weight of your body when you walk puts a large amount of pressure on your feet and ankles. This pressure keeps the new tissue from growing and inhibits new blood vessels from forming. If you continue to bear weight on a body part that has a wound, the time it takes to heal the wound increases, or, worse yet, the wound may not heal at all!  HOW DO I OFF-LOAD?  There are a number of ways to off-load your legs and feet.   The easiest way is to stay in bed, but of course, that isn't always possible.   You may also be prescribed a special shoe/boot/cast to allow you to be up on your feet periodically, but keep pressure off the wound when you are.  Finally if you are in bed, recliner, couch: your foot should not be touching anything except air.  Many times you can position a pillow from your knees to just above your ankles (along your calves) to keep your ankles, heels from resting on anything.     Offloading:  Off-loading is an important part of your wound treatment program. It is a part that only you can assure is accomplished. If you have any concerns about methods to off-load your wound, or feel that you might have trouble following the off-loading plan prescribed for you, talk to your doctor so that alternatives can be discussed that will work in your situation.    Patient should be up in chair with BRITNEY luffle cushion     Nutrition  and blood sugar control:  Focus on the following:  Protein: Meats, beans, eggs, milk and yogurt particularly Greek yogurt), tofu, soy nuts, soy protein products (Follow the protein handout in your welcome folder)  Vitamin C: Citrus fruits and juices, strawberries, tomatoes, tomato juice, peppers, baked potatoes, spinach, broccoli, cauliflower, [place] sprouts, cabbage  Vitamin A: Dark green, leafy vegetables, orange or yellow vegetables, cantaloupe, fortified dairy products, liver, fortified cereals  Zinc: Fortified cereals, red meats, seafood  PLEASE PROVIDE PATIENT 2 PACKETS OF Alex by [employer]. It can be purchased on amazon, [facility], or local pharmacy may be able to order it for you.  (These are essential branch chain amino acids that help with tissue building and wound healing).   When your blood sugar is consistently elevated greater than 180 your body can't heal or fight infection.     Concerns:  Signs of infection may include the following:  Increase in redness  Red \"streaks\" from wound  Increase in swelling  Fever  Unusual odor  Change in the amount of wound drainage     Should you experience any significant changes in your wound(s) or have any questions regarding your home care instructions please contact the [facility] @ [phone] If after regular business hours, please call your family doctor or local emergency room. The treatment plan has been discussed at length between you and your provider. Follow all instructions carefully, it is very important. If you do not follow all instructions you are at risk of your wound not healing, infection, possible loss of limb and even loss of life.    Jenny Olmos FNP-C, CWCN-AP, CFCN, CSWS, WCC, DWC  9/13/2024

## 2024-09-13 ENCOUNTER — OFFICE VISIT (OUTPATIENT)
Dept: WOUND CARE | Facility: HOSPITAL | Age: 79
End: 2024-09-13
Attending: NURSE PRACTITIONER
Payer: MEDICARE

## 2024-09-13 VITALS
RESPIRATION RATE: 16 BRPM | HEART RATE: 54 BPM | DIASTOLIC BLOOD PRESSURE: 64 MMHG | TEMPERATURE: 98 F | SYSTOLIC BLOOD PRESSURE: 158 MMHG

## 2024-09-13 DIAGNOSIS — I73.9 PAD (PERIPHERAL ARTERY DISEASE) (HCC): ICD-10-CM

## 2024-09-13 DIAGNOSIS — E11.621 DIABETIC FOOT ULCER WITH OSTEOMYELITIS (HCC): ICD-10-CM

## 2024-09-13 DIAGNOSIS — L97.509 DIABETIC FOOT ULCER WITH OSTEOMYELITIS (HCC): ICD-10-CM

## 2024-09-13 DIAGNOSIS — M86.9 DIABETIC FOOT ULCER WITH OSTEOMYELITIS (HCC): ICD-10-CM

## 2024-09-13 DIAGNOSIS — E11.69 DIABETIC FOOT ULCER WITH OSTEOMYELITIS (HCC): ICD-10-CM

## 2024-09-13 DIAGNOSIS — L97.524 NON-PRESSURE CHRONIC ULCER OF OTHER PART OF LEFT FOOT WITH NECROSIS OF BONE (HCC): Primary | ICD-10-CM

## 2024-09-13 DIAGNOSIS — N18.6 ESRD (END STAGE RENAL DISEASE) (HCC): ICD-10-CM

## 2024-09-13 DIAGNOSIS — L97.514 NON-PRESSURE CHRONIC ULCER OF OTHER PART OF RIGHT FOOT WITH NECROSIS OF BONE (HCC): ICD-10-CM

## 2024-09-13 LAB — GLUCOSE BLD-MCNC: 124 MG/DL (ref 70–99)

## 2024-09-13 PROCEDURE — 82962 GLUCOSE BLOOD TEST: CPT | Performed by: NURSE PRACTITIONER

## 2024-09-13 PROCEDURE — 15275 SKIN SUB GRAFT FACE/NK/HF/G: CPT | Performed by: NURSE PRACTITIONER

## 2024-09-13 PROCEDURE — 15276 SKIN SUB GRAFT F/N/HF/G ADDL: CPT | Performed by: NURSE PRACTITIONER

## 2024-09-13 NOTE — PATIENT INSTRUCTIONS
Please return:   Tuesday or Wednesday with Jenny for 1 hour    Patient discharge and wound care instructions  Darin Bowens  9/13/2024          Leave in place x 1 week  Cleansing/dressing:     In clinic/ with each dressing change:    please cleanse the limb, foot, and between toes with soap, water and washcloth.   Dry thoroughly.    Cleanse/soak the wound with VASHE (or other hypochlorous wound cleanser), dab dry.    Apply the following dressings:       Left tma:  micronized keracis>silicone contact>hydrofera blue Transfer>kerramax>abd >spandagrip from over the tma to knee  Left lateral foot: betadine daily and leave NIGEL    Right TMA: micronized keracis>silicone contact>hydrofera blue Transfer>kerramax>abd pad >spandagrip from over the tma to knee  Right Achilles: honey>abd pad  Right heel : honey>abd pad      Weight bearing as tolerated    Offloading  Provide patient with bilateral heel lift boots (ie prevalon), these should be on whenever patient is in bed    Offloading Lower Extremities  Off-loading means no weight-bearing or anything touching the area of the wound. If your doctor suggests that you should \"off-load\", he or she means that you are not to walk or bear weight on the extremity that has a wound, or the extremity where a wound appears likely to develop.  WHY DO I HAVE TO OFF-LOAD?  When you have a non-healing wound, you must do everything possible to give your body a chance to heal the wound. The weight of your body when you walk puts a large amount of pressure on your feet and ankles. This pressure keeps the new tissue from growing and inhibits new blood vessels from forming. If you continue to bear weight on a body part that has a wound, the time it takes to heal the wound increases, or, worse yet, the wound may not heal at all!  HOW DO I OFF-LOAD?  There are a number of ways to off-load your legs and feet.   The easiest way is to stay in bed, but of course, that isn't always possible.   You  may also be prescribed a special shoe/boot/cast to allow you to be up on your feet periodically, but keep pressure off the wound when you are.  Finally if you are in bed, recliner, couch: your foot should not be touching anything except air.  Many times you can position a pillow from your knees to just above your ankles (along your calves) to keep your ankles, heels from resting on anything.     Offloading:  Off-loading is an important part of your wound treatment program. It is a part that only you can assure is accomplished. If you have any concerns about methods to off-load your wound, or feel that you might have trouble following the off-loading plan prescribed for you, talk to your doctor so that alternatives can be discussed that will work in your situation.    Patient should be up in chair with EHOB waffle cushion     Nutrition and blood sugar control:  Focus on the following:  Protein: Meats, beans, eggs, milk and yogurt particularly Greek yogurt), tofu, soy nuts, soy protein products (Follow the protein handout in your welcome folder)  Vitamin C: Citrus fruits and juices, strawberries, tomatoes, tomato juice, peppers, baked potatoes, spinach, broccoli, cauliflower, [place] sprouts, cabbage  Vitamin A: Dark green, leafy vegetables, orange or yellow vegetables, cantaloupe, fortified dairy products, liver, fortified cereals  Zinc: Fortified cereals, red meats, seafood  PLEASE PROVIDE PATIENT 2 PACKETS OF Alex by [employer]. It can be purchased on amazon, [employer], or local pharmacy may be able to order it for you.  (These are essential branch chain amino acids that help with tissue building and wound healing).   When your blood sugar is consistently elevated greater than 180 your body can't heal or fight infection.     Concerns:  Signs of infection may include the following:  Increase in redness  Red \"streaks\" from wound  Increase in swelling  Fever  Unusual odor  Change in the amount of wound  drainage     Should you experience any significant changes in your wound(s) or have any questions regarding your home care instructions please contact the wound center [facility] @ [phone] If after regular business hours, please call your family doctor or local emergency room. The treatment plan has been discussed at length between you and your provider. Follow all instructions carefully, it is very important. If you do not follow all instructions you are at risk of your wound not healing, infection, possible loss of limb and even loss of life.

## 2024-09-13 NOTE — PROGRESS NOTES
.Weekly Wound Education Note    Teaching Provided To: Patient;Family  Training Topics: Discharge instructions;Dressing;Skin substitute;Off-loading;Cleasing and general instructions;Edema control;Compression  Training Method: Explain/Verbal;Written  Training Response: Patient responds and understands;Reinforcement needed            Kerecis Micro 19sqcm x3 applied to right and left foot wounds (3rd application to right foot and 4th application to left foot.  Covered with contact layer, hydrofera transfer, kerramax and abd pads.  Secured in place with medipore tape.  Honey gel and abd pads to right posterior ankle and heel wounds.  Spanda  size E to BLE.  Patient to discharge from [facility] tomorrow.  May leave all dressings in place until seen in clinic next Wednesday.

## 2024-09-13 NOTE — PROGRESS NOTES
Patient ID: Cricket Bowens is a 79 year old male.    Cellular tissue product application Diabetic Ulcer Left Foot    Date/Time: 9/13/2024 11:06 AM    Performed by: Jenny Olmos APRN  Authorized by: Jenny Olmos APRN  Associated wounds:   Wound 07/18/24 #1 Foot Left  Consent:     Consent obtained:  Verbal    Consent given by:  Patient  Anesthesia (see MAR for exact dosages):     Anesthesia method:  Topical application  Procedure details:     Location:  head/hands/feet/digits/genitalia    Product applied:  Kerecis omega3    Product lot #:  [id number]    Product expiration:  11/30/2025    Amount used (cm^2):  38    Amount wasted (cm^2):  0    Secured/Fixated: Yes      Secured/Fixated with:  Contact layer, hydrofera transfer, kerramax, medipore tape  Post-procedure details:     Patient tolerance of procedure:  Tolerated well, no immediate complications  Comments:      X2 Kerecis Micro 19sqcm to wound applied -  Second - Lot#[id number], Exp. 11/30/2025  Cellular tissue product application Diabetic Ulcer Right Foot    Date/Time: 9/13/2024 11:09 AM    Performed by: Jenny Olmos APRN  Authorized by: Jenny Olmos APRN  Associated wounds:   Wound 07/18/24 #2 right Foot Right  Consent:     Consent obtained:  Verbal    Consent given by:  Patient  Procedure details:     Location:  head/hands/feet/digits/genitalia    Product applied:  Kerecis omega3    Product lot #:  [id number]    Product expiration:  11/1/2025    Amount used (cm^2):  19    Amount wasted (cm^2):  0    Secured/Fixated: Yes      Secured/Fixated with:  Contact layer, hydrofera transfer, kerramax  Post-procedure details:     Patient tolerance of procedure:  Tolerated well, no immediate complications

## 2024-09-13 NOTE — PROGRESS NOTES
Patient ID: Cricket Bowens is a 79 year old male.    Debridement Diabetic Ulcer Left Foot   Wound 07/18/24 #1 Foot Left    Performed by: Jenny Olmos APRN  Authorized by: Jenny Olmos APRN      Consent   Consent obtained? verbal  Consent given by: patient    Debridement Details  Performed by: NP  Debridement type: surgical  Level of debridement: subcutaneous tissue  Pain control: lidocaine 4%    Pre-debridement measurements  Length (cm): 4.5  Width (cm): 9.4  Depth (cm): 0.6  Surface Area (cm^2): 42.3    Post-debridement measurements  Length (cm): 4.5  Width (cm): 9.4  Depth (cm): 0.8  Percent debrided: 100%  Surface Area (cm^2): 42.3  Area Debrided (cm^2): 42.3  Volume (cm^3): 33.84    Tissue and other material debrided: adipose and subcutaneous tissue  Comment regarding debrided tissue: Tendon & vessels  Devitalized tissue debrided: slough  Instrument(s) utilized: curette, forceps and blade  Bleeding: medium  Hemostasis obtained with: pressure  Procedural pain (0-10): 0  Post-procedural pain: 0   Response to treatment: procedure was tolerated well    Debridement Diabetic Ulcer Right Foot   Wound 07/18/24 #2 right Foot Right    Performed by: Jenny Olmos APRN  Authorized by: Jenny Olmos APRN      Consent   Consent obtained? verbal  Consent given by: patient    Debridement Details  Performed by: NP  Debridement type: surgical  Level of debridement: subcutaneous tissue  Pain control: lidocaine 4%  Pain control administration type: topical    Pre-debridement measurements  Length (cm): 2.6  Width (cm): 13.9  Depth (cm): 1  Surface Area (cm^2): 36.14    Post-debridement measurements  Length (cm): 2.6  Width (cm): 13.9  Depth (cm): 1.2  Percent debrided: 40%  Surface Area (cm^2): 36.14  Area Debrided (cm^2): 14.46  Volume (cm^3): 43.37    Tissue and other material debrided: adipose and subcutaneous tissue  Comment regarding debrided tissue: tendon  Devitalized tissue debrided: biofilm  Instrument(s)  utilized: forceps, blade and curette  Bleeding: medium  Hemostasis obtained with: pressure  Procedural pain (0-10): 0  Post-procedural pain: 0   Response to treatment: procedure was tolerated well

## 2024-09-17 ENCOUNTER — TELEPHONE (OUTPATIENT)
Dept: NEPHROLOGY | Facility: CLINIC | Age: 79
End: 2024-09-17

## 2024-09-17 NOTE — PROGRESS NOTES
CHIEF COMPLAINT:     No chief complaint on file.    HPI:   Information obtained from Patient, chart and family  7-18-24 INITIAL:  80 YO male with past medical history of CAD s/p stents, PAD s/p bilateral lower extremity stents, atrial fibrillation on Xarelto, diabetes with insulin pump, ESRD on HD, CHF with LVH status post AVR, history of stroke, hypertension, hyperlipidemia, DESI and recent TMA  6/13 per Dr Meza. He presented to hospital on 6-25w poor wound healing. He had further debridement to level of bone along with partial resection of the Right 1st and 2nd MT on 6/27 and had further debridement of foot on 7/5.   Patient was discharged on IV Cefepime with HD, continue PO bactrim and PO Flagyl through 8/15. . He did receive 4 HBO treatmenns while inpatient and then discharge to rehab facility. Podiatry suggested they would reconsider application of wound vac on follow up at [place] wound [facility].   Epic notes:  \"Hx of PVD arterial Ultrasound April likely more distal PAD require further amputation per vascular patient is not a candidate for any further revascularization and should have foot amputation if does not heal.\"  Niru is dressing wounds with betadine soaked gauze, he presents with son and spouse (who is a retired nurse).  Bedside clinic exam reveals palpable anterior tibials (at the ankles) and posterior tibia, patient also has pulsatile signals at the dp/pt/peronal/and plantar aspect. We discussed micro vs macro circulation. Will get tcom.  Once patient is safe in transfers and short distance ambulation family would like to care for him at home, at that point patient could then come for hbo again.  At this time will utilize santyl. I did remove the staples from the right as they were no longer supporting the tissue and applied steristrips.  This incision is concerning for further dehiscence.     HPI PRIOR TO SEPTEMBER 2024 SEE INDIVIDUAL PROGRESS NOTES  8-8-24 Patient returns.  He was seen for rn  visit on Monday, keracis placed last week. He has tolerated the compression well.  Drainage has slowed on the right.  Wife and daughter report that he is doing really well with transfers with physicial therapy and stamina. He has even worked on getting in/out of the car.  Dr Meza is here in clinic to collaborate on next steps-plan will be for patient to go to [facility] next Monday or Wednesday and do a clean up, potential closure, and incisional vac placement.  Once scheduled will have our inpatient team place to place in light compression wrap.  Team and family feel patient is ok to go home, he will be dc'd in next 2 days.  Patient will come Tuesday for hbo re-consult with dr martinez and then we can get him started back in hbo after surgical intervention as well.  I will update dr. Hugo and will await scheduling from dr meza's office. No acute s/s of infection or c/o pain.    8-22-24 since patient was last seen in clinic, he was dc'd from the [facility] on 8-8 (Thursday). Family reported he was doing very well.  he was taken to the OR on 8-12 (Monday) debrided and keracis placed.  When patient presented on Tuesday for an hbo consult he was slurring words, slouching/sliding out of chair and family reported that they could not take care of him like this. Patient was sent to ed and admitted from8-13 to 8-15 for acute blood loss anemia on chronic anemia.  During this admission there was discussion of palliative or hospice at home however patient chose to return to [facility].  The apn at [facility] touched base with me on Monday regarding wound vac.  Patient did bring wound vac today. They have been dressing all wounds with adaptic and abd pad and ace wrap.  Patient has completed abx. He has new pressure wounds to his left posterior heel and left anterior ankle (from ace wrap wrinkle? Shoe?). The right achilles wound is extremely dessicated.  The right tma site remains approximated with sutures, concern for dehiscence on the  lateral aspect however I suspect it is related to his edema as it is significantly increased from last visit. The left tma is stable, this was debrided and keracis placed along with vac.  We need to take extreme caution with this patient and bony prominences. Patient will come to wound clinic for dressing and vac changes.    8-29-24 patient returns.  Patient came for rn visit, it was noted that the vac was clamped.  His edema was reducing. Overall there is an improvement in the wounds the left tma still has first metatarsal exposed-keracis placed in this area, remainder of the wound bed has remnants of keracis from last week-will allow to continue to integrate. The right incision on the lateral aspect is drainining, there does appear to be some necrosis along the lateral edge. D/w patient and spouse to make sure the right foot is not laterally rotating when he is relaxed. The right achilles remains extremely dessicated.  Will utilize silver hydrogel.  Since we do not have a y connector for the genadyne vac, will utilize the vac on the right today in an effort to keep the incision approximated and fill in the lateral aspect.  No acute s/s of infection.     9-5-24 patient returns.  Patient came for rn visit two days ago.  Patient did have a new area of sdti on the left lateral foot-this is most likely from the surgical shoe-will utilize bordered foam with offloading hole to attempt to offload this area, according to the spouse he is walking 150 feet in physical therapy but remains very weak and can't transfer from chair to toilet with max assist of 3.  [facility].  I encouraged her and the family to attend the care plan meeting and discuss with .  I encouraged her to let them know taht he needs some work on transfers and rising from a seated position for adl safety.  Last week we placed the wound vac to the right foot.  Today the right foot is  cold to touch, there is a signal in the right plantar arch and AT/pt, but no signal more distal.  The medial aspect is now  as well with a healing ridge in the central 3rd of the incision.  The left is stable, it is warm, with signals at the AT/pt/plantar medial and lateral.  Patient is not c/o pain.  Per family he is more tired.  I let them know that we can order labs.  I also asked them to f/u with dr. Nix.  I will send a message to his care team pcp, [facility] apvioleta, dr matson and dr. Nix.  I am concerned with the continued dehiscence on the right and now the distal aspect being cool that salvage is becoming less likely.    9-13-24 patient returns.  Last week the right foot was cold and distal signals could not be auscultated with doppler and family was stating he was more tired. I touched base with apn as well as duly providers regarding my concerns. See below for labs taking from apn note, esr and crp trending down from July, h/h stable.  Patient is being dcd from [facility] tomorrow. Patient had an appointment with dr. Nix/adria yesterday and per the documentation: both feet were warm and patient's dp/pt pulses were found by doppler.  They felt that patient's blood flow should allow for continued healing. They are to f/u as needed such in the case of new development of wound, or worsening.  Today patient presents with transfer and spandagrip compression. The right is warm today and color appears better, although the lateral aspect probes deep to bone. Patient debrided today and keracis placed.    9-18-24 patient returns. Patient was discharged from [facility] last week. Noted a careeverywhere note from yesterday from [facility] called nephrology \"RN reports patient's temperature is 100.3F with no pain. /80 and RR 28. No abnormal lung sounds auscultated but patient's throat area sounds \"gurgly\" when breathing. Patient had dialysis and labs drawn yesterday. RN reports norco was administered to the patient\".   Nephrology advised to call pcp. ***Keracis was placed last week. Dressings left in place until today. Today ***  MEDICATIONS:     Current Outpatient Medications:     brinzolamide 1 % Ophthalmic Suspension, Place 1 drop into both eyes in the morning and 1 drop before bedtime., Disp: , Rfl:     midodrine 5 MG Oral Tab, Take 1 tablet (5 mg total) by mouth 3 (three) times daily as needed (SBP less than 90)., Disp: , Rfl:     insulin glargine 100 UNIT/ML Subcutaneous Solution, Inject 15 Units into the skin nightly., Disp: , Rfl:     Insulin Lispro 100 UNIT/ML Subcutaneous Solution Cartridge, Inject 5-9 Units into the skin TID AC&HS., Disp: , Rfl:     omeprazole 20 MG Oral Capsule Delayed Release, Take 1 capsule (20 mg total) by mouth every morning before breakfast., Disp: , Rfl:     polyethylene glycol, PEG 3350, 17 g Oral Powd Pack, Take 17 g by mouth daily as needed (constipation)., Disp: , Rfl:     DORZOLAMIDE 2 % Ophthalmic Solution, INSTILL 1 DROP INTO BOTH EYES IN THE MORNING AND 1 DROP BEFORE BEDTIME (Patient not taking: Reported on 9/3/2024), Disp: 10 mL, Rfl: 0    ELIQUIS 2.5 MG Oral Tab, TAKE 1 TABLET(2.5 MG) BY MOUTH TWICE DAILY, Disp: 60 tablet, Rfl: 0    COMBIGAN 0.2-0.5 % Ophthalmic Solution, Place 1 drop into both eyes 2 (two) times daily., Disp: 1 each, Rfl: 0    gabapentin 100 MG Oral Cap, Take 1 capsule (100 mg total) by mouth at bedtime., Disp: 30 capsule, Rfl: 0    HYDROcodone-acetaminophen 5-325 MG Oral Tab, Take 1 tablet by mouth every 6 (six) hours as needed for Pain., Disp: 10 tablet, Rfl: 0    Probiotic Product (PROBIOTIC DAILY OR), Take 1 capsule by mouth daily., Disp: , Rfl:     sennosides 8.6 MG Oral Tab, Take 1 tablet (8.6 mg total) by mouth 2 (two) times daily., Disp: , Rfl:     acetaminophen 325 MG Oral Tab, Take 2 tablets (650 mg total) by mouth every 6 (six) hours as needed for Pain., Disp: , Rfl:     Netarsudil-Latanoprost (ROCKLATAN) 0.02-0.005 % Ophthalmic Solution, Apply 1 drop to eye  daily. Both eyes, Disp: , Rfl:     atorvastatin 80 MG Oral Tab, Take 1 tablet (80 mg total) by mouth nightly., Disp: 90 tablet, Rfl: 2    CLOPIDOGREL 75 MG Oral Tab, TAKE 1 TABLET(75 MG) BY MOUTH DAILY, Disp: 90 tablet, Rfl: 0    Continuous Blood Gluc Sensor (DEXCOM G6 SENSOR) Does not apply Misc, 1 Device Every 10 days. (Patient not taking: Reported on 8/20/2024), Disp: 3 each, Rfl: 1    Insulin Lispro (HUMALOG) 100 UNIT/ML Subcutaneous Solution, 95 units per day via insulin pump. (Patient not taking: Reported on 8/20/2024), Disp: 90 mL, Rfl: 1    carvedilol 12.5 MG Oral Tab, Take 1 tablet (12.5 mg total) by mouth 2 (two) times daily with meals. Take one tablet (12.5mg total) by mouth two times a day, Disp: 180 tablet, Rfl: 3    Ergocalciferol (VITAMIN D2 OR), Take 50,000 Units by mouth every 30 (thirty) days., Disp: , Rfl:     Continuous Blood Gluc Transmit (DEXCOM G6 TRANSMITTER) Does not apply Misc, Change every 90 days (Patient not taking: Reported on 8/20/2024), Disp: 1 each, Rfl: 3    CALCIUM ACETATE 667 MG Oral Cap, TAKE 1 CAPSULE BY MOUTH THREE TIMES DAILY WITH MEALS (Patient not taking: Reported on 9/10/2024), Disp: 270 capsule, Rfl: 0    PTA Insulin via Pump, Inject into the skin continuous. humalog insulin  Medtronic Basal Rate : 55.6 standard rate 1.90 I:C - 1 units/4 carbs Correction Factor - unknown (Patient not taking: Reported on 8/20/2024), Disp: , Rfl:   ALLERGIES:     Allergies   Allergen Reactions    Augmentin [Amoxicillin-Pot Clavulanate] RASH    Lisinopril Coughing     Dyspnea        REVIEW OF SYSTEMS:   This information was obtained from the patient/family and chart.    See HPI for pertinent positives, otherwise 10 pt ROS negative.  HISTORY:   Past medical, surgical, family and social history updated where appropriate.      PHYSICAL EXAM:     There were no vitals filed for this visit.      Estimated body mass index is 28.48 kg/m² as calculated from the following:    Height as of 8/13/24:  72\".    Weight as of 8/13/24: 210 lb (95.3 kg).   POC Glucose   Date Value Ref Range Status   09/13/2024 124 (H) 70 - 99 mg/dL Final   09/05/2024 232 (H) 70 - 99 mg/dL Final   09/03/2024 264 (H) 70 - 99 mg/dL Final       Vital signs reviewed.Appears stated age, well groomed.    Constitutional:  Bp ***wnl for patient. Pulse Regular and wnl for patient. Respirations easy and unlabored. Temperature wnl. Elevated bmi. Appearance neat and clean. Appears in no acute distress. Well nourished and well developed.    Lower extremity:  at/pt palpable ***bilaterally.  Extremities free of varicosities, edema ***.  Left open TMA is warm, right with sutures intact but some dehiscence on lateral and medial aspect, the right is warm.  Skin is dry. no hairgrowth on legs.    Musculoskeletal:  Patient is in wheelchair propelled by others, able to transfer with max assist x 3  Integumentary:  refer to wound characteristics and images   Psychiatric:  Judgment and insight intact. Alert and oriented times 3. Patient is quiet, calm, cooperative, and communicative.   EDEMA:   Calf                    Ankle                          DIAGNOSTICS:     9-6-24 [place] labs (per apn note)  9/6/24 wbc 5.41 hgb 8.4 hematocrit 25.3 plt 151  Sed 6 crp 2.8   Sodium 134 potassium 3.7 chl 95 co2 30 glucose 344 bun 52 crt 4.69 ca 10.1 prot 5.6 alb 3.4 alt < 7 ast 10 alk phos 106 bili 0.26 gfr 12     Lab Results   Component Value Date    BUN 59 (H) 08/14/2024    CREATSERUM 6.27 (H) 08/14/2024    GFRCKDEPI 8.51 (L) 03/01/2022    ALB 3.8 08/13/2024    TP 6.2 08/13/2024    A1C 5.9 (H) 06/14/2024   Albumin 2.4/tp 5.0  [facility]  Albumin 2.8/tp 5.9   7-22-24    Lab Results   Component Value Date    ESRML 75 (H) 06/24/2024   ESR    50    [facility]   ESR    44    7-22-24 ([place])    Lab Results   Component Value Date    CRP 18.50 (H) 06/24/2024    CRP 0.3 04/22/2014   CRP     11.0   7-15-24 ([place])  CRP      23.7  7-22-24  ([place])    7-25-24 TCOM  Lead                  Baseline             Oxygen challenge           Location     1                        79                      120                                 Left medial lower leg  2                        75                      105                                 Left foot dorsal surface  3                        49                      107                                 Rt medial lower leg  4                        61                      161                                 Rt foot dorsal surface    6-30-24 pathology left foot  Left foot, transmetatarsal amputation:  -Gangrenous necrosis of skin and soft tissue with underlying acute osteomyelitis.  -Bone margin with no significant acute inflammation.  -Viable soft tissue margin with calcific atherosclerosis.    6-27-24 pathology right foot & left  A.  Right foot tissue and bone, excision:  -Bone with acute osteomyelitis.     B.  Left foot second metatarsal bone, excision:  -Bone with acute osteomyelitis.  -Fibroadipose tissue with necrosis and suppurative inflammation.    5-22-24 operative report-dr salas  Findings: Widely patent bilateral common femoral, SFA, profunda, popliteal.  PT and peroneal patent to the foot however significant atresia of the vasculature into the foot with sparse collaterals.  AT reoccluded despite recent endovascular intervention.  No opacification to the toe wounds.  Deep venous arterialization performed in the right anterior tibial artery to vein however the veins in the foot were friable after 3 mm balloon angioplasty and there was no significant outflow and thus this was embolized.  The patient maintained outflow via the PT and peroneal bilaterally consistent with preoperative baseline.  Patient should proceed with TMA and should this not heal they understand the need for future major amputation.   WOUND ASSESSMENT:     Wound 07/18/24 #1 Foot Left (Active)   Date First Assessed/Time First  Assessed: 07/18/24 1344    Wound Number (Wound Clinic Only): #1  Primary Wound Type: Diabetic Ulcer  Location: Foot  Wound Location Orientation: Left      Assessments 7/18/2024  2:00 PM 9/13/2024  9:00 AM   Wound Image         Drainage Amount Small Moderate   Drainage Description Serosanguineous Serosanguineous   Treatments Compression Compression   Wound Length (cm) 5.2 cm 4.5 cm   Wound Width (cm) 10.4 cm 9.4 cm   Wound Surface Area (cm^2) 54.08 cm^2 42.3 cm^2   Wound Depth (cm) 1 cm 0.6 cm   Wound Volume (cm^3) 54.08 cm^3 25.38 cm^3   Wound Healing % -- 53   Margins Well-defined edges Well-defined edges   Non-staged Wound Description Full thickness Full thickness   María-wound Assessment Clean;Dry;Blanchable erythema Moist;Maceration;Pink   Wound Granulation Tissue Red;Spongy Red;Spongy   Wound Bed Granulation (%) 15 % 60 %   Wound Bed Slough (%) 85 % 40 %   Exposed Structure Bone;Adipose --   Wound Odor None None   Shape 10staples to periwound --   Tunneling? No --   Undermining? No --   Sinus Tracts? No --   Balbuena Scale Grade 4 Grade 4       Inactive Orders   Date Order Priority Status Authorizing Provider   09/13/24 1106 Cellular tissue product application Diabetic Ulcer Left Foot Routine Completed Jenny Olmos, APRHETAL   09/13/24 1040 Debridement Diabetic Ulcer Left Foot Routine Completed Jenny Olmos, APRN   08/29/24 1204 Cellular tissue product application Diabetic Ulcer Left Foot Routine Completed Jenny lOmos, APRN   08/22/24 1152 Cellular tissue product application Diabetic Ulcer Left Foot Routine Completed Jenny Olmos, APRN   08/14/24 1452 Wound care Routine Discontinued Jose R Meza DPM   08/01/24 1142 Cellular tissue product application Diabetic Ulcer Left Foot Routine Completed Jenny Olmos, APRN   08/01/24 1116 Debridement Diabetic Ulcer Left Foot Routine Completed Jenny Olmos, APRN   07/25/24 1445 Debridement Diabetic Ulcer Left Foot Routine Completed Jenny Olmos, APRHETAL       Wound  07/18/24 #2 right Foot Right (Active)   Date First Assessed/Time First Assessed: 07/18/24 1345    Wound Number (Wound Clinic Only): #2 right  Primary Wound Type: Diabetic Ulcer  Location: Foot  Wound Location Orientation: Right      Assessments 7/18/2024  1:57 PM 9/13/2024  9:02 AM   Wound Image          Drainage Amount Small Small   Drainage Description Serosanguineous Serous;Yellow   Treatments Compression Compression   Wound Length (cm) 4.4 cm 2.6 cm   Wound Width (cm) 14 cm 13.9 cm   Wound Surface Area (cm^2) 61.6 cm^2 36.14 cm^2   Wound Depth (cm) 2.1 cm 1 cm   Wound Volume (cm^3) 129.36 cm^3 36.14 cm^3   Wound Healing % -- 72   Margins Well-defined edges Well-defined edges   Non-staged Wound Description Full thickness Full thickness   María-wound Assessment Clean;Dry;Blanchable erythema Dry   Wound Granulation Tissue Red;Pink;Spongy Pink;Firm   Wound Bed Granulation (%) 30 % 5 %   Wound Bed Epithelium (%) 20 % 60 %   Wound Bed Slough (%) 50 % 5 %   Wound Bed Eschar (%) -- 30 %   Exposed Structure Bone;Adipose --   Wound Odor None None   Shape 9 staples noted to maría wound 12 Sutures   Tunneling? No --   Undermining? No --   Sinus Tracts? No --   Balbuena Scale Grade 4 Grade 4       Inactive Orders   Date Order Priority Status Authorizing Provider   09/13/24 1109 Cellular tissue product application Diabetic Ulcer Right Foot Routine Completed Jenny Olmos, DELMI   09/13/24 1102 Debridement Diabetic Ulcer Right Foot Routine Completed Jenny Olmos APRN   08/29/24 1207 Debridement Diabetic Ulcer Right Foot Routine Completed Jenny Olmos, APRN   08/29/24 1205 Cellular tissue product application Diabetic Ulcer Right Foot Routine Completed Jenny Olmos, APRHETAL   08/14/24 1452 Wound care Routine Discontinued Jose R Meza DPM   08/01/24 1140 Cellular tissue product application Diabetic Ulcer Right Foot Routine Completed Jenny Olmos, APRHETAL   08/01/24 1058 Debridement Diabetic Ulcer Right Foot Routine Completed  Jenny Olmos APRN   07/25/24 1445 Debridement Diabetic Ulcer Right Foot Routine Completed Jenny Olmos APRN       Wound 07/18/24 #3 Ankle Posterior;Right (Active)   Date First Assessed/Time First Assessed: 07/18/24 1347    Wound Number (Wound Clinic Only): #3  Primary Wound Type: Diabetic Ulcer  Location: Ankle  Wound Location Orientation: Posterior;Right      Assessments 7/18/2024  2:07 PM 9/13/2024  9:05 AM   Wound Image       Drainage Amount None Scant   Drainage Description -- Serosanguineous   Treatments Compression Compression   Wound Length (cm) 4.5 cm 3.1 cm   Wound Width (cm) 2.1 cm 2.2 cm   Wound Surface Area (cm^2) 9.45 cm^2 6.82 cm^2   Wound Depth (cm) 0.1 cm 0.1 cm   Wound Volume (cm^3) 0.945 cm^3 0.682 cm^3   Wound Healing % -- 28   Margins -- Well-defined edges   Non-staged Wound Description -- Full thickness   Hailey-wound Assessment Dry;Blanchable erythema Dry;Pink   Wound Granulation Tissue Pink;Firm Pink;Firm   Wound Bed Granulation (%) 10 % 30 %   Wound Bed Epithelium (%) 10 % --   Wound Bed Slough (%) -- 40 %   Wound Bed Eschar (%) 80 % 30 %   Wound Odor None None   Shape blister to hailey --   Tunneling? No --   Undermining? No --   Sinus Tracts? No --   Pressure Injury Stage Unstageable Stage 3       Inactive Orders   Date Order Priority Status Authorizing Provider   07/25/24 1442 Debridement Pressure Injury Posterior;Right Ankle Routine Completed Jenny Olmos APRN       Wound 07/18/24 #4 Right heel wound Heel Right (Active)   Date First Assessed/Time First Assessed: 07/18/24 1428    Wound Number (Wound Clinic Only): #4 Right heel wound  Primary Wound Type: Pressure Injury  Location: Heel  Wound Location Orientation: Right      Assessments 7/18/2024  2:31 PM 9/13/2024  9:06 AM   Wound Image       Drainage Amount None Scant   Drainage Description -- Serous;Yellow   Treatments Compression Compression   Wound Length (cm) 0.5 cm 1.1 cm   Wound Width (cm) 1 cm 1.5 cm   Wound Surface Area  (cm^2) 0.5 cm^2 1.65 cm^2   Wound Depth (cm) 0 cm 0.1 cm   Wound Volume (cm^3) 0 cm^3 0.165 cm^3   Margins Well-defined edges Well-defined edges   Non-staged Wound Description -- Full thickness   María-wound Assessment Dry;Clean Dry   Wound Granulation Tissue -- Pink;Firm   Wound Bed Granulation (%) -- 40 %   Wound Bed Epithelium (%) 100 % --   Wound Bed Slough (%) -- 60 %   Wound Odor None None   Pressure Injury Stage Deep Tissue Stage 3       No associated orders.       Compression Wrap 08/01/24 Foot Dorsal;Right (Active)   Placement Date/Time: 08/01/24 1147   Location: Foot  Wound Location Orientation: Dorsal;Right      Assessments 8/1/2024 11:47 AM 9/3/2024  4:00 PM   Response to Treatment Well tolerated Well tolerated   Compression Layers Multilayer Multilayer   Compression Product Type Unna Boot Unna Boot   Dressing Applied Yes Yes   Compression Wrap Location Toes to Knee Toes to Knee   Compression Wrap Status Clean;Dry Clean;Dry;Intact       No associated orders.       Compression Wrap 08/01/24 Foot Dorsal;Left (Active)   Placement Date/Time: 08/01/24 1148   Location: Foot  Wound Location Orientation: Dorsal;Left      Assessments 8/1/2024 11:48 AM 9/3/2024  4:00 PM   Response to Treatment Well tolerated Well tolerated   Compression Layers Multilayer Multilayer   Compression Product Type Unna Boot Unna Boot   Dressing Applied Yes Yes   Compression Wrap Location Toes to Knee Toes to Knee   Compression Wrap Status Clean;Dry Clean;Dry;Intact       No associated orders.       Wound 08/22/24 #5 Left Heel Heel Left (Active)   Date First Assessed/Time First Assessed: 08/22/24 1021    Wound Number (Wound Clinic Only): #5 Left Heel  Primary Wound Type: Pressure Injury  Location: Heel  Wound Location Orientation: Left      Assessments 8/22/2024 10:49 AM 9/13/2024  9:06 AM   Wound Image       Drainage Amount None None   Treatments Betadine Compression   Wound Length (cm) 1.6 cm 1.6 cm   Wound Width (cm) 0.5 cm 1.4 cm    Wound Surface Area (cm^2) 0.8 cm^2 2.24 cm^2   Wound Depth (cm) 0 cm 0 cm   Wound Volume (cm^3) 0 cm^3 0 cm^3   Margins -- Well-defined edges   Non-staged Wound Description -- Full thickness   María-wound Assessment Clean Clean;Dry   Wound Bed Epithelium (%) 100 % 100 %   Wound Odor None None   Shape -- Dtí   Pressure Injury Stage Deep Tissue Deep Tissue       No associated orders.       Negative Pressure Wound Therapy Dorsal;Left (Active)   Placement Date/Time: 08/22/24 1258   Wound Location Orientation: Dorsal;Left      Assessments 8/22/2024 10:49 AM 8/26/2024  4:30 PM   Wound photographed/measured Yes Yes   Machine Status (On) Yes Yes   Unit Type Medella Medella   Dressing Type Other (Comment) Other (Comment)   Number of Foam Pieces Used 2 2   Cycle Continuous Continuous   Target Pressure (mmHg) 125 125   Canister Changed Yes No       No associated orders.       Negative Pressure Wound Therapy Dorsal;Right (Active)   Placement Date/Time: 08/29/24 1245   Wound Location Orientation: Dorsal;Right      Assessments 8/29/2024 10:24 AM 9/3/2024  4:04 PM   Wound photographed/measured Yes Yes   Machine Status (On) Yes Yes   Site Assessment Clean Clean   María-wound Assessment Clean Clean   Unit Type Medella Medella   Number of Foam Pieces Used 2 2   Cycle Continuous Continuous   Target Pressure (mmHg) 125 125   Canister Changed Yes Yes       No associated orders.       Wound 09/03/24 #7 Left Lateral Foot Left;Lateral (Active)   Date First Assessed/Time First Assessed: 09/03/24 1606    Wound Number (Wound Clinic Only): #7 Left Lateral Foot  Primary Wound Type: Pressure Injury  Wound Location Orientation: Left;Lateral      Assessments 9/3/2024  4:07 PM 9/13/2024  9:07 AM   Wound Image       Drainage Amount None None   Treatments Betadine Compression   Dressing ABD Pad --   Wound Length (cm) 1.1 cm 1.4 cm   Wound Width (cm) 0.5 cm 0.8 cm   Wound Surface Area (cm^2) 0.55 cm^2 1.12 cm^2   Wound Depth (cm) 0 cm 0 cm   Wound  Volume (cm^3) 0 cm^3 0 cm^3   Margins Well-defined edges Well-defined edges   Non-staged Wound Description Full thickness Full thickness   María-wound Assessment Dry;Clean Dry   Wound Bed Epithelium (%) 100 % 100 %   Wound Odor None None   Tunneling? No --   Undermining? No --   Sinus Tracts? No --   Pressure Injury Stage Deep Tissue Deep Tissue       No associated orders.          ASSESSMENT AND PLAN:    There are no diagnoses linked to this encounter.            Risks, benefits, and alternatives of current treatment plan discussed in detail.  Questions and concerns addressed. Red flags to RTC or ED reviewed.  Patient (or parent) agrees to plan.      NOTE TO PATIENT: The 21st Century Cures Act makes clinical notes like these available to patients in the interest of transparency. Clinical notes are medical documents used by physicians and care providers to communicate with each other. These documents include medical language and terminology, abbreviations, and treatment information that may sound technical and at times possibly unfamiliar. In addition, at times, the verbiage may appear blunt or direct. These documents are one tool providers use to communicate relevant information and clinical opinions of the care providers in a way that allows common understanding of the clinical context.   I spent***minutes with the patient. This time included:    preparing to see the patient (eg, review notes and recent diagnostics),  seeing the patient, obtaining and/or reviewing separately obtained history, performing a medically appropriate examination and/or evaluation, counseling and educating the patient, documenting in the record.***  DISCHARGE:      There are no Patient Instructions on file for this visit.   Jenny Olmos FNP-C, CWMARY-AP, CFCN, CSWS, WCC, DWC  9/18/2024

## 2024-09-18 ENCOUNTER — APPOINTMENT (OUTPATIENT)
Dept: WOUND CARE | Facility: HOSPITAL | Age: 79
End: 2024-09-18
Attending: NURSE PRACTITIONER
Payer: MEDICARE

## 2024-09-18 ENCOUNTER — TELEPHONE (OUTPATIENT)
Dept: WOUND CARE | Facility: HOSPITAL | Age: 79
End: 2024-09-18

## 2024-09-18 NOTE — TELEPHONE ENCOUNTER
Patient is admitted to Hospice care. They left a voicemail to cancel his wound care treatment today.

## (undated) DEVICE — SOLUTION IRRIG 1000ML 0.9% NACL USP BTL

## (undated) DEVICE — CHLORAPREP 26ML APPLICATOR

## (undated) DEVICE — BLADE SAW 9X25MM THK0.51MM CUT THK0.56MM

## (undated) DEVICE — SUTURE POLYDEK 4-0 TIES 6-932

## (undated) DEVICE — CV PACK-LF: Brand: MEDLINE INDUSTRIES, INC.

## (undated) DEVICE — BASIC DOUBLE BASIN 1-LF: Brand: MEDLINE INDUSTRIES, INC.

## (undated) DEVICE — PROXIMATE RH ROTATING HEAD SKIN STAPLERS (35 WIDE) CONTAINS 35 STAINLESS STEEL STAPLES: Brand: PROXIMATE

## (undated) DEVICE — GAMMEX® PI HYBRID SIZE 8, STERILE POWDER-FREE SURGICAL GLOVE, POLYISOPRENE AND NEOPRENE BLEND: Brand: GAMMEX

## (undated) DEVICE — 3M™ STERI-DRAPE™ U-DRAPE 1015: Brand: STERI-DRAPE™

## (undated) DEVICE — C-ARMOR C-ARM EQUIPMENT COVERS CLEAR STERILE UNIVERSAL FIT 12 PER CASE: Brand: C-ARMOR

## (undated) DEVICE — 450 ML BOTTLE OF 0.05% CHLORHEXIDINE GLUCONATE IN 99.95% STERILE WATER FOR IRRIGATION, USP AND APPLICATOR.: Brand: IRRISEPT ANTIMICROBIAL WOUND LAVAGE

## (undated) DEVICE — SUT COAT VCRL 4-0 27IN SH ABSRB VLT 26MM 1/2

## (undated) DEVICE — BANDAGE,GAUZE,CONFORMING,4"X75",STRL,LF: Brand: MEDLINE

## (undated) DEVICE — PISTOL GRIP SKIN STAPLER: Brand: MULTIFIRE PREMIUM

## (undated) DEVICE — SOL  .9 1000ML BTL

## (undated) DEVICE — STANDARD HYPODERMIC NEEDLE,POLYPROPYLENE HUB: Brand: MONOJECT

## (undated) DEVICE — SOL  .9 3000ML

## (undated) DEVICE — SUTURE PROLENE 6-0 C-1

## (undated) DEVICE — SUTURE PROLENE 3-0 SH

## (undated) DEVICE — DRAPE,T,LIMB,BILATERAL,STERILE: Brand: MEDLINE

## (undated) DEVICE — GLOVE,SURG,SENSICARE SLT,LF,PF,7.5: Brand: MEDLINE

## (undated) DEVICE — INDICATED FOR SURGICAL CLAMPING DURING CARDIOVASCULAR PERIPHERAL VASCULAR, AND GENERAL SURGERY.: Brand: SOFT/FIBRA® SPRING CLIP

## (undated) DEVICE — STRL PENROSE DRAIN 18" X 1/4": Brand: CARDINAL HEALTH

## (undated) DEVICE — SYRINGE 10ML LL TIP

## (undated) DEVICE — GOWN SURG AERO CHROME XXL

## (undated) DEVICE — ESSENTIAL SHOULDER SLING

## (undated) DEVICE — SUT ETHLN 3-0 18IN PS-1 NABSRB BLK 24MM 3/8 C

## (undated) DEVICE — KENDALL SCD EXPRESS SLEEVES, KNEE LENGTH, MEDIUM: Brand: KENDALL SCD

## (undated) DEVICE — HEMOCLIP MED 24 CLIP/CARTRIDGE

## (undated) DEVICE — UNDYED BRAIDED (POLYGLACTIN 910), SYNTHETIC ABSORBABLE SUTURE: Brand: COATED VICRYL

## (undated) DEVICE — STRL PENROSE DRAIN 18" X 1/2": Brand: CARDINAL HEALTH

## (undated) DEVICE — SLEEVE COMPR MD KNEE LEN SGL USE KENDALL SCD

## (undated) DEVICE — MARKER SKIN 2 TIP

## (undated) DEVICE — DRAPE,EXTREMITY,89X128,STERILE: Brand: MEDLINE

## (undated) DEVICE — SCD SLEEVE KNEE HI BLEND

## (undated) DEVICE — 3M™ STERI-STRIP™ REINFORCED ADHESIVE SKIN CLOSURES, R1547, 1/2 IN X 4 IN (12 MM X 100 MM), 6 STRIPS/ENVELOPE: Brand: 3M™ STERI-STRIP™

## (undated) DEVICE — STERILE POLYISOPRENE POWDER-FREE SURGICAL GLOVES WITH EMOLLIENT COATING: Brand: PROTEXIS

## (undated) DEVICE — SUTURE ETHIBOND EXCEL 3-0 SH

## (undated) DEVICE — SUT COAT VCRL 2-0 27IN ABSRB VLT 36MM CT-1

## (undated) DEVICE — Device

## (undated) DEVICE — SUTURE VICRYL 3-0 CT-1

## (undated) DEVICE — HIP PINNING CDS: Brand: MEDLINE INDUSTRIES, INC.

## (undated) DEVICE — HANDPIECE SET WITH HIGH FLOW TIP AND SUCTION TUBE: Brand: INTERPULSE

## (undated) DEVICE — GEL AQUASONIC 100 20GR

## (undated) DEVICE — DRESSING AQUACEL AG 3.5 X 6

## (undated) DEVICE — LIGHT HANDLE

## (undated) DEVICE — DECANTER BAG 9": Brand: MEDLINE INDUSTRIES, INC.

## (undated) DEVICE — HEMOCLIP HORIZON SM MULTI

## (undated) DEVICE — GAUZE SPONGES,12 PLY: Brand: CURITY

## (undated) DEVICE — FLOSEAL HEMOSTATIC MATRIX, 5ML: Brand: FLOSEAL HEMOSTATIC MATRIX

## (undated) DEVICE — PAD,ABDOMINAL,8"X7.5",ST,LF,20/BX: Brand: MEDLINE INDUSTRIES, INC.

## (undated) DEVICE — BNDG,ELSTC,MATRIX,STRL,4"X5YD,LF,HOOK&LP: Brand: MEDLINE

## (undated) DEVICE — SUTURE PROLENE 5-0 C-1

## (undated) DEVICE — SKIN AFFIX .4ML

## (undated) DEVICE — DRESSING WET L16XW3IN OIL EMUL N ADH CURAD

## (undated) DEVICE — CONVERTORS STOCKINETTE: Brand: CONVERTORS

## (undated) DEVICE — STOCKINETTE,IMPERVIOUS,12X48,STERILE: Brand: MEDLINE

## (undated) DEVICE — APPLICATOR PREP 26ML CHG 2% ISO ALC 70%

## (undated) DEVICE — SOLUTION IV 1000ML 0.9% NACL PRESERVATIVE

## (undated) DEVICE — BANDAGE ROLL,100% COTTON, 6 PLY, LARGE: Brand: KERLIX

## (undated) DEVICE — CONTAINER,SPECIMEN,PNEU TUBE,4OZ,OR STRL: Brand: MEDLINE

## (undated) DEVICE — SUTURE PROLENE 7-0 BV-1

## (undated) DEVICE — LOWER EXTREMITY CDS-LF: Brand: MEDLINE INDUSTRIES, INC.

## (undated) DEVICE — ANTIBACTERIAL UNDYED BRAIDED (POLYGLACTIN 910), SYNTHETIC ABSORBABLE SUTURE: Brand: COATED VICRYL

## (undated) DEVICE — DRAPE C-ARM UNIVERSAL

## (undated) DEVICE — SPONGE GZ 4X4IN COT 12 PLY TYP VII WVN C

## (undated) DEVICE — SUTURE MONOCRYL 4-0 PS-2

## (undated) DEVICE — 1010 S-DRAPE TOWEL DRAPE 10/BX: Brand: STERI-DRAPE™

## (undated) DEVICE — SYRINGE 20CC LL TIP

## (undated) DEVICE — DRESSING AQUACEL AG 3.5X3.75

## (undated) DEVICE — STERILE POLYISOPRENE POWDER-FREE SURGICAL GLOVES: Brand: PROTEXIS

## (undated) DEVICE — DRESSING ADAPTIC L16XW3IN OIL ADH

## (undated) DEVICE — CRADLE ARM SLING: Brand: DEROYAL

## (undated) DEVICE — SUTURE VICRYL 3-0 SH

## (undated) DEVICE — BIT DRL 4.5MM CNN QCK CNCT

## (undated) DEVICE — TRANSPOSAL ULTRAFLEX DUO/QUAD ULTRA CART MANIFOLD

## (undated) DEVICE — ENCORE® PERRY STYLE 42 PF SIZE 7.5, STERILE LATEX POWDER-FREE SURGICAL GLOVE: Brand: ENCORE

## (undated) DEVICE — CHLORAPREP ORANGE TINT 10.5ML

## (undated) DEVICE — SOL  .9 500ML

## (undated) DEVICE — TOWEL OR BLU 16X26 STRL

## (undated) DEVICE — COTTON UNDERCAST PADDING,REGULAR FINISH: Brand: WEBRIL

## (undated) DEVICE — CANISTER NEG PRSS 500ML W/ GEL INFOVAC

## (undated) DEVICE — PREMIUM WET SKIN PREP TRAY: Brand: MEDLINE INDUSTRIES, INC.

## (undated) DEVICE — 3M™ MICROFOAM™ TAPE 1528-4: Brand: 3M™ MICROFOAM™

## (undated) DEVICE — SUTURE VICRYL 0 CP-1

## (undated) NOTE — LETTER
Consent to Procedure/Sedation    Date: 2-7-2019    Time: 8519    1. I authorize the performance upon Garo Rear the following:permanent dialysis catheter insertion        2.  I authorize Dr. Juan Escobar  (and whomever is designated as the [employer] Witness: ____________________     Date: ______________    Printed: 2019   1:08 PM    Patient Name: Beatris Rodriguez        : 1945       Medical Record #: [id number]

## (undated) NOTE — LETTER
[address]  [address]  Authorization for Surgical Operation and Procedure     Date:___________                                                                                                         Time:__________  I hereby authorize Surgeon(s):  Jose R Meza DPM, my physician and his/her assistants (if applicable), which may include medical students, residents, and/or fellows, to perform the following surgical operation/ procedure and administer such anesthesia as may be determined necessary by my physician:  Operation/Procedure name (s) Bilateral feet wound debridement and application of wound VAC on Darin Bowens   2.   I recognize that during the surgical operation/procedure, unforeseen conditions may necessitate additional or different procedures than those listed above.  I, therefore, further authorize and request that the above-named surgeon, assistants, or designees perform such procedures as are, in their judgment, necessary and desirable.    3.   My surgeon/physician has discussed prior to my surgery the potential benefits, risks and side effects of this procedure; the likelihood of achieving goals; and potential problems that might occur during recuperation.  They also discussed reasonable alternatives to the procedure, including risks, benefits, and side effects related to the alternatives and risks related to not receiving this procedure.  I have had all my questions answered and I acknowledge that no guarantee has been made as to the result that may be obtained.    4.   Should the need arise during my operation/procedure, which includes change of level of care prior to discharge, I also consent to the administration of blood and/or blood products.  Further, I understand that despite careful testing and screening of blood or blood products by collecting agencies, I may still be subject to ill effects as a result of receiving a blood transfusion and/or  blood products.  The following are some, but not all, of the potential risks that can occur: fever and allergic reactions, hemolytic reactions, transmission of diseases such as Hepatitis, AIDS and Cytomegalovirus (CMV) and fluid overload.  In the event that I wish to have an autologous transfusion of my own blood, or a directed donor transfusion, I will discuss this with my physician.  Check only if Refusing Blood or Blood Products  I understand refusal of blood or blood products as deemed necessary by my physician may have serious consequences to my condition to include possible death. I hereby assume responsibility for my refusal and release the hospital, its personnel, and my physicians from any responsibility for the consequences of my refusal.          o  Refuse      5.   I authorize the use of any specimen, organs, tissues, body parts or foreign objects that may be removed from my body during the operation/procedure for diagnosis, research or teaching purposes and their subsequent disposal by hospital authorities.  I also authorize the release of specimen test results and/or written reports to my treating physician on the hospital medical staff or other referring or consulting physicians involved in my care, at the discretion of the Pathologist or my treating physician.    6.   I consent to the photographing or videotaping of the operations or procedures to be performed, including appropriate portions of my body for medical, scientific, or educational purposes, provided my identity is not revealed by the pictures or by descriptive texts accompanying them.  If the procedure has been photographed/videotaped, the surgeon will obtain the original picture, image, videotape or CD.  The hospital will not be responsible for storage, release or maintenance of the picture, image, tape or CD.    7.   I consent to the presence of a  or observers in the operating room as deemed necessary by my physician  or their designees.    8.   I recognize that in the event my procedure results in extended X-Ray/fluoroscopy time, I may develop a skin reaction.    9. If I have a Do Not Attempt Resuscitation (DNAR) order in place, that status will be suspended while in the operating room, procedural suite, and during the recovery period unless otherwise explicitly stated by me (or a person authorized to consent on my behalf). The surgeon or my attending physician will determine when the applicable recovery period ends for purposes of reinstating the DNAR order.  10. Patients having a sterilization procedure: I understand that if the procedure is successful the results will be permanent and it will therefore be impossible for me to inseminate, conceive, or bear children.  I also understand that the procedure is intended to result in sterility, although the result has not been guaranteed.   11. I acknowledge that my physician has explained sedation/analgesia administration to me including the risk and benefits I consent to the administration of sedation/analgesia as may be necessary or desirable in the judgment of my physician.    I CERTIFY THAT I HAVE READ AND FULLY UNDERSTAND THE ABOVE CONSENT TO OPERATION and/or OTHER PROCEDURE.    _________________________________________  __________________________________  Signature of Patient     Signature of Responsible Person         ___________________________________         Printed Name of Responsible Person           _________________________________                 Relationship to Patient  _________________________________________  ______________________________  Signature of Witness          Date  Time      Patient Name: Darin Bowens     : 1945                 Printed: 2024     Medical Record #: [id number]                     Page 1 of 2                                    [address]  [address]    Consent for  Anesthesia    I, Darin Bowens agree to be cared for by an anesthesiologist, who is specially trained to monitor me and give me medicine to put me to sleep or keep me comfortable during my procedure    I understand that my anesthesiologist is not an employee or agent of [facility] or [employer]. He or she works for [employer].    As the patient asking for anesthesia services, I agree to:  Allow the anesthesiologist (anesthesia doctor) to give me medicine and do additional procedures as necessary. Some examples are: Starting or using an “IV” to give me medicine, fluids or blood during my procedure, and having a breathing tube placed to help me breathe when I’m asleep (intubation). In the event that my heart stops working properly, I understand that my anesthesiologist will make every effort to sustain my life, unless otherwise directed by [facility] Do Not Resuscitate documents.  Tell my anesthesia doctor before my procedure:  If I am pregnant.  The last time that I ate or drank.  All of the medicines I take (including prescriptions, herbal supplements, and pills I can buy without a prescription (including street drugs/illegal medications). Failure to inform my anesthesiologist about these medicines may increase my risk of anesthetic complications.  If I am allergic to anything or have had a reaction to anesthesia before.  I understand how the anesthesia medicine will help me (benefits).  I understand that with any type of anesthesia medicine there are risks:  The most common risks are: nausea, vomiting, sore throat, muscle soreness, damage to my eyes, mouth, or teeth (from breathing tube placement).  Rare risks include: remembering what happened during my procedure, allergic reactions to medications, injury to my airway, heart, lungs, vision, nerves, or muscles and in extremely rare instances death.  My doctor has explained to me other choices available to me for my  care (alternatives).  Pregnant Patients (“epidural”):  I understand that the risks of having an epidural (medicine given into my back to help control pain during labor), include itching, low blood pressure, difficulty urinating, headache or slowing of the baby’s heart. Very rare risks include infection, bleeding, seizure, irregular heart rhythms and nerve injury.  Regional Anesthesia (“spinal”, “epidural”, & “nerve blocks”):  I understand that rare but potential complications include headache, bleeding, infection, seizure, irregular heart rhythms, and nerve injury.    I can change my mind about having anesthesia services at any time before I get the medicine.    _____________________________________________________________________________  Patient (or Representative) Signature/Relationship to Patient  Date   Time    _____________________________________________________________________________   Name (if used)    Language/Organization   Time    _____________________________________________________________________________  Anesthesiologist Signature     Date   Time  I have discussed the procedure and information above with the patient (or patient’s representative) and answered their questions. The patient or their representative has agreed to have anesthesia services.    _____________________________________________________________________________  Witness        Date   Time  I have verified that the signature is that of the patient or patient’s representative, and that it was signed before the procedure  Patient Name: Darin MCINTYRE Gogo     : 1945                 Printed: 2024     Medical Record #: [id number]                     Page 2 of 2

## (undated) NOTE — IP AVS SNAPSHOT
Patient Demographics     Address  [place] [address] Phone  [phone] [facility])  [phone] (Mobile) *Preferred* E-mail Address  [email or web address]      Emergency Contact(s)     Name Relation Home Work [address] [place] [address] appointment as soon as possible for a visit in 1 week.     Specialty: Family Medicine  Contact information:  [address] [phone]                  Your medication list      TAKE these medications       Instructions Authorizing Pr I:C - 1 units/4 carbs  Correction Factor - unknown          rivaroxaban 15 MG Tabs  Commonly known as: Xarelto      Take 1 tablet (15 mg total) by mouth daily with food.    Jorden Drew MD         VITAMIN D2 OR                    Where to Get Your Krista Priti matt-epbx (RETACRIT) 22551 UNIT/ML injection 10,000 Units 06/14/21 1600 Given      [id number] furosemide (LASIX) tab 80 mg 06/15/21 0831 Given      [id number] losartan Potassium (COZAAR) tab 50 mg 06/15/21 0831 Given      [id number] multivitamin (ADULT) tab 1 fx sp mechanical fall. Says cards recently and was noted to have pAfib- started xarelto and took one dose. No current cp/sob/n/v/f/c. +U, +flatus.   Wife says pt has a foot drop so has been having work up outpatient- PT had been recommended, but pt had dec Linda Wells MD;  Location: [address] [place] [address]   • M-SEDAJ BY [employer] PHYS [address] [facility] [address]  N [place] [facility] 5+ YR N/A 12/10/2015    Procedure: LUMBAR EPIDURAL;  Surgeon: Kirsten Collier MD;  Location: [facility] FOR PAIN MANAGEMENT   • M-SEDAJ BY [employer] PHYS [address] [facility] [address]  [address] [place] [facility] MANAGEMENT   • REPLACE AORTIC VALVE OPEN  2012   • VALVE REPAIR          ALL:    Lisinopril              Coughing    Comment:Dyspnea     Home Medications:  rivaroxaban (XARELTO) 15 MG Oral Tab, Take 1 tablet (15 mg total) by mouth daily with food. , Disp: 9 Normal effort   Chest wall:  No tenderness or deformity. Heart:  Regular rate and rhythm, S1, S2 normal,no LE edema   Abdomen:   Soft, non-tender. Bowel sounds normal. . Non distended    Extremities: Extremities  no  edema.    Skin: Skin color, texture, t CONCLUSION:  Left femoral neck fracture.     Dictated by (CST): Shimon Anthony MD on 6/10/2021 at 6:01 PM     Finalized by (CST): Shimon Anthony MD on 6/10/2021 at 6:11 PM          ASSESSMENT / PLAN:[LM.1]       69 yo with mmp including but not limited to CAD, HT Lili Watkins MD Service: Nephrology Author Type: Physician    Filed: 6/15/2021 10:05 AM Date of Service: 6/15/2021 10:04 AM Status: Signed    : Lili Watkins MD (Physician)       [facility]  Progress Note    Kamran Khushbu Patient Stat Q4H PRN   Or  HYDROcodone-acetaminophen (NORCO) 5-325 MG per tab 2 tablet, 2 tablet, Oral, Q4H PRN  ondansetron HCl (ZOFRAN) injection 4 mg, 4 mg, Intravenous, Q6H PRN  HYDROmorphone HCl (DILAUDID) 1 MG/ML injection 0.2 mg, 0.2 mg, Intravenous, Q2H PRN   O 1027 06/14/21  0513   K 5.4* 5.5*       No results for input(s): ALT, AST, ALB, AMYLASE, LIPASE, LDH in the last 72 hours. Invalid input(s): ALPHOS, TBIL, DBIL, TPROT      Impression/Plan:    1. ESRD- due to DM/HTN.  Hyperkalemia - continue routine HD; readmission after discharge from the hospital.[LD.2]    Important Follow up:    MD Malcolm Padillaais [phone]    In 2 weeks        Future Appointments   Date Time Provider Suzie Preston   6/15/202 no additional history at this time    FINDINGS:  BONES:  There is a nondisplaced femoral neck fracture which was described previously. The more distal femur is intact. There is joint space narrowing in the hip and knee.  SOFT TISSUES:  There are vascular LEFT (CPT=73501), 6/10/2021, 5:43 PM.  INDICATIONS:  Hip pain; Fracture, known or r/o, or trauma;  Fracture, preop planning; Hip x-ray result available; No known/automatically detected potential contraindications t  TECHNIQUE:  Multi-planar CT images were c cardiomediastinal contour, with mild cardiomegaly and sternotomy changes. The right-sided dialysis catheter has been removed. There are opacities at the medial right lung base. There is pulmonary vascular redistribution.   Pulmonary vascular congestion h after falling. FINDINGS:  There is fracture of left proximal femur. The fracture line is along the mid neck. Separation approximately 0.6 cm. There is underlying mild to moderate bilateral hip arthritis. The pelvic ring appears intact.   There is mil Solution  95 units per day via insulin pump. PTA Insulin via Pump  Inject into the skin continuous.  humalog insulin   Medtronic  Basal Rate : 55.6 standard rate 1.90  I:C - 1 units/4 carbs  Correction Factor - unknown            Code Status: Full Code pain. Pt is s/p percutaneous screw fixation on 6/11/21. Pt underwent dialysis on Wednesday and Friday. Pt has had R drop foot for a few months and etiology is unknown.  Pt rec'd AFO for R foot on 6/11/21, and wife is to bring in tie up shoes for use with th DX/THERAPEUTIC SUBSTANCE, EPIDURAL/SUBARACHNOID; LUMBAR/SACRAL N/A 1/25/2016    Procedure: LUMBAR EPIDURAL;  Surgeon: Marj Tidwell MD;  Location: [facility] FOR PAIN MANAG[facility]   • INJECTION, W/WO CONTRAST, DX/THERAPEUTIC SUBSTANCE, EPIDURAL/SUBARACHNOI PROPHYLAXIS. N/A 1/25/2016    Procedure: LUMBAR EPIDURAL;  Surgeon: Rosa Mcgraw MD;  Location: [facility] FOR PAIN MANAGEMENT   • PATIENT [address] FOR IV ANTIBIOTIC SURGICAL SITE INFECTION PROPHYLAXIS.  N/A 3/2/2016    Procedure: Felipe Eye Impairment: 46.58%   Standardized Score (AM-PAC Scale): 43.63   CMS Modifier (G-Code): CK    FUNCTIONAL ABILITY STATUS  Gait Assessment   Gait Assistance: Not tested (pt just returned from walking 36 ft with PCT. )  Distance (ft):    Assistive Device: Othe knowledge regarding the integration of current limitations into functional mobility and amb. The AM-PAC '6-Clicks' Inpatient Basic Mobility Short Form was completed and this patient is demonstrating a 46.58% degree of impairment in mobility.  Research OCCUPATIONAL THERAPY TREATMENT NOTE - INPATIENT     Room Number: 392/823-M  Session: 1[BW.1]   Number of Visits to Meet Established Goals: 4    Presenting Problem: L hip fracture[BW.2]    History related to current admission: Patient is a 70year old male W/WO CONTRAST, DX/THERAPEUTIC SUBSTANCE, EPIDURAL/SUBARACHNOID; LUMBAR/SACRAL N/A 12/10/2015    Procedure: LUMBAR EPIDURAL;  Surgeon: Joni Archer MD;  Location: [facility] FOR PAIN MANAGEMENT   • INJECTION, W/WO CONTRAST, DX/THERAPEUTIC SUBSTANCE, EPID INFECTION PROPHYLAXIS. N/A 12/10/2015    Procedure: LUMBAR EPIDURAL;  Surgeon: Rhett Flanagan MD;  Location: [facility] FOR PAIN MANAGEMENT   • PATIENT [address] FOR IV ANTIBIOTIC SURGICAL SITE INFECTION PROPHYLAXIS.  N/A 1/25/2016    Pro precautions and incorporation into ADLs; patient required minimal cueing to follow throughout session; education on bed mobility, performed supine to sit min assist for LLE with increased time; education on LB dressing techniques/equipment, performed LB  perform grooming: with supervision and while standing at sink --> in progress  Patient will perform lower body dressing:  with min assist and with adaptive equipment PRN --> in progress  Patient will perform toileting: with supervision --> in progress    F Goal: \"To be able to walk again\"    Interventions:  - Surgery  -PT/OT  -Pain management  - See additional Care Plan goals for specific interventions   Patient/Family Short Term Goal     Interdisciplinary Progressing    Description: Patient's Short Term G 03/21/19 0949 03/21/19 0941 03/21/19 0919 03/21/19 0823 03/21/19 8944   Site Assessment  Clean;Dry; Intact   Clean;Dry; Intact   Clean;Dry; Intact   Clean;Dry; Intact   —    Reason for Continuing [place]   —   —   —   —    [place] Fistula  —   —   — Status  Clamped   Clamped   Clamped   Clamped   Clamped    CHG Impregnated Disc  Yes   Yes   Yes   Yes   Yes    If No, Reason Why?  —   —   —   —   —    Dressing Status  Clean;Dry; Intact   Clean;Dry; Intact   Clean;Dry; Intact   Clean;Dry; Intact   Clean;Dry; No complications   No complications   No complications   No complications    Dressing  Open to air (None)   Gauze   Gauze   Gauze   Gauze    Dressing Change Due  06/14/21 06/14/21 06/14/21 06/14/21 06/14/21    Drainage Description  —   —   —   —

## (undated) NOTE — LETTER
[address]  [address]  Consent for Procedure/Sedation  Date: 6/25/24         Time: 1300    I hereby authorize Dr. Lui, my physician and his/her assistants (if applicable), which may include medical students, residents, and/or fellows, to perform the following surgical operation/ procedure and administer such anesthesia as may be determined necessary by my physician: Temporary dialysis catheter placement on Darin Bowens  2.   I recognize that during the surgical operation/procedure, unforeseen conditions may necessitate additional or different procedures than those listed above.  I, therefore, further authorize and request that the above-named surgeon, assistants, or designees perform such procedures as are, in their judgment, necessary and desirable.    3.   My surgeon/physician has discussed prior to my surgery the potential benefits, risks and side effects of this procedure; the likelihood of achieving goals; and potential problems that might occur during recuperation.  They also discussed reasonable alternatives to the procedure, including risks, benefits, and side effects related to the alternatives and risks related to not receiving this procedure.  I have had all my questions answered and I acknowledge that no guarantee has been made as to the result that may be obtained.    4.   Should the need arise during my operation/procedure, which includes change of level of care prior to discharge, I also consent to the administration of blood and/or blood products.  Further, I understand that despite careful testing and screening of blood or blood products by collecting agencies, I may still be subject to ill effects as a result of receiving a blood transfusion and/or blood products.  The following are some, but not all, of the potential risks that can occur: fever and allergic reactions, hemolytic reactions, transmission of diseases such as Hepatitis, AIDS and  Cytomegalovirus (CMV) and fluid overload.  In the event that I wish to have an autologous transfusion of my own blood, or a directed donor transfusion, I will discuss this with my physician.   Check only if Refusing Blood or Blood Products  I understand refusal of blood or blood products as deemed necessary by my physician may have serious consequences to my condition to include possible death. I hereby assume responsibility for my refusal and release the hospital, its personnel, and my physicians from any responsibility for the consequences of my refusal.         o  Refuse         5.   I authorize the use of any specimen, organs, tissues, body parts or foreign objects that may be removed from my body during the operation/procedure for diagnosis, research or teaching purposes and their subsequent disposal by hospital authorities.  I also authorize the release of specimen test results and/or written reports to my treating physician on the hospital medical staff or other referring or consulting physicians involved in my care, at the discretion of the Pathologist or my treating physician.    6.   I consent to the photographing or videotaping of the operations or procedures to be performed, including appropriate portions of my body for medical, scientific, or educational purposes, provided my identity is not revealed by the pictures or by descriptive texts accompanying them.  If the procedure has been photographed/videotaped, the surgeon will obtain the original picture, image, videotape or CD.  The hospital will not be responsible for storage, release or maintenance of the picture, image, tape or CD.    7.   I consent to the presence of a  or observers in the operating room as deemed necessary by my physician or their designees.    8.   I recognize that in the event my procedure results in extended X-Ray/fluoroscopy time, I may develop a skin reaction.    9. If I have a Do Not Attempt Resuscitation  (DNAR) order in place, that status will be suspended while in the operating room, procedural suite, and during the recovery period unless otherwise explicitly stated by me (or a person authorized to consent on my behalf). The surgeon or my attending physician will determine when the applicable recovery period ends for purposes of reinstating the DNAR order.  10. Patients having a sterilization procedure: I understand that if the procedure is successful the results will be permanent and it will therefore be impossible for me to inseminate, conceive, or bear children.  I also understand that the procedure is intended to result in sterility, although the result has not been guaranteed.   11. I acknowledge that my physician has explained sedation/analgesia administration to me including the risk and benefits I consent to the administration of sedation/analgesia as may be necessary or desirable in the judgment of my physician.    I CERTIFY THAT I HAVE READ AND FULLY UNDERSTAND THE ABOVE CONSENT TO OPERATION and/or OTHER PROCEDURE.        ____________________________________       _________________________________      ______________________________  Signature of Patient         Signature of Responsible Person        Printed Name of Responsible Person        ____________________________________      _________________________________      ______________________________       Signature of Witness          Relationship to Patient                       Date                                       Time  Patient Name: Darin MIGUELINA Bowens  : 1945    Reviewed: 2024   Printed: 2024  Medical Record #: [id number] Page 1 of 1

## (undated) NOTE — LETTER
Date: 9/5/2024  Patient name: Darin Bowens  YOB: 1945  Medical Record Number: [id number]  Primary Coverage: Payor: MEDICARE / Plan: MEDICARE PART A&B / Product Type: *No Product type* /   Secondary Coverage: COMMERCIAL - [facility]  Insurance ID: [id number]  Patient Address: [address] [place] [id number]  Telephone Information:   Home Phone [phone]   Work Phone [phone]   Mobile [phone]       Encounter Date: 9/5/2024  Provider: DELMI Wheeler  Diagnosis:     ICD-10-CM   1. Non-pressure chronic ulcer of other part of left foot with necrosis of bone ([facility])  L97.524   2. Non-pressure chronic ulcer of other part of right foot with necrosis of bone ([facility])  L97.514   3. Diabetic foot ulcer with osteomyelitis ([facility])  E11.621    E11.69    L97.509    M86.9   4. PAD (peripheral artery disease) ([facility])  I73.9   5. ESRD (end stage renal disease) ([facility])  N18.6         Wound 07/18/24 #1 Foot Left (Active)   Date First Assessed/Time First Assessed: 07/18/24 1344    Wound Number (Wound Clinic Only): #1  Primary Wound Type: Diabetic Ulcer  Location: Foot  Wound Location Orientation: Left      Assessments 7/18/2024  2:00 PM 9/5/2024  9:24 AM   Wound Image        Drainage Amount Small Small   Drainage Description Serosanguineous Serosanguineous   Treatments Compression --   Wound Length (cm) 5.2 cm 3.5 cm   Wound Width (cm) 10.4 cm 8.4 cm   Wound Surface Area (cm^2) 54.08 cm^2 29.4 cm^2   Wound Depth (cm) 1 cm 0.8 cm   Wound Volume (cm^3) 54.08 cm^3 23.52 cm^3   Wound Healing % -- 57   Margins Well-defined edges Well-defined edges   Non-staged Wound Description Full thickness Full thickness   María-wound Assessment Clean;Dry;Blanchable erythema Blanchable erythema;Moist;Maceration   Wound Granulation Tissue Red;Spongy Red;Firm   Wound Bed Granulation (%) 15 % 30 %   Wound Bed Slough (%) 85 % 60 %   Exposed Structure Bone;Adipose Bone   Wound Odor None None   Shape 10staples to  periwound 10% exposed bone   Tunneling? No No   Undermining? No Yes   Number of Undermines -- 1   Undermine 1 Start Position -- 4   Undermine 1 End Position -- 11   Sinus Tracts? No No   Balbuena Scale Grade 4 --       Inactive Orders   Date Order Priority Status Authorizing Provider   08/29/24 1204 Cellular tissue product application Diabetic Ulcer Left Foot Routine Completed Jenny Olmos, APRN   08/22/24 1152 Cellular tissue product application Diabetic Ulcer Left Foot Routine Completed Jenny Olmos, APRN   08/14/24 1452 Wound care Routine Discontinued Jose R Meza, DPM   08/01/24 1142 Cellular tissue product application Diabetic Ulcer Left Foot Routine Completed Jenny Olmos, APRN   08/01/24 1116 Debridement Diabetic Ulcer Left Foot Routine Completed Jenny Olmos, APRN   07/25/24 1445 Debridement Diabetic Ulcer Left Foot Routine Completed Jenny Olmos, APRN       Wound 07/18/24 #2 right Foot Right (Active)   Date First Assessed/Time First Assessed: 07/18/24 1345    Wound Number (Wound Clinic Only): #2 right  Primary Wound Type: Diabetic Ulcer  Location: Foot  Wound Location Orientation: Right      Assessments 7/18/2024  1:57 PM 9/5/2024  9:23 AM   Wound Image        Drainage Amount Small Small   Drainage Description Serosanguineous Serosanguineous   Treatments Compression --   Wound Length (cm) 4.4 cm 2.7 cm   Wound Width (cm) 14 cm 13 cm   Wound Surface Area (cm^2) 61.6 cm^2 35.1 cm^2   Wound Depth (cm) 2.1 cm 1 cm   Wound Volume (cm^3) 129.36 cm^3 35.1 cm^3   Wound Healing % -- 73   Margins Well-defined edges Well-defined edges   Non-staged Wound Description Full thickness Full thickness   María-wound Assessment Clean;Dry;Blanchable erythema Moist;Maceration   Wound Granulation Tissue Red;Pink;Spongy Red;Firm   Wound Bed Granulation (%) 30 % 10 %   Wound Bed Epithelium (%) 20 % 50 %   Wound Bed Slough (%) 50 % 30 %   Exposed Structure Bone;Adipose Bone   Wound Odor None --   Shape 9 staples noted to  hailey wound 10% bone, black sutures   Tunneling? No --   Undermining? No Yes   Number of Undermines -- 1   Undermine 1 Start Position -- 7   Undermine 1 End Position -- 8   Sinus Tracts? No --   Balbuena Scale Grade 4 Grade 4       Inactive Orders   Date Order Priority Status Authorizing Provider   08/29/24 1207 Debridement Diabetic Ulcer Right Foot Routine Completed Jenny Olmos, APRN   08/29/24 1205 Cellular tissue product application Diabetic Ulcer Right Foot Routine Completed Jenny Olmos, APRN   08/14/24 1452 Wound care Routine Discontinued Jose R Meza, DPM   08/01/24 1140 Cellular tissue product application Diabetic Ulcer Right Foot Routine Completed Jenny Olmos, APRN   08/01/24 1058 Debridement Diabetic Ulcer Right Foot Routine Completed Jenny Olmos, APRN   07/25/24 1445 Debridement Diabetic Ulcer Right Foot Routine Completed Jenny Olmos, APRN       Wound 07/18/24 #3 Ankle Posterior;Right (Active)   Date First Assessed/Time First Assessed: 07/18/24 1347    Wound Number (Wound Clinic Only): #3  Primary Wound Type: Diabetic Ulcer  Location: Ankle  Wound Location Orientation: Posterior;Right      Assessments 7/18/2024  2:07 PM 9/5/2024  9:26 AM   Wound Image       Drainage Amount None Small   Drainage Description -- Serosanguineous   Treatments Compression --   Wound Length (cm) 4.5 cm 3.4 cm   Wound Width (cm) 2.1 cm 2 cm   Wound Surface Area (cm^2) 9.45 cm^2 6.8 cm^2   Wound Depth (cm) 0.1 cm 0.1 cm   Wound Volume (cm^3) 0.945 cm^3 0.68 cm^3   Wound Healing % -- 28   Margins -- Well-defined edges   Non-staged Wound Description -- Full thickness   Hailey-wound Assessment Dry;Blanchable erythema --   Wound Granulation Tissue Pink;Firm --   Wound Bed Granulation (%) 10 % --   Wound Bed Epithelium (%) 10 % --   Wound Bed Slough (%) -- 100 %   Wound Bed Eschar (%) 80 % --   Wound Odor None None   Shape blister to hailey --   Tunneling? No No   Undermining? No No   Sinus Tracts? No No   Pressure Injury Stage  Unstageable Stage 3       Inactive Orders   Date Order Priority Status Authorizing Provider   07/25/24 1442 Debridement Pressure Injury Posterior;Right Ankle Routine Completed Jenny Olmos APRN       Wound 07/18/24 #4 Right heel wound Heel Right (Active)   Date First Assessed/Time First Assessed: 07/18/24 1428    Wound Number (Wound Clinic Only): #4 Right heel wound  Primary Wound Type: Pressure Injury  Location: Heel  Wound Location Orientation: Right      Assessments 7/18/2024  2:31 PM 9/5/2024  9:25 AM   Wound Image       Drainage Amount None Scant   Drainage Description -- Serous;Yellow   Treatments Compression --   Wound Length (cm) 0.5 cm 1.3 cm   Wound Width (cm) 1 cm 1.6 cm   Wound Surface Area (cm^2) 0.5 cm^2 2.08 cm^2   Wound Depth (cm) 0 cm 0.1 cm   Wound Volume (cm^3) 0 cm^3 0.208 cm^3   Margins Well-defined edges Well-defined edges   Non-staged Wound Description -- Full thickness   María-wound Assessment Dry;Clean Dry   Wound Granulation Tissue -- Firm;Pink   Wound Bed Granulation (%) -- 100 %   Wound Bed Epithelium (%) 100 % --   Wound Odor None None   Tunneling? -- No   Undermining? -- No   Sinus Tracts? -- No   Pressure Injury Stage Deep Tissue Stage 3       No associated orders.       Compression Wrap 08/01/24 Foot Dorsal;Right (Active)   Placement Date/Time: 08/01/24 1147   Location: Foot  Wound Location Orientation: Dorsal;Right      Assessments 8/1/2024 11:47 AM 9/3/2024  4:00 PM   Response to Treatment Well tolerated Well tolerated   Compression Layers Multilayer Multilayer   Compression Product Type Unna Boot Unna Boot   Dressing Applied Yes Yes   Compression Wrap Location Toes to Knee Toes to Knee   Compression Wrap Status Clean;Dry Clean;Dry;Intact       No associated orders.       Compression Wrap 08/01/24 Foot Dorsal;Left (Active)   Placement Date/Time: 08/01/24 1148   Location: Foot  Wound Location Orientation: Dorsal;Left      Assessments 8/1/2024 11:48 AM 9/3/2024  4:00 PM   Response  to Treatment Well tolerated Well tolerated   Compression Layers Multilayer Multilayer   Compression Product Type Unna Boot Unna Boot   Dressing Applied Yes Yes   Compression Wrap Location Toes to Knee Toes to Knee   Compression Wrap Status Clean;Dry Clean;Dry;Intact       No associated orders.       Wound 08/22/24 #5 Left Heel Heel Left (Active)   Date First Assessed/Time First Assessed: 08/22/24 1021    Wound Number (Wound Clinic Only): #5 Left Heel  Primary Wound Type: Pressure Injury  Location: Heel  Wound Location Orientation: Left      Assessments 8/22/2024 10:49 AM 9/5/2024  9:25 AM   Wound Image       Drainage Amount None None   Treatments Betadine Betadine   Dressing -- ABD Pad   Wound Length (cm) 1.6 cm 1.5 cm   Wound Width (cm) 0.5 cm 0.6 cm   Wound Surface Area (cm^2) 0.8 cm^2 0.9 cm^2   Wound Depth (cm) 0 cm 0 cm   Wound Volume (cm^3) 0 cm^3 0 cm^3   Margins -- Well-defined edges   María-wound Assessment Clean --   Wound Bed Epithelium (%) 100 % 100 %   Wound Odor None None   Tunneling? -- No   Undermining? -- No   Sinus Tracts? -- No   Pressure Injury Stage Deep Tissue Deep Tissue       No associated orders.       Wound 08/22/24 #6 Left Anterior Ankle Ankle Anterior;Left (Active)   Date First Assessed/Time First Assessed: 08/22/24 1022    Wound Number (Wound Clinic Only): #6 Left Anterior Ankle  Primary Wound Type: Pressure Injury  Location: Ankle  Wound Location Orientation: Anterior;Left      Assessments 8/22/2024 10:50 AM 9/5/2024  9:23 AM   Wound Image       Drainage Amount None None   Treatments Betadine Betadine   Dressing -- ABD Pad   Wound Length (cm) 1.2 cm 0.7 cm   Wound Width (cm) 0.6 cm 0.5 cm   Wound Surface Area (cm^2) 0.72 cm^2 0.35 cm^2   Wound Depth (cm) 0 cm 0 cm   Wound Volume (cm^3) 0 cm^3 0 cm^3   Margins -- Well-defined edges   Non-staged Wound Description -- Full thickness   María-wound Assessment Clean Clean   Wound Bed Epithelium (%) 100 % 100 %   Wound Odor None None   Tunneling?  -- No   Undermining? -- No   Sinus Tracts? -- No   Pressure Injury Stage Stage 1 Stage 1       No associated orders.       Negative Pressure Wound Therapy Dorsal;Left (Active)   Placement Date/Time: 08/22/24 1258   Wound Location Orientation: Dorsal;Left      Assessments 8/22/2024 10:49 AM 8/26/2024  4:30 PM   Wound photographed/measured Yes Yes   Machine Status (On) Yes Yes   Unit Type Medella Medella   Dressing Type Other (Comment) Other (Comment)   Number of Foam Pieces Used 2 2   Cycle Continuous Continuous   Target Pressure (mmHg) 125 125   Canister Changed Yes No       No associated orders.       Negative Pressure Wound Therapy Dorsal;Right (Active)   Placement Date/Time: 08/29/24 1245   Wound Location Orientation: Dorsal;Right      Assessments 8/29/2024 10:24 AM 9/3/2024  4:04 PM   Wound photographed/measured Yes Yes   Machine Status (On) Yes Yes   Site Assessment Clean Clean   María-wound Assessment Clean Clean   Unit Type Medella Medella   Number of Foam Pieces Used 2 2   Cycle Continuous Continuous   Target Pressure (mmHg) 125 125   Canister Changed Yes Yes       No associated orders.       Wound 09/03/24 #7 Left Lateral Foot Left;Lateral (Active)   Date First Assessed/Time First Assessed: 09/03/24 1606    Wound Number (Wound Clinic Only): #7 Left Lateral Foot  Primary Wound Type: Pressure Injury  Wound Location Orientation: Left;Lateral      Assessments 9/3/2024  4:07 PM 9/5/2024  9:24 AM   Wound Image       Drainage Amount None None   Treatments Betadine Betadine   Dressing ABD Pad --   Wound Length (cm) 1.1 cm 1 cm   Wound Width (cm) 0.5 cm 1.3 cm   Wound Surface Area (cm^2) 0.55 cm^2 1.3 cm^2   Wound Depth (cm) 0 cm 0 cm   Wound Volume (cm^3) 0 cm^3 0 cm^3   Margins Well-defined edges Well-defined edges   Non-staged Wound Description Full thickness --   María-wound Assessment Dry;Clean --   Wound Bed Epithelium (%) 100 % 100 %   Wound Odor None None   Tunneling? No No   Undermining? No No   Sinus Tracts?  No No   Pressure Injury Stage Deep Tissue Deep Tissue       No associated orders.           Miscellaneous/Additional Orders:  Offloading: Heel off-loading boot(s)  Miscellaneous orders: Home health care nurse for wound care    Care Summary:  Care Summary: Discussed Plan of Care at beside with patient. Patient verbally acknowledges understanding of all instructions and all questions were answered.      Follow Up:  No follow-ups on file.      Additional Notes:  NPWT on hold until next clinic appointment.        [place]-change dressings below every 3 days  Cleansing/dressing:     In clinic/ with each dressing change:    please cleanse the limb, foot, and between toes with soap, water and washcloth.   Dry thoroughly.    Cleanse/soak the wound with VASHE (or other hypochlorous wound cleanser), dab dry.    Apply the following dressings:     Left anterior ankle: paint with betadine>abd pad  Left heel: paint with betadine>abd pad  Left tma:  hydrofera blue Transfer>border foam (start plantar and secure dorsal) or border superabsorbant (ie kerramax) >spandagrip from over the tma to knee  Left lateral foot:  border foam with offloading [place] (this may stay in place for 1 week) paint the dti with betadine daily and leave NIGEL    Right TMA: hydrofera blue Transfer>border foam (start plantar and secure dorsal) or border superabsorbant (ie kerramax) >spandagrip from over the tma to knee  Right Achilles: small amount of silver hydrogel>adaptic>abd pad  Right heel :  adaptic>abd pad    Additional home health DME: No

## (undated) NOTE — LETTER
[address] [address]  [place] [facility]    Consent for Operation  Date: __________________                                Time: _______________    1.  I authorize the performance upon Ashley Keene the following operation:  Pro procedure has been videotaped, the surgeon will obtain the original videotape. The hospital will not be responsible for storage or maintenance of this tape.   7. For the purpose of advancing medical education, I consent to the admittance of observers to the STATEMENTS REQUIRING INSERTION OR COMPLETION WERE FILLED IN.     Signature of Patient:   ___________________________    When the patient is a minor or mentally incompetent to give consent:  Signature of person authorized to consent for patient: ____________ supplements, and pills I can buy without a prescription (including street drugs/illegal medications). Failure to inform my anesthesiologist about these medicines may increase my risk of anesthetic complications. iv.  If I am allergic to anything or have ha Anesthesiologist Signature     Date   Time  I have discussed the procedure and information above with the patient (or patient’s representative) and answered their questions. The patient or their representative has agreed to have anesthesia services.     ___

## (undated) NOTE — LETTER
Saadia [address] [address]  [place], [address] [facility]    Consent for Operation  Date: __________________                                Time: _______________    1.  I authorize the performance upon Kamran Ríos the following operation:  Pro procedure has been videotaped, the surgeon will obtain the original videotape. The hospital will not be responsible for storage or maintenance of this tape.   7. For the purpose of advancing medical education, I consent to the admittance of observers to the STATEMENTS REQUIRING INSERTION OR COMPLETION WERE FILLED IN.     Signature of Patient:   ___________________________    When the patient is a minor or mentally incompetent to give consent:  Signature of person authorized to consent for patient: ____________ supplements, and pills I can buy without a prescription (including street drugs/illegal medications). Failure to inform my anesthesiologist about these medicines may increase my risk of anesthetic complications. iv.  If I am allergic to anything or have ha Anesthesiologist Signature     Date   Time  I have discussed the procedure and information above with the patient (or patient’s representative) and answered their questions. The patient or their representative has agreed to have anesthesia services.     ___

## (undated) NOTE — LETTER
[address]  [address]  Authorization for Surgical Operation and Procedure     Date:___________                                                                                                         Time:__________  I hereby authorize Surgeon(s):  Jose R Meza DPM, my physician and his/her assistants (if applicable), which may include medical students, residents, and/or fellows, to perform the following surgical operation/ procedure and administer such anesthesia as may be determined necessary by my physician:  Operation/Procedure name (s) Procedure(s):  RIGHT TRANSMETATARSAL AMPUTATION, ACHILLES TENDON LENGTHENING, AND LEFT SECOND TOE AMPUTATION on Darin Bowens   2.   I recognize that during the surgical operation/procedure, unforeseen conditions may necessitate additional or different procedures than those listed above.  I, therefore, further authorize and request that the above-named surgeon, assistants, or designees perform such procedures as are, in their judgment, necessary and desirable.    3.   My surgeon/physician has discussed prior to my surgery the potential benefits, risks and side effects of this procedure; the likelihood of achieving goals; and potential problems that might occur during recuperation.  They also discussed reasonable alternatives to the procedure, including risks, benefits, and side effects related to the alternatives and risks related to not receiving this procedure.  I have had all my questions answered and I acknowledge that no guarantee has been made as to the result that may be obtained.    4.   Should the need arise during my operation/procedure, which includes change of level of care prior to discharge, I also consent to the administration of blood and/or blood products.  Further, I understand that despite careful testing and screening of blood or blood products by collecting agencies, I may still be subject to ill effects as a  result of receiving a blood transfusion and/or blood products.  The following are some, but not all, of the potential risks that can occur: fever and allergic reactions, hemolytic reactions, transmission of diseases such as Hepatitis, AIDS and Cytomegalovirus (CMV) and fluid overload.  In the event that I wish to have an autologous transfusion of my own blood, or a directed donor transfusion, I will discuss this with my physician.  Check only if Refusing Blood or Blood Products  I understand refusal of blood or blood products as deemed necessary by my physician may have serious consequences to my condition to include possible death. I hereby assume responsibility for my refusal and release the hospital, its personnel, and my physicians from any responsibility for the consequences of my refusal.          o  Refuse      5.   I authorize the use of any specimen, organs, tissues, body parts or foreign objects that may be removed from my body during the operation/procedure for diagnosis, research or teaching purposes and their subsequent disposal by hospital authorities.  I also authorize the release of specimen test results and/or written reports to my treating physician on the hospital medical staff or other referring or consulting physicians involved in my care, at the discretion of the Pathologist or my treating physician.    6.   I consent to the photographing or videotaping of the operations or procedures to be performed, including appropriate portions of my body for medical, scientific, or educational purposes, provided my identity is not revealed by the pictures or by descriptive texts accompanying them.  If the procedure has been photographed/videotaped, the surgeon will obtain the original picture, image, videotape or CD.  The hospital will not be responsible for storage, release or maintenance of the picture, image, tape or CD.    7.   I consent to the presence of a  or observers in the  operating room as deemed necessary by my physician or their designees.    8.   I recognize that in the event my procedure results in extended X-Ray/fluoroscopy time, I may develop a skin reaction.    9. If I have a Do Not Attempt Resuscitation (DNAR) order in place, that status will be suspended while in the operating room, procedural suite, and during the recovery period unless otherwise explicitly stated by me (or a person authorized to consent on my behalf). The surgeon or my attending physician will determine when the applicable recovery period ends for purposes of reinstating the DNAR order.  10. Patients having a sterilization procedure: I understand that if the procedure is successful the results will be permanent and it will therefore be impossible for me to inseminate, conceive, or bear children.  I also understand that the procedure is intended to result in sterility, although the result has not been guaranteed.   11. I acknowledge that my physician has explained sedation/analgesia administration to me including the risk and benefits I consent to the administration of sedation/analgesia as may be necessary or desirable in the judgment of my physician.    I CERTIFY THAT I HAVE READ AND FULLY UNDERSTAND THE ABOVE CONSENT TO OPERATION and/or OTHER PROCEDURE.    _________________________________________  __________________________________  Signature of Patient     Signature of Responsible Person         ___________________________________         Printed Name of Responsible Person           _________________________________                 Relationship to Patient  _________________________________________  ______________________________  Signature of Witness          Date  Time      Patient Name: Darin Bowens     : 1945                 Printed: 2024     Medical Record #: [id number]                     Page 1 of 2                                    [address]  [place]  [address]    Consent for Anesthesia    IDarin agree to be cared for by an anesthesiologist, who is specially trained to monitor me and give me medicine to put me to sleep or keep me comfortable during my procedure    I understand that my anesthesiologist is not an employee or agent of [facility] or [employer]. He or she works for [employer].    As the patient asking for anesthesia services, I agree to:  Allow the anesthesiologist (anesthesia doctor) to give me medicine and do additional procedures as necessary. Some examples are: Starting or using an “IV” to give me medicine, fluids or blood during my procedure, and having a breathing tube placed to help me breathe when I’m asleep (intubation). In the event that my heart stops working properly, I understand that my anesthesiologist will make every effort to sustain my life, unless otherwise directed by [facility] Do Not Resuscitate documents.  Tell my anesthesia doctor before my procedure:  If I am pregnant.  The last time that I ate or drank.  All of the medicines I take (including prescriptions, herbal supplements, and pills I can buy without a prescription (including street drugs/illegal medications). Failure to inform my anesthesiologist about these medicines may increase my risk of anesthetic complications.  If I am allergic to anything or have had a reaction to anesthesia before.  I understand how the anesthesia medicine will help me (benefits).  I understand that with any type of anesthesia medicine there are risks:  The most common risks are: nausea, vomiting, sore throat, muscle soreness, damage to my eyes, mouth, or teeth (from breathing tube placement).  Rare risks include: remembering what happened during my procedure, allergic reactions to medications, injury to my airway, heart, lungs, vision, nerves, or muscles and in extremely rare instances death.  My doctor has explained to  me other choices available to me for my care (alternatives).  Pregnant Patients (“epidural”):  I understand that the risks of having an epidural (medicine given into my back to help control pain during labor), include itching, low blood pressure, difficulty urinating, headache or slowing of the baby’s heart. Very rare risks include infection, bleeding, seizure, irregular heart rhythms and nerve injury.  Regional Anesthesia (“spinal”, “epidural”, & “nerve blocks”):  I understand that rare but potential complications include headache, bleeding, infection, seizure, irregular heart rhythms, and nerve injury.    I can change my mind about having anesthesia services at any time before I get the medicine.    _____________________________________________________________________________  Patient (or Representative) Signature/Relationship to Patient  Date   Time    _____________________________________________________________________________   Name (if used)    Language/Organization   Time    _____________________________________________________________________________  Anesthesiologist Signature     Date   Time  I have discussed the procedure and information above with the patient (or patient’s representative) and answered their questions. The patient or their representative has agreed to have anesthesia services.    _____________________________________________________________________________  Witness        Date   Time  I have verified that the signature is that of the patient or patient’s representative, and that it was signed before the procedure  Patient Name: Darin Bowens     : 1945                 Printed: 2024     Medical Record #: [id number]                     Page 2 of 2

## (undated) NOTE — LETTER
Date: 8/8/2024  Patient name: Darin Bowens  YOB: 1945  Medical Record Number: [id number]  Primary Coverage: Payor: MEDICARE / Plan: MEDICARE PART A&B / Product Type: *No Product type* /   Secondary Coverage: COMMERCIAL - [facility]  Insurance ID: [id number]  Patient Address: [address] [place] [id number]  Telephone Information:   Home Phone [phone]   Work Phone [phone]   Mobile [phone]       Encounter Date: 8/8/2024  Provider: DELMI Wheeler  Diagnosis:     ICD-10-CM   1. Non-pressure chronic ulcer of other part of left foot with necrosis of bone ([facility])  L97.524   2. Non-pressure chronic ulcer of other part of right foot with necrosis of bone ([facility])  L97.514   3. Diabetic foot ulcer with osteomyelitis ([facility])  E11.621    E11.69    L97.509    M86.9   4. PAD (peripheral artery disease) ([facility])  I73.9   5. ESRD (end stage renal disease) ([facility])  N18.6         Wound 07/18/24 #1 Foot Left (Active)   Date First Assessed/Time First Assessed: 07/18/24 1344    Wound Number (Wound Clinic Only): #1  Primary Wound Type: Diabetic Ulcer  Location: Foot  Wound Location Orientation: Left      Assessments 7/18/2024  2:00 PM 8/8/2024  8:02 AM   Wound Image        Drainage Amount Small Moderate   Drainage Description Serosanguineous Serosanguineous   Treatments Compression Compression   Wound Length (cm) 5.2 cm 4.5 cm   Wound Width (cm) 10.4 cm 11.5 cm   Wound Surface Area (cm^2) 54.08 cm^2 51.75 cm^2   Wound Depth (cm) 1 cm 1 cm   Wound Volume (cm^3) 54.08 cm^3 51.75 cm^3   Wound Healing % -- 4   Margins Well-defined edges Well-defined edges   Non-staged Wound Description Full thickness Full thickness   María-wound Assessment Clean;Dry;Blanchable erythema Edema;Blanchable erythema   Wound Granulation Tissue Red;Spongy Firm;Red;Spongy   Wound Bed Granulation (%) 15 % 40 %   Wound Bed Epithelium (%) -- 10 %   Wound Bed Slough (%) 85 % 30 %   Wound Odor None --   Exposed Structure  Bone;Adipose Bone   Shape 10staples to periwound 20% adipose and staples 10   Tunneling? No --   Undermining? No --   Sinus Tracts? No --   Balbuena Scale Grade 4 Grade 4       Inactive Orders   Date Order Priority Status Authorizing Provider   08/01/24 1142 Cellular tissue product application Diabetic Ulcer Left Foot Routine Completed Jenny Olmos APRN   08/01/24 1116 Debridement Diabetic Ulcer Left Foot Routine Completed Jenny Olmos APRN   07/25/24 1445 Debridement Diabetic Ulcer Left Foot Routine Completed Jenny Olmos APRN       Wound 07/18/24 #2 right Foot Right (Active)   Date First Assessed/Time First Assessed: 07/18/24 1345    Wound Number (Wound Clinic Only): #2 right  Primary Wound Type: Diabetic Ulcer  Location: Foot  Wound Location Orientation: Right      Assessments 7/18/2024  1:57 PM 8/8/2024  8:05 AM   Wound Image       Drainage Amount Small Large   Drainage Description Serosanguineous Serosanguineous   Treatments Compression Compression   Wound Length (cm) 4.4 cm 2.4 cm   Wound Width (cm) 14 cm 12.6 cm   Wound Surface Area (cm^2) 61.6 cm^2 30.24 cm^2   Wound Depth (cm) 2.1 cm 2.6 cm   Wound Volume (cm^3) 129.36 cm^3 78.624 cm^3   Wound Healing % -- 39   Margins Well-defined edges Well-defined edges   Non-staged Wound Description Full thickness Full thickness   Hailey-wound Assessment Clean;Dry;Blanchable erythema Edema;Maceration;Moist   Wound Granulation Tissue Red;Pink;Spongy Firm;Pink   Wound Bed Granulation (%) 30 % 50 %   Wound Bed Epithelium (%) 20 % --   Wound Bed Slough (%) 50 % 10 %   Wound Odor None None   Exposed Structure Bone;Adipose Bone;Adipose   Shape 9 staples noted to hailey wound 30% adipose, 10% bone   Tunneling? No --   Undermining? No --   Sinus Tracts? No --   Balbuena Scale Grade 4 Grade 4       Inactive Orders   Date Order Priority Status Authorizing Provider   08/01/24 1140 Cellular tissue product application Diabetic Ulcer Right Foot Routine Completed Jenny Olmos, DELMI    08/01/24 1058 Debridement Diabetic Ulcer Right Foot Routine Completed Jenny Olmos APRN   07/25/24 1445 Debridement Diabetic Ulcer Right Foot Routine Completed Jenny Olmos APRN       Wound 07/18/24 #3 Ankle Posterior;Right (Active)   Date First Assessed/Time First Assessed: 07/18/24 1347    Wound Number (Wound Clinic Only): #3  Primary Wound Type: Pressure Injury  Location: Ankle  Wound Location Orientation: Posterior;Right      Assessments 7/18/2024  2:07 PM 8/8/2024  8:08 AM   Wound Image       Drainage Amount None Moderate   Drainage Description -- Serosanguineous   Treatments Compression Compression;Sling   Wound Length (cm) 4.5 cm 4.3 cm   Wound Width (cm) 2.1 cm 1.9 cm   Wound Surface Area (cm^2) 9.45 cm^2 8.17 cm^2   Wound Depth (cm) 0.1 cm 0.1 cm   Wound Volume (cm^3) 0.945 cm^3 0.817 cm^3   Wound Healing % -- 14   Margins -- Well-defined edges   Non-staged Wound Description -- Full thickness   Hailey-wound Assessment Dry;Blanchable erythema Edema;Dry   Wound Granulation Tissue Pink;Firm Pale Grey;Pink;Firm   Wound Bed Granulation (%) 10 % 40 %   Wound Bed Epithelium (%) 10 % --   Wound Bed Slough (%) -- 30 %   Wound Bed Eschar (%) 80 % 30 %   Wound Odor None None   Shape blister to hailey --   Tunneling? No --   Undermining? No --   Sinus Tracts? No --   Pressure Injury Stage Unstageable Stage 3       Inactive Orders   Date Order Priority Status Authorizing Provider   07/25/24 1442 Debridement Pressure Injury Posterior;Right Ankle Routine Completed Jenny Olmos APRN       Wound 07/18/24 #4 Right heel wound Heel Right (Active)   Date First Assessed/Time First Assessed: 07/18/24 1428    Wound Number (Wound Clinic Only): #4 Right heel wound  Primary Wound Type: Pressure Injury  Location: Heel  Wound Location Orientation: Right      Assessments 7/18/2024  2:31 PM 8/8/2024  8:11 AM   Wound Image       Drainage Amount None None   Treatments Compression Betadine   Wound Length (cm) 0.5 cm 0.3 cm   Wound  Width (cm) 1 cm 0.3 cm   Wound Surface Area (cm^2) 0.5 cm^2 0.09 cm^2   Wound Depth (cm) 0 cm 0 cm   Wound Volume (cm^3) 0 cm^3 0 cm^3   Margins Well-defined edges Well-defined edges   Non-staged Wound Description -- Full thickness   María-wound Assessment Dry;Clean Dry;Clean   Wound Bed Epithelium (%) 100 % 100 %   Wound Odor None None   Pressure Injury Stage Deep Tissue Deep Tissue       No associated orders.       Compression Wrap 08/01/24 Foot Dorsal;Right (Active)   Placement Date/Time: 08/01/24 1147   Location: Foot  Wound Location Orientation: Dorsal;Right      Assessments 8/1/2024 11:47 AM 8/8/2024  8:08 AM   Response to Treatment Well tolerated Well tolerated   Compression Layers Multilayer Multilayer   Compression Product Type Unna Boot Unna Boot   Dressing Applied Yes Yes   Compression Wrap Location Toes to Knee Toes to Knee   Compression Wrap Status Clean;Dry Clean;Dry;Intact       No associated orders.       Compression Wrap 08/01/24 Foot Dorsal;Left (Active)   Placement Date/Time: 08/01/24 1148   Location: Foot  Wound Location Orientation: Dorsal;Left      Assessments 8/1/2024 11:48 AM 8/8/2024  8:08 AM   Response to Treatment Well tolerated Well tolerated   Compression Layers Multilayer Multilayer   Compression Product Type Unna Boot Unna Boot   Dressing Applied Yes Yes   Compression Wrap Location Toes to Knee Toes to Knee   Compression Wrap Status Clean;Dry Clean;Dry;Intact       No associated orders.                   Additional Notes:  Leave dressings intact. Pt ok to discharge home. Hard script sent with pt.     Additional home health DME: No

## (undated) NOTE — LETTER
[facility]SW[facility]A  [address] [facility] [address]  [phone]    Blood Transfusion Consent    In the course of your treatment, it may become necessary to administer a transfusion of blood or blood components. This form provides basic information concerning this procedure and, if signed by you, authorizes its administration. By signing this form, you agree that all of your questions about the administration of blood or blood products have been answered by the ordering medical professional or designee.    Description of Procedure  Blood is introduced into one of your veins, commonly in the arm, using a sterilized disposable needle. The amount of blood transfused, and whether the transfusion will be of blood or blood components is a judgement the physician will make based on your particular needs.    Risks  The transfusion is a common procedure of low risk.  MINOR AND TEMPORARY REACTIONS ARE NOT UNCOMMON, including a slight bruise, swelling or local reaction in the area where the needle pierces your skin, or a nonserious reaction to the transfused material itself, including headache, fever or mild skin reaction, such as rash.  Serious reactions are possible, though very unlikely, and include severe allergic reaction (shock) and destruction (hemolysis) of transfused blood cells.  Infectious diseases which are known to be transmitted by blood transfusion include certain types of viral Hepatitis(liver infection from a virus), Human Immunodeficiency Virus (HIV-1,2) infection, a viral infection known to cause Acquired Immunodeficiency Syndrome (AIDS), as well as certain other bacterial, viral, and parasitic diseases. While a minimal risk of acquiring an infectious disease from transfused blood exists, in accordance with the Federal and State law, all due care has been taken in donor selection and testing to avoid transmission of disease.    Alternatives  If loss of blood poses serious threats during your  treatment, THERE IS NO EFFECTIVE ALTERNATIVE TO BLOOD TRANSFUSION. However, if you have any further questions on this matter, your provider will fully explain the alternatives to you if it has not already been done.    I, ______________________________, have read/had read to me the above. I understand the matters bearing on the decision whether or not to authorize a transfusion of blood or blood components. I have no questions which have not been answered to my full satisfaction. I hereby consent to such transfusion as my physician may deem necessary or advisable in the course of my treatment.    ______________________________________________                    ___________________________  (Signature of Patient or Responsible party in case of minor,                 (Printed Name of Patient or incompetent, or unconscious patient)              Responsible Party)    ___________________________               _____________________  (Relationship to Patient if not self)                                    (Date and Time)    __________________________                                                           ______________________              (Signature of Witness)               (Printed Name of Witness)     Language line ()    Telephone/Verbal/Video Consent    __________________________                     ____________________  (Signature of 2nd Witness           (Printed Name of 2nd  Telephone/Verbal/Video Consent)           Witness)    Patient Name: Darin Bowens     : 1945                 Printed: July 3, 2024     Medical Record #: [id number]      Rev: 2023

## (undated) NOTE — LETTER
Date: 8/1/2024  Patient name: Darin Bowens  YOB: 1945  Medical Record Number: [id number]  Primary Coverage: Payor: MEDICARE / Plan: MEDICARE PART A&B / Product Type: *No Product type* /   Secondary Coverage: COMMERCIAL - [facility]  Insurance ID: [id number]  Patient Address: [address] [place] [id number]  Telephone Information:   Home Phone [phone]   Work Phone [phone]   Mobile [phone]       Encounter Date: 8/1/2024  Provider: DELMI Wheeler  Diagnosis:     ICD-10-CM   1. Non-pressure chronic ulcer of other part of left foot with necrosis of bone ([facility])  L97.524   2. Non-pressure chronic ulcer of other part of right foot with necrosis of bone ([facility])  L97.514   3. Diabetic foot ulcer with osteomyelitis ([facility])  E11.621    E11.69    L97.509    M86.9   4. PAD (peripheral artery disease) ([facility])  I73.9   5. ESRD (end stage renal disease) ([facility])  N18.6         Wound 07/18/24 #1 Foot Left (Active)   Date First Assessed/Time First Assessed: 07/18/24 1344    Wound Number (Wound Clinic Only): #1  Primary Wound Type: Diabetic Ulcer  Location: Foot  Wound Location Orientation: Left      Assessments 7/18/2024  2:00 PM 8/1/2024  9:03 AM   Wound Image        Drainage Amount Small Moderate   Drainage Description Serosanguineous Serosanguineous   Treatments Compression Compression   Wound Length (cm) 5.2 cm 4.6 cm   Wound Width (cm) 10.4 cm 11.5 cm   Wound Surface Area (cm^2) 54.08 cm^2 52.9 cm^2   Wound Depth (cm) 1 cm 0.9 cm   Wound Volume (cm^3) 54.08 cm^3 47.61 cm^3   Wound Healing % -- 12   Margins Well-defined edges Well-defined edges   Non-staged Wound Description Full thickness Full thickness   María-wound Assessment Clean;Dry;Blanchable erythema Dry;Moist;Edema   Wound Granulation Tissue Red;Spongy Spongy;Pink   Wound Bed Granulation (%) 15 % 40 %   Wound Bed Slough (%) 85 % --   Wound Odor None None   Exposed Structure Bone;Adipose Bone;Adipose   Shape 10staples to  periwound 10% bone, 50% adipose   Tunneling? No No   Undermining? No No   Sinus Tracts? No No   Balbuena Scale Grade 4 Grade 4       Inactive Orders   Date Order Priority Status Authorizing Provider   08/01/24 1142 Cellular tissue product application Diabetic Ulcer Left Foot Routine Completed Jenny Olmos APRN   08/01/24 1116 Debridement Diabetic Ulcer Left Foot Routine Completed Jenny Olmos APRN   07/25/24 1445 Debridement Diabetic Ulcer Left Foot Routine Completed Jenny Olmos APRN       Wound 07/18/24 #2 right Foot Right (Active)   Date First Assessed/Time First Assessed: 07/18/24 1345    Wound Number (Wound Clinic Only): #2 right  Primary Wound Type: Diabetic Ulcer  Location: Foot  Wound Location Orientation: Right      Assessments 7/18/2024  1:57 PM 8/1/2024  9:05 AM   Wound Image        Drainage Amount Small Scant   Drainage Description Serosanguineous Serosanguineous   Treatments Compression Compression   Wound Length (cm) 4.4 cm 2.8 cm   Wound Width (cm) 14 cm 13.7 cm   Wound Surface Area (cm^2) 61.6 cm^2 38.36 cm^2   Wound Depth (cm) 2.1 cm 1.5 cm   Wound Volume (cm^3) 129.36 cm^3 57.54 cm^3   Wound Healing % -- 56   Margins Well-defined edges Well-defined edges   Non-staged Wound Description Full thickness Full thickness   Hailey-wound Assessment Clean;Dry;Blanchable erythema Edema;Maceration;Moist   Wound Granulation Tissue Red;Pink;Spongy Spongy;Red   Wound Bed Granulation (%) 30 % 40 %   Wound Bed Epithelium (%) 20 % --   Wound Bed Slough (%) 50 % 60 %   Wound Odor None None   Exposed Structure Bone;Adipose Bone;Adipose   Shape 9 staples noted to hailey wound --   Tunneling? No No   Undermining? No No   Sinus Tracts? No No   Balbuena Scale Grade 4 Grade 4       Inactive Orders   Date Order Priority Status Authorizing Provider   08/01/24 1140 Cellular tissue product application Diabetic Ulcer Right Foot Routine Completed Jenny Olmos, DELMI   08/01/24 1058 Debridement Diabetic Ulcer Right Foot Routine  Completed Jenny Olmos APRN   07/25/24 1445 Debridement Diabetic Ulcer Right Foot Routine Completed Jenny Olmos APRN       Wound 07/18/24 #3 Ankle Posterior;Right (Active)   Date First Assessed/Time First Assessed: 07/18/24 1347    Wound Number (Wound Clinic Only): #3  Primary Wound Type: Pressure Injury  Location: Ankle  Wound Location Orientation: Posterior;Right      Assessments 7/18/2024  2:07 PM 8/1/2024  9:08 AM   Wound Image       Drainage Amount None Small   Drainage Description -- Serosanguineous   Treatments Compression Compression   Wound Length (cm) 4.5 cm 4.7 cm   Wound Width (cm) 2.1 cm 1.5 cm   Wound Surface Area (cm^2) 9.45 cm^2 7.05 cm^2   Wound Depth (cm) 0.1 cm 0.1 cm   Wound Volume (cm^3) 0.945 cm^3 0.705 cm^3   Wound Healing % -- 25   Margins -- Well-defined edges   Non-staged Wound Description -- Full thickness   Hailey-wound Assessment Dry;Blanchable erythema Moist;Blanchable erythema   Wound Granulation Tissue Pink;Firm Firm;Red   Wound Bed Granulation (%) 10 % 30 %   Wound Bed Epithelium (%) 10 % --   Wound Bed Slough (%) -- 70 %   Wound Bed Eschar (%) 80 % --   Wound Odor None None   Shape blister to hailey --   Tunneling? No No   Undermining? No No   Sinus Tracts? No No   Pressure Injury Stage Unstageable Unstageable       Inactive Orders   Date Order Priority Status Authorizing Provider   07/25/24 1442 Debridement Pressure Injury Posterior;Right Ankle Routine Completed Jenny Olmos APRN       Wound 07/18/24 #4 Right heel wound Heel Right (Active)   Date First Assessed/Time First Assessed: 07/18/24 1428    Wound Number (Wound Clinic Only): #4 Right heel wound  Primary Wound Type: Pressure Injury  Location: Heel  Wound Location Orientation: Right      Assessments 7/18/2024  2:31 PM 8/1/2024  9:10 AM   Wound Image       Drainage Amount None None   Treatments Compression Compression   Wound Length (cm) 0.5 cm 0.9 cm   Wound Width (cm) 1 cm 2.2 cm   Wound Surface Area (cm^2) 0.5 cm^2  1.98 cm^2   Wound Depth (cm) 0 cm 0 cm   Wound Volume (cm^3) 0 cm^3 0 cm^3   Margins Well-defined edges Well-defined edges   Non-staged Wound Description -- Not applicable   María-wound Assessment Dry;Clean Dry;Clean   Wound Bed Epithelium (%) 100 % 100 %   Wound Odor None None   Tunneling? -- No   Undermining? -- No   Sinus Tracts? -- No   Pressure Injury Stage Deep Tissue Deep Tissue       No associated orders.       Compression Wrap 08/01/24 Foot Dorsal;Right (Active)   Placement Date/Time: 08/01/24 1147   Location: Foot  Wound Location Orientation: Dorsal;Right      Assessments 8/1/2024 11:47 AM   Response to Treatment Well tolerated   Compression Layers Multilayer   Compression Product Type Unna Boot   Dressing Applied Yes   Compression Wrap Location Toes to Knee   Compression Wrap Status Clean;Dry       No associated orders.       Compression Wrap 08/01/24 Foot Dorsal;Left (Active)   Placement Date/Time: 08/01/24 1148   Location: Foot  Wound Location Orientation: Dorsal;Left      Assessments 8/1/2024 11:48 AM   Response to Treatment Well tolerated   Compression Layers Multilayer   Compression Product Type Unna Boot   Dressing Applied Yes   Compression Wrap Location Toes to Knee   Compression Wrap Status Clean;Dry       No associated orders.               Wound Cleaning and Dressings:  Showering directions: May shower with protection      Follow Up:  No follow-ups on file.      Additional Notes:  Please leave all dressings and wraps in place.  Will be changed in the wound clinic next week.  Skin substitutes placed this visit.  Do not get wraps wet.

## (undated) NOTE — LETTER
[address]  [address]  Authorization for Surgical Operation and Procedure     Date:___________                                                                                                         Time:__________  I hereby authorize Surgeon(s):  Jose R Meza DPM, my physician and his/her assistants (if applicable), which may include medical students, residents, and/or fellows, to perform the following surgical operation/ procedure and administer such anesthesia as may be determined necessary by my physician:  Operation/Procedure name (s) Procedure(s):  RIGHT FOOT WOUND DEBRIDMENT & APPLICATION WOUND VAC AND LEFT TRANS-METARTASAL AMPUTATION W/ ACHILES TENDON on Darin Bowens   2.   I recognize that during the surgical operation/procedure, unforeseen conditions may necessitate additional or different procedures than those listed above.  I, therefore, further authorize and request that the above-named surgeon, assistants, or designees perform such procedures as are, in their judgment, necessary and desirable.    3.   My surgeon/physician has discussed prior to my surgery the potential benefits, risks and side effects of this procedure; the likelihood of achieving goals; and potential problems that might occur during recuperation.  They also discussed reasonable alternatives to the procedure, including risks, benefits, and side effects related to the alternatives and risks related to not receiving this procedure.  I have had all my questions answered and I acknowledge that no guarantee has been made as to the result that may be obtained.    4.   Should the need arise during my operation/procedure, which includes change of level of care prior to discharge, I also consent to the administration of blood and/or blood products.  Further, I understand that despite careful testing and screening of blood or blood products by collecting agencies, I may still be subject to ill  effects as a result of receiving a blood transfusion and/or blood products.  The following are some, but not all, of the potential risks that can occur: fever and allergic reactions, hemolytic reactions, transmission of diseases such as Hepatitis, AIDS and Cytomegalovirus (CMV) and fluid overload.  In the event that I wish to have an autologous transfusion of my own blood, or a directed donor transfusion, I will discuss this with my physician.  Check only if Refusing Blood or Blood Products  I understand refusal of blood or blood products as deemed necessary by my physician may have serious consequences to my condition to include possible death. I hereby assume responsibility for my refusal and release the hospital, its personnel, and my physicians from any responsibility for the consequences of my refusal.          o  Refuse      5.   I authorize the use of any specimen, organs, tissues, body parts or foreign objects that may be removed from my body during the operation/procedure for diagnosis, research or teaching purposes and their subsequent disposal by hospital authorities.  I also authorize the release of specimen test results and/or written reports to my treating physician on the hospital medical staff or other referring or consulting physicians involved in my care, at the discretion of the Pathologist or my treating physician.    6.   I consent to the photographing or videotaping of the operations or procedures to be performed, including appropriate portions of my body for medical, scientific, or educational purposes, provided my identity is not revealed by the pictures or by descriptive texts accompanying them.  If the procedure has been photographed/videotaped, the surgeon will obtain the original picture, image, videotape or CD.  The hospital will not be responsible for storage, release or maintenance of the picture, image, tape or CD.    7.   I consent to the presence of a  or observers  in the operating room as deemed necessary by my physician or their designees.    8.   I recognize that in the event my procedure results in extended X-Ray/fluoroscopy time, I may develop a skin reaction.    9. If I have a Do Not Attempt Resuscitation (DNAR) order in place, that status will be suspended while in the operating room, procedural suite, and during the recovery period unless otherwise explicitly stated by me (or a person authorized to consent on my behalf). The surgeon or my attending physician will determine when the applicable recovery period ends for purposes of reinstating the DNAR order.  10. Patients having a sterilization procedure: I understand that if the procedure is successful the results will be permanent and it will therefore be impossible for me to inseminate, conceive, or bear children.  I also understand that the procedure is intended to result in sterility, although the result has not been guaranteed.   11. I acknowledge that my physician has explained sedation/analgesia administration to me including the risk and benefits I consent to the administration of sedation/analgesia as may be necessary or desirable in the judgment of my physician.    I CERTIFY THAT I HAVE READ AND FULLY UNDERSTAND THE ABOVE CONSENT TO OPERATION and/or OTHER PROCEDURE.    _________________________________________  __________________________________  Signature of Patient     Signature of Responsible Person         ___________________________________         Printed Name of Responsible Person           _________________________________                 Relationship to Patient  _________________________________________  ______________________________  Signature of Witness          Date  Time      Patient Name: Darin Bowens     : 1945                 Printed: 2024     Medical Record #: [id number]                     Page 1 of 2                                    [address] [address]  [place]  [address]    Consent for Anesthesia    I Darin MCINTYRE Gogo agree to be cared for by an anesthesiologist, who is specially trained to monitor me and give me medicine to put me to sleep or keep me comfortable during my procedure    I understand that my anesthesiologist is not an employee or agent of [facility] or [employer]. He or she works for [employer].    As the patient asking for anesthesia services, I agree to:  Allow the anesthesiologist (anesthesia doctor) to give me medicine and do additional procedures as necessary. Some examples are: Starting or using an “IV” to give me medicine, fluids or blood during my procedure, and having a breathing tube placed to help me breathe when I’m asleep (intubation). In the event that my heart stops working properly, I understand that my anesthesiologist will make every effort to sustain my life, unless otherwise directed by [facility] Do Not Resuscitate documents.  Tell my anesthesia doctor before my procedure:  If I am pregnant.  The last time that I ate or drank.  All of the medicines I take (including prescriptions, herbal supplements, and pills I can buy without a prescription (including street drugs/illegal medications). Failure to inform my anesthesiologist about these medicines may increase my risk of anesthetic complications.  If I am allergic to anything or have had a reaction to anesthesia before.  I understand how the anesthesia medicine will help me (benefits).  I understand that with any type of anesthesia medicine there are risks:  The most common risks are: nausea, vomiting, sore throat, muscle soreness, damage to my eyes, mouth, or teeth (from breathing tube placement).  Rare risks include: remembering what happened during my procedure, allergic reactions to medications, injury to my airway, heart, lungs, vision, nerves, or muscles and in extremely rare instances death.  My doctor has  explained to me other choices available to me for my care (alternatives).  Pregnant Patients (“epidural”):  I understand that the risks of having an epidural (medicine given into my back to help control pain during labor), include itching, low blood pressure, difficulty urinating, headache or slowing of the baby’s heart. Very rare risks include infection, bleeding, seizure, irregular heart rhythms and nerve injury.  Regional Anesthesia (“spinal”, “epidural”, & “nerve blocks”):  I understand that rare but potential complications include headache, bleeding, infection, seizure, irregular heart rhythms, and nerve injury.    I can change my mind about having anesthesia services at any time before I get the medicine.    _____________________________________________________________________________  Patient (or Representative) Signature/Relationship to Patient  Date   Time    _____________________________________________________________________________   Name (if used)    Language/Organization   Time    _____________________________________________________________________________  Anesthesiologist Signature     Date   Time  I have discussed the procedure and information above with the patient (or patient’s representative) and answered their questions. The patient or their representative has agreed to have anesthesia services.    _____________________________________________________________________________  Witness        Date   Time  I have verified that the signature is that of the patient or patient’s representative, and that it was signed before the procedure  Patient Name: Darin Bowens     : 1945                 Printed: 2024     Medical Record #: [id number]                     Page 2 of 2

## (undated) NOTE — LETTER
Andres Berumen M.D., F.A.C.S. Denise Miller M.D., F.A.C.S. Irma Reyes M.D., Rikki Barnhart. XENIA Castanon M.D., F.A.C.S. Vira Montoya. Obey Quiroz M.D., F.A.C.S. JAYMIE Rust M. Birdena Barbone A.D. Gretta Mazzoni, M.D., F. chance to have all of your questions and concerns answered. If there are any issues which have not been adequately addressed, we ask you to bring them forward so that we can thoroughly address them.     A patient who is fully informed and understands their treatment, among other options and the risks and benefits of the different treatment options:    Yes _____ No _____    A [facility] surgeon as explained to me that if I should so desire, he/she is willing to explain my case and the surgical and non-surgical optio

## (undated) NOTE — LETTER
[address]  [address]  Authorization for Surgical Operation and Procedure     Date:___________                                                                                                         Time:__________  I hereby authorize Surgeon(s):  Jose R Meza DPM, my physician and his/her assistants (if applicable), which may include medical students, residents, and/or fellows, to perform the following surgical operation/ procedure and administer such anesthesia as may be determined necessary by my physician:  Operation/Procedure name (s) Procedure(s):  RIGHT FOOT WOUND DEBRIDMENT & APPLICATION WOUND VAC AND LEFT TRANS-METARTASAL AMPUTATION W/ ACHILES TENDON on Darin Bowens   2.   I recognize that during the surgical operation/procedure, unforeseen conditions may necessitate additional or different procedures than those listed above.  I, therefore, further authorize and request that the above-named surgeon, assistants, or designees perform such procedures as are, in their judgment, necessary and desirable.    3.   My surgeon/physician has discussed prior to my surgery the potential benefits, risks and side effects of this procedure; the likelihood of achieving goals; and potential problems that might occur during recuperation.  They also discussed reasonable alternatives to the procedure, including risks, benefits, and side effects related to the alternatives and risks related to not receiving this procedure.  I have had all my questions answered and I acknowledge that no guarantee has been made as to the result that may be obtained.    4.   Should the need arise during my operation/procedure, which includes change of level of care prior to discharge, I also consent to the administration of blood and/or blood products.  Further, I understand that despite careful testing and screening of blood or blood products by collecting agencies, I may still be subject to ill  effects as a result of receiving a blood transfusion and/or blood products.  The following are some, but not all, of the potential risks that can occur: fever and allergic reactions, hemolytic reactions, transmission of diseases such as Hepatitis, AIDS and Cytomegalovirus (CMV) and fluid overload.  In the event that I wish to have an autologous transfusion of my own blood, or a directed donor transfusion, I will discuss this with my physician.  Check only if Refusing Blood or Blood Products  I understand refusal of blood or blood products as deemed necessary by my physician may have serious consequences to my condition to include possible death. I hereby assume responsibility for my refusal and release the hospital, its personnel, and my physicians from any responsibility for the consequences of my refusal.          o  Refuse      5.   I authorize the use of any specimen, organs, tissues, body parts or foreign objects that may be removed from my body during the operation/procedure for diagnosis, research or teaching purposes and their subsequent disposal by hospital authorities.  I also authorize the release of specimen test results and/or written reports to my treating physician on the hospital medical staff or other referring or consulting physicians involved in my care, at the discretion of the Pathologist or my treating physician.    6.   I consent to the photographing or videotaping of the operations or procedures to be performed, including appropriate portions of my body for medical, scientific, or educational purposes, provided my identity is not revealed by the pictures or by descriptive texts accompanying them.  If the procedure has been photographed/videotaped, the surgeon will obtain the original picture, image, videotape or CD.  The hospital will not be responsible for storage, release or maintenance of the picture, image, tape or CD.    7.   I consent to the presence of a  or observers  in the operating room as deemed necessary by my physician or their designees.    8.   I recognize that in the event my procedure results in extended X-Ray/fluoroscopy time, I may develop a skin reaction.    9. If I have a Do Not Attempt Resuscitation (DNAR) order in place, that status will be suspended while in the operating room, procedural suite, and during the recovery period unless otherwise explicitly stated by me (or a person authorized to consent on my behalf). The surgeon or my attending physician will determine when the applicable recovery period ends for purposes of reinstating the DNAR order.  10. Patients having a sterilization procedure: I understand that if the procedure is successful the results will be permanent and it will therefore be impossible for me to inseminate, conceive, or bear children.  I also understand that the procedure is intended to result in sterility, although the result has not been guaranteed.   11. I acknowledge that my physician has explained sedation/analgesia administration to me including the risk and benefits I consent to the administration of sedation/analgesia as may be necessary or desirable in the judgment of my physician.    I CERTIFY THAT I HAVE READ AND FULLY UNDERSTAND THE ABOVE CONSENT TO OPERATION and/or OTHER PROCEDURE.    _________________________________________  __________________________________  Signature of Patient     Signature of Responsible Person         ___________________________________         Printed Name of Responsible Person           _________________________________                 Relationship to Patient  _________________________________________  ______________________________  Signature of Witness          Date  Time      Patient Name: Darin Bowens     : 1945                 Printed: 2024     Medical Record #: [id number]                     Page 1 of 2                                    [address]  [place]  [address]    Consent for Anesthesia    I Darin MCINTYRE Gogo agree to be cared for by an anesthesiologist, who is specially trained to monitor me and give me medicine to put me to sleep or keep me comfortable during my procedure    I understand that my anesthesiologist is not an employee or agent of [facility] or [employer]. He or she works for [employer].    As the patient asking for anesthesia services, I agree to:  Allow the anesthesiologist (anesthesia doctor) to give me medicine and do additional procedures as necessary. Some examples are: Starting or using an “IV” to give me medicine, fluids or blood during my procedure, and having a breathing tube placed to help me breathe when I’m asleep (intubation). In the event that my heart stops working properly, I understand that my anesthesiologist will make every effort to sustain my life, unless otherwise directed by [facility] Do Not Resuscitate documents.  Tell my anesthesia doctor before my procedure:  If I am pregnant.  The last time that I ate or drank.  All of the medicines I take (including prescriptions, herbal supplements, and pills I can buy without a prescription (including street drugs/illegal medications). Failure to inform my anesthesiologist about these medicines may increase my risk of anesthetic complications.  If I am allergic to anything or have had a reaction to anesthesia before.  I understand how the anesthesia medicine will help me (benefits).  I understand that with any type of anesthesia medicine there are risks:  The most common risks are: nausea, vomiting, sore throat, muscle soreness, damage to my eyes, mouth, or teeth (from breathing tube placement).  Rare risks include: remembering what happened during my procedure, allergic reactions to medications, injury to my airway, heart, lungs, vision, nerves, or muscles and in extremely rare instances death.  My doctor has  explained to me other choices available to me for my care (alternatives).  Pregnant Patients (“epidural”):  I understand that the risks of having an epidural (medicine given into my back to help control pain during labor), include itching, low blood pressure, difficulty urinating, headache or slowing of the baby’s heart. Very rare risks include infection, bleeding, seizure, irregular heart rhythms and nerve injury.  Regional Anesthesia (“spinal”, “epidural”, & “nerve blocks”):  I understand that rare but potential complications include headache, bleeding, infection, seizure, irregular heart rhythms, and nerve injury.    I can change my mind about having anesthesia services at any time before I get the medicine.    _____________________________________________________________________________  Patient (or Representative) Signature/Relationship to Patient  Date   Time    _____________________________________________________________________________   Name (if used)    Language/Organization   Time    _____________________________________________________________________________  Anesthesiologist Signature     Date   Time  I have discussed the procedure and information above with the patient (or patient’s representative) and answered their questions. The patient or their representative has agreed to have anesthesia services.    _____________________________________________________________________________  Witness        Date   Time  I have verified that the signature is that of the patient or patient’s representative, and that it was signed before the procedure  Patient Name: Darin Bowens     : 1945                 Printed: 2024     Medical Record #: [id number]                     Page 2 of 2

## (undated) NOTE — LETTER
Date: 7/25/2024  Patient name: Darin Bowens  YOB: 1945  Medical Record Number: [id number]  Primary Coverage: Payor: MEDICARE / Plan: MEDICARE PART A&B / Product Type: *No Product type* /   Secondary Coverage: COMMERCIAL - [facility]  Insurance ID: [id number]  Patient Address: [address] [place] [id number]  Telephone Information:   Home Phone [phone]   Work Phone [phone]   Mobile [phone]       Encounter Date: 7/25/2024  Provider: DELMI Wheeler  Diagnosis:     ICD-10-CM   1. Non-pressure chronic ulcer of other part of left foot with necrosis of bone ([facility])  L97.524   2. Non-pressure chronic ulcer of other part of right foot with necrosis of bone ([facility])  L97.514   3. Diabetic foot ulcer with osteomyelitis ([facility])  E11.621    E11.69    L97.509    M86.9   4. PAD (peripheral artery disease) ([facility])  I73.9   5. ESRD (end stage renal disease) ([facility])  N18.6         Wound 07/18/24 #1 Foot Left (Active)   Date First Assessed/Time First Assessed: 07/18/24 1344    Wound Number (Wound Clinic Only): #1  Primary Wound Type: Diabetic Ulcer  Location: Foot  Wound Location Orientation: Left      Assessments 7/18/2024  2:00 PM 7/25/2024  1:11 PM   Wound Image         Drainage Amount Small Small   Drainage Description Serosanguineous Serous;Yellow   Treatments Compression --   Wound Length (cm) 5.2 cm 4.6 cm   Wound Width (cm) 10.4 cm 10.6 cm   Wound Surface Area (cm^2) 54.08 cm^2 48.76 cm^2   Wound Depth (cm) 1 cm 0.5 cm   Wound Volume (cm^3) 54.08 cm^3 24.38 cm^3   Wound Healing % -- 55   Margins Well-defined edges Well-defined edges   Non-staged Wound Description Full thickness Full thickness   María-wound Assessment Clean;Dry;Blanchable erythema Edema;Dry   Wound Granulation Tissue Red;Spongy Spongy;Pink   Wound Bed Granulation (%) 15 % 15 %   Wound Bed Epithelium (%) -- 50 %   Wound Bed Slough (%) 85 % 35 %   Wound Odor None None   Exposed Structure Bone;Adipose Bone;Adipose    Shape 10staples to periwound 10staples to periwound   Tunneling? No --   Undermining? No --   Sinus Tracts? No --   Balbuena Scale Grade 4 Grade 4       Inactive Orders   Date Order Priority Status Authorizing Provider   07/25/24 1445 Debridement Diabetic Ulcer Left Foot Routine Completed Jenny Olmos APRN       Wound 07/18/24 #2 right Foot Right (Active)   Date First Assessed/Time First Assessed: 07/18/24 1345    Wound Number (Wound Clinic Only): #2 right  Primary Wound Type: Diabetic Ulcer  Location: Foot  Wound Location Orientation: Right      Assessments 7/18/2024  1:57 PM 7/25/2024  1:13 PM   Wound Image         Drainage Amount Small Moderate   Drainage Description Serosanguineous Serous;Yellow   Treatments Compression --   Wound Length (cm) 4.4 cm 3.4 cm   Wound Width (cm) 14 cm 13.7 cm   Wound Surface Area (cm^2) 61.6 cm^2 46.58 cm^2   Wound Depth (cm) 2.1 cm 1.7 cm   Wound Volume (cm^3) 129.36 cm^3 79.186 cm^3   Wound Healing % -- 39   Margins Well-defined edges Well-defined edges   Non-staged Wound Description Full thickness Full thickness   Hailey-wound Assessment Clean;Dry;Blanchable erythema Edema;Maceration   Wound Granulation Tissue Red;Pink;Spongy Spongy;Pink   Wound Bed Granulation (%) 30 % 20 %   Wound Bed Epithelium (%) 20 % --   Wound Bed Slough (%) 50 % 80 %   Wound Odor None None   Exposed Structure Bone;Adipose Bone;Adipose   Shape 9 staples noted to hailey wound --   Tunneling? No --   Undermining? No --   Sinus Tracts? No --   Balbuena Scale Grade 4 Grade 4       Inactive Orders   Date Order Priority Status Authorizing Provider   07/25/24 1445 Debridement Diabetic Ulcer Right Foot Routine Completed Jenny Olmos APRN       Wound 07/18/24 #3 Ankle Posterior;Right (Active)   Date First Assessed/Time First Assessed: 07/18/24 1347    Wound Number (Wound Clinic Only): #3  Primary Wound Type: Pressure Injury  Location: Ankle  Wound Location Orientation: Posterior;Right      Assessments 7/18/2024   2:07 PM 7/25/2024  1:21 PM   Wound Image        Drainage Amount None Small   Drainage Description -- Serosanguineous   Treatments Compression --   Wound Length (cm) 4.5 cm 4.5 cm   Wound Width (cm) 2.1 cm 1.5 cm   Wound Surface Area (cm^2) 9.45 cm^2 6.75 cm^2   Wound Depth (cm) 0.1 cm 0.1 cm   Wound Volume (cm^3) 0.945 cm^3 0.675 cm^3   Wound Healing % -- 29   Margins -- Well-defined edges   Non-staged Wound Description -- Full thickness   Hailey-wound Assessment Dry;Blanchable erythema Dry;Blanchable erythema   Wound Granulation Tissue Pink;Firm --   Wound Bed Granulation (%) 10 % --   Wound Bed Epithelium (%) 10 % 10 %   Wound Bed Eschar (%) 80 % 90 %   Wound Odor None None   Shape blister to hailey --   Tunneling? No --   Undermining? No --   Sinus Tracts? No --   Pressure Injury Stage Unstageable Unstageable       Inactive Orders   Date Order Priority Status Authorizing Provider   07/25/24 1442 Debridement Pressure Injury Posterior;Right Ankle Routine Completed Jenny Olmos APRN       Wound 07/18/24 #4 Right heel wound Heel Right (Active)   Date First Assessed/Time First Assessed: 07/18/24 1428    Wound Number (Wound Clinic Only): #4 Right heel wound  Primary Wound Type: Pressure Injury  Location: Heel  Wound Location Orientation: Right      Assessments 7/18/2024  2:31 PM 7/25/2024  1:19 PM   Wound Image       Drainage Amount None None   Treatments Compression Betadine   Wound Length (cm) 0.5 cm 0.5 cm   Wound Width (cm) 1 cm 1 cm   Wound Surface Area (cm^2) 0.5 cm^2 0.5 cm^2   Wound Depth (cm) 0 cm 0 cm   Wound Volume (cm^3) 0 cm^3 0 cm^3   Margins Well-defined edges Well-defined edges   Non-staged Wound Description -- Full thickness   Hailey-wound Assessment Dry;Clean Dry   Wound Bed Epithelium (%) 100 % 100 %   Wound Odor None None   Shape -- intact blister   Pressure Injury Stage Deep Tissue Deep Tissue       No associated orders.               Bilateral TMA sites and right achilles:  Change daily  Changing  your dressing:              Wash your hands with soap and water.  Ensure that the old dressing is removed completely. Place it in a plastic bag and throw it in the trash.  Cleanse the wound with hypochlorous wound cleanser (ie. Anasept, vashe, pure and clean) .  It's ok to “scrub” your wound with the gauze, small amount of bleeding with cleansing is normal and ok.  Apply the following dressings:     santyl colleganse>cover with saline moist gauze, dry gauze>secure with tegaderm (starting plantar and securring on the dorsal foot) -to help hold and support the plantar flap)    Right heel : twice daily  Changing your dressing:              Wash your hands with soap and water.  Ensure that the old dressing is removed completely. Place it in a plastic bag and throw it in the trash.   PAINT WITH BETADINE>cover with abd pad and gauze     Weight bearing as tolerated    Offloading  Provide patient with bilateral heel lift boots (ie prevalon), these should be on whenever patient is in bed      Care Summary:  Care Summary: Discussed Plan of Care at beside with patient. Patient verbally acknowledges understanding of all instructions and all questions were answered.      Follow Up:  No follow-ups on file.        Additional home health DME: No

## (undated) NOTE — ED AVS SNAPSHOT
Raven List   MRN: [id number]    Department:  [facility] Emergency Department   Date of Visit:  2/12/2018           Disclosure     Insurance plans vary and the physician(s) referred by the ER may not be covered by your plan.  Please conta tell this physician (or your personal doctor if your instructions are to return to your personal doctor) about any new or lasting problems. The primary care or specialist physician will see patients referred from the [facility] Emergency Department.  Tom Turner

## (undated) NOTE — LETTER
Date: 7/18/2024  Patient name: Darin Bowens  YOB: 1945  Medical Record Number: [id number]  Primary Coverage: Payor: MEDICARE / Plan: MEDICARE PART A&B / Product Type: *No Product type* /   Secondary Coverage: COMMERCIAL - [facility]  Insurance ID: [id number]  Patient Address: [address] [place] [id number]  Telephone Information:   Home Phone [phone]   Work Phone [phone]   Mobile [phone]       Encounter Date: 7/18/2024  Provider: DELMI Wheeler  Diagnosis:     ICD-10-CM   1. Diabetic foot ulcer with osteomyelitis ([facility])  E11.621    E11.69    L97.509    M86.9   2. Non-pressure chronic ulcer of other part of left foot with necrosis of bone ([facility])  L97.524   3. Non-pressure chronic ulcer of other part of right foot with necrosis of bone ([facility])  L97.514   4. Gangrene of right foot ([facility])  I96   5. PAD (peripheral artery disease) ([facility])  I73.9   6. ESRD (end stage renal disease) ([facility])  N18.6         Wound 07/18/24 #1 Foot Left (Active)   Date First Assessed/Time First Assessed: 07/18/24 1344    Wound Number (Wound Clinic Only): #1  Primary Wound Type: Diabetic Ulcer  Location: Foot  Wound Location Orientation: Left      Assessments 7/18/2024  2:00 PM   Wound Image     Drainage Amount Small   Drainage Description Serosanguineous   Treatments Compression   Wound Length (cm) 5.2 cm   Wound Width (cm) 10.4 cm   Wound Surface Area (cm^2) 54.08 cm^2   Wound Depth (cm) 1 cm   Wound Volume (cm^3) 54.08 cm^3   Margins Well-defined edges   Non-staged Wound Description Full thickness   María-wound Assessment Clean;Dry;Blanchable erythema   Wound Granulation Tissue Red;Spongy   Wound Bed Granulation (%) 15 %   Wound Bed Slough (%) 85 %   Wound Odor None   Exposed Structure Bone;Adipose   Shape 10staples to periwound   Tunneling? No   Undermining? No   Sinus Tracts? No   Balbuena Scale Grade 4       No associated orders.       Wound 07/18/24 #2 right Foot Right (Active)   Date  First Assessed/Time First Assessed: 07/18/24 1345    Wound Number (Wound Clinic Only): #2 right  Primary Wound Type: Diabetic Ulcer  Location: Foot  Wound Location Orientation: Right      Assessments 7/18/2024  1:57 PM   Wound Image     Drainage Amount Small   Drainage Description Serosanguineous   Treatments Compression   Wound Length (cm) 4.4 cm   Wound Width (cm) 14 cm   Wound Surface Area (cm^2) 61.6 cm^2   Wound Depth (cm) 2.1 cm   Wound Volume (cm^3) 129.36 cm^3   Margins Well-defined edges   Non-staged Wound Description Full thickness   Hailey-wound Assessment Clean;Dry;Blanchable erythema   Wound Granulation Tissue Red;Pink;Spongy   Wound Bed Granulation (%) 30 %   Wound Bed Epithelium (%) 20 %   Wound Bed Slough (%) 50 %   Wound Odor None   Exposed Structure Bone;Adipose   Shape 9 staples noted to hailey wound   Tunneling? No   Undermining? No   Sinus Tracts? No   Balbuena Scale Grade 4       No associated orders.       Wound 07/18/24 #3 Ankle Posterior;Right (Active)   Date First Assessed/Time First Assessed: 07/18/24 1347    Wound Number (Wound Clinic Only): #3  Primary Wound Type: Pressure Injury  Location: Ankle  Wound Location Orientation: Posterior;Right      Assessments 7/18/2024  2:07 PM   Wound Image     Drainage Amount None   Treatments Compression   Wound Length (cm) 4.5 cm   Wound Width (cm) 2.1 cm   Wound Surface Area (cm^2) 9.45 cm^2   Wound Depth (cm) 0.1 cm   Wound Volume (cm^3) 0.945 cm^3   Hailey-wound Assessment Dry;Blanchable erythema   Wound Granulation Tissue Pink;Firm   Wound Bed Granulation (%) 10 %   Wound Bed Epithelium (%) 10 %   Wound Bed Eschar (%) 80 %   Wound Odor None   Shape blister to hailey   Tunneling? No   Undermining? No   Sinus Tracts? No   Pressure Injury Stage Unstageable       No associated orders.       Wound 07/18/24 #4 Right heel wound Heel Right (Active)   Date First Assessed/Time First Assessed: 07/18/24 1428    Wound Number (Wound Clinic Only): #4 Right heel wound   Primary Wound Type: Pressure Injury  Location: Heel  Wound Location Orientation: Right      Assessments 7/18/2024  2:31 PM   Wound Image     Drainage Amount None   Treatments Compression   Wound Length (cm) 0.5 cm   Wound Width (cm) 1 cm   Wound Surface Area (cm^2) 0.5 cm^2   Wound Depth (cm) 0 cm   Wound Volume (cm^3) 0 cm^3   Margins Well-defined edges   María-wound Assessment Dry;Clean   Wound Bed Epithelium (%) 100 %   Wound Odor None   Pressure Injury Stage Deep Tissue       No associated orders.             Wound Cleaning and Dressings:  Showering directions: May shower with protection  Wound cleansing:  Cleanse with normal saline or wound cleanser  Wound product: Other Betadine to right posterior ankle wound and heel wound daily, cover with dry dressings.  Santyl ointment to right and left foot wounds, cover with adaptic and abd pads, wrap with kerlix daily.  Spanda  size E to BLE.  WBAT with surgical shoes.  Offloading boots on while in bed.  EHOB cushion for wheelchair.  Low air loss mattress.  Dressing change frequency:  Change dressing daily and/or PRN  Enzymatic agent:  Collagenase Santyl    Anesthetic:      Compression Therapy:   Spandagrip        Follow Up:  No follow-ups on file.      Additional Notes:  Other Betadine to right posterior ankle wound and heel wound daily, cover with dry dressings.  Santyl ointment to right and left foot wounds, cover with adaptic and abd pads, wrap with kerlix daily.  Spanda  size E to BLE.  WBAT with surgical shoes.  Offloading boots on while in bed.  EHOB cushion for wheelchair.  Low air loss mattress.

## (undated) NOTE — IP AVS SNAPSHOT
[address]            (For Outpatient Use Only) Initial Admit Date: 6/10/2021   Inpt/Obs Admit Date: Inpt: 6/10/21 / Obs: N/A   Discharge Date:    Christal Corona:  [de-identified]   MRN: [de-identified]   CSN: [id number]   CEID: [id number] Payor:  Plan:    Group Number:  Insurance Type:    Subscriber Name:  Subscriber :    Subscriber ID:  Pt Rel to Subscriber:    Hospital Account Financial Class: Medicare    Alejandra 15, 2021

## (undated) NOTE — LETTER
Fide Silva M.D., F.A.C.S. Annamarie Cordova M.D., F.A.C.S. Lui Hilario M.D., Jonelle Lowry. XENIA Gallego M.D., F.A.C.S. Zulema Austin. Jakub Rucker M.D., F.A.C.S. Jeremiah Rhodes M.D. TREY Mackay M.D., F. chance to have all of your questions and concerns answered. If there are any issues which have not been adequately addressed, we ask you to bring them forward so that we can thoroughly address them.     A patient who is fully informed and understands their treatment, among other options and the risks and benefits of the different treatment options:    Yes _____ No _____    A [facility] surgeon as explained to me that if I should so desire, he/she is willing to explain my case and the surgical and non-surgical optio

## (undated) NOTE — LETTER
[address]  [address]  Authorization for Surgical Operation and Procedure     Date:___________                                                                                                         Time:__________  I hereby authorize Surgeon(s):  Jose R Meza DPM, my physician and his/her assistants (if applicable), which may include medical students, residents, and/or fellows, to perform the following surgical operation/ procedure and administer such anesthesia as may be determined necessary by my physician:  Operation/Procedure name (s) Procedure(s):  RIGHT FOOT WOUND DEBRIDEMENT PARTIAL RESECTION OF 1ST AND 2ND METATARSAL, LEFT FOOT WOUND DEBRIDEMENT WITH POSSIBLE TRANSMETATARSAL AMPUTATION  TOE AMPUTATION on Darin Phanschmidt   2.   I recognize that during the surgical operation/procedure, unforeseen conditions may necessitate additional or different procedures than those listed above.  I, therefore, further authorize and request that the above-named surgeon, assistants, or designees perform such procedures as are, in their judgment, necessary and desirable.    3.   My surgeon/physician has discussed prior to my surgery the potential benefits, risks and side effects of this procedure; the likelihood of achieving goals; and potential problems that might occur during recuperation.  They also discussed reasonable alternatives to the procedure, including risks, benefits, and side effects related to the alternatives and risks related to not receiving this procedure.  I have had all my questions answered and I acknowledge that no guarantee has been made as to the result that may be obtained.    4.   Should the need arise during my operation/procedure, which includes change of level of care prior to discharge, I also consent to the administration of blood and/or blood products.  Further, I understand that despite careful testing and screening of blood or blood products by  collecting agencies, I may still be subject to ill effects as a result of receiving a blood transfusion and/or blood products.  The following are some, but not all, of the potential risks that can occur: fever and allergic reactions, hemolytic reactions, transmission of diseases such as Hepatitis, AIDS and Cytomegalovirus (CMV) and fluid overload.  In the event that I wish to have an autologous transfusion of my own blood, or a directed donor transfusion, I will discuss this with my physician.  Check only if Refusing Blood or Blood Products  I understand refusal of blood or blood products as deemed necessary by my physician may have serious consequences to my condition to include possible death. I hereby assume responsibility for my refusal and release the hospital, its personnel, and my physicians from any responsibility for the consequences of my refusal.          o  Refuse      5.   I authorize the use of any specimen, organs, tissues, body parts or foreign objects that may be removed from my body during the operation/procedure for diagnosis, research or teaching purposes and their subsequent disposal by hospital authorities.  I also authorize the release of specimen test results and/or written reports to my treating physician on the hospital medical staff or other referring or consulting physicians involved in my care, at the discretion of the Pathologist or my treating physician.    6.   I consent to the photographing or videotaping of the operations or procedures to be performed, including appropriate portions of my body for medical, scientific, or educational purposes, provided my identity is not revealed by the pictures or by descriptive texts accompanying them.  If the procedure has been photographed/videotaped, the surgeon will obtain the original picture, image, videotape or CD.  The hospital will not be responsible for storage, release or maintenance of the picture, image, tape or CD.    7.   I consent  to the presence of a  or observers in the operating room as deemed necessary by my physician or their designees.    8.   I recognize that in the event my procedure results in extended X-Ray/fluoroscopy time, I may develop a skin reaction.    9. If I have a Do Not Attempt Resuscitation (DNAR) order in place, that status will be suspended while in the operating room, procedural suite, and during the recovery period unless otherwise explicitly stated by me (or a person authorized to consent on my behalf). The surgeon or my attending physician will determine when the applicable recovery period ends for purposes of reinstating the DNAR order.  10. Patients having a sterilization procedure: I understand that if the procedure is successful the results will be permanent and it will therefore be impossible for me to inseminate, conceive, or bear children.  I also understand that the procedure is intended to result in sterility, although the result has not been guaranteed.   11. I acknowledge that my physician has explained sedation/analgesia administration to me including the risk and benefits I consent to the administration of sedation/analgesia as may be necessary or desirable in the judgment of my physician.    I CERTIFY THAT I HAVE READ AND FULLY UNDERSTAND THE ABOVE CONSENT TO OPERATION and/or OTHER PROCEDURE.    _________________________________________  __________________________________  Signature of Patient     Signature of Responsible Person         ___________________________________         Printed Name of Responsible Person           _________________________________                 Relationship to Patient  _________________________________________  ______________________________  Signature of Witness          Date  Time      Patient Name: Darin Bowens     : 1945                 Printed: 2024     Medical Record #: [id number]                     Page 2 of 3                                     [address]  [address]    Consent for Anesthesia    I, Darin Bowens agree to be cared for by an anesthesiologist, who is specially trained to monitor me and give me medicine to put me to sleep or keep me comfortable during my procedure    I understand that my anesthesiologist is not an employee or agent of [facility] or [employer]. He or she works for [employer].    As the patient asking for anesthesia services, I agree to:  Allow the anesthesiologist (anesthesia doctor) to give me medicine and do additional procedures as necessary. Some examples are: Starting or using an “IV” to give me medicine, fluids or blood during my procedure, and having a breathing tube placed to help me breathe when I’m asleep (intubation). In the event that my heart stops working properly, I understand that my anesthesiologist will make every effort to sustain my life, unless otherwise directed by [facility] Do Not Resuscitate documents.  Tell my anesthesia doctor before my procedure:  If I am pregnant.  The last time that I ate or drank.  All of the medicines I take (including prescriptions, herbal supplements, and pills I can buy without a prescription (including street drugs/illegal medications). Failure to inform my anesthesiologist about these medicines may increase my risk of anesthetic complications.  If I am allergic to anything or have had a reaction to anesthesia before.  I understand how the anesthesia medicine will help me (benefits).  I understand that with any type of anesthesia medicine there are risks:  The most common risks are: nausea, vomiting, sore throat, muscle soreness, damage to my eyes, mouth, or teeth (from breathing tube placement).  Rare risks include: remembering what happened during my procedure, allergic reactions to medications, injury to my airway, heart, lungs, vision, nerves, or muscles and in  extremely rare instances death.  My doctor has explained to me other choices available to me for my care (alternatives).  Pregnant Patients (“epidural”):  I understand that the risks of having an epidural (medicine given into my back to help control pain during labor), include itching, low blood pressure, difficulty urinating, headache or slowing of the baby’s heart. Very rare risks include infection, bleeding, seizure, irregular heart rhythms and nerve injury.  Regional Anesthesia (“spinal”, “epidural”, & “nerve blocks”):  I understand that rare but potential complications include headache, bleeding, infection, seizure, irregular heart rhythms, and nerve injury.    I can change my mind about having anesthesia services at any time before I get the medicine.    _____________________________________________________________________________  Patient (or Representative) Signature/Relationship to Patient  Date   Time    _____________________________________________________________________________   Name (if used)    Language/Organization   Time    _____________________________________________________________________________  Anesthesiologist Signature     Date   Time  I have discussed the procedure and information above with the patient (or patient’s representative) and answered their questions. The patient or their representative has agreed to have anesthesia services.    _____________________________________________________________________________  Witness        Date   Time  I have verified that the signature is that of the patient or patient’s representative, and that it was signed before the procedure  Patient Name: Darin MCINTYRE Gogo     : 1945                 Printed: 2024     Medical Record #: [id number]                     Page 3 of 3

## (undated) NOTE — LETTER
[facility] 3SW-A  [address] [address]  [phone]    Blood Transfusion Consent    In the course of your treatment, it may become necessary to administer a transfusion of blood or blood components. This form provides basic information concerning this procedure and, if signed by you, authorizes its administration. By signing this form, you agree that all of your questions about the administration of blood or blood products have been answered by the ordering medical professional or designee.    Description of Procedure  Blood is introduced into one of your veins, commonly in the arm, using a sterilized disposable needle. The amount of blood transfused, and whether the transfusion will be of blood or blood components is a judgement the physician will make based on your particular needs.    Risks  The transfusion is a common procedure of low risk.  MINOR AND TEMPORARY REACTIONS ARE NOT UNCOMMON, including a slight bruise, swelling or local reaction in the area where the needle pierces your skin, or a nonserious reaction to the transfused material itself, including headache, fever or mild skin reaction, such as rash.  Serious reactions are possible, though very unlikely, and include severe allergic reaction (shock) and destruction (hemolysis) of transfused blood cells.  Infectious diseases which are known to be transmitted by blood transfusion include certain types of viral Hepatitis(liver infection from a virus), Human Immunodeficiency Virus (HIV-1,2) infection, a viral infection known to cause Acquired Immunodeficiency Syndrome (AIDS), as well as certain other bacterial, viral, and parasitic diseases. While a minimal risk of acquiring an infectious disease from transfused blood exists, in accordance with the Federal and State law, all due care has been taken in donor selection and testing to avoid transmission of disease.    Alternatives  If loss of blood poses serious threats during your  treatment, THERE IS NO EFFECTIVE ALTERNATIVE TO BLOOD TRANSFUSION. However, if you have any further questions on this matter, your provider will fully explain the alternatives to you if it has not already been done.    I, ______________________________, have read/had read to me the above. I understand the matters bearing on the decision whether or not to authorize a transfusion of blood or blood components. I have no questions which have not been answered to my full satisfaction. I hereby consent to such transfusion as my physician may deem necessary or advisable in the course of my treatment.    ______________________________________________                    ___________________________  (Signature of Patient or Responsible party in case of minor,                 (Printed Name of Patient or incompetent, or unconscious patient)              Responsible Party)    ___________________________               _____________________  (Relationship to Patient if not self)                                    (Date and Time)    __________________________                                                           ______________________              (Signature of Witness)               (Printed Name of Witness)     Language line ()    Telephone/Verbal/Video Consent    __________________________                     ____________________  (Signature of 2nd Witness           (Printed Name of 2nd  Telephone/Verbal/Video Consent)           Witness)    Patient Name: Darin Bowens     : 1945                 Printed: 2024     Medical Record #: [id number]      Rev: 2023

## (undated) NOTE — LETTER
Date: 8/22/2024  Patient name: Darin Bowens  YOB: 1945  Medical Record Number: [id number]  Primary Coverage: Payor: MEDICARE / Plan: MEDICARE PART A&B / Product Type: *No Product type* /   Secondary Coverage: COMMERCIAL - [facility]  Insurance ID: [id number]  Patient Address: [address] [place] [id number]  Telephone Information:   Home Phone [phone]   Work Phone [phone]   Mobile [phone]       Encounter Date: 8/22/2024  Provider: DELMI Wheeler  Diagnosis:     ICD-10-CM   1. Non-pressure chronic ulcer of other part of left foot with necrosis of bone ([facility])  L97.524   2. Non-pressure chronic ulcer of other part of right foot with necrosis of bone ([facility])  L97.514   3. PAD (peripheral artery disease) ([facility])  I73.9   4. ESRD (end stage renal disease) ([facility])  N18.6   5. Diabetic foot ulcer with osteomyelitis ([facility])  E11.621    E11.69    L97.509    M86.9         Wound 07/18/24 #1 Foot Left (Active)   Date First Assessed/Time First Assessed: 07/18/24 1344    Wound Number (Wound Clinic Only): #1  Primary Wound Type: Diabetic Ulcer  Location: Foot  Wound Location Orientation: Left      Assessments 7/18/2024  2:00 PM 8/22/2024 10:00 AM   Wound Image       Drainage Amount Small Large   Drainage Description Serosanguineous Serosanguineous   Treatments Compression Compression;Wound Vac - Neg Pressure   Wound Vac Brand -- Medela   Wound Length (cm) 5.2 cm 3.5 cm   Wound Width (cm) 10.4 cm 9.3 cm   Wound Surface Area (cm^2) 54.08 cm^2 32.55 cm^2   Wound Depth (cm) 1 cm 1.2 cm   Wound Volume (cm^3) 54.08 cm^3 39.06 cm^3   Wound Healing % -- 28   Margins Well-defined edges Well-defined edges   Non-staged Wound Description Full thickness Full thickness   María-wound Assessment Clean;Dry;Blanchable erythema Dry;Edema   Wound Granulation Tissue Red;Spongy --   Wound Bed Granulation (%) 15 % 40 %   Wound Bed Slough (%) 85 % 30 %   Wound Bed Eschar (%) -- 20 %   Wound Odor None None    Exposed Structure Bone;Adipose --   Shape 10staples to periwound 10% adipose   Tunneling? No No   Undermining? No No   Sinus Tracts? No No   Balbuena Scale Grade 4 Grade 4       Inactive Orders   Date Order Priority Status Authorizing Provider   08/22/24 1152 Cellular tissue product application Diabetic Ulcer Left Foot Routine Completed Jenny Olmos, APRN   08/14/24 1452 Wound care Routine Discontinued Jose R Meza DPM   08/01/24 1142 Cellular tissue product application Diabetic Ulcer Left Foot Routine Completed Jenny Olmos, APRN   08/01/24 1116 Debridement Diabetic Ulcer Left Foot Routine Completed Jenny Olmos, APRN   07/25/24 1445 Debridement Diabetic Ulcer Left Foot Routine Completed Jenny Olmos, APRN       Wound 07/18/24 #2 right Foot Right (Active)   Date First Assessed/Time First Assessed: 07/18/24 1345    Wound Number (Wound Clinic Only): #2 right  Primary Wound Type: Diabetic Ulcer  Location: Foot  Wound Location Orientation: Right      Assessments 7/18/2024  1:57 PM 8/22/2024 10:03 AM   Wound Image        Drainage Amount Small Scant   Drainage Description Serosanguineous Serosanguineous   Treatments Compression Compression   Wound Length (cm) 4.4 cm 0.7 cm   Wound Width (cm) 14 cm 13.4 cm   Wound Surface Area (cm^2) 61.6 cm^2 9.38 cm^2   Wound Depth (cm) 2.1 cm 0.3 cm   Wound Volume (cm^3) 129.36 cm^3 2.814 cm^3   Wound Healing % -- 98   Margins Well-defined edges Well-defined edges   Non-staged Wound Description Full thickness Full thickness   Hailey-wound Assessment Clean;Dry;Blanchable erythema Dry;Edema;Blanchable erythema   Wound Granulation Tissue Red;Pink;Spongy Firm;Red;Pink   Wound Bed Granulation (%) 30 % 10 %   Wound Bed Epithelium (%) 20 % 80 %   Wound Bed Slough (%) 50 % 10 %   Wound Odor None None   Exposed Structure Bone;Adipose --   Shape 9 staples noted to hailey wound 14 sutures noted   Tunneling? No No   Undermining? No No   Sinus Tracts? No No   Balbuena Scale Grade 4 Grade 4        Inactive Orders   Date Order Priority Status Authorizing Provider   08/14/24 1452 Wound care Routine Discontinued Derrick Jose Ralison CORDERO DPM   08/01/24 1140 Cellular tissue product application Diabetic Ulcer Right Foot Routine Completed Jenny Olmos, DELMI   08/01/24 1058 Debridement Diabetic Ulcer Right Foot Routine Completed Jenny Olmos, DELMI   07/25/24 1445 Debridement Diabetic Ulcer Right Foot Routine Completed Jenny Olmos APRN       Wound 07/18/24 #3 Ankle Posterior;Right (Active)   Date First Assessed/Time First Assessed: 07/18/24 1347    Wound Number (Wound Clinic Only): #3  Primary Wound Type: Diabetic Ulcer  Location: Ankle  Wound Location Orientation: Posterior;Right      Assessments 7/18/2024  2:07 PM 8/22/2024 10:06 AM   Wound Image       Drainage Amount None Scant   Drainage Description -- Serosanguineous   Treatments Compression --   Wound Length (cm) 4.5 cm 4.2 cm   Wound Width (cm) 2.1 cm 1.5 cm   Wound Surface Area (cm^2) 9.45 cm^2 6.3 cm^2   Wound Depth (cm) 0.1 cm 0.1 cm   Wound Volume (cm^3) 0.945 cm^3 0.63 cm^3   Wound Healing % -- 33   Margins -- Well-defined edges   Non-staged Wound Description -- Full thickness   María-wound Assessment Dry;Blanchable erythema Edema;Dry   Wound Granulation Tissue Pink;Firm Firm;Pink   Wound Bed Granulation (%) 10 % 25 %   Wound Bed Epithelium (%) 10 % --   Wound Bed Slough (%) -- 25 %   Wound Bed Eschar (%) 80 % 50 %   Wound Odor None None   Shape blister to maría --   Tunneling? No No   Undermining? No No   Sinus Tracts? No No   Pressure Injury Stage Unstageable --   Balbuena Scale -- Grade 4       Inactive Orders   Date Order Priority Status Authorizing Provider   07/25/24 1442 Debridement Pressure Injury Posterior;Right Ankle Routine Completed Jenny Olmos APRN       Wound 07/18/24 #4 Right heel wound Heel Right (Active)   Date First Assessed/Time First Assessed: 07/18/24 1428    Wound Number (Wound Clinic Only): #4 Right heel wound  Primary Wound Type:  Pressure Injury  Location: Heel  Wound Location Orientation: Right      Assessments 7/18/2024  2:31 PM 8/22/2024 10:08 AM   Wound Image       Drainage Amount None Scant   Drainage Description -- Serosanguineous   Treatments Compression --   Wound Length (cm) 0.5 cm 0.4 cm   Wound Width (cm) 1 cm 1.3 cm   Wound Surface Area (cm^2) 0.5 cm^2 0.52 cm^2   Wound Depth (cm) 0 cm 0.1 cm   Wound Volume (cm^3) 0 cm^3 0.052 cm^3   Margins Well-defined edges Well-defined edges   Non-staged Wound Description -- Full thickness   María-wound Assessment Dry;Clean Dry   Wound Bed Granulation (%) -- 50 %   Wound Bed Epithelium (%) 100 % --   Wound Odor None None   Shape -- 50% scabbed   Tunneling? -- No   Undermining? -- No   Sinus Tracts? -- No   Pressure Injury Stage Deep Tissue Unstageable       No associated orders.       Compression Wrap 08/01/24 Foot Dorsal;Right (Active)   Placement Date/Time: 08/01/24 1147   Location: Foot  Wound Location Orientation: Dorsal;Right      Assessments 8/1/2024 11:47 AM 8/22/2024 10:08 AM   Response to Treatment Well tolerated Well tolerated   Compression Layers Multilayer Multilayer   Compression Product Type Unna Boot Unna Boot   Dressing Applied Yes Yes   Compression Wrap Location Toes to Knee Toes to Knee   Compression Wrap Status Clean;Dry Clean;Dry;Intact       No associated orders.       Compression Wrap 08/01/24 Foot Dorsal;Left (Active)   Placement Date/Time: 08/01/24 1148   Location: Foot  Wound Location Orientation: Dorsal;Left      Assessments 8/1/2024 11:48 AM 8/22/2024 10:08 AM   Response to Treatment Well tolerated Well tolerated   Compression Layers Multilayer Multilayer   Compression Product Type Unna Boot Unna Boot   Dressing Applied Yes Yes   Compression Wrap Location Toes to Knee Toes to Knee   Compression Wrap Status Clean;Dry Clean;Dry;Intact       No associated orders.       Wound 08/22/24 #5 Left Heel Heel Left (Active)   Date First Assessed/Time First Assessed: 08/22/24  1021    Wound Number (Wound Clinic Only): #5 Left Heel  Primary Wound Type: Pressure Injury  Location: Heel  Wound Location Orientation: Left      Assessments 8/22/2024 10:49 AM   Wound Image     Drainage Amount None   Treatments Betadine   Wound Length (cm) 1.6 cm   Wound Width (cm) 0.5 cm   Wound Surface Area (cm^2) 0.8 cm^2   Wound Depth (cm) 0 cm   Wound Volume (cm^3) 0 cm^3   María-wound Assessment Clean   Wound Bed Epithelium (%) 100 %   Wound Odor None   Pressure Injury Stage Deep Tissue       No associated orders.       Wound 08/22/24 #6 Left Anterior Ankle Ankle Anterior;Left (Active)   Date First Assessed/Time First Assessed: 08/22/24 1022    Wound Number (Wound Clinic Only): #6 Left Anterior Ankle  Primary Wound Type: Pressure Injury  Location: Ankle  Wound Location Orientation: Anterior;Left      Assessments 8/22/2024 10:50 AM   Wound Image     Drainage Amount None   Treatments Betadine   Wound Length (cm) 1.2 cm   Wound Width (cm) 0.6 cm   Wound Surface Area (cm^2) 0.72 cm^2   Wound Depth (cm) 0 cm   Wound Volume (cm^3) 0 cm^3   María-wound Assessment Clean   Wound Bed Epithelium (%) 100 %   Wound Odor None   Pressure Injury Stage Stage 1       No associated orders.       Negative Pressure Wound Therapy Dorsal;Left (Active)   Placement Date/Time: 08/22/24 1258   Wound Location Orientation: Dorsal;Left      Assessments 8/22/2024 10:49 AM   Wound photographed/measured Yes   Machine Status (On) Yes   Unit Type Medella   Dressing Type Other (Comment)   Number of Foam Pieces Used 2   Cycle Continuous   Target Pressure (mmHg) 125   Canister Changed Yes       No associated orders.                 Additional Notes:  Leave all dressings in place until pt comes back to wound clinic.   Please use blue heel off loading boots while in bed.       Additional home health DME: No

## (undated) NOTE — LETTER
Saadia Ann [address] [address]  [place], [address] [facility]    Consent for Operation  Date: __________________                                Time: _______________    1.  I authorize the performance upon Vaelrie Castaneda the following operation:  Pro procedure has been videotaped, the surgeon will obtain the original videotape. The hospital will not be responsible for storage or maintenance of this tape.   7. For the purpose of advancing medical education, I consent to the admittance of observers to the STATEMENTS REQUIRING INSERTION OR COMPLETION WERE FILLED IN.     Signature of Patient:   ___________________________    When the patient is a minor or mentally incompetent to give consent:  Signature of person authorized to consent for patient: ____________ supplements, and pills I can buy without a prescription (including street drugs/illegal medications). Failure to inform my anesthesiologist about these medicines may increase my risk of anesthetic complications. iv.  If I am allergic to anything or have ha Anesthesiologist Signature     Date   Time  I have discussed the procedure and information above with the patient (or patient’s representative) and answered their questions. The patient or their representative has agreed to have anesthesia services.     ___

## (undated) NOTE — LETTER
[address]  [address]  Authorization for Surgical Operation and Procedure     Date:___________                                                                                                         Time:__________  I hereby authorize Surgeon(s):  Jose R Meza DPM, my physician and his/her assistants (if applicable), which may include medical students, residents, and/or fellows, to perform the following surgical operation/ procedure and administer such anesthesia as may be determined necessary by my physician:  Operation/Procedure name (s) Procedure(s):  RIGHT FOOT WOUND DEBRIDEMENT PARTIAL RESECTION OF 1ST AND 2ND METATARSAL, LEFT FOOT WOUND DEBRIDEMENT WITH PARTIAL RESECTION OF 2ND METATARSAL on Darin Bowens   2.   I recognize that during the surgical operation/procedure, unforeseen conditions may necessitate additional or different procedures than those listed above.  I, therefore, further authorize and request that the above-named surgeon, assistants, or designees perform such procedures as are, in their judgment, necessary and desirable.    3.   My surgeon/physician has discussed prior to my surgery the potential benefits, risks and side effects of this procedure; the likelihood of achieving goals; and potential problems that might occur during recuperation.  They also discussed reasonable alternatives to the procedure, including risks, benefits, and side effects related to the alternatives and risks related to not receiving this procedure.  I have had all my questions answered and I acknowledge that no guarantee has been made as to the result that may be obtained.    4.   Should the need arise during my operation/procedure, which includes change of level of care prior to discharge, I also consent to the administration of blood and/or blood products.  Further, I understand that despite careful testing and screening of blood or blood products by collecting  agencies, I may still be subject to ill effects as a result of receiving a blood transfusion and/or blood products.  The following are some, but not all, of the potential risks that can occur: fever and allergic reactions, hemolytic reactions, transmission of diseases such as Hepatitis, AIDS and Cytomegalovirus (CMV) and fluid overload.  In the event that I wish to have an autologous transfusion of my own blood, or a directed donor transfusion, I will discuss this with my physician.  Check only if Refusing Blood or Blood Products  I understand refusal of blood or blood products as deemed necessary by my physician may have serious consequences to my condition to include possible death. I hereby assume responsibility for my refusal and release the hospital, its personnel, and my physicians from any responsibility for the consequences of my refusal.          o  Refuse      5.   I authorize the use of any specimen, organs, tissues, body parts or foreign objects that may be removed from my body during the operation/procedure for diagnosis, research or teaching purposes and their subsequent disposal by hospital authorities.  I also authorize the release of specimen test results and/or written reports to my treating physician on the hospital medical staff or other referring or consulting physicians involved in my care, at the discretion of the Pathologist or my treating physician.    6.   I consent to the photographing or videotaping of the operations or procedures to be performed, including appropriate portions of my body for medical, scientific, or educational purposes, provided my identity is not revealed by the pictures or by descriptive texts accompanying them.  If the procedure has been photographed/videotaped, the surgeon will obtain the original picture, image, videotape or CD.  The hospital will not be responsible for storage, release or maintenance of the picture, image, tape or CD.    7.   I consent to the  presence of a  or observers in the operating room as deemed necessary by my physician or their designees.    8.   I recognize that in the event my procedure results in extended X-Ray/fluoroscopy time, I may develop a skin reaction.    9. If I have a Do Not Attempt Resuscitation (DNAR) order in place, that status will be suspended while in the operating room, procedural suite, and during the recovery period unless otherwise explicitly stated by me (or a person authorized to consent on my behalf). The surgeon or my attending physician will determine when the applicable recovery period ends for purposes of reinstating the DNAR order.  10. Patients having a sterilization procedure: I understand that if the procedure is successful the results will be permanent and it will therefore be impossible for me to inseminate, conceive, or bear children.  I also understand that the procedure is intended to result in sterility, although the result has not been guaranteed.   11. I acknowledge that my physician has explained sedation/analgesia administration to me including the risk and benefits I consent to the administration of sedation/analgesia as may be necessary or desirable in the judgment of my physician.    I CERTIFY THAT I HAVE READ AND FULLY UNDERSTAND THE ABOVE CONSENT TO OPERATION and/or OTHER PROCEDURE.    _________________________________________  __________________________________  Signature of Patient     Signature of Responsible Person         ___________________________________         Printed Name of Responsible Person           _________________________________                 Relationship to Patient  _________________________________________  ______________________________  Signature of Witness          Date  Time      Patient Name: Darin MIGUELINA Bowens     : 1945                 Printed: 2024     Medical Record #: [id number]                     Page 2 of 3                                     [address]  [address]    Consent for Anesthesia    I, Darin Bowens agree to be cared for by an anesthesiologist, who is specially trained to monitor me and give me medicine to put me to sleep or keep me comfortable during my procedure    I understand that my anesthesiologist is not an employee or agent of [facility] or [employer]. He or she works for [employer].    As the patient asking for anesthesia services, I agree to:  Allow the anesthesiologist (anesthesia doctor) to give me medicine and do additional procedures as necessary. Some examples are: Starting or using an “IV” to give me medicine, fluids or blood during my procedure, and having a breathing tube placed to help me breathe when I’m asleep (intubation). In the event that my heart stops working properly, I understand that my anesthesiologist will make every effort to sustain my life, unless otherwise directed by [facility] Do Not Resuscitate documents.  Tell my anesthesia doctor before my procedure:  If I am pregnant.  The last time that I ate or drank.  All of the medicines I take (including prescriptions, herbal supplements, and pills I can buy without a prescription (including street drugs/illegal medications). Failure to inform my anesthesiologist about these medicines may increase my risk of anesthetic complications.  If I am allergic to anything or have had a reaction to anesthesia before.  I understand how the anesthesia medicine will help me (benefits).  I understand that with any type of anesthesia medicine there are risks:  The most common risks are: nausea, vomiting, sore throat, muscle soreness, damage to my eyes, mouth, or teeth (from breathing tube placement).  Rare risks include: remembering what happened during my procedure, allergic reactions to medications, injury to my airway, heart, lungs, vision, nerves, or muscles and in extremely  rare instances death.  My doctor has explained to me other choices available to me for my care (alternatives).  Pregnant Patients (“epidural”):  I understand that the risks of having an epidural (medicine given into my back to help control pain during labor), include itching, low blood pressure, difficulty urinating, headache or slowing of the baby’s heart. Very rare risks include infection, bleeding, seizure, irregular heart rhythms and nerve injury.  Regional Anesthesia (“spinal”, “epidural”, & “nerve blocks”):  I understand that rare but potential complications include headache, bleeding, infection, seizure, irregular heart rhythms, and nerve injury.    I can change my mind about having anesthesia services at any time before I get the medicine.    _____________________________________________________________________________  Patient (or Representative) Signature/Relationship to Patient  Date   Time    _____________________________________________________________________________   Name (if used)    Language/Organization   Time    _____________________________________________________________________________  Anesthesiologist Signature     Date   Time  I have discussed the procedure and information above with the patient (or patient’s representative) and answered their questions. The patient or their representative has agreed to have anesthesia services.    _____________________________________________________________________________  Witness        Date   Time  I have verified that the signature is that of the patient or patient’s representative, and that it was signed before the procedure  Patient Name: Darin MCINTYRE Gogo     : 1945                 Printed: 2024     Medical Record #: [id number]                     Page 3 of 3

## (undated) NOTE — LETTER
[address]  [address]  Consent for Procedure/Sedation  Date: 6/28/24         Time: 1100    I hereby authorize Dr. Padilla, my physician and his/her assistants (if applicable), which may include medical students, residents, and/or fellows, to perform the following surgical operation/ procedure and administer such anesthesia as may be determined necessary by my physician: Permanent dialysis catheter insertion on Darin Bowens  2.   I recognize that during the surgical operation/procedure, unforeseen conditions may necessitate additional or different procedures than those listed above.  I, therefore, further authorize and request that the above-named surgeon, assistants, or designees perform such procedures as are, in their judgment, necessary and desirable.    3.   My surgeon/physician has discussed prior to my surgery the potential benefits, risks and side effects of this procedure; the likelihood of achieving goals; and potential problems that might occur during recuperation.  They also discussed reasonable alternatives to the procedure, including risks, benefits, and side effects related to the alternatives and risks related to not receiving this procedure.  I have had all my questions answered and I acknowledge that no guarantee has been made as to the result that may be obtained.    4.   Should the need arise during my operation/procedure, which includes change of level of care prior to discharge, I also consent to the administration of blood and/or blood products.  Further, I understand that despite careful testing and screening of blood or blood products by collecting agencies, I may still be subject to ill effects as a result of receiving a blood transfusion and/or blood products.  The following are some, but not all, of the potential risks that can occur: fever and allergic reactions, hemolytic reactions, transmission of diseases such as Hepatitis, AIDS and  Cytomegalovirus (CMV) and fluid overload.  In the event that I wish to have an autologous transfusion of my own blood, or a directed donor transfusion, I will discuss this with my physician.   Check only if Refusing Blood or Blood Products  I understand refusal of blood or blood products as deemed necessary by my physician may have serious consequences to my condition to include possible death. I hereby assume responsibility for my refusal and release the hospital, its personnel, and my physicians from any responsibility for the consequences of my refusal.         o  Refuse         5.   I authorize the use of any specimen, organs, tissues, body parts or foreign objects that may be removed from my body during the operation/procedure for diagnosis, research or teaching purposes and their subsequent disposal by hospital authorities.  I also authorize the release of specimen test results and/or written reports to my treating physician on the hospital medical staff or other referring or consulting physicians involved in my care, at the discretion of the Pathologist or my treating physician.    6.   I consent to the photographing or videotaping of the operations or procedures to be performed, including appropriate portions of my body for medical, scientific, or educational purposes, provided my identity is not revealed by the pictures or by descriptive texts accompanying them.  If the procedure has been photographed/videotaped, the surgeon will obtain the original picture, image, videotape or CD.  The hospital will not be responsible for storage, release or maintenance of the picture, image, tape or CD.    7.   I consent to the presence of a  or observers in the operating room as deemed necessary by my physician or their designees.    8.   I recognize that in the event my procedure results in extended X-Ray/fluoroscopy time, I may develop a skin reaction.    9. If I have a Do Not Attempt Resuscitation  (DNAR) order in place, that status will be suspended while in the operating room, procedural suite, and during the recovery period unless otherwise explicitly stated by me (or a person authorized to consent on my behalf). The surgeon or my attending physician will determine when the applicable recovery period ends for purposes of reinstating the DNAR order.  10. Patients having a sterilization procedure: I understand that if the procedure is successful the results will be permanent and it will therefore be impossible for me to inseminate, conceive, or bear children.  I also understand that the procedure is intended to result in sterility, although the result has not been guaranteed.   11. I acknowledge that my physician has explained sedation/analgesia administration to me including the risk and benefits I consent to the administration of sedation/analgesia as may be necessary or desirable in the judgment of my physician.    I CERTIFY THAT I HAVE READ AND FULLY UNDERSTAND THE ABOVE CONSENT TO OPERATION and/or OTHER PROCEDURE.        ____________________________________       _________________________________      ______________________________  Signature of Patient         Signature of Responsible Person        Printed Name of Responsible Person        ____________________________________      _________________________________      ______________________________       Signature of Witness          Relationship to Patient                       Date                                       Time  Patient Name: Darin MIGUELINA Bowens  : 1945    Reviewed: 2024   Printed: 2024  Medical Record #: [id number] Page 1 of 1

## (undated) NOTE — LETTER
Saadia Ann [address]  [address] [facility] [address], [address] [facility]  Authorization for Surgical Operation and Procedure     Date:___________                                                                                                         Time:__________ potential risks that can occur: fever and allergic reactions, hemolytic reactions, transmission of diseases such as Hepatitis, AIDS and Cytomegalovirus (CMV) and fluid overload.   In the event that I wish to have an autologous transfusion of my own blood, o will determine when the applicable recovery period ends for purposes of reinstating the DNAR order.   10. Patients having a sterilization procedure: I understand that if the procedure is successful the results will be permanent and it will therefore be impo anesthesiologist (anesthesia doctor) to give me medicine and do additional procedures as necessary.  Some examples are: Starting or using an “IV” to give me medicine, fluids or blood during my procedure, and having a breathing tube placed to help me breathe “epidural”, & “nerve blocks”): I understand that rare but potential complications include headache, bleeding, infection, seizure, irregular heart rhythms, and nerve injury.     I can change my mind about having anesthesia services at any time before I get

## (undated) NOTE — LETTER
[address]  [id number]  Authorization for Surgical Operation and Procedure     Date:___________                                                                                                         Time:__________  I hereby authorize Jose R Meza DPM, my physician and his/her assistants (if applicable), which may include medical students, residents, and/or fellows, to perform the following surgical operation/ procedure and administer such anesthesia as may be determined necessary by my physician: RIGHT FOOT WOUND DEBRIDMENT & APPLICATION WOUND VAC AND LEFT TRANS-METARTASAL AMPUTATION W/ ACHILES TENDON on Darin MCINTYRE Gogo   2.   I recognize that during the surgical operation/procedure, unforeseen conditions may necessitate additional or different procedures than those listed above.  I, therefore, further authorize and request that the above-named surgeon, assistants, or designees perform such procedures as are, in their judgment, necessary and desirable.    3.   My surgeon/physician has discussed prior to my surgery the potential benefits, risks and side effects of this procedure; the likelihood of achieving goals; and potential problems that might occur during recuperation.  They also discussed reasonable alternatives to the procedure, including risks, benefits, and side effects related to the alternatives and risks related to not receiving this procedure.  I have had all my questions answered and I acknowledge that no guarantee has been made as to the result that may be obtained.    4.   Should the need arise during my operation/procedure, which includes change of level of care prior to discharge, I also consent to the administration of blood and/or blood products.  Further, I understand that despite careful testing and screening of blood or blood products by collecting agencies, I may still be subject to ill effects as a result of receiving a blood transfusion and/or  blood products.  The following are some, but not all, of the potential risks that can occur: fever and allergic reactions, hemolytic reactions, transmission of diseases such as Hepatitis, AIDS and Cytomegalovirus (CMV) and fluid overload.  In the event that I wish to have an autologous transfusion of my own blood, or a directed donor transfusion, I will discuss this with my physician.  Check only if Refusing Blood or Blood Products  I understand refusal of blood or blood products as deemed necessary by my physician may have serious consequences to my condition to include possible death. I hereby assume responsibility for my refusal and release the hospital, its personnel, and my physicians from any responsibility for the consequences of my refusal.          o  Refuse      5.   I authorize the use of any specimen, organs, tissues, body parts or foreign objects that may be removed from my body during the operation/procedure for diagnosis, research or teaching purposes and their subsequent disposal by hospital authorities.  I also authorize the release of specimen test results and/or written reports to my treating physician on the hospital medical staff or other referring or consulting physicians involved in my care, at the discretion of the Pathologist or my treating physician.    6.   I consent to the photographing or videotaping of the operations or procedures to be performed, including appropriate portions of my body for medical, scientific, or educational purposes, provided my identity is not revealed by the pictures or by descriptive texts accompanying them.  If the procedure has been photographed/videotaped, the surgeon will obtain the original picture, image, videotape or CD.  The hospital will not be responsible for storage, release or maintenance of the picture, image, tape or CD.    7.   I consent to the presence of a  or observers in the operating room as deemed necessary by my physician  or their designees.    8.   I recognize that in the event my procedure results in extended X-Ray/fluoroscopy time, I may develop a skin reaction.    9. If I have a Do Not Attempt Resuscitation (DNAR) order in place, that status will be suspended while in the operating room, procedural suite, and during the recovery period unless otherwise explicitly stated by me (or a person authorized to consent on my behalf). The surgeon or my attending physician will determine when the applicable recovery period ends for purposes of reinstating the DNAR order.  10. Patients having a sterilization procedure: I understand that if the procedure is successful the results will be permanent and it will therefore be impossible for me to inseminate, conceive, or bear children.  I also understand that the procedure is intended to result in sterility, although the result has not been guaranteed.   11. I acknowledge that my physician has explained sedation/analgesia administration to me including the risk and benefits I consent to the administration of sedation/analgesia as may be necessary or desirable in the judgment of my physician.    I CERTIFY THAT I HAVE READ AND FULLY UNDERSTAND THE ABOVE CONSENT TO OPERATION and/or OTHER PROCEDURE.    _________________________________________  __________________________________  Signature of Patient     Signature of Responsible Person         ___________________________________         Printed Name of Responsible Person           _________________________________                 Relationship to Patient  _________________________________________  ______________________________  Signature of Witness          Date  Time      Patient Name: Darin MIGUELINA Bowens     : 1945                 Printed: 2024     Medical Record #: [id number]                     Page 1 of 2                                    [address]  [address]    Consent for  Anesthesia    I, Darin Bowens agree to be cared for by an anesthesiologist, who is specially trained to monitor me and give me medicine to put me to sleep or keep me comfortable during my procedure    I understand that my anesthesiologist is not an employee or agent of [facility] or [email or web address]. He or she works for [employer].    As the patient asking for anesthesia services, I agree to:  Allow the anesthesiologist (anesthesia doctor) to give me medicine and do additional procedures as necessary. Some examples are: Starting or using an “IV” to give me medicine, fluids or blood during my procedure, and having a breathing tube placed to help me breathe when I’m asleep (intubation). In the event that my heart stops working properly, I understand that my anesthesiologist will make every effort to sustain my life, unless otherwise directed by [facility] Do Not Resuscitate documents.  Tell my anesthesia doctor before my procedure:  If I am pregnant.  The last time that I ate or drank.  All of the medicines I take (including prescriptions, herbal supplements, and pills I can buy without a prescription (including street drugs/illegal medications). Failure to inform my anesthesiologist about these medicines may increase my risk of anesthetic complications.  If I am allergic to anything or have had a reaction to anesthesia before.  I understand how the anesthesia medicine will help me (benefits).  I understand that with any type of anesthesia medicine there are risks:  The most common risks are: nausea, vomiting, sore throat, muscle soreness, damage to my eyes, mouth, or teeth (from breathing tube placement).  Rare risks include: remembering what happened during my procedure, allergic reactions to medications, injury to my airway, heart, lungs, vision, nerves, or muscles and in extremely rare instances death.  My doctor has explained to me other choices available to me for my  care (alternatives).  Pregnant Patients (“epidural”):  I understand that the risks of having an epidural (medicine given into my back to help control pain during labor), include itching, low blood pressure, difficulty urinating, headache or slowing of the baby’s heart. Very rare risks include infection, bleeding, seizure, irregular heart rhythms and nerve injury.  Regional Anesthesia (“spinal”, “epidural”, & “nerve blocks”):  I understand that rare but potential complications include headache, bleeding, infection, seizure, irregular heart rhythms, and nerve injury.    I can change my mind about having anesthesia services at any time before I get the medicine.    _____________________________________________________________________________  Patient (or Representative) Signature/Relationship to Patient  Date   Time    _____________________________________________________________________________   Name (if used)    Language/Organization   Time    _____________________________________________________________________________  Anesthesiologist Signature     Date   Time  I have discussed the procedure and information above with the patient (or patient’s representative) and answered their questions. The patient or their representative has agreed to have anesthesia services.    _____________________________________________________________________________  Witness        Date   Time  I have verified that the signature is that of the patient or patient’s representative, and that it was signed before the procedure  Patient Name: Darin MCINTYRE Gogo     : 1945                 Printed: 2024     Medical Record #: [id number]                     Page 2 of 2